# Patient Record
Sex: MALE | Race: WHITE | Employment: OTHER | URBAN - METROPOLITAN AREA
[De-identification: names, ages, dates, MRNs, and addresses within clinical notes are randomized per-mention and may not be internally consistent; named-entity substitution may affect disease eponyms.]

---

## 2023-02-16 ENCOUNTER — OFFICE VISIT (OUTPATIENT)
Dept: INTERNAL MEDICINE CLINIC | Age: 73
End: 2023-02-16
Payer: COMMERCIAL

## 2023-02-16 VITALS
OXYGEN SATURATION: 98 % | HEART RATE: 75 BPM | WEIGHT: 155.6 LBS | HEIGHT: 70 IN | DIASTOLIC BLOOD PRESSURE: 76 MMHG | SYSTOLIC BLOOD PRESSURE: 128 MMHG | BODY MASS INDEX: 22.28 KG/M2

## 2023-02-16 DIAGNOSIS — G45.9 TIA (TRANSIENT ISCHEMIC ATTACK): ICD-10-CM

## 2023-02-16 DIAGNOSIS — E53.8 VITAMIN B 12 DEFICIENCY: Primary | ICD-10-CM

## 2023-02-16 DIAGNOSIS — Z91.81 AT HIGH RISK FOR FALLS: ICD-10-CM

## 2023-02-16 DIAGNOSIS — I10 PRIMARY HYPERTENSION: ICD-10-CM

## 2023-02-16 DIAGNOSIS — L98.9 SKIN LESION: ICD-10-CM

## 2023-02-16 DIAGNOSIS — I25.83 CORONARY ARTERY DISEASE DUE TO LIPID RICH PLAQUE: ICD-10-CM

## 2023-02-16 DIAGNOSIS — F17.200 SMOKER: ICD-10-CM

## 2023-02-16 DIAGNOSIS — G21.9 SECONDARY PARKINSONISM, UNSPECIFIED SECONDARY PARKINSONISM TYPE (HCC): ICD-10-CM

## 2023-02-16 DIAGNOSIS — E78.5 HYPERLIPIDEMIA, UNSPECIFIED HYPERLIPIDEMIA TYPE: ICD-10-CM

## 2023-02-16 DIAGNOSIS — I25.10 CORONARY ARTERY DISEASE DUE TO LIPID RICH PLAQUE: ICD-10-CM

## 2023-02-16 DIAGNOSIS — I48.91 ATRIAL FIBRILLATION, UNSPECIFIED TYPE (HCC): ICD-10-CM

## 2023-02-16 PROCEDURE — 3074F SYST BP LT 130 MM HG: CPT | Performed by: INTERNAL MEDICINE

## 2023-02-16 PROCEDURE — 1123F ACP DISCUSS/DSCN MKR DOCD: CPT | Performed by: INTERNAL MEDICINE

## 2023-02-16 PROCEDURE — 3078F DIAST BP <80 MM HG: CPT | Performed by: INTERNAL MEDICINE

## 2023-02-16 PROCEDURE — 99204 OFFICE O/P NEW MOD 45 MIN: CPT | Performed by: INTERNAL MEDICINE

## 2023-02-16 RX ORDER — MIRTAZAPINE 30 MG/1
TABLET, FILM COATED ORAL
COMMUNITY
Start: 2023-01-16

## 2023-02-16 RX ORDER — LEVOTHYROXINE SODIUM 0.07 MG/1
75 TABLET ORAL DAILY
COMMUNITY

## 2023-02-16 RX ORDER — MELOXICAM 7.5 MG/1
7.5 TABLET ORAL DAILY
COMMUNITY

## 2023-02-16 RX ORDER — MULTIVIT-MIN/FERROUS FUMARATE 9 MG/15 ML
LIQUID (ML) ORAL
COMMUNITY

## 2023-02-16 RX ORDER — AMLODIPINE BESYLATE 5 MG/1
TABLET ORAL
COMMUNITY
Start: 2023-01-31

## 2023-02-16 RX ORDER — ASPIRIN 81 MG/1
81 TABLET ORAL DAILY
COMMUNITY

## 2023-02-16 RX ORDER — SUMATRIPTAN 50 MG/1
TABLET, FILM COATED ORAL
COMMUNITY
Start: 2023-01-28

## 2023-02-16 RX ORDER — EVOLOCUMAB 140 MG/ML
INJECTION, SOLUTION SUBCUTANEOUS
COMMUNITY

## 2023-02-16 RX ORDER — CLOPIDOGREL BISULFATE 75 MG/1
TABLET ORAL
COMMUNITY
Start: 2023-02-12

## 2023-02-16 RX ORDER — TRAMADOL HYDROCHLORIDE 50 MG/1
TABLET ORAL
COMMUNITY
Start: 2023-01-20

## 2023-02-16 RX ORDER — RANOLAZINE 1000 MG/1
500 TABLET, EXTENDED RELEASE ORAL 2 TIMES DAILY
COMMUNITY

## 2023-02-16 RX ORDER — UBIDECARENONE 100 MG
100 CAPSULE ORAL DAILY
COMMUNITY

## 2023-02-16 RX ORDER — PREGABALIN 75 MG/1
CAPSULE ORAL
COMMUNITY
Start: 2023-01-18

## 2023-02-16 SDOH — ECONOMIC STABILITY: HOUSING INSECURITY
IN THE LAST 12 MONTHS, WAS THERE A TIME WHEN YOU DID NOT HAVE A STEADY PLACE TO SLEEP OR SLEPT IN A SHELTER (INCLUDING NOW)?: NO

## 2023-02-16 SDOH — ECONOMIC STABILITY: INCOME INSECURITY: HOW HARD IS IT FOR YOU TO PAY FOR THE VERY BASICS LIKE FOOD, HOUSING, MEDICAL CARE, AND HEATING?: NOT HARD AT ALL

## 2023-02-16 SDOH — ECONOMIC STABILITY: FOOD INSECURITY: WITHIN THE PAST 12 MONTHS, YOU WORRIED THAT YOUR FOOD WOULD RUN OUT BEFORE YOU GOT MONEY TO BUY MORE.: NEVER TRUE

## 2023-02-16 SDOH — ECONOMIC STABILITY: FOOD INSECURITY: WITHIN THE PAST 12 MONTHS, THE FOOD YOU BOUGHT JUST DIDN'T LAST AND YOU DIDN'T HAVE MONEY TO GET MORE.: NEVER TRUE

## 2023-02-16 ASSESSMENT — ENCOUNTER SYMPTOMS
SHORTNESS OF BREATH: 0
CONSTIPATION: 0
DIARRHEA: 0
APNEA: 0
CHEST TIGHTNESS: 0
COUGH: 0
WHEEZING: 0
COLOR CHANGE: 0
ABDOMINAL PAIN: 0
BACK PAIN: 0
FACIAL SWELLING: 0
ABDOMINAL DISTENTION: 0

## 2023-02-16 ASSESSMENT — PATIENT HEALTH QUESTIONNAIRE - PHQ9
SUM OF ALL RESPONSES TO PHQ QUESTIONS 1-9: 0
SUM OF ALL RESPONSES TO PHQ9 QUESTIONS 1 & 2: 0
2. FEELING DOWN, DEPRESSED OR HOPELESS: 0
1. LITTLE INTEREST OR PLEASURE IN DOING THINGS: 0

## 2023-02-16 NOTE — PROGRESS NOTES
Subjective:      Patient ID: Estanislao Jeans is a 75 y.o. male. HPI-patient is here to establish care, he has multiple medical problems include hypertension, coronary artery disease s/p CABG in 2014, multiple stents in 2022, hyperlipidemia, history of TIA, atrial fibrillation not on anticoagulation with history of bleeding, Parkinson disease, chronic headache, likely chronic migraine, follows with [facility] , get botox  Patient, not taking his Repatha, he told me he has allergic reaction at the injection site,  Feeling very week, he was recently admitted to [facility]  Had extensive testing, seen by neurologist, diagnosed with Parkinson disease  Started on Sinemet  [facility] for his chronic headache  Patient since started on this medication he feels that his tremors are better, and his balance is improved  He has chronic rash on right side of his face, requesting Derm referral  Patient has seen structural heart physician on 10/2, for consideration of Watchman device, he will have ALKA soon, he cannot take blood thinners with history of bleeding    Review of Systems   Constitutional:  Positive for fatigue. Negative for activity change, appetite change, chills and diaphoresis. HENT:  Negative for congestion, dental problem, ear discharge, facial swelling and hearing loss. Respiratory:  Negative for apnea, cough, chest tightness, shortness of breath and wheezing. Cardiovascular:  Negative for chest pain and leg swelling. Gastrointestinal:  Negative for abdominal distention, abdominal pain, constipation and diarrhea. Genitourinary:  Negative for difficulty urinating, dysuria, enuresis, flank pain and frequency. Musculoskeletal:  Positive for gait problem. Negative for arthralgias, back pain and joint swelling. Skin:  Positive for rash (Right side of face). Negative for color change and pallor.    Neurological:  Positive for tremors and headaches. Negative for dizziness, seizures, facial asymmetry, light-headedness and numbness. Psychiatric/Behavioral:  Negative for agitation, behavioral problems, confusion, decreased concentration and dysphoric mood. Objective:   Physical Exam  Vitals and nursing note reviewed. Constitutional:       General: He is not in acute distress. Appearance: He is well-developed. He is not diaphoretic. HENT:      Head: Normocephalic and atraumatic. Mouth/Throat:      Pharynx: No oropharyngeal exudate. Eyes:      General: No scleral icterus. Right eye: No discharge. Left eye: No discharge. Conjunctiva/sclera: Conjunctivae normal.      Pupils: Pupils are equal, round, and reactive to light. Neck:      Thyroid: No thyromegaly. Vascular: No JVD. Trachea: No tracheal deviation. Cardiovascular:      Rate and Rhythm: Normal rate. Heart sounds: Normal heart sounds. No murmur heard. No gallop. Pulmonary:      Effort: Pulmonary effort is normal. No respiratory distress. Breath sounds: Normal breath sounds. No stridor. No wheezing or rales. Abdominal:      General: Bowel sounds are normal. There is no distension. Palpations: Abdomen is soft. Tenderness: There is no abdominal tenderness. There is no guarding or rebound. Musculoskeletal:         General: No tenderness. Normal range of motion. Cervical back: Normal range of motion and neck supple. Skin:     General: Skin is warm and dry. Findings: Rash (Hyperpigmented, nontender rash around  right cheek) present. No erythema. Neurological:      Mental Status: He is alert and oriented to person, place, and time.        Socioeconomic History    Marital status:      Spouse name: None    Number of children: None    Years of education: None    Highest education level: None   Tobacco Use    Smoking status: Never    Smokeless tobacco: Never   Substance and Sexual Activity    Alcohol use: Never    Drug use: Never     Social Determinants of Health     Financial Resource Strain: Low Risk     Difficulty of Paying Living Expenses: Not hard at all   Food Insecurity: No Food Insecurity    Worried About [address] in the Last Year: Never true    Ran Out of Food in the Last Year: Never true   Transportation Needs: Unknown    Lack of Transportation (Non-Medical): No   Housing Stability: Unknown    Unstable Housing in the Last Year: No        No family history on file. 1. Vitamin B 12 deficiency  - cyanocobalamin ([employer] VITAMIN B12) 1000 MCG tablet; Take 1 tablet by mouth daily  Dispense: 30 tablet; Refill: 3    2. Smoker  Advise to quit smoking     3. Coronary artery disease due to lipid rich plaque  S/p CABG and Multiple stents     4. Secondary parkinsonism, unspecified secondary Parkinsonism type ([address]  Recent eval by neurologist on Sinemet    5. Hyperlipidemia, unspecified hyperlipidemia type  Last LDL was more than 180, history of multiple coronary events, need to continue with Repatha  Only allergic reaction risk  is skin reaction    6. At high risk for falls  - Amb External Referral To Home Health    7. Primary hypertension  Controlled  8. TIA (transient ischemic attack)  On aspirin    9. Atrial fibrillation, unspecified type [facility]  Patient, had episode of bleeding with Eliquis he stopped it, was recently evaluated by cardiologist, will have ALKA and Watchman device    10. Skin lesion  - Georgia Link MD, Dermatology, [place]      Return in about 6 weeks (around 3/30/2023). Reviewed prior labs and health maintenance. Discussed use, benefit, and side effects of prescribed medications. Barriers to medication compliance addressed. All patient questions answered. Pt voiced understanding. MD TULIO [facility]  2/16/2023, 5:20 PM    Please note that this chart was generated using voice recognition Dragon dictation software.   Although every effort was made to ensure the accuracy of this automated transcription, some errors in transcription may have occurred. Assessment / Plan:    Visit Information    Have you changed or started any medications since your last visit including any over-the-counter medicines, vitamins, or herbal medicines? no   Are you having any side effects from any of your medications? -  no  Have you stopped taking any of your medications? Is so, why? -  no    Have you seen any other physician or provider since your last visit? No  Have you had any other diagnostic tests since your last visit? No  Have you been seen in the emergency room and/or had an admission to a hospital since we last saw you? No  Have you had your routine dental cleaning in the past 6 months? no    Have you activated your [address] account? If not, what are your barriers? No:      Patient Care Team:  Elwin Aase, MD as PCP - General (Internal Medicine)    Medical History Review  Past Medical, Family, and Social History reviewed and does not contribute to the patient presenting condition    Health Maintenance   Topic Date Due    Depression Screen  Never done    Hepatitis C screen  Never done    Colorectal Cancer Screen  Never done    Shingles vaccine (1 of 2) Never done    Pneumococcal 65+ years Vaccine (2 - PPSV23 if available, else PCV20) 11/27/2020    COVID-19 Vaccine (3 - Booster for Katrin series) 05/30/2022    DTaP/Tdap/Td vaccine (2 - Td or Tdap) 12/22/2026    Lipids  12/07/2027    Flu vaccine  Completed    Hepatitis A vaccine  Aged Out    Hib vaccine  Aged Out    Meningococcal (ACWY) vaccine  Aged Out              On the basis of positive falls risk screening, assessment and plan is as follows: home safety tips provided.

## 2023-03-06 RX ORDER — ASPIRIN 81 MG
TABLET, DELAYED RELEASE (ENTERIC COATED) ORAL
Qty: 90 TABLET | Refills: 2 | Status: SHIPPED | OUTPATIENT
Start: 2023-03-06 | End: 2023-03-08 | Stop reason: SDUPTHER

## 2023-03-08 DIAGNOSIS — E53.8 VITAMIN B 12 DEFICIENCY: ICD-10-CM

## 2023-03-08 RX ORDER — ASPIRIN 81 MG
TABLET, DELAYED RELEASE (ENTERIC COATED) ORAL
Qty: 90 TABLET | Refills: 2 | Status: SHIPPED | OUTPATIENT
Start: 2023-03-08

## 2023-03-08 NOTE — TELEPHONE ENCOUNTER
----- Message from Osvaldo Richardson sent at 3/8/2023 10:35 AM EST -----  Subject: Refill Request    QUESTIONS  Name of Medication? carbidopa-levodopa (SINEMET)  MG per tablet  Patient-reported dosage and instructions?  MG per tablet  How many days do you have left? 0  [id number]  Pharmacy phone number (if available)? [phone]  ---------------------------------------------------------------------------  --------------,  Name of Medication? cyanocobalamin ([employer] VITAMIN B12) 1000 MCG tablet  Patient-reported dosage and instructions? 1000 MCG tablet  How many days do you have left? 0  [id number]  Pharmacy phone number (if available)? [phone]  ---------------------------------------------------------------------------  --------------,  Name of Medication? Other - Thera - M multivitamin & iron product  Patient-reported dosage and instructions? Thera-M 9 mg iron-400 mcg tablet  How many days do you have left? 0  [id number]  Pharmacy phone number (if available)? [phone]  ---------------------------------------------------------------------------  --------------  CALL BACK INFO  What is the best way for the office to contact you? OK to leave message on   voicemail  Preferred Call Back Phone Number? [phone]  ---------------------------------------------------------------------------  --------------  SCRIPT ANSWERS  Relationship to Patient? Spouse/Partner  Representative Name? Neeraj Able  Is the representative on the Communication Release of Information (FAUSTO)   form in Epic?  Yes

## 2023-03-30 ENCOUNTER — OFFICE VISIT (OUTPATIENT)
Dept: INTERNAL MEDICINE CLINIC | Age: 73
End: 2023-03-30

## 2023-03-30 VITALS
SYSTOLIC BLOOD PRESSURE: 120 MMHG | OXYGEN SATURATION: 98 % | HEIGHT: 70 IN | HEART RATE: 86 BPM | WEIGHT: 173.4 LBS | DIASTOLIC BLOOD PRESSURE: 70 MMHG | BODY MASS INDEX: 24.82 KG/M2

## 2023-03-30 DIAGNOSIS — I25.10 CORONARY ARTERY DISEASE DUE TO LIPID RICH PLAQUE: ICD-10-CM

## 2023-03-30 DIAGNOSIS — E53.8 VITAMIN B 12 DEFICIENCY: ICD-10-CM

## 2023-03-30 DIAGNOSIS — I10 PRIMARY HYPERTENSION: ICD-10-CM

## 2023-03-30 DIAGNOSIS — I48.91 ATRIAL FIBRILLATION, UNSPECIFIED TYPE (HCC): ICD-10-CM

## 2023-03-30 DIAGNOSIS — Z12.11 COLON CANCER SCREENING: Primary | ICD-10-CM

## 2023-03-30 DIAGNOSIS — I25.83 CORONARY ARTERY DISEASE DUE TO LIPID RICH PLAQUE: ICD-10-CM

## 2023-03-30 ASSESSMENT — ENCOUNTER SYMPTOMS
ABDOMINAL DISTENTION: 0
CONSTIPATION: 0
COUGH: 0
WHEEZING: 0
SHORTNESS OF BREATH: 0
FACIAL SWELLING: 0
APNEA: 0
BACK PAIN: 1
DIARRHEA: 0
COLOR CHANGE: 0
CHEST TIGHTNESS: 0
ABDOMINAL PAIN: 0

## 2023-03-30 NOTE — PROGRESS NOTES
Subjective:      Patient ID: Jyoti León is a 75 y.o. male. Patient is here for evaluation of  multiple medical problems include hypertension, coronary artery disease s/p CABG in 2014, multiple stents in 2022, hyperlipidemia, history of TIA, atrial fibrillation not on anticoagulation with history of bleeding, Parkinson disease,  Follows with [facility] , Chronic Migraine , which is Controlled   Seen Back surgeon for Chronic back Pain,ordered MRI lower back, on Pain Meds from pain management   Had ALKA , will see Cardiologist Soon for watchman Device   Has Diagnosed with Prostate  Cancer Follows with Urologist at [facility] . Last PSA was 2.2   Has just one low readings of Low BP,he has Parkinson Dz   Has Vitamin B12 being Low   Review of Systems   Constitutional:  Positive for fatigue. Negative for activity change, appetite change, chills and diaphoresis. HENT:  Negative for congestion, dental problem, ear discharge, facial swelling and hearing loss. Respiratory:  Negative for apnea, cough, chest tightness, shortness of breath and wheezing. Cardiovascular:  Negative for chest pain and leg swelling. Gastrointestinal:  Negative for abdominal distention, abdominal pain, constipation and diarrhea. Genitourinary:  Negative for difficulty urinating, dysuria, enuresis, flank pain and frequency. Musculoskeletal:  Positive for back pain and gait problem. Negative for arthralgias and joint swelling. Skin:  Negative for color change, pallor and rash. Neurological:  Positive for tremors and headaches. Negative for dizziness, seizures, facial asymmetry, light-headedness and numbness. Psychiatric/Behavioral:  Negative for agitation, behavioral problems, confusion, decreased concentration and dysphoric mood. Objective:   Physical Exam  Vitals and nursing note reviewed. Constitutional:       General: He is not in acute distress. Appearance: He is well-developed. He is not diaphoretic.    HENT:      Head:

## 2023-07-05 ENCOUNTER — OFFICE VISIT (OUTPATIENT)
Dept: INTERNAL MEDICINE CLINIC | Age: 73
End: 2023-07-05
Payer: COMMERCIAL

## 2023-07-05 VITALS
DIASTOLIC BLOOD PRESSURE: 64 MMHG | OXYGEN SATURATION: 97 % | HEART RATE: 60 BPM | WEIGHT: 176 LBS | SYSTOLIC BLOOD PRESSURE: 120 MMHG | BODY MASS INDEX: 25.2 KG/M2 | HEIGHT: 70 IN

## 2023-07-05 DIAGNOSIS — I10 PRIMARY HYPERTENSION: ICD-10-CM

## 2023-07-05 DIAGNOSIS — E53.8 VITAMIN B12 DEFICIENCY: Primary | ICD-10-CM

## 2023-07-05 DIAGNOSIS — I25.83 CORONARY ARTERY DISEASE DUE TO LIPID RICH PLAQUE: ICD-10-CM

## 2023-07-05 DIAGNOSIS — I25.10 CORONARY ARTERY DISEASE DUE TO LIPID RICH PLAQUE: ICD-10-CM

## 2023-07-05 DIAGNOSIS — I48.91 ATRIAL FIBRILLATION, UNSPECIFIED TYPE (HCC): ICD-10-CM

## 2023-07-05 PROCEDURE — 99214 OFFICE O/P EST MOD 30 MIN: CPT | Performed by: INTERNAL MEDICINE

## 2023-07-05 PROCEDURE — 3074F SYST BP LT 130 MM HG: CPT | Performed by: INTERNAL MEDICINE

## 2023-07-05 PROCEDURE — 1123F ACP DISCUSS/DSCN MKR DOCD: CPT | Performed by: INTERNAL MEDICINE

## 2023-07-05 PROCEDURE — 3078F DIAST BP <80 MM HG: CPT | Performed by: INTERNAL MEDICINE

## 2023-07-05 ASSESSMENT — PATIENT HEALTH QUESTIONNAIRE - PHQ9
SUM OF ALL RESPONSES TO PHQ QUESTIONS 1-9: 0
SUM OF ALL RESPONSES TO PHQ QUESTIONS 1-9: 0
1. LITTLE INTEREST OR PLEASURE IN DOING THINGS: 0
SUM OF ALL RESPONSES TO PHQ9 QUESTIONS 1 & 2: 0
SUM OF ALL RESPONSES TO PHQ QUESTIONS 1-9: 0
2. FEELING DOWN, DEPRESSED OR HOPELESS: 0
SUM OF ALL RESPONSES TO PHQ QUESTIONS 1-9: 0

## 2023-07-05 ASSESSMENT — ENCOUNTER SYMPTOMS
COLOR CHANGE: 0
DIARRHEA: 0
FACIAL SWELLING: 0
BACK PAIN: 1
CONSTIPATION: 0
COUGH: 0
ABDOMINAL DISTENTION: 0
CHEST TIGHTNESS: 0
SHORTNESS OF BREATH: 0
ABDOMINAL PAIN: 0
APNEA: 0
WHEEZING: 0

## 2023-07-05 NOTE — PROGRESS NOTES
Subjective:      Patient ID: Tay Hernandez is a 75 y.o. male. HPIPatient is here for evaluation of  multiple medical problems include hypertension, CKD-3 , coronary artery disease s/p CABG in 2014, multiple stents in 2022, hyperlipidemia, history of TIA, atrial fibrillation not on anticoagulation with history of bleeding, Parkinson disease, ( follows with [facility]) , his dose of sinemet is increased   Follows with [facility] , Chronic Migraine , which is Controlled   Seen Back surgeon for Chronic back Pain,ordered MRI lower back, on Pain Meds from pain management   Has Diagnosed with Prostate  Cancer Follows with Urologist at [facility] . Last PSA was 2.2   Seen cardiologist , will have Watchman Device after Colonoscopy and Endoscopy , ( which is already scheduled on 8/2) , with H/o GI Bleed   He feels that his memory is getting worse , more concerned about short term Memmory , has H/o VItamin b12 def   Review of Systems   Constitutional:  Positive for fatigue. Negative for activity change, appetite change, chills and diaphoresis. HENT:  Negative for congestion, dental problem, ear discharge, facial swelling and hearing loss. Respiratory:  Negative for apnea, cough, chest tightness, shortness of breath and wheezing. Cardiovascular:  Negative for chest pain and leg swelling. Gastrointestinal:  Negative for abdominal distention, abdominal pain, constipation and diarrhea. Genitourinary:  Negative for difficulty urinating, dysuria, enuresis, flank pain and frequency. Musculoskeletal:  Positive for back pain and gait problem. Negative for arthralgias and joint swelling. Skin:  Negative for color change, pallor and rash. Neurological:  Positive for tremors and headaches. Negative for dizziness, seizures, facial asymmetry, light-headedness and numbness. Psychiatric/Behavioral:  Negative for agitation, behavioral problems, confusion, decreased concentration and dysphoric mood.       Objective:   Physical

## 2023-07-18 ENCOUNTER — TELEPHONE (OUTPATIENT)
Dept: INTERNAL MEDICINE CLINIC | Age: 73
End: 2023-07-18

## 2023-10-11 RX ORDER — LEVOTHYROXINE SODIUM 0.07 MG/1
75 TABLET ORAL DAILY
Qty: 30 TABLET | Refills: 1 | Status: SHIPPED | OUTPATIENT
Start: 2023-10-11

## 2023-10-11 NOTE — TELEPHONE ENCOUNTER
Patient called stating the pharmacy told him they sent a request for this medication and they did not.   Patient has been out of medication since last Thursday

## 2023-10-13 ENCOUNTER — TELEPHONE (OUTPATIENT)
Dept: DERMATOLOGY | Age: 73
End: 2023-10-13

## 2023-10-13 NOTE — TELEPHONE ENCOUNTER
I was unable to confirm Dermatology appointment scheduled for 10/16/2023. The patient was unavailable and the voicemail was full.

## 2023-11-06 ENCOUNTER — TELEPHONE (OUTPATIENT)
Dept: INTERNAL MEDICINE CLINIC | Age: 73
End: 2023-11-06

## 2023-11-06 NOTE — TELEPHONE ENCOUNTER
----- Message from Triceyusufkristofer Monday sent at 11/6/2023  1:03 PM EST -----  Subject: Appointment Request    Reason for Call: Established Patient Appointment needed: Routine Existing   Condition Follow Up    QUESTIONS    Reason for appointment request? No appointments available during search     Additional Information for Provider? patient needs follow-up appt with   Rupali Schultz to go over his blood work that needs to be repeated.   ---------------------------------------------------------------------------  --------------  Concha Thomas [facility]  [phone]; OK to leave message on voicemail  ---------------------------------------------------------------------------  --------------  SCRIPT ANSWERS

## 2023-11-07 RX ORDER — LEVOTHYROXINE SODIUM 0.07 MG/1
75 TABLET ORAL DAILY
Qty: 90 TABLET | Refills: 4 | Status: SHIPPED | OUTPATIENT
Start: 2023-11-07

## 2023-11-15 ENCOUNTER — TELEPHONE (OUTPATIENT)
Dept: INTERNAL MEDICINE CLINIC | Age: 73
End: 2023-11-15

## 2023-11-15 NOTE — TELEPHONE ENCOUNTER
----- Message from Juan Pablo Wasserman sent at 11/15/2023 12:57 PM EST -----  Subject: Message to Provider    QUESTIONS  Information for Provider? Patient had destinman put in yesterday. Says he   already feels better. ---------------------------------------------------------------------------  --------------  Maritza Hunter [facility]  [phone]; OK to leave message on voicemail  ---------------------------------------------------------------------------  --------------  SCRIPT ANSWERS  Relationship to Patient?  Self

## 2023-12-11 RX ORDER — ASPIRIN 81 MG
TABLET, DELAYED RELEASE (ENTERIC COATED) ORAL
Qty: 90 TABLET | Refills: 2 | Status: SHIPPED | OUTPATIENT
Start: 2023-12-11

## 2023-12-26 ENCOUNTER — TELEPHONE (OUTPATIENT)
Dept: INTERNAL MEDICINE CLINIC | Age: 73
End: 2023-12-26

## 2023-12-26 NOTE — TELEPHONE ENCOUNTER
Informed patient to go to the Er or urgent care as we have no available appts.  Patient states he is going to go to [facility]

## 2024-01-02 ENCOUNTER — OFFICE VISIT (OUTPATIENT)
Dept: INTERNAL MEDICINE CLINIC | Age: 74
End: 2024-01-02
Payer: COMMERCIAL

## 2024-01-02 VITALS
DIASTOLIC BLOOD PRESSURE: 62 MMHG | BODY MASS INDEX: 20.76 KG/M2 | SYSTOLIC BLOOD PRESSURE: 90 MMHG | WEIGHT: 145 LBS | HEIGHT: 70 IN | HEART RATE: 98 BPM | OXYGEN SATURATION: 99 %

## 2024-01-02 DIAGNOSIS — R42 DIZZINESS: ICD-10-CM

## 2024-01-02 DIAGNOSIS — R53.83 OTHER FATIGUE: ICD-10-CM

## 2024-01-02 DIAGNOSIS — E86.0 DEHYDRATION: ICD-10-CM

## 2024-01-02 DIAGNOSIS — R05.2 SUBACUTE COUGH: Primary | ICD-10-CM

## 2024-01-02 DIAGNOSIS — Z13.220 LIPID SCREENING: ICD-10-CM

## 2024-01-02 PROCEDURE — 99214 OFFICE O/P EST MOD 30 MIN: CPT | Performed by: INTERNAL MEDICINE

## 2024-01-02 PROCEDURE — 1123F ACP DISCUSS/DSCN MKR DOCD: CPT | Performed by: INTERNAL MEDICINE

## 2024-01-02 RX ORDER — POTASSIUM CHLORIDE 750 MG/1
10 TABLET, FILM COATED, EXTENDED RELEASE ORAL EVERY MORNING
COMMUNITY
Start: 2023-11-10

## 2024-01-02 RX ORDER — DOXYCYCLINE HYCLATE 100 MG
100 TABLET ORAL 2 TIMES DAILY
Qty: 14 TABLET | Refills: 0 | Status: SHIPPED | OUTPATIENT
Start: 2024-01-02 | End: 2024-01-09

## 2024-01-02 RX ORDER — ONDANSETRON 4 MG/1
4 TABLET, ORALLY DISINTEGRATING ORAL EVERY 8 HOURS PRN
Qty: 21 TABLET | Refills: 0 | Status: SHIPPED | OUTPATIENT
Start: 2024-01-02

## 2024-01-02 ASSESSMENT — PATIENT HEALTH QUESTIONNAIRE - PHQ9
SUM OF ALL RESPONSES TO PHQ9 QUESTIONS 1 & 2: 0
2. FEELING DOWN, DEPRESSED OR HOPELESS: 0
SUM OF ALL RESPONSES TO PHQ QUESTIONS 1-9: 0
SUM OF ALL RESPONSES TO PHQ QUESTIONS 1-9: 0
1. LITTLE INTEREST OR PLEASURE IN DOING THINGS: 0
SUM OF ALL RESPONSES TO PHQ QUESTIONS 1-9: 0
SUM OF ALL RESPONSES TO PHQ QUESTIONS 1-9: 0

## 2024-01-02 NOTE — PROGRESS NOTES
Carb-Cholecalciferol 500-10 MG-MCG TABS Take 1 tablet by mouth (Patient not taking: Reported on 4/14/2023)      apixaban (ELIQUIS) 5 MG TABS tablet Take 1 tablet by mouth (Patient not taking: Reported on 4/14/2023)      ketoconazole (NIZORAL) 2 % cream Apply to feet BID (Patient not taking: Reported on 7/5/2023) 60 g 5    terbinafine (LAMISIL) 250 MG tablet Take 1 tablet daily.  Repeat this one week per month. (Patient not taking: Reported on 7/5/2023) 7 tablet 5    mirtazapine (REMERON) 30 MG tablet  (Patient not taking: Reported on 1/2/2024)      aspirin EC 81 MG EC tablet Take 81 mg by mouth daily (Patient not taking: Reported on 4/14/2023)      Cholecalciferol (VITAMIN D3) 30 MCG/15ML LIQD Take by mouth (Patient not taking: Reported on 4/14/2023)      meloxicam (MOBIC) 7.5 MG tablet Take 1 tablet by mouth daily (Patient not taking: Reported on 1/2/2024)      Evolocumab (REPATHA) SOSY syringe Inject into the skin (Patient not taking: Reported on 1/2/2024)       No current facility-administered medications for this visit.         OBJECTIVE:  Vitals:    01/02/24 1702   BP: 90/62   Pulse: 98   SpO2: 99%     Body mass index is 20.81 kg/m².        General exam (except above):  General appearance - well appearing, alert, in no acute distress  Head - Atraumatic, normocephalic  Eyes - EOMI, no jaundice or pallor  Lungs - Lungs clear to auscultation.  No wheezing, rhonchi, rales  Heart - RRR without murmur, gallop, or rubs.  No ectopy  Abdomen - Abdomen soft, non-tender. Bowel sounds normal. No masses, organomegaly  Extremities -No significant edema, or skin discoloration. Good capillary refill.  Neuro - Pt Alert, awake and oriented x 3. No gross focal neurological deficits    ASSESSMENT AND PLAN (MEDICAL DECISION MAKING):    Rowdy was seen today for fatigue and dizziness.    Diagnoses and all orders for this visit:    Subacute cough  -     Lipid, Fasting; Future  -     TSH With Reflex Ft4; Future  -     CBC; Future  -

## 2024-01-06 ENCOUNTER — HOSPITAL ENCOUNTER (OUTPATIENT)
Age: 74
Discharge: HOME | End: 2024-01-06
Payer: COMMERCIAL

## 2024-01-06 ENCOUNTER — HOSPITAL ENCOUNTER (OUTPATIENT)
Dept: GENERAL RADIOLOGY | Age: 74
End: 2024-01-06
Payer: COMMERCIAL

## 2024-01-06 ENCOUNTER — HOSPITAL ENCOUNTER (OUTPATIENT)
Age: 74
End: 2024-01-06
Payer: COMMERCIAL

## 2024-01-06 DIAGNOSIS — Z13.220 LIPID SCREENING: ICD-10-CM

## 2024-01-06 DIAGNOSIS — R05.2 SUBACUTE COUGH: ICD-10-CM

## 2024-01-06 DIAGNOSIS — R42 DIZZINESS: ICD-10-CM

## 2024-01-06 DIAGNOSIS — R53.83 OTHER FATIGUE: ICD-10-CM

## 2024-01-06 LAB
ALBUMIN SERPL-MCNC: 4.1 G/DL (ref 3.5–5.2)
ALP SERPL-CCNC: 98 U/L (ref 40–129)
ALT SERPL-CCNC: <5 U/L (ref 5–41)
ANION GAP SERPL CALCULATED.3IONS-SCNC: 13 MMOL/L (ref 9–17)
AST SERPL-CCNC: 17 U/L
BILIRUB SERPL-MCNC: 0.3 MG/DL (ref 0.3–1.2)
BUN SERPL-MCNC: 20 MG/DL (ref 8–23)
CALCIUM SERPL-MCNC: 9 MG/DL (ref 8.6–10.4)
CHLORIDE SERPL-SCNC: 98 MMOL/L (ref 98–107)
CHOLEST SERPL-MCNC: 307 MG/DL
CHOLESTEROL/HDL RATIO: 8.8
CO2 SERPL-SCNC: 25 MMOL/L (ref 20–31)
CREAT SERPL-MCNC: 1.3 MG/DL (ref 0.7–1.2)
ERYTHROCYTE [DISTWIDTH] IN BLOOD BY AUTOMATED COUNT: 13.6 % (ref 11.5–14.9)
GFR SERPL CREATININE-BSD FRML MDRD: 58 ML/MIN/1.73M2
GLUCOSE SERPL-MCNC: 96 MG/DL (ref 70–99)
HCT VFR BLD AUTO: 37.5 % (ref 41–53)
HDLC SERPL-MCNC: 35 MG/DL
HGB BLD-MCNC: 12.5 G/DL (ref 13.5–17.5)
LDLC SERPL CALC-MCNC: 221 MG/DL (ref 0–130)
MCH RBC QN AUTO: 31.3 PG (ref 26–34)
MCHC RBC AUTO-ENTMCNC: 33.4 G/DL (ref 31–37)
MCV RBC AUTO: 93.9 FL (ref 80–100)
PLATELET # BLD AUTO: 412 K/UL (ref 150–450)
PMV BLD AUTO: 10 FL (ref 6–12)
POTASSIUM SERPL-SCNC: 4.2 MMOL/L (ref 3.7–5.3)
PROT SERPL-MCNC: 7.3 G/DL (ref 6.4–8.3)
RBC # BLD AUTO: 4 M/UL (ref 4.5–5.9)
SODIUM SERPL-SCNC: 136 MMOL/L (ref 135–144)
TRIGL SERPL-MCNC: 255 MG/DL
TSH SERPL DL<=0.05 MIU/L-ACNC: 2.01 UIU/ML (ref 0.3–5)
WBC OTHER # BLD: 11.7 K/UL (ref 3.5–11)

## 2024-01-06 PROCEDURE — 80053 COMPREHEN METABOLIC PANEL: CPT

## 2024-01-06 PROCEDURE — 85027 COMPLETE CBC AUTOMATED: CPT

## 2024-01-06 PROCEDURE — 84443 ASSAY THYROID STIM HORMONE: CPT

## 2024-01-06 PROCEDURE — 36415 COLL VENOUS BLD VENIPUNCTURE: CPT

## 2024-01-06 PROCEDURE — 80061 LIPID PANEL: CPT

## 2024-01-06 PROCEDURE — 71046 X-RAY EXAM CHEST 2 VIEWS: CPT

## 2024-01-16 ENCOUNTER — OFFICE VISIT (OUTPATIENT)
Dept: INTERNAL MEDICINE CLINIC | Age: 74
End: 2024-01-16

## 2024-01-16 VITALS
BODY MASS INDEX: 21.62 KG/M2 | SYSTOLIC BLOOD PRESSURE: 106 MMHG | OXYGEN SATURATION: 97 % | DIASTOLIC BLOOD PRESSURE: 62 MMHG | WEIGHT: 151 LBS | HEART RATE: 77 BPM | HEIGHT: 70 IN

## 2024-01-16 DIAGNOSIS — R53.83 OTHER FATIGUE: ICD-10-CM

## 2024-01-16 DIAGNOSIS — R71.0 DROP IN HEMOGLOBIN: Primary | ICD-10-CM

## 2024-01-16 NOTE — PROGRESS NOTES
Difficulty of Paying Living Expenses: Not hard at all   Food Insecurity: Not on file (2/16/2023)   Transportation Needs: Unknown (2/16/2023)    PRAPARE - Transportation     Lack of Transportation (Medical): Not on file     Lack of Transportation (Non-Medical): No   Physical Activity: Not on file   Stress: Not on file   Social Connections: Not on file   Intimate Partner Violence: Not on file   Housing Stability: Unknown (2/16/2023)    Housing Stability Vital Sign     Unable to Pay for Housing in the Last Year: Not on file     Number of Places Lived in the Last Year: Not on file     Unstable Housing in the Last Year: No           CURRENT MEDICATIONS:  Current Outpatient Medications   Medication Sig Dispense Refill    potassium chloride (KLOR-CON) 10 MEQ extended release tablet Take 1 tablet by mouth every morning      ondansetron (ZOFRAN-ODT) 4 MG disintegrating tablet Take 1 tablet by mouth every 8 hours as needed for Nausea or Vomiting 21 tablet 0    Multiple Vitamins-Minerals (MULTIVITAMIN-MINERALS) TABS tablet take 1 tablet by mouth once daily 90 tablet 2    cyanocobalamin ([employer] VITAMIN B12) 1000 MCG tablet Take 1 tablet by mouth daily 30 tablet 3    ergocalciferol (ERGOCALCIFEROL) 1.25 MG (78267 UT) capsule Take 1 capsule by mouth once a week      aspirin 81 MG EC tablet Take 1 tablet by mouth daily      metoprolol tartrate (LOPRESSOR) 25 MG tablet Take 0.5 tablets by mouth 2 times daily      clopidogrel (PLAVIX) 75 MG tablet       pregabalin (LYRICA) 75 MG capsule       SUMAtriptan (IMITREX) 50 MG tablet       traMADol (ULTRAM) 50 MG tablet       coenzyme Q10 100 MG CAPS capsule Take 1 capsule by mouth daily      esomeprazole (NEXIUM) 20 MG delayed release capsule Take 1 capsule by mouth every morning (before breakfast)      levothyroxine (SYNTHROID) 75 MCG tablet Take 1 tablet by mouth Daily      Magnesium Chloride 70 MG TBEC Take by mouth      ranolazine (RANEXA) 1000 MG extended release tablet Take 500 mg by

## 2024-01-20 ENCOUNTER — HOSPITAL ENCOUNTER (OUTPATIENT)
Age: 74
Discharge: HOME | End: 2024-01-20
Payer: COMMERCIAL

## 2024-01-20 DIAGNOSIS — R71.0 DROP IN HEMOGLOBIN: ICD-10-CM

## 2024-01-20 DIAGNOSIS — R53.83 OTHER FATIGUE: ICD-10-CM

## 2024-01-20 LAB
ERYTHROCYTE [DISTWIDTH] IN BLOOD BY AUTOMATED COUNT: 14.6 % (ref 11.5–14.9)
HCT VFR BLD AUTO: 33.2 % (ref 41–53)
HGB BLD-MCNC: 11 G/DL (ref 13.5–17.5)
MCH RBC QN AUTO: 31.3 PG (ref 26–34)
MCHC RBC AUTO-ENTMCNC: 33 G/DL (ref 31–37)
MCV RBC AUTO: 95 FL (ref 80–100)
PLATELET # BLD AUTO: 207 K/UL (ref 150–450)
PMV BLD AUTO: 9.8 FL (ref 6–12)
RBC # BLD AUTO: 3.49 M/UL (ref 4.5–5.9)
WBC OTHER # BLD: 7.4 K/UL (ref 3.5–11)

## 2024-01-20 PROCEDURE — 85027 COMPLETE CBC AUTOMATED: CPT

## 2024-01-20 PROCEDURE — 36415 COLL VENOUS BLD VENIPUNCTURE: CPT

## 2024-01-27 ENCOUNTER — HOSPITAL ENCOUNTER (OUTPATIENT)
Age: 74
Setting detail: SPECIMEN
Discharge: HOME | End: 2024-01-27
Payer: COMMERCIAL

## 2024-01-27 LAB
DATE, STOOL #1: ABNORMAL
HEMOCCULT SP1 STL QL: POSITIVE
HEMOCCULT SP2 STL QL: NEGATIVE
HEMOCCULT SP3 STL QL: NEGATIVE
TIME, STOOL #1: 1500

## 2024-01-27 PROCEDURE — 82270 OCCULT BLOOD FECES: CPT

## 2024-01-29 DIAGNOSIS — K92.2 GASTROINTESTINAL HEMORRHAGE, UNSPECIFIED GASTROINTESTINAL HEMORRHAGE TYPE: Primary | ICD-10-CM

## 2024-01-31 ENCOUNTER — APPOINTMENT (OUTPATIENT)
Dept: GENERAL RADIOLOGY | Age: 74
DRG: 377 | End: 2024-01-31
Payer: COMMERCIAL

## 2024-01-31 ENCOUNTER — OFFICE VISIT (OUTPATIENT)
Dept: INTERNAL MEDICINE CLINIC | Age: 74
End: 2024-01-31
Payer: COMMERCIAL

## 2024-01-31 ENCOUNTER — APPOINTMENT (OUTPATIENT)
Dept: CT IMAGING | Age: 74
DRG: 377 | End: 2024-01-31
Payer: COMMERCIAL

## 2024-01-31 ENCOUNTER — HOSPITAL ENCOUNTER (INPATIENT)
Age: 74
LOS: 4 days | Discharge: HOME | DRG: 377 | End: 2024-02-04
Attending: EMERGENCY MEDICINE | Admitting: INTERNAL MEDICINE
Payer: COMMERCIAL

## 2024-01-31 VITALS
BODY MASS INDEX: 21.62 KG/M2 | SYSTOLIC BLOOD PRESSURE: 86 MMHG | HEIGHT: 70 IN | WEIGHT: 151 LBS | HEART RATE: 80 BPM | OXYGEN SATURATION: 96 % | DIASTOLIC BLOOD PRESSURE: 50 MMHG

## 2024-01-31 DIAGNOSIS — I95.9 HYPOTENSION, UNSPECIFIED HYPOTENSION TYPE: ICD-10-CM

## 2024-01-31 DIAGNOSIS — D53.8 OTHER SPECIFIED NUTRITIONAL ANEMIAS: ICD-10-CM

## 2024-01-31 DIAGNOSIS — I25.810 CORONARY ARTERY DISEASE INVOLVING CORONARY BYPASS GRAFT OF NATIVE HEART WITHOUT ANGINA PECTORIS: ICD-10-CM

## 2024-01-31 DIAGNOSIS — K92.2 GASTROINTESTINAL HEMORRHAGE, UNSPECIFIED GASTROINTESTINAL HEMORRHAGE TYPE: Primary | ICD-10-CM

## 2024-01-31 DIAGNOSIS — I48.20 CHRONIC ATRIAL FIBRILLATION (HCC): ICD-10-CM

## 2024-01-31 DIAGNOSIS — I95.1 ORTHOSTATIC HYPOTENSION: ICD-10-CM

## 2024-01-31 DIAGNOSIS — Z95.1 S/P CABG (CORONARY ARTERY BYPASS GRAFT): ICD-10-CM

## 2024-01-31 PROBLEM — D64.89 OTHER SPECIFIED ANEMIAS: Status: ACTIVE | Noted: 2024-01-31

## 2024-01-31 LAB
ALBUMIN SERPL-MCNC: 4.1 G/DL (ref 3.5–5.2)
ALP SERPL-CCNC: 104 U/L (ref 40–129)
ALT SERPL-CCNC: <5 U/L (ref 5–41)
ANION GAP SERPL CALCULATED.3IONS-SCNC: 15 MMOL/L (ref 9–17)
AST SERPL-CCNC: 28 U/L
BACTERIA URNS QL MICRO: ABNORMAL
BASOPHILS # BLD: 0.09 K/UL (ref 0–0.2)
BASOPHILS NFR BLD: 1 % (ref 0–2)
BILIRUB DIRECT SERPL-MCNC: 0.2 MG/DL
BILIRUB INDIRECT SERPL-MCNC: 0.1 MG/DL (ref 0–1)
BILIRUB SERPL-MCNC: 0.3 MG/DL (ref 0.3–1.2)
BILIRUB UR QL STRIP: ABNORMAL
BNP SERPL-MCNC: 213 PG/ML
BUN SERPL-MCNC: 17 MG/DL (ref 8–23)
CALCIUM SERPL-MCNC: 9.6 MG/DL (ref 8.6–10.4)
CASTS #/AREA URNS LPF: ABNORMAL /LPF
CHLORIDE SERPL-SCNC: 98 MMOL/L (ref 98–107)
CLARITY UR: CLEAR
CO2 SERPL-SCNC: 21 MMOL/L (ref 20–31)
COLOR UR: ABNORMAL
CREAT SERPL-MCNC: 1.3 MG/DL (ref 0.7–1.2)
EOSINOPHIL # BLD: 0.7 K/UL (ref 0–0.4)
EOSINOPHILS RELATIVE PERCENT: 8 % (ref 0–4)
EPI CELLS #/AREA URNS HPF: ABNORMAL /HPF
ERYTHROCYTE [DISTWIDTH] IN BLOOD BY AUTOMATED COUNT: 15.1 % (ref 11.5–14.9)
GFR SERPL CREATININE-BSD FRML MDRD: 58 ML/MIN/1.73M2
GLUCOSE SERPL-MCNC: 91 MG/DL (ref 70–99)
GLUCOSE UR STRIP-MCNC: NEGATIVE MG/DL
HCT VFR BLD AUTO: 41.3 % (ref 41–53)
HGB BLD-MCNC: 13.6 G/DL (ref 13.5–17.5)
HGB UR QL STRIP.AUTO: NEGATIVE
KETONES UR STRIP-MCNC: ABNORMAL MG/DL
LACTATE BLDV-SCNC: 1.3 MMOL/L (ref 0.5–2.2)
LEUKOCYTE ESTERASE UR QL STRIP: ABNORMAL
LIPASE SERPL-CCNC: 26 U/L (ref 13–60)
LYMPHOCYTES NFR BLD: 1.74 K/UL (ref 1–4.8)
LYMPHOCYTES RELATIVE PERCENT: 20 % (ref 24–44)
MCH RBC QN AUTO: 31.7 PG (ref 26–34)
MCHC RBC AUTO-ENTMCNC: 32.9 G/DL (ref 31–37)
MCV RBC AUTO: 96.4 FL (ref 80–100)
MONOCYTES NFR BLD: 0.61 K/UL (ref 0.1–1.3)
MONOCYTES NFR BLD: 7 % (ref 1–7)
MORPHOLOGY: ABNORMAL
NEUTROPHILS NFR BLD: 64 % (ref 36–66)
NEUTS SEG NFR BLD: 5.56 K/UL (ref 1.3–9.1)
NITRITE UR QL STRIP: NEGATIVE
PH UR STRIP: 5 [PH] (ref 5–8)
PLATELET # BLD AUTO: 241 K/UL (ref 150–450)
PMV BLD AUTO: 9.6 FL (ref 6–12)
POTASSIUM SERPL-SCNC: 4.6 MMOL/L (ref 3.7–5.3)
PROT SERPL-MCNC: 7.4 G/DL (ref 6.4–8.3)
PROT UR STRIP-MCNC: NEGATIVE MG/DL
RBC # BLD AUTO: 4.28 M/UL (ref 4.5–5.9)
RBC #/AREA URNS HPF: ABNORMAL /HPF
SODIUM SERPL-SCNC: 134 MMOL/L (ref 135–144)
SP GR UR STRIP: 1.03 (ref 1–1.03)
TROPONIN I SERPL HS-MCNC: 15 NG/L (ref 0–22)
UROBILINOGEN UR STRIP-ACNC: NORMAL EU/DL (ref 0–1)
WBC #/AREA URNS HPF: ABNORMAL /HPF
WBC OTHER # BLD: 8.7 K/UL (ref 3.5–11)

## 2024-01-31 PROCEDURE — 83605 ASSAY OF LACTIC ACID: CPT

## 2024-01-31 PROCEDURE — 96374 THER/PROPH/DIAG INJ IV PUSH: CPT

## 2024-01-31 PROCEDURE — 74177 CT ABD & PELVIS W/CONTRAST: CPT

## 2024-01-31 PROCEDURE — 99285 EMERGENCY DEPT VISIT HI MDM: CPT

## 2024-01-31 PROCEDURE — 6370000000 HC RX 637 (ALT 250 FOR IP): Performed by: NURSE PRACTITIONER

## 2024-01-31 PROCEDURE — 2580000003 HC RX 258

## 2024-01-31 PROCEDURE — 80048 BASIC METABOLIC PNL TOTAL CA: CPT

## 2024-01-31 PROCEDURE — 99212 OFFICE O/P EST SF 10 MIN: CPT | Performed by: INTERNAL MEDICINE

## 2024-01-31 PROCEDURE — 85025 COMPLETE CBC W/AUTO DIFF WBC: CPT

## 2024-01-31 PROCEDURE — 6360000004 HC RX CONTRAST MEDICATION

## 2024-01-31 PROCEDURE — 2060000000 HC ICU INTERMEDIATE R&B

## 2024-01-31 PROCEDURE — 36415 COLL VENOUS BLD VENIPUNCTURE: CPT

## 2024-01-31 PROCEDURE — 83690 ASSAY OF LIPASE: CPT

## 2024-01-31 PROCEDURE — 6360000002 HC RX W HCPCS

## 2024-01-31 PROCEDURE — 93005 ELECTROCARDIOGRAM TRACING: CPT

## 2024-01-31 PROCEDURE — 83880 ASSAY OF NATRIURETIC PEPTIDE: CPT

## 2024-01-31 PROCEDURE — 84484 ASSAY OF TROPONIN QUANT: CPT

## 2024-01-31 PROCEDURE — 81001 URINALYSIS AUTO W/SCOPE: CPT

## 2024-01-31 PROCEDURE — 71045 X-RAY EXAM CHEST 1 VIEW: CPT

## 2024-01-31 PROCEDURE — 80076 HEPATIC FUNCTION PANEL: CPT

## 2024-01-31 PROCEDURE — 1123F ACP DISCUSS/DSCN MKR DOCD: CPT | Performed by: INTERNAL MEDICINE

## 2024-01-31 PROCEDURE — 6370000000 HC RX 637 (ALT 250 FOR IP): Performed by: EMERGENCY MEDICINE

## 2024-01-31 PROCEDURE — 2580000003 HC RX 258: Performed by: NURSE PRACTITIONER

## 2024-01-31 RX ORDER — ACETAMINOPHEN 325 MG/1
650 TABLET ORAL EVERY 6 HOURS PRN
Status: DISCONTINUED | OUTPATIENT
Start: 2024-01-31 | End: 2024-02-04 | Stop reason: HOSPADM

## 2024-01-31 RX ORDER — POTASSIUM CHLORIDE 750 MG/1
10 CAPSULE, EXTENDED RELEASE ORAL EVERY MORNING
Status: DISCONTINUED | OUTPATIENT
Start: 2024-02-01 | End: 2024-02-04 | Stop reason: HOSPADM

## 2024-01-31 RX ORDER — MECLIZINE HYDROCHLORIDE 25 MG/1
25 TABLET ORAL 3 TIMES DAILY PRN
Status: DISCONTINUED | OUTPATIENT
Start: 2024-01-31 | End: 2024-02-04 | Stop reason: HOSPADM

## 2024-01-31 RX ORDER — CLOPIDOGREL BISULFATE 75 MG/1
75 TABLET ORAL DAILY
Status: DISCONTINUED | OUTPATIENT
Start: 2024-02-01 | End: 2024-02-04 | Stop reason: HOSPADM

## 2024-01-31 RX ORDER — SODIUM CHLORIDE 0.9 % (FLUSH) 0.9 %
5-40 SYRINGE (ML) INJECTION PRN
Status: DISCONTINUED | OUTPATIENT
Start: 2024-01-31 | End: 2024-02-04 | Stop reason: HOSPADM

## 2024-01-31 RX ORDER — SODIUM CHLORIDE 0.9 % (FLUSH) 0.9 %
5-40 SYRINGE (ML) INJECTION EVERY 12 HOURS SCHEDULED
Status: DISCONTINUED | OUTPATIENT
Start: 2024-01-31 | End: 2024-02-04 | Stop reason: HOSPADM

## 2024-01-31 RX ORDER — PREGABALIN 150 MG/1
150 CAPSULE ORAL 2 TIMES DAILY
Status: DISCONTINUED | OUTPATIENT
Start: 2024-01-31 | End: 2024-02-04 | Stop reason: HOSPADM

## 2024-01-31 RX ORDER — ACETAMINOPHEN 325 MG/1
650 TABLET ORAL ONCE
Status: COMPLETED | OUTPATIENT
Start: 2024-01-31 | End: 2024-01-31

## 2024-01-31 RX ORDER — SODIUM CHLORIDE 0.9 % (FLUSH) 0.9 %
10 SYRINGE (ML) INJECTION PRN
Status: DISCONTINUED | OUTPATIENT
Start: 2024-01-31 | End: 2024-02-04 | Stop reason: HOSPADM

## 2024-01-31 RX ORDER — ONDANSETRON 2 MG/ML
4 INJECTION INTRAMUSCULAR; INTRAVENOUS EVERY 6 HOURS PRN
Status: DISCONTINUED | OUTPATIENT
Start: 2024-01-31 | End: 2024-02-04 | Stop reason: HOSPADM

## 2024-01-31 RX ORDER — LEVOTHYROXINE SODIUM 0.07 MG/1
75 TABLET ORAL DAILY
Status: DISCONTINUED | OUTPATIENT
Start: 2024-02-01 | End: 2024-02-04 | Stop reason: HOSPADM

## 2024-01-31 RX ORDER — MORPHINE SULFATE 4 MG/ML
4 INJECTION, SOLUTION INTRAMUSCULAR; INTRAVENOUS ONCE
Status: COMPLETED | OUTPATIENT
Start: 2024-01-31 | End: 2024-01-31

## 2024-01-31 RX ORDER — TRAMADOL HYDROCHLORIDE 50 MG/1
50 TABLET ORAL 3 TIMES DAILY
Status: DISCONTINUED | OUTPATIENT
Start: 2024-01-31 | End: 2024-02-04 | Stop reason: HOSPADM

## 2024-01-31 RX ORDER — 0.9 % SODIUM CHLORIDE 0.9 %
1000 INTRAVENOUS SOLUTION INTRAVENOUS ONCE
Status: COMPLETED | OUTPATIENT
Start: 2024-01-31 | End: 2024-01-31

## 2024-01-31 RX ORDER — RANOLAZINE 500 MG/1
1000 TABLET, EXTENDED RELEASE ORAL 2 TIMES DAILY
Status: DISCONTINUED | OUTPATIENT
Start: 2024-01-31 | End: 2024-02-04 | Stop reason: HOSPADM

## 2024-01-31 RX ORDER — ASPIRIN 81 MG/1
81 TABLET ORAL DAILY
Status: DISCONTINUED | OUTPATIENT
Start: 2024-02-01 | End: 2024-02-04 | Stop reason: HOSPADM

## 2024-01-31 RX ORDER — PANTOPRAZOLE SODIUM 40 MG/1
40 TABLET, DELAYED RELEASE ORAL
Status: DISCONTINUED | OUTPATIENT
Start: 2024-02-01 | End: 2024-02-01

## 2024-01-31 RX ORDER — ONDANSETRON 4 MG/1
4 TABLET, ORALLY DISINTEGRATING ORAL EVERY 8 HOURS PRN
Status: DISCONTINUED | OUTPATIENT
Start: 2024-01-31 | End: 2024-02-04 | Stop reason: HOSPADM

## 2024-01-31 RX ORDER — DIAZEPAM 2 MG/1
2 TABLET ORAL ONCE
Status: COMPLETED | OUTPATIENT
Start: 2024-02-01 | End: 2024-02-01

## 2024-01-31 RX ORDER — SODIUM CHLORIDE 9 MG/ML
INJECTION, SOLUTION INTRAVENOUS PRN
Status: DISCONTINUED | OUTPATIENT
Start: 2024-01-31 | End: 2024-02-04 | Stop reason: HOSPADM

## 2024-01-31 RX ORDER — 0.9 % SODIUM CHLORIDE 0.9 %
100 INTRAVENOUS SOLUTION INTRAVENOUS ONCE
Status: COMPLETED | OUTPATIENT
Start: 2024-01-31 | End: 2024-01-31

## 2024-01-31 RX ORDER — SODIUM CHLORIDE 9 MG/ML
INJECTION, SOLUTION INTRAVENOUS CONTINUOUS
Status: DISCONTINUED | OUTPATIENT
Start: 2024-01-31 | End: 2024-02-04 | Stop reason: HOSPADM

## 2024-01-31 RX ORDER — ACETAMINOPHEN 650 MG/1
650 SUPPOSITORY RECTAL EVERY 6 HOURS PRN
Status: DISCONTINUED | OUTPATIENT
Start: 2024-01-31 | End: 2024-02-04 | Stop reason: HOSPADM

## 2024-01-31 RX ADMIN — DOCUSATE SODIUM LIQUID 50 MG: 100 LIQUID ORAL at 23:23

## 2024-01-31 RX ADMIN — CARBIDOPA AND LEVODOPA 1 TABLET: 25; 100 TABLET ORAL at 23:23

## 2024-01-31 RX ADMIN — ACETAMINOPHEN 650 MG: 325 TABLET ORAL at 21:00

## 2024-01-31 RX ADMIN — PREGABALIN 150 MG: 150 CAPSULE ORAL at 23:23

## 2024-01-31 RX ADMIN — SODIUM CHLORIDE, PRESERVATIVE FREE 10 ML: 5 INJECTION INTRAVENOUS at 19:44

## 2024-01-31 RX ADMIN — SODIUM CHLORIDE: 9 INJECTION, SOLUTION INTRAVENOUS at 23:24

## 2024-01-31 RX ADMIN — SODIUM CHLORIDE, PRESERVATIVE FREE 10 ML: 5 INJECTION INTRAVENOUS at 23:24

## 2024-01-31 RX ADMIN — SODIUM CHLORIDE 100 ML: 9 INJECTION, SOLUTION INTRAVENOUS at 19:44

## 2024-01-31 RX ADMIN — IOPAMIDOL 75 ML: 755 INJECTION, SOLUTION INTRAVENOUS at 19:44

## 2024-01-31 RX ADMIN — MORPHINE SULFATE 4 MG: 4 INJECTION, SOLUTION INTRAMUSCULAR; INTRAVENOUS at 18:35

## 2024-01-31 RX ADMIN — SODIUM CHLORIDE 1000 ML: 9 INJECTION, SOLUTION INTRAVENOUS at 19:47

## 2024-01-31 RX ADMIN — RANOLAZINE 1000 MG: 500 TABLET, EXTENDED RELEASE ORAL at 23:23

## 2024-01-31 RX ADMIN — TRAMADOL HYDROCHLORIDE 50 MG: 50 TABLET, COATED ORAL at 23:23

## 2024-01-31 ASSESSMENT — PAIN DESCRIPTION - LOCATION
LOCATION: ABDOMEN
LOCATION: BACK;HEAD
LOCATION: BACK

## 2024-01-31 ASSESSMENT — PAIN SCALES - GENERAL
PAINLEVEL_OUTOF10: 7
PAINLEVEL_OUTOF10: 10
PAINLEVEL_OUTOF10: 8

## 2024-01-31 ASSESSMENT — PAIN DESCRIPTION - PAIN TYPE: TYPE: CHRONIC PAIN

## 2024-01-31 ASSESSMENT — PAIN - FUNCTIONAL ASSESSMENT: PAIN_FUNCTIONAL_ASSESSMENT: 0-10

## 2024-01-31 NOTE — ED TRIAGE NOTES
Mode of arrival (squad #, walk in, police, etc) : w/c        Chief complaint(s): dizziness, hypotension        Arrival Note (brief scenario, treatment PTA, etc).: pt has been dealing with low BP and dizziness for some time. Was at PCP office today and she was worried because his BP has been consistently dropping as well as his hemoglobin and he recently had a positive hemoccult sample. Pt hx of ulcers and is on blood thinners. Pt denies having blood in stool        C= \"Have you ever felt that you should Cut down on your drinking?\"  No  A= \"Have people Annoyed you by criticizing your drinking?\"  No  G= \"Have you ever felt bad or Guilty about your drinking?\"  No  E= \"Have you ever had a drink as an Eye-opener first thing in the morning to steady your nerves or to help a hangover?\"  No      Deferred []      Reason for deferring: N/A    *If yes to two or more: probable alcohol abuse.*

## 2024-01-31 NOTE — ED PROVIDER NOTES
[facility] ED  eMERGENCY dEPARTMENT eNCOUnter   Attending Attestation     Pt Name: Rowdy Marie  MRN: [id number]  Birthdate 1950  Date of evaluation: 1/31/24    History, EXAM, MDM:    Rowdy Marie is a 73 y.o. male who presents with Hypotension and Dizziness    Presenting with hypotension and dizziness.  He is sent from internal medicine clinic.  Prior systolic pressures were 80 or earlier in the day.  He recently had a fecal occult blood test that was positive.  Over the month of January his hemoglobin is been dropping and he is anemic.  The patient takes aspirin and Plavix.  He is not not on any hypertensive agents.    On physical exam here the patient's blood pressure is 107/70.  He is not tachycardic.  There is no tenderness to his abdomen.    Pt has tenderness across his lower abdomen on exam.      The patient was placed on a cardiac monitor, an IV was placed, laboratory studies were obtained and showed white blood cell count of 8.7 hemoglobin is 13 hematocrit is 41 these are increased from his laboratory studies that were checked a few days ago, I suspect that he may be hypovolemic.  CT scan of the abdomen shows no acute intra-abdominal pathology no sign of infection perforation or ischemia.    EKG: All EKG's are interpreted by the Emergency Department Physician who either signs or Co-signs this chart in the absence of a cardiologist.    EKG shows a sinus rhythm, heart rate of 68, , QRS of 88, QTc of 448 no ST segment elevations or depressions there is no T wave inversions the axis is normal.      Given the patient's anemia, hypotension, + FOBT, will place in hospital for further evaluation and treatment.         Vitals:   Vitals:    01/31/24 2014 01/31/24 2029 01/31/24 2044 01/31/24 2130   BP: (!) 158/81 (!) 165/80 (!) 179/88 (!) 174/83   Pulse:       Resp:       Temp:       TempSrc:       SpO2: 96% 96% 93%      I performed a history and physical examination of the patient and discussed

## 2024-01-31 NOTE — ED PROVIDER NOTES
[facility]  Emergency Department Encounter  Emergency Medicine Resident     Pt Name:Rowdy Marie  MRN: [id number]  Birthdate 1950  Date of evaluation: 1/31/24  PCP:  Yandy Jones MD  Note Started: 5:25 PM EST      CHIEF COMPLAINT       Chief Complaint   Patient presents with    Hypotension    Dizziness       HISTORY OF PRESENT ILLNESS  (Location/Symptom, Timing/Onset, Context/Setting, Quality, Duration, Modifying Factors, Severity.)      Rowdy Marie is a 73 y.o. male with a history of CABG, atrial fibrillation who presents with concerns for GI bleed.  Patient was sent by his primary care office after he was found to be hypotensive with blood pressure of 86/50 at his office.  There was also concern for drop in hemoglobin over a short period of time with approximately 1.5 g drop.  Patient's blood was Hemoccult positive, patient is on aspirin and Plavix.    Upon arrival, patient states he overall has been feeling unwell.  States he feels weak and dizzy.  Has not noticed any gross blood in his stool or dark stools at home.  Denies associated chest pain, shortness of breath, abdominal pain.  Patient does have a history of ulcers.    PAST MEDICAL / SURGICAL / SOCIAL / FAMILY HISTORY      has no past medical history on file.       has no past surgical history on file.      Social History     Socioeconomic History    Marital status:      Spouse name: Not on file    Number of children: Not on file    Years of education: Not on file    Highest education level: Not on file   Occupational History    Not on file   Tobacco Use    Smoking status: Never    Smokeless tobacco: Never   Substance and Sexual Activity    Alcohol use: Never    Drug use: Never    Sexual activity: Not on file   Other Topics Concern    Not on file   Social History Narrative    Not on file     Social Determinants of Health     Financial Resource Strain: Low Risk  (2/16/2023)    Overall Financial Resource Strain (CARDIA)

## 2024-01-31 NOTE — PROGRESS NOTES
fatigued, alert, in no acute distress  Head - Atraumatic, normocephalic  Eyes - EOMI, no jaundice or pallor  Extremities -No significant edema, or skin discoloration. Good capillary refill.  Neuro - Pt Alert, awake and oriented x 3. No gross focal neurological deficits    ASSESSMENT AND PLAN (MEDICAL DECISION MAKING):    Rowdy was seen today for dizziness.    Diagnoses and all orders for this visit:    Gastrointestinal hemorrhage, unspecified gastrointestinal hemorrhage type  Comments:  Hemoglobin downtrending, patient hypovolemic dizzy    Orthostatic hypotension    Other specified nutritional anemias    Chronic atrial fibrillation (HCC)    Coronary artery disease involving coronary bypass graft of native heart without angina pectoris    S/P CABG (coronary artery bypass graft)         Concern for ongoing GI bleed due to recent drop in hemoglobin, Hemoccult positive stool persistent and worsening dizziness in spite of stopping all blood pressure medication patient is high risk for sudden GI bleed with cardiovascular decompensation because of being on aspirin as well as Plavix due to history of coronary artery disease.  Patient needs repeat hemoglobin checked stat and needs volume replacement  Advised to go to ER    Follow up in: As needed      Yandy Jones MD

## 2024-02-01 ENCOUNTER — APPOINTMENT (OUTPATIENT)
Dept: CT IMAGING | Age: 74
DRG: 377 | End: 2024-02-01
Payer: COMMERCIAL

## 2024-02-01 PROBLEM — R13.14 PHARYNGOESOPHAGEAL DYSPHAGIA: Status: ACTIVE | Noted: 2024-02-01

## 2024-02-01 PROBLEM — I95.9 HYPOTENSION: Status: ACTIVE | Noted: 2024-01-31

## 2024-02-01 PROBLEM — E43 SEVERE MALNUTRITION (HCC): Status: ACTIVE | Noted: 2024-02-01

## 2024-02-01 PROBLEM — D64.9 MILD ANEMIA: Status: ACTIVE | Noted: 2024-01-31

## 2024-02-01 LAB
EKG ATRIAL RATE: 66 BPM
EKG P AXIS: 53 DEGREES
EKG P-R INTERVAL: 150 MS
EKG Q-T INTERVAL: 428 MS
EKG QRS DURATION: 88 MS
EKG QTC CALCULATION (BAZETT): 448 MS
EKG R AXIS: -6 DEGREES
EKG T AXIS: 57 DEGREES
EKG VENTRICULAR RATE: 66 BPM
FERRITIN SERPL-MCNC: 105 NG/ML (ref 30–400)
FOLATE SERPL-MCNC: >20 NG/ML (ref 4.8–24.2)
HCT VFR BLD AUTO: 35.4 % (ref 41–53)
HCT VFR BLD AUTO: 37.7 % (ref 41–53)
HCT VFR BLD AUTO: 37.8 % (ref 41–53)
HCT VFR BLD AUTO: 39.2 % (ref 41–53)
HGB BLD-MCNC: 11.6 G/DL (ref 13.5–17.5)
HGB BLD-MCNC: 12.3 G/DL (ref 13.5–17.5)
HGB BLD-MCNC: 12.4 G/DL (ref 13.5–17.5)
HGB BLD-MCNC: 12.5 G/DL (ref 13.5–17.5)
INR PPP: 1.1
IRON SATN MFR SERPL: 21 % (ref 20–55)
IRON SERPL-MCNC: 57 UG/DL (ref 59–158)
PARTIAL THROMBOPLASTIN TIME: 38.1 SEC (ref 24–36)
PROTHROMBIN TIME: 14.8 SEC (ref 11.8–14.6)
TIBC SERPL-MCNC: 266 UG/DL (ref 250–450)
TROPONIN I SERPL HS-MCNC: 13 NG/L (ref 0–22)
UNSATURATED IRON BINDING CAPACITY: 209 UG/DL (ref 112–347)
VIT B12 SERPL-MCNC: 1970 PG/ML (ref 232–1245)

## 2024-02-01 PROCEDURE — 85014 HEMATOCRIT: CPT

## 2024-02-01 PROCEDURE — 6370000000 HC RX 637 (ALT 250 FOR IP): Performed by: PSYCHIATRY & NEUROLOGY

## 2024-02-01 PROCEDURE — 85018 HEMOGLOBIN: CPT

## 2024-02-01 PROCEDURE — 85730 THROMBOPLASTIN TIME PARTIAL: CPT

## 2024-02-01 PROCEDURE — 6370000000 HC RX 637 (ALT 250 FOR IP): Performed by: NURSE PRACTITIONER

## 2024-02-01 PROCEDURE — 93010 ELECTROCARDIOGRAM REPORT: CPT | Performed by: INTERNAL MEDICINE

## 2024-02-01 PROCEDURE — 99223 1ST HOSP IP/OBS HIGH 75: CPT | Performed by: INTERNAL MEDICINE

## 2024-02-01 PROCEDURE — 82746 ASSAY OF FOLIC ACID SERUM: CPT

## 2024-02-01 PROCEDURE — 2060000000 HC ICU INTERMEDIATE R&B

## 2024-02-01 PROCEDURE — 84484 ASSAY OF TROPONIN QUANT: CPT

## 2024-02-01 PROCEDURE — 97530 THERAPEUTIC ACTIVITIES: CPT

## 2024-02-01 PROCEDURE — 99223 1ST HOSP IP/OBS HIGH 75: CPT | Performed by: PSYCHIATRY & NEUROLOGY

## 2024-02-01 PROCEDURE — 82728 ASSAY OF FERRITIN: CPT

## 2024-02-01 PROCEDURE — 97166 OT EVAL MOD COMPLEX 45 MIN: CPT

## 2024-02-01 PROCEDURE — 83540 ASSAY OF IRON: CPT

## 2024-02-01 PROCEDURE — 6370000000 HC RX 637 (ALT 250 FOR IP): Performed by: INTERNAL MEDICINE

## 2024-02-01 PROCEDURE — 36415 COLL VENOUS BLD VENIPUNCTURE: CPT

## 2024-02-01 PROCEDURE — 70450 CT HEAD/BRAIN W/O DYE: CPT

## 2024-02-01 PROCEDURE — 85610 PROTHROMBIN TIME: CPT

## 2024-02-01 PROCEDURE — 6360000002 HC RX W HCPCS: Performed by: NURSE PRACTITIONER

## 2024-02-01 PROCEDURE — 83550 IRON BINDING TEST: CPT

## 2024-02-01 PROCEDURE — 82607 VITAMIN B-12: CPT

## 2024-02-01 PROCEDURE — 2580000003 HC RX 258: Performed by: NURSE PRACTITIONER

## 2024-02-01 RX ORDER — MORPHINE SULFATE 4 MG/ML
4 INJECTION, SOLUTION INTRAMUSCULAR; INTRAVENOUS ONCE
Status: COMPLETED | OUTPATIENT
Start: 2024-02-01 | End: 2024-02-01

## 2024-02-01 RX ORDER — MIDODRINE HYDROCHLORIDE 2.5 MG/1
2.5 TABLET ORAL
Status: DISCONTINUED | OUTPATIENT
Start: 2024-02-02 | End: 2024-02-04 | Stop reason: HOSPADM

## 2024-02-01 RX ORDER — FLUDROCORTISONE ACETATE 0.1 MG/1
0.1 TABLET ORAL DAILY
Status: DISCONTINUED | OUTPATIENT
Start: 2024-02-01 | End: 2024-02-04 | Stop reason: HOSPADM

## 2024-02-01 RX ADMIN — CARBIDOPA AND LEVODOPA 1 TABLET: 25; 100 TABLET ORAL at 15:09

## 2024-02-01 RX ADMIN — RANOLAZINE 1000 MG: 500 TABLET, EXTENDED RELEASE ORAL at 21:05

## 2024-02-01 RX ADMIN — TRAMADOL HYDROCHLORIDE 50 MG: 50 TABLET, COATED ORAL at 21:05

## 2024-02-01 RX ADMIN — MORPHINE SULFATE 4 MG: 4 INJECTION, SOLUTION INTRAMUSCULAR; INTRAVENOUS at 03:55

## 2024-02-01 RX ADMIN — SODIUM CHLORIDE: 9 INJECTION, SOLUTION INTRAVENOUS at 11:51

## 2024-02-01 RX ADMIN — DIAZEPAM 2 MG: 2 TABLET ORAL at 00:22

## 2024-02-01 RX ADMIN — POTASSIUM CHLORIDE 10 MEQ: 10 CAPSULE, COATED, EXTENDED RELEASE ORAL at 11:47

## 2024-02-01 RX ADMIN — PREGABALIN 150 MG: 150 CAPSULE ORAL at 21:05

## 2024-02-01 RX ADMIN — CARBIDOPA AND LEVODOPA 1 TABLET: 25; 100 TABLET ORAL at 21:05

## 2024-02-01 RX ADMIN — ACETAMINOPHEN 650 MG: 325 TABLET, FILM COATED ORAL at 11:46

## 2024-02-01 RX ADMIN — ACETAMINOPHEN, ASPIRIN, CAFFEINE 2 TABLET: 250; 65; 250 TABLET, FILM COATED ORAL at 00:21

## 2024-02-01 RX ADMIN — TRAMADOL HYDROCHLORIDE 50 MG: 50 TABLET, COATED ORAL at 15:09

## 2024-02-01 RX ADMIN — MECLIZINE HYDROCHLORIDE 25 MG: 25 TABLET ORAL at 00:22

## 2024-02-01 RX ADMIN — FLUDROCORTISONE ACETATE 0.1 MG: 0.1 TABLET ORAL at 17:00

## 2024-02-01 RX ADMIN — MECLIZINE HYDROCHLORIDE 25 MG: 25 TABLET ORAL at 11:47

## 2024-02-01 ASSESSMENT — PAIN SCALES - GENERAL
PAINLEVEL_OUTOF10: 3
PAINLEVEL_OUTOF10: 9
PAINLEVEL_OUTOF10: 8
PAINLEVEL_OUTOF10: 9
PAINLEVEL_OUTOF10: 8

## 2024-02-01 ASSESSMENT — PAIN DESCRIPTION - DESCRIPTORS
DESCRIPTORS: ACHING;POUNDING
DESCRIPTORS: ACHING;POUNDING

## 2024-02-01 ASSESSMENT — PAIN DESCRIPTION - LOCATION
LOCATION: HEAD
LOCATION: HEAD

## 2024-02-01 NOTE — CARE COORDINATION
Case Management Assessment  Initial Evaluation    Date/Time of Evaluation: 2/1/2024 3:23 PM  Assessment Completed by: Faviola Prakash RN    If patient is discharged prior to next notation, then this note serves as note for discharge by case management.    Patient Name: Rowdy Marie                   YOB: 1950  Diagnosis: GI bleed [K92.2]  Hypotension, unspecified hypotension type [I95.9]  Gastrointestinal hemorrhage, unspecified gastrointestinal hemorrhage type [K92.2]                   Date / Time: 1/31/2024  5:21 PM    Patient Admission Status: Inpatient   Readmission Risk (Low < 19, Mod (19-27), High > 27): Readmission Risk Score: 7.6    Current PCP: Yandy Jones MD  PCP verified by CM? No    Chart Reviewed: Yes      History Provided by: Patient  Patient Orientation: Alert and Oriented    Patient Cognition: Alert    Hospitalization in the last 30 days (Readmission):  No    If yes, Readmission Assessment in CM Navigator will be completed.    Advance Directives:      Code Status: Full Code   Patient's Primary Decision Maker is: Legal Next of Kin    Primary Decision Maker: remove,per pt - Spouse - [phone]    Discharge Planning:    Patient lives with: Children Type of Home: House  Primary Care Giver: Self  Patient Support Systems include: Children   Current Financial resources: Medicare  Current community resources: None  Current services prior to admission: None            Current DME:              Type of Home Care services:  None    ADLS  Prior functional level: Independent in ADLs/IADLs  Current functional level: Independent in ADLs/IADLs    PT AM-PAC:   /24  OT AM-PAC: 15 /24    Family can provide assistance at DC: Yes  Would you like Case Management to discuss the discharge plan with any other family members/significant others, and if so, who?    Plans to Return to Present Housing: Yes  Other Identified Issues/Barriers to RETURNING to current housing: no  Potential Assistance needed

## 2024-02-01 NOTE — ED NOTES
Report given to STAS Navarrete from PCU.   Report method by phone   The following was reviewed with receiving RN:   Current vital signs:  BP (!) 156/76   Pulse 78   Temp 98 °F (36.7 °C) (Oral)   Resp 16   SpO2 94%                MEWS Score: 1     Any medication or safety alerts were reviewed. Any pending diagnostics and notifications were also reviewed, as well as any safety concerns or issues, abnormal labs, abnormal imaging, and abnormal assessment findings. Questions were answered.

## 2024-02-01 NOTE — CONSULTS
NEUROLOGY INPATIENT CONSULT NOTE    2024         Rowdy Marie is a  73 y.o. male admitted on 2024 with  GI bleed [K92.2]  Hypotension, unspecified hypotension type [I95.9]  Gastrointestinal hemorrhage, unspecified gastrointestinal hemorrhage type [K92.2]    Reason for consult: Dizziness, weakness with multiple falls, tinnitus, Parkinson's  History is obtained mostly from the patient and the medical record and from the caregivers. Chart is reviewed and patient is examined.   Briefly, this is a  73 y.o. male with recent +ve fecal occult blood test and parkinsonism was admitted as a direct admit from clinic on 2024 with hypotension and dizziness.   Neurology is consulted for evaluation of parkinsonism with recurrent falls.  He was having shaking in his both hands and also was having orthostatic dizziness.  Patient stated that he was evaluated in [facility] last year and he has been on Sinemet 10/100 switched to 25/100.  It has helped initially but not lately.  He has been having recurrent falls with ongoing orthostatic dizziness.  Denied head injury and LOC.  Denied speech and swallowing difficulties.  Caregivers stated that he has had orthostatic vitals done this afternoon revealing  Supine: 162/89.,  Pulse 58  Standin/47.,  Pulse 73         No current facility-administered medications on file prior to encounter.     Current Outpatient Medications on File Prior to Encounter   Medication Sig Dispense Refill    carbidopa-levodopa (SINEMET)  MG per tablet Take 1 tablet by mouth 3 times daily      potassium chloride (KLOR-CON) 10 MEQ extended release tablet Take 1 tablet by mouth every morning      ondansetron (ZOFRAN-ODT) 4 MG disintegrating tablet Take 1 tablet by mouth every 8 hours as needed for Nausea or Vomiting 21 tablet 0    Multiple Vitamins-Minerals (MULTIVITAMIN-MINERALS) TABS tablet take 1 tablet by mouth once daily 90 tablet 2    cyanocobalamin ([employer] VITAMIN B12) 1000 MCG

## 2024-02-01 NOTE — CONSULTS
Date:   2/1/2024  Patient name: Rowdy Marie  Date of admission:  1/31/2024  5:21 PM  MRN:   [id number]  YOB: 1950  PCP: Yandy Jones MD    Reason for Admission: GI bleed [K92.2]  Hypotension, unspecified hypotension type [I95.9]  Gastrointestinal hemorrhage, unspecified gastrointestinal hemorrhage type [K92.2]    Cardiology consult: Chest pain       Referring physician Dr. Aracely Pierre    Impression  Episode of sharp chest pain no shortness of breath no diaphoresis ECG no ischemic changes on admission, normal high-sensitivity troponin  Paroxysmal atrial fibrillation Watchman procedure 11/14/2023  Ejection fraction 60 to 65%  Coronary artery disease, CABG times 4/2014, stent placement times 5 December 2022  LIMA to LAD, SVG to OM 3, ramus, RCA patent grafts cardiac cath December 2019  History of stent placement  Hypothyroidism  Hyperlipidemia  History of prostate cancer  History of anemia  History of gastritis, esophageal stricture  Parkinson disease on Sinemet  Back pain, back surgery    Investigation workup    ECG 1/31/2024  Sinus rhythm heart rate 66, normal GA, QRS and QT interval  No obvious ischemic changes    CT abdomen and pelvis 1/31/2024  No acute intra-abdominal or intrapelvic process    MRA neck and head without contrast 2/9/2023  No evidence of large vessel vascular occlusion or intracranial aneurysm within the limits of MR angiographic technique  No hemodynamically significant stenosis or occlusion in the visualized cervical portion of the carotid or vertebral arteries    Echo 11/14/2023  Normal LV size, wall thickness, wall motion, ejection fraction 55 to 60%  No obvious significant valvular abnormality normal aortic root    History of present illness    73-year-old male with past medical history of multivessel coronary artery disease, CABG x 4, multiple stents, paroxysmal A-fib, status post Watchman procedure, Parkinson disease, hypothyroidism got hospitalized on 1/31/2024 with a

## 2024-02-01 NOTE — CONSULTS
GI Consult Note:    Name: Rowdy Marie  MRN: [id number]     Acct: [id number]  Room: 2117/2117-01    Admit Date: 1/31/2024  PCP: Yandy Jones MD    Physician Requesting Consult: Aracely Pierre MD     Reason for Consult:      Mild anemia  Dysphagia  Abdominal discomfort  History of colon polyps  History of neck surgery  Patient on blood thinners      Chief Complaint:     Chief Complaint   Patient presents with    Hypotension    Dizziness       History Obtained From:     Patient and EMR    History of Present Illness:      Rowdy Marie is a  73 y.o.  male who presents with Hypotension and Dizziness    73-year-old gentleman history significant multiple chronic medical issues been admitted with some hypotension dizziness and cardiac issues    He has been on blood thinners    Has been complaining some weakness dizziness and fatigue    Mild anemia noted slight drop in hemoglobin    Patient denies any overt bleeding melanotic stools    He had GI workup done last year in August at [facility] facility was found to have polyps    He is also complaining of some nonspecific dysphagia    It is to be noted that he has cervical surgery done in the past has some issues with the neck bending    No smoking alcohol abuse illicit drug usage    Denies any rectal bleeding denies any melanotic stools    Mild abdominal bloating gas symptoms    Has intermittent constipation    Patient is available medical records were reviewed with him      Symptoms:  Onset:  Location:  abdomen  Duration:  day(s)  Severity:  mild  Quality:  intermittent      Past Medical History:     History reviewed. No pertinent past medical history.     Past Surgical History:     History reviewed. No pertinent surgical history.     Medications Prior to Admission:       Prior to Admission medications    Medication Sig Start Date End Date Taking? Authorizing Provider   carbidopa-levodopa (SINEMET)  MG per tablet Take 1 tablet by mouth 3 times daily

## 2024-02-01 NOTE — H&P
[facility]   IN-PATIENT SERVICE   [facility]    HISTORY AND PHYSICAL EXAMINATION            Date:   2/1/2024  Patient name:  Rowdy Marie  Date of admission:  1/31/2024  5:21 PM  MRN:   [id number]  Account:  [id number]  YOB: 1950  PCP:    Yandy Jones MD  Room:   [address] [place] [address]  Code Status:    Full Code    Chief Complaint:     Chief Complaint   Patient presents with    Hypotension    Dizziness       History Obtained From:     patient    History of Present Illness:     The patient is a 73 y.o.  Non- / non  male who presents with Hypotension and Dizziness   and he is admitted to the hospital for the management of         Rowdy Marie is a 73 y.o. Non- / non  male who presents with Hypotension and Dizziness   and is admitted to the hospital for the management of GI bleed.     According to patient, he was seen by his PCP office today, who had been working him up for recent weight loss and anemia.  He had a rectal guaiac test completed on 1/27, which was positive for occult blood. Patient was instructed to come to ED for evaluation.     Patient reports that recently he has been getting extremely dizzy and feels as if he cannot do anything.  Has been falling at home, secondary to these dizzy spells.  Patient reports that he was seen in the PCP office today and his blood pressure was noted to be 86/50.  Reports past history of vertigo and states that he gets these vertigo episodes and cannot walk..  Reports that he has been having these episodes since before Eliana and states that he has had some episodes of severe vomiting related to syncope.        Patient seen and examined, patient complaining of dizziness and lightheadedness, severely orthostatic positive, also has history of Parkinson's  Denies any chest pain shortness of breath      Past Medical History:     History reviewed. No pertinent past medical history.     Past Surgical History:

## 2024-02-02 PROBLEM — G20.A1 PARKINSON'S DISEASE WITHOUT DYSKINESIA OR FLUCTUATING MANIFESTATIONS: Status: ACTIVE | Noted: 2024-02-02

## 2024-02-02 PROBLEM — R51.9 ACUTE INTRACTABLE HEADACHE: Status: ACTIVE | Noted: 2024-02-02

## 2024-02-02 LAB
HCT VFR BLD AUTO: 33.4 % (ref 41–53)
HCT VFR BLD AUTO: 33.7 % (ref 41–53)
HCT VFR BLD AUTO: 34.6 % (ref 41–53)
HCT VFR BLD AUTO: 35.6 % (ref 41–53)
HGB BLD-MCNC: 10.9 G/DL (ref 13.5–17.5)
HGB BLD-MCNC: 10.9 G/DL (ref 13.5–17.5)
HGB BLD-MCNC: 11.4 G/DL (ref 13.5–17.5)
HGB BLD-MCNC: 11.5 G/DL (ref 13.5–17.5)
TROPONIN I SERPL HS-MCNC: 15 NG/L (ref 0–22)

## 2024-02-02 PROCEDURE — 93005 ELECTROCARDIOGRAM TRACING: CPT | Performed by: INTERNAL MEDICINE

## 2024-02-02 PROCEDURE — 97530 THERAPEUTIC ACTIVITIES: CPT

## 2024-02-02 PROCEDURE — 2580000003 HC RX 258: Performed by: NURSE PRACTITIONER

## 2024-02-02 PROCEDURE — 85018 HEMOGLOBIN: CPT

## 2024-02-02 PROCEDURE — C9113 INJ PANTOPRAZOLE SODIUM, VIA: HCPCS | Performed by: INTERNAL MEDICINE

## 2024-02-02 PROCEDURE — 6370000000 HC RX 637 (ALT 250 FOR IP): Performed by: PSYCHIATRY & NEUROLOGY

## 2024-02-02 PROCEDURE — 6370000000 HC RX 637 (ALT 250 FOR IP): Performed by: NURSE PRACTITIONER

## 2024-02-02 PROCEDURE — 6360000002 HC RX W HCPCS: Performed by: INTERNAL MEDICINE

## 2024-02-02 PROCEDURE — A4216 STERILE WATER/SALINE, 10 ML: HCPCS | Performed by: INTERNAL MEDICINE

## 2024-02-02 PROCEDURE — 6370000000 HC RX 637 (ALT 250 FOR IP): Performed by: INTERNAL MEDICINE

## 2024-02-02 PROCEDURE — 84484 ASSAY OF TROPONIN QUANT: CPT

## 2024-02-02 PROCEDURE — 99233 SBSQ HOSP IP/OBS HIGH 50: CPT | Performed by: INTERNAL MEDICINE

## 2024-02-02 PROCEDURE — APPNB30 APP NON BILLABLE TIME 0-30 MINS: Performed by: NURSE PRACTITIONER

## 2024-02-02 PROCEDURE — 2580000003 HC RX 258: Performed by: INTERNAL MEDICINE

## 2024-02-02 PROCEDURE — 85014 HEMATOCRIT: CPT

## 2024-02-02 PROCEDURE — 99232 SBSQ HOSP IP/OBS MODERATE 35: CPT | Performed by: PSYCHIATRY & NEUROLOGY

## 2024-02-02 PROCEDURE — 2060000000 HC ICU INTERMEDIATE R&B

## 2024-02-02 PROCEDURE — 97162 PT EVAL MOD COMPLEX 30 MIN: CPT

## 2024-02-02 PROCEDURE — 36415 COLL VENOUS BLD VENIPUNCTURE: CPT

## 2024-02-02 PROCEDURE — 6360000002 HC RX W HCPCS: Performed by: PSYCHIATRY & NEUROLOGY

## 2024-02-02 RX ORDER — MAGNESIUM SULFATE 1 G/100ML
1000 INJECTION INTRAVENOUS ONCE
Status: COMPLETED | OUTPATIENT
Start: 2024-02-02 | End: 2024-02-02

## 2024-02-02 RX ADMIN — DOCUSATE SODIUM LIQUID 50 MG: 100 LIQUID ORAL at 21:16

## 2024-02-02 RX ADMIN — PANTOPRAZOLE SODIUM 40 MG: 40 INJECTION, POWDER, FOR SOLUTION INTRAVENOUS at 11:44

## 2024-02-02 RX ADMIN — FLUDROCORTISONE ACETATE 0.1 MG: 0.1 TABLET ORAL at 11:44

## 2024-02-02 RX ADMIN — CARBIDOPA AND LEVODOPA 1 TABLET: 25; 100 TABLET ORAL at 05:19

## 2024-02-02 RX ADMIN — CARBIDOPA AND LEVODOPA 1 TABLET: 25; 100 TABLET ORAL at 18:43

## 2024-02-02 RX ADMIN — SODIUM CHLORIDE: 9 INJECTION, SOLUTION INTRAVENOUS at 05:16

## 2024-02-02 RX ADMIN — PREGABALIN 150 MG: 150 CAPSULE ORAL at 11:46

## 2024-02-02 RX ADMIN — TRAMADOL HYDROCHLORIDE 50 MG: 50 TABLET, COATED ORAL at 21:17

## 2024-02-02 RX ADMIN — LEVOTHYROXINE SODIUM 75 MCG: 0.07 TABLET ORAL at 05:19

## 2024-02-02 RX ADMIN — CARBIDOPA AND LEVODOPA 1 TABLET: 25; 100 TABLET ORAL at 11:46

## 2024-02-02 RX ADMIN — TRAMADOL HYDROCHLORIDE 50 MG: 50 TABLET, COATED ORAL at 11:45

## 2024-02-02 RX ADMIN — ACETAMINOPHEN, ASPIRIN, CAFFEINE 2 TABLET: 250; 65; 250 TABLET, FILM COATED ORAL at 11:44

## 2024-02-02 RX ADMIN — MECLIZINE HYDROCHLORIDE 25 MG: 25 TABLET ORAL at 11:44

## 2024-02-02 RX ADMIN — PREGABALIN 150 MG: 150 CAPSULE ORAL at 21:17

## 2024-02-02 RX ADMIN — ACETAMINOPHEN, ASPIRIN, CAFFEINE 2 TABLET: 250; 65; 250 TABLET, FILM COATED ORAL at 18:32

## 2024-02-02 RX ADMIN — TRAMADOL HYDROCHLORIDE 50 MG: 50 TABLET, COATED ORAL at 15:27

## 2024-02-02 RX ADMIN — SODIUM CHLORIDE: 9 INJECTION, SOLUTION INTRAVENOUS at 18:43

## 2024-02-02 RX ADMIN — MIDODRINE HYDROCHLORIDE 2.5 MG: 2.5 TABLET ORAL at 11:45

## 2024-02-02 RX ADMIN — MAGNESIUM SULFATE HEPTAHYDRATE 1000 MG: 1 INJECTION, SOLUTION INTRAVENOUS at 15:27

## 2024-02-02 RX ADMIN — RANOLAZINE 1000 MG: 500 TABLET, EXTENDED RELEASE ORAL at 11:44

## 2024-02-02 RX ADMIN — SODIUM CHLORIDE, PRESERVATIVE FREE 10 ML: 5 INJECTION INTRAVENOUS at 21:31

## 2024-02-02 RX ADMIN — CARBIDOPA AND LEVODOPA 1 TABLET: 25; 100 TABLET ORAL at 15:27

## 2024-02-02 RX ADMIN — RANOLAZINE 1000 MG: 500 TABLET, EXTENDED RELEASE ORAL at 21:17

## 2024-02-02 RX ADMIN — POTASSIUM CHLORIDE 10 MEQ: 10 CAPSULE, COATED, EXTENDED RELEASE ORAL at 11:46

## 2024-02-02 ASSESSMENT — PAIN SCALES - GENERAL
PAINLEVEL_OUTOF10: 5
PAINLEVEL_OUTOF10: 8
PAINLEVEL_OUTOF10: 0
PAINLEVEL_OUTOF10: 0
PAINLEVEL_OUTOF10: 5

## 2024-02-02 ASSESSMENT — PAIN DESCRIPTION - ORIENTATION: ORIENTATION: LOWER

## 2024-02-02 ASSESSMENT — PAIN DESCRIPTION - LOCATION: LOCATION: BACK

## 2024-02-02 NOTE — PLAN OF CARE
Problem: Discharge Planning  Goal: Discharge to home or other facility with appropriate resources  Outcome: Progressing     Problem: Safety - Adult  Goal: Free from fall injury  Outcome: Progressing     Problem: ABCDS Injury Assessment  Goal: Absence of physical injury  Outcome: Progressing     Problem: Nutrition Deficit:  Goal: Optimize nutritional status  Outcome: Progressing     Problem: Neurosensory - Adult  Goal: Achieves stable or improved neurological status  Outcome: Progressing

## 2024-02-02 NOTE — CARE COORDINATION
ONGOING DISCHARGE PLAN:    Patient is alert and oriented x4.    Spoke with patient regarding discharge plan and patient confirms that plan is still to discharge to home with no needs    UGI Monday may possibly done outpt    [facility]    PPI       Will continue to follow for additional discharge needs.    If patient is discharged prior to next notation, then this note serves as note for discharge by case management.    Electronically signed by Faviola Prakash RN on 2/2/2024 at 2:16 PM

## 2024-02-02 NOTE — PLAN OF CARE
Problem: Discharge Planning  Goal: Discharge to home or other facility with appropriate resources  Outcome: Progressing     Problem: Safety - Adult  Goal: Free from fall injury  Outcome: Progressing     Problem: ABCDS Injury Assessment  Goal: Absence of physical injury  Outcome: Progressing     Problem: Nutrition Deficit:  Goal: Optimize nutritional status  Outcome: Progressing     Problem: Neurosensory - Adult  Goal: Achieves stable or improved neurological status  Outcome: Progressing  Flowsheets (Taken 2/1/2024 2136)  Achieves stable or improved neurological status:   Assess for and report changes in neurological status   Monitor temperature, glucose, and sodium. Initiate appropriate interventions as ordered  Goal: Achieves maximal functionality and self care  Outcome: Progressing  Flowsheets (Taken 2/1/2024 2136)  Achieves maximal functionality and self care: Monitor swallowing and airway patency with patient fatigue and changes in neurological status     Problem: Cardiovascular - Adult  Goal: Maintains optimal cardiac output and hemodynamic stability  Outcome: Progressing  Flowsheets (Taken 2/1/2024 2136)  Maintains optimal cardiac output and hemodynamic stability:   Monitor blood pressure and heart rate   Assess for signs of decreased cardiac output   Administer vasoactive medications as ordered     Problem: Gastrointestinal - Adult  Goal: Maintains or returns to baseline bowel function  Outcome: Progressing  Flowsheets (Taken 2/1/2024 2136)  Maintains or returns to baseline bowel function:   Assess bowel function   Administer IV fluids as ordered to ensure adequate hydration   Administer ordered medications as needed     Problem: Metabolic/Fluid and Electrolytes - Adult  Goal: Hemodynamic stability and optimal renal function maintained  Outcome: Progressing  Flowsheets (Taken 2/1/2024 2136)  Hemodynamic stability and optimal renal function maintained:   Monitor labs and assess for signs and symptoms of

## 2024-02-02 NOTE — PLAN OF CARE
Problem: Discharge Planning  Goal: Discharge to home or other facility with appropriate resources  2/2/2024 0217 by Fariha Ackerman RN  Outcome: Progressing  Flowsheets (Taken 2/2/2024 0217)  Discharge to home or other facility with appropriate resources: Identify barriers to discharge with patient and caregiver  2/1/2024 2136 by Joie Felix RN  Outcome: Progressing     Problem: Safety - Adult  Goal: Free from fall injury  2/2/2024 0217 by Fariha Ackerman RN  Outcome: Progressing  Note: Patient instructed on safety measures.  2/1/2024 2136 by Joie Felix RN  Outcome: Progressing     Problem: ABCDS Injury Assessment  Goal: Absence of physical injury  2/2/2024 0217 by Fariha Ackerman RN  Outcome: Progressing  Flowsheets (Taken 2/2/2024 0217)  Absence of Physical Injury: Implement safety measures based on patient assessment  2/1/2024 2136 by Joie Felix RN  Outcome: Progressing     Problem: Nutrition Deficit:  Goal: Optimize nutritional status  2/2/2024 0217 by Fariha Ackerman RN  Outcome: Progressing  Flowsheets (Taken 2/2/2024 0217)  Nutrient intake appropriate for improving, restoring, or maintaining nutritional needs: Monitor oral intake, labs, and treatment plans  2/1/2024 2136 by Joie Felix RN  Outcome: Progressing     Problem: Neurosensory - Adult  Goal: Achieves stable or improved neurological status  2/2/2024 0217 by Fariha Ackerman RN  Outcome: Progressing  Flowsheets (Taken 2/1/2024 2136 by Joie Felix RN)  Achieves stable or improved neurological status:   Assess for and report changes in neurological status   Monitor temperature, glucose, and sodium. Initiate appropriate interventions as ordered  2/1/2024 2136 by Joie Felix RN  Outcome: Progressing  Flowsheets (Taken 2/1/2024 2136)  Achieves stable or improved neurological status:   Assess for and report changes in neurological status   Monitor temperature, glucose, and sodium. Initiate appropriate interventions as ordered  Goal:

## 2024-02-03 LAB
DATE, STOOL #1: NORMAL
HCT VFR BLD AUTO: 32.2 % (ref 41–53)
HCT VFR BLD AUTO: 35.4 % (ref 41–53)
HCT VFR BLD AUTO: 35.6 % (ref 41–53)
HCT VFR BLD AUTO: 36.8 % (ref 41–53)
HEMOCCULT SP1 STL QL: NEGATIVE
HGB BLD-MCNC: 10.6 G/DL (ref 13.5–17.5)
HGB BLD-MCNC: 11.5 G/DL (ref 13.5–17.5)
HGB BLD-MCNC: 11.6 G/DL (ref 13.5–17.5)
HGB BLD-MCNC: 12.1 G/DL (ref 13.5–17.5)
TIME, STOOL #1: NORMAL

## 2024-02-03 PROCEDURE — 6360000002 HC RX W HCPCS: Performed by: INTERNAL MEDICINE

## 2024-02-03 PROCEDURE — 99232 SBSQ HOSP IP/OBS MODERATE 35: CPT | Performed by: INTERNAL MEDICINE

## 2024-02-03 PROCEDURE — 85018 HEMOGLOBIN: CPT

## 2024-02-03 PROCEDURE — 2060000000 HC ICU INTERMEDIATE R&B

## 2024-02-03 PROCEDURE — 97110 THERAPEUTIC EXERCISES: CPT

## 2024-02-03 PROCEDURE — 6370000000 HC RX 637 (ALT 250 FOR IP): Performed by: INTERNAL MEDICINE

## 2024-02-03 PROCEDURE — 6370000000 HC RX 637 (ALT 250 FOR IP): Performed by: PSYCHIATRY & NEUROLOGY

## 2024-02-03 PROCEDURE — 82270 OCCULT BLOOD FECES: CPT

## 2024-02-03 PROCEDURE — 85014 HEMATOCRIT: CPT

## 2024-02-03 PROCEDURE — 6360000002 HC RX W HCPCS: Performed by: NURSE PRACTITIONER

## 2024-02-03 PROCEDURE — 2580000003 HC RX 258: Performed by: INTERNAL MEDICINE

## 2024-02-03 PROCEDURE — C9113 INJ PANTOPRAZOLE SODIUM, VIA: HCPCS | Performed by: INTERNAL MEDICINE

## 2024-02-03 PROCEDURE — 2580000003 HC RX 258: Performed by: NURSE PRACTITIONER

## 2024-02-03 PROCEDURE — 97116 GAIT TRAINING THERAPY: CPT

## 2024-02-03 PROCEDURE — 36415 COLL VENOUS BLD VENIPUNCTURE: CPT

## 2024-02-03 PROCEDURE — 6370000000 HC RX 637 (ALT 250 FOR IP): Performed by: NURSE PRACTITIONER

## 2024-02-03 PROCEDURE — A4216 STERILE WATER/SALINE, 10 ML: HCPCS | Performed by: INTERNAL MEDICINE

## 2024-02-03 PROCEDURE — 99232 SBSQ HOSP IP/OBS MODERATE 35: CPT | Performed by: PSYCHIATRY & NEUROLOGY

## 2024-02-03 RX ORDER — FLUDROCORTISONE ACETATE 0.1 MG/1
0.1 TABLET ORAL DAILY
Qty: 30 TABLET | Refills: 0 | Status: SHIPPED | OUTPATIENT
Start: 2024-02-04 | End: 2024-03-05

## 2024-02-03 RX ORDER — MIDODRINE HYDROCHLORIDE 2.5 MG/1
2.5 TABLET ORAL 3 TIMES DAILY PRN
Qty: 90 TABLET | Refills: 3 | Status: SHIPPED | OUTPATIENT
Start: 2024-02-03

## 2024-02-03 RX ORDER — MECLIZINE HYDROCHLORIDE 25 MG/1
25 TABLET ORAL 3 TIMES DAILY PRN
Qty: 30 TABLET | Refills: 0 | Status: SHIPPED | OUTPATIENT
Start: 2024-02-03 | End: 2024-02-13

## 2024-02-03 RX ADMIN — SODIUM CHLORIDE: 9 INJECTION, SOLUTION INTRAVENOUS at 06:30

## 2024-02-03 RX ADMIN — PREGABALIN 150 MG: 150 CAPSULE ORAL at 08:18

## 2024-02-03 RX ADMIN — POTASSIUM CHLORIDE 10 MEQ: 10 CAPSULE, COATED, EXTENDED RELEASE ORAL at 08:18

## 2024-02-03 RX ADMIN — ACETAMINOPHEN, ASPIRIN, CAFFEINE 2 TABLET: 250; 65; 250 TABLET, FILM COATED ORAL at 09:19

## 2024-02-03 RX ADMIN — FLUDROCORTISONE ACETATE 0.1 MG: 0.1 TABLET ORAL at 09:31

## 2024-02-03 RX ADMIN — PREGABALIN 150 MG: 150 CAPSULE ORAL at 21:29

## 2024-02-03 RX ADMIN — MECLIZINE HYDROCHLORIDE 25 MG: 25 TABLET ORAL at 23:51

## 2024-02-03 RX ADMIN — SODIUM CHLORIDE, PRESERVATIVE FREE 10 ML: 5 INJECTION INTRAVENOUS at 08:25

## 2024-02-03 RX ADMIN — RANOLAZINE 1000 MG: 500 TABLET, EXTENDED RELEASE ORAL at 21:29

## 2024-02-03 RX ADMIN — SODIUM CHLORIDE: 9 INJECTION, SOLUTION INTRAVENOUS at 16:52

## 2024-02-03 RX ADMIN — CARBIDOPA AND LEVODOPA 1 TABLET: 25; 100 TABLET ORAL at 13:58

## 2024-02-03 RX ADMIN — TRAMADOL HYDROCHLORIDE 50 MG: 50 TABLET, COATED ORAL at 14:46

## 2024-02-03 RX ADMIN — CARBIDOPA AND LEVODOPA 1 TABLET: 25; 100 TABLET ORAL at 09:34

## 2024-02-03 RX ADMIN — MIDODRINE HYDROCHLORIDE 2.5 MG: 2.5 TABLET ORAL at 08:18

## 2024-02-03 RX ADMIN — LEVOTHYROXINE SODIUM 75 MCG: 0.07 TABLET ORAL at 05:42

## 2024-02-03 RX ADMIN — ACETAMINOPHEN 650 MG: 325 TABLET, FILM COATED ORAL at 13:58

## 2024-02-03 RX ADMIN — TRAMADOL HYDROCHLORIDE 50 MG: 50 TABLET, COATED ORAL at 08:18

## 2024-02-03 RX ADMIN — MIDODRINE HYDROCHLORIDE 2.5 MG: 2.5 TABLET ORAL at 12:30

## 2024-02-03 RX ADMIN — MECLIZINE HYDROCHLORIDE 25 MG: 25 TABLET ORAL at 00:11

## 2024-02-03 RX ADMIN — PANTOPRAZOLE SODIUM 40 MG: 40 INJECTION, POWDER, FOR SOLUTION INTRAVENOUS at 08:17

## 2024-02-03 RX ADMIN — RANOLAZINE 1000 MG: 500 TABLET, EXTENDED RELEASE ORAL at 08:18

## 2024-02-03 RX ADMIN — SODIUM CHLORIDE, PRESERVATIVE FREE 10 ML: 5 INJECTION INTRAVENOUS at 21:31

## 2024-02-03 RX ADMIN — CARBIDOPA AND LEVODOPA 1 TABLET: 25; 100 TABLET ORAL at 17:58

## 2024-02-03 RX ADMIN — CARBIDOPA AND LEVODOPA 1 TABLET: 25; 100 TABLET ORAL at 05:42

## 2024-02-03 RX ADMIN — ONDANSETRON 4 MG: 2 INJECTION INTRAMUSCULAR; INTRAVENOUS at 12:37

## 2024-02-03 RX ADMIN — MECLIZINE HYDROCHLORIDE 25 MG: 25 TABLET ORAL at 10:29

## 2024-02-03 RX ADMIN — TRAMADOL HYDROCHLORIDE 50 MG: 50 TABLET, COATED ORAL at 21:29

## 2024-02-03 RX ADMIN — DOCUSATE SODIUM LIQUID 50 MG: 100 LIQUID ORAL at 21:29

## 2024-02-03 ASSESSMENT — PAIN SCALES - GENERAL
PAINLEVEL_OUTOF10: 6
PAINLEVEL_OUTOF10: 7
PAINLEVEL_OUTOF10: 1
PAINLEVEL_OUTOF10: 5
PAINLEVEL_OUTOF10: 6
PAINLEVEL_OUTOF10: 2
PAINLEVEL_OUTOF10: 6
PAINLEVEL_OUTOF10: 5

## 2024-02-03 ASSESSMENT — PAIN DESCRIPTION - ORIENTATION
ORIENTATION: LOWER
ORIENTATION: LOWER

## 2024-02-03 ASSESSMENT — PAIN DESCRIPTION - LOCATION
LOCATION: BACK
LOCATION: HEAD;GENERALIZED
LOCATION: HEAD
LOCATION: BACK;HEAD

## 2024-02-03 NOTE — PLAN OF CARE
Problem: Discharge Planning  Goal: Discharge to home or other facility with appropriate resources  2/3/2024 0330 by Jocy Morgan, RN  Outcome: Progressing  Flowsheets (Taken 2/3/2024 0330)  Discharge to home or other facility with appropriate resources:   Identify discharge learning needs (meds, wound care, etc)   Arrange for needed discharge resources and transportation as appropriate   Arrange for interpreters to assist at discharge as needed     Problem: Safety - Adult  Goal: Free from fall injury  2/3/2024 0330 by Jocy Morgan, RN  Outcome: Progressing  Note: No falls noted this shift. Patient ambulates with x1 staff assistance without difficulty.  Bed kept in low position. Safe environment maintained. Bedside table & call light in reach. Uses call light appropriately when needing assistance.        Problem: Pain  Goal: Verbalizes/displays adequate comfort level or baseline comfort level  Outcome: Progressing  Note: Pt medicated with pain medication prn.  Assessed all pain characteristics including level, type, location, frequency, and onset.  Non-pharmacologic interventions offered to pt as well.  Pt states pain is tolerable at this time.

## 2024-02-03 NOTE — CARE COORDINATION
ONGOING DISCHARGE PLAN:    Patient is alert and oriented x4.    Spoke with patient regarding discharge plan and patient confirms that plan is still to return to home w/ Family.    Denies VNS.    HGB today 10.6.     Per GI notes, OK for UGI outpatient, since can't be done til Monday.    Will continue to follow for additional discharge needs.    If patient is discharged prior to next notation, then this note serves as note for discharge by case management.    Electronically signed by Janice Rolle RN on 2/3/2024 at 5:47 PM

## 2024-02-04 VITALS
RESPIRATION RATE: 18 BRPM | HEIGHT: 70 IN | OXYGEN SATURATION: 100 % | HEART RATE: 68 BPM | WEIGHT: 124.12 LBS | SYSTOLIC BLOOD PRESSURE: 107 MMHG | DIASTOLIC BLOOD PRESSURE: 56 MMHG | BODY MASS INDEX: 17.77 KG/M2 | TEMPERATURE: 97.8 F

## 2024-02-04 PROCEDURE — 99239 HOSP IP/OBS DSCHRG MGMT >30: CPT | Performed by: INTERNAL MEDICINE

## 2024-02-04 PROCEDURE — C9113 INJ PANTOPRAZOLE SODIUM, VIA: HCPCS | Performed by: INTERNAL MEDICINE

## 2024-02-04 PROCEDURE — 6370000000 HC RX 637 (ALT 250 FOR IP): Performed by: INTERNAL MEDICINE

## 2024-02-04 PROCEDURE — 2580000003 HC RX 258: Performed by: NURSE PRACTITIONER

## 2024-02-04 PROCEDURE — 99231 SBSQ HOSP IP/OBS SF/LOW 25: CPT | Performed by: PSYCHIATRY & NEUROLOGY

## 2024-02-04 PROCEDURE — 2580000003 HC RX 258: Performed by: INTERNAL MEDICINE

## 2024-02-04 PROCEDURE — 6370000000 HC RX 637 (ALT 250 FOR IP): Performed by: PSYCHIATRY & NEUROLOGY

## 2024-02-04 PROCEDURE — A4216 STERILE WATER/SALINE, 10 ML: HCPCS | Performed by: INTERNAL MEDICINE

## 2024-02-04 PROCEDURE — 6360000002 HC RX W HCPCS: Performed by: INTERNAL MEDICINE

## 2024-02-04 PROCEDURE — 6370000000 HC RX 637 (ALT 250 FOR IP): Performed by: NURSE PRACTITIONER

## 2024-02-04 RX ADMIN — TRAMADOL HYDROCHLORIDE 50 MG: 50 TABLET, COATED ORAL at 08:12

## 2024-02-04 RX ADMIN — PANTOPRAZOLE SODIUM 40 MG: 40 INJECTION, POWDER, FOR SOLUTION INTRAVENOUS at 08:12

## 2024-02-04 RX ADMIN — RANOLAZINE 1000 MG: 500 TABLET, EXTENDED RELEASE ORAL at 08:12

## 2024-02-04 RX ADMIN — CARBIDOPA AND LEVODOPA 1 TABLET: 25; 100 TABLET ORAL at 06:06

## 2024-02-04 RX ADMIN — SODIUM CHLORIDE, PRESERVATIVE FREE 10 ML: 5 INJECTION INTRAVENOUS at 08:13

## 2024-02-04 RX ADMIN — MIDODRINE HYDROCHLORIDE 2.5 MG: 2.5 TABLET ORAL at 08:14

## 2024-02-04 RX ADMIN — PREGABALIN 150 MG: 150 CAPSULE ORAL at 08:12

## 2024-02-04 RX ADMIN — CARBIDOPA AND LEVODOPA 1 TABLET: 25; 100 TABLET ORAL at 10:07

## 2024-02-04 RX ADMIN — SODIUM CHLORIDE: 9 INJECTION, SOLUTION INTRAVENOUS at 03:39

## 2024-02-04 RX ADMIN — POTASSIUM CHLORIDE 10 MEQ: 10 CAPSULE, COATED, EXTENDED RELEASE ORAL at 08:12

## 2024-02-04 RX ADMIN — FLUDROCORTISONE ACETATE 0.1 MG: 0.1 TABLET ORAL at 08:14

## 2024-02-04 RX ADMIN — ACETAMINOPHEN, ASPIRIN, CAFFEINE 2 TABLET: 250; 65; 250 TABLET, FILM COATED ORAL at 00:09

## 2024-02-04 RX ADMIN — LEVOTHYROXINE SODIUM 75 MCG: 0.07 TABLET ORAL at 06:06

## 2024-02-04 ASSESSMENT — PAIN SCALES - GENERAL
PAINLEVEL_OUTOF10: 5
PAINLEVEL_OUTOF10: 7

## 2024-02-04 ASSESSMENT — PAIN DESCRIPTION - LOCATION: LOCATION: HEAD

## 2024-02-04 ASSESSMENT — PAIN DESCRIPTION - DESCRIPTORS: DESCRIPTORS: ACHING

## 2024-02-04 NOTE — DISCHARGE SUMMARY
Gallbladder shows no significant abnormality. GI/Bowel: There is limited evaluation due to absence of oral contrast. The stomach shows no focal lesions.  Small bowel loops normal in caliber showing no focal abnormalities. No evidence for appendicitis.  Evaluation of the colon shows no acute process. Pelvis: Prostate seed implants noted.  The urinary bladder is unremarkable. It is moderately distended.  No suspicious pelvic mass. Peritoneum/Retroperitoneum: No free fluid.  No significant lymphadenopathy. Atherosclerotic disease in the aorta. Bones/Soft Tissues: L4 and L5 laminectomy noted.  Pedicle screw fixation of L4 and L5.  No acute bony abnormality.  Superficial soft tissues unremarkable.     No acute intra-abdominal or intrapelvic process.     XR CHEST PORTABLE    Result Date: 1/31/2024  EXAMINATION: ONE XRAY VIEW OF THE CHEST 1/31/2024 6:02 pm COMPARISON: January 6, 2024 HISTORY: ORDERING SYSTEM PROVIDED HISTORY: dizziness TECHNOLOGIST PROVIDED HISTORY: dizziness Reason for Exam: dizziness Additional signs and symptoms: dizziness Relevant Medical/Surgical History: dizziness FINDINGS: The lungs are without acute focal process.  There is no effusion or pneumothorax. The cardiomediastinal silhouette is without acute process. The osseous structures are without acute process.  Stable left humeral ORIF. Stable postthoracotomy changes.     No acute cardiopulmonary process.     XR CHEST (2 VW)    Result Date: 1/6/2024  EXAMINATION: TWO XRAY VIEWS OF THE CHEST 1/6/2024 10:55 am COMPARISON: None. HISTORY: ORDERING SYSTEM PROVIDED HISTORY: Subacute cough TECHNOLOGIST PROVIDED HISTORY: Reason for Exam: Pt states acute cough, fatigue FINDINGS: Medical devices: Left humeral plate and screw fixation hardware with cortical irregularity chronic appearing cortical irregularity of the medial proximal shaft.  Sternotomy wires and surgical clips over the heart.. Mediastinum/Heart: The mediastinal contours are normal. The heart

## 2024-02-04 NOTE — PLAN OF CARE
Problem: Discharge Planning  Goal: Discharge to home or other facility with appropriate resources  2/4/2024 0312 by Jocy Morgan RN  Outcome: Progressing  Flowsheets (Taken 2/4/2024 0312)  Discharge to home or other facility with appropriate resources:   Identify barriers to discharge with patient and caregiver   Refer to discharge planning if patient needs post-hospital services based on physician order or complex needs related to functional status, cognitive ability or social support system   Arrange for needed discharge resources and transportation as appropriate     Problem: Safety - Adult  Goal: Free from fall injury  2/4/2024 0312 by Jocy Morgan RN  Outcome: Progressing  Note: No falls noted this shift. Patient ambulates with x1 staff assistance without difficulty.  Bed kept in low position. Safe environment maintained. Bedside table & call light in reach. Uses call light appropriately when needing assistance.        Problem: Nutrition Deficit:  Goal: Optimize nutritional status  2/4/2024 0312 by Jocy Morgan RN  Outcome: Progressing     Problem: Neurosensory - Adult  Goal: Achieves stable or improved neurological status  2/4/2024 0312 by Jocy Morgan RN  Outcome: Progressing     Problem: Gastrointestinal - Adult  Goal: Maintains or returns to baseline bowel function  2/4/2024 0312 by Jocy Morgan RN  Outcome: Progressing     Problem: Pain  Goal: Verbalizes/displays adequate comfort level or baseline comfort level  2/4/2024 0312 by Jocy Morgan RN  Outcome: Progressing  Note: Pt medicated with pain medication prn.  Assessed all pain characteristics including level, type, location, frequency, and onset.  Non-pharmacologic interventions offered to pt as well.  Pt states pain is tolerable at this time.        Problem: Skin/Tissue Integrity  Goal: Absence of new skin breakdown  Description: 1.  Monitor for areas of redness and/or skin breakdown  2.  Assess

## 2024-02-04 NOTE — PLAN OF CARE
Problem: Discharge Planning  Goal: Discharge to home or other facility with appropriate resources  Outcome: Progressing     Problem: Safety - Adult  Goal: Free from fall injury  Outcome: Progressing     Problem: ABCDS Injury Assessment  Goal: Absence of physical injury  Outcome: Progressing     Problem: Nutrition Deficit:  Goal: Optimize nutritional status  Outcome: Progressing     Problem: Neurosensory - Adult  Goal: Achieves stable or improved neurological status  Outcome: Progressing     Problem: Cardiovascular - Adult  Goal: Maintains optimal cardiac output and hemodynamic stability  Outcome: Progressing     Problem: Gastrointestinal - Adult  Goal: Maintains or returns to baseline bowel function  Outcome: Progressing     Problem: Metabolic/Fluid and Electrolytes - Adult  Goal: Hemodynamic stability and optimal renal function maintained  Outcome: Progressing     Problem: Pain  Goal: Verbalizes/displays adequate comfort level or baseline comfort level  Outcome: Progressing

## 2024-02-04 NOTE — PROGRESS NOTES
[facility]    Gastroenterology Daily Progress Note      Patient:   Rowdy Marie   :    1950   Facility:   [facility]  Date:     2024  Consultant:   ALMAS Coreas - CNP, CNP      SUBJECTIVE  73 y.o. male admitted 2024 with GI bleed [K92.2]  Hypotension, unspecified hypotension type [I95.9]  Gastrointestinal hemorrhage, unspecified gastrointestinal hemorrhage type [K92.2] and seen for anemia with positive fobt and dysphagia. The pt was seen and examined. Hgb 11.4, no BM overnight, pt was given food so UGI cannot be completed until Monday. Pt denies abdominal pain and nausea. Reports decreased dizziness..        OBJECTIVE  Scheduled Meds:   carbidopa-levodopa  1 tablet Oral 4x Daily    fludrocortisone  0.1 mg Oral Daily    pantoprazole (PROTONIX) 40 mg in sodium chloride (PF) 0.9 % 10 mL injection  40 mg IntraVENous Daily    midodrine  2.5 mg Oral TID WC    sodium chloride flush  5-40 mL IntraVENous 2 times per day    [Held by provider] aspirin EC  81 mg Oral Daily    ranolazine  1,000 mg Oral BID    pregabalin  150 mg Oral BID    potassium chloride  10 mEq Oral QAM    traMADol  50 mg Oral TID    levothyroxine  75 mcg Oral Daily    docusate  50 mg Oral Nightly    [Held by provider] clopidogrel  75 mg Oral Daily       Vital Signs:  /69   Pulse 74   Temp 98.4 °F (36.9 °C) (Oral)   Resp 16   Ht 1.778 m (5' 10\")   SpO2 96%   BMI 21.67 kg/m²    Review of Systems - History obtained from chart review and the patient  General ROS: positive for  - dizziness  Respiratory ROS: no cough, shortness of breath, or wheezing  Cardiovascular ROS: no chest pain or dyspnea on exertion  Gastrointestinal ROS: no abdominal pain, change in bowel habits, or black or bloody stools   Physical Exam:     General Appearance: alert and oriented to person, place and time, well-developed and well-nourished, in no acute distress  Skin: warm and dry, no rash or erythema  Head: 
   02/02/24 1150   Encounter Summary   Encounter Overview/Reason  Attempted Encounter   Service Provided For: Patient not available  (patient with PT)       
   GI Progress notes    2/3/2024   11:36 AM    Name:  Rowdy Marie  MRN:    [id number]     Acct:     [id number]   Room:  [address] [place] [address] Day: 3     Admit Date: 1/31/2024  5:21 PM  PCP: Yandy Jones MD    Subjective:     C/C:   Chief Complaint   Patient presents with    Hypotension    Dizziness       Interval History: Status: improved.     Patient seen and examined.  No acute events overnight.  Tolerating diet  No signs of overt GI bleeding  Hgb stable    ROS:  Constitutional: negative for chills, fevers and sweats  Gastrointestinal: negative for abdominal pain, constipation, diarrhea, nausea and vomiting  Neurological: negative for dizziness and headaches    Medications:     Allergies:   Allergies   Allergen Reactions    Atorvastatin     Fluvastatin     Imdur [Isosorbide Nitrate]     Isosorbide Headaches     Patient denies having any allergy to this medication    Methadone     Metoprolol     Nalbuphine     Oxycodone     Penicillins     Rosuvastatin     Statins     Zetia [Ezetimibe]        Current Meds: carbidopa-levodopa (SINEMET)  MG per tablet 1 tablet, 4x Daily  fludrocortisone (FLORINEF) tablet 0.1 mg, Daily  pantoprazole (PROTONIX) 40 mg in sodium chloride (PF) 0.9 % 10 mL injection, Daily  midodrine (PROAMATINE) tablet 2.5 mg, TID WC  sodium chloride flush 0.9 % injection 10 mL, PRN  0.9 % sodium chloride infusion, Continuous  sodium chloride flush 0.9 % injection 5-40 mL, 2 times per day  sodium chloride flush 0.9 % injection 5-40 mL, PRN  0.9 % sodium chloride infusion, PRN  ondansetron (ZOFRAN-ODT) disintegrating tablet 4 mg, Q8H PRN   Or  ondansetron (ZOFRAN) injection 4 mg, Q6H PRN  acetaminophen (TYLENOL) tablet 650 mg, Q6H PRN   Or  acetaminophen (TYLENOL) suppository 650 mg, Q6H PRN  [Held by provider] aspirin EC tablet 81 mg, Daily  ranolazine (RANEXA) extended release tablet 1,000 mg, BID  pregabalin (LYRICA) capsule 150 mg, BID  potassium chloride (MICRO-K) extended release capsule 10 
  NEUROLOGY INPATIENT PROGRESS NOTE    2024         Rowdy Marie is a  73 y.o. male admitted on 2024 with  GI bleed [K92.2]  Hypotension, unspecified hypotension type [I95.9]  Gastrointestinal hemorrhage, unspecified gastrointestinal hemorrhage type [K92.2]    Reason for consult: Dizziness, weakness with multiple falls, tinnitus, Parkinson's  History is obtained mostly from the patient and the medical record and from the caregivers. Chart is reviewed and patient is examined.   Briefly, this is a  73 y.o. male with recent +ve fecal occult blood test and parkinsonism was admitted as a direct admit from clinic on 2024 with hypotension and dizziness.   Neurology is consulted for evaluation of parkinsonism with recurrent falls.  He was having shaking in his both hands and also was having orthostatic dizziness.  Patient stated that he was evaluated in [facility] last year and he has been on Sinemet 10/100 switched to 25/100.  It has helped initially but not lately.  He has been having recurrent falls with ongoing orthostatic dizziness.  Denied head injury and LOC.  Denied speech and swallowing difficulties.  Caregivers stated that he has had orthostatic vitals done this afternoon revealing  Supine: 162/89.,  Pulse 58  Standin/47.,  Pulse 73    2024: Says orthostatic dizziness is getting better on present meds. But he c/o severe headache located in left frontotemporal region with throbbing and pounding nature with associated photophobia and phonophobia.  He has received tramadol with no help.  He is requesting medication to help for relief of headache.         No current facility-administered medications on file prior to encounter.     Current Outpatient Medications on File Prior to Encounter   Medication Sig Dispense Refill    carbidopa-levodopa (SINEMET)  MG per tablet Take 1 tablet by mouth 3 times daily      potassium chloride (KLOR-CON) 10 MEQ extended release tablet Take 1 tablet 
  NEUROLOGY INPATIENT PROGRESS NOTE    2024         Rowdy Marie is a  73 y.o. male admitted on 2024 with  GI bleed [K92.2]  Hypotension, unspecified hypotension type [I95.9]  Gastrointestinal hemorrhage, unspecified gastrointestinal hemorrhage type [K92.2]    Reason for consult: Dizziness, weakness with multiple falls, tinnitus, Parkinson's  History is obtained mostly from the patient and the medical record and from the caregivers. Chart is reviewed and patient is examined.   Briefly, this is a  73 y.o. male with recent +ve fecal occult blood test and parkinsonism was admitted as a direct admit from clinic on 2024 with hypotension and dizziness.   Neurology is consulted for evaluation of parkinsonism with recurrent falls.  He was having shaking in his both hands and also was having orthostatic dizziness.  Patient stated that he was evaluated in [facility] last year and he has been on Sinemet 10/100 switched to 25/100.  It has helped initially but not lately.  He has been having recurrent falls with ongoing orthostatic dizziness.  Denied head injury and LOC.  Denied speech and swallowing difficulties.  Caregivers stated that he has had orthostatic vitals done this afternoon revealing  Supine: 162/89.,  Pulse 58  Standin/47.,  Pulse 73    2024: Says orthostatic dizziness is getting better on present meds. But he c/o severe headache located in left frontotemporal region with throbbing and pounding nature with associated photophobia and phonophobia.  He has received tramadol with no help.  He is requesting medication to help for relief of headache.    2/3/2024: Chart reviewed and discussed with caregivers.  Patient has had severe headache yesterday receiving IV mag and also had tramadol.    2024: tremors and dizziness are getting better. caregivers stated that his blood pressure parameters on orthostatic hypotension check improved; supine 140/80--> standing 120/80         No current 
  Rowdy Marie is a 73 y.o. Non- / non  male who presents with Hypotension and Dizziness   and is admitted to the hospital for the management of GI bleed.    According to patient, he was seen by his PCP office today, who had been working him up for recent weight loss and anemia.  He had a rectal guaiac test completed on 1/27, which was positive for occult blood. Patient was instructed to come to ED for evaluation.    Patient reports that recently he has been getting extremely dizzy and feels as if he cannot do anything.  Has been falling at home, secondary to these dizzy spells.  Patient reports that he was seen in the PCP office today and his blood pressure was noted to be 86/50.  Reports past history of vertigo and states that he gets these vertigo episodes and cannot walk..  Reports that he has been having these episodes since before Christmas and states that he has had some episodes of severe vomiting related to syncope.      Patient status inpatient in the Progressive Unit/Step down    Hospital Problems             Last Modified POA    * (Principal) GI bleed 1/31/2024 Yes     GI bleeding  -rectal guaiac Positive on 1/27   -hemoglobin 13.6  -Hold ASA and VTE d/t bleeding  -Consult GI  -Clear liquid diet, then NPO after midnight  -monitor for signs of bleeding  -Reports past history of hemorrhoids and Peptic ulcers    Near syncope  -Check orthostatic VS   -EKG - sinus rhythm  -check 2D Echocardiogram today  -IVF- NS @ 75 ml/hr  -PT & OT eval and treat  -Meclizine as needed for dizziness  -Consult neurology        Disposition 3 days      Consultations:   IP CONSULT TO GI    Patient is admitted as inpatient status because of co-morbidities listed above, severity of signs and symptoms as outlined, requirement for current medical therapies and most importantly because of direct risk to patient if care not provided in a hospital setting.  Expected length of stay > 48 hours.    ALMAS Lopez - 
Arabella Nguyen, JEROME   Patient:  RADHA IVEY   YOB: 1950  MRN:  [id number]  [facility]: [facility]    2117-01 2/1/24 8:43 AM   [phone] Hospital or Facility: [facility] From: Shahnaz [place] RE: RADHA IVEY 1950 RM: 2117-01 GI bleed Need Callback: NO CALLBACK REQ PROG CARE ROUTINE  Unrea   
Comprehensive Nutrition Assessment    Type and Reason for Visit:  Positive Nutrition Screen (weight loss)    Nutrition Recommendations/Plan:   Continue diet as ordered.  Follow for diet advancement.     Malnutrition Assessment:  Malnutrition Status:  Severe malnutrition (24 1440)    Context:  Chronic Illness     Findings of the 6 clinical characteristics of malnutrition:  Body Fat Loss:  Severe body fat loss Orbital, Buccal region   Muscle Mass Loss:  Severe muscle mass loss Temples (temporalis), Clavicles (pectoralis & deltoids)  Fluid Accumulation:  No significant fluid accumulation     Strength:  Not Performed    Nutrition Assessment:    Pt had gone to his PCP with complaints of vertigo & weakness.  Pt found to be hypotensive with stool hemoccult positive and told to go ER. GI consulted with Clear liquid diet ordered. Pt states he's probably lost 40# in less than a year and thinks his weight is lower than the recorded weight of 151#. Pt states \"I've been under a lot of stress, my wife  and we were  50 plus years and his best friend.\"  Pt further discussed that anything lemon, canteloupe , and cream cheese will give him a migraine.    Nutrition Related Findings:    No edema. Hx: CABG, A. fib. Labs & meds reviewed. Wound Type: None       Current Nutrition Intake & Therapies:    Average Meal Intake: %     ADULT DIET; Clear Liquid    Anthropometric Measures:  Height: 177.8 cm (5' 10\")  Ideal Body Weight (IBW): 166 lbs (75 kg)    Admission Body Weight: 68.5 kg (151 lb)  Current Body Weight: 68.5 kg (151 lb), 91 % IBW. Weight Source: Not Specified  Current BMI (kg/m2): 21.7                          BMI Categories: Underweight (BMI less than 22) age over 65    Estimated Daily Nutrient Needs:  Energy Requirements Based On: Kcal/kg  Weight Used for Energy Requirements: Admission  Energy (kcal/day): 4593-0930 kcals based on 26-28 kcals/kg  Weight Used for Protein Requirements: Admission  Protein 
Date:   2/3/2024  Patient name: Rowdy Marie  Date of admission:  1/31/2024  5:21 PM  MRN:   [id number]  YOB: 1950  PCP: Yandy Jones MD    Reason for Admission: GI bleed [K92.2]  Hypotension, unspecified hypotension type [I95.9]  Gastrointestinal hemorrhage, unspecified gastrointestinal hemorrhage type [K92.2]    Cardiology follow-up: Chest pain        Referring physician Dr. Aracely Pierre     Impression     Admission 1/31/2024 severe hypotension blood pressure 80 mmHg, dizziness  Episode of sharp chest pain no shortness of breath no diaphoresis ECG no ischemic changes on admission, normal high-sensitivity troponin 15, 13, 15  Paroxysmal atrial fibrillation Watchman procedure 11/14/2023, current rhythm sinus rhythm  Ejection fraction 60 to 65%  Coronary artery disease, CABG times 4/2014, stent placement times 5 December 2022  LIMA to LAD, SVG to OM 3, ramus, RCA patent grafts cardiac cath December 2019  History of stent placement  Hypothyroidism  Hyperlipidemia  History of prostate cancer  History of anemia  History of gastritis, esophageal stricture  Parkinson disease on Sinemet  Back pain, back surgery  Episodes of dizziness, combination of previous C-spine surgery, postural hypotension/autonomic dysfunction patient has Parkinson disease on medication, dizziness aggravates by head and neck movements  Normal B12 1970, serum iron 57, iron saturation 21, folate more than 20    Investigation workup     ECG 1/31/2024  Sinus rhythm heart rate 66, normal OR, QRS and QT interval  No obvious ischemic changes     CT abdomen and pelvis 1/31/2024  No acute intra-abdominal or intrapelvic process     MRA neck and head without contrast 2/9/2023  No evidence of large vessel vascular occlusion or intracranial aneurysm within the limits of MR angiographic technique  No hemodynamically significant stenosis or occlusion in the visualized cervical portion of the carotid or vertebral arteries     Echo 
Date:   2/4/2024  Patient name: Rowdy Marie  Date of admission:  1/31/2024  5:21 PM  MRN:   [id number]  YOB: 1950  PCP: Yandy Jones MD    Reason for Admission: GI bleed [K92.2]  Hypotension, unspecified hypotension type [I95.9]  Gastrointestinal hemorrhage, unspecified gastrointestinal hemorrhage type [K92.2]    Cardiology follow-up: Chest pain         Referring physician Dr. Aracely Pierre     Impression     Admission 1/31/2024 severe hypotension blood pressure 80 mmHg, dizziness  Episode of sharp chest pain no shortness of breath no diaphoresis ECG no ischemic changes on admission, normal high-sensitivity troponin 15, 13, 15  Paroxysmal atrial fibrillation Watchman procedure 11/14/2023, current rhythm sinus rhythm  Ejection fraction 60 to 65%  Coronary artery disease, CABG times 4/2014, stent placement times 5 December 2022  LIMA to LAD, SVG to OM 3, ramus, RCA patent grafts cardiac cath December 2019  History of stent placement  Hypothyroidism  Hyperlipidemia  History of prostate cancer  History of anemia  History of gastritis, esophageal stricture  Parkinson disease on Sinemet  Back pain, back surgery  Episodes of dizziness, combination of previous C-spine surgery, postural hypotension/autonomic dysfunction patient has Parkinson disease on medication, dizziness aggravates by head and neck movements  Normal B12 1970, serum iron 57, iron saturation 21, folate more than 20    Investigation workup     ECG 1/31/2024  Sinus rhythm heart rate 66, normal MN, QRS and QT interval  No obvious ischemic changes     CT abdomen and pelvis 1/31/2024  No acute intra-abdominal or intrapelvic process     MRA neck and head without contrast 2/9/2023  No evidence of large vessel vascular occlusion or intracranial aneurysm within the limits of MR angiographic technique  No hemodynamically significant stenosis or occlusion in the visualized cervical portion of the carotid or vertebral arteries     Echo 
Dr Ladd was consulted and saw patient and will continue to follow.  
Dr. Rafa Macias   Patient:  RADHA IVEY   YOB: 1950  MRN:  [id number]  Location: [facility]    2117-01 2/1/24 8:45 AM   [phone] From: Shahnaz Zuniga [facility] RE: RADHA IVEY dizziness, weakness with multiple falls, tinnitus, parkinsons. - sees neurology at [facility]  Unread   
[facility]   Occupational Therapy Evaluation  Date: 24  Patient Name: Rowdy Marie       Room: 7/2117-01  MRN: [id number]  Account: [id number]   : 1950  (73 y.o.) Gender: male     Discharge Recommendations:  Further Occupational Therapy is recommended upon facility discharge.    OT Equipment Recommendations  Other: TBD    Referring Practitioner: Mavis Mathur, APRN - CNP  Diagnosis: GI bleed; Hypotension, unspecified hypotension type; Gastrointestinal hemorrhage, unspecified type Additional Pertinent Hx: Rowdy Marie is a 73 y.o. Non- / non  male who presents with Hypotension and Dizziness   and is admitted to the hospital for the management of GI bleed.     According to patient, he was seen by his PCP office today, who had been working him up for recent weight loss and anemia.  He had a rectal guaiac test completed on , which was positive for occult blood. Patient was instructed to come to ED for evaluation.     Patient reports that recently he has been getting extremely dizzy and feels as if he cannot do anything.  Has been falling at home, secondary to these dizzy spells.  Patient reports that he was seen in the PCP office today and his blood pressure was noted to be 86/50.  Reports past history of vertigo and states that he gets these vertigo episodes and cannot walk..  Reports that he has been having these episodes since before  and states that he has had some episodes of severe vomiting related to syncope.    Treatment Diagnosis: Impaired self-care status    Past Medical History:  has no past medical history on file.    Past Surgical History:   has no past surgical history on file.    Restrictions  Restrictions/Precautions  Restrictions/Precautions: Fall Risk, General Precautions, Bedrest with Bathroom Privileges  Required Braces or Orthoses?: No  Implants present? : Metal implants (Pt reports hardware in back, plates in R ankle, L upper arm 
Message sent via [employer] serve to Dr Kirby for consult of chest pain. EKG interpretation sent. Vitals stable, troponin 13. Patient has extensive cardiac history and sees [facility].  
Patient complain chest pain 7/10 pressure stabbing pain left chest. History CABG 2014, NSTEMI and several stents. Dr Pierre updated. Vitals obtained, 12 lead EKG done troponins ordered. Orders to consult cardiology.  
Pharmacy Medication History Note      List of current medications patient is taking is complete.     Source of information: patient/caregiver, dispense report, OARRS    Changes made to medication list:  Medications flagged for removal (include reason, ex. noncompliance):  Calcium carbonate - patient reports not taking  Vitamin D3 - patient reports not taking  Ergocalciferol - patient reports not taking  Evolocumab - patient reports not taking  Ferrous sulfate - patient reports not taking  Hydrocortisone - patient reports not taking  Ketoconazole - patient reports not taking  Magnesium chloride - patient reports not taking  Mirtazapine - patient reports not taking  Sumatriptan - patient reports not taking  Terbinafine - patient reports not taking  Ubrogepant - patient reports not taking    Medications removed (include reason, ex. therapy complete or physician discontinued):  Levothyroxine - duplicate medication  Vytorin - listed as patient allergy  Ezetemibe - listed as patient alllergy  CoQ10 - dupliocate medication    Medications added/doses adjusted:  Pregabalin - takes two capsules twice daily    Other notes (ex. Recent course of antibiotics, Coumadin dosing):  OARRS Report - tramadol 50mg 90 for 30 on 12/30/23 and pregabalin 75mg 120 for 30 on 12/24/23    Denies use of other OTC or herbal medications.      Allergies clarified    Medication list provided to the patient:no  Medication education provided to the patient:none      Electronically signed by Wally Gonzalez on 1/31/2024 at 8:24 PM                                                     
Physical Therapy  [facility]    Date: 2/3/24  Patient Name: Rowdy Marie       Room: 7/2117-01  MRN: [id number]   Account: [id number]   : 1950  (73 y.o.) Gender: male     Referring Practitioner: Mavis Mathur APRN - CNP  Diagnosis: GI bleed; Hypotension, unspecified hypotension type; Gastrointestinal hemorrhage, unspecified type  Past Medical History:  has a past medical history of A-fib ([facility]), PREET (acute kidney injury) ([facility]), Anemia, CAD (coronary artery disease), Esophageal stricture, Hyperlipidemia, Hypokalemia, Hypomagnesemia, NSTEMI (non-ST elevated myocardial infarction) ([facility]), Orthostatic hypotension, Parkinsons, Presence of Watchman left atrial appendage closure device, and Prostate CA ([facility]).   Past Surgical History:   has a past surgical history that includes Coronary artery bypass graft; Cardiac catheterization; and Coronary angioplasty with stent.       Overall Orientation Status: Within Functional Limits  Restrictions/Precautions  Restrictions/Precautions: Fall Risk;General Precautions;Bedrest with Bathroom Privileges  Required Braces or Orthoses?: No  Implants present? : Metal implants (Pt reports hardware in back, plates in R ankle, L upper arm screws, pins in bilat elbows)  Position Activity Restriction  Other position/activity restrictions: Mavis Mathur APRN - CNP    Subjective: Pt lying in bed upon arrival, agreeable to therapy  Comments: STAS Castillo approved therapy       Pain Assessment: 0-10  Pain Level: 7  Pain Location: Back  Pain Orientation: Lower     Oxygen Therapy  O2 Device: None (Room air)    Bed Mobility  Supine to Sit: Stand by assistance  Sit to Supine: Unable to assess  Scooting: Stand by assistance  Bed mobility  Scooting: Stand by assistance    Transfers:  Sit to Stand: Stand by assistance  Stand to Sit: Stand by assistance                 Ambulation  Surface: Level tile  Device: Rolling Walker  Assistance: Contact guard assistance  Distance: 
activity tolerance, impaired balance, general weakness and increased pain which impacts the pt's ability to safely and independently complete self-care and mobility    Balance  Balance  Sitting Balance: Stand by assistance (pt tolerates sitting EOB x ~10 minutes, pt denied dizziness with sitting EOB)  Standing Balance: Contact guard assistance  Standing Balance  Time: 2 minutes x 3  Activity: static stand, functional mobility, dynamic reaching activity  Comment: pt states that his legs feel weak but no LOB noted    Transfers/Mobility  Bed mobility  Supine to Sit: Stand by assistance  Sit to Supine: Unable to assess (pt up in recliner at end of session)  Scooting: Stand by assistance (to EOB)  Bed Mobility Comments: HOB elevated, use of hand rails, pt denies dizziness c changes in positioning  Transfers  Sit to stand: Contact guard assistance  Stand to sit: Contact guard assistance    Functional Mobility  Functional - Mobility Device: Rolling Walker  Activity: Other (room mobility)  Assist Level: Contact guard assistance  Functional Mobility Comments: pt states that his legs feel weak but no LOB noted      OT Exercises  Dynamic Standing Balance Exercises: pt engages in dynamic reaching activity x 2 minutes. eg reaching high/low(to just below waist), and crossing midline outside KATIE c 1 UE support on RW to increase safety during functional tasks at home. no c/o dizziness or LOB noted    Patient Education  Patient Education  Education Given To: Patient  Education Provided: Role of Therapy, Plan of Care  Education Method: Verbal  Barriers to Learning: None  Education Outcome: Verbalized understanding, Continued education needed    Goals  Short Term Goals  Time Frame for Short Term Goals: By discharge  Short Term Goal 1: Pt will complete UB ADLs with Mod I and LB ADLs with Min A, good safety, and use of AE/DME/Modified techniques as needed  Short Term Goal 2: Pt will actively participate in 15+ minutes of therapeutic 
stents, paroxysmal A-fib, status post Watchman procedure, Parkinson disease, hypothyroidism got hospitalized on 1/31/2024 with a chief complaint of dizziness also had hypotension.  He was seen in office, his systolic blood pressure was 80 mmHg.  He did not have any chest pain.  Normally when he walks around he gets shortness of breath but no chest pain. He recently had fecal occult blood test and it was positive patient has been on aspirin and Plavix  In ER his blood pressure was 107/70 he was not tachycardic, ECG showed sinus rhythm heart rate 66  CT abdomen and pelvis did not show any acute process     On admission hemoglobin was 13.6, sodium 134, potassium 4.6, creatinine 1.3  High-sensitivity troponin 15    Current evaluation  Patient seen and examined he was resting with couple of pillows  No further episode of chest pain  Repeat high-sensitivity troponin 13 and 15  He was out of bed for couple of hours he was on chair  He had a headache when he stood up headache has improved by lying down on the bed  No nausea no vomiting  No diaphoresis no paroxysmal nocturnal dyspnea  Hemodynamically stable  ECG monitor sinus rhythm    Blood pressure 140/80, heart rate 74, temperature 97.6, oxygen saturation 97%  Hemoglobin 10.9 on admission hemoglobin was 13.6    Medications:   Scheduled Meds:   carbidopa-levodopa  1 tablet Oral 4x Daily    fludrocortisone  0.1 mg Oral Daily    pantoprazole (PROTONIX) 40 mg in sodium chloride (PF) 0.9 % 10 mL injection  40 mg IntraVENous Daily    midodrine  2.5 mg Oral TID WC    sodium chloride flush  5-40 mL IntraVENous 2 times per day    [Held by provider] aspirin EC  81 mg Oral Daily    ranolazine  1,000 mg Oral BID    pregabalin  150 mg Oral BID    potassium chloride  10 mEq Oral QAM    traMADol  50 mg Oral TID    levothyroxine  75 mcg Oral Daily    docusate  50 mg Oral Nightly    [Held by provider] clopidogrel  75 mg Oral Daily     Continuous Infusions:   sodium chloride 75 mL/hr at 
 0.1 mg Oral Daily    pantoprazole (PROTONIX) 40 mg in sodium chloride (PF) 0.9 % 10 mL injection  40 mg IntraVENous Daily    midodrine  2.5 mg Oral TID WC    sodium chloride flush  5-40 mL IntraVENous 2 times per day    [Held by provider] aspirin EC  81 mg Oral Daily    ranolazine  1,000 mg Oral BID    pregabalin  150 mg Oral BID    potassium chloride  10 mEq Oral QAM    traMADol  50 mg Oral TID    levothyroxine  75 mcg Oral Daily    docusate  50 mg Oral Nightly    [Held by provider] clopidogrel  75 mg Oral Daily     PRN Meds include: sodium chloride flush, sodium chloride flush, sodium chloride, ondansetron **OR** ondansetron, acetaminophen **OR** acetaminophen, meclizine, aspirin-acetaminophen-caffeine    ROS:   Constitutional Negative for fever and chills   HEENT Negative for ear discharge, ear pain, nosebleed   Eyes Negative for photophobia, pain and discharge   Respiratory Negative for hemoptysis and sputum   Cardiovascular Negative for orthopnea, claudication and PND   Gastrointestinal Negative for abdominal pain, diarrhea, blood in stool   Musculoskeletal Negative for joint pain, negative for myalgia   Skin Negative for rash or itching   Endo/heme/allergies Negative for polydipsia, environmental allergy   Psychiatric Negative for suicidal ideation.  Patient is not anxious           Objective:   /61   Pulse 67   Temp 97.5 °F (36.4 °C)   Resp 16   Ht 1.778 m (5' 10\")   Wt 58 kg (127 lb 13.9 oz)   SpO2 95%   BMI 18.35 kg/m²     Blood pressure range: Systolic (24hrs), Av , Min:128 , Max:167   ; Diastolic (24hrs), Av, Min:61, Max:78      Continuous infusions:    sodium chloride 75 mL/hr at 24 0630    sodium chloride         ON EXAMINATION:  GENERAL  Appears comfortable and in no distress   HEENT  NC/ AT   NECK  Supple and no bruits heard   Cardiovascular  S1, S2 heard; radial pulse intact   MENTAL STATUS:  Alert, oriented, intact memory, no confusion, normal speech, normal language, no 
help is needed turning from your back to your side while in a flat bed without using bedrails?: A Little  How much help is needed moving from lying on your back to sitting on the side of a flat bed without using bedrails?: A Little  How much help is needed moving to and from a bed to a chair?: A Little  How much help is needed standing up from a chair using your arms?: A Little  How much help is needed walking in hospital room?: A Little  How much help is needed climbing 3-5 steps with a railing?: A Lot  AM-PAC Inpatient Mobility Raw Score : 17  AM-PAC Inpatient T-Scale Score : 42.13  Mobility Inpatient CMS 0-100% Score: 50.57  Mobility Inpatient CMS G-Code Modifier : CK       Goals  Short Term Goals  Time Frame for Short Term Goals: 2-3 days  Short Term Goal 1: mod-I bed mobility  Short Term Goal 2: mod-I transfers from various surfaces  Short Term Goal 3: ambulate 50-100ft with device and supervision for household distances  Short Term Goal 4: pt able to negotiate 6 steps with rail (with/without device) and supervision for household mobility and home entry              Therapy Time   Individual Concurrent Group Co-treatment   Time In 1140         Time Out 1205         Minutes 25         Timed Code Treatment Minutes: 10 Minutes       Gracie Hutchinson, PT         
Range    Hemoglobin 10.9 (L) 13.5 - 17.5 g/dL    Hematocrit 33.7 (L) 41 - 53 %   Hemoglobin and Hematocrit    Collection Time: 02/02/24  8:49 PM   Result Value Ref Range    Hemoglobin 11.5 (L) 13.5 - 17.5 g/dL    Hematocrit 34.6 (L) 41 - 53 %   Hemoglobin and Hematocrit    Collection Time: 02/03/24  4:28 AM   Result Value Ref Range    Hemoglobin 10.6 (L) 13.5 - 17.5 g/dL    Hematocrit 32.2 (L) 41 - 53 %       Imaging/Diagnostics:        Assessment :      Primary Problem  GI bleed    Active Hospital Problems    Diagnosis Date Noted    Parkinson's disease without dyskinesia or fluctuating manifestations [G20.A1] 02/02/2024    Acute intractable headache [R51.9] 02/02/2024    Severe malnutrition (HCC) [E43] 02/01/2024    Pharyngoesophageal dysphagia [R13.14] 02/01/2024    GI bleed [K92.2] 01/31/2024    Hypotension [I95.9] 01/31/2024    Mild anemia [D64.9] 01/31/2024       Plan:     Patient status Admit as inpatient in the  Progressive Unit/Step down    Patient is a 73-year-old male with multiple comorbidities including Parkinson's CAD s/p stent in December 2022, history of A-fib s/p Watchman device presented to the ER with presyncopal episode    Presyncope, patient has history of Parkinson's on carbidopa levodopa, his orthostats severely positive, blood pressure dropped to 70s on standing, patient has autonomic instability likely secondary to his Parkinson's, encourage p.o. hydration, will start Florinef,  Check head CT  Neurology is already been consulted    Anemia, acute anemia of blood loss hemoglobin dropped to 12.4, stool occult is positive, patient on aspirin and Plavix last stent was over a year ago, will hold GI consulted, switch to PPI IV    Parkinson's on carbidopa levodopa which has been resumed    CAD s/p stent    A-fib s/p Watchman device in November 2023 not on any anticoagulation due to history of GI bleed    Hypothyroidism, patient on levothyroxine recently TSH was checked which was 
IV    Parkinson's on carbidopa levodopa which has been resumed    CAD s/p stent    A-fib s/p Watchman device in November 2023 not on any anticoagulation due to history of GI bleed    Hypothyroidism, patient on levothyroxine recently TSH was checked which was normal    Hyperlipidemia, not on statin and Zetia due to side efefct   DVT prophylaxis SCDs    2/2  Continue to downtrend, 10.9 today.  Repeat orthostatic +ve- 120 to 95 systolic .  Fludrocortisone was started yesterday.  Ordering compression stockings, abdominal binder.  PT/OT  Discussed with GI-in light of continued hemoglobin drop, will need EGD to be done on Monday        Consultations:   IP CONSULT TO NEUROLOGY  IP CONSULT TO GI  IP CONSULT TO CARDIOLOGY     Patient is admitted as inpatient status because of co-morbidities listed above, severity of signs and symptoms as outlined, requirement for current medical therapies and most importantly because of direct risk to patient if care not provided in a hospital setting.    Yandy Jones MD  2/2/2024  1:51 PM    Copy sent to Yandy Haddad MD    Please note that this chart was generated using voice recognition Dragon dictation software.  Although every effort was made to ensure the accuracy of this automated transcription, some errors in transcription may have occurred.

## 2024-02-05 LAB
EKG ATRIAL RATE: 72 BPM
EKG P AXIS: 119 DEGREES
EKG P-R INTERVAL: 142 MS
EKG Q-T INTERVAL: 410 MS
EKG QRS DURATION: 86 MS
EKG QTC CALCULATION (BAZETT): 448 MS
EKG R AXIS: -68 DEGREES
EKG T AXIS: -165 DEGREES
EKG VENTRICULAR RATE: 72 BPM

## 2024-02-05 PROCEDURE — 93010 ELECTROCARDIOGRAM REPORT: CPT | Performed by: INTERNAL MEDICINE

## 2024-02-09 LAB
EKG ATRIAL RATE: 63 BPM
EKG P AXIS: 129 DEGREES
EKG P-R INTERVAL: 160 MS
EKG Q-T INTERVAL: 412 MS
EKG QRS DURATION: 88 MS
EKG QTC CALCULATION (BAZETT): 421 MS
EKG R AXIS: -33 DEGREES
EKG T AXIS: -175 DEGREES
EKG VENTRICULAR RATE: 63 BPM

## 2024-02-14 ENCOUNTER — TELEPHONE (OUTPATIENT)
Dept: INTERNAL MEDICINE CLINIC | Age: 74
End: 2024-02-14

## 2024-02-16 ENCOUNTER — TELEPHONE (OUTPATIENT)
Dept: GASTROENTEROLOGY | Age: 74
End: 2024-02-16

## 2024-02-16 NOTE — TELEPHONE ENCOUNTER
Writer received vm from Sánchez with [facility], #[phone], pt was seen in hospital recently and had stopped antidual platelet therapy, wondering if pt can continue this. Will speak with Dr Barclay and return call.

## 2024-02-18 DIAGNOSIS — E53.8 VITAMIN B12 DEFICIENCY: ICD-10-CM

## 2024-02-19 RX ORDER — LANOLIN ALCOHOL/MO/W.PET/CERES
1000 CREAM (GRAM) TOPICAL DAILY
Qty: 30 TABLET | Refills: 3 | Status: SHIPPED | OUTPATIENT
Start: 2024-02-19

## 2024-02-20 NOTE — TELEPHONE ENCOUNTER
Called lmom for patient to return call    Number given not correct please excuse, did speak with Dr Barclay ok to continue therapy, spoke with Lucero and gave verbal ok to continue antidual platelet therapy, thanked writer call ended.   28-Jan-2024 04:05

## 2024-02-22 ENCOUNTER — OFFICE VISIT (OUTPATIENT)
Dept: GASTROENTEROLOGY | Age: 74
End: 2024-02-22
Payer: MEDICARE

## 2024-02-22 VITALS
HEART RATE: 78 BPM | DIASTOLIC BLOOD PRESSURE: 76 MMHG | BODY MASS INDEX: 21.75 KG/M2 | TEMPERATURE: 97.2 F | WEIGHT: 151.6 LBS | SYSTOLIC BLOOD PRESSURE: 127 MMHG

## 2024-02-22 DIAGNOSIS — D64.9 MILD ANEMIA: ICD-10-CM

## 2024-02-22 DIAGNOSIS — G20.A1 PARKINSON'S DISEASE WITHOUT DYSKINESIA OR FLUCTUATING MANIFESTATIONS: ICD-10-CM

## 2024-02-22 DIAGNOSIS — Z98.890 H/O NECK SURGERY: ICD-10-CM

## 2024-02-22 DIAGNOSIS — R13.14 PHARYNGOESOPHAGEAL DYSPHAGIA: Primary | ICD-10-CM

## 2024-02-22 DIAGNOSIS — Z95.1 S/P CABG (CORONARY ARTERY BYPASS GRAFT): ICD-10-CM

## 2024-02-22 PROCEDURE — 99214 OFFICE O/P EST MOD 30 MIN: CPT | Performed by: INTERNAL MEDICINE

## 2024-02-22 PROCEDURE — 1123F ACP DISCUSS/DSCN MKR DOCD: CPT | Performed by: INTERNAL MEDICINE

## 2024-02-22 RX ORDER — CLOPIDOGREL BISULFATE 75 MG/1
75 TABLET ORAL EVERY MORNING
COMMUNITY
Start: 2024-02-04

## 2024-02-22 ASSESSMENT — ENCOUNTER SYMPTOMS
TROUBLE SWALLOWING: 1
WHEEZING: 0
SORE THROAT: 0
ANAL BLEEDING: 0
DIARRHEA: 0
NAUSEA: 1
VOMITING: 1
ABDOMINAL PAIN: 1
CONSTIPATION: 0
ABDOMINAL DISTENTION: 0
COUGH: 0
CHOKING: 0
RECTAL PAIN: 0
BLOOD IN STOOL: 0

## 2024-02-22 NOTE — PROGRESS NOTES
Isosorbide Headaches     Patient denies having any allergy to this medication    Methadone     Metoprolol     Nalbuphine     Oxycodone     Penicillins     Rosuvastatin     Statins     Zetia [Ezetimibe]        FAMILY HISTORY: No family history on file.      SOCIAL HISTORY:   Social History     Socioeconomic History    Marital status:      Spouse name: Not on file    Number of children: Not on file    Years of education: Not on file    Highest education level: Not on file   Occupational History    Not on file   Tobacco Use    Smoking status: Never    Smokeless tobacco: Never   Substance and Sexual Activity    Alcohol use: Never    Drug use: Never    Sexual activity: Not on file   Other Topics Concern    Not on file   Social History Narrative    Not on file     Social Determinants of Health     Financial Resource Strain: Low Risk  (2/16/2023)    Overall Financial Resource Strain (CARDIA)     Difficulty of Paying Living Expenses: Not hard at all   Food Insecurity: No Food Insecurity (1/31/2024)    Hunger Vital Sign     Worried About Running Out of Food in the Last Year: Never true     Ran Out of Food in the Last Year: Never true   Transportation Needs: No Transportation Needs (1/31/2024)    PRAPARE - Transportation     Lack of Transportation (Medical): No     Lack of Transportation (Non-Medical): No   Physical Activity: Not on file   Stress: Not on file   Social Connections: Not on file   Intimate Partner Violence: Not on file   Housing Stability: Low Risk  (1/31/2024)    Housing Stability Vital Sign     Unable to Pay for Housing in the Last Year: No     Number of Places Lived in the Last Year: 1     Unstable Housing in the Last Year: No         REVIEW OF SYSTEMS:         Review of Systems   Constitutional:  Positive for appetite change (decrease, states nothing sounds good) and fatigue. Negative for unexpected weight change.   HENT:  Positive for trouble swallowing (states when  eats food comes back up).

## 2024-02-26 ENCOUNTER — TELEPHONE (OUTPATIENT)
Dept: INTERNAL MEDICINE CLINIC | Age: 74
End: 2024-02-26

## 2024-02-26 DIAGNOSIS — I95.1 ORTHOSTATIC HYPOTENSION: Primary | ICD-10-CM

## 2024-02-26 RX ORDER — FLUDROCORTISONE ACETATE 0.1 MG/1
0.1 TABLET ORAL DAILY
Qty: 30 TABLET | Refills: 0 | Status: SHIPPED | OUTPATIENT
Start: 2024-02-26 | End: 2024-03-27

## 2024-02-26 NOTE — TELEPHONE ENCOUNTER
Patient called wanting to know if he is going to still be taking the Fludrocortisone when his script runs out in a week? He has no refills and if you want him to continue taking this he needs it sent back in to the pharmacy.    Please advise

## 2024-02-26 NOTE — TELEPHONE ENCOUNTER
Continue taking medication. Do not stop abruptly. I have sent script in.    We will discuss taper at follow up visit.

## 2024-02-29 ENCOUNTER — HOSPITAL ENCOUNTER (EMERGENCY)
Age: 74
Discharge: HOME | End: 2024-02-29
Attending: EMERGENCY MEDICINE
Payer: MEDICARE

## 2024-02-29 ENCOUNTER — APPOINTMENT (OUTPATIENT)
Dept: GENERAL RADIOLOGY | Age: 74
End: 2024-02-29
Payer: MEDICARE

## 2024-02-29 ENCOUNTER — TELEPHONE (OUTPATIENT)
Dept: INTERNAL MEDICINE CLINIC | Age: 74
End: 2024-02-29

## 2024-02-29 VITALS
OXYGEN SATURATION: 97 % | WEIGHT: 151 LBS | HEIGHT: 70 IN | HEART RATE: 69 BPM | SYSTOLIC BLOOD PRESSURE: 127 MMHG | BODY MASS INDEX: 21.62 KG/M2 | RESPIRATION RATE: 18 BRPM | TEMPERATURE: 97.7 F | DIASTOLIC BLOOD PRESSURE: 63 MMHG

## 2024-02-29 DIAGNOSIS — I95.1 ORTHOSTATIC HYPOTENSION: Primary | ICD-10-CM

## 2024-02-29 DIAGNOSIS — R42 VERTIGO: ICD-10-CM

## 2024-02-29 LAB
ANION GAP SERPL CALCULATED.3IONS-SCNC: 16 MMOL/L (ref 9–17)
BASOPHILS # BLD: 0.08 K/UL (ref 0–0.2)
BASOPHILS NFR BLD: 1 % (ref 0–2)
BUN SERPL-MCNC: 16 MG/DL (ref 8–23)
CALCIUM SERPL-MCNC: 8.7 MG/DL (ref 8.6–10.4)
CHLORIDE SERPL-SCNC: 100 MMOL/L (ref 98–107)
CO2 SERPL-SCNC: 21 MMOL/L (ref 20–31)
CREAT SERPL-MCNC: 1.3 MG/DL (ref 0.7–1.2)
EOSINOPHIL # BLD: 0.46 K/UL (ref 0–0.4)
EOSINOPHILS RELATIVE PERCENT: 6 % (ref 0–4)
ERYTHROCYTE [DISTWIDTH] IN BLOOD BY AUTOMATED COUNT: 14.5 % (ref 11.5–14.9)
GFR SERPL CREATININE-BSD FRML MDRD: 57 ML/MIN/1.73M2
GLUCOSE SERPL-MCNC: 136 MG/DL (ref 70–99)
HCT VFR BLD AUTO: 37.2 % (ref 41–53)
HGB BLD-MCNC: 12 G/DL (ref 13.5–17.5)
LYMPHOCYTES NFR BLD: 1.29 K/UL (ref 1–4.8)
LYMPHOCYTES RELATIVE PERCENT: 17 % (ref 24–44)
MCH RBC QN AUTO: 30.8 PG (ref 26–34)
MCHC RBC AUTO-ENTMCNC: 32.1 G/DL (ref 31–37)
MCV RBC AUTO: 96 FL (ref 80–100)
MONOCYTES NFR BLD: 0.84 K/UL (ref 0.1–1.3)
MONOCYTES NFR BLD: 11 % (ref 1–7)
MORPHOLOGY: ABNORMAL
NEUTROPHILS NFR BLD: 65 % (ref 36–66)
NEUTS SEG NFR BLD: 4.93 K/UL (ref 1.3–9.1)
PLATELET # BLD AUTO: 269 K/UL (ref 150–450)
PMV BLD AUTO: 10.3 FL (ref 6–12)
POTASSIUM SERPL-SCNC: 3.6 MMOL/L (ref 3.7–5.3)
RBC # BLD AUTO: 3.88 M/UL (ref 4.5–5.9)
SODIUM SERPL-SCNC: 137 MMOL/L (ref 135–144)
TROPONIN I SERPL HS-MCNC: 18 NG/L (ref 0–22)
TROPONIN I SERPL HS-MCNC: 22 NG/L (ref 0–22)
TSH SERPL DL<=0.05 MIU/L-ACNC: 0.9 UIU/ML (ref 0.3–5)
WBC OTHER # BLD: 7.6 K/UL (ref 3.5–11)

## 2024-02-29 PROCEDURE — 84484 ASSAY OF TROPONIN QUANT: CPT

## 2024-02-29 PROCEDURE — 6370000000 HC RX 637 (ALT 250 FOR IP)

## 2024-02-29 PROCEDURE — 36415 COLL VENOUS BLD VENIPUNCTURE: CPT

## 2024-02-29 PROCEDURE — 96360 HYDRATION IV INFUSION INIT: CPT

## 2024-02-29 PROCEDURE — 99285 EMERGENCY DEPT VISIT HI MDM: CPT

## 2024-02-29 PROCEDURE — 2580000003 HC RX 258

## 2024-02-29 PROCEDURE — 84443 ASSAY THYROID STIM HORMONE: CPT

## 2024-02-29 PROCEDURE — 80048 BASIC METABOLIC PNL TOTAL CA: CPT

## 2024-02-29 PROCEDURE — 85025 COMPLETE CBC W/AUTO DIFF WBC: CPT

## 2024-02-29 PROCEDURE — 71045 X-RAY EXAM CHEST 1 VIEW: CPT

## 2024-02-29 RX ORDER — MECLIZINE HCL 12.5 MG/1
12.5 TABLET ORAL 3 TIMES DAILY PRN
Qty: 7 TABLET | Refills: 0 | Status: SHIPPED | OUTPATIENT
Start: 2024-02-29

## 2024-02-29 RX ORDER — MECLIZINE HYDROCHLORIDE 25 MG/1
12.5 TABLET ORAL ONCE
Status: COMPLETED | OUTPATIENT
Start: 2024-02-29 | End: 2024-02-29

## 2024-02-29 RX ORDER — 0.9 % SODIUM CHLORIDE 0.9 %
1000 INTRAVENOUS SOLUTION INTRAVENOUS ONCE
Status: COMPLETED | OUTPATIENT
Start: 2024-02-29 | End: 2024-02-29

## 2024-02-29 RX ADMIN — MECLIZINE HYDROCHLORIDE 12.5 MG: 25 TABLET ORAL at 16:03

## 2024-02-29 RX ADMIN — SODIUM CHLORIDE 1000 ML: 9 INJECTION, SOLUTION INTRAVENOUS at 16:15

## 2024-02-29 ASSESSMENT — ENCOUNTER SYMPTOMS
NAUSEA: 0
SHORTNESS OF BREATH: 0
ABDOMINAL PAIN: 0
DIARRHEA: 0
COUGH: 0
SORE THROAT: 0
VOMITING: 0

## 2024-02-29 ASSESSMENT — PAIN SCALES - GENERAL: PAINLEVEL_OUTOF10: 5

## 2024-02-29 ASSESSMENT — LIFESTYLE VARIABLES
HOW MANY STANDARD DRINKS CONTAINING ALCOHOL DO YOU HAVE ON A TYPICAL DAY: PATIENT DOES NOT DRINK
HOW OFTEN DO YOU HAVE A DRINK CONTAINING ALCOHOL: NEVER

## 2024-02-29 ASSESSMENT — PAIN - FUNCTIONAL ASSESSMENT: PAIN_FUNCTIONAL_ASSESSMENT: 0-10

## 2024-02-29 NOTE — ED PROVIDER NOTES
EMERGENCY DEPARTMENT ENCOUNTER   ATTENDING ATTESTATION     Pt Name: Rowdy Marie  MRN: [id number]  Birthdate 1950  Date of evaluation: 2/29/24       Rowdy Marie is a 74 y.o. male who presents with Hypotension (Noted 81/52, dizziness upon standing which is not new/States he has a medication for when his BP is low (Midodrine) and took at 1300 /105/56 in triage, A&Ox4, //Just started taking plavix again on Monday and this is when sx began)      MDM:   Felt some lightheadedness dizziness with standing.  His blood pressure was very low in the 80s at home.  He is got a history of orthostatic hypotension and takes midodrine and Florinef.  He took a midodrine this morning and his blood pressure was not coming up so he decided to come in and get checked out.  Resumed his Plavix on Monday which is when he started to have the symptoms.  Currently feeling better.  He is given some IV fluids.  Do not suspect stroke.  Do not suspect ACS.  Hemoglobin stable do not suspect GI bleed.  Reviewed his EKG no arrhythmias noted.  He is on the cardiac monitor.  Blood pressure responds very well to fluids.  I think he is having orthostatic hypotension symptoms.  He is already on midodrine and Florinef for this.  Plan is to follow-up with his PCP 24 hours.    Vitals:   Vitals:    02/29/24 1534   BP: (!) 105/56   Pulse: 79   Resp: 18   Temp: 97.7 °F (36.5 °C)   TempSrc: Oral   SpO2: 97%   Weight: 68.5 kg (151 lb)   Height: 1.778 m (5' 10\")         I personally saw and examined the patient. I have reviewed and agree with the resident's findings, including all diagnostic interpretations and treatment plan as written. I was present for the key portions of any procedures performed and the inclusive time noted for any critical care statement.    Guille Garcia MD  Attending Emergency Physician            Guille Garcia MD  02/29/24 1640

## 2024-02-29 NOTE — TELEPHONE ENCOUNTER
Patient calls in today stating his blood pressure readings are low.  His reading was 81/51.    Directed him to the ER to be evaluated.

## 2024-02-29 NOTE — ED PROVIDER NOTES
[facility] ED  Emergency Department Encounter  Emergency Medicine Resident     Pt Name:Rowdy Marie  MRN: [id number]  Birthdate 1950  Date of evaluation: 2/29/24  PCP:  Yandy Jones MD  Note Started: 3:41 PM EST      CHIEF COMPLAINT       Chief Complaint   Patient presents with    Hypotension     Noted 81/52, dizziness upon standing which is not new  States he has a medication for when his BP is low (Midodrine) and took at 1300   105/56 in triage, A&Ox4,     Just started taking plavix again on Monday and this is when sx began       HISTORY OF PRESENT ILLNESS  (Location/Symptom, Timing/Onset, Context/Setting, Quality, Duration, Modifying Factors, Severity.)      Rowdy Marie is a 74 y.o. male with history of A-fib status post Watchman device placement, CAD status post CABG, orthostatic hypotension who was brought in by EMS due to concerns for low blood pressure at home.  Patient states that he felt dizzy today which is not new for him.  Dizziness is worse when he is up and moving.  He states that he checked his blood pressure at home and his systolic blood pressure was in the 80s.  He took a dose of midodrine but it did not improve that much which she states is not normal for him which is why he came to the emergency department for evaluation.  Patient also has meclizine at home that he takes intermittently for dizziness but did not take anything today.  Patient has a history of migraine headaches and decreased vision in the left eye but denies any changes from his baseline.  He denies any fevers or chills.  He denies any chest pain or shortness of breath.  Denies any nausea, vomiting, or diarrhea.  Denies any abdominal pain.  Denies any numbness or weakness in his arms or legs.  Patient states he did recently restart his Plavix on Monday and states that he has not felt the best since starting that medication.  He denies any urinary symptoms.  Denies any black or bloody stools.    PAST MEDICAL /

## 2024-02-29 NOTE — DISCHARGE INSTRUCTIONS
You were seen in the emergency department for low blood pressure and dizziness.  Your blood pressure was low on arrival but improved with IV fluids.  Lab work was all at your baseline.  No signs of infection.  Your dizziness was improved with meclizine.    I believe your symptoms are related to orthostatic hypotension or possibly resuming your Plavix on Monday.  Please call your cardiologist tomorrow to inform them of your symptoms that you have been having since starting the Plavix.    We sure to stay well-hydrated.  Please continue to wear your abdominal binder and compression stockings.  You may take 5 mg of midodrine as needed for low blood pressure instead of the 2.5 mg that you were originally taking.    You may continue to take meclizine as needed for dizziness.    Please follow-up with your primary care provider in the next week given recent ER visit.    Please return the emergency department if your symptoms worsen or you develop any headaches, vision changes, numbness or weakness in your arms or legs, chest pain, shortness of breath, nausea, vomiting, or develop any black or bloody stools.

## 2024-03-04 ENCOUNTER — TELEPHONE (OUTPATIENT)
Dept: INTERNAL MEDICINE CLINIC | Age: 74
End: 2024-03-04

## 2024-03-04 DIAGNOSIS — I95.1 ORTHOSTATIC HYPOTENSION: Primary | ICD-10-CM

## 2024-03-04 NOTE — TELEPHONE ENCOUNTER
Nurse triage call was dropped, called patient back     Yesterday AM BP approx. 100/60  Last night /110   Intense palpitations this morning  330pm today BP approx. 140/80    While on the phone /85 HR 75 asymptomatic    Recently went to ED for hypotension on 2/29/24  Appointment with PCP on 3/13  Advised patient to monitor BP and pulse.   Go to ER if feeling palpitations, chest pain, SOB, dizziness or if BP/HR increases.     Asked to log BP and bring to appointment     Please advise if any further instruction is needed.

## 2024-03-05 NOTE — TELEPHONE ENCOUNTER
Blood pressure readings as reported are satisfactory  Cardiology referral filed per patient request    Yandy Jones MD

## 2024-03-05 NOTE — TELEPHONE ENCOUNTER
Called patient to check on him  BP are as follows:  2ru=772/66  7yu=962/70  0kd=183/90    Patient is asymptomatic   Advised that patient monitor symptoms and keep log of BP to bring to appointment  Go to ER if symptoms worsen.    Requesting referral to cardiology, referral pending   Dr. Katty Silva

## 2024-03-06 LAB
EKG ATRIAL RATE: 75 BPM
EKG P AXIS: 54 DEGREES
EKG P-R INTERVAL: 136 MS
EKG Q-T INTERVAL: 406 MS
EKG QRS DURATION: 84 MS
EKG QTC CALCULATION (BAZETT): 453 MS
EKG R AXIS: 2 DEGREES
EKG T AXIS: 57 DEGREES
EKG VENTRICULAR RATE: 75 BPM

## 2024-03-13 ENCOUNTER — OFFICE VISIT (OUTPATIENT)
Dept: INTERNAL MEDICINE CLINIC | Age: 74
End: 2024-03-13
Payer: MEDICARE

## 2024-03-13 VITALS
OXYGEN SATURATION: 98 % | WEIGHT: 153 LBS | BODY MASS INDEX: 21.9 KG/M2 | HEIGHT: 70 IN | SYSTOLIC BLOOD PRESSURE: 150 MMHG | DIASTOLIC BLOOD PRESSURE: 80 MMHG | HEART RATE: 69 BPM

## 2024-03-13 DIAGNOSIS — Z00.00 WELCOME TO MEDICARE PREVENTIVE VISIT: ICD-10-CM

## 2024-03-13 DIAGNOSIS — I95.1 ORTHOSTATIC HYPOTENSION: ICD-10-CM

## 2024-03-13 DIAGNOSIS — I10 PRIMARY HYPERTENSION: ICD-10-CM

## 2024-03-13 DIAGNOSIS — I48.20 CHRONIC ATRIAL FIBRILLATION (HCC): Primary | ICD-10-CM

## 2024-03-13 DIAGNOSIS — I25.810 CORONARY ARTERY DISEASE INVOLVING CORONARY BYPASS GRAFT OF NATIVE HEART WITHOUT ANGINA PECTORIS: ICD-10-CM

## 2024-03-13 PROCEDURE — 3077F SYST BP >= 140 MM HG: CPT | Performed by: INTERNAL MEDICINE

## 2024-03-13 PROCEDURE — 99214 OFFICE O/P EST MOD 30 MIN: CPT | Performed by: INTERNAL MEDICINE

## 2024-03-13 PROCEDURE — G0402 INITIAL PREVENTIVE EXAM: HCPCS | Performed by: INTERNAL MEDICINE

## 2024-03-13 PROCEDURE — 3079F DIAST BP 80-89 MM HG: CPT | Performed by: INTERNAL MEDICINE

## 2024-03-13 PROCEDURE — 1123F ACP DISCUSS/DSCN MKR DOCD: CPT | Performed by: INTERNAL MEDICINE

## 2024-03-13 RX ORDER — ASPIRIN 81 MG/1
81 TABLET ORAL DAILY
COMMUNITY

## 2024-03-13 SDOH — ECONOMIC STABILITY: FOOD INSECURITY: WITHIN THE PAST 12 MONTHS, YOU WORRIED THAT YOUR FOOD WOULD RUN OUT BEFORE YOU GOT MONEY TO BUY MORE.: NEVER TRUE

## 2024-03-13 SDOH — ECONOMIC STABILITY: INCOME INSECURITY: HOW HARD IS IT FOR YOU TO PAY FOR THE VERY BASICS LIKE FOOD, HOUSING, MEDICAL CARE, AND HEATING?: NOT HARD AT ALL

## 2024-03-13 SDOH — ECONOMIC STABILITY: FOOD INSECURITY: WITHIN THE PAST 12 MONTHS, THE FOOD YOU BOUGHT JUST DIDN'T LAST AND YOU DIDN'T HAVE MONEY TO GET MORE.: NEVER TRUE

## 2024-03-13 NOTE — PATIENT INSTRUCTIONS
[employer] practices performed before you die?  When should you call for help?  Be sure to contact your doctor if you have any questions.  Where can you learn more?  Go to https://[email or web address].net/patientEd and enter R264 to learn more about \"Advance Directives: Care Instructions.\"  Current as of: November 16, 2023               Content Version: 14.0  © 6441-7387 [email or web address].   Care instructions adapted under license by [employer]. If you have questions about a medical condition or this instruction, always ask your healthcare professional. [employer] disclaims any warranty or liability for your use of this information.           A Healthy Heart: Care Instructions  Overview     Coronary artery disease, also called heart disease, occurs when a substance called plaque builds up in the vessels that supply oxygen-rich blood to your heart muscle. This can narrow the blood vessels and reduce blood flow. A heart attack happens when blood flow is completely blocked. A high-fat diet, smoking, and other factors increase the risk of heart disease.  Your doctor has found that you have a chance of having heart disease. A heart-healthy lifestyle can help keep your heart healthy and prevent heart disease. This lifestyle includes eating healthy, being active, staying at a weight that's healthy for you, and not smoking or using tobacco. It also includes taking medicines as directed, managing other health conditions, and trying to get a healthy amount of sleep.  Follow-up care is a key part of your treatment and safety. Be sure to make and go to all appointments, and call your doctor if you are having problems. It's also a good idea to know your test results and keep a list of the medicines you take.  How can you care for yourself at home?  Diet    Use less salt when you cook and eat. This helps lower your blood pressure. Taste food before salting. Add only a little salt when you think you need it. With

## 2024-03-13 NOTE — PROGRESS NOTES
daily as needed for Dizziness Yes Jason Church MD   fludrocortisone (FLORINEF) 0.1 MG tablet Take 1 tablet by mouth daily Yes Brea Hung MD   vitamin B-12 (CYANOCOBALAMIN) 1000 MCG tablet take 1 tablet by mouth once daily Yes Brea Hung MD   midodrine (PROAMATINE) 2.5 MG tablet Take 1 tablet by mouth 3 times daily as needed (SBP <100) Yes Yandy Jones MD   carbidopa-levodopa (SINEMET)  MG per tablet Take 1 tablet by mouth 4 times daily Yes Yandy Jones MD   potassium chloride (KLOR-CON) 10 MEQ extended release tablet Take 1 tablet by mouth every morning Yes Bailey Umanzor MD   docusate sodium (COLACE) 50 MG capsule Take 1 capsule by mouth at bedtime Yes Bailey Umanzor MD   pregabalin (LYRICA) 75 MG capsule Take 2 capsules by mouth 2 times daily. Yes Bailey Umanzor MD   traMADol (ULTRAM) 50 MG tablet Take 1 tablet by mouth in the morning, at noon, and at bedtime. Yes Bailey Umanzor MD   coenzyme Q10 100 MG CAPS capsule Take 1 capsule by mouth daily Yes Bailey Umanzor MD   esomeprazole (NEXIUM) 20 MG delayed release capsule Take 1 capsule by mouth every morning (before breakfast) Yes Bailey Umanzor MD   levothyroxine (SYNTHROID) 75 MCG tablet Take 1 tablet by mouth Daily Yes Bailey Umanzor MD   ranolazine (RANEXA) 1000 MG extended release tablet Take 1 tablet by mouth 2 times daily Yes Bailye Umanzor MD   clopidogrel (PLAVIX) 75 MG tablet Take 1 tablet by mouth every morning  Patient not taking: Reported on 3/13/2024  Bailey Umanzor MD CareTeam (Including outside providers/suppliers regularly involved in providing care):   Patient Care Team:  Yandy Jones MD as PCP - General (Internal Medicine)  Yandy Jones MD as PCP - Empaneled Provider     Reviewed and updated this visit:  Allergies  Meds

## 2024-03-25 RX ORDER — FLUDROCORTISONE ACETATE 0.1 MG/1
0.1 TABLET ORAL DAILY
Qty: 30 TABLET | Refills: 0 | Status: SHIPPED | OUTPATIENT
Start: 2024-03-25 | End: 2024-04-24

## 2024-04-02 RX ORDER — MECLIZINE HYDROCHLORIDE 25 MG/1
TABLET ORAL
Qty: 30 TABLET | Refills: 0 | Status: SHIPPED | OUTPATIENT
Start: 2024-04-02

## 2024-04-09 ENCOUNTER — APPOINTMENT (OUTPATIENT)
Dept: GENERAL RADIOLOGY | Age: 74
DRG: 282 | End: 2024-04-09
Payer: MEDICARE

## 2024-04-09 ENCOUNTER — OFFICE VISIT (OUTPATIENT)
Dept: INTERNAL MEDICINE CLINIC | Age: 74
End: 2024-04-09
Payer: MEDICARE

## 2024-04-09 ENCOUNTER — HOSPITAL ENCOUNTER (INPATIENT)
Age: 74
LOS: 1 days | Discharge: ANOTHER HOSPITAL | DRG: 282 | End: 2024-04-11
Attending: EMERGENCY MEDICINE | Admitting: INTERNAL MEDICINE
Payer: MEDICARE

## 2024-04-09 VITALS
OXYGEN SATURATION: 97 % | SYSTOLIC BLOOD PRESSURE: 124 MMHG | HEART RATE: 66 BPM | DIASTOLIC BLOOD PRESSURE: 66 MMHG | WEIGHT: 159 LBS | BODY MASS INDEX: 22.76 KG/M2 | HEIGHT: 70 IN

## 2024-04-09 DIAGNOSIS — R07.9 CHEST PAIN, UNSPECIFIED TYPE: Primary | ICD-10-CM

## 2024-04-09 DIAGNOSIS — I48.20 CHRONIC ATRIAL FIBRILLATION (HCC): Primary | ICD-10-CM

## 2024-04-09 DIAGNOSIS — I21.4 NSTEMI (NON-ST ELEVATED MYOCARDIAL INFARCTION) (HCC): ICD-10-CM

## 2024-04-09 DIAGNOSIS — H93.13 TINNITUS OF BOTH EARS: ICD-10-CM

## 2024-04-09 DIAGNOSIS — Z91.81 AT MODERATE RISK FOR FALL: ICD-10-CM

## 2024-04-09 DIAGNOSIS — G20.A1 PARKINSON'S DISEASE WITHOUT DYSKINESIA OR FLUCTUATING MANIFESTATIONS (HCC): ICD-10-CM

## 2024-04-09 LAB
ANION GAP SERPL CALCULATED.3IONS-SCNC: 14 MMOL/L (ref 9–17)
BASOPHILS # BLD: 0 K/UL (ref 0–0.2)
BASOPHILS NFR BLD: 0 % (ref 0–2)
BUN SERPL-MCNC: 23 MG/DL (ref 8–23)
CALCIUM SERPL-MCNC: 8.6 MG/DL (ref 8.6–10.4)
CHLORIDE SERPL-SCNC: 99 MMOL/L (ref 98–107)
CO2 SERPL-SCNC: 25 MMOL/L (ref 20–31)
CREAT SERPL-MCNC: 1.3 MG/DL (ref 0.7–1.2)
EOSINOPHIL # BLD: 0.7 K/UL (ref 0–0.4)
EOSINOPHILS RELATIVE PERCENT: 9 % (ref 0–4)
ERYTHROCYTE [DISTWIDTH] IN BLOOD BY AUTOMATED COUNT: 13.9 % (ref 11.5–14.9)
GFR SERPL CREATININE-BSD FRML MDRD: 57 ML/MIN/1.73M2
GLUCOSE SERPL-MCNC: 127 MG/DL (ref 70–99)
HCT VFR BLD AUTO: 35.5 % (ref 41–53)
HGB BLD-MCNC: 11.5 G/DL (ref 13.5–17.5)
INR PPP: 1
LYMPHOCYTES NFR BLD: 1.6 K/UL (ref 1–4.8)
LYMPHOCYTES RELATIVE PERCENT: 22 % (ref 24–44)
MAGNESIUM SERPL-MCNC: 1.4 MG/DL (ref 1.6–2.6)
MCH RBC QN AUTO: 30 PG (ref 26–34)
MCHC RBC AUTO-ENTMCNC: 32.3 G/DL (ref 31–37)
MCV RBC AUTO: 92.8 FL (ref 80–100)
MONOCYTES NFR BLD: 0.6 K/UL (ref 0.1–1.3)
MONOCYTES NFR BLD: 9 % (ref 1–7)
NEUTROPHILS NFR BLD: 60 % (ref 36–66)
NEUTS SEG NFR BLD: 4.4 K/UL (ref 1.3–9.1)
PARTIAL THROMBOPLASTIN TIME: 35.7 SEC (ref 24–36)
PHOSPHATE SERPL-MCNC: 3.1 MG/DL (ref 2.5–4.5)
PLATELET # BLD AUTO: 229 K/UL (ref 150–450)
PMV BLD AUTO: 10.1 FL (ref 6–12)
POTASSIUM SERPL-SCNC: 3.8 MMOL/L (ref 3.7–5.3)
PROTHROMBIN TIME: 13.9 SEC (ref 11.8–14.6)
RBC # BLD AUTO: 3.83 M/UL (ref 4.5–5.9)
SODIUM SERPL-SCNC: 138 MMOL/L (ref 135–144)
TROPONIN I SERPL HS-MCNC: 18 NG/L (ref 0–22)
WBC OTHER # BLD: 7.3 K/UL (ref 3.5–11)

## 2024-04-09 PROCEDURE — 36415 COLL VENOUS BLD VENIPUNCTURE: CPT

## 2024-04-09 PROCEDURE — 6360000002 HC RX W HCPCS: Performed by: EMERGENCY MEDICINE

## 2024-04-09 PROCEDURE — 96375 TX/PRO/DX INJ NEW DRUG ADDON: CPT

## 2024-04-09 PROCEDURE — 96376 TX/PRO/DX INJ SAME DRUG ADON: CPT

## 2024-04-09 PROCEDURE — 85730 THROMBOPLASTIN TIME PARTIAL: CPT

## 2024-04-09 PROCEDURE — 85520 HEPARIN ASSAY: CPT

## 2024-04-09 PROCEDURE — 80048 BASIC METABOLIC PNL TOTAL CA: CPT

## 2024-04-09 PROCEDURE — 1123F ACP DISCUSS/DSCN MKR DOCD: CPT | Performed by: INTERNAL MEDICINE

## 2024-04-09 PROCEDURE — 84100 ASSAY OF PHOSPHORUS: CPT

## 2024-04-09 PROCEDURE — 84484 ASSAY OF TROPONIN QUANT: CPT

## 2024-04-09 PROCEDURE — 6370000000 HC RX 637 (ALT 250 FOR IP)

## 2024-04-09 PROCEDURE — 99213 OFFICE O/P EST LOW 20 MIN: CPT | Performed by: INTERNAL MEDICINE

## 2024-04-09 PROCEDURE — 83735 ASSAY OF MAGNESIUM: CPT

## 2024-04-09 PROCEDURE — 85025 COMPLETE CBC W/AUTO DIFF WBC: CPT

## 2024-04-09 PROCEDURE — 96374 THER/PROPH/DIAG INJ IV PUSH: CPT

## 2024-04-09 PROCEDURE — 99285 EMERGENCY DEPT VISIT HI MDM: CPT

## 2024-04-09 PROCEDURE — 93005 ELECTROCARDIOGRAM TRACING: CPT | Performed by: INTERNAL MEDICINE

## 2024-04-09 PROCEDURE — 71045 X-RAY EXAM CHEST 1 VIEW: CPT

## 2024-04-09 PROCEDURE — 85610 PROTHROMBIN TIME: CPT

## 2024-04-09 RX ORDER — MORPHINE SULFATE 2 MG/ML
2 INJECTION, SOLUTION INTRAMUSCULAR; INTRAVENOUS ONCE
Status: COMPLETED | OUTPATIENT
Start: 2024-04-09 | End: 2024-04-09

## 2024-04-09 RX ORDER — FENTANYL CITRATE 0.05 MG/ML
25 INJECTION, SOLUTION INTRAMUSCULAR; INTRAVENOUS ONCE
Status: COMPLETED | OUTPATIENT
Start: 2024-04-09 | End: 2024-04-09

## 2024-04-09 RX ORDER — MECLIZINE HYDROCHLORIDE 25 MG/1
TABLET ORAL
Qty: 30 TABLET | Refills: 0 | Status: CANCELLED | OUTPATIENT
Start: 2024-04-09

## 2024-04-09 RX ORDER — ASPIRIN 325 MG
TABLET ORAL
Status: COMPLETED
Start: 2024-04-09 | End: 2024-04-09

## 2024-04-09 RX ORDER — ASPIRIN 325 MG
325 TABLET ORAL ONCE
Status: DISCONTINUED | OUTPATIENT
Start: 2024-04-09 | End: 2024-04-09

## 2024-04-09 RX ADMIN — Medication 325 MG: at 21:54

## 2024-04-09 RX ADMIN — MORPHINE SULFATE 2 MG: 2 INJECTION, SOLUTION INTRAMUSCULAR; INTRAVENOUS at 21:54

## 2024-04-09 RX ADMIN — ASPIRIN 325 MG: 325 TABLET ORAL at 21:54

## 2024-04-09 RX ADMIN — FENTANYL CITRATE 25 MCG: 0.05 INJECTION, SOLUTION INTRAMUSCULAR; INTRAVENOUS at 23:49

## 2024-04-09 RX ADMIN — MORPHINE SULFATE 2 MG: 2 INJECTION, SOLUTION INTRAMUSCULAR; INTRAVENOUS at 22:59

## 2024-04-09 ASSESSMENT — PAIN SCALES - GENERAL
PAINLEVEL_OUTOF10: 10

## 2024-04-09 ASSESSMENT — PAIN DESCRIPTION - LOCATION
LOCATION: CHEST
LOCATION: CHEST

## 2024-04-09 ASSESSMENT — PAIN - FUNCTIONAL ASSESSMENT: PAIN_FUNCTIONAL_ASSESSMENT: 0-10

## 2024-04-09 NOTE — TELEPHONE ENCOUNTER
Patient was seen today and forgot to ask for a refill today.     Patient states he takes 25 mg of Meclizine    Please advise

## 2024-04-09 NOTE — PROGRESS NOTES
Visit Information    Have you changed or started any medications since your last visit including any over-the-counter medicines, vitamins, or herbal medicines? no   Are you having any side effects from any of your medications? -  no  Have you stopped taking any of your medications? Is so, why? -  no    Have you seen any other physician or provider since your last visit? No  Have you had any other diagnostic tests since your last visit? No  Have you been seen in the emergency room and/or had an admission to a hospital since we last saw you? No  Have you had your routine dental cleaning in the past 6 months? no    Have you activated your [employer] account? If not, what are your barriers? No:      Patient Care Team:  Yandy Jones MD as PCP - General (Internal Medicine)  Yandy Jones MD as PCP - Empaneled Provider    Medical History Review  Past Medical, Family, and Social History reviewed and does contribute to the patient presenting condition    Health Maintenance   Topic Date Due    Hepatitis C screen  Never done    Prostate Specific Antigen (PSA) Screening or Monitoring  Never done    Colorectal Cancer Screen  Never done    Shingles vaccine (1 of 2) Never done    Respiratory Syncytial Virus (RSV) Pregnant or age 60 yrs+ (1 - 1-dose 60+ series) Never done    Pneumococcal 65+ years Vaccine (2 of 2 - PPSV23 or PCV20) 11/27/2020    COVID-19 Vaccine (3 - 2023-24 season) 09/01/2023    Flu vaccine (Season Ended) 08/01/2024    Depression Screen  03/13/2025    Annual Wellness Visit (Medicare)  03/14/2025    DTaP/Tdap/Td vaccine (2 - Td or Tdap) 12/22/2026    Lipids  01/06/2029    Hepatitis A vaccine  Aged Out    Hepatitis B vaccine  Aged Out    Hib vaccine  Aged Out    Polio vaccine  Aged Out    Meningococcal (ACWY) vaccine  Aged Out    GFR test (Diabetes, CKD 3-4, OR last GFR 15-59)  Discontinued     SUBJECTIVE:  Rowdy Marie is a 74 y.o. male patient who  comes for complaints of   Chief Complaint   Patient

## 2024-04-10 ENCOUNTER — APPOINTMENT (OUTPATIENT)
Dept: CT IMAGING | Age: 74
DRG: 282 | End: 2024-04-10
Payer: MEDICARE

## 2024-04-10 PROBLEM — R07.9 CHEST PAIN: Status: ACTIVE | Noted: 2024-04-10

## 2024-04-10 PROBLEM — I21.4 NSTEMI (NON-ST ELEVATED MYOCARDIAL INFARCTION) (HCC): Status: ACTIVE | Noted: 2024-04-10

## 2024-04-10 LAB
ANION GAP SERPL CALCULATED.3IONS-SCNC: 12 MMOL/L (ref 9–17)
ANTI-XA UNFRAC HEPARIN: 0.25 IU/L (ref 0.3–0.7)
ANTI-XA UNFRAC HEPARIN: 0.32 IU/L (ref 0.3–0.7)
ANTI-XA UNFRAC HEPARIN: 0.45 IU/L (ref 0.3–0.7)
ANTI-XA UNFRAC HEPARIN: <0.1 IU/L (ref 0.3–0.7)
BASOPHILS # BLD: 0.2 K/UL (ref 0–0.2)
BASOPHILS NFR BLD: 3 % (ref 0–2)
BUN SERPL-MCNC: 23 MG/DL (ref 8–23)
CALCIUM SERPL-MCNC: 8.3 MG/DL (ref 8.6–10.4)
CHLORIDE SERPL-SCNC: 102 MMOL/L (ref 98–107)
CO2 SERPL-SCNC: 25 MMOL/L (ref 20–31)
CREAT SERPL-MCNC: 1.1 MG/DL (ref 0.7–1.2)
D DIMER PPP FEU-MCNC: 0.75 UG/ML FEU (ref 0–0.59)
EKG ATRIAL RATE: 61 BPM
EKG P AXIS: 54 DEGREES
EKG P-R INTERVAL: 142 MS
EKG Q-T INTERVAL: 436 MS
EKG QRS DURATION: 84 MS
EKG QTC CALCULATION (BAZETT): 438 MS
EKG R AXIS: 3 DEGREES
EKG T AXIS: 86 DEGREES
EKG VENTRICULAR RATE: 61 BPM
EOSINOPHIL # BLD: 0.7 K/UL (ref 0–0.4)
EOSINOPHILS RELATIVE PERCENT: 9 % (ref 0–4)
ERYTHROCYTE [DISTWIDTH] IN BLOOD BY AUTOMATED COUNT: 13.9 % (ref 11.5–14.9)
GFR SERPL CREATININE-BSD FRML MDRD: 70 ML/MIN/1.73M2
GLUCOSE SERPL-MCNC: 114 MG/DL (ref 70–99)
HCT VFR BLD AUTO: 33.3 % (ref 41–53)
HGB BLD-MCNC: 10.8 G/DL (ref 13.5–17.5)
LYMPHOCYTES NFR BLD: 2 K/UL (ref 1–4.8)
LYMPHOCYTES RELATIVE PERCENT: 26 % (ref 24–44)
MAGNESIUM SERPL-MCNC: 2.3 MG/DL (ref 1.6–2.6)
MCH RBC QN AUTO: 30.2 PG (ref 26–34)
MCHC RBC AUTO-ENTMCNC: 32.4 G/DL (ref 31–37)
MCV RBC AUTO: 93.2 FL (ref 80–100)
MONOCYTES NFR BLD: 0.8 K/UL (ref 0.1–1.3)
MONOCYTES NFR BLD: 10 % (ref 1–7)
NEUTROPHILS NFR BLD: 52 % (ref 36–66)
NEUTS SEG NFR BLD: 4.1 K/UL (ref 1.3–9.1)
PLATELET # BLD AUTO: 204 K/UL (ref 150–450)
PMV BLD AUTO: 10.3 FL (ref 6–12)
POTASSIUM SERPL-SCNC: 3.6 MMOL/L (ref 3.7–5.3)
RBC # BLD AUTO: 3.58 M/UL (ref 4.5–5.9)
SODIUM SERPL-SCNC: 139 MMOL/L (ref 135–144)
TROPONIN I SERPL HS-MCNC: 38 NG/L (ref 0–22)
TROPONIN I SERPL HS-MCNC: 66 NG/L (ref 0–22)
TROPONIN I SERPL HS-MCNC: 70 NG/L (ref 0–22)
WBC OTHER # BLD: 7.8 K/UL (ref 3.5–11)

## 2024-04-10 PROCEDURE — 6370000000 HC RX 637 (ALT 250 FOR IP): Performed by: INTERNAL MEDICINE

## 2024-04-10 PROCEDURE — 6360000002 HC RX W HCPCS: Performed by: INTERNAL MEDICINE

## 2024-04-10 PROCEDURE — 93005 ELECTROCARDIOGRAM TRACING: CPT | Performed by: EMERGENCY MEDICINE

## 2024-04-10 PROCEDURE — 6370000000 HC RX 637 (ALT 250 FOR IP)

## 2024-04-10 PROCEDURE — 99223 1ST HOSP IP/OBS HIGH 75: CPT | Performed by: INTERNAL MEDICINE

## 2024-04-10 PROCEDURE — 36415 COLL VENOUS BLD VENIPUNCTURE: CPT

## 2024-04-10 PROCEDURE — 6360000004 HC RX CONTRAST MEDICATION: Performed by: INTERNAL MEDICINE

## 2024-04-10 PROCEDURE — 2580000003 HC RX 258

## 2024-04-10 PROCEDURE — 6360000002 HC RX W HCPCS

## 2024-04-10 PROCEDURE — 2580000003 HC RX 258: Performed by: INTERNAL MEDICINE

## 2024-04-10 PROCEDURE — 85025 COMPLETE CBC W/AUTO DIFF WBC: CPT

## 2024-04-10 PROCEDURE — 71260 CT THORAX DX C+: CPT

## 2024-04-10 PROCEDURE — 6360000002 HC RX W HCPCS: Performed by: EMERGENCY MEDICINE

## 2024-04-10 PROCEDURE — 2060000000 HC ICU INTERMEDIATE R&B

## 2024-04-10 PROCEDURE — 2500000003 HC RX 250 WO HCPCS

## 2024-04-10 PROCEDURE — 84484 ASSAY OF TROPONIN QUANT: CPT

## 2024-04-10 PROCEDURE — 80048 BASIC METABOLIC PNL TOTAL CA: CPT

## 2024-04-10 PROCEDURE — 85520 HEPARIN ASSAY: CPT

## 2024-04-10 PROCEDURE — 85379 FIBRIN DEGRADATION QUANT: CPT

## 2024-04-10 PROCEDURE — 83735 ASSAY OF MAGNESIUM: CPT

## 2024-04-10 RX ORDER — MECLIZINE HCL 12.5 MG/1
25 TABLET ORAL 3 TIMES DAILY PRN
Qty: 7 TABLET | Refills: 0 | Status: SHIPPED | OUTPATIENT
Start: 2024-04-10

## 2024-04-10 RX ORDER — LEVOTHYROXINE SODIUM 0.07 MG/1
75 TABLET ORAL DAILY
Status: DISCONTINUED | OUTPATIENT
Start: 2024-04-10 | End: 2024-04-11 | Stop reason: HOSPADM

## 2024-04-10 RX ORDER — SODIUM CHLORIDE 0.9 % (FLUSH) 0.9 %
10 SYRINGE (ML) INJECTION PRN
Status: DISCONTINUED | OUTPATIENT
Start: 2024-04-10 | End: 2024-04-11 | Stop reason: HOSPADM

## 2024-04-10 RX ORDER — ASPIRIN 81 MG/1
81 TABLET ORAL DAILY
Status: DISCONTINUED | OUTPATIENT
Start: 2024-04-10 | End: 2024-04-11 | Stop reason: HOSPADM

## 2024-04-10 RX ORDER — SODIUM CHLORIDE AND POTASSIUM CHLORIDE 150; 900 MG/100ML; MG/100ML
INJECTION, SOLUTION INTRAVENOUS CONTINUOUS
Status: DISCONTINUED | OUTPATIENT
Start: 2024-04-10 | End: 2024-04-11 | Stop reason: HOSPADM

## 2024-04-10 RX ORDER — ACETAMINOPHEN 650 MG/1
650 SUPPOSITORY RECTAL EVERY 6 HOURS PRN
Status: DISCONTINUED | OUTPATIENT
Start: 2024-04-10 | End: 2024-04-11 | Stop reason: HOSPADM

## 2024-04-10 RX ORDER — PANTOPRAZOLE SODIUM 40 MG/1
40 TABLET, DELAYED RELEASE ORAL
Status: DISCONTINUED | OUTPATIENT
Start: 2024-04-10 | End: 2024-04-11 | Stop reason: HOSPADM

## 2024-04-10 RX ORDER — SODIUM CHLORIDE 0.9 % (FLUSH) 0.9 %
10 SYRINGE (ML) INJECTION PRN
Status: COMPLETED | OUTPATIENT
Start: 2024-04-10 | End: 2024-04-10

## 2024-04-10 RX ORDER — PREGABALIN 150 MG/1
150 CAPSULE ORAL 2 TIMES DAILY
Status: DISCONTINUED | OUTPATIENT
Start: 2024-04-10 | End: 2024-04-11 | Stop reason: HOSPADM

## 2024-04-10 RX ORDER — RANOLAZINE 500 MG/1
1000 TABLET, EXTENDED RELEASE ORAL 2 TIMES DAILY
Status: DISCONTINUED | OUTPATIENT
Start: 2024-04-10 | End: 2024-04-11 | Stop reason: HOSPADM

## 2024-04-10 RX ORDER — HEPARIN SODIUM 1000 [USP'U]/ML
2000 INJECTION, SOLUTION INTRAVENOUS; SUBCUTANEOUS PRN
Status: DISCONTINUED | OUTPATIENT
Start: 2024-04-10 | End: 2024-04-11 | Stop reason: HOSPADM

## 2024-04-10 RX ORDER — SODIUM CHLORIDE AND POTASSIUM CHLORIDE 150; 900 MG/100ML; MG/100ML
INJECTION, SOLUTION INTRAVENOUS CONTINUOUS
Status: DISCONTINUED | OUTPATIENT
Start: 2024-04-10 | End: 2024-04-10

## 2024-04-10 RX ORDER — MIDODRINE HYDROCHLORIDE 2.5 MG/1
2.5 TABLET ORAL 3 TIMES DAILY PRN
Status: DISCONTINUED | OUTPATIENT
Start: 2024-04-10 | End: 2024-04-11 | Stop reason: HOSPADM

## 2024-04-10 RX ORDER — BISACODYL 10 MG
10 SUPPOSITORY, RECTAL RECTAL DAILY PRN
Status: DISCONTINUED | OUTPATIENT
Start: 2024-04-10 | End: 2024-04-11 | Stop reason: HOSPADM

## 2024-04-10 RX ORDER — FLUDROCORTISONE ACETATE 0.1 MG/1
0.1 TABLET ORAL DAILY
Status: DISCONTINUED | OUTPATIENT
Start: 2024-04-10 | End: 2024-04-11 | Stop reason: HOSPADM

## 2024-04-10 RX ORDER — HEPARIN SODIUM 1000 [USP'U]/ML
4000 INJECTION, SOLUTION INTRAVENOUS; SUBCUTANEOUS PRN
Status: DISCONTINUED | OUTPATIENT
Start: 2024-04-10 | End: 2024-04-11 | Stop reason: HOSPADM

## 2024-04-10 RX ORDER — ACETAMINOPHEN 325 MG/1
650 TABLET ORAL EVERY 6 HOURS PRN
Status: DISCONTINUED | OUTPATIENT
Start: 2024-04-10 | End: 2024-04-11 | Stop reason: HOSPADM

## 2024-04-10 RX ORDER — HEPARIN SODIUM 1000 [USP'U]/ML
4000 INJECTION, SOLUTION INTRAVENOUS; SUBCUTANEOUS ONCE
Status: COMPLETED | OUTPATIENT
Start: 2024-04-10 | End: 2024-04-10

## 2024-04-10 RX ORDER — LANOLIN ALCOHOL/MO/W.PET/CERES
3 CREAM (GRAM) TOPICAL NIGHTLY PRN
Status: DISCONTINUED | OUTPATIENT
Start: 2024-04-10 | End: 2024-04-11 | Stop reason: HOSPADM

## 2024-04-10 RX ORDER — ONDANSETRON 4 MG/1
4 TABLET, ORALLY DISINTEGRATING ORAL EVERY 8 HOURS PRN
Status: DISCONTINUED | OUTPATIENT
Start: 2024-04-10 | End: 2024-04-11 | Stop reason: HOSPADM

## 2024-04-10 RX ORDER — HEPARIN SODIUM 10000 [USP'U]/100ML
5-30 INJECTION, SOLUTION INTRAVENOUS CONTINUOUS
Status: DISPENSED | OUTPATIENT
Start: 2024-04-10 | End: 2024-04-11

## 2024-04-10 RX ORDER — POTASSIUM CHLORIDE 7.45 MG/ML
10 INJECTION INTRAVENOUS PRN
Status: DISCONTINUED | OUTPATIENT
Start: 2024-04-10 | End: 2024-04-11 | Stop reason: HOSPADM

## 2024-04-10 RX ORDER — MORPHINE SULFATE 4 MG/ML
4 INJECTION, SOLUTION INTRAMUSCULAR; INTRAVENOUS EVERY 4 HOURS PRN
Status: DISCONTINUED | OUTPATIENT
Start: 2024-04-10 | End: 2024-04-11 | Stop reason: HOSPADM

## 2024-04-10 RX ORDER — POTASSIUM CHLORIDE 20 MEQ/1
40 TABLET, EXTENDED RELEASE ORAL PRN
Status: DISCONTINUED | OUTPATIENT
Start: 2024-04-10 | End: 2024-04-11 | Stop reason: HOSPADM

## 2024-04-10 RX ORDER — SODIUM CHLORIDE 9 MG/ML
INJECTION, SOLUTION INTRAVENOUS ONCE
Status: DISCONTINUED | OUTPATIENT
Start: 2024-04-10 | End: 2024-04-11 | Stop reason: HOSPADM

## 2024-04-10 RX ORDER — MECLIZINE HYDROCHLORIDE 25 MG/1
12.5 TABLET ORAL 3 TIMES DAILY PRN
Status: DISCONTINUED | OUTPATIENT
Start: 2024-04-10 | End: 2024-04-11 | Stop reason: HOSPADM

## 2024-04-10 RX ORDER — POTASSIUM CHLORIDE 750 MG/1
10 CAPSULE, EXTENDED RELEASE ORAL EVERY MORNING
Status: DISCONTINUED | OUTPATIENT
Start: 2024-04-10 | End: 2024-04-11 | Stop reason: HOSPADM

## 2024-04-10 RX ORDER — MAGNESIUM SULFATE HEPTAHYDRATE 40 MG/ML
2000 INJECTION, SOLUTION INTRAVENOUS ONCE
Status: COMPLETED | OUTPATIENT
Start: 2024-04-10 | End: 2024-04-10

## 2024-04-10 RX ORDER — UBIDECARENONE 100 MG
100 CAPSULE ORAL DAILY
Status: DISCONTINUED | OUTPATIENT
Start: 2024-04-10 | End: 2024-04-11 | Stop reason: HOSPADM

## 2024-04-10 RX ORDER — SODIUM CHLORIDE 9 MG/ML
INJECTION, SOLUTION INTRAVENOUS PRN
Status: DISCONTINUED | OUTPATIENT
Start: 2024-04-10 | End: 2024-04-11 | Stop reason: HOSPADM

## 2024-04-10 RX ORDER — MAGNESIUM SULFATE HEPTAHYDRATE 40 MG/ML
2000 INJECTION, SOLUTION INTRAVENOUS PRN
Status: DISCONTINUED | OUTPATIENT
Start: 2024-04-10 | End: 2024-04-11 | Stop reason: HOSPADM

## 2024-04-10 RX ORDER — ONDANSETRON 2 MG/ML
4 INJECTION INTRAMUSCULAR; INTRAVENOUS EVERY 6 HOURS PRN
Status: DISCONTINUED | OUTPATIENT
Start: 2024-04-10 | End: 2024-04-11 | Stop reason: HOSPADM

## 2024-04-10 RX ORDER — SODIUM CHLORIDE 0.9 % (FLUSH) 0.9 %
5-40 SYRINGE (ML) INJECTION EVERY 12 HOURS SCHEDULED
Status: DISCONTINUED | OUTPATIENT
Start: 2024-04-10 | End: 2024-04-11 | Stop reason: HOSPADM

## 2024-04-10 RX ORDER — POLYETHYLENE GLYCOL 3350 17 G/17G
17 POWDER, FOR SOLUTION ORAL DAILY PRN
Status: DISCONTINUED | OUTPATIENT
Start: 2024-04-10 | End: 2024-04-11 | Stop reason: HOSPADM

## 2024-04-10 RX ORDER — NITROGLYCERIN 20 MG/100ML
5-200 INJECTION INTRAVENOUS CONTINUOUS
Status: DISCONTINUED | OUTPATIENT
Start: 2024-04-10 | End: 2024-04-11 | Stop reason: HOSPADM

## 2024-04-10 RX ORDER — LANOLIN ALCOHOL/MO/W.PET/CERES
1000 CREAM (GRAM) TOPICAL DAILY
Status: DISCONTINUED | OUTPATIENT
Start: 2024-04-10 | End: 2024-04-11 | Stop reason: HOSPADM

## 2024-04-10 RX ORDER — TRAMADOL HYDROCHLORIDE 50 MG/1
50 TABLET ORAL 3 TIMES DAILY
Status: DISCONTINUED | OUTPATIENT
Start: 2024-04-10 | End: 2024-04-11 | Stop reason: HOSPADM

## 2024-04-10 RX ADMIN — MORPHINE SULFATE 4 MG: 4 INJECTION, SOLUTION INTRAMUSCULAR; INTRAVENOUS at 11:02

## 2024-04-10 RX ADMIN — PREGABALIN 150 MG: 150 CAPSULE ORAL at 09:23

## 2024-04-10 RX ADMIN — Medication 100 MG: at 09:45

## 2024-04-10 RX ADMIN — PREGABALIN 150 MG: 150 CAPSULE ORAL at 20:56

## 2024-04-10 RX ADMIN — FLUDROCORTISONE ACETATE 0.1 MG: 0.1 TABLET ORAL at 09:45

## 2024-04-10 RX ADMIN — METOPROLOL TARTRATE 25 MG: 25 TABLET, FILM COATED ORAL at 20:56

## 2024-04-10 RX ADMIN — TRAMADOL HYDROCHLORIDE 50 MG: 50 TABLET, COATED ORAL at 20:56

## 2024-04-10 RX ADMIN — ASPIRIN 81 MG: 81 TABLET, COATED ORAL at 09:24

## 2024-04-10 RX ADMIN — CARBIDOPA AND LEVODOPA 1 TABLET: 25; 100 TABLET ORAL at 18:10

## 2024-04-10 RX ADMIN — RANOLAZINE 1000 MG: 500 TABLET, EXTENDED RELEASE ORAL at 09:23

## 2024-04-10 RX ADMIN — SODIUM CHLORIDE, PRESERVATIVE FREE 10 ML: 5 INJECTION INTRAVENOUS at 20:39

## 2024-04-10 RX ADMIN — DOCUSATE SODIUM LIQUID 50 MG: 100 LIQUID ORAL at 20:55

## 2024-04-10 RX ADMIN — CARBIDOPA AND LEVODOPA 1 TABLET: 25; 100 TABLET ORAL at 12:09

## 2024-04-10 RX ADMIN — TRAMADOL HYDROCHLORIDE 50 MG: 50 TABLET, COATED ORAL at 14:56

## 2024-04-10 RX ADMIN — MORPHINE SULFATE 4 MG: 4 INJECTION, SOLUTION INTRAMUSCULAR; INTRAVENOUS at 02:16

## 2024-04-10 RX ADMIN — HEPARIN SODIUM 12 UNITS/KG/HR: 10000 INJECTION, SOLUTION INTRAVENOUS at 01:00

## 2024-04-10 RX ADMIN — RANOLAZINE 1000 MG: 500 TABLET, EXTENDED RELEASE ORAL at 20:56

## 2024-04-10 RX ADMIN — HEPARIN SODIUM 2000 UNITS: 1000 INJECTION INTRAVENOUS; SUBCUTANEOUS at 16:01

## 2024-04-10 RX ADMIN — MAGNESIUM SULFATE HEPTAHYDRATE 2000 MG: 40 INJECTION, SOLUTION INTRAVENOUS at 02:17

## 2024-04-10 RX ADMIN — HEPARIN SODIUM 4000 UNITS: 1000 INJECTION INTRAVENOUS; SUBCUTANEOUS at 00:59

## 2024-04-10 RX ADMIN — TRAMADOL HYDROCHLORIDE 50 MG: 50 TABLET, COATED ORAL at 09:24

## 2024-04-10 RX ADMIN — CYANOCOBALAMIN TAB 1000 MCG 1000 MCG: 1000 TAB at 09:24

## 2024-04-10 RX ADMIN — METOPROLOL TARTRATE 25 MG: 25 TABLET, FILM COATED ORAL at 09:24

## 2024-04-10 RX ADMIN — NITROGLYCERIN 5 MCG/MIN: 20 INJECTION INTRAVENOUS at 03:25

## 2024-04-10 RX ADMIN — LEVOTHYROXINE SODIUM 75 MCG: 0.07 TABLET ORAL at 06:17

## 2024-04-10 RX ADMIN — MORPHINE SULFATE 4 MG: 4 INJECTION, SOLUTION INTRAMUSCULAR; INTRAVENOUS at 17:25

## 2024-04-10 RX ADMIN — NITROGLYCERIN 40 MCG/MIN: 20 INJECTION INTRAVENOUS at 20:54

## 2024-04-10 RX ADMIN — Medication 10 ML: at 09:27

## 2024-04-10 RX ADMIN — CARBIDOPA AND LEVODOPA 1 TABLET: 25; 100 TABLET ORAL at 20:56

## 2024-04-10 RX ADMIN — POTASSIUM CHLORIDE 10 MEQ: 10 CAPSULE, COATED, EXTENDED RELEASE ORAL at 09:23

## 2024-04-10 RX ADMIN — POTASSIUM CHLORIDE AND SODIUM CHLORIDE: 900; 150 INJECTION, SOLUTION INTRAVENOUS at 20:14

## 2024-04-10 RX ADMIN — PANTOPRAZOLE SODIUM 40 MG: 40 TABLET, DELAYED RELEASE ORAL at 06:17

## 2024-04-10 RX ADMIN — POTASSIUM CHLORIDE AND SODIUM CHLORIDE: 900; 150 INJECTION, SOLUTION INTRAVENOUS at 15:01

## 2024-04-10 RX ADMIN — IOPAMIDOL 100 ML: 755 INJECTION, SOLUTION INTRAVENOUS at 20:39

## 2024-04-10 RX ADMIN — CARBIDOPA AND LEVODOPA 1 TABLET: 25; 100 TABLET ORAL at 09:25

## 2024-04-10 RX ADMIN — MORPHINE SULFATE 4 MG: 4 INJECTION, SOLUTION INTRAMUSCULAR; INTRAVENOUS at 06:17

## 2024-04-10 ASSESSMENT — PAIN DESCRIPTION - LOCATION
LOCATION: CHEST;BACK
LOCATION: CHEST
LOCATION: CHEST;BACK
LOCATION: CHEST

## 2024-04-10 ASSESSMENT — PAIN DESCRIPTION - DESCRIPTORS
DESCRIPTORS: PRESSURE
DESCRIPTORS: ACHING;TIGHTNESS
DESCRIPTORS: ACHING;TIGHTNESS

## 2024-04-10 ASSESSMENT — PAIN SCALES - GENERAL
PAINLEVEL_OUTOF10: 4
PAINLEVEL_OUTOF10: 8
PAINLEVEL_OUTOF10: 9
PAINLEVEL_OUTOF10: 10
PAINLEVEL_OUTOF10: 10
PAINLEVEL_OUTOF10: 5
PAINLEVEL_OUTOF10: 6
PAINLEVEL_OUTOF10: 9
PAINLEVEL_OUTOF10: 4
PAINLEVEL_OUTOF10: 8
PAINLEVEL_OUTOF10: 9
PAINLEVEL_OUTOF10: 7

## 2024-04-10 ASSESSMENT — PAIN DESCRIPTION - ONSET
ONSET: ON-GOING
ONSET: ON-GOING

## 2024-04-10 ASSESSMENT — PAIN DESCRIPTION - ORIENTATION
ORIENTATION: LEFT;ANTERIOR
ORIENTATION: LEFT;ANTERIOR

## 2024-04-10 ASSESSMENT — PAIN DESCRIPTION - FREQUENCY
FREQUENCY: CONTINUOUS
FREQUENCY: CONTINUOUS

## 2024-04-10 ASSESSMENT — PAIN DESCRIPTION - DIRECTION
RADIATING_TOWARDS: LEFT ARM
RADIATING_TOWARDS: LEFT ARM

## 2024-04-10 ASSESSMENT — PAIN DESCRIPTION - PAIN TYPE
TYPE: ACUTE PAIN
TYPE: ACUTE PAIN
TYPE: ACUTE PAIN;CHRONIC PAIN
TYPE: ACUTE PAIN;CHRONIC PAIN

## 2024-04-10 ASSESSMENT — ENCOUNTER SYMPTOMS
TROUBLE SWALLOWING: 0
NAUSEA: 0
COUGH: 0
VOMITING: 0
COLOR CHANGE: 0
ABDOMINAL PAIN: 0
SHORTNESS OF BREATH: 0
PHOTOPHOBIA: 0
DIARRHEA: 0

## 2024-04-10 ASSESSMENT — PAIN - FUNCTIONAL ASSESSMENT
PAIN_FUNCTIONAL_ASSESSMENT: ACTIVITIES ARE NOT PREVENTED
PAIN_FUNCTIONAL_ASSESSMENT: ACTIVITIES ARE NOT PREVENTED

## 2024-04-10 ASSESSMENT — HEART SCORE: ECG: NORMAL

## 2024-04-10 NOTE — PROGRESS NOTES
04/10/24 1345   Encounter Summary   Encounter Overview/Reason  Attempted Encounter   Service Provided For: Patient not available  (with staff)       
HEPARIN DRIP  Date 04/10 @ 1450    0.25     Plt 204 K  Anti-Xa 0.1-0.29 units/mL Heparin 30 units/kg bolus (Max=2,000 units), then increase infusion by 2 units/kg/hr.   Administration Action Time Recorded Time Documented By Site Comment Reason   Handoff : 12 Units/kg/hr : 8.7 mL/hr : IntraVENous 04/10/24 0716 04/10/24 0716       Increase to 14 units/kg/hr with 2000 bolus.    Wilfredo Aranda,  R.Ph.  4/10/2024  3:55 PM    
Patient continues to have 9/10 chest pain despite nitro gtt  
Pharmacy monitoring of Heparin infusion  Heparin Infusion New Order    Patient on heparin for:   Elevated troponin.    Was patient on previous oral anticoagulant/dose?:  No , Confirmed with RN/Pt/Pts family/Surescripts?: Yes.    Factor Xa inhibitor/LMWH use within the past 72 hours? NO  If yes, date of last administration: N/A.    Recent Labs     04/09/24  2145   HGB 11.5*   INR 1.0          Heparin to be monitored using anti-Xa for duration of therapy.(aPTT should be used for patients who have received a DOAC in the past 72 hours)?      Have admin instructions been updated to reflect appropriate protocol? YES    Have appropriate labs been ordered for corresponding protocol? YES   *Discontinue anti-Xa lab monitoring if using aPTT protocol    Is the starting rate/last rate adjustment appropriate? Yes.    Concurrent anticoagulants: No.  (Note it is not appropriate to be on most anticoagulants while on a heparin drip)    Review platelets from the past few days.  Any concerns?: No 229K  Ellis Rivas RPH [facility]  4/10/2024   12:30 AM  
Transport was set up with KELSY Davidson @7am on 4/11/2024 to go to [facility] cath lab.   
Writer spoke with Dr Oneill regarding patient's latest troponin level (66) and persistent 9/10 chest pain. Orders received to start nitro gtt and titrate to chest pain and recheck troponin in 8 hours.  
chest.  He describes it as sharp and constant.  It was unrelieved by nitro.  His heart sounds were S1-S2 without murmurs noted.  His pulmonary effort was normal with no adventitious breath sounds noted.  His radial pulses were 2+.  His pedal pulses were 2+.  No edema noted in bilateral lower extremities.    Past Medical History:   Diagnosis Date    A-fib ([facility])     PREET (acute kidney injury) ([facility])     Anemia     CAD (coronary artery disease)     Esophageal stricture     Hyperlipidemia     Hypokalemia     Hypomagnesemia     NSTEMI (non-ST elevated myocardial infarction) ([facility])     Orthostatic hypotension     Parkinsons ([facility])     Presence of Watchman left atrial appendage closure device     Prostate CA ([facility])         Past Surgical History:     Past Surgical History:   Procedure Laterality Date    CARDIAC CATHETERIZATION      CORONARY ANGIOPLASTY WITH STENT PLACEMENT      8 stents prior to CABG per patient, most recent 12/2023    CORONARY ARTERY BYPASS GRAFT      2014        Medications Prior to Admission:     Prior to Admission medications    Medication Sig Start Date End Date Taking? Authorizing Provider   fludrocortisone (FLORINEF) 0.1 MG tablet Take 1 tablet by mouth daily 3/25/24 4/24/24  Ian Bautista MD   aspirin 81 MG EC tablet Take 1 tablet by mouth daily    ProviderBailey MD   metoprolol tartrate (LOPRESSOR) 25 MG tablet Take 0.5 tablets by mouth at bedtime 3/13/24   Yandy Jones MD   Compression Stockings MISC by Does not apply route 3/13/24   Yandy Jones MD   meclizine (ANTIVERT) 12.5 MG tablet Take 1 tablet by mouth 3 times daily as needed for Dizziness 2/29/24   Jason Church MD   clopidogrel (PLAVIX) 75 MG tablet Take 1 tablet by mouth every morning  Patient not taking: Reported on 3/13/2024 2/4/24   ProviderBailey MD   vitamin B-12 (CYANOCOBALAMIN) 1000 MCG tablet take 1 tablet by mouth once daily 2/19/24   Brea Hung MD   midodrine (PROAMATINE) 2.5 MG tablet Take 1 tablet

## 2024-04-10 NOTE — H&P
[facility] Internal Medicine  Ko Elias MD; Ian Bautista MD, Fransisco Cee MD, Aracely Pierre MD, Brea Jones MD; Larissa Oro MD    [facility] Internal Medicine   IN-PATIENT SERVICE   [facility]    HISTORY AND PHYSICAL EXAMINATION            Date:   4/10/2024  Patient name:  Rowdy Marie  Date of admission:  4/9/2024  9:38 PM  MRN:   [id number]  Account:  [id number]  YOB: 1950  PCP:    Yandy Jones MD  Room:   [id number]  Code Status:    Full Code    Chief Complaint:     Chief Complaint   Patient presents with    Chest Pain       History Obtained From:     Patient/EMR/Bedside RN    History of Present Illness:     Rowdy Marie is a 74 y.o. Non- / non  male who presents with Chest Pain   and is admitted to the hospital for the management of NSTEMI (non-ST elevated myocardial infarction) (HCC).  According to patient, he has been suffering with severe chest pain with sudden onset 30 minutes prior to arrival to the ER.  The patient endorses a history of a 5 vessel CABG and an even more recent stent replacement.  He states that this pain is similar to the pain he had for previous MIs.  He also endorses not taking Plavix due to history of GI bleeds and watchman placement. Chart review reveals that the patient follows with Dr. Ladd, however ER consulted [facility] due to lack of callback.  [facility] ordered the patient to be started on a heparin drip in ER due to rising troponin.  The patient's last echo was performed February 2024, which showed an EF of 50 to 55%.    The patient underwent a Watchman procedure due to paroxysmal A-fib November 2023, CABG x 4 in 2014, and 5 stent placements in December 2022.  He had a recent hospital admission in February due to postural hypotension.  He was started on fludrocortisone and midodrine at that time.  - The patient was alert and oriented upon

## 2024-04-10 NOTE — ACP (ADVANCE CARE PLANNING)
Advance Care Planning     Advance Care Planning Activator (Inpatient)  Conversation Note      Date of ACP Conversation: 4/10/2024     Conversation Conducted with: Patient with Decision Making Capacity    ACP Activator: Jamaica Chairez RN      Health Care Decision Maker:     Current Designated Health Care Decision Maker:     Primary Decision Maker: remove,per pt - Spouse - [phone]  Click here to complete Healthcare Decision Makers including section of the Healthcare Decision Maker Relationship (ie \"Primary\")  Today we documented Decision Maker(s) consistent with Legal Next of Kin hierarchy.    Care Preferences    Ventilation:  \"If you were in your present state of health and suddenly became very ill and were unable to breathe on your own, what would your preference be about the use of a ventilator (breathing machine) if it were available to you?\"      Would the patient desire the use of ventilator (breathing machine)?: yes    \"If your health worsens and it becomes clear that your chance of recovery is unlikely, what would your preference be about the use of a ventilator (breathing machine) if it were available to you?\"     Would the patient desire the use of ventilator (breathing machine)?: No      Resuscitation  \"CPR works best to restart the heart when there is a sudden event, like a heart attack, in someone who is otherwise healthy. Unfortunately, CPR does not typically restart the heart for people who have serious health conditions or who are very sick.\"    \"In the event your heart stopped as a result of an underlying serious health condition, would you want attempts to be made to restart your heart (answer \"yes\" for attempt to resuscitate) or would you prefer a natural death (answer \"no\" for do not attempt to resuscitate)?\" yes       [] Yes   [x] No   Educated Patient / Decision Maker regarding differences between Advance Directives and portable DNR orders.    Length of ACP Conversation in minutes:

## 2024-04-10 NOTE — ED TRIAGE NOTES
Mode of arrival (squad #, walk in, police, etc) : walk in        Chief complaint(s): chest pain        Arrival Note (brief scenario, treatment PTA, etc).: pt had sudden onset chest pain and left arm pain this evening around 2130. Pt has cardiac hx        C= \"Have you ever felt that you should Cut down on your drinking?\"  No  A= \"Have people Annoyed you by criticizing your drinking?\"  No  G= \"Have you ever felt bad or Guilty about your drinking?\"  No  E= \"Have you ever had a drink as an Eye-opener first thing in the morning to steady your nerves or to help a hangover?\"  No      Deferred []      Reason for deferring: N/A    *If yes to two or more: probable alcohol abuse.*

## 2024-04-10 NOTE — ED NOTES
Report given to STAS Chairez from [facility].   Report method by phone   The following was reviewed with receiving RN:   Current vital signs:  /77   Pulse 63   Temp 97.4 °F (36.3 °C)   Resp 18   SpO2 98%                MEWS Score: 1     Any medication or safety alerts were reviewed. Any pending diagnostics and notifications were also reviewed, as well as any safety concerns or issues, abnormal labs, abnormal imaging, and abnormal assessment findings. Questions were answered.

## 2024-04-10 NOTE — ED NOTES
Pt called out for pain again. Pt reports nothing he has received for pain has helped. Lazara CANO notified.

## 2024-04-10 NOTE — PLAN OF CARE
Problem: Pain  Goal: Verbalizes/displays adequate comfort level or baseline comfort level  Outcome: Progressing  Flowsheets (Taken 4/10/2024 0621)  Verbalizes/displays adequate comfort level or baseline comfort level:   Encourage patient to monitor pain and request assistance   Assess pain using appropriate pain scale   Administer analgesics based on type and severity of pain and evaluate response

## 2024-04-10 NOTE — ED PROVIDER NOTES
EMERGENCY DEPARTMENT ENCOUNTER    Pt Name: Rowdy Marie  MRN: [id number]  Birthdate 1950  Date of evaluation: 4/9/24  CHIEF COMPLAINT       Chief Complaint   Patient presents with    Chest Pain     HISTORY OF PRESENT ILLNESS   74-year-old male with a history of CAD and open heart surgery coming to the ER complaining of left-sided chest pain that started at 9 PM tonight.    The history is provided by the patient.   Chest Pain  Pain location:  L chest  Pain quality: aching    Associated symptoms: no abdominal pain, no cough, no dizziness, no dysphagia, no fatigue, no fever, no nausea, no palpitations, no shortness of breath and no vomiting            REVIEW OF SYSTEMS     Review of Systems   Constitutional:  Negative for activity change, fatigue and fever.   HENT:  Negative for congestion, ear pain and trouble swallowing.    Eyes:  Negative for photophobia and visual disturbance.   Respiratory:  Negative for cough and shortness of breath.    Cardiovascular:  Positive for chest pain. Negative for palpitations.   Gastrointestinal:  Negative for abdominal pain, diarrhea, nausea and vomiting.   Genitourinary:  Negative for dysuria, flank pain and urgency.   Musculoskeletal:  Negative for arthralgias and myalgias.   Skin:  Negative for color change and rash.   Neurological:  Negative for dizziness and facial asymmetry.   Psychiatric/Behavioral:  Negative for agitation and behavioral problems.      PASTMEDICAL HISTORY     Past Medical History:   Diagnosis Date    A-fib ([facility])     PREET (acute kidney injury) ([facility])     Anemia     CAD (coronary artery disease)     Esophageal stricture     Hyperlipidemia     Hypokalemia     Hypomagnesemia     NSTEMI (non-ST elevated myocardial infarction) ([facility])     Orthostatic hypotension     Parkinsons ([facility])     Presence of Watchman left atrial appendage closure device     Prostate CA ([facility])      Past Problem List  Patient Active Problem List   Diagnosis Code    Hypotension I95.9    Mild

## 2024-04-11 ENCOUNTER — HOSPITAL ENCOUNTER (INPATIENT)
Age: 74
LOS: 3 days | Discharge: HOME | DRG: 321 | End: 2024-04-14
Attending: INTERNAL MEDICINE | Admitting: INTERNAL MEDICINE
Payer: MEDICARE

## 2024-04-11 ENCOUNTER — APPOINTMENT (OUTPATIENT)
Dept: VASCULAR LAB | Age: 74
DRG: 321 | End: 2024-04-11
Attending: INTERNAL MEDICINE
Payer: MEDICARE

## 2024-04-11 VITALS
RESPIRATION RATE: 16 BRPM | SYSTOLIC BLOOD PRESSURE: 100 MMHG | TEMPERATURE: 97.7 F | OXYGEN SATURATION: 91 % | HEART RATE: 57 BPM | DIASTOLIC BLOOD PRESSURE: 64 MMHG

## 2024-04-11 DIAGNOSIS — I25.10 CAD, MULTIPLE VESSEL: ICD-10-CM

## 2024-04-11 DIAGNOSIS — Z95.820 S/P ANGIOPLASTY WITH STENT: Primary | ICD-10-CM

## 2024-04-11 DIAGNOSIS — R07.89 OTHER CHEST PAIN: ICD-10-CM

## 2024-04-11 DIAGNOSIS — I20.9 ANGINA PECTORIS (HCC): ICD-10-CM

## 2024-04-11 PROBLEM — J18.9 CAP (COMMUNITY ACQUIRED PNEUMONIA): Status: ACTIVE | Noted: 2024-04-11

## 2024-04-11 LAB
ANION GAP SERPL CALCULATED.3IONS-SCNC: 9 MMOL/L (ref 9–17)
ANTI-XA UNFRAC HEPARIN: 0.36 IU/L (ref 0.3–0.7)
BASOPHILS # BLD: 0.1 K/UL (ref 0–0.2)
BASOPHILS NFR BLD: 1 % (ref 0–2)
BUN SERPL-MCNC: 17 MG/DL (ref 8–23)
CALCIUM SERPL-MCNC: 8.3 MG/DL (ref 8.6–10.4)
CHLORIDE SERPL-SCNC: 102 MMOL/L (ref 98–107)
CO2 SERPL-SCNC: 24 MMOL/L (ref 20–31)
CREAT SERPL-MCNC: 1.1 MG/DL (ref 0.7–1.2)
EKG ATRIAL RATE: 62 BPM
EKG P AXIS: 52 DEGREES
EKG P-R INTERVAL: 142 MS
EKG Q-T INTERVAL: 436 MS
EKG QRS DURATION: 82 MS
EKG QTC CALCULATION (BAZETT): 442 MS
EKG R AXIS: 4 DEGREES
EKG T AXIS: 85 DEGREES
EKG VENTRICULAR RATE: 62 BPM
EOSINOPHIL # BLD: 0.8 K/UL (ref 0–0.4)
EOSINOPHILS RELATIVE PERCENT: 9 % (ref 0–4)
ERYTHROCYTE [DISTWIDTH] IN BLOOD BY AUTOMATED COUNT: 14.1 % (ref 11.5–14.9)
GFR SERPL CREATININE-BSD FRML MDRD: 70 ML/MIN/1.73M2
GLUCOSE SERPL-MCNC: 97 MG/DL (ref 70–99)
HCT VFR BLD AUTO: 30.2 % (ref 41–53)
HCT VFR BLD AUTO: 33 % (ref 41–53)
HGB BLD-MCNC: 9.8 G/DL (ref 13.5–17.5)
LYMPHOCYTES NFR BLD: 2 K/UL (ref 1–4.8)
LYMPHOCYTES RELATIVE PERCENT: 22 % (ref 24–44)
MCH RBC QN AUTO: 30.3 PG (ref 26–34)
MCHC RBC AUTO-ENTMCNC: 32.4 G/DL (ref 31–37)
MCV RBC AUTO: 93.3 FL (ref 80–100)
MONOCYTES NFR BLD: 0.7 K/UL (ref 0.1–1.3)
MONOCYTES NFR BLD: 8 % (ref 1–7)
NEUTROPHILS NFR BLD: 60 % (ref 36–66)
NEUTS SEG NFR BLD: 5.3 K/UL (ref 1.3–9.1)
PLATELET # BLD AUTO: 212 K/UL (ref 150–450)
PMV BLD AUTO: 10.4 FL (ref 6–12)
POC HEMOGLOBIN (CALC): 11.1 G/DL (ref 13.5–17.5)
POTASSIUM SERPL-SCNC: 4.3 MMOL/L (ref 3.7–5.3)
RBC # BLD AUTO: 3.24 M/UL (ref 4.5–5.9)
SODIUM SERPL-SCNC: 135 MMOL/L (ref 135–144)
WBC OTHER # BLD: 8.9 K/UL (ref 3.5–11)

## 2024-04-11 PROCEDURE — C1894 INTRO/SHEATH, NON-LASER: HCPCS | Performed by: INTERNAL MEDICINE

## 2024-04-11 PROCEDURE — 93455 CORONARY ART/GRFT ANGIO S&I: CPT | Performed by: INTERNAL MEDICINE

## 2024-04-11 PROCEDURE — 2580000003 HC RX 258: Performed by: INTERNAL MEDICINE

## 2024-04-11 PROCEDURE — 6370000000 HC RX 637 (ALT 250 FOR IP): Performed by: INTERNAL MEDICINE

## 2024-04-11 PROCEDURE — 0202U NFCT DS 22 TRGT SARS-COV-2: CPT

## 2024-04-11 PROCEDURE — 87899 AGENT NOS ASSAY W/OPTIC: CPT

## 2024-04-11 PROCEDURE — 92920 PRQ TRLUML C ANGIOP 1ART&/BR: CPT | Performed by: INTERNAL MEDICINE

## 2024-04-11 PROCEDURE — 2500000003 HC RX 250 WO HCPCS: Performed by: INTERNAL MEDICINE

## 2024-04-11 PROCEDURE — 7100000010 HC PHASE II RECOVERY - FIRST 15 MIN

## 2024-04-11 PROCEDURE — C1769 GUIDE WIRE: HCPCS | Performed by: INTERNAL MEDICINE

## 2024-04-11 PROCEDURE — 6360000002 HC RX W HCPCS: Performed by: INTERNAL MEDICINE

## 2024-04-11 PROCEDURE — 92928 PRQ TCAT PLMT NTRAC ST 1 LES: CPT | Performed by: INTERNAL MEDICINE

## 2024-04-11 PROCEDURE — C9601 PERC DRUG-EL COR STENT BRAN: HCPCS | Performed by: INTERNAL MEDICINE

## 2024-04-11 PROCEDURE — 2580000003 HC RX 258

## 2024-04-11 PROCEDURE — 2709999900 HC NON-CHARGEABLE SUPPLY: Performed by: INTERNAL MEDICINE

## 2024-04-11 PROCEDURE — 6360000002 HC RX W HCPCS

## 2024-04-11 PROCEDURE — 4A023N7 MEASUREMENT OF CARDIAC SAMPLING AND PRESSURE, LEFT HEART, PERCUTANEOUS APPROACH: ICD-10-PCS | Performed by: INTERNAL MEDICINE

## 2024-04-11 PROCEDURE — 92937 PRQ TRLUML REVSC CAB GRF 1: CPT | Performed by: INTERNAL MEDICINE

## 2024-04-11 PROCEDURE — C1874 STENT, COATED/COV W/DEL SYS: HCPCS | Performed by: INTERNAL MEDICINE

## 2024-04-11 PROCEDURE — 93926 LOWER EXTREMITY STUDY: CPT

## 2024-04-11 PROCEDURE — C9600 PERC DRUG-EL COR STENT SING: HCPCS | Performed by: INTERNAL MEDICINE

## 2024-04-11 PROCEDURE — 93452 LEFT HRT CATH W/VENTRCLGRPHY: CPT | Performed by: INTERNAL MEDICINE

## 2024-04-11 PROCEDURE — 2060000000 HC ICU INTERMEDIATE R&B

## 2024-04-11 PROCEDURE — 85014 HEMATOCRIT: CPT

## 2024-04-11 PROCEDURE — 027135Z DILATION OF CORONARY ARTERY, TWO ARTERIES WITH TWO DRUG-ELUTING INTRALUMINAL DEVICES, PERCUTANEOUS APPROACH: ICD-10-PCS | Performed by: INTERNAL MEDICINE

## 2024-04-11 PROCEDURE — 80048 BASIC METABOLIC PNL TOTAL CA: CPT

## 2024-04-11 PROCEDURE — 6370000000 HC RX 637 (ALT 250 FOR IP)

## 2024-04-11 PROCEDURE — 99153 MOD SED SAME PHYS/QHP EA: CPT | Performed by: INTERNAL MEDICINE

## 2024-04-11 PROCEDURE — 85520 HEPARIN ASSAY: CPT

## 2024-04-11 PROCEDURE — B2151ZZ FLUOROSCOPY OF LEFT HEART USING LOW OSMOLAR CONTRAST: ICD-10-PCS | Performed by: INTERNAL MEDICINE

## 2024-04-11 PROCEDURE — 7100000010 HC PHASE II RECOVERY - FIRST 15 MIN: Performed by: INTERNAL MEDICINE

## 2024-04-11 PROCEDURE — 7100000011 HC PHASE II RECOVERY - ADDTL 15 MIN

## 2024-04-11 PROCEDURE — 93005 ELECTROCARDIOGRAM TRACING: CPT | Performed by: NURSE PRACTITIONER

## 2024-04-11 PROCEDURE — C1887 CATHETER, GUIDING: HCPCS | Performed by: INTERNAL MEDICINE

## 2024-04-11 PROCEDURE — 99152 MOD SED SAME PHYS/QHP 5/>YRS: CPT | Performed by: INTERNAL MEDICINE

## 2024-04-11 PROCEDURE — 85025 COMPLETE CBC W/AUTO DIFF WBC: CPT

## 2024-04-11 PROCEDURE — 6370000000 HC RX 637 (ALT 250 FOR IP): Performed by: STUDENT IN AN ORGANIZED HEALTH CARE EDUCATION/TRAINING PROGRAM

## 2024-04-11 PROCEDURE — 93005 ELECTROCARDIOGRAM TRACING: CPT | Performed by: STUDENT IN AN ORGANIZED HEALTH CARE EDUCATION/TRAINING PROGRAM

## 2024-04-11 PROCEDURE — C1760 CLOSURE DEV, VASC: HCPCS | Performed by: INTERNAL MEDICINE

## 2024-04-11 PROCEDURE — 99223 1ST HOSP IP/OBS HIGH 75: CPT | Performed by: INTERNAL MEDICINE

## 2024-04-11 PROCEDURE — B2111ZZ FLUOROSCOPY OF MULTIPLE CORONARY ARTERIES USING LOW OSMOLAR CONTRAST: ICD-10-PCS | Performed by: INTERNAL MEDICINE

## 2024-04-11 PROCEDURE — C1725 CATH, TRANSLUMIN NON-LASER: HCPCS | Performed by: INTERNAL MEDICINE

## 2024-04-11 PROCEDURE — 87449 NOS EACH ORGANISM AG IA: CPT

## 2024-04-11 PROCEDURE — 36415 COLL VENOUS BLD VENIPUNCTURE: CPT

## 2024-04-11 PROCEDURE — 6360000002 HC RX W HCPCS: Performed by: STUDENT IN AN ORGANIZED HEALTH CARE EDUCATION/TRAINING PROGRAM

## 2024-04-11 PROCEDURE — 93459 L HRT ART/GRFT ANGIO: CPT | Performed by: INTERNAL MEDICINE

## 2024-04-11 DEVICE — STENT ONYXNG27526UX ONYX 2.75X26RX
Type: IMPLANTABLE DEVICE | Status: FUNCTIONAL
Brand: ONYX FRONTIER™

## 2024-04-11 RX ORDER — SODIUM CHLORIDE 0.9 % (FLUSH) 0.9 %
5-40 SYRINGE (ML) INJECTION EVERY 12 HOURS SCHEDULED
Status: DISCONTINUED | OUTPATIENT
Start: 2024-04-11 | End: 2024-04-14 | Stop reason: HOSPADM

## 2024-04-11 RX ORDER — SODIUM CHLORIDE 0.9 % (FLUSH) 0.9 %
5-40 SYRINGE (ML) INJECTION PRN
Status: DISCONTINUED | OUTPATIENT
Start: 2024-04-11 | End: 2024-04-14 | Stop reason: HOSPADM

## 2024-04-11 RX ORDER — LEVOTHYROXINE SODIUM 0.07 MG/1
75 TABLET ORAL DAILY
Status: CANCELLED | OUTPATIENT
Start: 2024-04-12

## 2024-04-11 RX ORDER — UBIDECARENONE 100 MG
100 CAPSULE ORAL DAILY
Status: DISCONTINUED | OUTPATIENT
Start: 2024-04-11 | End: 2024-04-11 | Stop reason: SDUPTHER

## 2024-04-11 RX ORDER — FLUDROCORTISONE ACETATE 0.1 MG/1
0.1 TABLET ORAL DAILY
Status: CANCELLED | OUTPATIENT
Start: 2024-04-11

## 2024-04-11 RX ORDER — PANTOPRAZOLE SODIUM 40 MG/1
40 TABLET, DELAYED RELEASE ORAL
Status: DISCONTINUED | OUTPATIENT
Start: 2024-04-12 | End: 2024-04-14 | Stop reason: HOSPADM

## 2024-04-11 RX ORDER — CHOLECALCIFEROL (VITAMIN D3) 125 MCG
1000 CAPSULE ORAL DAILY
Status: DISCONTINUED | OUTPATIENT
Start: 2024-04-11 | End: 2024-04-14 | Stop reason: HOSPADM

## 2024-04-11 RX ORDER — UBIDECARENONE 100 MG
100 CAPSULE ORAL DAILY
Status: CANCELLED | OUTPATIENT
Start: 2024-04-11

## 2024-04-11 RX ORDER — FLUDROCORTISONE ACETATE 0.1 MG/1
0.1 TABLET ORAL DAILY
Status: DISCONTINUED | OUTPATIENT
Start: 2024-04-11 | End: 2024-04-14 | Stop reason: HOSPADM

## 2024-04-11 RX ORDER — UBIDECARENONE 100 MG
100 CAPSULE ORAL DAILY
Status: DISCONTINUED | OUTPATIENT
Start: 2024-04-11 | End: 2024-04-11

## 2024-04-11 RX ORDER — POLYETHYLENE GLYCOL 3350 17 G/17G
17 POWDER, FOR SOLUTION ORAL DAILY PRN
Status: CANCELLED | OUTPATIENT
Start: 2024-04-11

## 2024-04-11 RX ORDER — SODIUM CHLORIDE AND POTASSIUM CHLORIDE 150; 900 MG/100ML; MG/100ML
INJECTION, SOLUTION INTRAVENOUS CONTINUOUS
Status: CANCELLED | OUTPATIENT
Start: 2024-04-11 | End: 2024-04-11

## 2024-04-11 RX ORDER — POTASSIUM CHLORIDE 750 MG/1
10 TABLET, FILM COATED, EXTENDED RELEASE ORAL EVERY MORNING
Status: DISCONTINUED | OUTPATIENT
Start: 2024-04-11 | End: 2024-04-11 | Stop reason: SDUPTHER

## 2024-04-11 RX ORDER — MORPHINE SULFATE 2 MG/ML
1 INJECTION, SOLUTION INTRAMUSCULAR; INTRAVENOUS EVERY 4 HOURS PRN
Status: DISCONTINUED | OUTPATIENT
Start: 2024-04-11 | End: 2024-04-14 | Stop reason: HOSPADM

## 2024-04-11 RX ORDER — ACETAMINOPHEN 650 MG/1
650 SUPPOSITORY RECTAL EVERY 6 HOURS PRN
Status: DISCONTINUED | OUTPATIENT
Start: 2024-04-11 | End: 2024-04-14 | Stop reason: HOSPADM

## 2024-04-11 RX ORDER — SODIUM CHLORIDE 9 MG/ML
INJECTION, SOLUTION INTRAVENOUS PRN
Status: DISCONTINUED | OUTPATIENT
Start: 2024-04-11 | End: 2024-04-14 | Stop reason: HOSPADM

## 2024-04-11 RX ORDER — TRAMADOL HYDROCHLORIDE 50 MG/1
50 TABLET ORAL 3 TIMES DAILY
Status: DISCONTINUED | OUTPATIENT
Start: 2024-04-11 | End: 2024-04-14 | Stop reason: HOSPADM

## 2024-04-11 RX ORDER — MAGNESIUM SULFATE HEPTAHYDRATE 40 MG/ML
2000 INJECTION, SOLUTION INTRAVENOUS PRN
Status: CANCELLED | OUTPATIENT
Start: 2024-04-11

## 2024-04-11 RX ORDER — BISACODYL 10 MG
10 SUPPOSITORY, RECTAL RECTAL DAILY PRN
Status: CANCELLED | OUTPATIENT
Start: 2024-04-11

## 2024-04-11 RX ORDER — POLYETHYLENE GLYCOL 3350 17 G/17G
17 POWDER, FOR SOLUTION ORAL DAILY PRN
Status: DISCONTINUED | OUTPATIENT
Start: 2024-04-11 | End: 2024-04-14 | Stop reason: HOSPADM

## 2024-04-11 RX ORDER — ACETAMINOPHEN 650 MG/1
650 SUPPOSITORY RECTAL EVERY 6 HOURS PRN
Status: DISCONTINUED | OUTPATIENT
Start: 2024-04-11 | End: 2024-04-11

## 2024-04-11 RX ORDER — MAGNESIUM SULFATE IN WATER 40 MG/ML
2000 INJECTION, SOLUTION INTRAVENOUS PRN
Status: DISCONTINUED | OUTPATIENT
Start: 2024-04-11 | End: 2024-04-11

## 2024-04-11 RX ORDER — BISACODYL 10 MG
10 SUPPOSITORY, RECTAL RECTAL DAILY PRN
Status: DISCONTINUED | OUTPATIENT
Start: 2024-04-11 | End: 2024-04-14 | Stop reason: HOSPADM

## 2024-04-11 RX ORDER — MORPHINE SULFATE 2 MG/ML
4 INJECTION, SOLUTION INTRAMUSCULAR; INTRAVENOUS EVERY 4 HOURS PRN
Status: DISCONTINUED | OUTPATIENT
Start: 2024-04-11 | End: 2024-04-11

## 2024-04-11 RX ORDER — MIDODRINE HYDROCHLORIDE 2.5 MG/1
2.5 TABLET ORAL 3 TIMES DAILY PRN
Status: CANCELLED | OUTPATIENT
Start: 2024-04-11

## 2024-04-11 RX ORDER — NITROGLYCERIN 20 MG/100ML
INJECTION INTRAVENOUS CONTINUOUS PRN
Status: COMPLETED | OUTPATIENT
Start: 2024-04-11 | End: 2024-04-11

## 2024-04-11 RX ORDER — POTASSIUM CHLORIDE 20 MEQ/1
40 TABLET, EXTENDED RELEASE ORAL PRN
Status: DISCONTINUED | OUTPATIENT
Start: 2024-04-11 | End: 2024-04-14 | Stop reason: HOSPADM

## 2024-04-11 RX ORDER — LANOLIN ALCOHOL/MO/W.PET/CERES
3 CREAM (GRAM) TOPICAL NIGHTLY PRN
Status: DISCONTINUED | OUTPATIENT
Start: 2024-04-11 | End: 2024-04-14 | Stop reason: HOSPADM

## 2024-04-11 RX ORDER — PREGABALIN 75 MG/1
150 CAPSULE ORAL 2 TIMES DAILY
Status: DISCONTINUED | OUTPATIENT
Start: 2024-04-11 | End: 2024-04-14 | Stop reason: HOSPADM

## 2024-04-11 RX ORDER — ASPIRIN 81 MG/1
81 TABLET ORAL DAILY
Status: CANCELLED | OUTPATIENT
Start: 2024-04-11

## 2024-04-11 RX ORDER — ACETAMINOPHEN 325 MG/1
650 TABLET ORAL EVERY 6 HOURS PRN
Status: DISCONTINUED | OUTPATIENT
Start: 2024-04-11 | End: 2024-04-11 | Stop reason: SDUPTHER

## 2024-04-11 RX ORDER — ACETAMINOPHEN 650 MG/1
650 SUPPOSITORY RECTAL EVERY 6 HOURS PRN
Status: DISCONTINUED | OUTPATIENT
Start: 2024-04-11 | End: 2024-04-11 | Stop reason: SDUPTHER

## 2024-04-11 RX ORDER — POTASSIUM CHLORIDE 7.45 MG/ML
10 INJECTION INTRAVENOUS PRN
Status: DISCONTINUED | OUTPATIENT
Start: 2024-04-11 | End: 2024-04-11

## 2024-04-11 RX ORDER — POTASSIUM CHLORIDE 7.45 MG/ML
10 INJECTION INTRAVENOUS PRN
Status: DISCONTINUED | OUTPATIENT
Start: 2024-04-11 | End: 2024-04-14 | Stop reason: HOSPADM

## 2024-04-11 RX ORDER — TRAMADOL HYDROCHLORIDE 50 MG/1
50 TABLET ORAL 3 TIMES DAILY
Status: DISCONTINUED | OUTPATIENT
Start: 2024-04-11 | End: 2024-04-11 | Stop reason: SDUPTHER

## 2024-04-11 RX ORDER — ACETAMINOPHEN 650 MG/1
650 SUPPOSITORY RECTAL EVERY 6 HOURS PRN
Status: CANCELLED | OUTPATIENT
Start: 2024-04-11

## 2024-04-11 RX ORDER — MAGNESIUM SULFATE IN WATER 40 MG/ML
2000 INJECTION, SOLUTION INTRAVENOUS PRN
Status: DISCONTINUED | OUTPATIENT
Start: 2024-04-11 | End: 2024-04-11 | Stop reason: SDUPTHER

## 2024-04-11 RX ORDER — SODIUM CHLORIDE 9 MG/ML
INJECTION, SOLUTION INTRAVENOUS PRN
Status: DISCONTINUED | OUTPATIENT
Start: 2024-04-11 | End: 2024-04-11 | Stop reason: SDUPTHER

## 2024-04-11 RX ORDER — POLYETHYLENE GLYCOL 3350 17 G/17G
17 POWDER, FOR SOLUTION ORAL DAILY PRN
Status: DISCONTINUED | OUTPATIENT
Start: 2024-04-11 | End: 2024-04-11 | Stop reason: SDUPTHER

## 2024-04-11 RX ORDER — MAGNESIUM SULFATE IN WATER 40 MG/ML
2000 INJECTION, SOLUTION INTRAVENOUS PRN
Status: DISCONTINUED | OUTPATIENT
Start: 2024-04-11 | End: 2024-04-14 | Stop reason: HOSPADM

## 2024-04-11 RX ORDER — PANTOPRAZOLE SODIUM 40 MG/1
40 TABLET, DELAYED RELEASE ORAL
Status: CANCELLED | OUTPATIENT
Start: 2024-04-12

## 2024-04-11 RX ORDER — SODIUM CHLORIDE AND POTASSIUM CHLORIDE 150; 900 MG/100ML; MG/100ML
INJECTION, SOLUTION INTRAVENOUS CONTINUOUS
Status: DISPENSED | OUTPATIENT
Start: 2024-04-11 | End: 2024-04-11

## 2024-04-11 RX ORDER — ONDANSETRON 2 MG/ML
4 INJECTION INTRAMUSCULAR; INTRAVENOUS EVERY 6 HOURS PRN
Status: DISCONTINUED | OUTPATIENT
Start: 2024-04-11 | End: 2024-04-11 | Stop reason: SDUPTHER

## 2024-04-11 RX ORDER — ACETAMINOPHEN 325 MG/1
650 TABLET ORAL EVERY 4 HOURS PRN
Status: DISCONTINUED | OUTPATIENT
Start: 2024-04-11 | End: 2024-04-11 | Stop reason: SDUPTHER

## 2024-04-11 RX ORDER — SODIUM CHLORIDE 0.9 % (FLUSH) 0.9 %
5-40 SYRINGE (ML) INJECTION EVERY 12 HOURS SCHEDULED
Status: DISCONTINUED | OUTPATIENT
Start: 2024-04-11 | End: 2024-04-11 | Stop reason: SDUPTHER

## 2024-04-11 RX ORDER — SODIUM CHLORIDE 9 MG/ML
INJECTION, SOLUTION INTRAVENOUS PRN
Status: CANCELLED | OUTPATIENT
Start: 2024-04-11

## 2024-04-11 RX ORDER — SODIUM CHLORIDE 0.9 % (FLUSH) 0.9 %
10 SYRINGE (ML) INJECTION PRN
Status: CANCELLED | OUTPATIENT
Start: 2024-04-11

## 2024-04-11 RX ORDER — FLUDROCORTISONE ACETATE 0.1 MG/1
0.1 TABLET ORAL DAILY
Status: DISCONTINUED | OUTPATIENT
Start: 2024-04-11 | End: 2024-04-11 | Stop reason: SDUPTHER

## 2024-04-11 RX ORDER — NITROGLYCERIN 20 MG/100ML
5-200 INJECTION INTRAVENOUS CONTINUOUS
Status: DISCONTINUED | OUTPATIENT
Start: 2024-04-11 | End: 2024-04-14 | Stop reason: HOSPADM

## 2024-04-11 RX ORDER — SODIUM CHLORIDE 0.9 % (FLUSH) 0.9 %
5-40 SYRINGE (ML) INJECTION PRN
Status: DISCONTINUED | OUTPATIENT
Start: 2024-04-11 | End: 2024-04-11 | Stop reason: SDUPTHER

## 2024-04-11 RX ORDER — ONDANSETRON 4 MG/1
4 TABLET, ORALLY DISINTEGRATING ORAL EVERY 8 HOURS PRN
Status: DISCONTINUED | OUTPATIENT
Start: 2024-04-11 | End: 2024-04-11

## 2024-04-11 RX ORDER — RANOLAZINE 500 MG/1
1000 TABLET, EXTENDED RELEASE ORAL 2 TIMES DAILY
Status: DISCONTINUED | OUTPATIENT
Start: 2024-04-11 | End: 2024-04-14 | Stop reason: HOSPADM

## 2024-04-11 RX ORDER — PANTOPRAZOLE SODIUM 40 MG/1
40 TABLET, DELAYED RELEASE ORAL
Status: DISCONTINUED | OUTPATIENT
Start: 2024-04-12 | End: 2024-04-11 | Stop reason: SDUPTHER

## 2024-04-11 RX ORDER — RANOLAZINE 500 MG/1
1000 TABLET, EXTENDED RELEASE ORAL 2 TIMES DAILY
Status: DISCONTINUED | OUTPATIENT
Start: 2024-04-11 | End: 2024-04-11 | Stop reason: SDUPTHER

## 2024-04-11 RX ORDER — MIDODRINE HYDROCHLORIDE 2.5 MG/1
2.5 TABLET ORAL 3 TIMES DAILY PRN
Status: DISCONTINUED | OUTPATIENT
Start: 2024-04-11 | End: 2024-04-14 | Stop reason: HOSPADM

## 2024-04-11 RX ORDER — TRAMADOL HYDROCHLORIDE 50 MG/1
50 TABLET ORAL 3 TIMES DAILY
Status: CANCELLED | OUTPATIENT
Start: 2024-04-11

## 2024-04-11 RX ORDER — SODIUM CHLORIDE 0.9 % (FLUSH) 0.9 %
10 SYRINGE (ML) INJECTION PRN
Status: DISCONTINUED | OUTPATIENT
Start: 2024-04-11 | End: 2024-04-14 | Stop reason: HOSPADM

## 2024-04-11 RX ORDER — POTASSIUM CHLORIDE 20 MEQ/1
40 TABLET, EXTENDED RELEASE ORAL PRN
Status: DISCONTINUED | OUTPATIENT
Start: 2024-04-11 | End: 2024-04-11 | Stop reason: SDUPTHER

## 2024-04-11 RX ORDER — ONDANSETRON 4 MG/1
4 TABLET, ORALLY DISINTEGRATING ORAL EVERY 8 HOURS PRN
Status: DISCONTINUED | OUTPATIENT
Start: 2024-04-11 | End: 2024-04-14 | Stop reason: HOSPADM

## 2024-04-11 RX ORDER — ONDANSETRON 2 MG/ML
4 INJECTION INTRAMUSCULAR; INTRAVENOUS EVERY 6 HOURS PRN
Status: DISCONTINUED | OUTPATIENT
Start: 2024-04-11 | End: 2024-04-11

## 2024-04-11 RX ORDER — MORPHINE SULFATE 2 MG/ML
1 INJECTION, SOLUTION INTRAMUSCULAR; INTRAVENOUS ONCE
Status: COMPLETED | OUTPATIENT
Start: 2024-04-11 | End: 2024-04-11

## 2024-04-11 RX ORDER — MECLIZINE HYDROCHLORIDE 25 MG/1
12.5 TABLET ORAL 3 TIMES DAILY PRN
Status: CANCELLED | OUTPATIENT
Start: 2024-04-11

## 2024-04-11 RX ORDER — BIVALIRUDIN 250 MG/5ML
INJECTION, POWDER, LYOPHILIZED, FOR SOLUTION INTRAVENOUS PRN
Status: DISCONTINUED | OUTPATIENT
Start: 2024-04-11 | End: 2024-04-11 | Stop reason: HOSPADM

## 2024-04-11 RX ORDER — ONDANSETRON 2 MG/ML
4 INJECTION INTRAMUSCULAR; INTRAVENOUS EVERY 6 HOURS PRN
Status: DISCONTINUED | OUTPATIENT
Start: 2024-04-11 | End: 2024-04-14 | Stop reason: HOSPADM

## 2024-04-11 RX ORDER — POTASSIUM CHLORIDE 7.45 MG/ML
10 INJECTION INTRAVENOUS PRN
Status: DISCONTINUED | OUTPATIENT
Start: 2024-04-11 | End: 2024-04-11 | Stop reason: SDUPTHER

## 2024-04-11 RX ORDER — MIDODRINE HYDROCHLORIDE 2.5 MG/1
2.5 TABLET ORAL 3 TIMES DAILY PRN
Status: DISCONTINUED | OUTPATIENT
Start: 2024-04-11 | End: 2024-04-11 | Stop reason: SDUPTHER

## 2024-04-11 RX ORDER — POLYETHYLENE GLYCOL 3350 17 G/17G
17 POWDER, FOR SOLUTION ORAL DAILY PRN
Status: DISCONTINUED | OUTPATIENT
Start: 2024-04-11 | End: 2024-04-11

## 2024-04-11 RX ORDER — ACETAMINOPHEN 325 MG/1
650 TABLET ORAL EVERY 6 HOURS PRN
Status: DISCONTINUED | OUTPATIENT
Start: 2024-04-11 | End: 2024-04-11

## 2024-04-11 RX ORDER — HEPARIN SODIUM 1000 [USP'U]/ML
2000 INJECTION, SOLUTION INTRAVENOUS; SUBCUTANEOUS PRN
Status: DISCONTINUED | OUTPATIENT
Start: 2024-04-11 | End: 2024-04-11

## 2024-04-11 RX ORDER — LEVOTHYROXINE SODIUM 0.07 MG/1
75 TABLET ORAL DAILY
Status: DISCONTINUED | OUTPATIENT
Start: 2024-04-11 | End: 2024-04-11 | Stop reason: SDUPTHER

## 2024-04-11 RX ORDER — HEPARIN SODIUM 1000 [USP'U]/ML
4000 INJECTION, SOLUTION INTRAVENOUS; SUBCUTANEOUS PRN
Status: DISCONTINUED | OUTPATIENT
Start: 2024-04-11 | End: 2024-04-11

## 2024-04-11 RX ORDER — ONDANSETRON 2 MG/ML
4 INJECTION INTRAMUSCULAR; INTRAVENOUS EVERY 6 HOURS PRN
Status: CANCELLED | OUTPATIENT
Start: 2024-04-11

## 2024-04-11 RX ORDER — LANOLIN ALCOHOL/MO/W.PET/CERES
3 CREAM (GRAM) TOPICAL NIGHTLY PRN
Status: CANCELLED | OUTPATIENT
Start: 2024-04-11

## 2024-04-11 RX ORDER — MECLIZINE HCL 12.5 MG/1
25 TABLET ORAL 3 TIMES DAILY PRN
Status: DISCONTINUED | OUTPATIENT
Start: 2024-04-11 | End: 2024-04-14 | Stop reason: HOSPADM

## 2024-04-11 RX ORDER — PREGABALIN 150 MG/1
150 CAPSULE ORAL 2 TIMES DAILY
Status: CANCELLED | OUTPATIENT
Start: 2024-04-11

## 2024-04-11 RX ORDER — ENOXAPARIN SODIUM 100 MG/ML
40 INJECTION SUBCUTANEOUS DAILY
Status: DISCONTINUED | OUTPATIENT
Start: 2024-04-12 | End: 2024-04-14 | Stop reason: HOSPADM

## 2024-04-11 RX ORDER — LANOLIN ALCOHOL/MO/W.PET/CERES
1000 CREAM (GRAM) TOPICAL DAILY
Status: CANCELLED | OUTPATIENT
Start: 2024-04-11

## 2024-04-11 RX ORDER — POTASSIUM CHLORIDE 20 MEQ/1
40 TABLET, EXTENDED RELEASE ORAL PRN
Status: DISCONTINUED | OUTPATIENT
Start: 2024-04-11 | End: 2024-04-11

## 2024-04-11 RX ORDER — LEVOFLOXACIN 5 MG/ML
750 INJECTION, SOLUTION INTRAVENOUS EVERY 24 HOURS
Status: DISCONTINUED | OUTPATIENT
Start: 2024-04-11 | End: 2024-04-12

## 2024-04-11 RX ORDER — HEPARIN SODIUM 1000 [USP'U]/ML
2000 INJECTION, SOLUTION INTRAVENOUS; SUBCUTANEOUS PRN
Status: CANCELLED | OUTPATIENT
Start: 2024-04-11

## 2024-04-11 RX ORDER — PREGABALIN 75 MG/1
150 CAPSULE ORAL 2 TIMES DAILY
Status: DISCONTINUED | OUTPATIENT
Start: 2024-04-11 | End: 2024-04-11 | Stop reason: SDUPTHER

## 2024-04-11 RX ORDER — ACETAMINOPHEN 325 MG/1
650 TABLET ORAL EVERY 6 HOURS PRN
Status: DISCONTINUED | OUTPATIENT
Start: 2024-04-11 | End: 2024-04-14 | Stop reason: HOSPADM

## 2024-04-11 RX ORDER — HEPARIN SODIUM 1000 [USP'U]/ML
4000 INJECTION, SOLUTION INTRAVENOUS; SUBCUTANEOUS PRN
Status: CANCELLED | OUTPATIENT
Start: 2024-04-11

## 2024-04-11 RX ORDER — NITROGLYCERIN 20 MG/100ML
5-200 INJECTION INTRAVENOUS CONTINUOUS
Status: CANCELLED | OUTPATIENT
Start: 2024-04-11

## 2024-04-11 RX ORDER — POTASSIUM CHLORIDE 7.45 MG/ML
10 INJECTION INTRAVENOUS PRN
Status: CANCELLED | OUTPATIENT
Start: 2024-04-11

## 2024-04-11 RX ORDER — FENTANYL CITRATE 50 UG/ML
INJECTION, SOLUTION INTRAMUSCULAR; INTRAVENOUS PRN
Status: DISCONTINUED | OUTPATIENT
Start: 2024-04-11 | End: 2024-04-11 | Stop reason: HOSPADM

## 2024-04-11 RX ORDER — POTASSIUM CHLORIDE 20 MEQ/1
40 TABLET, EXTENDED RELEASE ORAL PRN
Status: CANCELLED | OUTPATIENT
Start: 2024-04-11

## 2024-04-11 RX ORDER — LEVOTHYROXINE SODIUM 0.07 MG/1
75 TABLET ORAL DAILY
Status: DISCONTINUED | OUTPATIENT
Start: 2024-04-12 | End: 2024-04-14 | Stop reason: HOSPADM

## 2024-04-11 RX ORDER — ONDANSETRON 4 MG/1
4 TABLET, ORALLY DISINTEGRATING ORAL EVERY 8 HOURS PRN
Status: DISCONTINUED | OUTPATIENT
Start: 2024-04-11 | End: 2024-04-11 | Stop reason: SDUPTHER

## 2024-04-11 RX ORDER — RANOLAZINE 500 MG/1
1000 TABLET, EXTENDED RELEASE ORAL 2 TIMES DAILY
Status: CANCELLED | OUTPATIENT
Start: 2024-04-11

## 2024-04-11 RX ORDER — POTASSIUM CHLORIDE 750 MG/1
10 CAPSULE, EXTENDED RELEASE ORAL EVERY MORNING
Status: DISCONTINUED | OUTPATIENT
Start: 2024-04-11 | End: 2024-04-14 | Stop reason: HOSPADM

## 2024-04-11 RX ORDER — SODIUM CHLORIDE 9 MG/ML
INJECTION, SOLUTION INTRAVENOUS CONTINUOUS
Status: DISCONTINUED | OUTPATIENT
Start: 2024-04-11 | End: 2024-04-11 | Stop reason: SDUPTHER

## 2024-04-11 RX ORDER — ASPIRIN 81 MG/1
81 TABLET, CHEWABLE ORAL DAILY
Status: DISCONTINUED | OUTPATIENT
Start: 2024-04-11 | End: 2024-04-14 | Stop reason: HOSPADM

## 2024-04-11 RX ORDER — CHOLECALCIFEROL (VITAMIN D3) 125 MCG
1000 CAPSULE ORAL DAILY
Status: DISCONTINUED | OUTPATIENT
Start: 2024-04-11 | End: 2024-04-11 | Stop reason: SDUPTHER

## 2024-04-11 RX ORDER — MIDAZOLAM HYDROCHLORIDE 1 MG/ML
INJECTION INTRAMUSCULAR; INTRAVENOUS PRN
Status: DISCONTINUED | OUTPATIENT
Start: 2024-04-11 | End: 2024-04-11 | Stop reason: HOSPADM

## 2024-04-11 RX ORDER — ASPIRIN 81 MG/1
81 TABLET ORAL DAILY
Status: DISCONTINUED | OUTPATIENT
Start: 2024-04-11 | End: 2024-04-11 | Stop reason: SDUPTHER

## 2024-04-11 RX ORDER — MECLIZINE HCL 12.5 MG/1
12.5 TABLET ORAL 3 TIMES DAILY PRN
Status: DISCONTINUED | OUTPATIENT
Start: 2024-04-11 | End: 2024-04-11

## 2024-04-11 RX ORDER — MORPHINE SULFATE 4 MG/ML
4 INJECTION, SOLUTION INTRAMUSCULAR; INTRAVENOUS EVERY 4 HOURS PRN
Status: CANCELLED | OUTPATIENT
Start: 2024-04-11

## 2024-04-11 RX ORDER — SODIUM CHLORIDE 0.9 % (FLUSH) 0.9 %
5-40 SYRINGE (ML) INJECTION EVERY 12 HOURS SCHEDULED
Status: CANCELLED | OUTPATIENT
Start: 2024-04-11

## 2024-04-11 RX ORDER — POTASSIUM CHLORIDE 750 MG/1
10 CAPSULE, EXTENDED RELEASE ORAL EVERY MORNING
Status: CANCELLED | OUTPATIENT
Start: 2024-04-11

## 2024-04-11 RX ORDER — ACETAMINOPHEN 325 MG/1
650 TABLET ORAL EVERY 6 HOURS PRN
Status: CANCELLED | OUTPATIENT
Start: 2024-04-11

## 2024-04-11 RX ORDER — ONDANSETRON 4 MG/1
4 TABLET, ORALLY DISINTEGRATING ORAL EVERY 8 HOURS PRN
Status: CANCELLED | OUTPATIENT
Start: 2024-04-11

## 2024-04-11 RX ADMIN — TRAMADOL HYDROCHLORIDE 50 MG: 50 TABLET, COATED ORAL at 22:23

## 2024-04-11 RX ADMIN — CARBIDOPA AND LEVODOPA 1 TABLET: 25; 100 TABLET ORAL at 19:52

## 2024-04-11 RX ADMIN — RANOLAZINE 1000 MG: 500 TABLET, FILM COATED, EXTENDED RELEASE ORAL at 19:52

## 2024-04-11 RX ADMIN — METOPROLOL TARTRATE 25 MG: 25 TABLET, FILM COATED ORAL at 18:31

## 2024-04-11 RX ADMIN — PREGABALIN 150 MG: 75 CAPSULE ORAL at 19:52

## 2024-04-11 RX ADMIN — TRAMADOL HYDROCHLORIDE 50 MG: 50 TABLET, COATED ORAL at 18:33

## 2024-04-11 RX ADMIN — Medication 1000 MCG: at 18:31

## 2024-04-11 RX ADMIN — RANOLAZINE 1000 MG: 500 TABLET, FILM COATED, EXTENDED RELEASE ORAL at 18:31

## 2024-04-11 RX ADMIN — MORPHINE SULFATE 1 MG: 2 INJECTION, SOLUTION INTRAMUSCULAR; INTRAVENOUS at 12:41

## 2024-04-11 RX ADMIN — SODIUM CHLORIDE: 9 INJECTION, SOLUTION INTRAVENOUS at 08:20

## 2024-04-11 RX ADMIN — HEPARIN SODIUM 14.01 UNITS/KG/HR: 10000 INJECTION, SOLUTION INTRAVENOUS at 00:14

## 2024-04-11 RX ADMIN — MORPHINE SULFATE 1 MG: 2 INJECTION, SOLUTION INTRAMUSCULAR; INTRAVENOUS at 19:53

## 2024-04-11 RX ADMIN — SODIUM CHLORIDE, PRESERVATIVE FREE 10 ML: 5 INJECTION INTRAVENOUS at 19:53

## 2024-04-11 RX ADMIN — TICAGRELOR 90 MG: 90 TABLET ORAL at 19:52

## 2024-04-11 RX ADMIN — CARBIDOPA AND LEVODOPA 1 TABLET: 25; 100 TABLET ORAL at 22:23

## 2024-04-11 RX ADMIN — LEVOFLOXACIN 750 MG: 5 INJECTION, SOLUTION INTRAVENOUS at 20:01

## 2024-04-11 RX ADMIN — DOCUSATE SODIUM LIQUID 50 MG: 100 LIQUID ORAL at 19:52

## 2024-04-11 RX ADMIN — MORPHINE SULFATE 4 MG: 4 INJECTION, SOLUTION INTRAMUSCULAR; INTRAVENOUS at 00:14

## 2024-04-11 RX ADMIN — CARBIDOPA AND LEVODOPA 1 TABLET: 25; 100 TABLET ORAL at 18:32

## 2024-04-11 RX ADMIN — POTASSIUM CHLORIDE 10 MEQ: 10 CAPSULE, COATED, EXTENDED RELEASE ORAL at 18:31

## 2024-04-11 RX ADMIN — NITROGLYCERIN 30 MCG/MIN: 20 INJECTION INTRAVENOUS at 22:27

## 2024-04-11 ASSESSMENT — PAIN SCALES - GENERAL
PAINLEVEL_OUTOF10: 7
PAINLEVEL_OUTOF10: 10
PAINLEVEL_OUTOF10: 10
PAINLEVEL_OUTOF10: 4
PAINLEVEL_OUTOF10: 10
PAINLEVEL_OUTOF10: 7
PAINLEVEL_OUTOF10: 10
PAINLEVEL_OUTOF10: 7
PAINLEVEL_OUTOF10: 3
PAINLEVEL_OUTOF10: 10

## 2024-04-11 ASSESSMENT — PAIN DESCRIPTION - DESCRIPTORS
DESCRIPTORS: ACHING;TIGHTNESS
DESCRIPTORS: ACHING
DESCRIPTORS: ACHING;TIGHTNESS
DESCRIPTORS: ACHING
DESCRIPTORS: TIGHTNESS;ACHING
DESCRIPTORS: ACHING

## 2024-04-11 ASSESSMENT — PAIN DESCRIPTION - ORIENTATION
ORIENTATION: LEFT

## 2024-04-11 ASSESSMENT — PAIN DESCRIPTION - LOCATION
LOCATION: CHEST;BACK
LOCATION: CHEST

## 2024-04-11 ASSESSMENT — PAIN - FUNCTIONAL ASSESSMENT
PAIN_FUNCTIONAL_ASSESSMENT: PREVENTS OR INTERFERES SOME ACTIVE ACTIVITIES AND ADLS
PAIN_FUNCTIONAL_ASSESSMENT: PREVENTS OR INTERFERES SOME ACTIVE ACTIVITIES AND ADLS

## 2024-04-11 ASSESSMENT — PAIN DESCRIPTION - PAIN TYPE
TYPE: ACUTE PAIN;CHRONIC PAIN

## 2024-04-11 NOTE — PROCEDURES
orders  Medical treatments.  Risk factors modifications.        Electronically signed by Andree Paulino MD on 4/11/2024 at 10:17 AM      [facility]  [phone]

## 2024-04-11 NOTE — PLAN OF CARE
Problem: Discharge Planning  Goal: Discharge to home or other facility with appropriate resources  Outcome: Progressing  Flowsheets (Taken 4/11/2024 0529)  Discharge to home or other facility with appropriate resources:   Identify barriers to discharge with patient and caregiver   Identify discharge learning needs (meds, wound care, etc)   Refer to discharge planning if patient needs post-hospital services based on physician order or complex needs related to functional status, cognitive ability or social support system     Problem: Pain  Goal: Verbalizes/displays adequate comfort level or baseline comfort level  Outcome: Progressing  Flowsheets (Taken 4/10/2024 0621 by Haroldo Dickey, RN)  Verbalizes/displays adequate comfort level or baseline comfort level:   Encourage patient to monitor pain and request assistance   Assess pain using appropriate pain scale   Administer analgesics based on type and severity of pain and evaluate response     Problem: Safety - Adult  Goal: Free from fall injury  Outcome: Progressing  Flowsheets (Taken 4/11/2024 0529)  Free From Fall Injury: Instruct family/caregiver on patient safety     Problem: Cardiovascular - Adult  Goal: Maintains optimal cardiac output and hemodynamic stability  Outcome: Progressing  Flowsheets (Taken 4/11/2024 0529)  Maintains optimal cardiac output and hemodynamic stability:   Monitor blood pressure and heart rate   Assess for signs of decreased cardiac output   Administer vasoactive medications as ordered   Monitor urine output and notify Licensed Independent Practitioner for values outside of normal range   Administer fluid and/or volume expanders as ordered  Goal: Absence of cardiac dysrhythmias or at baseline  Outcome: Progressing  Flowsheets (Taken 4/11/2024 0529)  Absence of cardiac dysrhythmias or at baseline:   Monitor cardiac rate and rhythm   Administer antiarrhythmia medication and electrolyte replacement as ordered   Assess for signs of

## 2024-04-11 NOTE — H&P
[facility]  Procedure History and Physical Update          Patient Name: Rowdy Marie  MRN:    [id number]  YOB: 1950  Date of evaluation:  4/11/2024    Procedure:    LHC +/- PCI    Indication for procedure:  NSTEMI    Please refer to the note completed by Dr. Gu on 4/10/2023 in the medical record and note that:    [x] I have examined the patient and reviewed the H&P/Consult and there are no changes to be made to the assessment or plan.    [] I have examined the patient and reviewed the H&P/Consult and have noted the following changes:    Past Medical History:   Diagnosis Date    A-fib ([facility])     PREET (acute kidney injury) ([facility])     Anemia     CAD (coronary artery disease)     Esophageal stricture     Hyperlipidemia     Hypokalemia     Hypomagnesemia     NSTEMI (non-ST elevated myocardial infarction) ([facility])     Orthostatic hypotension     Parkinsons ([facility])     Presence of Watchman left atrial appendage closure device     Prostate CA ([facility])        Past Surgical History:   Procedure Laterality Date    CARDIAC CATHETERIZATION      CORONARY ANGIOPLASTY WITH STENT PLACEMENT      8 stents prior to CABG per patient, most recent 12/2023    CORONARY ARTERY BYPASS GRAFT      2014    DIAGNOSTIC CARDIAC CATH LAB PROCEDURE  04/11/2024       History reviewed. No pertinent family history.    Allergies   Allergen Reactions    Latex     Atorvastatin     Fluvastatin     Imdur [Isosorbide Nitrate]     Isosorbide Headaches     Patient denies having any allergy to this medication    Methadone     Metoprolol     Nalbuphine     Oxycodone     Penicillins     Rosuvastatin     Statins     Zetia [Ezetimibe]        Prior to Admission medications    Medication Sig Start Date End Date Taking? Authorizing Provider   meclizine (ANTIVERT) 12.5 MG tablet Take 2 tablets by mouth 3 times daily as needed for Dizziness 4/10/24   Yandy Jones MD   fludrocortisone (FLORINEF) 0.1 MG tablet Take 1 tablet by mouth daily 
lymphadenopathy.  Musculoskeletal: Normal curvature of the spine.  No gross muscle weakness.    Extremities:  No lower extremity edema, ulcerations, tenderness, varicosities or erythema.  Muscle size, tone and strength are normal.  No involuntary movements are noted.    Skin:  Warm and dry.  Good color, turgor and pigmentation. No lesions or scars.  No cyanosis or clubbing  Neurological/Psychiatric: The patient's general behavior, level of consciousness, thought content and emotional status is normal.          INVESTIGATIONS     Diagnostic Labs:  CBC:   Recent Labs     04/09/24  2145 04/10/24  0502 04/11/24  0418   WBC 7.3 7.8 8.9   RBC 3.83* 3.58* 3.24*   HGB 11.5* 10.8* 9.8*   HCT 35.5* 33.3* 30.2*   MCV 92.8 93.2 93.3   RDW 13.9 13.9 14.1    204 212     BMP:   Recent Labs     04/09/24  2145 04/10/24  0502 04/11/24  0418    139 135   K 3.8 3.6* 4.3   CL 99 102 102   CO2 25 25 24   PHOS 3.1  --   --    BUN 23 23 17   CREATININE 1.3* 1.1 1.1     BNP: No results for input(s): \"BNP\" in the last 72 hours.  PT/INR:   Recent Labs     04/09/24 2145   PROTIME 13.9   INR 1.0     APTT:   Recent Labs     04/09/24 2145   APTT 35.7     CARDIAC ENZYMES: No results for input(s): \"CKMB\", \"CKMBINDEX\", \"TROPONINI\" in the last 72 hours.    Invalid input(s): \"CKTOTAL;3\"  FASTING LIPID PANEL:  Lab Results   Component Value Date    HDL 35 (L) 01/06/2024     LIVER PROFILE: No results for input(s): \"AST\", \"ALT\", \"ALB\", \"BILIDIR\", \"BILITOT\", \"ALKPHOS\" in the last 72 hours.   MICROBIOLOGY: No results found for: \"CULTURE\"    Imaging:   CT CHEST PULMONARY EMBOLISM W CONTRAST    Result Date: 4/10/2024  1. No evidence of pulmonary embolism. 2. Patchy bilateral peripheral predominant ground-glass opacities, which are nonspecific, but can be seen in the setting of atypical/viral pneumonia. 3. Mild cardiomegaly. 4. Enlarged bilateral hilar lymph nodes, which are nonspecific and may be reactive.     XR CHEST PORTABLE    Result Date:

## 2024-04-11 NOTE — DISCHARGE INSTRUCTIONS
medical condition or this instruction, always ask your healthcare professional. [employer] disclaims any warranty or liability for your use of this information.  Content Version: 10.6.559381; Current as of: February 20, 2015      Discharge Instructions      SEDATION / ANALGESIA INFORMATION / HOME GOING ADVICE  You have received the sedation/analgesia medication during your visit    Sedation/analgesia is used during short medical procedures under controlled supervision. The medication will produce a strong relaxation. You will be able to hear, speak and follow instructions, but your memory and alertness will be decreased.    You will be able to swallow and breathe on your own. During sedation/analgesia your blood pressure, heart and breathing will be watched closely. After the procedure, you may not remember what was said or done.    You may have the following effects from the medication.  \" Drowsiness, dizziness, sleepiness or confusion.  \" Difficulty remembering or delayed reaction times.  \" Loss of fine muscle control or difficulty with your balance especially while walking.  \" Difficulty focusing or blurred vision.  You may not be aware of slight changes in your behavior and/or your reaction time because of the medication used during the procedure. Therefore you should follow these instructions.  \" Have someone responsible help you with your care.  \" Do not drive for 24 hours.  \" Do not operate equipment for 24 hours (lawnmowers, power tools, kitchen accessories, stove).  \" Do not drink any alcoholic beverages for a minimum of 24 hours.  \" Do not make important personal, legal or business decisions for 24 hours.  \" You may experience dizziness or lightheadedness. Move slowly and carefully, do not make sudden position changes.  \" Drink extra amounts of fluids today.  \" Increase your diet as tolerated (unless you have received specific instructions from your doctor).  \" If you feel nauseated, continue

## 2024-04-11 NOTE — CONSULTS
Date:   4/10/2024  Patient name: Rowdy Marie  Date of admission:  4/9/2024  9:38 PM  MRN:   [id number]  YOB: 1950  PCP: Yandy Jones MD    Reason for Admission: Chest pain [R07.9]  NSTEMI (non-ST elevated myocardial infarction) (HCC) [I21.4]  Chest pain, unspecified type [R07.9]    Cardiology consult: Chest pain, elevated high-sensitivity troponin, history of CAD, CABG       Referring physician: Dr Ko Elias    Impression    Admission 4/10/2024 for left chest pain radiating to left shoulder aggravating with the inspiration and shoulder movements almost constant in nature for more than 2 days  ECG showed no ischemic changes high-sensitivity troponin trending upward     Admission 1/31/2024 severe hypotension blood pressure 80 mmHg, dizziness  Episode of sharp chest pain no shortness of breath no diaphoresis ECG no ischemic changes on admission, normal high-sensitivity troponin 15, 13, 15  Paroxysmal atrial fibrillation Watchman procedure 11/14/2023, current rhythm sinus rhythm  Ejection fraction 60 to 65%  Coronary artery disease, CABG times 4/2014, stent placement times 5 December 2022  LIMA to LAD, SVG to OM 3, ramus, RCA patent grafts cardiac cath December 2019  History of stent placement  Hypothyroidism  Hyperlipidemia  History of prostate cancer  History of anemia  History of gastritis, esophageal stricture  Parkinson disease on Sinemet  Back pain, back surgery  Episodes of dizziness, combination of previous C-spine surgery, postural hypotension/autonomic dysfunction patient has Parkinson disease on medication, dizziness aggravates by head and neck movements  Normal B12 1970, serum iron 57, iron saturation 21, folate more than 20    History of present illness     73-year-old male with past medical history of multivessel coronary artery disease, CABG x 4, multiple stents, paroxysmal A-fib, status post Watchman procedure, Parkinson disease, hypothyroidism got hospitalized on 4/9/2024 with 
  Abdomen:  No masses or tenderness  Bowel sounds present  Extremities:   No Cyanosis or Clubbing   Lower extremity edema: None   Skin: Warm and dry  Neurological:  Alert and oriented.  Moves all extremities well  No abnormalities of mood, affect, memory, mentation, or behavior are noted    DATA:    Diagnostics:      EKG: Sinus rhythm, 61 bpm; non-specific T wave abnormality      CARDIAC CATHETERIZATION ( 12/19/22)  Impression:   1. Non ST-elevation MI   2. Severe native coronary disease   3. Patent LIMA graft to the LAD, patent SVG to the distal RCA.    4. Severe ostial disease of SVG to the OM.  Successful drug-eluting stent   x2 of the graft   5. Severe disease of the SVG to the diagonal.  Successful drug-eluting   stent x3 of the graft         ALKA ( 2/ 16/ 24)      Left Ventricle: Systolic function is normal with an ejection fraction   of 55-60%.     Left Atrium: Left atrium is mildly dilated. Color Doppler reveals   evidence of an iatrogenic ASD. There is a 27mm Watchman device in the left   atrial appendage. The device appears to be of appropriate position and   adequate compression. There is a trivial leak noted (2.6 mm). No thrombus   visualized.    Mitral Valve: There is mild to moderate regurgitation.     Left Ventricle   Left ventricle appears normal in size. Wall thickness is normal. Systolic function is normal with an ejection fraction of 55-60%.     Right Ventricle   Right ventricular size appears normal. Systolic function is normal.     Left Atrium   Left atrium is mildly dilated. Color Doppler reveals evidence of an iatrogenic ASD. There is a 27mm Watchman device in the left atrial appendage. The device appears to be of appropriate position and adequate compression. There is a trivial leak noted. No thrombus visualized.     Right Atrium   Right atrium is normal in size.     Mitral Valve   The leaflets are mildly thickened. There is mild to moderate regurgitation. There is no evidence of mitral valve

## 2024-04-12 LAB
ANION GAP SERPL CALCULATED.3IONS-SCNC: 12 MMOL/L (ref 9–16)
B PARAP IS1001 DNA NPH QL NAA+NON-PROBE: NOT DETECTED
B PERT DNA SPEC QL NAA+PROBE: NOT DETECTED
BASOPHILS # BLD: 0.08 K/UL (ref 0–0.2)
BASOPHILS NFR BLD: 1 % (ref 0–2)
BODY TEMPERATURE: 37
BUN SERPL-MCNC: 16 MG/DL (ref 8–23)
C PNEUM DNA NPH QL NAA+NON-PROBE: NOT DETECTED
CALCIUM SERPL-MCNC: 9 MG/DL (ref 8.6–10.4)
CHLORIDE SERPL-SCNC: 100 MMOL/L (ref 98–107)
CHOLEST SERPL-MCNC: 255 MG/DL (ref 0–199)
CHOLESTEROL/HDL RATIO: 6
CO2 SERPL-SCNC: 22 MMOL/L (ref 20–31)
COHGB MFR BLD: 1.3 % (ref 0–5)
CREAT SERPL-MCNC: 1.1 MG/DL (ref 0.7–1.2)
EKG ATRIAL RATE: 61 BPM
EKG ATRIAL RATE: 65 BPM
EKG ATRIAL RATE: 70 BPM
EKG ATRIAL RATE: 71 BPM
EKG P AXIS: 52 DEGREES
EKG P AXIS: 53 DEGREES
EKG P AXIS: 59 DEGREES
EKG P AXIS: 59 DEGREES
EKG P-R INTERVAL: 138 MS
EKG P-R INTERVAL: 148 MS
EKG P-R INTERVAL: 148 MS
EKG P-R INTERVAL: 156 MS
EKG Q-T INTERVAL: 400 MS
EKG Q-T INTERVAL: 420 MS
EKG Q-T INTERVAL: 428 MS
EKG Q-T INTERVAL: 452 MS
EKG QRS DURATION: 80 MS
EKG QRS DURATION: 88 MS
EKG QTC CALCULATION (BAZETT): 430 MS
EKG QTC CALCULATION (BAZETT): 432 MS
EKG QTC CALCULATION (BAZETT): 456 MS
EKG QTC CALCULATION (BAZETT): 470 MS
EKG R AXIS: -16 DEGREES
EKG R AXIS: -2 DEGREES
EKG R AXIS: -9 DEGREES
EKG R AXIS: 7 DEGREES
EKG T AXIS: 64 DEGREES
EKG T AXIS: 74 DEGREES
EKG T AXIS: 82 DEGREES
EKG T AXIS: 86 DEGREES
EKG VENTRICULAR RATE: 61 BPM
EKG VENTRICULAR RATE: 65 BPM
EKG VENTRICULAR RATE: 70 BPM
EKG VENTRICULAR RATE: 71 BPM
EOSINOPHIL # BLD: 0.25 K/UL (ref 0–0.44)
EOSINOPHILS RELATIVE PERCENT: 2 % (ref 1–4)
ERYTHROCYTE [DISTWIDTH] IN BLOOD BY AUTOMATED COUNT: 13.3 % (ref 11.8–14.4)
FIO2 ON VENT: ABNORMAL %
FLUAV RNA NPH QL NAA+NON-PROBE: NOT DETECTED
FLUBV RNA NPH QL NAA+NON-PROBE: NOT DETECTED
GFR SERPL CREATININE-BSD FRML MDRD: 69 ML/MIN/1.73M2
GLUCOSE SERPL-MCNC: 93 MG/DL (ref 74–99)
HADV DNA NPH QL NAA+NON-PROBE: NOT DETECTED
HCO3 VENOUS: 26.8 MMOL/L (ref 24–30)
HCOV 229E RNA NPH QL NAA+NON-PROBE: NOT DETECTED
HCOV HKU1 RNA NPH QL NAA+NON-PROBE: NOT DETECTED
HCOV NL63 RNA NPH QL NAA+NON-PROBE: NOT DETECTED
HCOV OC43 RNA NPH QL NAA+NON-PROBE: NOT DETECTED
HCT VFR BLD AUTO: 36.4 % (ref 40.7–50.3)
HDLC SERPL-MCNC: 42 MG/DL
HGB BLD-MCNC: 11.1 G/DL (ref 13–17)
HMPV RNA NPH QL NAA+NON-PROBE: NOT DETECTED
HPIV1 RNA NPH QL NAA+NON-PROBE: NOT DETECTED
HPIV2 RNA NPH QL NAA+NON-PROBE: NOT DETECTED
HPIV3 RNA NPH QL NAA+NON-PROBE: NOT DETECTED
HPIV4 RNA NPH QL NAA+NON-PROBE: NOT DETECTED
IMM GRANULOCYTES # BLD AUTO: 0.06 K/UL (ref 0–0.3)
IMM GRANULOCYTES NFR BLD: 1 %
L PNEUMO1 AG UR QL IA.RAPID: NEGATIVE
LDLC SERPL CALC-MCNC: 185 MG/DL (ref 0–100)
LYMPHOCYTES NFR BLD: 1.18 K/UL (ref 1.1–3.7)
LYMPHOCYTES RELATIVE PERCENT: 10 % (ref 24–43)
M PNEUMO DNA NPH QL NAA+NON-PROBE: NOT DETECTED
M PNEUMO IGM SER QL IA: 0.14
MCH RBC QN AUTO: 30.5 PG (ref 25.2–33.5)
MCHC RBC AUTO-ENTMCNC: 30.5 G/DL (ref 28.4–34.8)
MCV RBC AUTO: 100 FL (ref 82.6–102.9)
MONOCYTES NFR BLD: 0.9 K/UL (ref 0.1–1.2)
MONOCYTES NFR BLD: 8 % (ref 3–12)
NEUTROPHILS NFR BLD: 78 % (ref 36–65)
NEUTS SEG NFR BLD: 9.09 K/UL (ref 1.5–8.1)
NRBC BLD-RTO: 0 PER 100 WBC
O2 SAT, VEN: 74.4 % (ref 60–85)
PCO2, VEN: 39.1 MM HG (ref 39–55)
PH VENOUS: 7.45 (ref 7.32–7.42)
PLATELET # BLD AUTO: 219 K/UL (ref 138–453)
PMV BLD AUTO: 12.1 FL (ref 8.1–13.5)
PO2, VEN: 40.6 MM HG (ref 30–50)
POSITIVE BASE EXCESS, VEN: 3.2 MMOL/L (ref 0–2)
POTASSIUM SERPL-SCNC: 4.1 MMOL/L (ref 3.7–5.3)
RBC # BLD AUTO: 3.64 M/UL (ref 4.21–5.77)
RSV RNA NPH QL NAA+NON-PROBE: NOT DETECTED
RV+EV RNA NPH QL NAA+NON-PROBE: NOT DETECTED
S PNEUM AG SPEC QL: NEGATIVE
SARS-COV-2 RNA NPH QL NAA+NON-PROBE: NOT DETECTED
SODIUM SERPL-SCNC: 134 MMOL/L (ref 136–145)
SPECIMEN DESCRIPTION: NORMAL
SPECIMEN SOURCE: NORMAL
TRIGL SERPL-MCNC: 137 MG/DL
TROPONIN I SERPL HS-MCNC: 260 NG/L (ref 0–22)
VAS RIGHT CFA PROX PSV: 168 CM/S
VAS RIGHT CFA VEL RATIO: 1
VAS RIGHT EXT ILIAC DIST PSV: 163 CM/S
VAS RIGHT SFA PROX PSV: 89.1 CM/S
VAS RIGHT SFA PROX VEL RATIO: 0.5
VLDLC SERPL CALC-MCNC: 27 MG/DL
WBC OTHER # BLD: 11.6 K/UL (ref 3.5–11.3)

## 2024-04-12 PROCEDURE — 6370000000 HC RX 637 (ALT 250 FOR IP)

## 2024-04-12 PROCEDURE — 93010 ELECTROCARDIOGRAM REPORT: CPT | Performed by: INTERNAL MEDICINE

## 2024-04-12 PROCEDURE — 93005 ELECTROCARDIOGRAM TRACING: CPT | Performed by: INTERNAL MEDICINE

## 2024-04-12 PROCEDURE — 2580000003 HC RX 258

## 2024-04-12 PROCEDURE — 80061 LIPID PANEL: CPT

## 2024-04-12 PROCEDURE — 6370000000 HC RX 637 (ALT 250 FOR IP): Performed by: NURSE PRACTITIONER

## 2024-04-12 PROCEDURE — 84484 ASSAY OF TROPONIN QUANT: CPT

## 2024-04-12 PROCEDURE — 6360000002 HC RX W HCPCS

## 2024-04-12 PROCEDURE — 93926 LOWER EXTREMITY STUDY: CPT | Performed by: STUDENT IN AN ORGANIZED HEALTH CARE EDUCATION/TRAINING PROGRAM

## 2024-04-12 PROCEDURE — 2060000000 HC ICU INTERMEDIATE R&B

## 2024-04-12 PROCEDURE — 6360000002 HC RX W HCPCS: Performed by: STUDENT IN AN ORGANIZED HEALTH CARE EDUCATION/TRAINING PROGRAM

## 2024-04-12 PROCEDURE — 82805 BLOOD GASES W/O2 SATURATION: CPT

## 2024-04-12 PROCEDURE — 99233 SBSQ HOSP IP/OBS HIGH 50: CPT | Performed by: INTERNAL MEDICINE

## 2024-04-12 PROCEDURE — 36415 COLL VENOUS BLD VENIPUNCTURE: CPT

## 2024-04-12 PROCEDURE — 6370000000 HC RX 637 (ALT 250 FOR IP): Performed by: STUDENT IN AN ORGANIZED HEALTH CARE EDUCATION/TRAINING PROGRAM

## 2024-04-12 PROCEDURE — 86738 MYCOPLASMA ANTIBODY: CPT

## 2024-04-12 PROCEDURE — 99233 SBSQ HOSP IP/OBS HIGH 50: CPT | Performed by: NURSE PRACTITIONER

## 2024-04-12 PROCEDURE — 85025 COMPLETE CBC W/AUTO DIFF WBC: CPT

## 2024-04-12 PROCEDURE — 80048 BASIC METABOLIC PNL TOTAL CA: CPT

## 2024-04-12 RX ORDER — AMLODIPINE BESYLATE 5 MG/1
5 TABLET ORAL DAILY
Status: DISCONTINUED | OUTPATIENT
Start: 2024-04-12 | End: 2024-04-14 | Stop reason: HOSPADM

## 2024-04-12 RX ORDER — LEVOFLOXACIN 750 MG/1
750 TABLET, FILM COATED ORAL DAILY
Status: DISCONTINUED | OUTPATIENT
Start: 2024-04-13 | End: 2024-04-14 | Stop reason: HOSPADM

## 2024-04-12 RX ORDER — AMLODIPINE BESYLATE 5 MG/1
5 TABLET ORAL DAILY
COMMUNITY

## 2024-04-12 RX ORDER — FUROSEMIDE 20 MG/1
20 TABLET ORAL DAILY
Status: DISCONTINUED | OUTPATIENT
Start: 2024-04-12 | End: 2024-04-14

## 2024-04-12 RX ADMIN — CARBIDOPA AND LEVODOPA 1 TABLET: 25; 100 TABLET ORAL at 17:40

## 2024-04-12 RX ADMIN — TRAMADOL HYDROCHLORIDE 50 MG: 50 TABLET, COATED ORAL at 05:48

## 2024-04-12 RX ADMIN — PANTOPRAZOLE SODIUM 40 MG: 40 TABLET, DELAYED RELEASE ORAL at 08:26

## 2024-04-12 RX ADMIN — ACETAMINOPHEN 650 MG: 325 TABLET ORAL at 20:05

## 2024-04-12 RX ADMIN — METOPROLOL TARTRATE 25 MG: 25 TABLET, FILM COATED ORAL at 20:05

## 2024-04-12 RX ADMIN — PREGABALIN 150 MG: 75 CAPSULE ORAL at 22:45

## 2024-04-12 RX ADMIN — TICAGRELOR 90 MG: 90 TABLET ORAL at 20:13

## 2024-04-12 RX ADMIN — CARBIDOPA AND LEVODOPA 1 TABLET: 25; 100 TABLET ORAL at 20:08

## 2024-04-12 RX ADMIN — SODIUM CHLORIDE, PRESERVATIVE FREE 10 ML: 5 INJECTION INTRAVENOUS at 20:06

## 2024-04-12 RX ADMIN — POTASSIUM CHLORIDE 10 MEQ: 10 CAPSULE, COATED, EXTENDED RELEASE ORAL at 09:21

## 2024-04-12 RX ADMIN — SODIUM CHLORIDE, PRESERVATIVE FREE 10 ML: 5 INJECTION INTRAVENOUS at 09:22

## 2024-04-12 RX ADMIN — AMLODIPINE BESYLATE 5 MG: 5 TABLET ORAL at 10:22

## 2024-04-12 RX ADMIN — PREGABALIN 150 MG: 75 CAPSULE ORAL at 09:59

## 2024-04-12 RX ADMIN — FLUDROCORTISONE ACETATE 0.1 MG: 0.1 TABLET ORAL at 09:21

## 2024-04-12 RX ADMIN — Medication 1000 MCG: at 09:21

## 2024-04-12 RX ADMIN — TRAMADOL HYDROCHLORIDE 50 MG: 50 TABLET, COATED ORAL at 20:05

## 2024-04-12 RX ADMIN — CARBIDOPA AND LEVODOPA 1 TABLET: 25; 100 TABLET ORAL at 09:21

## 2024-04-12 RX ADMIN — LEVOTHYROXINE SODIUM 75 MCG: 75 TABLET ORAL at 08:26

## 2024-04-12 RX ADMIN — ENOXAPARIN SODIUM 40 MG: 100 INJECTION SUBCUTANEOUS at 09:21

## 2024-04-12 RX ADMIN — METOPROLOL TARTRATE 25 MG: 25 TABLET, FILM COATED ORAL at 09:21

## 2024-04-12 RX ADMIN — ASPIRIN 81 MG 81 MG: 81 TABLET ORAL at 09:21

## 2024-04-12 RX ADMIN — FUROSEMIDE 20 MG: 20 TABLET ORAL at 12:40

## 2024-04-12 RX ADMIN — CARBIDOPA AND LEVODOPA 1 TABLET: 25; 100 TABLET ORAL at 12:39

## 2024-04-12 RX ADMIN — RANOLAZINE 1000 MG: 500 TABLET, FILM COATED, EXTENDED RELEASE ORAL at 09:21

## 2024-04-12 RX ADMIN — TRAMADOL HYDROCHLORIDE 50 MG: 50 TABLET, COATED ORAL at 14:29

## 2024-04-12 RX ADMIN — RANOLAZINE 1000 MG: 500 TABLET, FILM COATED, EXTENDED RELEASE ORAL at 20:05

## 2024-04-12 RX ADMIN — DOCUSATE SODIUM LIQUID 50 MG: 100 LIQUID ORAL at 20:05

## 2024-04-12 RX ADMIN — TICAGRELOR 90 MG: 90 TABLET ORAL at 09:21

## 2024-04-12 ASSESSMENT — PAIN DESCRIPTION - DESCRIPTORS
DESCRIPTORS: ACHING
DESCRIPTORS: PRESSURE

## 2024-04-12 ASSESSMENT — PAIN SCALES - GENERAL
PAINLEVEL_OUTOF10: 4
PAINLEVEL_OUTOF10: 4
PAINLEVEL_OUTOF10: 10
PAINLEVEL_OUTOF10: 10
PAINLEVEL_OUTOF10: 4

## 2024-04-12 ASSESSMENT — PAIN DESCRIPTION - ORIENTATION
ORIENTATION: LEFT
ORIENTATION: MID;LOWER

## 2024-04-12 ASSESSMENT — PAIN - FUNCTIONAL ASSESSMENT: PAIN_FUNCTIONAL_ASSESSMENT: PREVENTS OR INTERFERES SOME ACTIVE ACTIVITIES AND ADLS

## 2024-04-12 ASSESSMENT — PAIN DESCRIPTION - LOCATION
LOCATION: CHEST
LOCATION: CHEST

## 2024-04-12 NOTE — PLAN OF CARE
Problem: Safety - Adult  Goal: Free from fall injury  4/12/2024 1743 by Vika Glaser RN  Outcome: Progressing  4/12/2024 0530 by Barbara Alicea RN  Outcome: Progressing     Problem: Pain  Goal: Verbalizes/displays adequate comfort level or baseline comfort level  4/12/2024 1743 by Vika Glaser RN  Outcome: Progressing  4/12/2024 0530 by Barbara Alicea RN  Outcome: Progressing     Problem: Discharge Planning  Goal: Discharge to home or other facility with appropriate resources  4/12/2024 1743 by Vika Glaser RN  Outcome: Progressing  4/12/2024 0530 by Barbara Alicea RN  Outcome: Progressing

## 2024-04-12 NOTE — CARE COORDINATION
Attempt to see patient for initial assessment. Patient sleeping soundly and does not arouse to calling out name. No family available in room. Will try again later as time allows.

## 2024-04-12 NOTE — PLAN OF CARE
EKG shows no acute changes.    Nitro gtt off.   Pt states that he is feeling much better.   Ok to d/c from cardiology standpoint and follow up as outpt

## 2024-04-13 ENCOUNTER — APPOINTMENT (OUTPATIENT)
Dept: GENERAL RADIOLOGY | Age: 74
DRG: 321 | End: 2024-04-13
Attending: INTERNAL MEDICINE
Payer: MEDICARE

## 2024-04-13 LAB
ANION GAP SERPL CALCULATED.3IONS-SCNC: 14 MMOL/L (ref 9–16)
BASOPHILS # BLD: 0.08 K/UL (ref 0–0.2)
BASOPHILS NFR BLD: 1 % (ref 0–2)
BUN SERPL-MCNC: 19 MG/DL (ref 8–23)
CALCIUM SERPL-MCNC: 9 MG/DL (ref 8.6–10.4)
CHLORIDE SERPL-SCNC: 98 MMOL/L (ref 98–107)
CO2 SERPL-SCNC: 23 MMOL/L (ref 20–31)
CREAT SERPL-MCNC: 1.3 MG/DL (ref 0.7–1.2)
EKG ATRIAL RATE: 65 BPM
EKG ATRIAL RATE: 71 BPM
EKG P AXIS: 57 DEGREES
EKG P AXIS: 59 DEGREES
EKG P-R INTERVAL: 144 MS
EKG P-R INTERVAL: 148 MS
EKG Q-T INTERVAL: 418 MS
EKG Q-T INTERVAL: 452 MS
EKG QRS DURATION: 82 MS
EKG QRS DURATION: 88 MS
EKG QTC CALCULATION (BAZETT): 454 MS
EKG QTC CALCULATION (BAZETT): 470 MS
EKG R AXIS: -10 DEGREES
EKG R AXIS: -9 DEGREES
EKG T AXIS: 82 DEGREES
EKG T AXIS: 82 DEGREES
EKG VENTRICULAR RATE: 65 BPM
EKG VENTRICULAR RATE: 71 BPM
EOSINOPHIL # BLD: 0.45 K/UL (ref 0–0.44)
EOSINOPHILS RELATIVE PERCENT: 4 % (ref 1–4)
ERYTHROCYTE [DISTWIDTH] IN BLOOD BY AUTOMATED COUNT: 13.4 % (ref 11.8–14.4)
GFR SERPL CREATININE-BSD FRML MDRD: 60 ML/MIN/1.73M2
GLUCOSE SERPL-MCNC: 80 MG/DL (ref 74–99)
HCT VFR BLD AUTO: 35.3 % (ref 40.7–50.3)
HGB BLD-MCNC: 10.8 G/DL (ref 13–17)
IMM GRANULOCYTES # BLD AUTO: 0.07 K/UL (ref 0–0.3)
IMM GRANULOCYTES NFR BLD: 1 %
LYMPHOCYTES NFR BLD: 1.47 K/UL (ref 1.1–3.7)
LYMPHOCYTES RELATIVE PERCENT: 14 % (ref 24–43)
MAGNESIUM SERPL-MCNC: 1.8 MG/DL (ref 1.6–2.4)
MCH RBC QN AUTO: 30.3 PG (ref 25.2–33.5)
MCHC RBC AUTO-ENTMCNC: 30.6 G/DL (ref 28.4–34.8)
MCV RBC AUTO: 98.9 FL (ref 82.6–102.9)
MONOCYTES NFR BLD: 1.15 K/UL (ref 0.1–1.2)
MONOCYTES NFR BLD: 11 % (ref 3–12)
NEUTROPHILS NFR BLD: 69 % (ref 36–65)
NEUTS SEG NFR BLD: 7.64 K/UL (ref 1.5–8.1)
NRBC BLD-RTO: 0 PER 100 WBC
PLATELET # BLD AUTO: 214 K/UL (ref 138–453)
PMV BLD AUTO: 11.9 FL (ref 8.1–13.5)
POTASSIUM SERPL-SCNC: 3.5 MMOL/L (ref 3.7–5.3)
RBC # BLD AUTO: 3.57 M/UL (ref 4.21–5.77)
SODIUM SERPL-SCNC: 135 MMOL/L (ref 136–145)
WBC OTHER # BLD: 10.9 K/UL (ref 3.5–11.3)

## 2024-04-13 PROCEDURE — 6370000000 HC RX 637 (ALT 250 FOR IP)

## 2024-04-13 PROCEDURE — 2060000000 HC ICU INTERMEDIATE R&B

## 2024-04-13 PROCEDURE — 80048 BASIC METABOLIC PNL TOTAL CA: CPT

## 2024-04-13 PROCEDURE — 6370000000 HC RX 637 (ALT 250 FOR IP): Performed by: STUDENT IN AN ORGANIZED HEALTH CARE EDUCATION/TRAINING PROGRAM

## 2024-04-13 PROCEDURE — 99233 SBSQ HOSP IP/OBS HIGH 50: CPT | Performed by: INTERNAL MEDICINE

## 2024-04-13 PROCEDURE — 93010 ELECTROCARDIOGRAM REPORT: CPT | Performed by: INTERNAL MEDICINE

## 2024-04-13 PROCEDURE — 36415 COLL VENOUS BLD VENIPUNCTURE: CPT

## 2024-04-13 PROCEDURE — 2580000003 HC RX 258

## 2024-04-13 PROCEDURE — 99233 SBSQ HOSP IP/OBS HIGH 50: CPT | Performed by: NURSE PRACTITIONER

## 2024-04-13 PROCEDURE — 85025 COMPLETE CBC W/AUTO DIFF WBC: CPT

## 2024-04-13 PROCEDURE — 83735 ASSAY OF MAGNESIUM: CPT

## 2024-04-13 PROCEDURE — 71045 X-RAY EXAM CHEST 1 VIEW: CPT

## 2024-04-13 PROCEDURE — 6370000000 HC RX 637 (ALT 250 FOR IP): Performed by: INTERNAL MEDICINE

## 2024-04-13 PROCEDURE — 6360000002 HC RX W HCPCS: Performed by: STUDENT IN AN ORGANIZED HEALTH CARE EDUCATION/TRAINING PROGRAM

## 2024-04-13 PROCEDURE — 6370000000 HC RX 637 (ALT 250 FOR IP): Performed by: NURSE PRACTITIONER

## 2024-04-13 RX ORDER — POTASSIUM CHLORIDE 20 MEQ/1
40 TABLET, EXTENDED RELEASE ORAL ONCE
Status: COMPLETED | OUTPATIENT
Start: 2024-04-13 | End: 2024-04-13

## 2024-04-13 RX ADMIN — SODIUM CHLORIDE, PRESERVATIVE FREE 10 ML: 5 INJECTION INTRAVENOUS at 10:30

## 2024-04-13 RX ADMIN — CARBIDOPA AND LEVODOPA 1 TABLET: 25; 100 TABLET ORAL at 21:46

## 2024-04-13 RX ADMIN — POTASSIUM CHLORIDE 40 MEQ: 1500 TABLET, EXTENDED RELEASE ORAL at 10:25

## 2024-04-13 RX ADMIN — METOPROLOL TARTRATE 25 MG: 25 TABLET, FILM COATED ORAL at 10:20

## 2024-04-13 RX ADMIN — AMLODIPINE BESYLATE 5 MG: 5 TABLET ORAL at 10:19

## 2024-04-13 RX ADMIN — Medication 1000 MCG: at 10:19

## 2024-04-13 RX ADMIN — RANOLAZINE 1000 MG: 500 TABLET, FILM COATED, EXTENDED RELEASE ORAL at 21:29

## 2024-04-13 RX ADMIN — RANOLAZINE 1000 MG: 500 TABLET, FILM COATED, EXTENDED RELEASE ORAL at 11:06

## 2024-04-13 RX ADMIN — FUROSEMIDE 20 MG: 20 TABLET ORAL at 10:20

## 2024-04-13 RX ADMIN — TRAMADOL HYDROCHLORIDE 50 MG: 50 TABLET, COATED ORAL at 10:20

## 2024-04-13 RX ADMIN — CARBIDOPA AND LEVODOPA 1 TABLET: 25; 100 TABLET ORAL at 17:17

## 2024-04-13 RX ADMIN — MIDODRINE HYDROCHLORIDE 2.5 MG: 2.5 TABLET ORAL at 17:17

## 2024-04-13 RX ADMIN — ASPIRIN 81 MG 81 MG: 81 TABLET ORAL at 10:20

## 2024-04-13 RX ADMIN — POTASSIUM CHLORIDE 10 MEQ: 10 CAPSULE, COATED, EXTENDED RELEASE ORAL at 10:19

## 2024-04-13 RX ADMIN — CARBIDOPA AND LEVODOPA 1 TABLET: 25; 100 TABLET ORAL at 10:20

## 2024-04-13 RX ADMIN — ONDANSETRON 4 MG: 4 TABLET, ORALLY DISINTEGRATING ORAL at 13:19

## 2024-04-13 RX ADMIN — TRAMADOL HYDROCHLORIDE 50 MG: 50 TABLET, COATED ORAL at 15:30

## 2024-04-13 RX ADMIN — SODIUM CHLORIDE, PRESERVATIVE FREE 10 ML: 5 INJECTION INTRAVENOUS at 21:50

## 2024-04-13 RX ADMIN — METOPROLOL TARTRATE 25 MG: 25 TABLET, FILM COATED ORAL at 21:48

## 2024-04-13 RX ADMIN — PANTOPRAZOLE SODIUM 40 MG: 40 TABLET, DELAYED RELEASE ORAL at 10:25

## 2024-04-13 RX ADMIN — SODIUM CHLORIDE, PRESERVATIVE FREE 10 ML: 5 INJECTION INTRAVENOUS at 21:30

## 2024-04-13 RX ADMIN — LEVOTHYROXINE SODIUM 75 MCG: 75 TABLET ORAL at 10:25

## 2024-04-13 RX ADMIN — PREGABALIN 150 MG: 75 CAPSULE ORAL at 11:06

## 2024-04-13 RX ADMIN — ENOXAPARIN SODIUM 40 MG: 100 INJECTION SUBCUTANEOUS at 10:19

## 2024-04-13 RX ADMIN — CARBIDOPA AND LEVODOPA 1 TABLET: 25; 100 TABLET ORAL at 13:06

## 2024-04-13 RX ADMIN — FLUDROCORTISONE ACETATE 0.1 MG: 0.1 TABLET ORAL at 10:19

## 2024-04-13 RX ADMIN — DOCUSATE SODIUM LIQUID 50 MG: 100 LIQUID ORAL at 21:29

## 2024-04-13 RX ADMIN — TICAGRELOR 90 MG: 90 TABLET ORAL at 21:29

## 2024-04-13 RX ADMIN — TRAMADOL HYDROCHLORIDE 50 MG: 50 TABLET, COATED ORAL at 21:53

## 2024-04-13 RX ADMIN — SODIUM CHLORIDE, PRESERVATIVE FREE 10 ML: 5 INJECTION INTRAVENOUS at 10:22

## 2024-04-13 RX ADMIN — PREGABALIN 150 MG: 75 CAPSULE ORAL at 21:47

## 2024-04-13 RX ADMIN — TICAGRELOR 90 MG: 90 TABLET ORAL at 10:20

## 2024-04-13 RX ADMIN — LEVOFLOXACIN 750 MG: 750 TABLET, FILM COATED ORAL at 10:19

## 2024-04-13 ASSESSMENT — PAIN DESCRIPTION - LOCATION
LOCATION: CHEST
LOCATION: CHEST

## 2024-04-13 ASSESSMENT — PAIN SCALES - GENERAL
PAINLEVEL_OUTOF10: 8
PAINLEVEL_OUTOF10: 5
PAINLEVEL_OUTOF10: 4
PAINLEVEL_OUTOF10: 8
PAINLEVEL_OUTOF10: 8

## 2024-04-13 ASSESSMENT — PAIN DESCRIPTION - DESCRIPTORS
DESCRIPTORS: DISCOMFORT
DESCRIPTORS: DISCOMFORT

## 2024-04-13 ASSESSMENT — PAIN DESCRIPTION - FREQUENCY
FREQUENCY: CONTINUOUS
FREQUENCY: CONTINUOUS

## 2024-04-13 ASSESSMENT — PAIN DESCRIPTION - PAIN TYPE
TYPE: CHRONIC PAIN
TYPE: CHRONIC PAIN

## 2024-04-13 ASSESSMENT — PAIN DESCRIPTION - ORIENTATION
ORIENTATION: MID
ORIENTATION: MID

## 2024-04-13 NOTE — CARE COORDINATION
Case Management Assessment  Initial Evaluation    Date/Time of Evaluation: 4/13/2024 12:41 PM  Assessment Completed by: PETTY BOWLING    If patient is discharged prior to next notation, then this note serves as note for discharge by case management.    Patient Name: Rowdy Marie                   YOB: 1950  Diagnosis: Angina pectoris (HCC) [I20.9]  S/P angioplasty with stent [Z95.820]  NSTEMI (non-ST elevated myocardial infarction) (HCC) [I21.4]  CAD, multiple vessel [I25.10]                   Date / Time: 4/11/2024  8:09 AM    Patient Admission Status: Inpatient   Readmission Risk (Low < 19, Mod (19-27), High > 27): Readmission Risk Score: 20.6    Current PCP: Yandy Jones MD  PCP verified by CM? (P) Yes (Dr Sherwood)    Chart Reviewed: Yes      History Provided by: (P) Patient  Patient Orientation: (P) Alert and Oriented, Person, Place, Situation, Self    Patient Cognition:      Hospitalization in the last 30 days (Readmission):  Yes    If yes, Readmission Assessment in CM Navigator will be completed.    Advance Directives:      Code Status: Full Code   Patient's Primary Decision Maker is: (P) Legal Next of Kin    Primary Decision Maker: remove,per pt - Spouse - [phone]    Discharge Planning:    Patient lives with: (P) Children, Family Members Type of Home: (P) House  Primary Care Giver: (P) Self  Patient Support Systems include: (P) Children, Family Members   Current Financial resources: (P) Medicare  Current community resources: (P) None  Current services prior to admission: (P) Durable Medical Equipment            Current DME: (P) [facility], [facility]            Type of Home Care services:  (P) None    ADLS  Prior functional level: (P) Independent in ADLs/IADLs  Current functional level: (P) Independent in ADLs/IADLs    PT AM-PAC:   /24  OT AM-PAC:   /24    Family can provide assistance at DC: (P) Yes  Would you like Case Management to discuss the discharge plan with any other family

## 2024-04-13 NOTE — PLAN OF CARE
Problem: Safety - Adult  Goal: Free from fall injury  Outcome: Progressing     Problem: Pain  Goal: Verbalizes/displays adequate comfort level or baseline comfort level  Outcome: Progressing     Problem: Discharge Planning  Goal: Discharge to home or other facility with appropriate resources  Outcome: Progressing     Problem: Skin/Tissue Integrity  Goal: Absence of new skin breakdown  Description: 1.  Monitor for areas of redness and/or skin breakdown  2.  Assess vascular access sites hourly  3.  Every 4-6 hours minimum:  Change oxygen saturation probe site  4.  Every 4-6 hours:  If on nasal continuous positive airway pressure, respiratory therapy assess nares and determine need for appliance change or resting period.  Outcome: Progressing

## 2024-04-14 VITALS
HEART RATE: 57 BPM | BODY MASS INDEX: 22.78 KG/M2 | DIASTOLIC BLOOD PRESSURE: 66 MMHG | TEMPERATURE: 96.6 F | SYSTOLIC BLOOD PRESSURE: 108 MMHG | RESPIRATION RATE: 16 BRPM | WEIGHT: 158.73 LBS | OXYGEN SATURATION: 93 %

## 2024-04-14 LAB
ANION GAP SERPL CALCULATED.3IONS-SCNC: 12 MMOL/L (ref 9–16)
BASOPHILS # BLD: 0.07 K/UL (ref 0–0.2)
BASOPHILS NFR BLD: 1 % (ref 0–2)
BUN SERPL-MCNC: 23 MG/DL (ref 8–23)
CALCIUM SERPL-MCNC: 8.3 MG/DL (ref 8.6–10.4)
CHLORIDE SERPL-SCNC: 100 MMOL/L (ref 98–107)
CO2 SERPL-SCNC: 24 MMOL/L (ref 20–31)
CREAT SERPL-MCNC: 1.5 MG/DL (ref 0.7–1.2)
EOSINOPHIL # BLD: 0.54 K/UL (ref 0–0.44)
EOSINOPHILS RELATIVE PERCENT: 6 % (ref 1–4)
ERYTHROCYTE [DISTWIDTH] IN BLOOD BY AUTOMATED COUNT: 13.7 % (ref 11.8–14.4)
GFR SERPL CREATININE-BSD FRML MDRD: 49 ML/MIN/1.73M2
GLUCOSE SERPL-MCNC: 96 MG/DL (ref 74–99)
HCT VFR BLD AUTO: 32.6 % (ref 40.7–50.3)
HGB BLD-MCNC: 10.1 G/DL (ref 13–17)
IMM GRANULOCYTES # BLD AUTO: 0.11 K/UL (ref 0–0.3)
IMM GRANULOCYTES NFR BLD: 1 %
LYMPHOCYTES NFR BLD: 1.57 K/UL (ref 1.1–3.7)
LYMPHOCYTES RELATIVE PERCENT: 17 % (ref 24–43)
MCH RBC QN AUTO: 30 PG (ref 25.2–33.5)
MCHC RBC AUTO-ENTMCNC: 31 G/DL (ref 28.4–34.8)
MCV RBC AUTO: 96.7 FL (ref 82.6–102.9)
MONOCYTES NFR BLD: 1.08 K/UL (ref 0.1–1.2)
MONOCYTES NFR BLD: 12 % (ref 3–12)
NEUTROPHILS NFR BLD: 63 % (ref 36–65)
NEUTS SEG NFR BLD: 5.74 K/UL (ref 1.5–8.1)
NRBC BLD-RTO: 0 PER 100 WBC
PLATELET # BLD AUTO: 208 K/UL (ref 138–453)
PMV BLD AUTO: 12.3 FL (ref 8.1–13.5)
POTASSIUM SERPL-SCNC: 3.8 MMOL/L (ref 3.7–5.3)
RBC # BLD AUTO: 3.37 M/UL (ref 4.21–5.77)
SODIUM SERPL-SCNC: 136 MMOL/L (ref 136–145)
WBC OTHER # BLD: 9.1 K/UL (ref 3.5–11.3)

## 2024-04-14 PROCEDURE — 6360000002 HC RX W HCPCS: Performed by: STUDENT IN AN ORGANIZED HEALTH CARE EDUCATION/TRAINING PROGRAM

## 2024-04-14 PROCEDURE — 2580000003 HC RX 258

## 2024-04-14 PROCEDURE — 99233 SBSQ HOSP IP/OBS HIGH 50: CPT | Performed by: INTERNAL MEDICINE

## 2024-04-14 PROCEDURE — 85025 COMPLETE CBC W/AUTO DIFF WBC: CPT

## 2024-04-14 PROCEDURE — 99233 SBSQ HOSP IP/OBS HIGH 50: CPT | Performed by: NURSE PRACTITIONER

## 2024-04-14 PROCEDURE — 6370000000 HC RX 637 (ALT 250 FOR IP): Performed by: INTERNAL MEDICINE

## 2024-04-14 PROCEDURE — 6370000000 HC RX 637 (ALT 250 FOR IP): Performed by: NURSE PRACTITIONER

## 2024-04-14 PROCEDURE — 6370000000 HC RX 637 (ALT 250 FOR IP): Performed by: STUDENT IN AN ORGANIZED HEALTH CARE EDUCATION/TRAINING PROGRAM

## 2024-04-14 PROCEDURE — 36415 COLL VENOUS BLD VENIPUNCTURE: CPT

## 2024-04-14 PROCEDURE — 80048 BASIC METABOLIC PNL TOTAL CA: CPT

## 2024-04-14 PROCEDURE — 6370000000 HC RX 637 (ALT 250 FOR IP)

## 2024-04-14 RX ORDER — LEVOFLOXACIN 750 MG/1
375 TABLET, FILM COATED ORAL DAILY
Qty: 5 TABLET | Refills: 0 | Status: SHIPPED | OUTPATIENT
Start: 2024-04-14 | End: 2024-04-24

## 2024-04-14 RX ADMIN — POTASSIUM CHLORIDE 10 MEQ: 10 CAPSULE, COATED, EXTENDED RELEASE ORAL at 10:13

## 2024-04-14 RX ADMIN — Medication 1000 MCG: at 10:13

## 2024-04-14 RX ADMIN — CARBIDOPA AND LEVODOPA 1 TABLET: 25; 100 TABLET ORAL at 16:32

## 2024-04-14 RX ADMIN — TRAMADOL HYDROCHLORIDE 50 MG: 50 TABLET, COATED ORAL at 15:03

## 2024-04-14 RX ADMIN — TRAMADOL HYDROCHLORIDE 50 MG: 50 TABLET, COATED ORAL at 10:15

## 2024-04-14 RX ADMIN — CARBIDOPA AND LEVODOPA 1 TABLET: 25; 100 TABLET ORAL at 10:13

## 2024-04-14 RX ADMIN — SODIUM CHLORIDE, PRESERVATIVE FREE 10 ML: 5 INJECTION INTRAVENOUS at 10:19

## 2024-04-14 RX ADMIN — CARBIDOPA AND LEVODOPA 1 TABLET: 25; 100 TABLET ORAL at 12:58

## 2024-04-14 RX ADMIN — PREGABALIN 150 MG: 75 CAPSULE ORAL at 11:26

## 2024-04-14 RX ADMIN — METOPROLOL TARTRATE 25 MG: 25 TABLET, FILM COATED ORAL at 10:14

## 2024-04-14 RX ADMIN — SODIUM CHLORIDE, PRESERVATIVE FREE 10 ML: 5 INJECTION INTRAVENOUS at 10:20

## 2024-04-14 RX ADMIN — PANTOPRAZOLE SODIUM 40 MG: 40 TABLET, DELAYED RELEASE ORAL at 10:15

## 2024-04-14 RX ADMIN — TICAGRELOR 90 MG: 90 TABLET ORAL at 10:14

## 2024-04-14 RX ADMIN — MIDODRINE HYDROCHLORIDE 2.5 MG: 2.5 TABLET ORAL at 16:32

## 2024-04-14 RX ADMIN — ENOXAPARIN SODIUM 40 MG: 100 INJECTION SUBCUTANEOUS at 10:14

## 2024-04-14 RX ADMIN — FLUDROCORTISONE ACETATE 0.1 MG: 0.1 TABLET ORAL at 10:14

## 2024-04-14 RX ADMIN — RANOLAZINE 1000 MG: 500 TABLET, FILM COATED, EXTENDED RELEASE ORAL at 10:14

## 2024-04-14 RX ADMIN — AMLODIPINE BESYLATE 5 MG: 5 TABLET ORAL at 10:14

## 2024-04-14 RX ADMIN — ONDANSETRON 4 MG: 4 TABLET, ORALLY DISINTEGRATING ORAL at 12:58

## 2024-04-14 RX ADMIN — LEVOFLOXACIN 750 MG: 750 TABLET, FILM COATED ORAL at 10:14

## 2024-04-14 RX ADMIN — LEVOTHYROXINE SODIUM 75 MCG: 75 TABLET ORAL at 10:14

## 2024-04-14 RX ADMIN — ASPIRIN 81 MG 81 MG: 81 TABLET ORAL at 10:14

## 2024-04-14 ASSESSMENT — PAIN SCALES - GENERAL
PAINLEVEL_OUTOF10: 6
PAINLEVEL_OUTOF10: 4
PAINLEVEL_OUTOF10: 6
PAINLEVEL_OUTOF10: 4

## 2024-04-14 NOTE — CARE COORDINATION
Transitional Planning:   Spoke with pt and DIL at bedside and notified of discharge order. Plan to discharge home with son and DIL , has DME/ transport. Pt/ DIL agreeable to plan and deny any needs at this time.   1530- Pt qualifies for home O2, spoke with pt and DIL who state DME preference is [facility].   Call to Marielle from [facility], all info faxed to [phone] per request. Confirms they accept pt insurance and can deliver to [place], [place] .    1606- Spoke with Marielle from [facility] and confirmed fax rec'd . Notified pt discharging home, okay to discharge and give portable tank.   1610- [facility] portable O2 tank delivered to pt room and spoke with pt/ DILMartha Fishman and gave instructions for O2 and to call [facility] upon arrival home. Pt/ DIL verbalized understanding and denied questions at this time.       Discharge Report    [facility]  Clinical Case Management Department  Written by: PETTY BOWLING    Patient Name: Rowdy Marie  Attending Provider: Maria T Mccartney MD  Admit Date: 2024  8:09 AM  MRN: [id number]  Account: [id number]                     : 1950  Discharge Date:       Disposition: home with son/ DIL and oxygen from [facility] BOWLING

## 2024-04-14 NOTE — PLAN OF CARE
Problem: Safety - Adult  Goal: Free from fall injury  Outcome: Adequate for Discharge     Problem: Pain  Goal: Verbalizes/displays adequate comfort level or baseline comfort level  Outcome: Adequate for Discharge     Problem: Discharge Planning  Goal: Discharge to home or other facility with appropriate resources  Outcome: Adequate for Discharge     Problem: Skin/Tissue Integrity  Goal: Absence of new skin breakdown  Description: 1.  Monitor for areas of redness and/or skin breakdown  2.  Assess vascular access sites hourly  3.  Every 4-6 hours minimum:  Change oxygen saturation probe site  4.  Every 4-6 hours:  If on nasal continuous positive airway pressure, respiratory therapy assess nares and determine need for appliance change or resting period.  Outcome: Adequate for Discharge

## 2024-04-14 NOTE — PROGRESS NOTES
04/12/24 1319   RT Protocol   History Pulmonary Disease 0   Respiratory pattern 0   Breath sounds 0   Cough 0   Indications for Bronchodilator Therapy None   Bronchodilator Assessment Score 0       
   04/14/24 1159   Resting (Room Air)   SpO2 93   HR 57   During Walk (Room Air)   SpO2 92   HR 64   Walk/Assistance Device Walker   Rate of Dyspnea 1   Symptoms Dizziness   After Walk   SpO2 88   HR 58   O2 Device Nasal cannula   O2 Flow Rate (l/min) 2 l/min   Rate of Dyspnea 1   Symptoms Dizziness   Does the Patient Qualify for Home O2 Yes   Liter Flow at Rest 2   Liter Flow on Exertion 2   Does the Patient Need Portable Oxygen Tanks Yes       
  Herbal and Nutritional Product Restrictions      The following herbal, alternative, and/or nutritional/dietary supplement product(s) has been discontinued per [employer] approved policy:    Coenzyme q-10    Please reorder upon discharge if appropriate.    Thank you,  Neftaly West [facility]  4/11/2024 6:08 PM    
Angiomax completed.Stent card, Stent book and Angeoseal pamphlet given to patients daughter in law.  
CLINICAL PHARMACY NOTE: MEDS TO BEDS    Total # of Prescriptions Filled: 3   The following medications were delivered to the patient:  Brilinta 90mg tabs  Levofloxacin 750mg tabs    Additional Documentation:  Delivered to pt + 1 in room 505 on 4/14 at 1:48P. No copay.  
Dr guevara informed of vascular scan being completed. Ok to restart NTG for chest pain..  
Patient admitted, consent signed and questions answered. Patient ready for procedure. Call light to reach with side rails up 2 of 2. Bilat groin hair clipped with writer and Janice present.  Patient [facility].  
Patient c/o right lower quadrant abdominal pain ,chest and back pain. Rt groin area soft . Mid abdomen very tender to touch. Dr Fisher informed and visited. EKG done. HG per POC 11.1. Morphine 1 MG given IV as ordered. Vascular at bedside for scan. Patient fell asleep after morphine was given.  
Patient transferred to room 505 per payam.  
Received post procedure to [facility] to room 15. Assessment obtained. Restrictions reviewed with patient. Post procedure pathway initiated.  Rt femoral site soft, dressing  dry and intact.  No hematoma noted.  Family at side.  Patient without complaints. Head of bed flat with right leg straight. Nitro gtt at 3mls discontinued. Denies chest pain.  
Report received from Claudia MCCLELLAND.  
Report to Alek on 5a  
[facility]    Admission Medication Reconciliation       The patient's list of current home medications has been reviewed.     Source(s) of information: dispense report    Based on information provided by the above source(s), I have updated the patient's home med list as described below.     Please review the ACTION REQUESTED section of this note below for any discrepancies on current hospital orders.      I changed or updated the following medications on the patient's home medication list:  Removed none   Added Amlodipine 5 mg daily      Adjusted   none   Other Notes none         Please feel free to call me with any questions about this encounter. Thank you.        Electronically signed by Nora Tang RPH on 4/12/2024 at 11:25 AM            
[facility]   Progress Note                   Date:   4/12/2024  Patient name: Rowdy Marie  Date of admission:  4/11/2024  8:09 AM  MRN:   [id number]  YOB: 1950  PCP: Yandy Jones MD    Reason for Admission: Angina pectoris (HCC) [I20.9]  S/P angioplasty with stent [Z95.820]  NSTEMI (non-ST elevated myocardial infarction) (HCC) [I21.4]  CAD, multiple vessel [I25.10]    Subjective:       Clinical Changes / Abnormalities:Pt seen and examined in the room.  Pt denies any sob, but pt states that he is having some chest pressure Pt is on a nitro gtt.   Labs, vitals and tele reviewed-  s/p PCI      Medications:   Scheduled Meds:   enoxaparin  40 mg SubCUTAneous Daily    ticagrelor  90 mg Oral BID    aspirin  81 mg Oral Daily    carbidopa-levodopa  1 tablet Oral 4x Daily    docusate  50 mg Oral Nightly    fludrocortisone  0.1 mg Oral Daily    levothyroxine  75 mcg Oral Daily    metoprolol tartrate  25 mg Oral BID    pantoprazole  40 mg Oral QAM AC    potassium chloride  10 mEq Oral QAM    pregabalin  150 mg Oral BID    ranolazine  1,000 mg Oral BID    sodium chloride flush  5-40 mL IntraVENous 2 times per day    traMADol  50 mg Oral TID    vitamin B-12  1,000 mcg Oral Daily    sodium chloride flush  5-40 mL IntraVENous 2 times per day    levofloxacin  750 mg IntraVENous Q24H     Continuous Infusions:   sodium chloride      nitroGLYCERIN 10 mcg/min (04/12/24 0554)    sodium chloride       CBC:   Recent Labs     04/10/24  0502 04/11/24  0418 04/12/24  0701   WBC 7.8 8.9 11.6*   HGB 10.8* 9.8* 11.1*    212 219     BMP:    Recent Labs     04/10/24  0502 04/11/24  0418 04/12/24  0701    135 134*   K 3.6* 4.3 4.1    102 100   CO2 25 24 22   BUN 23 17 16   CREATININE 1.1 1.1 1.1   GLUCOSE 114* 97 93     Hepatic: No results for input(s): \"AST\", \"ALT\", \"ALB\", \"BILITOT\", \"ALKPHOS\" in the last 72 hours.  Troponin:   Recent Labs     04/09/24  2344 04/10/24  0230 04/10/24  0646 
[facility]   Progress Note                   Date:   4/14/2024  Patient name: Rowdy Marie  Date of admission:  4/11/2024  8:09 AM  MRN:   [id number]  YOB: 1950  PCP: Yandy Jones MD    Reason for Admission: Angina pectoris (HCC) [I20.9]  S/P angioplasty with stent [Z95.820]  NSTEMI (non-ST elevated myocardial infarction) (HCC) [I21.4]  CAD, multiple vessel [I25.10]    Subjective:       Clinical Changes / Abnormalities:Pt seen and examined in the room.  Pt states that he is feeling better.  Labs, vitals and tele reviewed-  s/p PCI      Medications:   Scheduled Meds:   amLODIPine  5 mg Oral Daily    levoFLOXacin  750 mg Oral Daily    enoxaparin  40 mg SubCUTAneous Daily    ticagrelor  90 mg Oral BID    aspirin  81 mg Oral Daily    carbidopa-levodopa  1 tablet Oral 4x Daily    docusate  50 mg Oral Nightly    fludrocortisone  0.1 mg Oral Daily    levothyroxine  75 mcg Oral Daily    metoprolol tartrate  25 mg Oral BID    pantoprazole  40 mg Oral QAM AC    potassium chloride  10 mEq Oral QAM    pregabalin  150 mg Oral BID    ranolazine  1,000 mg Oral BID    sodium chloride flush  5-40 mL IntraVENous 2 times per day    traMADol  50 mg Oral TID    vitamin B-12  1,000 mcg Oral Daily    sodium chloride flush  5-40 mL IntraVENous 2 times per day     Continuous Infusions:   sodium chloride      [Held by provider] nitroGLYCERIN Stopped (04/12/24 1138)    sodium chloride       CBC:   Recent Labs     04/12/24  0701 04/13/24  0614 04/14/24  0604   WBC 11.6* 10.9 9.1   HGB 11.1* 10.8* 10.1*    214 208       BMP:    Recent Labs     04/12/24  0701 04/13/24  0614 04/14/24  0604   * 135* 136   K 4.1 3.5* 3.8    98 100   CO2 22 23 24   BUN 16 19 23   CREATININE 1.1 1.3* 1.5*   GLUCOSE 93 80 96       Hepatic: No results for input(s): \"AST\", \"ALT\", \"ALB\", \"BILITOT\", \"ALKPHOS\" in the last 72 hours.  Troponin:   Recent Labs     04/12/24  1917   TROPHS 260*       BNP: No results for 
Voided 700mls urine in urinal.  
Pulses: Normal pulses.      Heart sounds: No murmur heard.     No friction rub.   Pulmonary:      Effort: Pulmonary effort is normal. No respiratory distress.      Breath sounds: No stridor. No wheezing or rhonchi.   Abdominal:      General: Abdomen is flat. There is no distension.      Palpations: There is no mass.      Tenderness: There is no abdominal tenderness.      Hernia: No hernia is present.   Musculoskeletal:         General: No swelling or tenderness. Normal range of motion.      Cervical back: Normal range of motion. No rigidity or tenderness.   Skin:     General: Skin is warm.      Coloration: Skin is not jaundiced or pale.   Neurological:      General: No focal deficit present.      Mental Status: He is alert and oriented to person, place, and time.   Psychiatric:         Mood and Affect: Mood normal.         Behavior: Behavior normal.           Medications:  Scheduled Medications:    amLODIPine  5 mg Oral Daily    furosemide  20 mg Oral Daily    levoFLOXacin  750 mg Oral Daily    enoxaparin  40 mg SubCUTAneous Daily    ticagrelor  90 mg Oral BID    aspirin  81 mg Oral Daily    carbidopa-levodopa  1 tablet Oral 4x Daily    docusate  50 mg Oral Nightly    fludrocortisone  0.1 mg Oral Daily    levothyroxine  75 mcg Oral Daily    metoprolol tartrate  25 mg Oral BID    pantoprazole  40 mg Oral QAM AC    potassium chloride  10 mEq Oral QAM    pregabalin  150 mg Oral BID    ranolazine  1,000 mg Oral BID    sodium chloride flush  5-40 mL IntraVENous 2 times per day    traMADol  50 mg Oral TID    vitamin B-12  1,000 mcg Oral Daily    sodium chloride flush  5-40 mL IntraVENous 2 times per day     Continuous Infusions:    sodium chloride      [Held by provider] nitroGLYCERIN Stopped (04/12/24 6228)    sodium chloride       PRN Medicationssodium chloride flush, 5-40 mL, PRN  meclizine, 25 mg, TID PRN  sodium chloride, , PRN  acetaminophen, 650 mg, Q6H PRN   Or  acetaminophen, 650 mg, Q6H PRN  bisacodyl, 10 mg, 
   [Held by provider] nitroGLYCERIN Stopped (04/12/24 1138)    sodium chloride       PRN Medicationssodium chloride flush, 5-40 mL, PRN  meclizine, 25 mg, TID PRN  sodium chloride, , PRN  acetaminophen, 650 mg, Q6H PRN   Or  acetaminophen, 650 mg, Q6H PRN  bisacodyl, 10 mg, Daily PRN  magnesium sulfate, 2,000 mg, PRN  melatonin, 3 mg, Nightly PRN  midodrine, 2.5 mg, TID PRN  ondansetron, 4 mg, Q8H PRN   Or  ondansetron, 4 mg, Q6H PRN  polyethylene glycol, 17 g, Daily PRN  sodium chloride flush, 10 mL, PRN  sodium chloride flush, 5-40 mL, PRN  sodium chloride, , PRN  potassium chloride, 40 mEq, PRN   Or  potassium alternative oral replacement, 40 mEq, PRN   Or  potassium chloride, 10 mEq, PRN  morphine, 1 mg, Q4H PRN        Diagnostic Labs:  CBC:   Recent Labs     04/11/24  0418 04/12/24  0701 04/13/24  0614   WBC 8.9 11.6* 10.9   RBC 3.24* 3.64* 3.57*   HGB 9.8* 11.1* 10.8*   HCT 30.2* 36.4* 35.3*   MCV 93.3 100.0 98.9   RDW 14.1 13.3 13.4    219 214       BMP:   Recent Labs     04/11/24  0418 04/12/24  0701 04/13/24  0614    134* 135*   K 4.3 4.1 3.5*    100 98   CO2 24 22 23   BUN 17 16 19   CREATININE 1.1 1.1 1.3*       BNP: No results for input(s): \"BNP\" in the last 72 hours.  PT/INR:   No results for input(s): \"PROTIME\", \"INR\" in the last 72 hours.    APTT:   No results for input(s): \"APTT\" in the last 72 hours.    CARDIAC ENZYMES: No results for input(s): \"CKMB\", \"CKMBINDEX\", \"TROPONINI\" in the last 72 hours.    Invalid input(s): \"CKTOTAL;3\"  FASTING LIPID PANEL:  Lab Results   Component Value Date    CHOL 255 (H) 04/12/2024    HDL 42 04/12/2024    TRIG 137 04/12/2024     LIVER PROFILE: No results for input(s): \"AST\", \"ALT\", \"ALB\", \"BILIDIR\", \"BILITOT\", \"ALKPHOS\" in the last 72 hours.   MICROBIOLOGY: No results found for: \"CULTURE\"    Imaging:    CT CHEST PULMONARY EMBOLISM W CONTRAST    Result Date: 4/10/2024  1. No evidence of pulmonary embolism. 2. Patchy bilateral peripheral predominant 
rate and rhythm, s1/s2 auscultated, no murmurs  Abdomen: soft, non-tender, bowel sounds active  Extremities: no edema  Neurologic: not done        Assessment / Acute Cardiac Problems:   Chest pain with NSTEMI  CAD with h/o CABG and multiple PCI; preserved LVEF with no CHF on exam  PAF; currently maintaining sinus rhythm  S/p PORSCHE occlusion ( Watchman); f/u ALKA 2/16/24 showed PORSCHE occlusion with trivial leak  Parkinson's disease    Patient Active Problem List:     Hypotension     Mild anemia     Chronic atrial fibrillation ([facility])     Coronary artery disease involving coronary bypass graft of native heart without angina pectoris     S/P CABG (coronary artery bypass graft)     GI bleed     Severe malnutrition ([facility])     Pharyngoesophageal dysphagia     Parkinson's disease without dyskinesia or fluctuating manifestations ([facility])     Acute intractable headache     H/O neck surgery     Chest pain     NSTEMI (non-ST elevated myocardial infarction) ([facility])     S/P angioplasty with stent     CAD, multiple vessel     CAP (community acquired pneumonia)      Plan of Treatment:   MVD.  S.p PCI.  Continue ASA, brilinta, statin, BB, CCB and Continue Ranexa. No acute EKG changes.  . SL nitro PRN    Pt states that he cannot tolerate imdur due to severe headaches. Off nitro gtt.    Continue PO  lasix.    On florinef   Follows with [facility] on discharge.  No further workup at this time     Electronically signed by ALMAS MALIK CNP on 4/13/2024 at 10:31 AM  [facility]  [phone]         
spirometry, respirate therapy  Continue to monitor  Keep SpO2 above 92%           History of paroxysmal A-fib s/p Watchman procedure  Currently heart rate 68 sinus rhythm  Continue to monitor        History of parkinsonism:  At home on carbidopa levodopa 4 times daily  Resume home dose carbidopa levodopa  Resume Lyrica 150 twice daily        Diet: Cardiac diet  DVT ppx : Lovenox  GI ppx:     PT/OT: PT OT  Discharge Planning / SW:  for discharge planning  Oly Sneed MD  Internal Medicine Resident, PGY-1  [facility], [place], [place]  4/12/2024, 1:53 AM

## 2024-04-14 NOTE — PLAN OF CARE
Patient was evaluated today for the diagnosis of  acute respiratory failure .  I entered a DME order for home oxygen at 2 lpm because the diagnosis and testing require the patient to have supplemental oxygen.  Condition will improve or be benefited by oxygen use.  The patient is not able to perform good mobility in a home setting and therefore does require the use of a portable oxygen system.  The need for this equipment was discussed with the patient and he understands and is in agreement.     Gurvinder Lund MD  Internal Medicine Resident, PGY-1  [place], [place]  4/14/2024,3:16 PM     Maria T Mccartney MD

## 2024-04-14 NOTE — CARE COORDINATION
04/14/24 0906   Readmission Assessment   Number of Days since last admission? 8-30 days   Previous Disposition Home with Family   Who is being Interviewed Patient   What was the patient's/caregiver's perception as to why they think they needed to return back to the hospital? Other (Comment)  (worsening chest pain)   Did you visit your Primary Care Physician after you left the hospital, before you returned this time? No   Why weren't you able to visit your PCP? Did not have an appointment   Did you see a specialist, such as Cardiac, Pulmonary, Orthopedic Physician, etc. after you left the hospital? No   Who advised the patient to return to the hospital? Self-referral   Does the patient report anything that got in the way of taking their medications? No   In our efforts to provide the best possible care to you and others like you, can you think of anything that we could have done to help you after you left the hospital the first time, so that you might not have needed to return so soon? Other (Comment)  (nothing he can think of)

## 2024-04-16 NOTE — DISCHARGE SUMMARY
[facility]     Department of Internal Medicine - Staff Internal Medicine Teaching Service    INPATIENT DISCHARGE SUMMARY      Patient Identification:  Rowdy Marie is a 74 y.o. male.  :  1950  MRN: [id number]     Acct: [id number]   PCP: Yandy Jones MD  Admit Date:  2024  Discharge date and time: 2024  6:59 PM   Attending Provider: No att. providers found                                     ACTIVE DISCHARGE DIAGNOSES     Hospital Problem Lists:  Principal Problem:    S/P angioplasty with stent  Active Problems:    NSTEMI (non-ST elevated myocardial infarction) (HCC)    Chronic atrial fibrillation (HCC)    CAD, multiple vessel    CAP (community acquired pneumonia)    Hypotension  Resolved Problems:    * No resolved hospital problems. *      HOSPITAL STAY     Brief Inpatient course:   Rowdy Marie is a 74 y.o. male who was admitted for the management of S/P angioplasty with stent, presented to the emergency department with chest pain.  The chest pain has been ongoing for couple of weeks but became worse that day.  For this ongoing severe pain patient decided to get evaluated and went to [place].    In n the ED patient was evaluated and his tropes were uptrending from 18> 38> 66> 70.  His EKG was unremarkable with no signs of ST or T wave changes.  Overnight the patient in the ED was started on IV heparin and nitroglycerin drip with no significant change in pain.  Cardiology reevaluated the patient decided to proceed with left heart cath at [facility]      Cardiac cath was done  which was suggestive of multivessel CAD, patent LIMA to LAD with moderate nonobstructive disease in the LAD, successful PCI with BRENDA to ostial and mid SVG to diagonal and Overall preserved LV function.    Post cath the patient had some improvement in chest pains of his nitro drip was weaned off.  For concerns of MVD s/p PCI patient was continued on aspirin, Brilinta, statin,

## 2024-04-22 ENCOUNTER — APPOINTMENT (OUTPATIENT)
Dept: CT IMAGING | Age: 74
DRG: 280 | End: 2024-04-22
Payer: MEDICARE

## 2024-04-22 ENCOUNTER — HOSPITAL ENCOUNTER (INPATIENT)
Age: 74
LOS: 2 days | Discharge: HOME | DRG: 280 | End: 2024-04-24
Attending: EMERGENCY MEDICINE | Admitting: INTERNAL MEDICINE
Payer: MEDICARE

## 2024-04-22 ENCOUNTER — APPOINTMENT (OUTPATIENT)
Dept: GENERAL RADIOLOGY | Age: 74
DRG: 280 | End: 2024-04-22
Payer: MEDICARE

## 2024-04-22 ENCOUNTER — TELEPHONE (OUTPATIENT)
Dept: INTERNAL MEDICINE CLINIC | Age: 74
End: 2024-04-22

## 2024-04-22 DIAGNOSIS — R07.9 CHEST PAIN, UNSPECIFIED TYPE: Primary | ICD-10-CM

## 2024-04-22 DIAGNOSIS — I25.10 CAD, MULTIPLE VESSEL: ICD-10-CM

## 2024-04-22 DIAGNOSIS — I20.9 ANGINA PECTORIS (HCC): ICD-10-CM

## 2024-04-22 LAB
ABSOLUTE BANDS: 0.12 K/UL (ref 0–1)
ALBUMIN SERPL-MCNC: 4.3 G/DL (ref 3.5–5.2)
ALP SERPL-CCNC: 96 U/L (ref 40–129)
ALT SERPL-CCNC: <5 U/L (ref 5–41)
ANION GAP SERPL CALCULATED.3IONS-SCNC: 17 MMOL/L (ref 9–17)
AST SERPL-CCNC: 10 U/L
BANDS: 1 % (ref 0–10)
BASOPHILS # BLD: 0 K/UL (ref 0–0.2)
BASOPHILS NFR BLD: 0 % (ref 0–2)
BILIRUB DIRECT SERPL-MCNC: 0.2 MG/DL
BILIRUB INDIRECT SERPL-MCNC: 0.4 MG/DL (ref 0–1)
BILIRUB SERPL-MCNC: 0.6 MG/DL (ref 0.3–1.2)
BNP SERPL-MCNC: 1865 PG/ML
BUN SERPL-MCNC: 27 MG/DL (ref 8–23)
CALCIUM SERPL-MCNC: 10 MG/DL (ref 8.6–10.4)
CHLORIDE SERPL-SCNC: 94 MMOL/L (ref 98–107)
CO2 SERPL-SCNC: 24 MMOL/L (ref 20–31)
CREAT SERPL-MCNC: 1.2 MG/DL (ref 0.7–1.2)
EOSINOPHIL # BLD: 0.35 K/UL (ref 0–0.4)
EOSINOPHILS RELATIVE PERCENT: 3 % (ref 0–4)
ERYTHROCYTE [DISTWIDTH] IN BLOOD BY AUTOMATED COUNT: 13.9 % (ref 11.5–14.9)
GFR SERPL CREATININE-BSD FRML MDRD: 63 ML/MIN/1.73M2
GLUCOSE SERPL-MCNC: 94 MG/DL (ref 70–99)
HCT VFR BLD AUTO: 39.7 % (ref 41–53)
HGB BLD-MCNC: 12.9 G/DL (ref 13.5–17.5)
LYMPHOCYTES NFR BLD: 1.04 K/UL (ref 1–4.8)
LYMPHOCYTES RELATIVE PERCENT: 9 % (ref 24–44)
MCH RBC QN AUTO: 29.5 PG (ref 26–34)
MCHC RBC AUTO-ENTMCNC: 32.4 G/DL (ref 31–37)
MCV RBC AUTO: 91.1 FL (ref 80–100)
MONOCYTES NFR BLD: 0.35 K/UL (ref 0.1–1.3)
MONOCYTES NFR BLD: 3 % (ref 1–7)
MORPHOLOGY: NORMAL
NEUTROPHILS NFR BLD: 84 % (ref 36–66)
NEUTS SEG NFR BLD: 9.64 K/UL (ref 1.3–9.1)
PLATELET # BLD AUTO: 395 K/UL (ref 150–450)
PMV BLD AUTO: 9.5 FL (ref 6–12)
POTASSIUM SERPL-SCNC: 3.9 MMOL/L (ref 3.7–5.3)
PROT SERPL-MCNC: 8.9 G/DL (ref 6.4–8.3)
RBC # BLD AUTO: 4.36 M/UL (ref 4.5–5.9)
SODIUM SERPL-SCNC: 135 MMOL/L (ref 135–144)
TROPONIN I SERPL HS-MCNC: 43 NG/L (ref 0–22)
TROPONIN I SERPL HS-MCNC: 59 NG/L (ref 0–22)
WBC OTHER # BLD: 11.5 K/UL (ref 3.5–11)

## 2024-04-22 PROCEDURE — 80048 BASIC METABOLIC PNL TOTAL CA: CPT

## 2024-04-22 PROCEDURE — 74174 CTA ABD&PLVS W/CONTRAST: CPT

## 2024-04-22 PROCEDURE — 71046 X-RAY EXAM CHEST 2 VIEWS: CPT

## 2024-04-22 PROCEDURE — 99285 EMERGENCY DEPT VISIT HI MDM: CPT

## 2024-04-22 PROCEDURE — 6360000004 HC RX CONTRAST MEDICATION: Performed by: EMERGENCY MEDICINE

## 2024-04-22 PROCEDURE — 83880 ASSAY OF NATRIURETIC PEPTIDE: CPT

## 2024-04-22 PROCEDURE — 36415 COLL VENOUS BLD VENIPUNCTURE: CPT

## 2024-04-22 PROCEDURE — 6360000002 HC RX W HCPCS: Performed by: EMERGENCY MEDICINE

## 2024-04-22 PROCEDURE — 2060000000 HC ICU INTERMEDIATE R&B

## 2024-04-22 PROCEDURE — 85025 COMPLETE CBC W/AUTO DIFF WBC: CPT

## 2024-04-22 PROCEDURE — 93005 ELECTROCARDIOGRAM TRACING: CPT | Performed by: EMERGENCY MEDICINE

## 2024-04-22 PROCEDURE — 96374 THER/PROPH/DIAG INJ IV PUSH: CPT

## 2024-04-22 PROCEDURE — 84484 ASSAY OF TROPONIN QUANT: CPT

## 2024-04-22 PROCEDURE — 96375 TX/PRO/DX INJ NEW DRUG ADDON: CPT

## 2024-04-22 PROCEDURE — 80076 HEPATIC FUNCTION PANEL: CPT

## 2024-04-22 PROCEDURE — 2580000003 HC RX 258: Performed by: EMERGENCY MEDICINE

## 2024-04-22 RX ORDER — ACETAMINOPHEN 325 MG/1
650 TABLET ORAL EVERY 6 HOURS PRN
Status: DISCONTINUED | OUTPATIENT
Start: 2024-04-22 | End: 2024-04-24 | Stop reason: HOSPADM

## 2024-04-22 RX ORDER — LEVOFLOXACIN 750 MG/1
375 TABLET, FILM COATED ORAL DAILY
Status: DISCONTINUED | OUTPATIENT
Start: 2024-04-23 | End: 2024-04-23

## 2024-04-22 RX ORDER — POTASSIUM CHLORIDE 7.45 MG/ML
10 INJECTION INTRAVENOUS PRN
Status: DISCONTINUED | OUTPATIENT
Start: 2024-04-22 | End: 2024-04-24 | Stop reason: HOSPADM

## 2024-04-22 RX ORDER — POTASSIUM CHLORIDE 750 MG/1
10 CAPSULE, EXTENDED RELEASE ORAL EVERY MORNING
Status: DISCONTINUED | OUTPATIENT
Start: 2024-04-23 | End: 2024-04-24 | Stop reason: HOSPADM

## 2024-04-22 RX ORDER — PREGABALIN 150 MG/1
150 CAPSULE ORAL 2 TIMES DAILY
Status: DISCONTINUED | OUTPATIENT
Start: 2024-04-23 | End: 2024-04-24 | Stop reason: HOSPADM

## 2024-04-22 RX ORDER — LEVOTHYROXINE SODIUM 0.07 MG/1
75 TABLET ORAL DAILY
Status: DISCONTINUED | OUTPATIENT
Start: 2024-04-23 | End: 2024-04-24 | Stop reason: HOSPADM

## 2024-04-22 RX ORDER — PANTOPRAZOLE SODIUM 40 MG/1
40 TABLET, DELAYED RELEASE ORAL
Status: DISCONTINUED | OUTPATIENT
Start: 2024-04-23 | End: 2024-04-24 | Stop reason: HOSPADM

## 2024-04-22 RX ORDER — ACETAMINOPHEN 650 MG/1
650 SUPPOSITORY RECTAL EVERY 6 HOURS PRN
Status: DISCONTINUED | OUTPATIENT
Start: 2024-04-22 | End: 2024-04-24 | Stop reason: HOSPADM

## 2024-04-22 RX ORDER — TRAMADOL HYDROCHLORIDE 50 MG/1
50 TABLET ORAL 3 TIMES DAILY
Status: DISCONTINUED | OUTPATIENT
Start: 2024-04-23 | End: 2024-04-24 | Stop reason: HOSPADM

## 2024-04-22 RX ORDER — UBIDECARENONE 100 MG
100 CAPSULE ORAL DAILY
Status: DISCONTINUED | OUTPATIENT
Start: 2024-04-23 | End: 2024-04-24 | Stop reason: HOSPADM

## 2024-04-22 RX ORDER — LANOLIN ALCOHOL/MO/W.PET/CERES
1000 CREAM (GRAM) TOPICAL DAILY
Status: DISCONTINUED | OUTPATIENT
Start: 2024-04-23 | End: 2024-04-24 | Stop reason: HOSPADM

## 2024-04-22 RX ORDER — MAGNESIUM SULFATE HEPTAHYDRATE 40 MG/ML
2000 INJECTION, SOLUTION INTRAVENOUS PRN
Status: DISCONTINUED | OUTPATIENT
Start: 2024-04-22 | End: 2024-04-24 | Stop reason: HOSPADM

## 2024-04-22 RX ORDER — FLUDROCORTISONE ACETATE 0.1 MG/1
0.1 TABLET ORAL DAILY
Status: DISCONTINUED | OUTPATIENT
Start: 2024-04-23 | End: 2024-04-24

## 2024-04-22 RX ORDER — ENOXAPARIN SODIUM 100 MG/ML
40 INJECTION SUBCUTANEOUS DAILY
Status: DISCONTINUED | OUTPATIENT
Start: 2024-04-23 | End: 2024-04-24 | Stop reason: HOSPADM

## 2024-04-22 RX ORDER — ONDANSETRON 2 MG/ML
4 INJECTION INTRAMUSCULAR; INTRAVENOUS EVERY 6 HOURS PRN
Status: DISCONTINUED | OUTPATIENT
Start: 2024-04-22 | End: 2024-04-24 | Stop reason: HOSPADM

## 2024-04-22 RX ORDER — SODIUM CHLORIDE 0.9 % (FLUSH) 0.9 %
10 SYRINGE (ML) INJECTION PRN
Status: COMPLETED | OUTPATIENT
Start: 2024-04-22 | End: 2024-04-22

## 2024-04-22 RX ORDER — RANOLAZINE 500 MG/1
1000 TABLET, EXTENDED RELEASE ORAL 2 TIMES DAILY
Status: DISCONTINUED | OUTPATIENT
Start: 2024-04-23 | End: 2024-04-24 | Stop reason: HOSPADM

## 2024-04-22 RX ORDER — POLYETHYLENE GLYCOL 3350 17 G/17G
17 POWDER, FOR SOLUTION ORAL DAILY PRN
Status: DISCONTINUED | OUTPATIENT
Start: 2024-04-22 | End: 2024-04-24 | Stop reason: HOSPADM

## 2024-04-22 RX ORDER — AMLODIPINE BESYLATE 5 MG/1
5 TABLET ORAL DAILY
Status: DISCONTINUED | OUTPATIENT
Start: 2024-04-23 | End: 2024-04-24

## 2024-04-22 RX ORDER — ASPIRIN 81 MG/1
81 TABLET ORAL DAILY
Status: DISCONTINUED | OUTPATIENT
Start: 2024-04-23 | End: 2024-04-24 | Stop reason: HOSPADM

## 2024-04-22 RX ORDER — SODIUM CHLORIDE 0.9 % (FLUSH) 0.9 %
5-40 SYRINGE (ML) INJECTION EVERY 12 HOURS SCHEDULED
Status: DISCONTINUED | OUTPATIENT
Start: 2024-04-23 | End: 2024-04-24 | Stop reason: HOSPADM

## 2024-04-22 RX ORDER — SODIUM CHLORIDE 0.9 % (FLUSH) 0.9 %
5-40 SYRINGE (ML) INJECTION PRN
Status: DISCONTINUED | OUTPATIENT
Start: 2024-04-22 | End: 2024-04-24 | Stop reason: HOSPADM

## 2024-04-22 RX ORDER — MORPHINE SULFATE 2 MG/ML
2 INJECTION, SOLUTION INTRAMUSCULAR; INTRAVENOUS
Status: DISCONTINUED | OUTPATIENT
Start: 2024-04-22 | End: 2024-04-24 | Stop reason: HOSPADM

## 2024-04-22 RX ORDER — FUROSEMIDE 10 MG/ML
20 INJECTION INTRAMUSCULAR; INTRAVENOUS ONCE
Status: COMPLETED | OUTPATIENT
Start: 2024-04-22 | End: 2024-04-22

## 2024-04-22 RX ORDER — SODIUM CHLORIDE 9 MG/ML
INJECTION, SOLUTION INTRAVENOUS ONCE
Status: COMPLETED | OUTPATIENT
Start: 2024-04-22 | End: 2024-04-22

## 2024-04-22 RX ORDER — SODIUM CHLORIDE 9 MG/ML
INJECTION, SOLUTION INTRAVENOUS PRN
Status: DISCONTINUED | OUTPATIENT
Start: 2024-04-22 | End: 2024-04-24 | Stop reason: HOSPADM

## 2024-04-22 RX ORDER — MECLIZINE HYDROCHLORIDE 25 MG/1
25 TABLET ORAL 3 TIMES DAILY PRN
Status: DISCONTINUED | OUTPATIENT
Start: 2024-04-22 | End: 2024-04-24 | Stop reason: HOSPADM

## 2024-04-22 RX ORDER — MIDODRINE HYDROCHLORIDE 2.5 MG/1
2.5 TABLET ORAL 3 TIMES DAILY PRN
Status: DISCONTINUED | OUTPATIENT
Start: 2024-04-22 | End: 2024-04-23

## 2024-04-22 RX ORDER — ONDANSETRON 4 MG/1
4 TABLET, ORALLY DISINTEGRATING ORAL EVERY 8 HOURS PRN
Status: DISCONTINUED | OUTPATIENT
Start: 2024-04-22 | End: 2024-04-24 | Stop reason: HOSPADM

## 2024-04-22 RX ORDER — POTASSIUM CHLORIDE 20 MEQ/1
40 TABLET, EXTENDED RELEASE ORAL PRN
Status: DISCONTINUED | OUTPATIENT
Start: 2024-04-22 | End: 2024-04-24 | Stop reason: HOSPADM

## 2024-04-22 RX ORDER — FENTANYL CITRATE 0.05 MG/ML
25 INJECTION, SOLUTION INTRAMUSCULAR; INTRAVENOUS ONCE
Status: COMPLETED | OUTPATIENT
Start: 2024-04-22 | End: 2024-04-22

## 2024-04-22 RX ADMIN — SODIUM CHLORIDE, PRESERVATIVE FREE 10 ML: 5 INJECTION INTRAVENOUS at 21:03

## 2024-04-22 RX ADMIN — FENTANYL CITRATE 25 MCG: 0.05 INJECTION, SOLUTION INTRAMUSCULAR; INTRAVENOUS at 18:40

## 2024-04-22 RX ADMIN — FUROSEMIDE 20 MG: 10 INJECTION, SOLUTION INTRAMUSCULAR; INTRAVENOUS at 23:00

## 2024-04-22 RX ADMIN — HYDROMORPHONE HYDROCHLORIDE 1 MG: 1 INJECTION, SOLUTION INTRAMUSCULAR; INTRAVENOUS; SUBCUTANEOUS at 21:38

## 2024-04-22 RX ADMIN — IOPAMIDOL 100 ML: 755 INJECTION, SOLUTION INTRAVENOUS at 21:03

## 2024-04-22 RX ADMIN — SODIUM CHLORIDE: 9 INJECTION, SOLUTION INTRAVENOUS at 21:04

## 2024-04-22 ASSESSMENT — PAIN - FUNCTIONAL ASSESSMENT
PAIN_FUNCTIONAL_ASSESSMENT: 0-10
PAIN_FUNCTIONAL_ASSESSMENT: 0-10

## 2024-04-22 ASSESSMENT — PAIN DESCRIPTION - LOCATION
LOCATION: CHEST
LOCATION: CHEST
LOCATION: BACK;CHEST
LOCATION: CHEST

## 2024-04-22 ASSESSMENT — LIFESTYLE VARIABLES
HOW OFTEN DO YOU HAVE A DRINK CONTAINING ALCOHOL: NEVER
HOW MANY STANDARD DRINKS CONTAINING ALCOHOL DO YOU HAVE ON A TYPICAL DAY: PATIENT DOES NOT DRINK

## 2024-04-22 ASSESSMENT — PAIN SCALES - GENERAL
PAINLEVEL_OUTOF10: 9
PAINLEVEL_OUTOF10: 8
PAINLEVEL_OUTOF10: 9
PAINLEVEL_OUTOF10: 10

## 2024-04-22 ASSESSMENT — PAIN DESCRIPTION - ORIENTATION: ORIENTATION: UPPER

## 2024-04-22 ASSESSMENT — PAIN DESCRIPTION - FREQUENCY: FREQUENCY: CONTINUOUS

## 2024-04-22 ASSESSMENT — PAIN DESCRIPTION - DESCRIPTORS
DESCRIPTORS: ACHING
DESCRIPTORS: ACHING

## 2024-04-22 NOTE — ED PROVIDER NOTES
[facility] CARE  Emergency Department Encounter  Emergency Medicine Resident     Pt Name:Rowdy Marie  MRN: [id number]  Birthdate 1950  Date of evaluation: 4/22/24  PCP:  Yandy Jones MD  Note Started: 5:50 PM EDT      CHIEF COMPLAINT       Chief Complaint   Patient presents with    Chest Pain     Recent NSTEMI with cardiac cath and stent. Pt also had pneumonia while in hospital.      Fatigue       HISTORY OF PRESENT ILLNESS  (Location/Symptom, Timing/Onset, Context/Setting, Quality, Duration, Modifying Factors, Severity.)      Rowdy Marie is a 74 y.o. male who presents with chest pain and generalized feeling unwell.  Patient had recent cardiac cath approximately a week and a half ago.  Reportedly had 2 stents placed.  Was discharged and has had persistent chest pain since discharge.  Patient reports mid chest pain that goes into his abdomen.  Has not had any recent cough, fevers, chills.  Denies any significant shortness of breath.  Reports compliance with medications.    PAST MEDICAL / SURGICAL / SOCIAL / FAMILY HISTORY      has a past medical history of A-fib ([facility]), PREET (acute kidney injury) ([facility]), Anemia, CAD (coronary artery disease), Esophageal stricture, Hyperlipidemia, Hypokalemia, Hypomagnesemia, NSTEMI (non-ST elevated myocardial infarction) ([facility]), Orthostatic hypotension, Parkinsons ([facility]), Presence of Watchman left atrial appendage closure device, and Prostate CA ([facility]).       has a past surgical history that includes Coronary artery bypass graft; Cardiac catheterization; Coronary angioplasty with stent; Diagnostic Cardiac Cath Lab Procedure (04/11/2024); Cardiac procedure (N/A, 4/11/2024); and Cardiac procedure (N/A, 4/11/2024).      Social History     Socioeconomic History    Marital status:      Spouse name: Not on file    Number of children: Not on file    Years of education: Not on file    Highest education level: Not on file   Occupational History    Not on file

## 2024-04-22 NOTE — ED PROVIDER NOTES
[facility] ED  eMERGENCY dEPARTMENT eNCOUnter   Attending Attestation     Pt Name: Rowdy Marie  MRN: [id number]  Birthdate 1950  Date of evaluation: 4/22/24    History, EXAM, MDM:    Rowdy Marie is a 74 y.o. male who presents with Chest Pain (Recent NSTEMI with cardiac cath and stent. Pt also had pneumonia while in hospital.  ) and Fatigue      Patient presenting with a week of chest pain since the heart catheterization.  Pain is in the middle of his chest rating down into his abdomen.  Is been persistent throughout the week.  No coughing or fevers.  Patient reports a lot of fatigue.    EKG: All EKG's are interpreted by the Emergency Department Physician who either signs or Co-signs this chart in the absence of a cardiologist.  EKG shows a sinus rhythm heart rate 75  QRS of 92 QTc of 462 no ST segment elevations, no T wave inversions the axis is leftwards.    IV placed the patient was placed on cardiac monitor laboratory and imaging studies obtained and reviewed.  BNP is elevated, liver function test within normal limits except for elevated protein.  Troponin is 59 and this is decreasing appropriately since is NSTEMI.  Renal function electrolytes within normal limits except for an elevation in his BUN.  Hemoglobin is 12.9 dated compared to his most recent reading on the 14th this is which was 10.1    Cardiac cath performed 11 April Multivessel CAD  Patent LIMA to the LAD with moderate nonobstructive disease in LAD post distal anastomosis  Patent SVG to OM 2 with patent stents  Patent SVG to RPDA  Successful PCI with BRENDA to ostial and mid SVG to diagonal.  Overall preserved LV function      Will obtain a CTA to rule out any aortic dissection.  If this is negative will place in the hospital for further cardiac evaluation given his recent heart catheterization and stent placement.    Vitals:   Vitals:    04/22/24 1845 04/22/24 1900 04/22/24 1938 04/22/24 2132   BP: 115/71 125/73 125/73 122/66

## 2024-04-22 NOTE — TELEPHONE ENCOUNTER
[facility] calling looking for documentation for the O2 that was ordered for the patient.  They needed to know what the patients sats are while sleeping.    Patient was recently at [facility] and the order came from there.  Reaching out to Care manager from [facility].    Fax [phone]

## 2024-04-22 NOTE — TELEPHONE ENCOUNTER
Patient called and stated he is not feeling well since his heart attack. He doesn't know if its his heart again or the pneumonia. He stated he was going to go back to the ER.

## 2024-04-22 NOTE — ED NOTES
Report given to STAS Trevino from ER  oncoming shift   Report method in person   The following was reviewed with receiving RN:   Current vital signs:  /71   Pulse 76   Temp 98.1 °F (36.7 °C) (Oral)   Resp 18   Ht 1.778 m (5' 10\")   Wt 70.3 kg (155 lb)   SpO2 96%   BMI 22.24 kg/m²                MEWS Score: 1     Any medication or safety alerts were reviewed. Any pending diagnostics and notifications were also reviewed, as well as any safety concerns or issues, abnormal labs, abnormal imaging, and abnormal assessment findings. Questions were answered.

## 2024-04-23 ENCOUNTER — TELEPHONE (OUTPATIENT)
Dept: INTERNAL MEDICINE CLINIC | Age: 74
End: 2024-04-23

## 2024-04-23 ENCOUNTER — APPOINTMENT (OUTPATIENT)
Age: 74
DRG: 280 | End: 2024-04-23
Payer: MEDICARE

## 2024-04-23 PROBLEM — E43 SEVERE MALNUTRITION (HCC): Status: ACTIVE | Noted: 2024-04-23

## 2024-04-23 LAB
ANION GAP SERPL CALCULATED.3IONS-SCNC: 17 MMOL/L (ref 9–17)
BUN SERPL-MCNC: 26 MG/DL (ref 8–23)
CALCIUM SERPL-MCNC: 9.3 MG/DL (ref 8.6–10.4)
CHLORIDE SERPL-SCNC: 95 MMOL/L (ref 98–107)
CO2 SERPL-SCNC: 22 MMOL/L (ref 20–31)
CREAT SERPL-MCNC: 1.3 MG/DL (ref 0.7–1.2)
ECHO AO ROOT DIAM: 3.1 CM
ECHO AO ROOT INDEX: 1.81 CM/M2
ECHO AV AREA PEAK VELOCITY: 2.1 CM2
ECHO AV AREA VTI: 2.2 CM2
ECHO AV AREA/BSA PEAK VELOCITY: 1.2 CM2/M2
ECHO AV AREA/BSA VTI: 1.3 CM2/M2
ECHO AV MEAN GRADIENT: 3 MMHG
ECHO AV MEAN VELOCITY: 0.8 M/S
ECHO AV PEAK GRADIENT: 5 MMHG
ECHO AV PEAK VELOCITY: 1.2 M/S
ECHO AV VELOCITY RATIO: 0.75
ECHO AV VTI: 22.8 CM
ECHO BSA: 1.67 M2
ECHO EST RA PRESSURE: 3 MMHG
ECHO LA DIAMETER INDEX: 2.05 CM/M2
ECHO LA DIAMETER: 3.5 CM
ECHO LA TO AORTIC ROOT RATIO: 1.13
ECHO LV E' LATERAL VELOCITY: 14 CM/S
ECHO LV E' SEPTAL VELOCITY: 8 CM/S
ECHO LV FRACTIONAL SHORTENING: 27 % (ref 28–44)
ECHO LV INTERNAL DIMENSION DIASTOLE INDEX: 2.98 CM/M2
ECHO LV INTERNAL DIMENSION DIASTOLIC: 5.1 CM (ref 4.2–5.9)
ECHO LV INTERNAL DIMENSION SYSTOLIC INDEX: 2.16 CM/M2
ECHO LV INTERNAL DIMENSION SYSTOLIC: 3.7 CM
ECHO LV IVSD: 0.9 CM (ref 0.6–1)
ECHO LV MASS 2D: 151.8 G (ref 88–224)
ECHO LV MASS INDEX 2D: 88.8 G/M2 (ref 49–115)
ECHO LV POSTERIOR WALL DIASTOLIC: 0.8 CM (ref 0.6–1)
ECHO LV RELATIVE WALL THICKNESS RATIO: 0.31
ECHO LVOT AREA: 2.8 CM2
ECHO LVOT AV VTI INDEX: 0.76
ECHO LVOT DIAM: 1.9 CM
ECHO LVOT MEAN GRADIENT: 2 MMHG
ECHO LVOT PEAK GRADIENT: 3 MMHG
ECHO LVOT PEAK VELOCITY: 0.9 M/S
ECHO LVOT STROKE VOLUME INDEX: 28.8 ML/M2
ECHO LVOT SV: 49.3 ML
ECHO LVOT VTI: 17.4 CM
ECHO MV A VELOCITY: 0.6 M/S
ECHO MV AREA PHT: 2 CM2
ECHO MV AREA VTI: 1.6 CM2
ECHO MV E DECELERATION TIME (DT): 239 MS
ECHO MV LVOT VTI INDEX: 1.76
ECHO MV MAX VELOCITY: 0.9 M/S
ECHO MV MEAN GRADIENT: 1 MMHG
ECHO MV MEAN VELOCITY: 0.5 M/S
ECHO MV PEAK GRADIENT: 3 MMHG
ECHO MV PRESSURE HALF TIME (PHT): 109 MS
ECHO MV REGURGITANT ALIASING (NYQUIST) VELOCITY: 39 CM/S
ECHO MV REGURGITANT PEAK GRADIENT: 100 MMHG
ECHO MV REGURGITANT PEAK VELOCITY: 5 M/S
ECHO MV REGURGITANT RADIUS PISA: 0.5 CM
ECHO MV REGURGITANT VTIA: 165 CM
ECHO MV VTI: 30.6 CM
ECHO RA AREA 4C: 11.6 CM2
ECHO RIGHT VENTRICULAR SYSTOLIC PRESSURE (RVSP): 22 MMHG
ECHO RV TAPSE: 1.1 CM (ref 1.7–?)
ECHO TV REGURGITANT MAX VELOCITY: 2.18 M/S
ECHO TV REGURGITANT PEAK GRADIENT: 17 MMHG
EKG ATRIAL RATE: 75 BPM
EKG P AXIS: 64 DEGREES
EKG P-R INTERVAL: 146 MS
EKG Q-T INTERVAL: 414 MS
EKG QRS DURATION: 92 MS
EKG QTC CALCULATION (BAZETT): 462 MS
EKG R AXIS: -15 DEGREES
EKG T AXIS: 73 DEGREES
EKG VENTRICULAR RATE: 75 BPM
ERYTHROCYTE [DISTWIDTH] IN BLOOD BY AUTOMATED COUNT: 13.8 % (ref 11.5–14.9)
EST. AVERAGE GLUCOSE BLD GHB EST-MCNC: 100 MG/DL
GFR SERPL CREATININE-BSD FRML MDRD: 57 ML/MIN/1.73M2
GLUCOSE SERPL-MCNC: 90 MG/DL (ref 70–99)
HBA1C MFR BLD: 5.1 % (ref 4–6)
HCT VFR BLD AUTO: 35.3 % (ref 41–53)
HGB BLD-MCNC: 11.5 G/DL (ref 13.5–17.5)
MAGNESIUM SERPL-MCNC: 2 MG/DL (ref 1.6–2.6)
MCH RBC QN AUTO: 30 PG (ref 26–34)
MCHC RBC AUTO-ENTMCNC: 32.6 G/DL (ref 31–37)
MCV RBC AUTO: 92 FL (ref 80–100)
PLATELET # BLD AUTO: 373 K/UL (ref 150–450)
PMV BLD AUTO: 9.8 FL (ref 6–12)
POTASSIUM SERPL-SCNC: 3.3 MMOL/L (ref 3.7–5.3)
RBC # BLD AUTO: 3.84 M/UL (ref 4.5–5.9)
SODIUM SERPL-SCNC: 134 MMOL/L (ref 135–144)
TROPONIN I SERPL HS-MCNC: 46 NG/L (ref 0–22)
TSH SERPL DL<=0.05 MIU/L-ACNC: 2.08 UIU/ML (ref 0.3–5)
WBC OTHER # BLD: 10.1 K/UL (ref 3.5–11)

## 2024-04-23 PROCEDURE — 93010 ELECTROCARDIOGRAM REPORT: CPT | Performed by: INTERNAL MEDICINE

## 2024-04-23 PROCEDURE — 97530 THERAPEUTIC ACTIVITIES: CPT

## 2024-04-23 PROCEDURE — 99222 1ST HOSP IP/OBS MODERATE 55: CPT | Performed by: NURSE PRACTITIONER

## 2024-04-23 PROCEDURE — 80048 BASIC METABOLIC PNL TOTAL CA: CPT

## 2024-04-23 PROCEDURE — 6370000000 HC RX 637 (ALT 250 FOR IP): Performed by: NURSE PRACTITIONER

## 2024-04-23 PROCEDURE — 84484 ASSAY OF TROPONIN QUANT: CPT

## 2024-04-23 PROCEDURE — 36415 COLL VENOUS BLD VENIPUNCTURE: CPT

## 2024-04-23 PROCEDURE — 83036 HEMOGLOBIN GLYCOSYLATED A1C: CPT

## 2024-04-23 PROCEDURE — 2060000000 HC ICU INTERMEDIATE R&B

## 2024-04-23 PROCEDURE — 97162 PT EVAL MOD COMPLEX 30 MIN: CPT

## 2024-04-23 PROCEDURE — 6360000002 HC RX W HCPCS: Performed by: NURSE PRACTITIONER

## 2024-04-23 PROCEDURE — 2580000003 HC RX 258: Performed by: INTERNAL MEDICINE

## 2024-04-23 PROCEDURE — 85027 COMPLETE CBC AUTOMATED: CPT

## 2024-04-23 PROCEDURE — 84443 ASSAY THYROID STIM HORMONE: CPT

## 2024-04-23 PROCEDURE — 93306 TTE W/DOPPLER COMPLETE: CPT

## 2024-04-23 PROCEDURE — 6370000000 HC RX 637 (ALT 250 FOR IP): Performed by: INTERNAL MEDICINE

## 2024-04-23 PROCEDURE — 99223 1ST HOSP IP/OBS HIGH 75: CPT | Performed by: INTERNAL MEDICINE

## 2024-04-23 PROCEDURE — 83735 ASSAY OF MAGNESIUM: CPT

## 2024-04-23 PROCEDURE — 97166 OT EVAL MOD COMPLEX 45 MIN: CPT

## 2024-04-23 PROCEDURE — 93306 TTE W/DOPPLER COMPLETE: CPT | Performed by: INTERNAL MEDICINE

## 2024-04-23 PROCEDURE — 2580000003 HC RX 258: Performed by: NURSE PRACTITIONER

## 2024-04-23 RX ORDER — NITROGLYCERIN 40 MG/1
1 PATCH TRANSDERMAL DAILY
Status: DISCONTINUED | OUTPATIENT
Start: 2024-04-23 | End: 2024-04-24 | Stop reason: HOSPADM

## 2024-04-23 RX ORDER — SODIUM CHLORIDE 9 MG/ML
INJECTION, SOLUTION INTRAVENOUS CONTINUOUS
Status: DISCONTINUED | OUTPATIENT
Start: 2024-04-23 | End: 2024-04-24

## 2024-04-23 RX ORDER — MIDODRINE HYDROCHLORIDE 5 MG/1
5 TABLET ORAL
Status: DISCONTINUED | OUTPATIENT
Start: 2024-04-23 | End: 2024-04-24 | Stop reason: HOSPADM

## 2024-04-23 RX ADMIN — MORPHINE SULFATE 2 MG: 2 INJECTION, SOLUTION INTRAMUSCULAR; INTRAVENOUS at 13:11

## 2024-04-23 RX ADMIN — MORPHINE SULFATE 2 MG: 2 INJECTION, SOLUTION INTRAMUSCULAR; INTRAVENOUS at 20:42

## 2024-04-23 RX ADMIN — CYANOCOBALAMIN TAB 1000 MCG 1000 MCG: 1000 TAB at 10:38

## 2024-04-23 RX ADMIN — DOCUSATE SODIUM LIQUID 50 MG: 100 LIQUID ORAL at 01:22

## 2024-04-23 RX ADMIN — CARBIDOPA AND LEVODOPA 1 TABLET: 25; 100 TABLET ORAL at 18:06

## 2024-04-23 RX ADMIN — PREGABALIN 150 MG: 150 CAPSULE ORAL at 20:42

## 2024-04-23 RX ADMIN — TICAGRELOR 90 MG: 90 TABLET ORAL at 20:41

## 2024-04-23 RX ADMIN — TICAGRELOR 90 MG: 90 TABLET ORAL at 10:37

## 2024-04-23 RX ADMIN — PREGABALIN 150 MG: 150 CAPSULE ORAL at 01:20

## 2024-04-23 RX ADMIN — TRAMADOL HYDROCHLORIDE 50 MG: 50 TABLET, COATED ORAL at 01:21

## 2024-04-23 RX ADMIN — METOPROLOL TARTRATE 12.5 MG: 25 TABLET, FILM COATED ORAL at 20:42

## 2024-04-23 RX ADMIN — LEVOFLOXACIN 375 MG: 750 TABLET, FILM COATED ORAL at 10:38

## 2024-04-23 RX ADMIN — PANTOPRAZOLE SODIUM 40 MG: 40 TABLET, DELAYED RELEASE ORAL at 06:03

## 2024-04-23 RX ADMIN — TRAMADOL HYDROCHLORIDE 50 MG: 50 TABLET, COATED ORAL at 15:29

## 2024-04-23 RX ADMIN — ASPIRIN 81 MG: 81 TABLET, COATED ORAL at 10:52

## 2024-04-23 RX ADMIN — RANOLAZINE 1000 MG: 500 TABLET, EXTENDED RELEASE ORAL at 01:20

## 2024-04-23 RX ADMIN — MORPHINE SULFATE 2 MG: 2 INJECTION, SOLUTION INTRAMUSCULAR; INTRAVENOUS at 04:39

## 2024-04-23 RX ADMIN — SODIUM CHLORIDE, PRESERVATIVE FREE 10 ML: 5 INJECTION INTRAVENOUS at 20:42

## 2024-04-23 RX ADMIN — LEVOTHYROXINE SODIUM 75 MCG: 0.07 TABLET ORAL at 06:03

## 2024-04-23 RX ADMIN — SODIUM CHLORIDE: 9 INJECTION, SOLUTION INTRAVENOUS at 11:01

## 2024-04-23 RX ADMIN — TICAGRELOR 90 MG: 90 TABLET ORAL at 01:20

## 2024-04-23 RX ADMIN — RANOLAZINE 1000 MG: 500 TABLET, EXTENDED RELEASE ORAL at 10:38

## 2024-04-23 RX ADMIN — MORPHINE SULFATE 2 MG: 2 INJECTION, SOLUTION INTRAMUSCULAR; INTRAVENOUS at 00:03

## 2024-04-23 RX ADMIN — METOPROLOL TARTRATE 12.5 MG: 25 TABLET, FILM COATED ORAL at 01:21

## 2024-04-23 RX ADMIN — MIDODRINE HYDROCHLORIDE 5 MG: 5 TABLET ORAL at 21:25

## 2024-04-23 RX ADMIN — ENOXAPARIN SODIUM 40 MG: 100 INJECTION SUBCUTANEOUS at 10:54

## 2024-04-23 RX ADMIN — DOCUSATE SODIUM LIQUID 50 MG: 100 LIQUID ORAL at 20:41

## 2024-04-23 RX ADMIN — POTASSIUM CHLORIDE 10 MEQ: 10 CAPSULE, COATED, EXTENDED RELEASE ORAL at 10:37

## 2024-04-23 RX ADMIN — Medication 100 MG: at 10:54

## 2024-04-23 RX ADMIN — SODIUM CHLORIDE, PRESERVATIVE FREE 10 ML: 5 INJECTION INTRAVENOUS at 11:00

## 2024-04-23 RX ADMIN — MORPHINE SULFATE 2 MG: 2 INJECTION, SOLUTION INTRAMUSCULAR; INTRAVENOUS at 16:46

## 2024-04-23 RX ADMIN — TRAMADOL HYDROCHLORIDE 50 MG: 50 TABLET, COATED ORAL at 10:38

## 2024-04-23 RX ADMIN — FLUDROCORTISONE ACETATE 0.1 MG: 0.1 TABLET ORAL at 10:53

## 2024-04-23 RX ADMIN — CARBIDOPA AND LEVODOPA 1 TABLET: 25; 100 TABLET ORAL at 14:17

## 2024-04-23 RX ADMIN — CARBIDOPA AND LEVODOPA 1 TABLET: 25; 100 TABLET ORAL at 20:42

## 2024-04-23 RX ADMIN — CARBIDOPA AND LEVODOPA 1 TABLET: 25; 100 TABLET ORAL at 10:37

## 2024-04-23 RX ADMIN — TRAMADOL HYDROCHLORIDE 50 MG: 50 TABLET, COATED ORAL at 20:42

## 2024-04-23 RX ADMIN — PREGABALIN 150 MG: 150 CAPSULE ORAL at 10:52

## 2024-04-23 RX ADMIN — RANOLAZINE 1000 MG: 500 TABLET, EXTENDED RELEASE ORAL at 20:42

## 2024-04-23 ASSESSMENT — PAIN SCALES - GENERAL
PAINLEVEL_OUTOF10: 8
PAINLEVEL_OUTOF10: 9
PAINLEVEL_OUTOF10: 9
PAINLEVEL_OUTOF10: 10
PAINLEVEL_OUTOF10: 6
PAINLEVEL_OUTOF10: 6
PAINLEVEL_OUTOF10: 9

## 2024-04-23 ASSESSMENT — PAIN DESCRIPTION - LOCATION
LOCATION: CHEST
LOCATION: CHEST;BACK
LOCATION: BACK;CHEST
LOCATION: CHEST
LOCATION: BACK;CHEST

## 2024-04-23 ASSESSMENT — ENCOUNTER SYMPTOMS
SHORTNESS OF BREATH: 0
VOMITING: 0
NAUSEA: 0
ABDOMINAL PAIN: 0
COUGH: 0

## 2024-04-23 ASSESSMENT — PAIN DESCRIPTION - DESCRIPTORS
DESCRIPTORS: SHARP

## 2024-04-23 ASSESSMENT — PAIN DESCRIPTION - ORIENTATION
ORIENTATION: LOWER
ORIENTATION: LEFT
ORIENTATION: LOWER
ORIENTATION: LEFT
ORIENTATION: LEFT;UPPER
ORIENTATION: LEFT

## 2024-04-23 NOTE — ACP (ADVANCE CARE PLANNING)
Advance Care Planning     Advance Care Planning Activator (Inpatient)  Conversation Note      Date of ACP Conversation: 4/23/2024     Conversation Conducted with: Patient with Decision Making Capacity    ACP Activator: Sabina Lucas RN        Health Care Decision Maker:     Current Designated Health Care Decision Maker:     Primary Decision Maker: Elliott Marie - Child - [phone]    Primary Decision Maker: FrankAlek - Child    Today we documented Decision Maker(s) consistent with Legal Next of Kin hierarchy.    Care Preferences    Ventilation:  \"If you were in your present state of health and suddenly became very ill and were unable to breathe on your own, what would your preference be about the use of a ventilator (breathing machine) if it were available to you?\"      Would the patient desire the use of ventilator (breathing machine)?: yes    \"If your health worsens and it becomes clear that your chance of recovery is unlikely, what would your preference be about the use of a ventilator (breathing machine) if it were available to you?\"     Would the patient desire the use of ventilator (breathing machine)?: Yes      Resuscitation  \"CPR works best to restart the heart when there is a sudden event, like a heart attack, in someone who is otherwise healthy. Unfortunately, CPR does not typically restart the heart for people who have serious health conditions or who are very sick.\"    \"In the event your heart stopped as a result of an underlying serious health condition, would you want attempts to be made to restart your heart (answer \"yes\" for attempt to resuscitate) or would you prefer a natural death (answer \"no\" for do not attempt to resuscitate)?\" yes       [] Yes   [] No   Educated Patient / Decision Maker regarding differences between Advance Directives and portable DNR orders.    Length of ACP Conversation in minutes:      Conversation Outcomes:  ACP discussion completed    Follow-up plan:    [] Schedule

## 2024-04-23 NOTE — CONSULTS
MD Yandy   Compression Stockings MISC by Does not apply route 3/13/24   Yandy Jones MD   midodrine (PROAMATINE) 2.5 MG tablet Take 1 tablet by mouth 3 times daily as needed (SBP <100)  Patient taking differently: Take 1 tablet by mouth 3 times daily as needed (SBP <100) Every morning 2/3/24   Yandy Jones MD       Allergies:  Latex, Atorvastatin, Fluvastatin, Imdur [isosorbide nitrate], Isosorbide, Methadone, Nalbuphine, Oxycodone, Penicillins, Rosuvastatin, Statins, and Zetia [ezetimibe]    Social History:   reports that he has never smoked. He has never used smokeless tobacco. He reports that he does not drink alcohol and does not use drugs.     Family History: family history is not on file. No h/o sudden cardiac death.No for premature CAD    REVIEW OF SYSTEMS:    Constitutional: there has been no unanticipated weight loss. There's been No change in energy level, No change in activity level.     Eyes: No visual changes or diplopia. No scleral icterus.  ENT: No Headaches, hearing loss or vertigo. No mouth sores or sore throat.  Cardiovascular: see above  Respiratory: see above  Gastrointestinal: No abdominal pain, appetite loss, blood in stools.   Genitourinary: No dysuria, trouble voiding, or hematuria.  Musculoskeletal:  No gait disturbance, No weakness or joint complaints.  Integumentary: No rash or pruritis.  Neurological: No headache or diplopia. No tingling  Psychiatric: No anxiety, or depression.  Endocrine: No temperature intolerance.   Hematologic/Lymphatic: No abnormal bruising or bleeding, blood clots or swollen lymph nodes.  Allergic/Immunologic: No nasal congestion or hives.      PHYSICAL EXAM:      /67   Pulse 68   Temp 97.8 °F (36.6 °C) (Oral)   Resp 18   Ht 1.778 m (5' 10\")   Wt 56.9 kg (125 lb 7.1 oz)   SpO2 98%   BMI 18.00 kg/m²    Constitutional and General Appearance: alert, cooperative, no distress and appears stated age  HEENT: PERRL, no cervical lymphadenopathy. No

## 2024-04-23 NOTE — CARE COORDINATION
Case Management Assessment  Initial Evaluation    Date/Time of Evaluation: 4/23/2024 11:32 AM  Assessment Completed by: Sabina Lucas RN    If patient is discharged prior to next notation, then this note serves as note for discharge by case management.    Patient Name: Rowdy Marie                   YOB: 1950  Diagnosis: Angina pectoris (HCC) [I20.9]  Chest pain, unspecified type [R07.9]                   Date / Time: 4/22/2024  5:47 PM    Patient Admission Status: Inpatient   Readmission Risk (Low < 19, Mod (19-27), High > 27): Readmission Risk Score: 23.3    Current PCP: Yandy Jones MD  PCP verified by CM? Yes    Chart Reviewed: Yes      History Provided by: Patient, Medical Record  Patient Orientation: Alert and Oriented    Patient Cognition: Alert    Hospitalization in the last 30 days (Readmission):  Yes    If yes, Readmission Assessment in CM Navigator will be completed.    Readmission Assessment  Number of Days since last admission?: 8-30 days  Previous Disposition: Home with Family  Who is being Interviewed: Patient  What was the patient's/caregiver's perception as to why they think they needed to return back to the hospital?: (S) Did not agree to original recommended D/C plan (I think they should have kept me longer. I told them I was not ready to go home yet. I have had stents placed before and I did not feel good.)  Did you visit your Primary Care Physician after you left the hospital, before you returned this time?: No  Why weren't you able to visit your PCP?: (S) Did not have an appointment (I called my doctors office but could not get in. They recommended I come to the ER because of my pain.)  Did you see a specialist, such as Cardiac, Pulmonary, Orthopedic Physician, etc. after you left the hospital?: No  Who advised the patient to return to the hospital?: Self-referral  Does the patient report anything that got in the way of taking their medications?: No  In our efforts to

## 2024-04-23 NOTE — ED NOTES
Admission Dx: chest pain    Pts Chief Complaints on Arrival: chest pain    ADL's - Partial assistance    Pending Diagnostics:  n/a    Residence PTA: single story home    Special Considerations/Circumstances:  n/a    Vitals: Current vital signs:  /72   Pulse 75   Temp 97.5 °F (36.4 °C) (Oral)   Resp 16   Ht 1.778 m (5' 10\")   Wt 70.3 kg (155 lb)   SpO2 98%   BMI 22.24 kg/m²                MEWS Score: 1

## 2024-04-23 NOTE — DISCHARGE INSTR - COC
Therapy and Occupational Therapy  Weight Bearing Status/Restrictions: No weight bearing restrictions  Other Medical Equipment (for information only, NOT a DME order):  cane and walker  Other Treatments: Skilled Nursing assessment and monitoring. Medication education and monitoring per protocol.      Patient's personal belongings (please select all that are sent with patient):  None    RN SIGNATURE:  Electronically signed by STAS DOVE on 4/24/24 at 1:00 PM EDT    CASE MANAGEMENT/SOCIAL WORK SECTION    Inpatient Status Date: 4/22/2024    Readmission Risk Assessment Score:  Readmission Risk              Risk of Unplanned Readmission:  28           Discharging to Facility/ Agency   [facility]  [address]  [place] [address]  Phone [phone]  Fax  [phone]     Dialysis Facility (if applicable)   Name:  Address:  Dialysis Schedule:  Phone:  Fax:    / signature: Electronically signed by Sabina Lucas RN on 4/23/24 at 11:25 AM EDT    PHYSICIAN SECTION    Prognosis: Fair    Condition at Discharge: Stable    Rehab Potential (if transferring to Rehab): Good    Recommended Labs or Other Treatments After Discharge: SEE ABOVE    Physician Certification: I certify the above information and transfer of Rowdy Marie  is necessary for the continuing treatment of the diagnosis listed and that he requires Home Care for less 30 days.     Update Admission H&P: No change in H&P    PHYSICIAN SIGNATURE:  Electronically signed by Aracely Pierre MD on 4/24/24 at 9:30 AM EDT

## 2024-04-23 NOTE — H&P
Anion Gap 17 9 - 17 mmol/L    Glucose 90 70 - 99 mg/dL    BUN 26 (H) 8 - 23 mg/dL    Creatinine 1.3 (H) 0.7 - 1.2 mg/dL    Est, Glom Filt Rate 57 (L) >60 mL/min/1.73m2    Calcium 9.3 8.6 - 10.4 mg/dL   Troponin    Collection Time: 04/23/24  5:47 AM   Result Value Ref Range    Troponin, High Sensitivity 46 (H) 0 - 22 ng/L   TSH with Reflex    Collection Time: 04/23/24  5:47 AM   Result Value Ref Range    TSH 2.08 0.30 - 5.00 uIU/mL   Magnesium    Collection Time: 04/23/24  5:47 AM   Result Value Ref Range    Magnesium 2.0 1.6 - 2.6 mg/dL       Imaging/Diagnostics:    CTA CHEST ABDOMEN PELVIS W CONTRAST  Addendum: ADDENDUM:   Correction: There is no endovascular aortic stent valve device.  There is  a   left atrial appendage occlusion device in place.  It appears to contain   thrombus which is unchanged.  Narrative: EXAMINATION:  CTA OF THE CHEST, ABDOMEN AND PELVIS WITH CONTRAST    4/22/2024 9:00 pm:    TECHNIQUE:  CTA of the chest, abdomen and pelvis was performed after the administration  of intravenous contrast.  Multiplanar reformatted images are provided for  review.  MIP images are provided for review. Automated exposure control,  iterative reconstruction, and/or weight based adjustment of the mA/kV was  utilized to reduce the radiation dose to as low as reasonably achievable.    COMPARISON:  Chest x-ray April 13, 2024.  CT chest April 10, 2024    HISTORY:  ORDERING SYSTEM PROVIDED HISTORY: rule out dissection  TECHNOLOGIST PROVIDED HISTORY:  rule out dissection  Additional Contrast?->1  Reason for Exam: rule out dissection, chest pain, fatigue    FINDINGS:    CTA CHEST:    Thoracic aorta: No evidence of thoracic aortic aneurysm or dissection.  No  acute abnormality of the aorta.  Calcific coronary artery disease.  Endovascular stent valve within the aorta.  This appears to contain thrombus.  Mediastinal vascular clips.  Mixed plaque in irregularity along the thoracic  aorta.  No significant ulcerations

## 2024-04-24 VITALS
HEART RATE: 65 BPM | BODY MASS INDEX: 19.03 KG/M2 | RESPIRATION RATE: 16 BRPM | DIASTOLIC BLOOD PRESSURE: 57 MMHG | SYSTOLIC BLOOD PRESSURE: 120 MMHG | TEMPERATURE: 97 F | HEIGHT: 70 IN | WEIGHT: 132.94 LBS | OXYGEN SATURATION: 99 %

## 2024-04-24 LAB
ANION GAP SERPL CALCULATED.3IONS-SCNC: 10 MMOL/L (ref 9–17)
BUN SERPL-MCNC: 22 MG/DL (ref 8–23)
CALCIUM SERPL-MCNC: 8.5 MG/DL (ref 8.6–10.4)
CHLORIDE SERPL-SCNC: 96 MMOL/L (ref 98–107)
CO2 SERPL-SCNC: 27 MMOL/L (ref 20–31)
CREAT SERPL-MCNC: 1 MG/DL (ref 0.7–1.2)
ERYTHROCYTE [DISTWIDTH] IN BLOOD BY AUTOMATED COUNT: 13.9 % (ref 11.5–14.9)
GFR SERPL CREATININE-BSD FRML MDRD: 78 ML/MIN/1.73M2
GLUCOSE SERPL-MCNC: 91 MG/DL (ref 70–99)
HCT VFR BLD AUTO: 30.5 % (ref 41–53)
HGB BLD-MCNC: 9.9 G/DL (ref 13.5–17.5)
MCH RBC QN AUTO: 30.1 PG (ref 26–34)
MCHC RBC AUTO-ENTMCNC: 32.7 G/DL (ref 31–37)
MCV RBC AUTO: 92.2 FL (ref 80–100)
PLATELET # BLD AUTO: 305 K/UL (ref 150–450)
PMV BLD AUTO: 9.2 FL (ref 6–12)
POTASSIUM SERPL-SCNC: 3.9 MMOL/L (ref 3.7–5.3)
RBC # BLD AUTO: 3.3 M/UL (ref 4.5–5.9)
SODIUM SERPL-SCNC: 133 MMOL/L (ref 135–144)
WBC OTHER # BLD: 9.6 K/UL (ref 3.5–11)

## 2024-04-24 PROCEDURE — 97530 THERAPEUTIC ACTIVITIES: CPT

## 2024-04-24 PROCEDURE — 6370000000 HC RX 637 (ALT 250 FOR IP): Performed by: NURSE PRACTITIONER

## 2024-04-24 PROCEDURE — 6370000000 HC RX 637 (ALT 250 FOR IP): Performed by: INTERNAL MEDICINE

## 2024-04-24 PROCEDURE — 99233 SBSQ HOSP IP/OBS HIGH 50: CPT | Performed by: NURSE PRACTITIONER

## 2024-04-24 PROCEDURE — 80048 BASIC METABOLIC PNL TOTAL CA: CPT

## 2024-04-24 PROCEDURE — 85027 COMPLETE CBC AUTOMATED: CPT

## 2024-04-24 PROCEDURE — 2580000003 HC RX 258: Performed by: INTERNAL MEDICINE

## 2024-04-24 PROCEDURE — 36415 COLL VENOUS BLD VENIPUNCTURE: CPT

## 2024-04-24 PROCEDURE — 97110 THERAPEUTIC EXERCISES: CPT

## 2024-04-24 PROCEDURE — 99239 HOSP IP/OBS DSCHRG MGMT >30: CPT | Performed by: INTERNAL MEDICINE

## 2024-04-24 PROCEDURE — 6360000002 HC RX W HCPCS: Performed by: NURSE PRACTITIONER

## 2024-04-24 RX ORDER — NITROGLYCERIN 40 MG/1
1 PATCH TRANSDERMAL DAILY
Qty: 30 PATCH | Refills: 3 | Status: SHIPPED | OUTPATIENT
Start: 2024-04-25

## 2024-04-24 RX ORDER — LIDOCAINE 4 G/G
1 PATCH TOPICAL DAILY
Qty: 30 EACH | Refills: 0 | Status: SHIPPED | OUTPATIENT
Start: 2024-04-24

## 2024-04-24 RX ORDER — FLUDROCORTISONE ACETATE 0.1 MG/1
0.2 TABLET ORAL DAILY
Qty: 30 TABLET | Refills: 0 | Status: SHIPPED | OUTPATIENT
Start: 2024-04-24 | End: 2024-05-24

## 2024-04-24 RX ORDER — FLUDROCORTISONE ACETATE 0.1 MG/1
0.2 TABLET ORAL DAILY
Status: DISCONTINUED | OUTPATIENT
Start: 2024-04-25 | End: 2024-04-24 | Stop reason: HOSPADM

## 2024-04-24 RX ORDER — DOCUSATE SODIUM 100 MG/1
100 CAPSULE, LIQUID FILLED ORAL DAILY
Status: DISCONTINUED | OUTPATIENT
Start: 2024-04-24 | End: 2024-04-24 | Stop reason: HOSPADM

## 2024-04-24 RX ORDER — LIDOCAINE 4 G/G
1 PATCH TOPICAL DAILY
Status: DISCONTINUED | OUTPATIENT
Start: 2024-04-24 | End: 2024-04-24 | Stop reason: HOSPADM

## 2024-04-24 RX ADMIN — MIDODRINE HYDROCHLORIDE 5 MG: 5 TABLET ORAL at 16:12

## 2024-04-24 RX ADMIN — Medication 100 MG: at 09:20

## 2024-04-24 RX ADMIN — MIDODRINE HYDROCHLORIDE 5 MG: 5 TABLET ORAL at 09:19

## 2024-04-24 RX ADMIN — CARBIDOPA AND LEVODOPA 1 TABLET: 25; 100 TABLET ORAL at 09:19

## 2024-04-24 RX ADMIN — PREGABALIN 150 MG: 150 CAPSULE ORAL at 09:19

## 2024-04-24 RX ADMIN — LEVOTHYROXINE SODIUM 75 MCG: 0.07 TABLET ORAL at 06:19

## 2024-04-24 RX ADMIN — SODIUM CHLORIDE: 9 INJECTION, SOLUTION INTRAVENOUS at 00:53

## 2024-04-24 RX ADMIN — TRAMADOL HYDROCHLORIDE 50 MG: 50 TABLET, COATED ORAL at 16:12

## 2024-04-24 RX ADMIN — MIDODRINE HYDROCHLORIDE 5 MG: 5 TABLET ORAL at 12:46

## 2024-04-24 RX ADMIN — CYANOCOBALAMIN TAB 1000 MCG 1000 MCG: 1000 TAB at 09:19

## 2024-04-24 RX ADMIN — POTASSIUM CHLORIDE 10 MEQ: 10 CAPSULE, COATED, EXTENDED RELEASE ORAL at 09:19

## 2024-04-24 RX ADMIN — CARBIDOPA AND LEVODOPA 1 TABLET: 25; 100 TABLET ORAL at 16:12

## 2024-04-24 RX ADMIN — TICAGRELOR 90 MG: 90 TABLET ORAL at 09:18

## 2024-04-24 RX ADMIN — PANTOPRAZOLE SODIUM 40 MG: 40 TABLET, DELAYED RELEASE ORAL at 06:19

## 2024-04-24 RX ADMIN — CARBIDOPA AND LEVODOPA 1 TABLET: 25; 100 TABLET ORAL at 12:46

## 2024-04-24 RX ADMIN — MORPHINE SULFATE 2 MG: 2 INJECTION, SOLUTION INTRAMUSCULAR; INTRAVENOUS at 09:30

## 2024-04-24 RX ADMIN — TRAMADOL HYDROCHLORIDE 50 MG: 50 TABLET, COATED ORAL at 09:19

## 2024-04-24 RX ADMIN — ENOXAPARIN SODIUM 40 MG: 100 INJECTION SUBCUTANEOUS at 09:20

## 2024-04-24 RX ADMIN — DOCUSATE SODIUM 100 MG: 100 CAPSULE, LIQUID FILLED ORAL at 11:08

## 2024-04-24 RX ADMIN — MORPHINE SULFATE 2 MG: 2 INJECTION, SOLUTION INTRAMUSCULAR; INTRAVENOUS at 00:47

## 2024-04-24 RX ADMIN — RANOLAZINE 1000 MG: 500 TABLET, EXTENDED RELEASE ORAL at 09:19

## 2024-04-24 RX ADMIN — ASPIRIN 81 MG: 81 TABLET, COATED ORAL at 09:19

## 2024-04-24 ASSESSMENT — PAIN DESCRIPTION - PAIN TYPE: TYPE: CHRONIC PAIN

## 2024-04-24 ASSESSMENT — PAIN SCALES - GENERAL
PAINLEVEL_OUTOF10: 8
PAINLEVEL_OUTOF10: 8
PAINLEVEL_OUTOF10: 7
PAINLEVEL_OUTOF10: 9
PAINLEVEL_OUTOF10: 8

## 2024-04-24 ASSESSMENT — PAIN - FUNCTIONAL ASSESSMENT: PAIN_FUNCTIONAL_ASSESSMENT: ACTIVITIES ARE NOT PREVENTED

## 2024-04-24 ASSESSMENT — PAIN DESCRIPTION - LOCATION
LOCATION: CHEST
LOCATION: CHEST

## 2024-04-24 ASSESSMENT — PAIN DESCRIPTION - DESCRIPTORS
DESCRIPTORS: TIGHTNESS
DESCRIPTORS: SHARP

## 2024-04-24 ASSESSMENT — PAIN DESCRIPTION - ORIENTATION
ORIENTATION: LEFT
ORIENTATION: LEFT

## 2024-04-24 ASSESSMENT — PAIN DESCRIPTION - ONSET: ONSET: ON-GOING

## 2024-04-24 ASSESSMENT — PAIN DESCRIPTION - FREQUENCY: FREQUENCY: CONTINUOUS

## 2024-04-24 NOTE — CARE COORDINATION
ONGOING DISCHARGE PLAN:    Patient is alert and oriented x4.    Spoke with patient regarding discharge plan and patient confirms that plan is still home with [facility] and follows at [facility]. Notified Ramila from [facility] that pt will be discharged home today. Clotild[facility] can pull DAVID and d/c med list from [facility].    Will continue to follow for additional discharge needs.    If patient is discharged prior to next notation, then this note serves as note for discharge by case management.    Electronically signed by Esther Blake RN on 4/24/2024 at 1:39 PM

## 2024-04-24 NOTE — PROGRESS NOTES
Physical Therapy Cancel Note      DATE: 2024    NAME: Rowdy Marie  MRN: [id number]   : 1950      Patient not seen this date for Physical Therapy due to:    Pt declined PT at this time.  Pt states he is going home shortly.      Electronically signed by Sabina Roland PTA on 2024 at 3:52 PM    
    [facility] Internal Medicine  Aamir Caballero MD; Fransisco Cee MD; Ko Elias MD; MD Yandy Walter MD; Larissa Oro MD  [facility] Internal Medicine   IN-PATIENT SERVICE  [facility]                 Date:   4/23/2024  Patientname:  Rowdy Marie  Date of admission:  4/22/2024  5:47 PM  MRN:   [id number]  Account:  [id number]  YOB: 1950  PCP:    Yandy Jones MD  Room:   2090/2090-01  Code Status:    Full Code      Chief Complaint:     Chief Complaint   Patient presents with    Chest Pain     Recent NSTEMI with cardiac cath and stent. Pt also had pneumonia while in hospital.      Fatigue       History of Present Illness:     Rowdy Marie is a 74 y.o. Non- / non  male who presents with Chest Pain (Recent NSTEMI with cardiac cath and stent. Pt also had pneumonia while in hospital) and Fatigue and is admitted to the hospital for the management of Angina pectoris (HCC). Medical history significant for A-fib, Watchman procedure, multivessel CAD, CABG x 4, multiple cardiac stents, HLD, Parkinson's disease, hypothyroidism.  Recent admit 4/11 to 4/14 for NSTEMI, cardiac cath and stent placement x2.  Concerns for mitral valve disease s/p PCI, continued on aspirin, Brilinta.  Treated for community-acquired pneumonia with Levaquin.  Presented to ED today complaining of chest pain and feeling unwell.  Reports having chest pain since discharge from hospital on 4/14.  Describes pain as mid chest radiating into abdomen, dull and constant.  Troponin 59 with repeat 43.  With recent NSTEMI troponins were elevated to 260.  BNP 1800, no prior elevation.  EKG sinus arrhythmia, no acute ST or T wave changes.  CTA chest thrombus and left atrial appendage device, unchanged.  Mild bibasilar interstitial infiltrates. Cardiology consulted in ED.  20 mg IV Lasix ordered.  WBC 11.5.  CXR no acute findings.  Continued home dose of Levaquin for CAP. Denies 
    [facility]   IN-PATIENT SERVICE   [facility]    HISTORY AND PHYSICAL EXAMINATION            Date:   4/24/2024  Patient name:  Rowdy Marie  Date of admission:  4/22/2024  5:47 PM  MRN:   [id number]  Account:  [id number]  YOB: 1950  PCP:    Yandy Jones MD  [facility]:   2090[facility]  Code Status:    Full Code    Chief Complaint:     Chief Complaint   Patient presents with    Chest Pain     Recent NSTEMI with cardiac cath and stent. Pt also had pneumonia while in hospital.      Fatigue       History Obtained From:     patient    History of Present Illness:     The patient is a 74 y.o.  Non- / non  male who presents with Chest Pain (Recent NSTEMI with cardiac cath and stent. Pt also had pneumonia while in hospital.  ) and Fatigue   and he is admitted to the hospital for the management of      Rowdy Marie is a 74 y.o. Non- / non  male who presents with Chest Pain (Recent NSTEMI with cardiac cath and stent. Pt also had pneumonia while in hospital) and Fatigue and is admitted to the hospital for the management of Angina pectoris (HCC). Medical history significant for A-fib, Watchman procedure, multivessel CAD, CABG x 4, multiple cardiac stents, HLD, Parkinson's disease, hypothyroidism.  Recent admit 4/11 to 4/14 for NSTEMI, cardiac cath and stent placement x2.  Concerns for mitral valve disease s/p PCI, continued on aspirin, Brilinta.  Treated for community-acquired pneumonia with Levaquin.  Presented to ED today complaining of chest pain and feeling unwell.  Reports having chest pain since discharge from hospital on 4/14.  Describes pain as mid chest radiating into abdomen, dull and constant.  Troponin 59 with repeat 43.  With recent NSTEMI troponins were elevated to 260.  BNP 1800, no prior elevation.  EKG sinus arrhythmia, no acute ST or T wave changes.  CTA chest thrombus and left atrial appendage device, unchanged.  Mild bibasilar interstitial 
CLINICAL PHARMACY NOTE: MEDS TO BEDS    Total # of Prescriptions Filled: 2   The following medications were delivered to the patient:  Lidocaine 4%  Fludricortisone 0.1mg    Additional Documentation: delivered to pt room in [facility] closet he and Nurse Arina padgett 4/24/24 kbg    -Nitroglycerin patch out of stock per pt transfer to [id number] - [address] -  [phone]  
Comprehensive Nutrition Assessment    Type and Reason for Visit:  Initial, Positive Nutrition Screen (Decreased appetite and intake, wt loss)    Nutrition Recommendations/Plan:   Provide 3-4 gm Na, Low Caffeine Diet.  Provide High Calorie, High Protein Oral Nutrition Supplement with meals.      Malnutrition Assessment:  Malnutrition Status:  Severe malnutrition (04/23/24 1851)    Context:  Acute Illness (Acute on chronic)     Findings of the 6 clinical characteristics of malnutrition:  Energy Intake:  50% or less of estimated energy requirements for 5 or more days  Weight Loss:  Greater than 5% over 1 month     Body Fat Loss:  Moderate body fat loss (Moderate to Severe) Orbital, Buccal region   Muscle Mass Loss:  Moderate muscle mass loss (Moderate to Severe) Temples (temporalis), Clavicles (pectoralis & deltoids)  Fluid Accumulation:  No significant fluid accumulation     Strength:  Not Performed    Nutrition Assessment:    Pt admitted with angina pectoris. States appetite and intake have been minimal for 2 weeks. States he ate the most today as he has over these past weeks; intake of lunch appeared to be less than 25%. States he does think his appetite is improving. Willing to receive Ensure with meals. Avoids uday due to contribution to headaches. No chewing or swallowing issues. Diet to be liberalized somewhat to promote increased intake.    Nutrition Related Findings:    No edema. Hx: CABG, CAD, Parkinson's, Esophageal Stricture. Labs and meds reviewed.         Current Nutrition Intake & Therapies:    Average Meal Intake: 1-25%     ADULT DIET; Regular; Low Fat/Low Chol/High Fiber/FREDDIE; No Caffeine  ADULT ORAL NUTRITION SUPPLEMENT; AM Snack, Dinner; Standard High Calorie/High Protein Oral Supplement    Anthropometric Measures:  Height: 177.8 cm (5' 10\")  Ideal Body Weight (IBW): 166 lbs (75 kg)    Admission Body Weight: 56.9 kg (125 lb 7.1 oz) ([facility] 4/23. Wt discrepancies noted.)  Current Body Weight: 
Occupational Therapy  Facility/Department: [facility] PROGRESSIVE CARE  Rehabilitation Occupational Therapy Daily Treatment Note    Date: 24  Patient Name: Rowdy Marie       Room:   MRN: [id number]  Account: [id number]   : 1950  (74 y.o.) Gender: male      RN reports patient is medically stable for therapy treatment this date. Chart reviewed prior to treatment and patient is agreeable for therapy.  All lines intact and patient positioned comfortably at end of treatment.  All patient needs addressed prior to ending therapy session.      Due to recent hospitalization and medical condition, pt would benefit from additional intermittent skilled therapy at time of discharge.  Please refer to the AM-PAC score for current functional status.                Past Medical History:  has a past medical history of A-fib ([facility]), PREET (acute kidney injury) ([facility]), Anemia, CAD (coronary artery disease), Esophageal stricture, Hyperlipidemia, Hypokalemia, Hypomagnesemia, NSTEMI (non-ST elevated myocardial infarction) ([facility]), Orthostatic hypotension, Parkinsons ([facility]), Presence of Watchman left atrial appendage closure device, and Prostate CA ([facility]).  Past Surgical History:   has a past surgical history that includes Coronary artery bypass graft; Cardiac catheterization; Coronary angioplasty with stent; Diagnostic Cardiac Cath Lab Procedure (2024); Cardiac procedure (N/A, 2024); and Cardiac procedure (N/A, 2024).    Restrictions  Restrictions/Precautions: Fall Risk, General Precautions  Implants present? : Metal implants (hardware in back, plates in R ankle, LUE screw, pins in Sixto elbow, stent in heart)  Required Braces or Orthoses?: No    Subjective  Subjective: Pt resting in bed upon arrival agreeable to OT. 7/10 in chest toward heart with RN present in room and aware.  Restrictions/Precautions: Fall Risk;General Precautions             Objective    Orthostatics:   Supine: 114/51 (69) HR 76  Sitting 
Occupational Therapy  [facility]   Occupational Therapy Evaluation  Date: 24  Patient Name: Rowdy Marie       Room:   MRN: [id number]  Account: [id number]   : 1950  (74 y.o.) Gender: male     Discharge Recommendations:  Further Occupational Therapy is recommended upon facility discharge.    OT Equipment Recommendations  Other: TBD    Referring Practitioner: Aracely Peirre MD  Diagnosis: Angina pectoris   Additional Pertinent Hx: Per chart: Rowdy Marie is a 74 y.o. Non- / non  male who presents with Chest Pain (Recent NSTEMI with cardiac cath and stent. Pt also had pneumonia while in hospital) and Fatigue and is admitted to the hospital for the management of Angina pectoris (HCC). Medical history significant for A-fib, Watchman procedure, multivessel CAD, CABG x 4, multiple cardiac stents, HLD, Parkinson's disease, hypothyroidism.  Recent admit  to  for NSTEMI, cardiac cath and stent placement x2.  Concerns for mitral valve disease s/p PCI, continued on aspirin, Brilinta.  Treated for community-acquired pneumonia with Levaquin.  Presented to ED today complaining of chest pain and feeling unwell.  Reports having chest pain since discharge from hospital on .  Describes pain as mid chest radiating into abdomen, dull and constant.  Troponin 59 with repeat 43.  With recent NSTEMI troponins were elevated to 260.  BNP 1800, no prior elevation.  EKG sinus arrhythmia, no acute ST or T wave changes.  CTA chest thrombus and left atrial appendage device, unchanged.  Mild bibasilar interstitial infiltrates. Cardiology consulted in ED.  20 mg IV Lasix ordered.  WBC 11.5.  CXR no acute findings.  Continued home dose of Levaquin for CAP. Denies fever, chills, abdominal pain, nausea, vomiting, diarrhea, and urinary symptoms. Symptoms are reported as acute, constant and moderate in severity.    Treatment Diagnosis: impaired self care status    Past 
Physical Therapy  Facility/Department: [facility] PROGRESSIVE CARE  Physical Therapy Initial Assessment    Name: Rowdy Marie  : 1950  MRN: [id number]  Date of Service: 2024    Discharge Recommendations:  Patient would benefit from continued therapy after discharge, Therapy recommended at discharge          Patient Diagnosis(es): The primary encounter diagnosis was Chest pain, unspecified type. Diagnoses of Angina pectoris (HCC) and CAD, multiple vessel were also pertinent to this visit.  Past Medical History:  has a past medical history of A-fib (HCC), PREET (acute kidney injury) (HCC), Anemia, CAD (coronary artery disease), Esophageal stricture, Hyperlipidemia, Hypokalemia, Hypomagnesemia, NSTEMI (non-ST elevated myocardial infarction) ([facility]), Orthostatic hypotension, Parkinsons (HCC), Presence of Watchman left atrial appendage closure device, and Prostate CA ([facility]).  Past Surgical History:  has a past surgical history that includes Coronary artery bypass graft; Cardiac catheterization; Coronary angioplasty with stent; Diagnostic Cardiac Cath Lab Procedure (2024); Cardiac procedure (N/A, 2024); and Cardiac procedure (N/A, 2024).    Assessment   Assessment: Pt with multiple admission recent, recent cardiac cath. Pt reports multiple falls at home and reports symptoms of dizzyness. Pt able to dangle at EOB , BP drops with dangling, reports moderate dizzyness and unable to stand for standing BP. Will conitnue POC to maximize rehab potential and assist with increased activity as able. Pt high fall risk and below his baselin level of fucntion. Will benefit from continued therapy while in acute care as well as post discharge.  Treatment Diagnosis: Impaired function  Specific Instructions for Next Treatment: Monitor BP , pt reports dizzyness, mulitple falls at  home.  Therapy Prognosis: Fair  Decision Making: Medium Complexity  History: A-fib, parkinson's, recent cardiac cath.  Requires PT Follow-Up: 
headache     H/O neck surgery     Chest pain     NSTEMI (non-ST elevated myocardial infarction) (HCC)     S/P angioplasty with stent     CAD, multiple vessel     CAP (community acquired pneumonia)     Angina pectoris (HCC)      Plan of Treatment:   Stable. Case was discussed with Dr. Mane yesterday and recommend continued medication management. Continue NTG patch started yesterday. Tolerating. Could not tolerate Imdur d/t headache.   Continue PO ASA & Brilinta. Allergy to statin  Continue Ranexa  Continue Midodrine for BP support.  C/O significant lightheadedness with standing. Will check orthostatics. Continue midodrine and will increase Florinef  Avoid dehydration. Discussed importance of monitoring PO fluid intake  MR note  Keep K >4 and Mg >2    Electronically signed by ALMAS Saldivar CNP on 4/24/2024 at 9:10 AM  [facility]  [phone]

## 2024-04-24 NOTE — CARE COORDINATION
IMM letter provided to patient.  Patient offered four hours to make informed decision regarding appeal process; patient agreeable to discharge.     Electronically signed by Esther Blake RN on 4/24/2024 at 1:39 PM

## 2024-04-24 NOTE — CARE COORDINATION
DISCHARGE PLANNING NOTE:    Follow up appt scheduled with Dr. Jones 5/21 @ 0945am. This is the soonest available appt.    Electronically signed by Esther Blake RN on 4/24/2024 at 10:58 AM

## 2024-04-24 NOTE — PLAN OF CARE
Problem: Discharge Planning  Goal: Discharge to home or other facility with appropriate resources  Outcome: Adequate for Discharge     Problem: Pain  Goal: Verbalizes/displays adequate comfort level or baseline comfort level  Outcome: Adequate for Discharge     Problem: Skin/Tissue Integrity  Goal: Absence of new skin breakdown  Description: 1.  Monitor for areas of redness and/or skin breakdown  2.  Assess vascular access sites hourly  3.  Every 4-6 hours minimum:  Change oxygen saturation probe site  4.  Every 4-6 hours:  If on nasal continuous positive airway pressure, respiratory therapy assess nares and determine need for appliance change or resting period.  Outcome: Adequate for Discharge     Problem: Safety - Adult  Goal: Free from fall injury  Outcome: Adequate for Discharge     Problem: Nutrition Deficit:  Goal: Optimize nutritional status  Outcome: Adequate for Discharge

## 2024-04-26 ENCOUNTER — CARE COORDINATION (OUTPATIENT)
Dept: CARE COORDINATION | Age: 74
End: 2024-04-26

## 2024-04-26 NOTE — CARE COORDINATION
CARD REHAB RM [address] [facility]   5/21/2024  9:45 AM Yandy Jones MD [facility]   7/1/2024 11:00 AM Yandy Jones MD [facility]   8/26/2024  2:00 PM Mahsa Barclay MD [facility]      and   General Assessment    Do you have any symptoms that are causing concern?: Yes  Progression since Onset: Unchanged, Intermittent - Waxing/Waning  Reported Symptoms:  (Comment: dizziness)

## 2024-04-30 ENCOUNTER — PATIENT MESSAGE (OUTPATIENT)
Dept: INTERNAL MEDICINE CLINIC | Age: 74
End: 2024-04-30

## 2024-04-30 ENCOUNTER — CARE COORDINATION (OUTPATIENT)
Dept: CARE COORDINATION | Age: 74
End: 2024-04-30

## 2024-04-30 NOTE — CARE COORDINATION
Ambulatory Care Coordination Note  2024    Patient Current Location:  Home: [id number] [place] [address] [place] [address]     ACM contacted the patient, family, and caregiver by telephone. Verified name and  with patient, family, and caregiver as identifiers. Provided introduction to self, and explanation of the ACM role.     Challenges to be reviewed by the provider   Additional needs identified to be addressed with provider: Yes  Patient is in need of a sooner follow up appointment, will route to PCP office.                Method of communication with provider: chart routing.    Summary  Date Care Coordination Episode Started:  24      Reason patient is in Care Coordination:     IP  referral    Topics Discussed Today:     Dizziness, patient and son report that patient has had severe dizzy spells since discharge. He reports his pulse ox has been 98-99% and his BP around 120/60. He feels that he is staying hydrated. He does not have a follow up with cardiology scheduled, I called Dr. Ladd's office and the soonest they can see him is 5..24 at 1:30. I took that appointment and called PCP office who will see him this week. 5.3.24 at 11 am. Patient notified of both appointments and notified that he may have a slight wait on Friday as provider will be overbooking for him. He was grateful and understood.   Patient instructed that if his symptoms worsen or if he has chest pains, increased SOB or any other concerning symptoms to report to the ED. He understood.   He did recheck his BP and it is 135/77.      Assessments completed today:     Fall risk  Initial assessment  Medication reconciliation  SDOH      Care Coordinator plan of care:     Follow up call in 5-7 days, check on needs, continue education and support.     Offered patient enrollment in the Remote Patient Monitoring (RPM) program for in-home monitoring: Yes, but did not enroll at this time: not eligible .         Goals Addressed

## 2024-05-01 RX ORDER — FLUDROCORTISONE ACETATE 0.1 MG/1
TABLET ORAL DAILY
Qty: 30 TABLET | Refills: 0 | OUTPATIENT
Start: 2024-05-01

## 2024-05-02 LAB
EKG ATRIAL RATE: 59 BPM
EKG P AXIS: 56 DEGREES
EKG P-R INTERVAL: 138 MS
EKG Q-T INTERVAL: 460 MS
EKG QRS DURATION: 78 MS
EKG QTC CALCULATION (BAZETT): 455 MS
EKG R AXIS: 7 DEGREES
EKG T AXIS: 104 DEGREES
EKG VENTRICULAR RATE: 59 BPM

## 2024-05-03 ENCOUNTER — OFFICE VISIT (OUTPATIENT)
Dept: INTERNAL MEDICINE CLINIC | Age: 74
End: 2024-05-03

## 2024-05-03 VITALS
HEART RATE: 67 BPM | BODY MASS INDEX: 18.94 KG/M2 | OXYGEN SATURATION: 97 % | DIASTOLIC BLOOD PRESSURE: 60 MMHG | SYSTOLIC BLOOD PRESSURE: 104 MMHG | WEIGHT: 132 LBS

## 2024-05-03 DIAGNOSIS — I95.1 ORTHOSTATIC HYPOTENSION: Primary | ICD-10-CM

## 2024-05-03 DIAGNOSIS — Z09 HOSPITAL DISCHARGE FOLLOW-UP: ICD-10-CM

## 2024-05-03 RX ORDER — MIDODRINE HYDROCHLORIDE 2.5 MG/1
2.5 TABLET ORAL 3 TIMES DAILY PRN
Qty: 90 TABLET | Refills: 3 | Status: SHIPPED | OUTPATIENT
Start: 2024-05-03

## 2024-05-03 RX ORDER — FLUDROCORTISONE ACETATE 0.1 MG/1
0.2 TABLET ORAL DAILY
Qty: 30 TABLET | Refills: 0 | Status: SHIPPED | OUTPATIENT
Start: 2024-05-03 | End: 2024-06-02

## 2024-05-03 RX ORDER — RANOLAZINE 500 MG/1
500 TABLET, EXTENDED RELEASE ORAL 2 TIMES DAILY
Qty: 60 TABLET | Refills: 3 | Status: SHIPPED | OUTPATIENT
Start: 2024-05-03

## 2024-05-03 RX ORDER — METOPROLOL SUCCINATE 25 MG/1
12.5 TABLET, EXTENDED RELEASE ORAL DAILY
Qty: 90 TABLET | Refills: 1 | Status: SHIPPED | OUTPATIENT
Start: 2024-05-03

## 2024-05-03 NOTE — PROGRESS NOTES
[facility] [address] [address]  Dept: [phone]  Dept Fax: [phone]    Office Progress/Follow Up Note  Date of patient's visit: 5/3/2024  Patient's Name:  Rowdy Marie YOB: 1950            Patient Care Team:  Yandy Jones MD as PCP - General (Internal Medicine)  Yandy Jones MD as PCP - Empaneled Provider  Antionette Buckner RN as Ambulatory Care Manager    REASON FOR VISIT: Hospital follow-up visit    HISTORY OF PRESENT ILLNESS:      Chief Complaint   Patient presents with    Follow-Up from Hospital     Hospital follow up from [facility]   Discharged 04/24/24  Patient is still having the dizzy spells     Tinnitus     Patient is having ringing in both of his ears         History was obtained from the patient. Rowdy Marie is a 74 y.o. is here for a hospital follow-up visit.    The patient was admitted to the hospital 4/22/2024, discharged on 5/2/2024.  She had presented there for chest pain, fatigue.    Rowdy Marie is a 74 y.o. male who was admitted for the management of Angina pectoris (HCC) , presented to ER with Chest Pain (Recent NSTEMI with cardiac cath and stent. Pt also had pneumonia while in hospital.  ) and Fatigue     Patient is a 74-year-old male with multiple comorbidities including history of CABG multiple PCI last 1 week ago, history of GI bleed, A-fib status post Watchman device not on anticoagulation due to history of GI bleed, Parkinson's, recently had stent placed a week ago  Angina, troponin plateaued, continues to have chest pain, cardiology consulted, already on Bumex beta-blocker, cannot tolerate nitro due to severe headache, as per cardiology to check echocardiogram today which has been ordered, cardiology ordered Nitropatch, monitor for severe headache next night, on aspirin and Brilinta patient allergic to statins  History of A-fib, status post Watchman device November 2023, patient had CTA showed left

## 2024-05-03 NOTE — PROGRESS NOTES
Visit Information    Have you changed or started any medications since your last visit including any over-the-counter medicines, vitamins, or herbal medicines? no   Are you having any side effects from any of your medications? -  no  Have you stopped taking any of your medications? Is so, why? -  no    Have you seen any other physician or provider since your last visit? No  Have you had any other diagnostic tests since your last visit? No  Have you been seen in the emergency room and/or had an admission to a hospital since we last saw you? Yes - Records Obtained  Have you had your routine dental cleaning in the past 6 months? no    Have you activated your [employer] account? If not, what are your barriers? Yes     Patient Care Team:  Yandy Jones MD as PCP - General (Internal Medicine)  Yandy Jones MD as PCP - Empaneled Provider  Antionette Buckner RN as Ambulatory Care Manager    Medical History Review  Past Medical, Family, and Social History reviewed and does not contribute to the patient presenting condition    Health Maintenance   Topic Date Due    Hepatitis C screen  Never done    Prostate Specific Antigen (PSA) Screening or Monitoring  Never done    Colorectal Cancer Screen  Never done    Shingles vaccine (1 of 2) Never done    Respiratory Syncytial Virus (RSV) Pregnant or age 60 yrs+ (1 - 1-dose 60+ series) Never done    Pneumococcal 65+ years Vaccine (2 of 2 - PPSV23 or PCV20) 11/27/2020    COVID-19 Vaccine (3 - 2023-24 season) 09/01/2023    Flu vaccine (Season Ended) 08/01/2024    Depression Screen  03/13/2025    Annual Wellness Visit (Medicare)  03/14/2025    DTaP/Tdap/Td vaccine (2 - Td or Tdap) 12/22/2026    Lipids  04/12/2029    Hepatitis A vaccine  Aged Out    Hepatitis B vaccine  Aged Out    Hib vaccine  Aged Out    Polio vaccine  Aged Out    Meningococcal (ACWY) vaccine  Aged Out    GFR test (Diabetes, CKD 3-4, OR last GFR 15-59)  Discontinued

## 2024-05-05 ENCOUNTER — PATIENT MESSAGE (OUTPATIENT)
Dept: INTERNAL MEDICINE CLINIC | Age: 74
End: 2024-05-05

## 2024-05-06 ENCOUNTER — CARE COORDINATION (OUTPATIENT)
Dept: CARE COORDINATION | Age: 74
End: 2024-05-06

## 2024-05-06 ENCOUNTER — TELEPHONE (OUTPATIENT)
Dept: INTERNAL MEDICINE CLINIC | Age: 74
End: 2024-05-06

## 2024-05-06 RX ORDER — FLUDROCORTISONE ACETATE 0.1 MG/1
0.2 TABLET ORAL DAILY
Qty: 30 TABLET | Refills: 0 | OUTPATIENT
Start: 2024-05-06 | End: 2024-06-05

## 2024-05-06 NOTE — TELEPHONE ENCOUNTER
[facility] no longer carry's the compression stockings with zippers.    States that maybe a [facility] would be someone that has what the patient needs.    Please Advise

## 2024-05-06 NOTE — TELEPHONE ENCOUNTER
From: Rowdy Marie  To: Dr. Brea Hung  Sent: 5/5/2024 10:30 PM EDT  Subject: Brilinta 90 mg    Could a script be sent to [facility] for refill.       Thanks

## 2024-05-06 NOTE — TELEPHONE ENCOUNTER
Spoke with patient to confirm he is able to use regular ones. Agreed that he can use them. Awaiting signature prior to sending to [facility]

## 2024-05-07 RX ORDER — FLUDROCORTISONE ACETATE 0.1 MG/1
0.2 TABLET ORAL DAILY
Qty: 30 TABLET | Refills: 0 | Status: SHIPPED | OUTPATIENT
Start: 2024-05-07 | End: 2024-06-06

## 2024-05-07 NOTE — CARE COORDINATION
Attempting to reach patient for a follow up care coordination call. Left detailed message for the patient to return my call with any questions or concerns.  TENA MonrealN, RN ambulatory care manager [phone].

## 2024-05-15 ENCOUNTER — TELEPHONE (OUTPATIENT)
Dept: INTERNAL MEDICINE CLINIC | Age: 74
End: 2024-05-15

## 2024-05-15 ENCOUNTER — CARE COORDINATION (OUTPATIENT)
Dept: CARE COORDINATION | Age: 74
End: 2024-05-15

## 2024-05-15 DIAGNOSIS — R51.9 ACUTE INTRACTABLE HEADACHE, UNSPECIFIED HEADACHE TYPE: Primary | ICD-10-CM

## 2024-05-15 DIAGNOSIS — G44.201 ACUTE INTRACTABLE TENSION-TYPE HEADACHE: Primary | ICD-10-CM

## 2024-05-15 NOTE — TELEPHONE ENCOUNTER
[facility] called to report a fall he had today , injured right forearm and right knee with  skin tears, no other know injuries.    Will have nursing in the home a next visit to assess

## 2024-05-15 NOTE — CARE COORDINATION
Ambulatory Care Coordination Note  5/15/2024    Patient Current Location:  Home: [address] [place] [address] [place] [address]     ACM contacted the patient by telephone. Verified name and  with patient as identifiers. Provided introduction to self, and explanation of the ACM role.     Challenges to be reviewed by the provider   Additional needs identified to be addressed with provider: Yes  Patient would like a referral for migraines, per patient he has suffered with migraines for 40 years.                Method of communication with provider: chart routing.    Summary  Date Care Coordination Episode Started:  24      Reason patient is in Care Coordination:     In patient  referral    Topics Discussed Today:     Home care, per patient he is to be evaluated today and possibly DC from PT and home care.   Cardiac rehab, scheduled for next month.  Fall, he did have 1 fall recently. Patient reports continued weakness and some dizzy spells since his surgery. Stated that it is improving but slowly. He does have a walker and a cane that he uses for support. He is more fatigued than usual, not able to do everything he used to.   Pain management is scheduled for tomorrow.   Migraines, patient reports that he has had migraines for over 40 years, when asked if he has seen a specialist for this he stated no. He is interested in a referral to specialist. Will discuss with PCP.       Assessments completed today:     Fall risk  Initial assessment  Medication reconciliation  SDOH      Care Coordinator plan of care:     Follow up with patient with PCP recommendations, continue education and support.     Offered patient enrollment in the Remote Patient Monitoring (RPM) program for in-home monitoring: Patient is not eligible for RPM program because: insurance coverage.       Goals Addressed                   This Visit's Progress     Conditions and Symptoms   On track     I will schedule office visits, as directed by my

## 2024-05-15 NOTE — TELEPHONE ENCOUNTER
Called and spoke to Briseyda and she states they will hopefully have a nurse come out and Thursday or Friday as they already seen the patient this week prior to this fall. They said they will have them call us if they feel they need imagining but she said the patient was fine and walked into the house for her to clean him up.

## 2024-05-16 ENCOUNTER — TELEPHONE (OUTPATIENT)
Dept: INTERNAL MEDICINE CLINIC | Age: 74
End: 2024-05-16

## 2024-05-16 DIAGNOSIS — L08.9 SKIN INFECTION: Primary | ICD-10-CM

## 2024-05-16 NOTE — TELEPHONE ENCOUNTER
Okay with me  Will order Bactroban ointment, if symptoms worsen, need to make appointment with the office

## 2024-05-16 NOTE — TELEPHONE ENCOUNTER
Rafaela from [facility] called inform us that the patient stubbed his toe and then fell on the deck down 4-5 stairs to the ground. Shoulders are sore. He is going to pain management today. Patient has a skin tear on right forearm. The family is caring for that for now. But Rafaela would like to know if a order for a soap and water cleanse with antibiotic ointment and a dry dressing daily is ok. Please advise.

## 2024-05-17 ENCOUNTER — HOSPITAL ENCOUNTER (OUTPATIENT)
Age: 74
Discharge: HOME | End: 2024-05-17
Payer: MEDICARE

## 2024-05-17 DIAGNOSIS — I95.1 ORTHOSTATIC HYPOTENSION: ICD-10-CM

## 2024-05-17 LAB
ALBUMIN SERPL-MCNC: 3.9 G/DL (ref 3.5–5.2)
ALP SERPL-CCNC: 102 U/L (ref 40–129)
ALT SERPL-CCNC: <5 U/L (ref 5–41)
ANION GAP SERPL CALCULATED.3IONS-SCNC: 13 MMOL/L (ref 9–17)
AST SERPL-CCNC: 12 U/L
BILIRUB SERPL-MCNC: 0.3 MG/DL (ref 0.3–1.2)
BUN SERPL-MCNC: 16 MG/DL (ref 8–23)
CALCIUM SERPL-MCNC: 8.6 MG/DL (ref 8.6–10.4)
CHLORIDE SERPL-SCNC: 102 MMOL/L (ref 98–107)
CO2 SERPL-SCNC: 24 MMOL/L (ref 20–31)
CREAT SERPL-MCNC: 1 MG/DL (ref 0.7–1.2)
ERYTHROCYTE [DISTWIDTH] IN BLOOD BY AUTOMATED COUNT: 16 % (ref 11.5–14.9)
GFR, ESTIMATED: 78 ML/MIN/1.73M2
GLUCOSE SERPL-MCNC: 90 MG/DL (ref 70–99)
HCT VFR BLD AUTO: 33.6 % (ref 41–53)
HGB BLD-MCNC: 10.6 G/DL (ref 13.5–17.5)
MCH RBC QN AUTO: 28.7 PG (ref 26–34)
MCHC RBC AUTO-ENTMCNC: 31.5 G/DL (ref 31–37)
MCV RBC AUTO: 91 FL (ref 80–100)
PLATELET # BLD AUTO: 239 K/UL (ref 150–450)
PMV BLD AUTO: 10 FL (ref 6–12)
POTASSIUM SERPL-SCNC: 4.2 MMOL/L (ref 3.7–5.3)
PROT SERPL-MCNC: 7.2 G/DL (ref 6.4–8.3)
RBC # BLD AUTO: 3.69 M/UL (ref 4.5–5.9)
SODIUM SERPL-SCNC: 139 MMOL/L (ref 135–144)
WBC OTHER # BLD: 8.6 K/UL (ref 3.5–11)

## 2024-05-17 PROCEDURE — 85027 COMPLETE CBC AUTOMATED: CPT

## 2024-05-17 PROCEDURE — 80053 COMPREHEN METABOLIC PANEL: CPT

## 2024-05-17 PROCEDURE — 36415 COLL VENOUS BLD VENIPUNCTURE: CPT

## 2024-05-20 ENCOUNTER — APPOINTMENT (OUTPATIENT)
Dept: GENERAL RADIOLOGY | Age: 74
DRG: 281 | End: 2024-05-20
Payer: MEDICARE

## 2024-05-20 ENCOUNTER — TELEPHONE (OUTPATIENT)
Dept: INTERNAL MEDICINE CLINIC | Age: 74
End: 2024-05-20

## 2024-05-20 ENCOUNTER — HOSPITAL ENCOUNTER (OUTPATIENT)
Age: 74
Setting detail: OBSERVATION
Discharge: ANOTHER HOSPITAL | DRG: 281 | End: 2024-05-22
Attending: STUDENT IN AN ORGANIZED HEALTH CARE EDUCATION/TRAINING PROGRAM | Admitting: INTERNAL MEDICINE
Payer: MEDICARE

## 2024-05-20 DIAGNOSIS — D64.89 ANEMIA DUE TO OTHER CAUSE, NOT CLASSIFIED: Primary | ICD-10-CM

## 2024-05-20 DIAGNOSIS — R07.9 CHEST PAIN, UNSPECIFIED TYPE: Primary | ICD-10-CM

## 2024-05-20 LAB
ANION GAP SERPL CALCULATED.3IONS-SCNC: 13 MMOL/L (ref 9–17)
BASOPHILS # BLD: 0.1 K/UL (ref 0–0.2)
BASOPHILS NFR BLD: 1 % (ref 0–2)
BNP SERPL-MCNC: 1554 PG/ML
BUN SERPL-MCNC: 21 MG/DL (ref 8–23)
CALCIUM SERPL-MCNC: 8.5 MG/DL (ref 8.6–10.4)
CHLORIDE SERPL-SCNC: 101 MMOL/L (ref 98–107)
CO2 SERPL-SCNC: 25 MMOL/L (ref 20–31)
CREAT SERPL-MCNC: 1.1 MG/DL (ref 0.7–1.2)
EOSINOPHIL # BLD: 0.5 K/UL (ref 0–0.4)
EOSINOPHILS RELATIVE PERCENT: 7 % (ref 0–4)
ERYTHROCYTE [DISTWIDTH] IN BLOOD BY AUTOMATED COUNT: 15.9 % (ref 11.5–14.9)
GFR, ESTIMATED: 70 ML/MIN/1.73M2
GLUCOSE SERPL-MCNC: 130 MG/DL (ref 70–99)
HCT VFR BLD AUTO: 29.6 % (ref 41–53)
HGB BLD-MCNC: 9.7 G/DL (ref 13.5–17.5)
LYMPHOCYTES NFR BLD: 1.5 K/UL (ref 1–4.8)
LYMPHOCYTES RELATIVE PERCENT: 20 % (ref 24–44)
MCH RBC QN AUTO: 29.5 PG (ref 26–34)
MCHC RBC AUTO-ENTMCNC: 32.6 G/DL (ref 31–37)
MCV RBC AUTO: 90.5 FL (ref 80–100)
MONOCYTES NFR BLD: 0.6 K/UL (ref 0.1–1.3)
MONOCYTES NFR BLD: 9 % (ref 1–7)
NEUTROPHILS NFR BLD: 63 % (ref 36–66)
NEUTS SEG NFR BLD: 4.9 K/UL (ref 1.3–9.1)
PLATELET # BLD AUTO: 235 K/UL (ref 150–450)
PMV BLD AUTO: 9.2 FL (ref 6–12)
POTASSIUM SERPL-SCNC: 3.7 MMOL/L (ref 3.7–5.3)
RBC # BLD AUTO: 3.28 M/UL (ref 4.5–5.9)
SODIUM SERPL-SCNC: 139 MMOL/L (ref 135–144)
TROPONIN I SERPL HS-MCNC: 23 NG/L (ref 0–22)
TROPONIN I SERPL HS-MCNC: 25 NG/L (ref 0–22)
WBC OTHER # BLD: 7.7 K/UL (ref 3.5–11)

## 2024-05-20 PROCEDURE — 85025 COMPLETE CBC W/AUTO DIFF WBC: CPT

## 2024-05-20 PROCEDURE — 99285 EMERGENCY DEPT VISIT HI MDM: CPT

## 2024-05-20 PROCEDURE — 83880 ASSAY OF NATRIURETIC PEPTIDE: CPT

## 2024-05-20 PROCEDURE — 80048 BASIC METABOLIC PNL TOTAL CA: CPT

## 2024-05-20 PROCEDURE — 36415 COLL VENOUS BLD VENIPUNCTURE: CPT

## 2024-05-20 PROCEDURE — 84484 ASSAY OF TROPONIN QUANT: CPT

## 2024-05-20 PROCEDURE — 93005 ELECTROCARDIOGRAM TRACING: CPT

## 2024-05-20 PROCEDURE — 71045 X-RAY EXAM CHEST 1 VIEW: CPT

## 2024-05-20 RX ORDER — FLUDROCORTISONE ACETATE 0.1 MG/1
0.2 TABLET ORAL 2 TIMES DAILY
Qty: 30 TABLET | Refills: 1 | Status: SHIPPED | OUTPATIENT
Start: 2024-05-20 | End: 2024-05-20 | Stop reason: ALTCHOICE

## 2024-05-20 RX ORDER — FLUDROCORTISONE ACETATE 0.1 MG/1
0.1 TABLET ORAL 2 TIMES DAILY
Qty: 180 TABLET | Refills: 1 | Status: SHIPPED | OUTPATIENT
Start: 2024-05-20 | End: 2024-11-16

## 2024-05-20 ASSESSMENT — PAIN - FUNCTIONAL ASSESSMENT: PAIN_FUNCTIONAL_ASSESSMENT: 0-10

## 2024-05-20 ASSESSMENT — PAIN SCALES - GENERAL: PAINLEVEL_OUTOF10: 9

## 2024-05-20 NOTE — TELEPHONE ENCOUNTER
Please inform patient that liver functions kidney functions within normal limits, hemoglobin is 10.6. I recommend seeing a hematologist-- referral placed

## 2024-05-21 PROBLEM — E78.5 HYPERLIPIDEMIA: Status: ACTIVE | Noted: 2024-05-21

## 2024-05-21 LAB
ANION GAP SERPL CALCULATED.3IONS-SCNC: 13 MMOL/L (ref 9–17)
ANTI-XA UNFRAC HEPARIN: <0.1 IU/L (ref 0.3–0.7)
ANTI-XA UNFRAC HEPARIN: <0.1 IU/L (ref 0.3–0.7)
BASOPHILS # BLD: 0.1 K/UL (ref 0–0.2)
BASOPHILS NFR BLD: 2 % (ref 0–2)
BUN SERPL-MCNC: 22 MG/DL (ref 8–23)
CALCIUM SERPL-MCNC: 8.6 MG/DL (ref 8.6–10.4)
CHLORIDE SERPL-SCNC: 103 MMOL/L (ref 98–107)
CO2 SERPL-SCNC: 23 MMOL/L (ref 20–31)
CREAT SERPL-MCNC: 1.1 MG/DL (ref 0.7–1.2)
EOSINOPHIL # BLD: 0.6 K/UL (ref 0–0.4)
EOSINOPHILS RELATIVE PERCENT: 9 % (ref 0–4)
ERYTHROCYTE [DISTWIDTH] IN BLOOD BY AUTOMATED COUNT: 16.1 % (ref 11.5–14.9)
ERYTHROCYTE [DISTWIDTH] IN BLOOD BY AUTOMATED COUNT: 16.1 % (ref 11.5–14.9)
GFR, ESTIMATED: 70 ML/MIN/1.73M2
GLUCOSE SERPL-MCNC: 96 MG/DL (ref 70–99)
HCT VFR BLD AUTO: 28.4 % (ref 41–53)
HCT VFR BLD AUTO: 33 % (ref 41–53)
HGB BLD-MCNC: 10.6 G/DL (ref 13.5–17.5)
HGB BLD-MCNC: 9.2 G/DL (ref 13.5–17.5)
INR PPP: 1.1
LYMPHOCYTES NFR BLD: 2 K/UL (ref 1–4.8)
LYMPHOCYTES RELATIVE PERCENT: 27 % (ref 24–44)
MAGNESIUM SERPL-MCNC: 1.5 MG/DL (ref 1.6–2.6)
MCH RBC QN AUTO: 29.3 PG (ref 26–34)
MCH RBC QN AUTO: 29.4 PG (ref 26–34)
MCHC RBC AUTO-ENTMCNC: 32 G/DL (ref 31–37)
MCHC RBC AUTO-ENTMCNC: 32.5 G/DL (ref 31–37)
MCV RBC AUTO: 90.4 FL (ref 80–100)
MCV RBC AUTO: 91.6 FL (ref 80–100)
MONOCYTES NFR BLD: 0.7 K/UL (ref 0.1–1.3)
MONOCYTES NFR BLD: 10 % (ref 1–7)
NEUTROPHILS NFR BLD: 52 % (ref 36–66)
NEUTS SEG NFR BLD: 3.9 K/UL (ref 1.3–9.1)
PARTIAL THROMBOPLASTIN TIME: 36.2 SEC (ref 24–36)
PLATELET # BLD AUTO: 219 K/UL (ref 150–450)
PLATELET # BLD AUTO: 231 K/UL (ref 150–450)
PMV BLD AUTO: 10 FL (ref 6–12)
PMV BLD AUTO: 10.1 FL (ref 6–12)
POTASSIUM SERPL-SCNC: 3.5 MMOL/L (ref 3.7–5.3)
PROTHROMBIN TIME: 14 SEC (ref 11.8–14.6)
RBC # BLD AUTO: 3.14 M/UL (ref 4.5–5.9)
RBC # BLD AUTO: 3.6 M/UL (ref 4.5–5.9)
SODIUM SERPL-SCNC: 139 MMOL/L (ref 135–144)
TROPONIN I SERPL HS-MCNC: 29 NG/L (ref 0–22)
WBC OTHER # BLD: 7.4 K/UL (ref 3.5–11)
WBC OTHER # BLD: 7.5 K/UL (ref 3.5–11)

## 2024-05-21 PROCEDURE — 84484 ASSAY OF TROPONIN QUANT: CPT

## 2024-05-21 PROCEDURE — 96374 THER/PROPH/DIAG INJ IV PUSH: CPT

## 2024-05-21 PROCEDURE — 85025 COMPLETE CBC W/AUTO DIFF WBC: CPT

## 2024-05-21 PROCEDURE — 80048 BASIC METABOLIC PNL TOTAL CA: CPT

## 2024-05-21 PROCEDURE — 85027 COMPLETE CBC AUTOMATED: CPT

## 2024-05-21 PROCEDURE — 96376 TX/PRO/DX INJ SAME DRUG ADON: CPT

## 2024-05-21 PROCEDURE — 6370000000 HC RX 637 (ALT 250 FOR IP): Performed by: NURSE PRACTITIONER

## 2024-05-21 PROCEDURE — 6360000002 HC RX W HCPCS: Performed by: INTERNAL MEDICINE

## 2024-05-21 PROCEDURE — 36415 COLL VENOUS BLD VENIPUNCTURE: CPT

## 2024-05-21 PROCEDURE — 85610 PROTHROMBIN TIME: CPT

## 2024-05-21 PROCEDURE — 2580000003 HC RX 258: Performed by: NURSE PRACTITIONER

## 2024-05-21 PROCEDURE — 83735 ASSAY OF MAGNESIUM: CPT

## 2024-05-21 PROCEDURE — 99223 1ST HOSP IP/OBS HIGH 75: CPT | Performed by: INTERNAL MEDICINE

## 2024-05-21 PROCEDURE — 85520 HEPARIN ASSAY: CPT

## 2024-05-21 PROCEDURE — G0378 HOSPITAL OBSERVATION PER HR: HCPCS

## 2024-05-21 PROCEDURE — 85730 THROMBOPLASTIN TIME PARTIAL: CPT

## 2024-05-21 RX ORDER — POTASSIUM CHLORIDE 7.45 MG/ML
10 INJECTION INTRAVENOUS PRN
Status: DISCONTINUED | OUTPATIENT
Start: 2024-05-21 | End: 2024-05-22 | Stop reason: HOSPADM

## 2024-05-21 RX ORDER — LIDOCAINE 4 G/G
1 PATCH TOPICAL DAILY
Status: CANCELLED | OUTPATIENT
Start: 2024-05-21

## 2024-05-21 RX ORDER — NITROGLYCERIN 40 MG/1
1 PATCH TRANSDERMAL DAILY
Status: CANCELLED | OUTPATIENT
Start: 2024-05-21

## 2024-05-21 RX ORDER — UBIDECARENONE 100 MG
100 CAPSULE ORAL DAILY
Status: CANCELLED | OUTPATIENT
Start: 2024-05-21

## 2024-05-21 RX ORDER — ACETAMINOPHEN 650 MG/1
650 SUPPOSITORY RECTAL EVERY 6 HOURS PRN
Status: DISCONTINUED | OUTPATIENT
Start: 2024-05-21 | End: 2024-05-22 | Stop reason: HOSPADM

## 2024-05-21 RX ORDER — DOCUSATE SODIUM 100 MG/1
100 CAPSULE, LIQUID FILLED ORAL NIGHTLY
Status: DISCONTINUED | OUTPATIENT
Start: 2024-05-21 | End: 2024-05-22 | Stop reason: HOSPADM

## 2024-05-21 RX ORDER — FLUDROCORTISONE ACETATE 0.1 MG/1
0.2 TABLET ORAL 2 TIMES DAILY
Status: DISCONTINUED | OUTPATIENT
Start: 2024-05-21 | End: 2024-05-22 | Stop reason: HOSPADM

## 2024-05-21 RX ORDER — FLUDROCORTISONE ACETATE 0.1 MG/1
0.2 TABLET ORAL 2 TIMES DAILY
Status: CANCELLED | OUTPATIENT
Start: 2024-05-21

## 2024-05-21 RX ORDER — LIDOCAINE 4 G/G
1 PATCH TOPICAL DAILY
Status: DISCONTINUED | OUTPATIENT
Start: 2024-05-21 | End: 2024-05-22 | Stop reason: HOSPADM

## 2024-05-21 RX ORDER — HEPARIN SODIUM 1000 [USP'U]/ML
60 INJECTION, SOLUTION INTRAVENOUS; SUBCUTANEOUS PRN
Status: DISCONTINUED | OUTPATIENT
Start: 2024-05-21 | End: 2024-05-22 | Stop reason: HOSPADM

## 2024-05-21 RX ORDER — METOPROLOL SUCCINATE 25 MG/1
12.5 TABLET, EXTENDED RELEASE ORAL DAILY
Status: CANCELLED | OUTPATIENT
Start: 2024-05-21

## 2024-05-21 RX ORDER — POTASSIUM CHLORIDE 20 MEQ/1
40 TABLET, EXTENDED RELEASE ORAL PRN
Status: DISCONTINUED | OUTPATIENT
Start: 2024-05-21 | End: 2024-05-22 | Stop reason: HOSPADM

## 2024-05-21 RX ORDER — HEPARIN SODIUM 10000 [USP'U]/100ML
5-30 INJECTION, SOLUTION INTRAVENOUS CONTINUOUS
Status: DISCONTINUED | OUTPATIENT
Start: 2024-05-21 | End: 2024-05-22 | Stop reason: HOSPADM

## 2024-05-21 RX ORDER — SODIUM CHLORIDE 0.9 % (FLUSH) 0.9 %
10 SYRINGE (ML) INJECTION PRN
Status: DISCONTINUED | OUTPATIENT
Start: 2024-05-21 | End: 2024-05-22 | Stop reason: HOSPADM

## 2024-05-21 RX ORDER — POLYETHYLENE GLYCOL 3350 17 G/17G
17 POWDER, FOR SOLUTION ORAL DAILY PRN
Status: DISCONTINUED | OUTPATIENT
Start: 2024-05-21 | End: 2024-05-22 | Stop reason: HOSPADM

## 2024-05-21 RX ORDER — METOPROLOL SUCCINATE 25 MG/1
12.5 TABLET, EXTENDED RELEASE ORAL DAILY
Status: DISCONTINUED | OUTPATIENT
Start: 2024-05-21 | End: 2024-05-22 | Stop reason: HOSPADM

## 2024-05-21 RX ORDER — MIDODRINE HYDROCHLORIDE 2.5 MG/1
2.5 TABLET ORAL 3 TIMES DAILY PRN
Status: CANCELLED | OUTPATIENT
Start: 2024-05-21

## 2024-05-21 RX ORDER — LANOLIN ALCOHOL/MO/W.PET/CERES
3 CREAM (GRAM) TOPICAL NIGHTLY PRN
Status: DISCONTINUED | OUTPATIENT
Start: 2024-05-21 | End: 2024-05-22 | Stop reason: HOSPADM

## 2024-05-21 RX ORDER — LEVOTHYROXINE SODIUM 0.07 MG/1
75 TABLET ORAL DAILY
Status: CANCELLED | OUTPATIENT
Start: 2024-05-21

## 2024-05-21 RX ORDER — MAGNESIUM SULFATE HEPTAHYDRATE 40 MG/ML
2000 INJECTION, SOLUTION INTRAVENOUS PRN
Status: DISCONTINUED | OUTPATIENT
Start: 2024-05-21 | End: 2024-05-22 | Stop reason: HOSPADM

## 2024-05-21 RX ORDER — HEPARIN SODIUM 1000 [USP'U]/ML
30 INJECTION, SOLUTION INTRAVENOUS; SUBCUTANEOUS PRN
Status: DISCONTINUED | OUTPATIENT
Start: 2024-05-21 | End: 2024-05-22 | Stop reason: HOSPADM

## 2024-05-21 RX ORDER — ONDANSETRON 4 MG/1
4 TABLET, ORALLY DISINTEGRATING ORAL EVERY 8 HOURS PRN
Status: DISCONTINUED | OUTPATIENT
Start: 2024-05-21 | End: 2024-05-22 | Stop reason: HOSPADM

## 2024-05-21 RX ORDER — LANOLIN ALCOHOL/MO/W.PET/CERES
1000 CREAM (GRAM) TOPICAL DAILY
Status: CANCELLED | OUTPATIENT
Start: 2024-05-21

## 2024-05-21 RX ORDER — POTASSIUM CHLORIDE 750 MG/1
10 TABLET, FILM COATED, EXTENDED RELEASE ORAL EVERY MORNING
Status: CANCELLED | OUTPATIENT
Start: 2024-05-21

## 2024-05-21 RX ORDER — SODIUM CHLORIDE 0.9 % (FLUSH) 0.9 %
5-40 SYRINGE (ML) INJECTION EVERY 12 HOURS SCHEDULED
Status: DISCONTINUED | OUTPATIENT
Start: 2024-05-21 | End: 2024-05-22 | Stop reason: HOSPADM

## 2024-05-21 RX ORDER — ACETAMINOPHEN 325 MG/1
650 TABLET ORAL EVERY 6 HOURS PRN
Status: DISCONTINUED | OUTPATIENT
Start: 2024-05-21 | End: 2024-05-22 | Stop reason: HOSPADM

## 2024-05-21 RX ORDER — PREGABALIN 150 MG/1
150 CAPSULE ORAL 2 TIMES DAILY
Status: DISCONTINUED | OUTPATIENT
Start: 2024-05-21 | End: 2024-05-22 | Stop reason: HOSPADM

## 2024-05-21 RX ORDER — ASPIRIN 81 MG/1
81 TABLET ORAL DAILY
Status: CANCELLED | OUTPATIENT
Start: 2024-05-21

## 2024-05-21 RX ORDER — RANOLAZINE 500 MG/1
1000 TABLET, EXTENDED RELEASE ORAL 2 TIMES DAILY
Status: DISCONTINUED | OUTPATIENT
Start: 2024-05-21 | End: 2024-05-22 | Stop reason: HOSPADM

## 2024-05-21 RX ORDER — PREGABALIN 150 MG/1
150 CAPSULE ORAL 2 TIMES DAILY
Status: CANCELLED | OUTPATIENT
Start: 2024-05-21

## 2024-05-21 RX ORDER — MIDODRINE HYDROCHLORIDE 2.5 MG/1
2.5 TABLET ORAL 3 TIMES DAILY PRN
Status: DISCONTINUED | OUTPATIENT
Start: 2024-05-21 | End: 2024-05-22 | Stop reason: HOSPADM

## 2024-05-21 RX ORDER — ENOXAPARIN SODIUM 100 MG/ML
40 INJECTION SUBCUTANEOUS DAILY
Status: DISCONTINUED | OUTPATIENT
Start: 2024-05-21 | End: 2024-05-21

## 2024-05-21 RX ORDER — ASPIRIN 81 MG/1
81 TABLET ORAL DAILY
Status: DISCONTINUED | OUTPATIENT
Start: 2024-05-21 | End: 2024-05-22 | Stop reason: HOSPADM

## 2024-05-21 RX ORDER — ONDANSETRON 2 MG/ML
4 INJECTION INTRAMUSCULAR; INTRAVENOUS EVERY 6 HOURS PRN
Status: DISCONTINUED | OUTPATIENT
Start: 2024-05-21 | End: 2024-05-22 | Stop reason: HOSPADM

## 2024-05-21 RX ORDER — PANTOPRAZOLE SODIUM 40 MG/1
40 TABLET, DELAYED RELEASE ORAL
Status: DISCONTINUED | OUTPATIENT
Start: 2024-05-21 | End: 2024-05-22 | Stop reason: HOSPADM

## 2024-05-21 RX ORDER — RANOLAZINE 500 MG/1
1000 TABLET, EXTENDED RELEASE ORAL 2 TIMES DAILY
Status: CANCELLED | OUTPATIENT
Start: 2024-05-21

## 2024-05-21 RX ORDER — TRAMADOL HYDROCHLORIDE 50 MG/1
50 TABLET ORAL 3 TIMES DAILY
Status: DISCONTINUED | OUTPATIENT
Start: 2024-05-21 | End: 2024-05-22 | Stop reason: HOSPADM

## 2024-05-21 RX ORDER — BISACODYL 10 MG
10 SUPPOSITORY, RECTAL RECTAL DAILY PRN
Status: DISCONTINUED | OUTPATIENT
Start: 2024-05-21 | End: 2024-05-22 | Stop reason: HOSPADM

## 2024-05-21 RX ORDER — POTASSIUM CHLORIDE 750 MG/1
10 CAPSULE, EXTENDED RELEASE ORAL EVERY MORNING
Status: DISCONTINUED | OUTPATIENT
Start: 2024-05-21 | End: 2024-05-22 | Stop reason: HOSPADM

## 2024-05-21 RX ORDER — SODIUM CHLORIDE 9 MG/ML
INJECTION, SOLUTION INTRAVENOUS PRN
Status: DISCONTINUED | OUTPATIENT
Start: 2024-05-21 | End: 2024-05-22 | Stop reason: HOSPADM

## 2024-05-21 RX ORDER — LEVOTHYROXINE SODIUM 0.07 MG/1
75 TABLET ORAL DAILY
Status: DISCONTINUED | OUTPATIENT
Start: 2024-05-21 | End: 2024-05-22 | Stop reason: HOSPADM

## 2024-05-21 RX ORDER — PANTOPRAZOLE SODIUM 40 MG/1
40 TABLET, DELAYED RELEASE ORAL
Status: CANCELLED | OUTPATIENT
Start: 2024-05-21

## 2024-05-21 RX ORDER — TRAMADOL HYDROCHLORIDE 50 MG/1
50 TABLET ORAL 3 TIMES DAILY
Status: CANCELLED | OUTPATIENT
Start: 2024-05-21

## 2024-05-21 RX ADMIN — POTASSIUM CHLORIDE 40 MEQ: 1500 TABLET, EXTENDED RELEASE ORAL at 15:07

## 2024-05-21 RX ADMIN — SODIUM CHLORIDE, PRESERVATIVE FREE 10 ML: 5 INJECTION INTRAVENOUS at 13:08

## 2024-05-21 RX ADMIN — HEPARIN SODIUM 12 UNITS/KG/HR: 10000 INJECTION, SOLUTION INTRAVENOUS at 14:53

## 2024-05-21 RX ADMIN — TRAMADOL HYDROCHLORIDE 50 MG: 50 TABLET, COATED ORAL at 20:20

## 2024-05-21 RX ADMIN — TRAMADOL HYDROCHLORIDE 50 MG: 50 TABLET, COATED ORAL at 15:05

## 2024-05-21 RX ADMIN — RANOLAZINE 1000 MG: 500 TABLET, EXTENDED RELEASE ORAL at 20:20

## 2024-05-21 RX ADMIN — CARBIDOPA AND LEVODOPA 1 TABLET: 25; 100 TABLET ORAL at 20:21

## 2024-05-21 RX ADMIN — CARBIDOPA AND LEVODOPA 1 TABLET: 25; 100 TABLET ORAL at 15:09

## 2024-05-21 RX ADMIN — ACETAMINOPHEN 650 MG: 325 TABLET ORAL at 17:38

## 2024-05-21 RX ADMIN — PREGABALIN 150 MG: 150 CAPSULE ORAL at 20:20

## 2024-05-21 RX ADMIN — DOCUSATE SODIUM 100 MG: 100 CAPSULE, LIQUID FILLED ORAL at 20:20

## 2024-05-21 RX ADMIN — FLUDROCORTISONE ACETATE 0.2 MG: 0.1 TABLET ORAL at 20:21

## 2024-05-21 RX ADMIN — TICAGRELOR 90 MG: 90 TABLET ORAL at 20:20

## 2024-05-21 RX ADMIN — HEPARIN SODIUM 4000 UNITS: 1000 INJECTION INTRAVENOUS; SUBCUTANEOUS at 21:13

## 2024-05-21 ASSESSMENT — PAIN DESCRIPTION - ORIENTATION
ORIENTATION: LOWER
ORIENTATION: LOWER

## 2024-05-21 ASSESSMENT — PAIN SCALES - GENERAL
PAINLEVEL_OUTOF10: 8
PAINLEVEL_OUTOF10: 5

## 2024-05-21 ASSESSMENT — PAIN DESCRIPTION - DESCRIPTORS
DESCRIPTORS: ACHING
DESCRIPTORS: ACHING
DESCRIPTORS: ACHING;SORE

## 2024-05-21 ASSESSMENT — PAIN DESCRIPTION - LOCATION
LOCATION: CHEST;BACK
LOCATION: BACK
LOCATION: HEAD
LOCATION: BACK

## 2024-05-21 NOTE — PROGRESS NOTES
[facility] Internal Medicine  Aamir Caballero MD; Fransisco Cee MD; Ko Elias MD; MD Yandy Walter MD; Larissa Oro MD  [facility] Internal Medicine   IN-PATIENT SERVICE  [facility]                 Date:   5/21/2024  Patientname:  Rowdy Marie  Date of admission:  5/20/2024  9:56 PM  MRN:   [id number]  Account:  [id number]  YOB: 1950  PCP:    Yandy Jones MD  Room:   09/09  Code Status:    Prior      Chief Complaint:     Chief Complaint   Patient presents with    Chest Pain       History of Present Illness:     Rowdy Marie is a 74 y.o. Non- / non  male who presents with Chest Pain   and is admitted to the hospital for the management of Angina pectoris (HCC).    Patient's medical history significant for multivessel CAD -s/p CABG and angioplasty with stent x 4, Parkinson's disease, chronic back pain, hyperlipidemia, and hypertension.      According to patient, he had an MI in April.  This evening, he felt fatigued and developed pain across the left side of his chest that radiated down his left arm and up into his left neck.  Sympotms are reminiscent of the pain during his MI.  Patient also experienced shortness of breath, and lightheadedness.  States that in April, he had 2 old heart stents replaced and 2 new stents placed by Dr. Paulino; he follows with Dr. Ldad.  Reports longstanding history of hyperlipidemia.    ED eval reveals troponin HS  23, then 25.  Pro-BNP - 1,554CXR mild diffuse interstitial prominence - may represent mild pulmonary edema or atypical infection.  Denies cough, feer, chills.  No signs of ischemia on EKG.    Past Medical History:     Past Medical History:   Diagnosis Date    A-fib (HCC)     PREET (acute kidney injury) ([facility])     Anemia     CAD (coronary artery disease)     Esophageal stricture     Hyperlipidemia     Hypokalemia     Hypomagnesemia     NSTEMI (non-ST elevated myocardial infarction) ([facility])      Orthostatic hypotension     Parkinsons (HCC)     Presence of Watchman left atrial appendage closure device     Prostate CA (HCC)         Past Surgical History:     Past Surgical History:   Procedure Laterality Date    CARDIAC CATHETERIZATION      CARDIAC PROCEDURE N/A 4/11/2024    sunny / Left heart cath / coronary angiography / op [facility] performed by Andree Paulino MD at [facility] CARDIAC CATH LAB    CARDIAC PROCEDURE N/A 4/11/2024    Percutaneous coronary intervention performed by Andree Paulino MD at [facility] CARDIAC CATH LAB    CORONARY ANGIOPLASTY WITH STENT PLACEMENT      8 stents prior to CABG per patient, most recent 12/2023    CORONARY ARTERY BYPASS GRAFT      2014    DIAGNOSTIC CARDIAC CATH LAB PROCEDURE  04/11/2024        Medications Prior to Admission:     Prior to Admission medications    Medication Sig Start Date End Date Taking? Authorizing Provider   fludrocortisone (FLORINEF) 0.1 MG tablet Take 1 tablet by mouth 2 times daily 5/20/24 11/16/24  Yandy Jones MD   mupirocin (BACTROBAN) 2 % ointment Apply topically 3 times daily. 5/16/24 5/23/24  Ian Bautista MD   ticagrelor (BRILINTA) 90 MG TABS tablet Take 1 tablet by mouth 2 times daily 5/6/24 8/4/24  Yandy Jones MD   Compression Stockings MISC by Does not apply route 5/6/24   Yandy Jones MD   midodrine (PROAMATINE) 2.5 MG tablet Take 1 tablet by mouth 3 times daily as needed (SBP <100) 5/3/24   Brea Hung MD   Compression Stockings MISC by Does not apply route 5/3/24   Brea Hung MD   metoprolol succinate (TOPROL XL) 25 MG extended release tablet Take 0.5 tablets by mouth daily 5/3/24   Brea Hung MD   ranolazine (RANEXA) 500 MG extended release tablet Take 1 tablet by mouth 2 times daily 5/3/24   Brea Hung MD   lidocaine 4 % external patch Place 1 patch onto the skin daily 4/24/24   Aracely Pierre MD   nitroGLYCERIN (NITRODUR) 0.2 MG/HR Place 1 patch onto the skin daily 4/25/24   Aracely Pierre MD   meclizine  13.5 - 17.5 g/dL    Hematocrit 29.6 (L) 41 - 53 %    MCV 90.5 80 - 100 fL    MCH 29.5 26 - 34 pg    MCHC 32.6 31 - 37 g/dL    RDW 15.9 (H) 11.5 - 14.9 %    Platelets 235 150 - 450 k/uL    MPV 9.2 6.0 - 12.0 fL    Neutrophils % 63 36 - 66 %    Lymphocytes % 20 (L) 24 - 44 %    Monocytes % 9 (H) 1 - 7 %    Eosinophils % 7 (H) 0 - 4 %    Basophils % 1 0 - 2 %    Neutrophils Absolute 4.90 1.3 - 9.1 k/uL    Lymphocytes Absolute 1.50 1.0 - 4.8 k/uL    Monocytes Absolute 0.60 0.1 - 1.3 k/uL    Eosinophils Absolute 0.50 (H) 0.0 - 0.4 k/uL    Basophils Absolute 0.10 0.0 - 0.2 k/uL   Basic Metabolic Panel    Collection Time: 05/20/24 10:17 PM   Result Value Ref Range    Sodium 139 135 - 144 mmol/L    Potassium 3.7 3.7 - 5.3 mmol/L    Chloride 101 98 - 107 mmol/L    CO2 25 20 - 31 mmol/L    Anion Gap 13 9 - 17 mmol/L    Glucose 130 (H) 70 - 99 mg/dL    BUN 21 8 - 23 mg/dL    Creatinine 1.1 0.7 - 1.2 mg/dL    Est, Glom Filt Rate 70 >60 mL/min/1.73m2    Calcium 8.5 (L) 8.6 - 10.4 mg/dL   Brain Natriuretic Peptide    Collection Time: 05/20/24 10:17 PM   Result Value Ref Range    Pro-BNP 1,554 (H) <300 pg/mL   Troponin    Collection Time: 05/20/24 10:17 PM   Result Value Ref Range    Troponin, High Sensitivity 23 (H) 0 - 22 ng/L   Troponin    Collection Time: 05/20/24 11:13 PM   Result Value Ref Range    Troponin, High Sensitivity 25 (H) 0 - 22 ng/L       Imaging/Diagnostics:  XR CHEST PORTABLE    Result Date: 5/20/2024  Mild diffuse interstitial prominence may represent mild pulmonary edema or atypical infection.         Patient status observation in the [facility] Problems             Last Modified POA    * (Principal) Angina pectoris (HCC) 5/21/2024 Yes     Angina Pectoris - recent MI with stent placement with recurring chest pain.  Consult cardiology for evaluation in am.  Keep NPO until discussed with cardiology. Recheck troponin in am.     Disposition 2 days      Consultations:   IP CONSULT TO

## 2024-05-21 NOTE — CARE COORDINATION
Case Management Assessment  Initial Evaluation    Date/Time of Evaluation: 5/21/2024 3:53 PM  Assessment Completed by: Esther Blake RN    If patient is discharged prior to next notation, then this note serves as note for discharge by case management.    Patient Name: Rowdy Marie                   YOB: 1950  Diagnosis: Angina pectoris (HCC) [I20.9]  Chest pain, unspecified type [R07.9]                   Date / Time: 5/20/2024  9:56 PM    Patient Admission Status: Observation   Readmission Risk (Low < 19, Mod (19-27), High > 27): Readmission Risk Score: 23.3    Current PCP: Yandy Jones MD  PCP verified by CM? Yes    Chart Reviewed: Yes      History Provided by: Patient  Patient Orientation: Alert and Oriented    Patient Cognition: Alert    Hospitalization in the last 30 days (Readmission):  Yes    If yes, Readmission Assessment in CM Navigator will be completed.    Readmission Assessment  Number of Days since last admission?: 8-30 days  Previous Disposition: Home with Home Health  Who is being Interviewed: Patient  What was the patient's/caregiver's perception as to why they think they needed to return back to the hospital?: Other (Comment) (chest pain)  Did you visit your Primary Care Physician after you left the hospital, before you returned this time?: Yes  Why weren't you able to visit your PCP?: Other (Comment) (n/a)  Did you see a specialist, such as Cardiac, Pulmonary, Orthopedic Physician, etc. after you left the hospital?: No  Who advised the patient to return to the hospital?: Self-referral  Does the patient report anything that got in the way of taking their medications?: No  In our efforts to provide the best possible care to you and others like you, can you think of anything that we could have done to help you after you left the hospital the first time, so that you might not have needed to return so soon?: Other (Comment) (n/a)    Advance Directives:      Code Status: Full  Code   Patient's Primary Decision Maker is: Legal Next of Kin    Primary Decision Maker: Elliott Marie - Child - [phone]    Primary Decision Maker: Alek Marie - Child    Discharge Planning:    Patient lives with: Family Members Type of Home: House  Primary Care Giver: Self  Patient Support Systems include: Family Members, Friends/Neighbors, Children   Current Financial resources: Medicare  Current community resources: Other (Comment) ([facility])  Current services prior to admission: Oxygen Therapy, Durable Medical Equipment, Home Care (current with [facility])            Current DME: Walker, Cane, Shower Chair, Other (Comment) (raised toilet seat. O2 2L NC from [employer])            Type of Home Care services:  PT, OT, Nursing Services    ADLS  Prior functional level: Assistance with the following:, Housework, Cooking, Shopping  Current functional level: Assistance with the following:, Cooking, Housework, Shopping    PT AM-PAC:   /24  OT AM-PAC:   /24    Family can provide assistance at DC: Yes  Would you like Case Management to discuss the discharge plan with any other family members/significant others, and if so, who?    Plans to Return to Present Housing: Yes  Other Identified Issues/Barriers to RETURNING to current housing: no  Potential Assistance needed at discharge: Home Care            Potential DME:  n/a  Patient expects to discharge to: House  Plan for transportation at discharge: Family    Financial    Payor: [employer] MEDICARE / Plan: [employer] MEDIBLUE ESSENTIAL/PLUS / Product Type: *No Product type* /     Does insurance require precert for SNF: Yes    Potential assistance Purchasing Medications:    Meds-to-Beds request:        [id number] - [place], [place] - [address] -  [phone] - F [phone]  [address] [facility] [address]  Phone: [phone] Fax: [phone]      Notes:    Factors facilitating achievement of predicted outcomes: Family support, Motivated,

## 2024-05-21 NOTE — CONSULTS
Date:   5/21/2024  Patient name: Rowdy Marie  Date of admission:  5/20/2024  9:56 PM  MRN:   [id number]  YOB: 1950  PCP: Yandy Jones MD    Reason for Admission: Angina pectoris (HCC) [I20.9]  Chest pain, unspecified type [R07.9]    Cardiology consult: Chest pain, history of CAD, CABG       Referring physician: Dr. Aracely Pierre      Impression    Admission 5/23/2024 left-sided chest pain radiating to the left arm and to the neck, no acute ECG changes, high-sensitivity troponin 29, 25, 23, proBNP 1550     Admission 4/10/2024 for left chest pain radiating to left shoulder aggravating with the inspiration and shoulder movements almost constant in nature for more than 2 days  ECG showed no ischemic changes high-sensitivity troponin trending upward  Cardiac cath 4/11/2024  Multivessel CAD  Patent LIMA to the LAD with moderate nonobstructive disease in LAD post distal anastomosis  Patent SVG to OM 2 with patent stents  Patent SVG to RPDA  Successful PCI with BRENDA to ostial and mid SVG to diagonal.  Overall preserved LV function    Admission 1/31/2024 severe hypotension blood pressure 80 mmHg, dizziness  Episode of sharp chest pain no shortness of breath no diaphoresis ECG no ischemic changes on admission, normal high-sensitivity troponin 15, 13, 15  Paroxysmal atrial fibrillation Watchman procedure 11/14/2023, current rhythm sinus rhythm  Ejection fraction 60 to 65%  Coronary artery disease, CABG times 4/2014, stent placement times 5 December 2022  LIMA to LAD, SVG to OM 3, ramus, RCA patent grafts cardiac cath December 2019  History of stent placement  Hypothyroidism  Hyperlipidemia  History of prostate cancer  History of anemia  History of gastritis, esophageal stricture  Parkinson disease on Sinemet  Back pain, back surgery  Episodes of dizziness, combination of previous C-spine surgery, postural hypotension/autonomic dysfunction patient has Parkinson disease on medication, dizziness aggravates by  vertebral arteries     Echo 11/14/2023  Normal LV size, wall thickness, wall motion, ejection fraction 55 to 60%  No obvious significant valvular abnormality normal aortic root      History of present illness     73-year-old male with past medical history of multivessel coronary artery disease, CABG x 4, multiple stents, paroxysmal A-fib, status post Watchman procedure, Parkinson disease, hypothyroidism got hospitalized on 5/20/2024 with a severe left upper chest pain radiating to the neck and left shoulder.  He had stent placement in the SVG to diagonal 1 on 4/11/2024.  Over the last 1 week he has been experiencing left sided chest pain radiating to the neck and shoulder particularly in the evening, lasting only for few minute.  Yesterday the pain was very severe 9/10 and associated with the shortness of breath and it did not go away.  Pain lasted for about 3 to 4 hours.  Nitroglycerin gave him headache but he was put on nitroglycerin for.  ECG showed sinus rhythm no obvious ST elevation or depression.  Oxygen saturations were 98% on room air blood pressure 139/63 and heart rate 84.  Chest x-ray showed mild diffuse interstitial prominence may represent mild pulmonary edema or atypical    Labs on admission  proBNP 1554, high-sensitivity troponin 23 and 29  Sodium 139, potassium 3.7, creatinine 1.1, CO2 25, magnesium 1.5  Hemoglobin 9.7, WBC 7.7, platelets 230    Current evaluation  Patient seen and examined  Elderly very slim male resting on bed  At present he has no chest pain  Afebrile hemodynamically stable  ECG monitor sinus rhythm  No obvious sign of cardiac decompensation        Medications:   Scheduled Meds:   sodium chloride flush  5-40 mL IntraVENous 2 times per day    enoxaparin  40 mg SubCUTAneous Daily    aspirin  81 mg Oral Daily    carbidopa-levodopa  1 tablet Oral 4x Daily    docusate sodium  100 mg Oral Nightly    pantoprazole  40 mg Oral QAM AC    fludrocortisone  0.2 mg Oral BID    levothyroxine   angina  Multivessel CAD, cardiac cath 4/11/2024 patent LIMA to LAD distal LAD disease, patent SVG to OM 2 and patent stents, patent SVG to RPDA, stent placement in the SVG to diagonal 1  History of CABG times 4/2014, multiple stent placement, normal EF  History of exertional shortness of breath   ECG on admission no ischemic changes high-sensitivity troponin 18, 70  Normal LV systolic function ejection fraction more than 55%  History of paroxysmal A-fib, status post Watchman procedure  History of GI bleeding  Parkinson disease  Episodes of dizziness combination of previous C-spine surgery, Parkinson disease medications, postural hypotension/autonomic dysfunction    Patient Active Problem List:     Hypotension     Mild anemia     Chronic atrial fibrillation ([facility])     Coronary artery disease involving coronary bypass graft of native heart without angina pectoris     S/P CABG (coronary artery bypass graft)     GI bleed     Severe malnutrition ([facility])     Pharyngoesophageal dysphagia     Parkinson's disease without dyskinesia or fluctuating manifestations ([facility])     Acute intractable headache     H/O neck surgery     Chest pain     NSTEMI (non-ST elevated myocardial infarction) ([facility])     S/P angioplasty with stent     CAD, multiple vessel     CAP (community acquired pneumonia)     Angina pectoris ([facility])     Hyperlipidemia      Plan of Treatment:   Medications reviewed  1: Unstable angina, start IV heparin infusion without bolus continue with aspirin and Brilinta  2: Continue with other home medication including metoprolol succinate 12.5 mg t daily, ranolazine 1000 mg twice a day,  3: Parkinson disease continue with carbidopa levodopa 25/101 tablet 4 times a day, patient is also on Florinef 0.2 mg twice a day    N.p.o. after midnight  Cardiac cath tomorrow at [facility]  Arrange transport to cardiac catheter tomorrow morning  Case discussed with interventional cardiologist Dr. Mane.      Electronically signed by

## 2024-05-21 NOTE — ED PROVIDER NOTES
[facility] ED  Emergency Department  Faculty Attestation       I performed a history and physical examination of the patient and discussed management with the resident. I reviewed the resident’s note and agree with the documented findings including all diagnostic interpretations and plan of care. Any areas of disagreement are noted on the chart. I was personally present for the key portions of any procedures. I have documented in the chart those procedures where I was not present during the key portions. I have reviewed the emergency nurses triage note. I agree with the chief complaint, past medical history, past surgical history, allergies, medications, social and family history as documented unless otherwise noted below. Documentation of the HPI, Physical Exam and Medical Decision Making performed by scribjoseph is based on my personal performance of the HPI, PE and MDM. For Physician Assistant/ Nurse Practitioner cases/documentation I have personally evaluated this patient and have completed at least one if not all key elements of the E/M (history, physical exam, and MDM). Additional findings are as noted.    Pertinent Comments     Primary Care Physician: Yandy Jones MD    History: This is a 74 y.o. male who presents to the Emergency Department with complaint of    Chief Complaint   Patient presents with    Chest Pain         Physical:    ED Triage Vitals [05/20/24 2153]   BP Temp Temp Source Pulse Respirations SpO2 Height Weight - Scale   139/63 97.9 °F (36.6 °C) Oral 84 18 98 % 1.803 m (5' 11\") 68 kg (150 lb)        RADIOLOGY:  XR CHEST PORTABLE    Result Date: 5/20/2024  EXAMINATION: ONE XRAY VIEW OF THE CHEST 5/20/2024 10:05 pm COMPARISON: Chest radiograph 04/22/2024 HISTORY: ORDERING SYSTEM PROVIDED HISTORY: cp TECHNOLOGIST PROVIDED HISTORY: cp Reason for Exam: PT CO CP in sternal region X 1 day FINDINGS: Stable cardiac silhouette status post median sternotomy.  There is mild diffuse interstitial  prominence.  No lobar consolidation.  No pleural effusion or pneumothorax.  Proximal left humeral surgical fixation hardware again noted.     Mild diffuse interstitial prominence may represent mild pulmonary edema or atypical infection.      MDM/Plan:   Centralized chest pain.  Recent history of a stent.  Pain does not appear to be severe.  Minimal shortness of breath.  No fever, no chills.  No cough.  Lungs clear to auscultation bilaterally, heart rate is regular.  Minimal leg swelling.  Plan is for lab work, chest x-ray and likely admission given the fact that he is having chest pain and just had a recent stent placed.  Has been compliant with his Brilinta and aspirin.      EKG Interpretation    Interpreted by me  [id number]  Sinus rhythm rate of 79.  No ST segment changes, no arrhythmia, no ectopy.  Left axis deviation with good R wave progression and persistent T wave inversion in aVL.    Procedures:  none    ED Course as of 05/21/24 0205 Tue May 21, 2024   0059 Troponin near baseline.  BNP of 1500.  Given MI with stent placement just 1 month ago will admit for trending troponins, cardiology evaluation in the morning [AK]      ED Course User Index  [AK] Bertrand Thomson MD       CRITICAL CARE: There was a high probability of clinically significant/life threatening deterioration in this patient's condition which required my urgent intervention.  Total critical care time was 12 minutes.  This excludes any time for separately reportable procedures.     Yang Bridges DO  Attending Emergency Physician         Yang Bridges DO  05/21/24 0206

## 2024-05-21 NOTE — PROGRESS NOTES
Pharmacy monitoring of Heparin infusion  Heparin Infusion New Order    Patient on heparin for:   angina    Was patient on previous oral anticoagulant/dose?:  none , Confirmed with RN/Pt/Pts family/Surescripts?: yes    Factor Xa inhibitor/LMWH use within the past 72 hours? NO  If yes, date of last administration: none     Recent Labs     05/20/24  2217 05/21/24  0546   HGB 9.7* 9.2*    219       Heparin to be monitored using anti-Xa for duration of therapy.(aPTT should be used for patients who have received a DOAC in the past 72 hours)?      Have admin instructions been updated to reflect appropriate protocol? YES    Have appropriate labs been ordered for corresponding protocol? YES   *Discontinue anti-Xa lab monitoring if using aPTT protocol    Is the starting rate/last rate adjustment appropriate? yes    Concurrent anticoagulants: none   (Note it is not appropriate to be on most anticoagulants while on a heparin drip)    Review platelets from the past few days.  Any concerns?: No    Please record any appropriate additional notes here:     Kristi ChristieD, BCPS  5/21/2024 2:42 PM

## 2024-05-21 NOTE — PLAN OF CARE
Problem: Discharge Planning  Goal: Discharge to home or other facility with appropriate resources  5/21/2024 1707 by Erik Avilez RN  Outcome: Progressing  5/21/2024 0411 by Tj Schultz RN  Outcome: Progressing  Flowsheets (Taken 5/21/2024 0223)  Discharge to home or other facility with appropriate resources: Identify barriers to discharge with patient and caregiver     Problem: Pain  Goal: Verbalizes/displays adequate comfort level or baseline comfort level  5/21/2024 1707 by Erik Avilez RN  Outcome: Progressing  5/21/2024 0411 by Tj Schultz RN  Outcome: Progressing     Problem: Safety - Adult  Goal: Free from fall injury  5/21/2024 1707 by Erik Avilez RN  Outcome: Progressing  5/21/2024 0411 by Tj Schultz RN  Outcome: Progressing     Problem: ABCDS Injury Assessment  Goal: Absence of physical injury  5/21/2024 1707 by Erik Avilez RN  Outcome: Progressing  5/21/2024 0411 by Tj Schultz RN  Outcome: Progressing     Problem: Cardiovascular - Adult  Goal: Maintains optimal cardiac output and hemodynamic stability  5/21/2024 1707 by Erik Avilez RN  Outcome: Progressing  5/21/2024 0411 by Tj Schultz RN  Outcome: Progressing  Flowsheets (Taken 5/21/2024 0223)  Maintains optimal cardiac output and hemodynamic stability: Monitor blood pressure and heart rate  Goal: Absence of cardiac dysrhythmias or at baseline  5/21/2024 0411 by Tj Schultz RN  Outcome: Progressing  Flowsheets (Taken 5/21/2024 0223)  Absence of cardiac dysrhythmias or at baseline: Monitor cardiac rate and rhythm     Problem: Skin/Tissue Integrity - Adult  Goal: Skin integrity remains intact  5/21/2024 1707 by Erik Avilez RN  Outcome: Progressing  5/21/2024 0411 by Tj Schultz RN  Outcome: Progressing  Flowsheets  Taken 5/21/2024 0234  Skin Integrity Remains Intact: Monitor for areas of redness and/or skin breakdown  Taken 5/21/2024 0223  Skin Integrity Remains Intact: Monitor for areas  of redness and/or skin breakdown

## 2024-05-21 NOTE — H&P
rate, regular rhythm, no murmur, gallop, rub.  Abdomen: Soft, nontender, nondistended, normal bowel sounds, no hepatomegaly or splenomegaly  Neurologic: There are no new focal motor or sensory deficits, normal muscle tone and bulk, no abnormal sensation, normal speech, cranial nerves II through XII grossly intact  Skin: No gross lesions, rashes, bruising or bleeding on exposed skin area  Extremities:  peripheral pulses palpable, no pedal edema or calf pain with palpation  Psych: normal affect     Investigations:      Laboratory Testing:  Recent Results (from the past 24 hour(s))   CBC with Auto Differential    Collection Time: 05/20/24 10:17 PM   Result Value Ref Range    WBC 7.7 3.5 - 11.0 k/uL    RBC 3.28 (L) 4.5 - 5.9 m/uL    Hemoglobin 9.7 (L) 13.5 - 17.5 g/dL    Hematocrit 29.6 (L) 41 - 53 %    MCV 90.5 80 - 100 fL    MCH 29.5 26 - 34 pg    MCHC 32.6 31 - 37 g/dL    RDW 15.9 (H) 11.5 - 14.9 %    Platelets 235 150 - 450 k/uL    MPV 9.2 6.0 - 12.0 fL    Neutrophils % 63 36 - 66 %    Lymphocytes % 20 (L) 24 - 44 %    Monocytes % 9 (H) 1 - 7 %    Eosinophils % 7 (H) 0 - 4 %    Basophils % 1 0 - 2 %    Neutrophils Absolute 4.90 1.3 - 9.1 k/uL    Lymphocytes Absolute 1.50 1.0 - 4.8 k/uL    Monocytes Absolute 0.60 0.1 - 1.3 k/uL    Eosinophils Absolute 0.50 (H) 0.0 - 0.4 k/uL    Basophils Absolute 0.10 0.0 - 0.2 k/uL   Basic Metabolic Panel    Collection Time: 05/20/24 10:17 PM   Result Value Ref Range    Sodium 139 135 - 144 mmol/L    Potassium 3.7 3.7 - 5.3 mmol/L    Chloride 101 98 - 107 mmol/L    CO2 25 20 - 31 mmol/L    Anion Gap 13 9 - 17 mmol/L    Glucose 130 (H) 70 - 99 mg/dL    BUN 21 8 - 23 mg/dL    Creatinine 1.1 0.7 - 1.2 mg/dL    Est, Glom Filt Rate 70 >60 mL/min/1.73m2    Calcium 8.5 (L) 8.6 - 10.4 mg/dL   Brain Natriuretic Peptide    Collection Time: 05/20/24 10:17 PM   Result Value Ref Range    Pro-BNP 1,554 (H) <300 pg/mL   Troponin    Collection Time: 05/20/24 10:17 PM   Result Value Ref Range     Troponin, High Sensitivity 23 (H) 0 - 22 ng/L   Troponin    Collection Time: 05/20/24 11:13 PM   Result Value Ref Range    Troponin, High Sensitivity 25 (H) 0 - 22 ng/L   Basic Metabolic Panel w/ Reflex to MG    Collection Time: 05/21/24  5:46 AM   Result Value Ref Range    Sodium 139 135 - 144 mmol/L    Potassium 3.5 (L) 3.7 - 5.3 mmol/L    Chloride 103 98 - 107 mmol/L    CO2 23 20 - 31 mmol/L    Anion Gap 13 9 - 17 mmol/L    Glucose 96 70 - 99 mg/dL    BUN 22 8 - 23 mg/dL    Creatinine 1.1 0.7 - 1.2 mg/dL    Est, Glom Filt Rate 70 >60 mL/min/1.73m2    Calcium 8.6 8.6 - 10.4 mg/dL   CBC with auto differential    Collection Time: 05/21/24  5:46 AM   Result Value Ref Range    WBC 7.4 3.5 - 11.0 k/uL    RBC 3.14 (L) 4.5 - 5.9 m/uL    Hemoglobin 9.2 (L) 13.5 - 17.5 g/dL    Hematocrit 28.4 (L) 41 - 53 %    MCV 90.4 80 - 100 fL    MCH 29.3 26 - 34 pg    MCHC 32.5 31 - 37 g/dL    RDW 16.1 (H) 11.5 - 14.9 %    Platelets 219 150 - 450 k/uL    MPV 10.1 6.0 - 12.0 fL    Neutrophils % 52 36 - 66 %    Lymphocytes % 27 24 - 44 %    Monocytes % 10 (H) 1 - 7 %    Eosinophils % 9 (H) 0 - 4 %    Basophils % 2 0 - 2 %    Neutrophils Absolute 3.90 1.3 - 9.1 k/uL    Lymphocytes Absolute 2.00 1.0 - 4.8 k/uL    Monocytes Absolute 0.70 0.1 - 1.3 k/uL    Eosinophils Absolute 0.60 (H) 0.0 - 0.4 k/uL    Basophils Absolute 0.10 0.0 - 0.2 k/uL   Troponin    Collection Time: 05/21/24  5:46 AM   Result Value Ref Range    Troponin, High Sensitivity 29 (H) 0 - 22 ng/L   Magnesium    Collection Time: 05/21/24  5:46 AM   Result Value Ref Range    Magnesium 1.5 (L) 1.6 - 2.6 mg/dL       Imaging/Diagnostics:        Assessment :      Primary Problem  Angina pectoris (HCC)    Active Hospital Problems    Diagnosis Date Noted    Angina pectoris (HCC) [I20.9] 04/22/2024    CAD, multiple vessel [I25.10] 04/11/2024       Plan:     Patient status Admit as inpatient in the  Med/Surge      Patient is a 74-year-old male with multiple comorbidities including

## 2024-05-21 NOTE — DISCHARGE INSTR - COC
Continuity of Care Form    Patient Name: Rowdy Marie   :  1950  MRN:  [id number]    Admit date:  2024  Discharge date:  ***    Code Status Order: Full Code   Advance Directives:     Admitting Physician:  Larissa Oro MD  PCP: Yandy Jones MD    Discharging Nurse: ***  Discharging Hospital Unit/Room#: 2064/2064-01  Discharging Unit Phone Number: ***    Emergency Contact:   Extended Emergency Contact Information  Primary Emergency Contact: Elliott Marie  Home Phone: [phone]  Relation: Child  Secondary Emergency Contact: Kye Marie  Home Phone: [phone]  Mobile Phone: [phone]  Relation: Daughter-in-Law  Preferred language: English   needed? No    Past Surgical History:  Past Surgical History:   Procedure Laterality Date    CARDIAC CATHETERIZATION      CARDIAC PROCEDURE N/A 2024    sunny / [facility] CARDIAC CATH LAB    CARDIAC PROCEDURE N/A 2024    Percutaneous coronary intervention performed by Andree Paulino MD at [facility] CARDIAC CATH LAB    CORONARY ANGIOPLASTY WITH STENT PLACEMENT      8 stents prior to CABG per patient, most recent 2023    CORONARY ARTERY BYPASS GRAFT          DIAGNOSTIC CARDIAC CATH LAB PROCEDURE  2024       Immunization History:   Immunization History   Administered Date(s) Administered    COVID-19, J&J, (age 18y+), IM, 0.5 mL 2021, 2022       Active Problems:  Patient Active Problem List   Diagnosis Code    Hypotension I95.9    Mild anemia D64.9    Chronic atrial fibrillation (HCC) I48.20    Coronary artery disease involving coronary bypass graft of native heart without angina pectoris I25.810    S/P CABG (coronary artery bypass graft) Z95.1    GI bleed K92.2    Severe malnutrition (HCC) E43    Pharyngoesophageal dysphagia R13.14    Parkinson's disease without dyskinesia or fluctuating manifestations (HCC) G20.A1    Acute intractable

## 2024-05-21 NOTE — ED TRIAGE NOTES
Mode of arrival (squad #, walk in, police, etc) : Walk in        Chief complaint(s): Chest pain        Arrival Note (brief scenario, treatment PTA, etc).: Pt reports he was having Lt side chest pain that radiates to left arm. Pt was laying in bed when this started and has gotten worse with time. Pt reports he has hx of MI. PT is AO x4, vitals assessed, EKG done at bedside, pt placed on cardiac monitor. Care ongoing.

## 2024-05-21 NOTE — ED PROVIDER NOTES
[facility] ED  Emergency Department Encounter  Emergency Medicine Resident     Pt Name:Rowdy Marie  MRN: [id number]  Birthdate 1950  Date of evaluation: 5/21/24  PCP:  Yandy Jones MD  Note Started: 12:57 AM EDT      CHIEF COMPLAINT       Chief Complaint   Patient presents with    Chest Pain       HISTORY OF PRESENT ILLNESS  (Location/Symptom, Timing/Onset, Context/Setting, Quality, Duration, Modifying Factors, Severity.)      Rowdy Marie is a 74 y.o. male who presents with chest pain.  Patient states he had a heart attack proximately 1 month ago, had stents placed.  Discharged on aspirin and Brilinta.  He states that when he was walking around his house today he had some left-sided chest pain rating to his left arm.  Did not radiate to the back of the abdomen.  He states this started felt like his previous heart attack so he wanted to come be evaluated.  He denies any nausea, vomiting, shortness of breath, abdominal pain, diaphoresis or any other complaints associated    PAST MEDICAL / SURGICAL / SOCIAL / FAMILY HISTORY      has a past medical history of A-fib ([facility]), PREET (acute kidney injury) ([facility]), Anemia, CAD (coronary artery disease), Esophageal stricture, Hyperlipidemia, Hypokalemia, Hypomagnesemia, NSTEMI (non-ST elevated myocardial infarction) ([facility]), Orthostatic hypotension, Parkinsons ([facility]), Presence of Watchman left atrial appendage closure device, and Prostate CA ([facility]).       has a past surgical history that includes Coronary artery bypass graft; Cardiac catheterization; Coronary angioplasty with stent; Diagnostic Cardiac Cath Lab Procedure (04/11/2024); Cardiac procedure (N/A, 4/11/2024); and Cardiac procedure (N/A, 4/11/2024).      Social History     Socioeconomic History    Marital status:      Spouse name: Not on file    Number of children: Not on file    Years of education: Not on file    Highest education level: Not on file   Occupational History    Not on file

## 2024-05-21 NOTE — PLAN OF CARE
Problem: Discharge Planning  Goal: Discharge to home or other facility with appropriate resources  Outcome: Progressing  Flowsheets (Taken 5/21/2024 0223)  Discharge to home or other facility with appropriate resources: Identify barriers to discharge with patient and caregiver     Problem: Pain  Goal: Verbalizes/displays adequate comfort level or baseline comfort level  Outcome: Progressing     Problem: Safety - Adult  Goal: Free from fall injury  Outcome: Progressing     Problem: ABCDS Injury Assessment  Goal: Absence of physical injury  Outcome: Progressing     Problem: Cardiovascular - Adult  Goal: Maintains optimal cardiac output and hemodynamic stability  Outcome: Progressing  Flowsheets (Taken 5/21/2024 0223)  Maintains optimal cardiac output and hemodynamic stability: Monitor blood pressure and heart rate     Problem: Cardiovascular - Adult  Goal: Absence of cardiac dysrhythmias or at baseline  Outcome: Progressing  Flowsheets (Taken 5/21/2024 0223)  Absence of cardiac dysrhythmias or at baseline: Monitor cardiac rate and rhythm     Problem: Skin/Tissue Integrity - Adult  Goal: Skin integrity remains intact  Outcome: Progressing  Flowsheets  Taken 5/21/2024 0234  Skin Integrity Remains Intact: Monitor for areas of redness and/or skin breakdown  Taken 5/21/2024 0223  Skin Integrity Remains Intact: Monitor for areas of redness and/or skin breakdown

## 2024-05-21 NOTE — PROGRESS NOTES
Pt arrived to room 2064. VSS. Standard safety measures in place. Telemetry applied. Admission questions and assessment complete. Writer received message from Mavis Mathur NP that she will be placing home medication orders. Writer notified Mavis Mathur NP about pts back pain which he takes tramadol for at home, okay to give with a sip of water. Order to be placed by NP.

## 2024-05-22 ENCOUNTER — HOSPITAL ENCOUNTER (OUTPATIENT)
Age: 74
Setting detail: OUTPATIENT SURGERY
Discharge: ANOTHER HOSPITAL | End: 2024-05-22
Attending: INTERNAL MEDICINE | Admitting: INTERNAL MEDICINE
Payer: MEDICARE

## 2024-05-22 ENCOUNTER — HOSPITAL ENCOUNTER (INPATIENT)
Age: 74
LOS: 1 days | Discharge: HOME HEALTH | DRG: 281 | End: 2024-05-23
Attending: INTERNAL MEDICINE | Admitting: INTERNAL MEDICINE
Payer: MEDICARE

## 2024-05-22 VITALS
OXYGEN SATURATION: 98 % | HEART RATE: 62 BPM | SYSTOLIC BLOOD PRESSURE: 147 MMHG | DIASTOLIC BLOOD PRESSURE: 80 MMHG | RESPIRATION RATE: 17 BRPM

## 2024-05-22 VITALS
RESPIRATION RATE: 16 BRPM | DIASTOLIC BLOOD PRESSURE: 83 MMHG | WEIGHT: 147.05 LBS | HEART RATE: 130 BPM | OXYGEN SATURATION: 96 % | BODY MASS INDEX: 20.59 KG/M2 | TEMPERATURE: 97.9 F | SYSTOLIC BLOOD PRESSURE: 133 MMHG | HEIGHT: 71 IN

## 2024-05-22 DIAGNOSIS — I20.9 ANGINA PECTORIS (HCC): ICD-10-CM

## 2024-05-22 LAB
ANION GAP SERPL CALCULATED.3IONS-SCNC: 14 MMOL/L (ref 9–17)
ANTI-XA UNFRAC HEPARIN: 0.44 IU/L (ref 0.3–0.7)
ANTI-XA UNFRAC HEPARIN: 0.7 IU/L (ref 0.3–0.7)
BASOPHILS # BLD: 0.2 K/UL (ref 0–0.2)
BASOPHILS NFR BLD: 3 % (ref 0–2)
BUN SERPL-MCNC: 14 MG/DL (ref 8–23)
CALCIUM SERPL-MCNC: 9 MG/DL (ref 8.6–10.4)
CHLORIDE SERPL-SCNC: 103 MMOL/L (ref 98–107)
CO2 SERPL-SCNC: 24 MMOL/L (ref 20–31)
CREAT SERPL-MCNC: 1 MG/DL (ref 0.7–1.2)
EOSINOPHIL # BLD: 0.7 K/UL (ref 0–0.4)
EOSINOPHILS RELATIVE PERCENT: 9 % (ref 0–4)
ERYTHROCYTE [DISTWIDTH] IN BLOOD BY AUTOMATED COUNT: 16.1 % (ref 11.5–14.9)
GFR, ESTIMATED: 78 ML/MIN/1.73M2
GLUCOSE SERPL-MCNC: 96 MG/DL (ref 70–99)
HCT VFR BLD AUTO: 34.5 % (ref 41–53)
HGB BLD-MCNC: 11.1 G/DL (ref 13.5–17.5)
LYMPHOCYTES NFR BLD: 1.7 K/UL (ref 1–4.8)
LYMPHOCYTES RELATIVE PERCENT: 22 % (ref 24–44)
MCH RBC QN AUTO: 29.1 PG (ref 26–34)
MCHC RBC AUTO-ENTMCNC: 32.3 G/DL (ref 31–37)
MCV RBC AUTO: 90 FL (ref 80–100)
MONOCYTES NFR BLD: 0.6 K/UL (ref 0.1–1.3)
MONOCYTES NFR BLD: 8 % (ref 1–7)
NEUTROPHILS NFR BLD: 58 % (ref 36–66)
NEUTS SEG NFR BLD: 4.6 K/UL (ref 1.3–9.1)
PLATELET # BLD AUTO: 240 K/UL (ref 150–450)
PMV BLD AUTO: 9.9 FL (ref 6–12)
POTASSIUM SERPL-SCNC: 3.8 MMOL/L (ref 3.7–5.3)
RBC # BLD AUTO: 3.83 M/UL (ref 4.5–5.9)
SODIUM SERPL-SCNC: 141 MMOL/L (ref 135–144)
WBC OTHER # BLD: 7.8 K/UL (ref 3.5–11)

## 2024-05-22 PROCEDURE — 2580000003 HC RX 258: Performed by: STUDENT IN AN ORGANIZED HEALTH CARE EDUCATION/TRAINING PROGRAM

## 2024-05-22 PROCEDURE — 2580000003 HC RX 258: Performed by: NURSE PRACTITIONER

## 2024-05-22 PROCEDURE — G0378 HOSPITAL OBSERVATION PER HR: HCPCS

## 2024-05-22 PROCEDURE — 85025 COMPLETE CBC W/AUTO DIFF WBC: CPT

## 2024-05-22 PROCEDURE — 2709999900 HC NON-CHARGEABLE SUPPLY: Performed by: INTERNAL MEDICINE

## 2024-05-22 PROCEDURE — 2500000003 HC RX 250 WO HCPCS: Performed by: INTERNAL MEDICINE

## 2024-05-22 PROCEDURE — 96375 TX/PRO/DX INJ NEW DRUG ADDON: CPT

## 2024-05-22 PROCEDURE — 99152 MOD SED SAME PHYS/QHP 5/>YRS: CPT | Performed by: INTERNAL MEDICINE

## 2024-05-22 PROCEDURE — 6370000000 HC RX 637 (ALT 250 FOR IP): Performed by: STUDENT IN AN ORGANIZED HEALTH CARE EDUCATION/TRAINING PROGRAM

## 2024-05-22 PROCEDURE — C1725 CATH, TRANSLUMIN NON-LASER: HCPCS | Performed by: INTERNAL MEDICINE

## 2024-05-22 PROCEDURE — C1874 STENT, COATED/COV W/DEL SYS: HCPCS | Performed by: INTERNAL MEDICINE

## 2024-05-22 PROCEDURE — 92928 PRQ TCAT PLMT NTRAC ST 1 LES: CPT | Performed by: INTERNAL MEDICINE

## 2024-05-22 PROCEDURE — C1887 CATHETER, GUIDING: HCPCS | Performed by: INTERNAL MEDICINE

## 2024-05-22 PROCEDURE — 36415 COLL VENOUS BLD VENIPUNCTURE: CPT

## 2024-05-22 PROCEDURE — 6370000000 HC RX 637 (ALT 250 FOR IP): Performed by: INTERNAL MEDICINE

## 2024-05-22 PROCEDURE — 2060000000 HC ICU INTERMEDIATE R&B

## 2024-05-22 PROCEDURE — 85520 HEPARIN ASSAY: CPT

## 2024-05-22 PROCEDURE — 7100000010 HC PHASE II RECOVERY - FIRST 15 MIN: Performed by: INTERNAL MEDICINE

## 2024-05-22 PROCEDURE — 2580000003 HC RX 258: Performed by: INTERNAL MEDICINE

## 2024-05-22 PROCEDURE — 7100000011 HC PHASE II RECOVERY - ADDTL 15 MIN: Performed by: INTERNAL MEDICINE

## 2024-05-22 PROCEDURE — 6360000004 HC RX CONTRAST MEDICATION: Performed by: INTERNAL MEDICINE

## 2024-05-22 PROCEDURE — C1769 GUIDE WIRE: HCPCS | Performed by: INTERNAL MEDICINE

## 2024-05-22 PROCEDURE — 99153 MOD SED SAME PHYS/QHP EA: CPT | Performed by: INTERNAL MEDICINE

## 2024-05-22 PROCEDURE — 96376 TX/PRO/DX INJ SAME DRUG ADON: CPT

## 2024-05-22 PROCEDURE — 6360000002 HC RX W HCPCS: Performed by: INTERNAL MEDICINE

## 2024-05-22 PROCEDURE — 93455 CORONARY ART/GRFT ANGIO S&I: CPT | Performed by: INTERNAL MEDICINE

## 2024-05-22 PROCEDURE — C1892 INTRO/SHEATH,FIXED,PEEL-AWAY: HCPCS | Performed by: INTERNAL MEDICINE

## 2024-05-22 PROCEDURE — 6370000000 HC RX 637 (ALT 250 FOR IP): Performed by: NURSE PRACTITIONER

## 2024-05-22 PROCEDURE — 80048 BASIC METABOLIC PNL TOTAL CA: CPT

## 2024-05-22 PROCEDURE — C1894 INTRO/SHEATH, NON-LASER: HCPCS | Performed by: INTERNAL MEDICINE

## 2024-05-22 PROCEDURE — 99239 HOSP IP/OBS DSCHRG MGMT >30: CPT | Performed by: INTERNAL MEDICINE

## 2024-05-22 RX ORDER — POTASSIUM CHLORIDE 750 MG/1
10 CAPSULE, EXTENDED RELEASE ORAL EVERY MORNING
Status: DISCONTINUED | OUTPATIENT
Start: 2024-05-23 | End: 2024-05-23 | Stop reason: HOSPADM

## 2024-05-22 RX ORDER — MIDODRINE HYDROCHLORIDE 2.5 MG/1
2.5 TABLET ORAL 3 TIMES DAILY PRN
Status: DISCONTINUED | OUTPATIENT
Start: 2024-05-22 | End: 2024-05-23 | Stop reason: HOSPADM

## 2024-05-22 RX ORDER — SODIUM CHLORIDE 0.9 % (FLUSH) 0.9 %
5-40 SYRINGE (ML) INJECTION EVERY 12 HOURS SCHEDULED
Status: CANCELLED | OUTPATIENT
Start: 2024-05-22

## 2024-05-22 RX ORDER — ONDANSETRON 4 MG/1
4 TABLET, ORALLY DISINTEGRATING ORAL EVERY 8 HOURS PRN
Status: CANCELLED | OUTPATIENT
Start: 2024-05-22

## 2024-05-22 RX ORDER — LANOLIN ALCOHOL/MO/W.PET/CERES
3 CREAM (GRAM) TOPICAL NIGHTLY PRN
Status: CANCELLED | OUTPATIENT
Start: 2024-05-22

## 2024-05-22 RX ORDER — ACETAMINOPHEN 325 MG/1
650 TABLET ORAL EVERY 4 HOURS PRN
Status: DISCONTINUED | OUTPATIENT
Start: 2024-05-22 | End: 2024-05-22 | Stop reason: HOSPADM

## 2024-05-22 RX ORDER — METOPROLOL TARTRATE 1 MG/ML
5 INJECTION, SOLUTION INTRAVENOUS ONCE
Status: COMPLETED | OUTPATIENT
Start: 2024-05-22 | End: 2024-05-22

## 2024-05-22 RX ORDER — FLUDROCORTISONE ACETATE 0.1 MG/1
0.2 TABLET ORAL 2 TIMES DAILY
Status: DISCONTINUED | OUTPATIENT
Start: 2024-05-22 | End: 2024-05-23 | Stop reason: HOSPADM

## 2024-05-22 RX ORDER — SODIUM CHLORIDE 9 MG/ML
INJECTION, SOLUTION INTRAVENOUS PRN
Status: CANCELLED | OUTPATIENT
Start: 2024-05-22

## 2024-05-22 RX ORDER — METOPROLOL SUCCINATE 25 MG/1
12.5 TABLET, EXTENDED RELEASE ORAL DAILY
Status: DISCONTINUED | OUTPATIENT
Start: 2024-05-23 | End: 2024-05-23 | Stop reason: HOSPADM

## 2024-05-22 RX ORDER — TRAMADOL HYDROCHLORIDE 50 MG/1
50 TABLET ORAL 3 TIMES DAILY
Status: DISCONTINUED | OUTPATIENT
Start: 2024-05-22 | End: 2024-05-23 | Stop reason: HOSPADM

## 2024-05-22 RX ORDER — FENTANYL CITRATE 50 UG/ML
INJECTION, SOLUTION INTRAMUSCULAR; INTRAVENOUS PRN
Status: DISCONTINUED | OUTPATIENT
Start: 2024-05-22 | End: 2024-05-22 | Stop reason: HOSPADM

## 2024-05-22 RX ORDER — POLYETHYLENE GLYCOL 3350 17 G/17G
17 POWDER, FOR SOLUTION ORAL DAILY PRN
Status: DISCONTINUED | OUTPATIENT
Start: 2024-05-22 | End: 2024-05-23 | Stop reason: HOSPADM

## 2024-05-22 RX ORDER — ONDANSETRON 2 MG/ML
4 INJECTION INTRAMUSCULAR; INTRAVENOUS EVERY 6 HOURS PRN
Status: DISCONTINUED | OUTPATIENT
Start: 2024-05-22 | End: 2024-05-23 | Stop reason: HOSPADM

## 2024-05-22 RX ORDER — SODIUM CHLORIDE 9 MG/ML
INJECTION, SOLUTION INTRAVENOUS PRN
Status: DISCONTINUED | OUTPATIENT
Start: 2024-05-22 | End: 2024-05-23 | Stop reason: SDUPTHER

## 2024-05-22 RX ORDER — MIDAZOLAM HYDROCHLORIDE 1 MG/ML
INJECTION INTRAMUSCULAR; INTRAVENOUS PRN
Status: DISCONTINUED | OUTPATIENT
Start: 2024-05-22 | End: 2024-05-22 | Stop reason: HOSPADM

## 2024-05-22 RX ORDER — SODIUM CHLORIDE 0.9 % (FLUSH) 0.9 %
5-40 SYRINGE (ML) INJECTION PRN
Status: CANCELLED | OUTPATIENT
Start: 2024-05-22

## 2024-05-22 RX ORDER — ACETAMINOPHEN 650 MG/1
650 SUPPOSITORY RECTAL EVERY 6 HOURS PRN
Status: CANCELLED | OUTPATIENT
Start: 2024-05-22

## 2024-05-22 RX ORDER — SODIUM CHLORIDE 0.9 % (FLUSH) 0.9 %
10 SYRINGE (ML) INJECTION PRN
Status: CANCELLED | OUTPATIENT
Start: 2024-05-22

## 2024-05-22 RX ORDER — LANOLIN ALCOHOL/MO/W.PET/CERES
3 CREAM (GRAM) TOPICAL NIGHTLY PRN
Status: DISCONTINUED | OUTPATIENT
Start: 2024-05-22 | End: 2024-05-23 | Stop reason: HOSPADM

## 2024-05-22 RX ORDER — DOCUSATE SODIUM 100 MG/1
100 CAPSULE, LIQUID FILLED ORAL NIGHTLY
Status: CANCELLED | OUTPATIENT
Start: 2024-05-22

## 2024-05-22 RX ORDER — HEPARIN SODIUM 1000 [USP'U]/ML
30 INJECTION, SOLUTION INTRAVENOUS; SUBCUTANEOUS PRN
Status: DISCONTINUED | OUTPATIENT
Start: 2024-05-22 | End: 2024-05-22

## 2024-05-22 RX ORDER — ASPIRIN 81 MG/1
81 TABLET ORAL DAILY
Status: CANCELLED | OUTPATIENT
Start: 2024-05-23

## 2024-05-22 RX ORDER — POTASSIUM CHLORIDE 7.45 MG/ML
10 INJECTION INTRAVENOUS PRN
Status: DISCONTINUED | OUTPATIENT
Start: 2024-05-22 | End: 2024-05-23 | Stop reason: HOSPADM

## 2024-05-22 RX ORDER — SODIUM CHLORIDE 0.9 % (FLUSH) 0.9 %
5-40 SYRINGE (ML) INJECTION PRN
Status: DISCONTINUED | OUTPATIENT
Start: 2024-05-22 | End: 2024-05-23 | Stop reason: HOSPADM

## 2024-05-22 RX ORDER — NITROGLYCERIN 20 MG/100ML
INJECTION INTRAVENOUS PRN
Status: DISCONTINUED | OUTPATIENT
Start: 2024-05-22 | End: 2024-05-22 | Stop reason: HOSPADM

## 2024-05-22 RX ORDER — ACETAMINOPHEN 650 MG/1
650 SUPPOSITORY RECTAL EVERY 6 HOURS PRN
Status: DISCONTINUED | OUTPATIENT
Start: 2024-05-22 | End: 2024-05-23 | Stop reason: HOSPADM

## 2024-05-22 RX ORDER — HEPARIN SODIUM 10000 [USP'U]/100ML
5-30 INJECTION, SOLUTION INTRAVENOUS CONTINUOUS
Status: CANCELLED | OUTPATIENT
Start: 2024-05-22

## 2024-05-22 RX ORDER — HEPARIN SODIUM 1000 [USP'U]/ML
60 INJECTION, SOLUTION INTRAVENOUS; SUBCUTANEOUS PRN
Status: DISCONTINUED | OUTPATIENT
Start: 2024-05-22 | End: 2024-05-22

## 2024-05-22 RX ORDER — ACETAMINOPHEN 325 MG/1
650 TABLET ORAL EVERY 6 HOURS PRN
Status: DISCONTINUED | OUTPATIENT
Start: 2024-05-22 | End: 2024-05-23 | Stop reason: HOSPADM

## 2024-05-22 RX ORDER — SODIUM CHLORIDE 0.9 % (FLUSH) 0.9 %
10 SYRINGE (ML) INJECTION PRN
Status: DISCONTINUED | OUTPATIENT
Start: 2024-05-22 | End: 2024-05-23 | Stop reason: HOSPADM

## 2024-05-22 RX ORDER — POTASSIUM CHLORIDE 20 MEQ/1
40 TABLET, EXTENDED RELEASE ORAL PRN
Status: DISCONTINUED | OUTPATIENT
Start: 2024-05-22 | End: 2024-05-23 | Stop reason: HOSPADM

## 2024-05-22 RX ORDER — RANOLAZINE 500 MG/1
1000 TABLET, EXTENDED RELEASE ORAL 2 TIMES DAILY
Status: DISCONTINUED | OUTPATIENT
Start: 2024-05-22 | End: 2024-05-23 | Stop reason: HOSPADM

## 2024-05-22 RX ORDER — ONDANSETRON 2 MG/ML
4 INJECTION INTRAMUSCULAR; INTRAVENOUS EVERY 6 HOURS PRN
Status: CANCELLED | OUTPATIENT
Start: 2024-05-22

## 2024-05-22 RX ORDER — LIDOCAINE 4 G/G
1 PATCH TOPICAL DAILY
Status: CANCELLED | OUTPATIENT
Start: 2024-05-23

## 2024-05-22 RX ORDER — MAGNESIUM SULFATE HEPTAHYDRATE 40 MG/ML
2000 INJECTION, SOLUTION INTRAVENOUS PRN
Status: DISCONTINUED | OUTPATIENT
Start: 2024-05-22 | End: 2024-05-23 | Stop reason: HOSPADM

## 2024-05-22 RX ORDER — POTASSIUM CHLORIDE 7.45 MG/ML
10 INJECTION INTRAVENOUS PRN
Status: CANCELLED | OUTPATIENT
Start: 2024-05-22

## 2024-05-22 RX ORDER — SODIUM CHLORIDE 0.9 % (FLUSH) 0.9 %
5-40 SYRINGE (ML) INJECTION EVERY 12 HOURS SCHEDULED
Status: DISCONTINUED | OUTPATIENT
Start: 2024-05-22 | End: 2024-05-23 | Stop reason: HOSPADM

## 2024-05-22 RX ORDER — ASPIRIN 81 MG/1
81 TABLET ORAL DAILY
Status: DISCONTINUED | OUTPATIENT
Start: 2024-05-23 | End: 2024-05-23 | Stop reason: HOSPADM

## 2024-05-22 RX ORDER — LEVOTHYROXINE SODIUM 0.07 MG/1
75 TABLET ORAL DAILY
Status: CANCELLED | OUTPATIENT
Start: 2024-05-23

## 2024-05-22 RX ORDER — MAGNESIUM SULFATE HEPTAHYDRATE 40 MG/ML
2000 INJECTION, SOLUTION INTRAVENOUS PRN
Status: CANCELLED | OUTPATIENT
Start: 2024-05-22

## 2024-05-22 RX ORDER — TRAMADOL HYDROCHLORIDE 50 MG/1
50 TABLET ORAL 3 TIMES DAILY
Status: CANCELLED | OUTPATIENT
Start: 2024-05-22

## 2024-05-22 RX ORDER — METOPROLOL SUCCINATE 25 MG/1
12.5 TABLET, EXTENDED RELEASE ORAL DAILY
Status: CANCELLED | OUTPATIENT
Start: 2024-05-23

## 2024-05-22 RX ORDER — RANOLAZINE 500 MG/1
1000 TABLET, EXTENDED RELEASE ORAL 2 TIMES DAILY
Status: CANCELLED | OUTPATIENT
Start: 2024-05-22

## 2024-05-22 RX ORDER — HEPARIN SODIUM 1000 [USP'U]/ML
30 INJECTION, SOLUTION INTRAVENOUS; SUBCUTANEOUS PRN
Status: CANCELLED | OUTPATIENT
Start: 2024-05-22

## 2024-05-22 RX ORDER — PANTOPRAZOLE SODIUM 40 MG/1
40 TABLET, DELAYED RELEASE ORAL
Status: CANCELLED | OUTPATIENT
Start: 2024-05-23

## 2024-05-22 RX ORDER — BIVALIRUDIN 250 MG/5ML
INJECTION, POWDER, LYOPHILIZED, FOR SOLUTION INTRAVENOUS PRN
Status: DISCONTINUED | OUTPATIENT
Start: 2024-05-22 | End: 2024-05-22 | Stop reason: HOSPADM

## 2024-05-22 RX ORDER — FLUDROCORTISONE ACETATE 0.1 MG/1
0.2 TABLET ORAL 2 TIMES DAILY
Status: CANCELLED | OUTPATIENT
Start: 2024-05-22

## 2024-05-22 RX ORDER — HEPARIN SODIUM 10000 [USP'U]/100ML
5-30 INJECTION, SOLUTION INTRAVENOUS CONTINUOUS
Status: DISCONTINUED | OUTPATIENT
Start: 2024-05-22 | End: 2024-05-22

## 2024-05-22 RX ORDER — MIDODRINE HYDROCHLORIDE 2.5 MG/1
2.5 TABLET ORAL 3 TIMES DAILY PRN
Status: CANCELLED | OUTPATIENT
Start: 2024-05-22

## 2024-05-22 RX ORDER — LIDOCAINE 4 G/G
1 PATCH TOPICAL DAILY
Status: DISCONTINUED | OUTPATIENT
Start: 2024-05-23 | End: 2024-05-23 | Stop reason: HOSPADM

## 2024-05-22 RX ORDER — PANTOPRAZOLE SODIUM 40 MG/1
40 TABLET, DELAYED RELEASE ORAL
Status: DISCONTINUED | OUTPATIENT
Start: 2024-05-23 | End: 2024-05-23 | Stop reason: HOSPADM

## 2024-05-22 RX ORDER — BISACODYL 10 MG
10 SUPPOSITORY, RECTAL RECTAL DAILY PRN
Status: CANCELLED | OUTPATIENT
Start: 2024-05-22

## 2024-05-22 RX ORDER — PREGABALIN 150 MG/1
150 CAPSULE ORAL 2 TIMES DAILY
Status: CANCELLED | OUTPATIENT
Start: 2024-05-22

## 2024-05-22 RX ORDER — POTASSIUM CHLORIDE 750 MG/1
10 CAPSULE, EXTENDED RELEASE ORAL EVERY MORNING
Status: CANCELLED | OUTPATIENT
Start: 2024-05-23

## 2024-05-22 RX ORDER — POTASSIUM CHLORIDE 20 MEQ/1
40 TABLET, EXTENDED RELEASE ORAL PRN
Status: CANCELLED | OUTPATIENT
Start: 2024-05-22

## 2024-05-22 RX ORDER — LEVOTHYROXINE SODIUM 0.07 MG/1
75 TABLET ORAL DAILY
Status: DISCONTINUED | OUTPATIENT
Start: 2024-05-23 | End: 2024-05-23 | Stop reason: HOSPADM

## 2024-05-22 RX ORDER — ACETAMINOPHEN 325 MG/1
650 TABLET ORAL EVERY 6 HOURS PRN
Status: CANCELLED | OUTPATIENT
Start: 2024-05-22

## 2024-05-22 RX ORDER — BISACODYL 10 MG
10 SUPPOSITORY, RECTAL RECTAL DAILY PRN
Status: DISCONTINUED | OUTPATIENT
Start: 2024-05-22 | End: 2024-05-23 | Stop reason: HOSPADM

## 2024-05-22 RX ORDER — SODIUM CHLORIDE 9 MG/ML
INJECTION, SOLUTION INTRAVENOUS CONTINUOUS
Status: DISCONTINUED | OUTPATIENT
Start: 2024-05-22 | End: 2024-05-22 | Stop reason: HOSPADM

## 2024-05-22 RX ORDER — DOCUSATE SODIUM 100 MG/1
100 CAPSULE, LIQUID FILLED ORAL NIGHTLY
Status: DISCONTINUED | OUTPATIENT
Start: 2024-05-22 | End: 2024-05-23 | Stop reason: HOSPADM

## 2024-05-22 RX ORDER — ONDANSETRON 4 MG/1
4 TABLET, ORALLY DISINTEGRATING ORAL EVERY 8 HOURS PRN
Status: DISCONTINUED | OUTPATIENT
Start: 2024-05-22 | End: 2024-05-23 | Stop reason: HOSPADM

## 2024-05-22 RX ORDER — POLYETHYLENE GLYCOL 3350 17 G/17G
17 POWDER, FOR SOLUTION ORAL DAILY PRN
Status: CANCELLED | OUTPATIENT
Start: 2024-05-22

## 2024-05-22 RX ORDER — SODIUM CHLORIDE 9 MG/ML
INJECTION, SOLUTION INTRAVENOUS PRN
Status: DISCONTINUED | OUTPATIENT
Start: 2024-05-22 | End: 2024-05-23 | Stop reason: HOSPADM

## 2024-05-22 RX ORDER — SODIUM CHLORIDE 0.9 % (FLUSH) 0.9 %
5-40 SYRINGE (ML) INJECTION EVERY 12 HOURS SCHEDULED
Status: DISCONTINUED | OUTPATIENT
Start: 2024-05-22 | End: 2024-05-23 | Stop reason: SDUPTHER

## 2024-05-22 RX ORDER — PREGABALIN 150 MG/1
150 CAPSULE ORAL 2 TIMES DAILY
Status: DISCONTINUED | OUTPATIENT
Start: 2024-05-22 | End: 2024-05-23 | Stop reason: HOSPADM

## 2024-05-22 RX ORDER — HEPARIN SODIUM 1000 [USP'U]/ML
60 INJECTION, SOLUTION INTRAVENOUS; SUBCUTANEOUS PRN
Status: CANCELLED | OUTPATIENT
Start: 2024-05-22

## 2024-05-22 RX ADMIN — CARBIDOPA AND LEVODOPA 1 TABLET: 25; 100 TABLET ORAL at 08:35

## 2024-05-22 RX ADMIN — ASPIRIN 81 MG: 81 TABLET, COATED ORAL at 08:26

## 2024-05-22 RX ADMIN — METOPROLOL TARTRATE 5 MG: 1 INJECTION, SOLUTION INTRAVENOUS at 08:30

## 2024-05-22 RX ADMIN — FLUDROCORTISONE ACETATE 0.2 MG: 0.1 TABLET ORAL at 20:55

## 2024-05-22 RX ADMIN — SODIUM CHLORIDE, PRESERVATIVE FREE 10 ML: 5 INJECTION INTRAVENOUS at 20:58

## 2024-05-22 RX ADMIN — TICAGRELOR 90 MG: 90 TABLET ORAL at 08:26

## 2024-05-22 RX ADMIN — ACETAMINOPHEN 650 MG: 325 TABLET ORAL at 17:51

## 2024-05-22 RX ADMIN — METOPROLOL TARTRATE 5 MG: 1 INJECTION, SOLUTION INTRAVENOUS at 08:02

## 2024-05-22 RX ADMIN — SODIUM CHLORIDE, PRESERVATIVE FREE 10 ML: 5 INJECTION INTRAVENOUS at 08:29

## 2024-05-22 RX ADMIN — PREGABALIN 150 MG: 150 CAPSULE ORAL at 08:26

## 2024-05-22 RX ADMIN — METOPROLOL SUCCINATE 12.5 MG: 25 TABLET, EXTENDED RELEASE ORAL at 06:35

## 2024-05-22 RX ADMIN — TICAGRELOR 90 MG: 90 TABLET ORAL at 20:56

## 2024-05-22 RX ADMIN — DOCUSATE SODIUM 100 MG: 100 CAPSULE, LIQUID FILLED ORAL at 20:56

## 2024-05-22 RX ADMIN — TRAMADOL HYDROCHLORIDE 50 MG: 50 TABLET, COATED ORAL at 08:26

## 2024-05-22 RX ADMIN — RANOLAZINE 1000 MG: 500 TABLET, EXTENDED RELEASE ORAL at 20:55

## 2024-05-22 RX ADMIN — LEVOTHYROXINE SODIUM 75 MCG: 0.07 TABLET ORAL at 08:28

## 2024-05-22 RX ADMIN — TRAMADOL HYDROCHLORIDE 50 MG: 50 TABLET, COATED ORAL at 20:56

## 2024-05-22 RX ADMIN — RANOLAZINE 1000 MG: 500 TABLET, EXTENDED RELEASE ORAL at 08:26

## 2024-05-22 RX ADMIN — FLUDROCORTISONE ACETATE 0.2 MG: 0.1 TABLET ORAL at 08:35

## 2024-05-22 RX ADMIN — PREGABALIN 150 MG: 150 CAPSULE ORAL at 20:56

## 2024-05-22 RX ADMIN — CARBIDOPA AND LEVODOPA 1 TABLET: 25; 100 TABLET ORAL at 20:56

## 2024-05-22 RX ADMIN — PANTOPRAZOLE SODIUM 40 MG: 40 TABLET, DELAYED RELEASE ORAL at 08:27

## 2024-05-22 RX ADMIN — POTASSIUM CHLORIDE 10 MEQ: 10 CAPSULE, COATED, EXTENDED RELEASE ORAL at 08:26

## 2024-05-22 RX ADMIN — Medication 3 MG: at 20:56

## 2024-05-22 ASSESSMENT — PAIN DESCRIPTION - ORIENTATION
ORIENTATION: LOWER
ORIENTATION: LOWER

## 2024-05-22 ASSESSMENT — PAIN DESCRIPTION - ONSET: ONSET: ON-GOING

## 2024-05-22 ASSESSMENT — PAIN SCALES - GENERAL
PAINLEVEL_OUTOF10: 0
PAINLEVEL_OUTOF10: 8
PAINLEVEL_OUTOF10: 5
PAINLEVEL_OUTOF10: 6

## 2024-05-22 ASSESSMENT — PAIN DESCRIPTION - LOCATION
LOCATION: BACK

## 2024-05-22 ASSESSMENT — PAIN DESCRIPTION - PAIN TYPE: TYPE: CHRONIC PAIN

## 2024-05-22 ASSESSMENT — PAIN DESCRIPTION - DESCRIPTORS
DESCRIPTORS: ACHING

## 2024-05-22 ASSESSMENT — PAIN - FUNCTIONAL ASSESSMENT
PAIN_FUNCTIONAL_ASSESSMENT: ACTIVITIES ARE NOT PREVENTED
PAIN_FUNCTIONAL_ASSESSMENT: PREVENTS OR INTERFERES SOME ACTIVE ACTIVITIES AND ADLS

## 2024-05-22 ASSESSMENT — PAIN DESCRIPTION - FREQUENCY: FREQUENCY: CONTINUOUS

## 2024-05-22 NOTE — PROGRESS NOTES
Patient returned to room. Post cath recovery initiated. Patient alert and oriented . Arterial line intact to right groin. No  hematoma or drainage noted.    Family at bedside,  call light with in reach. Side railsup times 2. Patient closely monitored.    Arterial line with good wave from noted.patient closely monitored.

## 2024-05-22 NOTE — PROGRESS NOTES
Pharmacy monitoring of Heparin infusion  Heparin Infusion New Order    Patient on heparin for:  atrial fibrillation    Was patient on previous oral anticoagulant/dose?:  none , Confirmed with RN/Pt/Pts family/Surescripts?: yes    Factor Xa inhibitor/LMWH use within the past 72 hours? NO  If yes, date of last administration:     Recent Labs     05/21/24  0546 05/21/24  1457 05/22/24  0749   HGB 9.2* 10.6* 11.1*   INR  --  1.1  --     231 240       Heparin to be monitored using anti-Xa for duration of therapy.(aPTT should be used for patients who have received a DOAC in the past 72 hours)?      Have admin instructions been updated to reflect appropriate protocol? YES    Have appropriate labs been ordered for corresponding protocol? YES   *Discontinue anti-Xa lab monitoring if using aPTT protocol    Is the starting rate/last rate adjustment appropriate? yes    Concurrent anticoagulants: none  (Note it is not appropriate to be on most anticoagulants while on a heparin drip)    Review platelets from the past few days.  Any concerns?: No    Please record any appropriate additional notes here:

## 2024-05-22 NOTE — PLAN OF CARE
Problem: Discharge Planning  Goal: Discharge to home or other facility with appropriate resources  5/22/2024 0430 by Tj Schultz RN  Outcome: Progressing  Flowsheets (Taken 5/21/2024 2017)  Discharge to home or other facility with appropriate resources: Identify barriers to discharge with patient and caregiver     Problem: Pain  Goal: Verbalizes/displays adequate comfort level or baseline comfort level  5/22/2024 0430 by Tj Schultz RN  Outcome: Progressing     Problem: Safety - Adult  Goal: Free from fall injury  5/22/2024 0430 by Tj Schultz RN  Outcome: Progressing  Flowsheets (Taken 5/21/2024 2147)  Free From Fall Injury: Instruct family/caregiver on patient safety     Problem: ABCDS Injury Assessment  Goal: Absence of physical injury  5/22/2024 0430 by Tj Schultz RN  Outcome: Progressing  Flowsheets (Taken 5/21/2024 2147)  Absence of Physical Injury: Implement safety measures based on patient assessment     Problem: Cardiovascular - Adult  Goal: Maintains optimal cardiac output and hemodynamic stability  5/22/2024 0430 by Tj Schultz RN  Outcome: Progressing  Flowsheets (Taken 5/21/2024 2017)  Maintains optimal cardiac output and hemodynamic stability: Monitor blood pressure and heart rate     Problem: Cardiovascular - Adult  Goal: Absence of cardiac dysrhythmias or at baseline  Outcome: Progressing  Flowsheets (Taken 5/21/2024 2017)  Absence of cardiac dysrhythmias or at baseline: Monitor cardiac rate and rhythm     Problem: Skin/Tissue Integrity - Adult  Goal: Skin integrity remains intact  5/22/2024 0430 by Tj Schultz RN  Outcome: Progressing  Flowsheets  Taken 5/21/2024 2147  Skin Integrity Remains Intact: Monitor for areas of redness and/or skin breakdown  Taken 5/21/2024 2017  Skin Integrity Remains Intact: Monitor for areas of redness and/or skin breakdown

## 2024-05-22 NOTE — CARE COORDINATION
ONGOING DISCHARGE PLAN:    Patient is alert and oriented x4.    Spoke with patient regarding discharge plan and patient confirms that plan is still to return to home w/ Son & Dil w/ [facility]NS, [employer].     Plan is for Cardiac Cath today.    Will follow for any rec/needs, IF/When pt. Returns.     Will continue to follow for additional discharge needs.    If patient is discharged prior to next notation, then this note serves as note for discharge by case management.    Electronically signed by Janice oRlle RN on 5/22/2024 at 10:47 AM

## 2024-05-22 NOTE — DISCHARGE SUMMARY
IN-PATIENT SERVICE   [facility] Internal Medicine    Discharge Summary     Patient ID: Rowdy Marie  :  1950   MRN: [id number]     ACCOUNT:  [id number]   Patient's PCP: Yandy Jones MD  Admit Date: 2024   Discharge Date: 2024    Length of Stay: 0  Code Status:  Full Code  Admitting Physician: Larissa Oro MD  Discharge Physician: Aracely Pierre MD     Active Discharge Diagnoses:     Primary Problem  Angina pectoris (HCC)      Hospital Problems  Active Hospital Problems    Diagnosis Date Noted    Angina pectoris (HCC) [I20.9] 2024    CAD, multiple vessel [I25.10] 2024       Admission Condition:  fair     Discharged Condition: fair    Hospital Stay:     Hospital Course:  Rowdy Marie is a 74 y.o. male who was admitted for the management of Angina pectoris (HCC) , presented to ER with Chest Pain    The patient is a 74 y.o.  Non- / non  male who presents with Chest Pain   and he is admitted to the hospital for the management of    Rowdy Marie is a 74 y.o. Non- / non  male who presents with Chest Pain   and is admitted to the hospital for the management of Angina pectoris (HCC).     Patient's medical history significant for multivessel CAD -s/p CABG and angioplasty with stent x 4, Parkinson's disease, chronic back pain, hyperlipidemia, and hypertension.       According to patient, he had an MI in April.  This evening, he felt fatigued and developed pain across the left side of his chest that radiated down his left arm and up into his left neck.  Sympotms are reminiscent of the pain during his MI.  Patient also experienced shortness of breath, and lightheadedness.  States that in April, he had 2 old heart stents replaced and 2 new stents placed by Dr. Paulino; he follows with Dr. Ladd.  Reports longstanding history of hyperlipidemia.     ED eval reveals troponin HS  23, then 25.  Pro-BNP - 1,554CXR mild diffuse interstitial prominence      Discharge Medications:      Medication List        CONTINUE taking these medications      Compression Stockings Misc  by Does not apply route     Compression Stockings Misc  by Does not apply route            ASK your doctor about these medications      aspirin 81 MG EC tablet     carbidopa-levodopa  MG per tablet  Commonly known as: SINEMET  Take 1 tablet by mouth 4 times daily     coenzyme Q10 100 MG Caps capsule     docusate sodium 50 MG capsule  Commonly known as: COLACE     esomeprazole 20 MG delayed release capsule  Commonly known as: NexIUM     fludrocortisone 0.1 MG tablet  Commonly known as: FLORINEF  Take 1 tablet by mouth 2 times daily     levothyroxine 75 MCG tablet  Commonly known as: SYNTHROID     lidocaine 4 % external patch  Place 1 patch onto the skin daily     meclizine 12.5 MG tablet  Commonly known as: ANTIVERT  Take 2 tablets by mouth 3 times daily as needed for Dizziness     metoprolol succinate 25 MG extended release tablet  Commonly known as: TOPROL XL  Take 0.5 tablets by mouth daily     midodrine 2.5 MG tablet  Commonly known as: PROAMATINE  Take 1 tablet by mouth 3 times daily as needed (SBP <100)     mupirocin 2 % ointment  Commonly known as: BACTROBAN  Apply topically 3 times daily.     nitroGLYCERIN 0.2 MG/HR  Commonly known as: NITRODUR  Place 1 patch onto the skin daily     potassium chloride 10 MEQ extended release tablet  Commonly known as: KLOR-CON     pregabalin 75 MG capsule  Commonly known as: LYRICA     ranolazine 500 MG extended release tablet  Commonly known as: RANEXA  Take 1 tablet by mouth 2 times daily     ticagrelor 90 MG Tabs tablet  Commonly known as: BRILINTA  Take 1 tablet by mouth 2 times daily     traMADol 50 MG tablet  Commonly known as: ULTRAM     vitamin B-12 1000 MCG tablet  Commonly known as: CYANOCOBALAMIN  take 1 tablet by mouth once daily              Time Spent on discharge is  35 mins in patient examination, evaluation, counseling as well as

## 2024-05-22 NOTE — PROGRESS NOTES
Patient admitted, consent signed and questions answered. Patient ready for procedure. Call light to reach with side rails up 2 of 2. Bilateral groins clipped with writer and Yareli present.  family at bedside with patient.  History and physical updated.

## 2024-05-22 NOTE — PROGRESS NOTES
Angiomax completed at this time.    Patient resting with eyes closed, apears to be sleeping.   Spontaneous, unlabored and symmetrical

## 2024-05-22 NOTE — H&P
[facility]  Procedure History and Physical Update          Patient Name: Rowdy Marie  MRN:    [id number]  YOB: 1950  Date of evaluation:  5/22/2024    Procedure:    Cardiac cath +/- PCI    Indication for procedure:  angina      Please refer to the office note completed by Dr. Peralta on 5/21 in the medical record and note that:    [x] I have examined the patient and reviewed the H&P/Consult and there are no changes to be made to the assessment or plan.    [] I have examined the patient and reviewed the H&P/Consult and have noted the following changes:    Past Medical History:   Diagnosis Date    A-fib ([facility])     PREET (acute kidney injury) ([facility])     Anemia     CAD (coronary artery disease)     Esophageal stricture     Hyperlipidemia     Hypokalemia     Hypomagnesemia     NSTEMI (non-ST elevated myocardial infarction) ([facility])     Orthostatic hypotension     Parkinsons ([facility])     Presence of Watchman left atrial appendage closure device     Prostate CA ([facility])        Past Surgical History:   Procedure Laterality Date    CARDIAC CATHETERIZATION      CARDIAC PROCEDURE N/A 4/11/2024    sunny / [facility] CARDIAC CATH LAB    CARDIAC PROCEDURE N/A 4/11/2024    Percutaneous coronary intervention performed by Andree Paulino MD at [facility] CARDIAC CATH LAB    CORONARY ANGIOPLASTY WITH STENT PLACEMENT      8 stents prior to CABG per patient, most recent 12/2023    CORONARY ARTERY BYPASS GRAFT      2014    DIAGNOSTIC CARDIAC CATH LAB PROCEDURE  04/11/2024       No family history on file.    Allergies   Allergen Reactions    Latex     Atorvastatin     Fluvastatin     Imdur [Isosorbide Nitrate]     Isosorbide Headaches     Patient denies having any allergy to this medication    Methadone     Nalbuphine     Oxycodone     Penicillins     Rosuvastatin     Statins     Zetia [Ezetimibe]        Prior to Admission medications

## 2024-05-23 VITALS
OXYGEN SATURATION: 98 % | HEIGHT: 71 IN | WEIGHT: 148.15 LBS | RESPIRATION RATE: 16 BRPM | DIASTOLIC BLOOD PRESSURE: 60 MMHG | HEART RATE: 66 BPM | SYSTOLIC BLOOD PRESSURE: 121 MMHG | BODY MASS INDEX: 20.74 KG/M2 | TEMPERATURE: 97.7 F

## 2024-05-23 LAB
ANION GAP SERPL CALCULATED.3IONS-SCNC: 15 MMOL/L (ref 9–17)
BASOPHILS # BLD: 0.1 K/UL (ref 0–0.2)
BASOPHILS NFR BLD: 1 % (ref 0–2)
BUN SERPL-MCNC: 19 MG/DL (ref 8–23)
CALCIUM SERPL-MCNC: 8.9 MG/DL (ref 8.6–10.4)
CHLORIDE SERPL-SCNC: 103 MMOL/L (ref 98–107)
CO2 SERPL-SCNC: 23 MMOL/L (ref 20–31)
CREAT SERPL-MCNC: 1.1 MG/DL (ref 0.7–1.2)
EOSINOPHIL # BLD: 0.4 K/UL (ref 0–0.4)
EOSINOPHILS RELATIVE PERCENT: 6 % (ref 0–4)
ERYTHROCYTE [DISTWIDTH] IN BLOOD BY AUTOMATED COUNT: 16.2 % (ref 11.5–14.9)
GFR, ESTIMATED: 70 ML/MIN/1.73M2
GLUCOSE SERPL-MCNC: 117 MG/DL (ref 70–99)
HCT VFR BLD AUTO: 31.4 % (ref 41–53)
HGB BLD-MCNC: 10.3 G/DL (ref 13.5–17.5)
LYMPHOCYTES NFR BLD: 0.9 K/UL (ref 1–4.8)
LYMPHOCYTES RELATIVE PERCENT: 12 % (ref 24–44)
MCH RBC QN AUTO: 29.5 PG (ref 26–34)
MCHC RBC AUTO-ENTMCNC: 32.7 G/DL (ref 31–37)
MCV RBC AUTO: 90.3 FL (ref 80–100)
MONOCYTES NFR BLD: 0.7 K/UL (ref 0.1–1.3)
MONOCYTES NFR BLD: 10 % (ref 1–7)
NEUTROPHILS NFR BLD: 71 % (ref 36–66)
NEUTS SEG NFR BLD: 5.2 K/UL (ref 1.3–9.1)
PLATELET # BLD AUTO: 227 K/UL (ref 150–450)
PMV BLD AUTO: 10.1 FL (ref 6–12)
POTASSIUM SERPL-SCNC: 3.7 MMOL/L (ref 3.7–5.3)
RBC # BLD AUTO: 3.48 M/UL (ref 4.5–5.9)
SODIUM SERPL-SCNC: 141 MMOL/L (ref 135–144)
WBC OTHER # BLD: 7.3 K/UL (ref 3.5–11)

## 2024-05-23 PROCEDURE — 36415 COLL VENOUS BLD VENIPUNCTURE: CPT

## 2024-05-23 PROCEDURE — 85025 COMPLETE CBC W/AUTO DIFF WBC: CPT

## 2024-05-23 PROCEDURE — 80048 BASIC METABOLIC PNL TOTAL CA: CPT

## 2024-05-23 PROCEDURE — 6370000000 HC RX 637 (ALT 250 FOR IP): Performed by: INTERNAL MEDICINE

## 2024-05-23 PROCEDURE — 99232 SBSQ HOSP IP/OBS MODERATE 35: CPT | Performed by: INTERNAL MEDICINE

## 2024-05-23 PROCEDURE — 2580000003 HC RX 258: Performed by: STUDENT IN AN ORGANIZED HEALTH CARE EDUCATION/TRAINING PROGRAM

## 2024-05-23 RX ORDER — RANOLAZINE 1000 MG/1
1000 TABLET, EXTENDED RELEASE ORAL 2 TIMES DAILY
Qty: 60 TABLET | Refills: 3 | Status: SHIPPED | OUTPATIENT
Start: 2024-05-23

## 2024-05-23 RX ADMIN — CARBIDOPA AND LEVODOPA 1 TABLET: 25; 100 TABLET ORAL at 14:53

## 2024-05-23 RX ADMIN — RANOLAZINE 1000 MG: 500 TABLET, EXTENDED RELEASE ORAL at 10:41

## 2024-05-23 RX ADMIN — POTASSIUM CHLORIDE 10 MEQ: 10 CAPSULE, COATED, EXTENDED RELEASE ORAL at 10:42

## 2024-05-23 RX ADMIN — CARBIDOPA AND LEVODOPA 1 TABLET: 25; 100 TABLET ORAL at 10:42

## 2024-05-23 RX ADMIN — PANTOPRAZOLE SODIUM 40 MG: 40 TABLET, DELAYED RELEASE ORAL at 05:44

## 2024-05-23 RX ADMIN — TICAGRELOR 90 MG: 90 TABLET ORAL at 10:41

## 2024-05-23 RX ADMIN — TRAMADOL HYDROCHLORIDE 50 MG: 50 TABLET, COATED ORAL at 14:53

## 2024-05-23 RX ADMIN — LEVOTHYROXINE SODIUM 75 MCG: 0.07 TABLET ORAL at 05:44

## 2024-05-23 RX ADMIN — FLUDROCORTISONE ACETATE 0.2 MG: 0.1 TABLET ORAL at 10:44

## 2024-05-23 RX ADMIN — METOPROLOL SUCCINATE 12.5 MG: 25 TABLET, EXTENDED RELEASE ORAL at 10:42

## 2024-05-23 RX ADMIN — SODIUM CHLORIDE, PRESERVATIVE FREE 10 ML: 5 INJECTION INTRAVENOUS at 10:43

## 2024-05-23 RX ADMIN — ASPIRIN 81 MG: 81 TABLET, COATED ORAL at 10:42

## 2024-05-23 RX ADMIN — TRAMADOL HYDROCHLORIDE 50 MG: 50 TABLET, COATED ORAL at 10:42

## 2024-05-23 RX ADMIN — PREGABALIN 150 MG: 150 CAPSULE ORAL at 10:42

## 2024-05-23 ASSESSMENT — PAIN DESCRIPTION - ORIENTATION: ORIENTATION: LOWER

## 2024-05-23 ASSESSMENT — PAIN SCALES - GENERAL: PAINLEVEL_OUTOF10: 5

## 2024-05-23 ASSESSMENT — PAIN DESCRIPTION - LOCATION: LOCATION: BACK

## 2024-05-23 NOTE — CARE COORDINATION
Case Management Assessment  Initial Evaluation    Date/Time of Evaluation: 5/23/2024 12:29 PM  Assessment Completed by: Janice Rolle RN    If patient is discharged prior to next notation, then this note serves as note for discharge by case management.    Patient Name: Rowdy Marie                   YOB: 1950  Diagnosis: NSTEMI (non-ST elevated myocardial infarction) (HCC) [I21.4]                   Date / Time: 5/22/2024  7:20 PM    Patient Admission Status: Inpatient   Readmission Risk (Low < 19, Mod (19-27), High > 27): Readmission Risk Score: 25.7    Current PCP: Yandy Jones MD  PCP verified by CM? Yes    Chart Reviewed: Yes      History Provided by: Patient  Patient Orientation: Alert and Oriented    Patient Cognition: Alert    Hospitalization in the last 30 days (Readmission):  No    If yes, Readmission Assessment in  Navigator will be completed.    Advance Directives:      Code Status: Full Code   Patient's Primary Decision Maker is:      Primary Decision Maker: Elliott Marie - [phone]    Primary Decision Maker: Alek Marie    Discharge Planning:    Patient lives with: Family Members Type of Home: House  Primary Care Giver: Self  Patient Support Systems include: Family Members   Current Financial resources: Medicare  Current community resources: Other (Comment) ([facility])  Current services prior to admission: Durable Medical Equipment            Current DME: Cane, Walker, Shower Chair, Oxygen Therapy (Comment), Other (Comment) (Raised Toilet Seat. Wears 2LNC, as needed, [employer], Rollator)            Type of Home Care services:  PT, OT, Nursing Services    ADLS  Prior functional level: Assistance with the following:, Cooking, Housework, Shopping  Current functional level: Assistance with the following:, Cooking, Housework, Shopping    PT AM-PAC:   /24  OT AM-PAC:   /24    Family can provide assistance at DC: Yes  Would you like Case Management to discuss the  Fax:

## 2024-05-23 NOTE — DISCHARGE INSTR - COC
Continuity of Care Form    Patient Name: Rowdy Marie   :  1950  MRN:  [id number]    Admit date:  2024  Discharge date:  2024    Code Status Order: Full Code   Advance Directives:     Admitting Physician:  Larissa Oro MD  PCP: Yandy Jones MD    Discharging Nurse: Denita MCCLELLAND  Discharging Hospital Unit/Room#: 2119/2119-01  Discharging Unit Phone Number: [phone]    Emergency Contact:   Extended Emergency Contact Information  Primary Emergency Contact: Elliott Marie  Home Phone: [phone]  Relation: Child  Secondary Emergency Contact: Kye Marie  Home Phone: [phone]  Mobile Phone: [phone]  Relation: Daughter-in-Law  Preferred language: English   needed? No    Past Surgical History:  Past Surgical History:   Procedure Laterality Date    CARDIAC CATHETERIZATION      CARDIAC PROCEDURE N/A 2024    sunny / Left heart cath / [facility] CARDIAC CATH LAB    CARDIAC PROCEDURE N/A 2024    Percutaneous coronary intervention performed by Andree Paulino MD at [facility] CARDIAC CATH LAB    CORONARY ANGIOPLASTY WITH STENT PLACEMENT      8 stents prior to CABG per patient, most recent 2023    CORONARY ARTERY BYPASS GRAFT          DIAGNOSTIC CARDIAC CATH LAB PROCEDURE  2024       Immunization History:   Immunization History   Administered Date(s) Administered    COVID-19, J&J, (age 18y+), IM, 0.5 mL 2021, 2022       Active Problems:  Patient Active Problem List   Diagnosis Code    Hypotension I95.9    Mild anemia D64.9    Chronic atrial fibrillation (HCC) I48.20    Coronary artery disease involving coronary bypass graft of native heart without angina pectoris I25.810    S/P CABG (coronary artery bypass graft) Z95.1    GI bleed K92.2    Severe malnutrition (HCC) E43    Pharyngoesophageal dysphagia R13.14    Parkinson's disease without dyskinesia or fluctuating manifestations (HCC) G20.A1     Acute intractable headache R51.9    H/O neck surgery Z98.890    Chest pain R07.9    NSTEMI (non-ST elevated myocardial infarction) ([facility]) I21.4    S/P angioplasty with stent Z95.820    CAD, multiple vessel I25.10    CAP (community acquired pneumonia) J18.9    Angina pectoris ([facility]) I20.9    Hyperlipidemia E78.5       Isolation/Infection:   Isolation            No Isolation          Patient Infection Status       None to display                     Nurse Assessment:  Last Vital Signs: BP (!) 130/53   Pulse 72   Temp 98.3 °F (36.8 °C) (Oral)   Resp 16   Wt 67.2 kg (148 lb 2.4 oz)   SpO2 93%   BMI 20.66 kg/m²     Last documented pain score (0-10 scale): Pain Level: 0  Last Weight:   Wt Readings from Last 1 Encounters:   05/23/24 67.2 kg (148 lb 2.4 oz)     Mental Status:  oriented and alert    IV Access:  - None    Nursing Mobility/ADLs:  Walking   Assisted  Transfer  Assisted  Bathing  Assisted  Dressing  Assisted  Toileting  Assisted  Feeding  Independent  Med Admin  Independent  Med Delivery   whole    Wound Care Documentation and Therapy:        Elimination:  Continence:   Bowel: Yes  Bladder: Yes  Urinary Catheter: None   Colostomy/Ileostomy/Ileal Conduit: No       Date of Last BM: ***    Intake/Output Summary (Last 24 hours) at 5/23/2024 1031  Last data filed at 5/23/2024 0947  Gross per 24 hour   Intake 640 ml   Output 1000 ml   Net -360 ml     I/O last 3 completed shifts:  In: 400 [P.O.:400]  Out: 1000 [Urine:1000]    Safety Concerns:     At Risk for Falls    Impairments/Disabilities:      Vision    Nutrition Therapy:  Current Nutrition Therapy:   - Oral Diet:  General    Routes of Feeding: Oral  Liquids: No Restrictions  Daily Fluid Restriction: no  Last Modified Barium Swallow with Video (Video Swallowing Test): not done    Treatments at the Time of Hospital Discharge:   Respiratory Treatments: ***  Oxygen Therapy:  {Therapy; copd oxygen:61840}  Ventilator:    { CC Vent List:[id number]}    Rehab

## 2024-05-23 NOTE — DISCHARGE SUMMARY
[facility] Internal Medicine    Ko Elias MD; Ian Bautista MD, Fransisco Cee MD, Aracely Pierre MD, Brea Jones MD; Larissa Oro MD      [facility] Internal Medicine  IN-PATIENT SERVICE   [facility]    Discharge Summary     Patient ID: Rowdy Marie  :  1950   MRN: [id number]     ACCOUNT:  [id number]   Patient's PCP: Yandy Jones MD  Admit Date: 2024   Discharge Date: 2024     Length of Stay: 1  Code Status:  Full Code  Admitting Physician: Larissa Oro MD  Discharge Physician: Larissa Oro MD     Active Discharge Diagnoses:     Hospital Problem Lists:  Principal Problem:    NSTEMI (non-ST elevated myocardial infarction) (HCC)  Resolved Problems:    * No resolved hospital problems. *      Admission Condition:  Serious      Discharged Condition: Stable     Hospital Stay:     Hospital Course:  Rowdy Marie is a 74 y.o. male who was admitted for the management of   NSTEMI (non-ST elevated myocardial infarction) (HCC) , presented to ER with No chief complaint on file.        Review of system:  Denies any nausea vomiting fever chills,  Denies any headaches or blurred vision,  Denies any chest pain   Denies any cough phlegm hemoptysis,  Denies any abdominal pain diarrhea constipation,  Denies any tingling tingling numbness weakness of arms or legs,   Skin no rash,    On examination,  Alert awake oriented x3,  S1-S2 present,  CTA bilateral,  Abdomen soft nontender nondistended bowel sounds present   Extremity no edema no calf tenderness,,  Skin no rash  CNS no focal neurological deficits   Significant therapeutic interventions:     Significant Diagnostic Studies:   Labs / Micro:  BP (!) 130/53   Pulse 72   Temp 98.3 °F (36.8 °C) (Oral)   Resp 16   Wt 67.2 kg (148 lb 2.4 oz)   SpO2 93%   BMI 20.66 kg/m²   Temp (24hrs), Av °F (36.7 °C), Min:97.7 °F (36.5 °C), Max:98.3 °F (36.8 °C)    No results for input(s):  \"POCGLU\" in the last 72 hours.    Intake/Output Summary (Last 24 hours) at 5/23/2024 1212  Last data filed at 5/23/2024 0947  Gross per 24 hour   Intake 640 ml   Output 1000 ml   Net -360 ml        Labs:  Hematology:  Recent Labs     05/21/24  1457 05/22/24  0749 05/23/24  0517   WBC 7.5 7.8 7.3   RBC 3.60* 3.83* 3.48*   HGB 10.6* 11.1* 10.3*   HCT 33.0* 34.5* 31.4*   MCV 91.6 90.0 90.3   MCH 29.4 29.1 29.5   MCHC 32.0 32.3 32.7   RDW 16.1* 16.1* 16.2*    240 227   MPV 10.0 9.9 10.1   INR 1.1  --   --      Chemistry:  Recent Labs     05/20/24  2217 05/20/24  2313 05/21/24  0546 05/22/24  0749 05/23/24  0517     --  139 141 141   K 3.7  --  3.5* 3.8 3.7     --  103 103 103   CO2 25  --  23 24 23   GLUCOSE 130*  --  96 96 117*   BUN 21  --  22 14 19   CREATININE 1.1  --  1.1 1.0 1.1   MG  --   --  1.5*  --   --    ANIONGAP 13  --  13 14 15   LABGLOM 70  --  70 78 70   CALCIUM 8.5*  --  8.6 9.0 8.9   PROBNP 1,554*  --   --   --   --    TROPHS 23* 25* 29*  --   --    No results for input(s): \"PROT\", \"LABALBU\", \"LABA1C\", \"T2LRJZT\", \"W3DNNAK\", \"FT4\", \"TSH\", \"AST\", \"ALT\", \"LDH\", \"GGT\", \"ALKPHOS\", \"BILITOT\", \"BILIDIR\", \"AMMONIA\", \"AMYLASE\", \"LIPASE\", \"LACTATE\", \"CHOL\", \"HDL\", \"CHOLHDLRATIO\", \"TRIG\", \"VLDL\", \"DOK68RN\", \"PHENYTOIN\", \"PHENYF\", \"URICACID\", \"POCGLU\" in the last 72 hours.    Invalid input(s): \"LABGGT\", \"LDLCHOLESTEROL\"  ABG:  Lab Results   Component Value Date/Time    FIO2 INFORMATION NOT PROVIDED 04/12/2024 07:17 PM     No results found for: \"SPECIAL\"  No results found for: \"CULTURE\"    Radiology:  XR CHEST PORTABLE    Result Date: 5/20/2024  Mild diffuse interstitial prominence may represent mild pulmonary edema or atypical infection.       Consultations:    Consults:     Final Specialist Recommendations/Findings:   IP CONSULT TO CARDIOLOGY      The patient was seen and examined on day of discharge and this discharge summary is in conjunction with any daily progress note from day of

## 2024-05-23 NOTE — H&P
[facility]   IN-PATIENT SERVICE   [facility]    HISTORY AND PHYSICAL EXAMINATION            Date:   5/23/2024  Patient name:  Rowdy Marie  Date of admission:  5/22/2024  7:20 PM  MRN:   [id number]  Account:  [id number]  YOB: 1950  PCP:    Yandy Jones MD  Room:   [address] [place] [address]  Code Status:    Full Code    Chief Complaint:     No chief complaint on file.      History Obtained From:     patient    History of Present Illness:     The patient is a 74 y.o.  Non- / non  male who presents with No chief complaint on file.   and he is admitted to the hospital for the management of    Rowdy Marie is a 74 y.o. Non- / non  male who presents with Chest Pain   and is admitted to the hospital for the management of Angina pectoris (HCC).     Patient's medical history significant for multivessel CAD -s/p CABG and angioplasty with stent x 4, Parkinson's disease, chronic back pain, hyperlipidemia, and hypertension.       According to patient, he had an MI in April.  This evening, he felt fatigued and developed pain across the left side of his chest that radiated down his left arm and up into his left neck.  Sympotms are reminiscent of the pain during his MI.  Patient also experienced shortness of breath, and lightheadedness.  States that in April, he had 2 old heart stents replaced and 2 new stents placed by Dr. Paulino; he follows with Dr. Ladd.  Reports longstanding history of hyperlipidemia.     ED eval reveals troponin HS  23, then 25.  Pro-BNP - 1,554CXR mild diffuse interstitial prominence - may represent mild pulmonary edema or atypical infection.  Denies cough, feer, chills.  No signs of ischemia on EKG.      Past Medical History:     Past Medical History:   Diagnosis Date    A-fib (HCC)     PREET (acute kidney injury) (HCC)     Anemia     CAD (coronary artery disease)     Esophageal stricture     Hyperlipidemia     Hypokalemia     Hypomagnesemia      myocardial infarction) ([facility])    Active Hospital Problems    Diagnosis Date Noted    NSTEMI (non-ST elevated myocardial infarction) ([facility]) [I21.4] 04/10/2024       Plan:     Patient status Admit as inpatient in the  Med/Surge      Patient is a 74-year-old male with multiple comorbidities including history of CABG multiple PCI last month, history of GI bleed, A-fib status post Watchman device not on anticoagulation due to history of GI bleed, Parkinson's, recently had stent a month ago  Angina, patient continues to have typical chest pain troponin plateaued, continues to have chest pain, cardiology consulted, beta-blocker, cannot tolerate nitro due to severe headache, had similar admission last month, had echocardiogram, which showed preserved ejection fraction , on aspirin and Brilinta patient allergic to statins, on Ranexa as well thousand twice daily  History of A-fib, status post Watchman device   Hypertension stable   chronic orthostatic hypotension  Parkinson's disease, on carbidopa  CKD stage III, creatinine is 1.3 continue to monitor patient received 1 dose of Lasix yesterday, no sign of volume overload present, no pedal edema, continue to monitor next  Replace potassium  DVT prophylaxis.  Anemia of chronic disease n  Had chest x-ray showed pulmonary edema versus possible atypical infection.  Denies fever or chills, no cough  ,  no shortness of breath no sign of volume overload, continue to monitor next  S/p cath and PCI from [place],  Discussed with cardiology, possible discharge now    Consultations:   IP CONSULT TO CARDIOLOGY     Patient is admitted as inpatient status because of co-morbidities listed above, severity of signs and symptoms as outlined, requirement for current medical therapies and most importantly because of direct risk to patient if care not provided in a hospital setting.    Larissa Oro MD  5/23/2024  12:09 PM    Copy sent to Yandy Haddad MD    Please note that this chart  was generated using voice recognition Dragon dictation software.  Although every effort was made to ensure the accuracy of this automated transcription, some errors in transcription may have occurred.

## 2024-05-23 NOTE — DISCHARGE INSTR - DIET

## 2024-05-23 NOTE — CARE COORDINATION
Continuity of Care Form    Patient Name: Rowdy Marie   :  1950  MRN:  [id number]    Admit date:  2024  Discharge date:  2024    Code Status Order: Full Code   Advance Directives:     Admitting Physician:  Larissa Oro MD  PCP: Yandy Jones MD    Discharging Nurse: Denita MCCLELLAND  Discharging Hospital Unit/Room#: 2119/2119-01  Discharging Unit Phone Number: [phone]    Emergency Contact:   Extended Emergency Contact Information  Primary Emergency Contact: Elliott Marie  Home Phone: [phone]  Relation: Child  Secondary Emergency Contact: Kye Marie  Home Phone: [phone]  Mobile Phone: [phone]  Relation: Daughter-in-Law  Preferred language: English   needed? No    Past Surgical History:  Past Surgical History:   Procedure Laterality Date    CARDIAC CATHETERIZATION      CARDIAC PROCEDURE N/A 2024    sunny / [facility] heart [facility] CARDIAC CATH LAB    CARDIAC PROCEDURE N/A 2024    Percutaneous coronary intervention performed by Andree Paulino MD at [facility] CARDIAC CATH LAB    CORONARY ANGIOPLASTY WITH STENT PLACEMENT      8 stents prior to CABG per patient, most recent 2023    CORONARY ARTERY BYPASS GRAFT          DIAGNOSTIC CARDIAC CATH LAB PROCEDURE  2024       Immunization History:   Immunization History   Administered Date(s) Administered    COVID-19, J&J, (age 18y+), IM, 0.5 mL 2021, 2022       Active Problems:  Patient Active Problem List   Diagnosis Code    Hypotension I95.9    Mild anemia D64.9    Chronic atrial fibrillation (HCC) I48.20    Coronary artery disease involving coronary bypass graft of native heart without angina pectoris I25.810    S/P CABG (coronary artery bypass graft) Z95.1    GI bleed K92.2    Severe malnutrition (HCC) E43    Pharyngoesophageal dysphagia R13.14    Parkinson's disease without dyskinesia or fluctuating manifestations (HCC) G20.A1

## 2024-05-23 NOTE — CARE COORDINATION
[facility] Encounter Date/Time: 2024    Hospital Account: [id number]    MRN: [id number]    Patient: Rowdy Ivey    Contact Serial #: [id number]      ENCOUNTER          Patient Class: I Private Enc?  No Unit RM BD: STCZ PROG    Hospital Service: MED   Encounter DX: NSTEMI (non-ST elevated *   ADM Provider: Larissa Oro MD   Procedure:     ATT Provider: Larissa Oro MD   REF Provider:        Admission DX: NSTEMI (non-ST elevated myocardial infarction) (HCC) and DX codes: I21.4      PATIENT                 Name: Rowdy Ivey : 1950 (74 yrs)   Address: [address] [place] [address]  Sex: Male   City: [place] [address]         Marital Status:    Employer: RETIRED         Ep[employer]opalian: Jainism   Primary Care Provider: Yandy Jones MD         Primary Phone: [phone]   EMERGENCY CONTACT   Contact Name Legal Guardian? Relationship to Patient Home Phone Work Phone   1. Elliott Ivey  2. Kye Ivey    No Child  Daughter-in-Law [phone]              GUARANTOR            Guarantor: Rowdy Ivey     : 1950   Address: [address] [place] [address]  Sex: [place] [address]     Relation to Patient: Self       Home Phone: [phone]   Guarantor ID: [id number]       Work Phone: [phone]   Guarantor Employer: RETIRED         Status: RETIRED      COVERAGE        PRIMARY INSURANCE   Payor: [facility] MEDICARE Plan: ANTHEM MEDIBLUE ESSENTIA*   Payor Address: [address] [address]       Group Number: [id number] Insurance Type: INDEMNITY   Subscriber Name: ROWDY IVEY [place] Subscriber : 1950   Subscriber ID: [id number] Pat. Rel. to Sub: Self   SECONDARY INSURANCE   Payor:   Plan:     Payor Address:  ,           Group Number:   Insurance Type:     Subscriber Name:   Subscriber :     Subscriber ID:   Pat. Rel. to Sub:          CSN: [id number]

## 2024-05-23 NOTE — CONSULTS
Date:   5/23/2024  Patient name: Rowdy Marie  Date of admission:  5/22/2024  7:20 PM  MRN:   [id number]  YOB: 1950  PCP: Yandy Jones MD    Reason for Admission: NSTEMI (non-ST elevated myocardial infarction) (HCC) [I21.4]    Cardiology consult:       Referring physician: Dr Larissa Oro    Impression     Admission 5/20/2024 left-sided chest pain radiating to the left arm and to the neck, no acute ECG changes, high-sensitivity troponin 29, 25, 23, proBNP 1550  Cardiac cath 5/22/2024 right femoral artery access  Patent LIMA to LAD distal LAD disease not amenable for PCI, patent SVG to diagonal with a patent stent, patent SVG to RPDA, 80% stenosis of SVG to OM and reduced to 0% BRENDA by Dr. Love     Admission 4/10/2024 for left chest pain radiating to left shoulder aggravating with the inspiration and shoulder movements almost constant in nature for more than 2 days  ECG showed no ischemic changes high-sensitivity troponin trending upward  Cardiac cath 4/11/2024  Multivessel CAD  Patent LIMA to the LAD with moderate nonobstructive disease in LAD post distal anastomosis  Patent SVG to OM 2 with patent stents  Patent SVG to RPDA  Successful PCI with BRENDA to ostial and mid SVG to diagonal.  Overall preserved LV function     Admission 1/31/2024 severe hypotension blood pressure 80 mmHg, dizziness  Episode of sharp chest pain no shortness of breath no diaphoresis ECG no ischemic changes on admission, normal high-sensitivity troponin 15, 13, 15  Paroxysmal atrial fibrillation Watchman procedure 11/14/2023, current rhythm sinus rhythm  Ejection fraction 60 to 65%  Coronary artery disease, CABG times 4/2014, stent placement times 5 December 2022  LIMA to LAD, SVG to OM 3, ramus, RCA patent grafts cardiac cath December 2019  History of stent placement  Hypothyroidism  Hyperlipidemia  History of prostate cancer  History of anemia  History of gastritis, esophageal stricture  Parkinson disease on Sinemet  Back  seen and examined  Elderly very slim male resting on bed  Cardiac catheter right femoral artery access 5/22/2024  He had BRENDA SVG to OM for 80% stenosis 5/22/2024  Previous stent SVG to diagonal was patent  Is feeling much better no further chest pain  Right groin examination showed no swelling or hematoma normal peripheral  Neck veins were normal lungs were clear on auscultation    Blood pressure 120/60, heart rate 66, oxygen saturation 98%  WBC 7.3, hemoglobin 10.3, platelets 227  Sodium 141, potassium 3.7, BUN 19, creatinine 1.1, glucose 170    Medications:   Scheduled Meds:   aspirin  81 mg Oral Daily    carbidopa-levodopa  1 tablet Oral 4x Daily    docusate sodium  100 mg Oral Nightly    pantoprazole  40 mg Oral QAM AC    fludrocortisone  0.2 mg Oral BID    levothyroxine  75 mcg Oral Daily    lidocaine  1 patch TransDERmal Daily    metoprolol succinate  12.5 mg Oral Daily    potassium chloride  10 mEq Oral QAM    pregabalin  150 mg Oral BID    ranolazine  1,000 mg Oral BID    ticagrelor  90 mg Oral BID    traMADol  50 mg Oral TID    sodium chloride flush  5-40 mL IntraVENous 2 times per day     Continuous Infusions:   sodium chloride       CBC:   Recent Labs     05/21/24  1457 05/22/24  0749 05/23/24  0517   WBC 7.5 7.8 7.3   HGB 10.6* 11.1* 10.3*    240 227     BMP:    Recent Labs     05/21/24  0546 05/22/24  0749 05/23/24  0517    141 141   K 3.5* 3.8 3.7    103 103   CO2 23 24 23   BUN 22 14 19   CREATININE 1.1 1.0 1.1   GLUCOSE 96 96 117*     Hepatic: No results for input(s): \"AST\", \"ALT\", \"BILITOT\", \"ALKPHOS\" in the last 72 hours.    Invalid input(s): \"ALB\"  Troponin: No results for input(s): \"TROPONINI\" in the last 72 hours.  BNP: No results for input(s): \"BNP\" in the last 72 hours.  Lipids: No results for input(s): \"CHOL\", \"HDL\" in the last 72 hours.    Invalid input(s): \"LDLCALCU\"  INR:   Recent Labs     05/21/24  1457   INR 1.1       Objective:   Vitals: BP (!) 130/53   Pulse 72

## 2024-05-23 NOTE — CARE COORDINATION
Writer faxed Jonny/DC med list to [facility] & informed them of DC today & to call for start of care.

## 2024-05-23 NOTE — PLAN OF CARE
Problem: Discharge Planning  Goal: Discharge to home or other facility with appropriate resources  5/23/2024 1345 by Denita Wing, RN  Outcome: Adequate for Discharge  Flowsheets (Taken 5/23/2024 0915)  Discharge to home or other facility with appropriate resources:   Identify barriers to discharge with patient and caregiver   Arrange for needed discharge resources and transportation as appropriate   Identify discharge learning needs (meds, wound care, etc)   Refer to discharge planning if patient needs post-hospital services based on physician order or complex needs related to functional status, cognitive ability or social support system     Problem: Safety - Adult  Goal: Free from fall injury  5/23/2024 1345 by Denita Wing, RN  Outcome: Adequate for Discharge     Problem: Pain  Goal: Verbalizes/displays adequate comfort level or baseline comfort level  5/23/2024 1345 by Denita Wing, RN  Outcome: Adequate for Discharge

## 2024-05-23 NOTE — PROGRESS NOTES
CLINICAL PHARMACY NOTE: MEDS TO BEDS    Total # of Prescriptions Filled: 1   The following medications were delivered to the patient:  Ranolazine ER 1000mg    Additional Documentation: delivered to pt and family in room Nurse Denita aware [id number] complete 5/23/24 3:35pm kbg    5/23/24 12:18pm [employer] Nurse Denita to call Outpt Pharm when family arrives for [id number] delivery  12:17pm spoke w/pt when family arrives will be able to pay $47 copay

## 2024-05-24 NOTE — CARE COORDINATION
Writer faxed Jonny/DC med list to [facility], & Informed of DC yesterday. Information was mistakenly sent to [facility] yesterday.

## 2024-05-28 ENCOUNTER — CARE COORDINATION (OUTPATIENT)
Dept: CARE COORDINATION | Age: 74
End: 2024-05-28

## 2024-05-28 NOTE — CARE COORDINATION
Ambulatory Care Coordination Note  2024    Patient Current Location:  Home: [id number] [place] [address] [place] [address]     ACM contacted the patient by telephone. Verified name and  with patient as identifiers. Provided introduction to self, and explanation of the ACM role.     Challenges to be reviewed by the provider   Additional needs identified to be addressed with provider: No  none               Method of communication with provider: none.    Summary  Date Care Coordination Episode Started:  24      Reason patient is in Care Coordination:     In patient  referral     Topics Discussed Today:     Medications, no needs per patient.   Equipment, patient reports he has all of the equipment he needs.   Home care, per patient the nurse was out today.  Patient denied any needs today.     Assessments completed today:     Fall risk  Initial assessment  Medication reconciliation  [facility]        Care Coordinator plan of care:     Follow up in 1 week, continue education and support.       Offered patient enrollment in the Remote Patient Monitoring (RPM) program for in-home monitoring: Yes, but did not enroll at this time: declined to enroll in the program becauseinsurance coverage .       Goals Addressed                   This Visit's Progress     Conditions and Symptoms   On track     I will schedule office visits, as directed by my provider.  I will keep my appointment or reschedule if I have to cancel.  I will notify my provider of any barriers to my plan of care.  I will follow my Zone Management tool to seek urgent or emergent care.  I will notify my provider of any symptoms that indicate a worsening of my condition.    Barriers: overwhelmed by complexity of regimen  Plan for overcoming my barriers: work with ACM and home health  Confidence: 10/10  Anticipated Goal Completion Date: 24                Future Appointments   Date Time Provider Department Center   2024  8:15 AM [facility] CARD REHAB

## 2024-05-31 NOTE — PROGRESS NOTES
Physician Progress Note      PATIENT:               RADHA IVEY  CSN #:                  [id number]  :                       1950  ADMIT DATE:       2024 7:20 PM  DISCH DATE:        2024 4:19 PM  RESPONDING  PROVIDER #:        Larissa Oro MD          QUERY TEXT:    Pt admitted with chest pain.  Noted documentation of NSTEMI by cardiology in   c/s notes dated  and .  Troponins 23, 25, 29, pt with recent MI and   transferred for cardiac cath. If possible, please document in progress notes   and discharge summary:    The medical record reflects the following:  Risk Factors: recent MI, CAD, HLD  Clinical Indicators: trops 23, 25, 29, cards  note \"NSTEMI (non-ST   elevated myocardial infarction)\", \"Multivessel coronary artery disease, stent   placement 2024 and 2024 continue current dose of aspirin 81 mg,   Brilinta/ticagrelor 90 mg twice a day, Ranexa 1000 mg twice a day, metoprolol   12.5 mg\", H&P \"No signs of ischemia on EKG\", DCS \"Cardiology was consulted   recommended cardiac cath, patient transferred to [facility] for cardiac   cath\"  Treatment: labs, ekg, cards c/s, transfer for cath, medication management    Thank you  [email or web address]  Options provided:  -- NSTEMI suspected present on admission  -- NSTEMI not suspected, CP d/t, pls specify.  -- Other - I will add my own diagnosis  -- Disagree - Not applicable / Not valid  -- Disagree - Clinically unable to determine / Unknown  -- Refer to Clinical Documentation Reviewer    PROVIDER RESPONSE TEXT:    The diagnosis of NSTEMI was suspected as present on admission.    Query created by: Tamika Solitario on 2024 7:50 AM      Electronically signed by:  Larissa Oro MD 2024 9:05 AM

## 2024-06-04 ENCOUNTER — CARE COORDINATION (OUTPATIENT)
Dept: CARE COORDINATION | Age: 74
End: 2024-06-04

## 2024-06-04 LAB
EKG ATRIAL RATE: 79 BPM
EKG P AXIS: 60 DEGREES
EKG P-R INTERVAL: 138 MS
EKG Q-T INTERVAL: 376 MS
EKG QRS DURATION: 76 MS
EKG QTC CALCULATION (BAZETT): 431 MS
EKG R AXIS: 4 DEGREES
EKG T AXIS: 60 DEGREES
EKG VENTRICULAR RATE: 79 BPM

## 2024-06-04 NOTE — CARE COORDINATION
office visits, as directed by my provider.  I will keep my appointment or reschedule if I have to cancel.  I will notify my provider of any barriers to my plan of care.  I will follow my Zone Management tool to seek urgent or emergent care.  I will notify my provider of any symptoms that indicate a worsening of my condition.    Barriers: overwhelmed by complexity of regimen  Plan for overcoming my barriers: work with ACM and home health  Confidence: 10/10  Anticipated Goal Completion Date: 7.26.24                Future Appointments   Date Time Provider Department Center   6/12/2024  8:15 AM [facility] CARD REHAB [address] [facility] [address] [facility] [place]   7/1/2024 11:00 AM Yandy Jones MD [facility]   7/11/2024  3:30 PM Mohit Adan MD [facility]   8/9/2024 10:30 AM Yandy Jones MD [facility]   8/26/2024  2:00 PM Mahsa Barclay MD [facility]    and   General Assessment    Do you have any symptoms that are causing concern?: Yes  Progression since Onset: Intermittent - Waxing/Waning  Reported Symptoms: Other (Comment: swelling in feet)

## 2024-06-06 ENCOUNTER — HOSPITAL ENCOUNTER (INPATIENT)
Age: 74
LOS: 1 days | Discharge: HOME | End: 2024-06-07
Attending: EMERGENCY MEDICINE | Admitting: INTERNAL MEDICINE
Payer: MEDICARE

## 2024-06-06 ENCOUNTER — APPOINTMENT (OUTPATIENT)
Dept: GENERAL RADIOLOGY | Age: 74
End: 2024-06-06
Payer: MEDICARE

## 2024-06-06 DIAGNOSIS — R07.9 CHEST PAIN, UNSPECIFIED TYPE: Primary | ICD-10-CM

## 2024-06-06 DIAGNOSIS — I20.9 ANGINA PECTORIS (HCC): ICD-10-CM

## 2024-06-06 DIAGNOSIS — E87.6 HYPOKALEMIA: ICD-10-CM

## 2024-06-06 LAB
ANION GAP SERPL CALCULATED.3IONS-SCNC: 16 MMOL/L (ref 9–17)
BASOPHILS # BLD: 0 K/UL (ref 0–0.2)
BASOPHILS NFR BLD: 1 % (ref 0–2)
BNP SERPL-MCNC: 4005 PG/ML
BUN SERPL-MCNC: 13 MG/DL (ref 8–23)
CALCIUM SERPL-MCNC: 8.3 MG/DL (ref 8.6–10.4)
CHLORIDE SERPL-SCNC: 97 MMOL/L (ref 98–107)
CO2 SERPL-SCNC: 26 MMOL/L (ref 20–31)
CREAT SERPL-MCNC: 1.2 MG/DL (ref 0.7–1.2)
EOSINOPHIL # BLD: 0.5 K/UL (ref 0–0.4)
EOSINOPHILS RELATIVE PERCENT: 5 % (ref 0–4)
ERYTHROCYTE [DISTWIDTH] IN BLOOD BY AUTOMATED COUNT: 16.1 % (ref 11.5–14.9)
GFR, ESTIMATED: 63 ML/MIN/1.73M2
GLUCOSE SERPL-MCNC: 104 MG/DL (ref 70–99)
HCT VFR BLD AUTO: 33.1 % (ref 41–53)
HGB BLD-MCNC: 10.9 G/DL (ref 13.5–17.5)
INR PPP: 1.1
LYMPHOCYTES NFR BLD: 1.3 K/UL (ref 1–4.8)
LYMPHOCYTES RELATIVE PERCENT: 15 % (ref 24–44)
MCH RBC QN AUTO: 29 PG (ref 26–34)
MCHC RBC AUTO-ENTMCNC: 32.9 G/DL (ref 31–37)
MCV RBC AUTO: 88.3 FL (ref 80–100)
MONOCYTES NFR BLD: 0.4 K/UL (ref 0.1–1.3)
MONOCYTES NFR BLD: 5 % (ref 1–7)
MYOGLOBIN SERPL-MCNC: 36 NG/ML (ref 28–72)
MYOGLOBIN SERPL-MCNC: 43 NG/ML (ref 28–72)
NEUTROPHILS NFR BLD: 74 % (ref 36–66)
NEUTS SEG NFR BLD: 6.5 K/UL (ref 1.3–9.1)
PARTIAL THROMBOPLASTIN TIME: 39.6 SEC (ref 24–36)
PLATELET # BLD AUTO: 245 K/UL (ref 150–450)
PMV BLD AUTO: 10 FL (ref 6–12)
POTASSIUM SERPL-SCNC: 3.2 MMOL/L (ref 3.7–5.3)
PROTHROMBIN TIME: 14.7 SEC (ref 11.8–14.6)
RBC # BLD AUTO: 3.74 M/UL (ref 4.5–5.9)
SODIUM SERPL-SCNC: 139 MMOL/L (ref 135–144)
TROPONIN I SERPL HS-MCNC: 20 NG/L (ref 0–22)
TROPONIN I SERPL HS-MCNC: 21 NG/L (ref 0–22)
TROPONIN I SERPL HS-MCNC: 22 NG/L (ref 0–22)
TROPONIN I SERPL HS-MCNC: 23 NG/L (ref 0–22)
WBC OTHER # BLD: 8.8 K/UL (ref 3.5–11)

## 2024-06-06 PROCEDURE — 80048 BASIC METABOLIC PNL TOTAL CA: CPT

## 2024-06-06 PROCEDURE — 96366 THER/PROPH/DIAG IV INF ADDON: CPT

## 2024-06-06 PROCEDURE — 6360000002 HC RX W HCPCS

## 2024-06-06 PROCEDURE — 93005 ELECTROCARDIOGRAM TRACING: CPT | Performed by: EMERGENCY MEDICINE

## 2024-06-06 PROCEDURE — 84484 ASSAY OF TROPONIN QUANT: CPT

## 2024-06-06 PROCEDURE — 96365 THER/PROPH/DIAG IV INF INIT: CPT

## 2024-06-06 PROCEDURE — 2060000000 HC ICU INTERMEDIATE R&B

## 2024-06-06 PROCEDURE — 36415 COLL VENOUS BLD VENIPUNCTURE: CPT

## 2024-06-06 PROCEDURE — G0378 HOSPITAL OBSERVATION PER HR: HCPCS

## 2024-06-06 PROCEDURE — 71045 X-RAY EXAM CHEST 1 VIEW: CPT

## 2024-06-06 PROCEDURE — 85610 PROTHROMBIN TIME: CPT

## 2024-06-06 PROCEDURE — 99285 EMERGENCY DEPT VISIT HI MDM: CPT

## 2024-06-06 PROCEDURE — 85730 THROMBOPLASTIN TIME PARTIAL: CPT

## 2024-06-06 PROCEDURE — 6370000000 HC RX 637 (ALT 250 FOR IP): Performed by: EMERGENCY MEDICINE

## 2024-06-06 PROCEDURE — 83880 ASSAY OF NATRIURETIC PEPTIDE: CPT

## 2024-06-06 PROCEDURE — 6370000000 HC RX 637 (ALT 250 FOR IP)

## 2024-06-06 PROCEDURE — 6360000002 HC RX W HCPCS: Performed by: EMERGENCY MEDICINE

## 2024-06-06 PROCEDURE — 96374 THER/PROPH/DIAG INJ IV PUSH: CPT

## 2024-06-06 PROCEDURE — 83874 ASSAY OF MYOGLOBIN: CPT

## 2024-06-06 PROCEDURE — 85025 COMPLETE CBC W/AUTO DIFF WBC: CPT

## 2024-06-06 PROCEDURE — 96372 THER/PROPH/DIAG INJ SC/IM: CPT

## 2024-06-06 RX ORDER — PREGABALIN 150 MG/1
150 CAPSULE ORAL 2 TIMES DAILY
Status: DISCONTINUED | OUTPATIENT
Start: 2024-06-06 | End: 2024-06-07 | Stop reason: HOSPADM

## 2024-06-06 RX ORDER — RANOLAZINE 500 MG/1
1000 TABLET, EXTENDED RELEASE ORAL 2 TIMES DAILY
Status: DISCONTINUED | OUTPATIENT
Start: 2024-06-06 | End: 2024-06-07 | Stop reason: HOSPADM

## 2024-06-06 RX ORDER — TRAMADOL HYDROCHLORIDE 50 MG/1
50 TABLET ORAL 3 TIMES DAILY
Status: DISCONTINUED | OUTPATIENT
Start: 2024-06-06 | End: 2024-06-07 | Stop reason: HOSPADM

## 2024-06-06 RX ORDER — ONDANSETRON 4 MG/1
4 TABLET, ORALLY DISINTEGRATING ORAL EVERY 8 HOURS PRN
Status: DISCONTINUED | OUTPATIENT
Start: 2024-06-06 | End: 2024-06-07 | Stop reason: HOSPADM

## 2024-06-06 RX ORDER — FLUDROCORTISONE ACETATE 0.1 MG/1
0.1 TABLET ORAL 2 TIMES DAILY
Status: DISCONTINUED | OUTPATIENT
Start: 2024-06-06 | End: 2024-06-07 | Stop reason: HOSPADM

## 2024-06-06 RX ORDER — ASPIRIN 81 MG/1
324 TABLET, CHEWABLE ORAL ONCE
Status: COMPLETED | OUTPATIENT
Start: 2024-06-06 | End: 2024-06-06

## 2024-06-06 RX ORDER — ASPIRIN 81 MG/1
81 TABLET ORAL DAILY
Status: DISCONTINUED | OUTPATIENT
Start: 2024-06-06 | End: 2024-06-07 | Stop reason: HOSPADM

## 2024-06-06 RX ORDER — LEVOTHYROXINE SODIUM 0.07 MG/1
75 TABLET ORAL DAILY
Status: DISCONTINUED | OUTPATIENT
Start: 2024-06-06 | End: 2024-06-07 | Stop reason: HOSPADM

## 2024-06-06 RX ORDER — PANTOPRAZOLE SODIUM 40 MG/1
40 TABLET, DELAYED RELEASE ORAL
Status: DISCONTINUED | OUTPATIENT
Start: 2024-06-07 | End: 2024-06-07 | Stop reason: HOSPADM

## 2024-06-06 RX ORDER — ENOXAPARIN SODIUM 100 MG/ML
40 INJECTION SUBCUTANEOUS DAILY
Status: DISCONTINUED | OUTPATIENT
Start: 2024-06-06 | End: 2024-06-07 | Stop reason: HOSPADM

## 2024-06-06 RX ORDER — POTASSIUM CHLORIDE 20 MEQ/1
40 TABLET, EXTENDED RELEASE ORAL PRN
Status: DISCONTINUED | OUTPATIENT
Start: 2024-06-06 | End: 2024-06-07 | Stop reason: HOSPADM

## 2024-06-06 RX ORDER — LANOLIN ALCOHOL/MO/W.PET/CERES
1000 CREAM (GRAM) TOPICAL DAILY
Status: DISCONTINUED | OUTPATIENT
Start: 2024-06-06 | End: 2024-06-07 | Stop reason: HOSPADM

## 2024-06-06 RX ORDER — SODIUM CHLORIDE 0.9 % (FLUSH) 0.9 %
5-40 SYRINGE (ML) INJECTION EVERY 12 HOURS SCHEDULED
Status: DISCONTINUED | OUTPATIENT
Start: 2024-06-06 | End: 2024-06-07 | Stop reason: HOSPADM

## 2024-06-06 RX ORDER — ACETAMINOPHEN 325 MG/1
650 TABLET ORAL EVERY 6 HOURS PRN
Status: DISCONTINUED | OUTPATIENT
Start: 2024-06-06 | End: 2024-06-07 | Stop reason: HOSPADM

## 2024-06-06 RX ORDER — NITROGLYCERIN 40 MG/1
1 PATCH TRANSDERMAL DAILY
Status: DISCONTINUED | OUTPATIENT
Start: 2024-06-06 | End: 2024-06-07

## 2024-06-06 RX ORDER — SODIUM CHLORIDE 9 MG/ML
INJECTION, SOLUTION INTRAVENOUS PRN
Status: DISCONTINUED | OUTPATIENT
Start: 2024-06-06 | End: 2024-06-07 | Stop reason: HOSPADM

## 2024-06-06 RX ORDER — MIDODRINE HYDROCHLORIDE 2.5 MG/1
2.5 TABLET ORAL 3 TIMES DAILY PRN
Status: DISCONTINUED | OUTPATIENT
Start: 2024-06-06 | End: 2024-06-07 | Stop reason: HOSPADM

## 2024-06-06 RX ORDER — POTASSIUM CHLORIDE 20 MEQ/1
40 TABLET, EXTENDED RELEASE ORAL ONCE
Status: COMPLETED | OUTPATIENT
Start: 2024-06-06 | End: 2024-06-06

## 2024-06-06 RX ORDER — NITROGLYCERIN 20 MG/100ML
5-200 INJECTION INTRAVENOUS CONTINUOUS
Status: DISCONTINUED | OUTPATIENT
Start: 2024-06-06 | End: 2024-06-07

## 2024-06-06 RX ORDER — UBIDECARENONE 100 MG
100 CAPSULE ORAL DAILY
Status: DISCONTINUED | OUTPATIENT
Start: 2024-06-06 | End: 2024-06-07 | Stop reason: HOSPADM

## 2024-06-06 RX ORDER — POTASSIUM CHLORIDE 7.45 MG/ML
10 INJECTION INTRAVENOUS PRN
Status: DISCONTINUED | OUTPATIENT
Start: 2024-06-06 | End: 2024-06-07 | Stop reason: HOSPADM

## 2024-06-06 RX ORDER — SODIUM CHLORIDE 0.9 % (FLUSH) 0.9 %
5-40 SYRINGE (ML) INJECTION PRN
Status: DISCONTINUED | OUTPATIENT
Start: 2024-06-06 | End: 2024-06-07 | Stop reason: HOSPADM

## 2024-06-06 RX ORDER — POLYETHYLENE GLYCOL 3350 17 G/17G
17 POWDER, FOR SOLUTION ORAL DAILY PRN
Status: DISCONTINUED | OUTPATIENT
Start: 2024-06-06 | End: 2024-06-07 | Stop reason: HOSPADM

## 2024-06-06 RX ORDER — ACETAMINOPHEN 650 MG/1
650 SUPPOSITORY RECTAL EVERY 6 HOURS PRN
Status: DISCONTINUED | OUTPATIENT
Start: 2024-06-06 | End: 2024-06-07 | Stop reason: HOSPADM

## 2024-06-06 RX ORDER — MAGNESIUM SULFATE HEPTAHYDRATE 40 MG/ML
2000 INJECTION, SOLUTION INTRAVENOUS PRN
Status: DISCONTINUED | OUTPATIENT
Start: 2024-06-06 | End: 2024-06-07 | Stop reason: HOSPADM

## 2024-06-06 RX ORDER — METOPROLOL SUCCINATE 25 MG/1
12.5 TABLET, EXTENDED RELEASE ORAL DAILY
Status: DISCONTINUED | OUTPATIENT
Start: 2024-06-06 | End: 2024-06-07 | Stop reason: HOSPADM

## 2024-06-06 RX ORDER — ONDANSETRON 2 MG/ML
4 INJECTION INTRAMUSCULAR; INTRAVENOUS EVERY 6 HOURS PRN
Status: DISCONTINUED | OUTPATIENT
Start: 2024-06-06 | End: 2024-06-07 | Stop reason: HOSPADM

## 2024-06-06 RX ADMIN — TRAMADOL HYDROCHLORIDE 50 MG: 50 TABLET, COATED ORAL at 17:53

## 2024-06-06 RX ADMIN — FLUDROCORTISONE ACETATE 0.1 MG: 0.1 TABLET ORAL at 21:59

## 2024-06-06 RX ADMIN — MIDODRINE HYDROCHLORIDE 2.5 MG: 2.5 TABLET ORAL at 20:06

## 2024-06-06 RX ADMIN — ENOXAPARIN SODIUM 40 MG: 100 INJECTION SUBCUTANEOUS at 17:53

## 2024-06-06 RX ADMIN — PREGABALIN 150 MG: 150 CAPSULE ORAL at 21:27

## 2024-06-06 RX ADMIN — RANOLAZINE 1000 MG: 500 TABLET, EXTENDED RELEASE ORAL at 21:27

## 2024-06-06 RX ADMIN — DOCUSATE SODIUM LIQUID 50 MG: 100 LIQUID ORAL at 21:27

## 2024-06-06 RX ADMIN — CARBIDOPA AND LEVODOPA 1 TABLET: 25; 100 TABLET ORAL at 17:53

## 2024-06-06 RX ADMIN — TICAGRELOR 90 MG: 90 TABLET ORAL at 21:27

## 2024-06-06 RX ADMIN — TRAMADOL HYDROCHLORIDE 50 MG: 50 TABLET, COATED ORAL at 21:27

## 2024-06-06 RX ADMIN — POTASSIUM CHLORIDE 40 MEQ: 1500 TABLET, EXTENDED RELEASE ORAL at 16:08

## 2024-06-06 RX ADMIN — NITROGLYCERIN 5 MCG/MIN: 20 INJECTION INTRAVENOUS at 15:26

## 2024-06-06 RX ADMIN — ASPIRIN 81 MG 324 MG: 81 TABLET ORAL at 15:19

## 2024-06-06 ASSESSMENT — ENCOUNTER SYMPTOMS
CONSTIPATION: 0
EYE REDNESS: 0
SORE THROAT: 0
WHEEZING: 0
EYE PAIN: 0
VOMITING: 0
RHINORRHEA: 0
CHEST TIGHTNESS: 1
FACIAL SWELLING: 0
SHORTNESS OF BREATH: 1
TROUBLE SWALLOWING: 0
BACK PAIN: 0
BLOOD IN STOOL: 0
EYE DISCHARGE: 0
CHEST TIGHTNESS: 0
SINUS PRESSURE: 0
COLOR CHANGE: 0
ABDOMINAL PAIN: 0
DIARRHEA: 0
COUGH: 0
NAUSEA: 0

## 2024-06-06 ASSESSMENT — PAIN DESCRIPTION - LOCATION
LOCATION: CHEST;BACK
LOCATION: CHEST

## 2024-06-06 ASSESSMENT — PAIN - FUNCTIONAL ASSESSMENT: PAIN_FUNCTIONAL_ASSESSMENT: 0-10

## 2024-06-06 ASSESSMENT — PAIN SCALES - GENERAL
PAINLEVEL_OUTOF10: 4
PAINLEVEL_OUTOF10: 10
PAINLEVEL_OUTOF10: 8

## 2024-06-06 ASSESSMENT — PAIN DESCRIPTION - DESCRIPTORS: DESCRIPTORS: PRESSURE;ACHING

## 2024-06-06 ASSESSMENT — PAIN DESCRIPTION - ORIENTATION
ORIENTATION: LOWER
ORIENTATION: LEFT

## 2024-06-06 NOTE — H&P
tablets by mouth 3 times daily as needed for Dizziness 4/10/24   Yandy Jones MD   aspirin 81 MG EC tablet Take 1 tablet by mouth daily    Bailey Umanzor MD   vitamin B-12 (CYANOCOBALAMIN) 1000 MCG tablet take 1 tablet by mouth once daily 2/19/24   Brea Hung MD   carbidopa-levodopa (SINEMET)  MG per tablet Take 1 tablet by mouth 4 times daily 2/3/24   Yandy Jones MD   potassium chloride (KLOR-CON) 10 MEQ extended release tablet Take 1 tablet by mouth every morning 11/10/23   Bailey Umanzor MD   docusate sodium (COLACE) 50 MG capsule Take 1 capsule by mouth at bedtime 4/26/16   Bailey Umanzor MD   pregabalin (LYRICA) 75 MG capsule Take 2 capsules by mouth 2 times daily. 1/18/23   Bailey Umanzor MD   traMADol (ULTRAM) 50 MG tablet Take 1 tablet by mouth in the morning, at noon, and at bedtime. 1/20/23   Bailye Umanzor MD   coenzyme Q10 100 MG CAPS capsule Take 1 capsule by mouth daily    Bailey Umanzor MD   esomeprazole (NEXIUM) 20 MG delayed release capsule Take 1 capsule by mouth every morning (before breakfast)    Bailey Umanzor MD   levothyroxine (SYNTHROID) 75 MCG tablet Take 1 tablet by mouth Daily    Bailey Umanzor MD        Allergies:     Latex, Atorvastatin, Fluvastatin, Imdur [isosorbide nitrate], Isosorbide, Methadone, Nalbuphine, Oxycodone, Penicillins, Rosuvastatin, Statins, and Zetia [ezetimibe]    Social History:     Tobacco:    reports that he has never smoked. He has never used smokeless tobacco.  Alcohol:      reports no history of alcohol use.  Drug Use:  reports no history of drug use.    Family History:     History reviewed. No pertinent family history.    Review of Systems:     Positive and Negative as described in HPI.    Review of Systems   Constitutional:  Negative for fatigue and fever.   Respiratory:  Positive for chest tightness. Negative for cough.    Cardiovascular:  Positive for chest pain. Negative for  anticoagulant  -Continue metoprolol 12.5 Mg twice daily    #Hypothyroidism  -Continue levothyroxine 75 mcg daily    #History of orthostatic hypotension  -Continue home med fludrocortisone 0.1 Mg twice daily, continue midodrine 2.5 Mg 3 times as needed  -continue compression stockings    #Parkinsonism  -Continue Sinemet 1 tab 4 times daily      DVT prophylaxis: Lovenox Sq  GI prophylaxis: Oral Protonix  Diet: Regular , low Sodium diet, NPO after midnight today  Disposition: Progressive unit    OT/PT/SW all consulted    Code Status: Full code    Consultations:   IP CONSULT TO PRIMARY CARE PROVIDER  IP CONSULT TO CARDIOLOGY  IP CONSULT TO SOCIAL WORK     Patient is admitted as inpatient status because of co-morbiditieslisted above, severity of signs and symptoms as outlined, requirement for current medical therapies and most importantly because of direct risk to patient if care not provided in a hospital setting.    Tho Wood MD  PGY1- Transitional Year Resident  6/6/2024  4:20 PM    This note is created with the assistance of the speech recognition program. While intending to generate a document that actually reflects the content of the visit, it can still have some errors including those of syntax and sound-a-like substitutions which may escape proof reading. Actual meaning can be extrapolated by contextual inference.    Copy sent to Yandy Haddad MD       Attending Physician Statement  I have discussed the care of Rowdy Marie with the resident team. I have examined the patient myself and taken ros and hpi , including pertinent history and exam findings,  with the resident. I have reviewed the key elements of all parts of the encounter with the resident.  I agree with the assessment, plan and orders as documented by the resident.    Principal Problem:    Chest pain  Resolved Problems:    * No resolved hospital problems. *      Please see my note from 6/6.      Electronically signed by Yandy

## 2024-06-06 NOTE — CONSULTS
Date:   6/6/2024  Patient name: Rowdy Marie  Date of admission:  6/6/2024  2:36 PM  MRN:   [id number]  YOB: 1950  PCP: Yandy Jones MD    Reason for Admission: Hypokalemia [E87.6]  Chest pain [R07.9]  Chest pain, unspecified type [R07.9]    Cardiology consult       Referring physician Dr. Aracely Pierre      Impression     Admission 5/20/2024 left-sided chest pain radiating to the left arm and to the neck, no acute ECG changes, high-sensitivity troponin 29, 25, 23, proBNP 1550  Cardiac cath 5/22/2024 right femoral artery access  Patent LIMA to LAD distal LAD disease not amenable for PCI, patent SVG to diagonal with a patent stent, patent SVG to RPDA, 80% stenosis of SVG to OM and reduced to 0% BRENDA by Dr. Love     Admission 4/10/2024 for left chest pain radiating to left shoulder aggravating with the inspiration and shoulder movements almost constant in nature for more than 2 days  ECG showed no ischemic changes high-sensitivity troponin trending upward  Cardiac cath 4/11/2024  Multivessel CAD  Patent LIMA to the LAD with moderate nonobstructive disease in LAD post distal anastomosis  Patent SVG to OM 2 with patent stents  Patent SVG to RPDA  Successful PCI with BRENDA to ostial and mid SVG to diagonal.  Overall preserved LV function     Admission 1/31/2024 severe hypotension blood pressure 80 mmHg, dizziness  Episode of sharp chest pain no shortness of breath no diaphoresis ECG no ischemic changes on admission, normal high-sensitivity troponin 15, 13, 15  Paroxysmal atrial fibrillation Watchman procedure 11/14/2023, current rhythm sinus rhythm  Ejection fraction 60 to 65%  Coronary artery disease, CABG times 4/2014, stent placement times 5 December 2022  LIMA to LAD, SVG to OM 3, ramus, RCA patent grafts cardiac cath December 2019  History of stent placement  Hypothyroidism  Hyperlipidemia  History of prostate cancer  History of anemia  History of gastritis, esophageal stricture  Parkinson disease on  Sinemet  Back pain, back surgery  Episodes of dizziness, combination of previous C-spine surgery, postural hypotension/autonomic dysfunction patient has Parkinson disease on medication, dizziness aggravates by head and neck movements  Normal B12 1970, serum iron 57, iron saturation 21, folate more than 20  MRA neck and head without contrast 2/9/2023  No evidence of large vessel vascular occlusion or intracranial aneurysm within the limits of MR angiographic technique  No hemodynamically significant stenosis or occlusion in the visualized cervical portion of the carotid or vertebral arteries      Investigation workup     ECG 6/6/2024  Sinus rhythm heart rate 64, nonspecific T wave changes lead V1 and V2    ECG 5/28/2024   sinus rhythm, no obvious ST elevation or depression     Cardiac cath 5/22/2024 right femoral artery access  Severe native vessel CAD  Patent LIMA to LAD LAD distal to touchdown has diffuse in-stent restenosis small in caliber and not amendable for PCI similar to previous cath 4/11/2024  Patent SVG to diagonal with a patent stent  Patent SVG to RPDA  80% stenosis of SVG to OM reduced to 0% BRENDA     Chest x-ray 5/20/2024  Mild diffuse interstitial prominence may represent mild pulmonary edema or atypical infection     2D echo 4/23/2024  Ejection fraction 55% normal LV wall thickness and wall motion  Moderate to severe mitral regurgitation  Normal RV size and function  IVC not well-visualized      History of present illness     73-year-old male with past medical history of multivessel coronary artery disease, CABG x 4, multiple stents, paroxysmal A-fib, status post Watchman procedure, Parkinson disease, hypothyroidism got hospitalized on 5/20/2024 with severe left upper chest pain radiating to the neck and left shoulder.  He underwent stent placement SVG to OM for 80% stenosis on 5/22/2024.  He had a previous stent placement SVG to diagonal 1 on 4/11/2024.  He got readmitted on 6/6/2024 left upper chest

## 2024-06-06 NOTE — PROGRESS NOTES
Pharmacy Medication History Note      List of current medications patient is taking is complete.    Source of information: [employer] [place] ([place], [facility], [phone]), [facility], [employer]    Changes made to medication list:  Medications removed (include reason, ex. therapy complete or physician discontinued, noncompliance):  None     Medications flagged for provider review:  None     Medications added/doses adjusted:  Carbidopa-levodopa  mg tabs decreased to 1 tablet 3 times daily     Other notes (ex. Recent course of antibiotics, Coumadin dosing):  Per OARRS, last fill of tramadol was on 5/21/24 with quantity 90 for 30 days.   Per OARRS, last fill of pregabalin was on 4/29/24 with quantity 120 for 30 days.       Current Home Medication List at Time of Admission:  Prior to Admission medications    Medication Sig   carbidopa-levodopa (SINEMET)  MG per tablet Take 1 tablet by mouth 3 times daily   ranolazine (RANEXA) 1000 MG extended release tablet Take 1 tablet by mouth 2 times daily   fludrocortisone (FLORINEF) 0.1 MG tablet Take 1 tablet by mouth 2 times daily   ticagrelor (BRILINTA) 90 MG TABS tablet Take 1 tablet by mouth 2 times daily   Compression Stockings MISC by Does not apply route   midodrine (PROAMATINE) 2.5 MG tablet Take 1 tablet by mouth 3 times daily as needed (SBP <100)   Compression Stockings MISC by Does not apply route   metoprolol succinate (TOPROL XL) 25 MG extended release tablet Take 0.5 tablets by mouth daily   lidocaine 4 % external patch Place 1 patch onto the skin daily   nitroGLYCERIN (NITRODUR) 0.2 MG/HR Place 1 patch onto the skin daily   meclizine (ANTIVERT) 12.5 MG tablet Take 2 tablets by mouth 3 times daily as needed for Dizziness   aspirin 81 MG EC tablet Take 1 tablet by mouth daily   vitamin B-12 (CYANOCOBALAMIN) 1000 MCG tablet take 1 tablet by mouth once daily   potassium chloride (KLOR-CON) 10 MEQ extended release tablet Take 1 tablet by mouth every morning   docusate

## 2024-06-06 NOTE — ED PROVIDER NOTES
eMERGENCY dEPARTMENT eNCOUnter      Pt Name: Rowdy Marie  MRN: [id number]  Birthdate 1950  Date of evaluation: 6/6/24      CHIEF COMPLAINT       Chief Complaint   Patient presents with    Chest Pain         HISTORY OF PRESENT ILLNESS    Rowdy Marie is a 74 y.o. male who presents complaining of chest pain.  Patient states for the last 3 days has been having chest pain middle of his chest towards the left side with elevated shortness of breath.  Patient has a history of cardiac problems was just admitted 2 weeks ago and had a cardiac cath.  Patient states he did get a stent at that time and that there was something else in the back of the heart that was supposed to get fix but he is not sure if that happened.  Patient has not had a cough or any fevers.  Patient states 3 days ago he did have some swelling in his left leg but is gone down since then.      REVIEW OF SYSTEMS       Review of Systems   Constitutional:  Negative for activity change, appetite change, chills, diaphoresis and fever.   HENT:  Negative for congestion, ear pain, facial swelling, nosebleeds, rhinorrhea, sinus pressure, sore throat and trouble swallowing.    Eyes:  Negative for pain, discharge and redness.   Respiratory:  Positive for shortness of breath. Negative for cough, chest tightness and wheezing.    Cardiovascular:  Positive for chest pain. Negative for palpitations and leg swelling.   Gastrointestinal:  Negative for abdominal pain, blood in stool, constipation, diarrhea, nausea and vomiting.   Genitourinary:  Negative for difficulty urinating, dysuria, flank pain, frequency, genital sores and hematuria.   Musculoskeletal:  Negative for arthralgias, back pain, gait problem, joint swelling, myalgias and neck pain.   Skin:  Negative for color change, pallor, rash and wound.   Neurological:  Negative for dizziness, tremors, seizures, syncope, speech difficulty, weakness, numbness and headaches.   Psychiatric/Behavioral:  Negative for  icterus.        Right eye: No discharge.         Left eye: No discharge.      Conjunctiva/sclera: Conjunctivae normal.      Pupils: Pupils are equal, round, and reactive to light.   Cardiovascular:      Rate and Rhythm: Normal rate and regular rhythm.      Heart sounds: Normal heart sounds. No murmur heard.     No friction rub. No gallop.   Pulmonary:      Effort: Pulmonary effort is normal. No respiratory distress.      Breath sounds: Normal breath sounds. No wheezing or rales.   Chest:      Chest wall: No tenderness.   Abdominal:      General: Bowel sounds are normal. There is no distension.      Palpations: Abdomen is soft. There is no mass.      Tenderness: There is no abdominal tenderness. There is no guarding or rebound.   Musculoskeletal:         General: No tenderness. Normal range of motion.   Skin:     General: Skin is warm and dry.      Coloration: Skin is not pale.      Findings: No erythema or rash.   Neurological:      Mental Status: He is alert and oriented to person, place, and time.      Cranial Nerves: No cranial nerve deficit.      Sensory: No sensory deficit.      Motor: No abnormal muscle tone.      Coordination: Coordination normal.      Deep Tendon Reflexes: Reflexes normal.   Psychiatric:         Behavior: Behavior normal.         Thought Content: Thought content normal.         Judgment: Judgment normal.           MEDICAL DECISION MAKIN)  Number and Complexity of Problems  Problem List This Visit:  Chest pain, shortness of breath    Differential Diagnosis:  ACS, chest wall pain, PE    Diagnoses Considered but Do Not Suspect:  None    Pertinent Comorbid Conditions:  CAD, atrial fibrillation    2)  Data Reviewed  Decision Rules/Scores/MIPS utilized:  None    EKG Interpretation:  EKG Interpretation    Interpreted by emergency department physician    Rhythm: normal sinus   Rate: normal  Axis: normal  Ectopy: none  Conduction: normal  ST Segments: nonspecific changes  T Waves: non

## 2024-06-07 ENCOUNTER — APPOINTMENT (OUTPATIENT)
Dept: CT IMAGING | Age: 74
End: 2024-06-07
Payer: MEDICARE

## 2024-06-07 ENCOUNTER — APPOINTMENT (OUTPATIENT)
Dept: VASCULAR LAB | Age: 74
End: 2024-06-07
Payer: MEDICARE

## 2024-06-07 ENCOUNTER — PATIENT MESSAGE (OUTPATIENT)
Dept: INTERNAL MEDICINE CLINIC | Age: 74
End: 2024-06-07

## 2024-06-07 VITALS
DIASTOLIC BLOOD PRESSURE: 67 MMHG | SYSTOLIC BLOOD PRESSURE: 125 MMHG | RESPIRATION RATE: 18 BRPM | WEIGHT: 150.57 LBS | TEMPERATURE: 97.8 F | HEIGHT: 71 IN | OXYGEN SATURATION: 96 % | HEART RATE: 64 BPM | BODY MASS INDEX: 21.08 KG/M2

## 2024-06-07 LAB
ANION GAP SERPL CALCULATED.3IONS-SCNC: 10 MMOL/L (ref 9–17)
BASOPHILS # BLD: 0.2 K/UL (ref 0–0.2)
BASOPHILS NFR BLD: 3 % (ref 0–2)
BUN SERPL-MCNC: 20 MG/DL (ref 8–23)
CALCIUM SERPL-MCNC: 7.9 MG/DL (ref 8.6–10.4)
CHLORIDE SERPL-SCNC: 100 MMOL/L (ref 98–107)
CO2 SERPL-SCNC: 27 MMOL/L (ref 20–31)
CREAT SERPL-MCNC: 1.2 MG/DL (ref 0.7–1.2)
D DIMER PPP FEU-MCNC: 1.02 UG/ML FEU (ref 0–0.59)
ECHO BSA: 1.85 M2
EKG ATRIAL RATE: 64 BPM
EKG ATRIAL RATE: 66 BPM
EKG P AXIS: 51 DEGREES
EKG P AXIS: 67 DEGREES
EKG P-R INTERVAL: 140 MS
EKG P-R INTERVAL: 144 MS
EKG Q-T INTERVAL: 444 MS
EKG Q-T INTERVAL: 464 MS
EKG QRS DURATION: 78 MS
EKG QRS DURATION: 88 MS
EKG QTC CALCULATION (BAZETT): 465 MS
EKG QTC CALCULATION (BAZETT): 478 MS
EKG R AXIS: -8 DEGREES
EKG R AXIS: -8 DEGREES
EKG T AXIS: 52 DEGREES
EKG T AXIS: 67 DEGREES
EKG VENTRICULAR RATE: 64 BPM
EKG VENTRICULAR RATE: 66 BPM
EOSINOPHIL # BLD: 0.7 K/UL (ref 0–0.4)
EOSINOPHILS RELATIVE PERCENT: 8 % (ref 0–4)
ERYTHROCYTE [DISTWIDTH] IN BLOOD BY AUTOMATED COUNT: 16.1 % (ref 11.5–14.9)
GFR, ESTIMATED: 63 ML/MIN/1.73M2
GLUCOSE SERPL-MCNC: 89 MG/DL (ref 70–99)
HCT VFR BLD AUTO: 26.5 % (ref 41–53)
HCT VFR BLD AUTO: 28.3 % (ref 41–53)
HGB BLD-MCNC: 8.6 G/DL (ref 13.5–17.5)
HGB BLD-MCNC: 9.2 G/DL (ref 13.5–17.5)
LYMPHOCYTES NFR BLD: 1.8 K/UL (ref 1–4.8)
LYMPHOCYTES RELATIVE PERCENT: 22 % (ref 24–44)
MCH RBC QN AUTO: 29.3 PG (ref 26–34)
MCHC RBC AUTO-ENTMCNC: 32.5 G/DL (ref 31–37)
MCV RBC AUTO: 90.4 FL (ref 80–100)
MONOCYTES NFR BLD: 0.8 K/UL (ref 0.1–1.3)
MONOCYTES NFR BLD: 10 % (ref 1–7)
NEUTROPHILS NFR BLD: 57 % (ref 36–66)
NEUTS SEG NFR BLD: 4.6 K/UL (ref 1.3–9.1)
PLATELET # BLD AUTO: 205 K/UL (ref 150–450)
PMV BLD AUTO: 10.1 FL (ref 6–12)
POTASSIUM SERPL-SCNC: 3.6 MMOL/L (ref 3.7–5.3)
RBC # BLD AUTO: 2.93 M/UL (ref 4.5–5.9)
SODIUM SERPL-SCNC: 137 MMOL/L (ref 135–144)
TROPONIN I SERPL HS-MCNC: 23 NG/L (ref 0–22)
TROPONIN I SERPL HS-MCNC: 24 NG/L (ref 0–22)
WBC OTHER # BLD: 8.1 K/UL (ref 3.5–11)

## 2024-06-07 PROCEDURE — 6360000002 HC RX W HCPCS

## 2024-06-07 PROCEDURE — 96366 THER/PROPH/DIAG IV INF ADDON: CPT

## 2024-06-07 PROCEDURE — 85025 COMPLETE CBC W/AUTO DIFF WBC: CPT

## 2024-06-07 PROCEDURE — 96372 THER/PROPH/DIAG INJ SC/IM: CPT

## 2024-06-07 PROCEDURE — 80048 BASIC METABOLIC PNL TOTAL CA: CPT

## 2024-06-07 PROCEDURE — 71260 CT THORAX DX C+: CPT

## 2024-06-07 PROCEDURE — 6370000000 HC RX 637 (ALT 250 FOR IP)

## 2024-06-07 PROCEDURE — G0378 HOSPITAL OBSERVATION PER HR: HCPCS

## 2024-06-07 PROCEDURE — 97165 OT EVAL LOW COMPLEX 30 MIN: CPT

## 2024-06-07 PROCEDURE — 36415 COLL VENOUS BLD VENIPUNCTURE: CPT

## 2024-06-07 PROCEDURE — 99223 1ST HOSP IP/OBS HIGH 75: CPT | Performed by: INTERNAL MEDICINE

## 2024-06-07 PROCEDURE — 85018 HEMOGLOBIN: CPT

## 2024-06-07 PROCEDURE — 97530 THERAPEUTIC ACTIVITIES: CPT

## 2024-06-07 PROCEDURE — 93970 EXTREMITY STUDY: CPT | Performed by: SURGERY

## 2024-06-07 PROCEDURE — 2580000003 HC RX 258: Performed by: INTERNAL MEDICINE

## 2024-06-07 PROCEDURE — 85379 FIBRIN DEGRADATION QUANT: CPT

## 2024-06-07 PROCEDURE — 93970 EXTREMITY STUDY: CPT

## 2024-06-07 PROCEDURE — 6360000004 HC RX CONTRAST MEDICATION: Performed by: INTERNAL MEDICINE

## 2024-06-07 PROCEDURE — 84484 ASSAY OF TROPONIN QUANT: CPT

## 2024-06-07 PROCEDURE — 85014 HEMATOCRIT: CPT

## 2024-06-07 RX ORDER — AZITHROMYCIN 1 G/1
1 POWDER, FOR SUSPENSION ORAL ONCE
Qty: 1 EACH | Refills: 0 | Status: SHIPPED | OUTPATIENT
Start: 2024-06-08 | End: 2024-06-07 | Stop reason: HOSPADM

## 2024-06-07 RX ORDER — AZITHROMYCIN 250 MG/1
TABLET, FILM COATED ORAL
Qty: 6 TABLET | Refills: 0 | Status: SHIPPED | OUTPATIENT
Start: 2024-06-07 | End: 2024-06-17

## 2024-06-07 RX ORDER — SODIUM CHLORIDE 0.9 % (FLUSH) 0.9 %
10 SYRINGE (ML) INJECTION PRN
Status: DISCONTINUED | OUTPATIENT
Start: 2024-06-07 | End: 2024-06-07 | Stop reason: HOSPADM

## 2024-06-07 RX ORDER — ISOSORBIDE MONONITRATE 30 MG/1
30 TABLET, EXTENDED RELEASE ORAL DAILY
Status: DISCONTINUED | OUTPATIENT
Start: 2024-06-07 | End: 2024-06-07 | Stop reason: HOSPADM

## 2024-06-07 RX ORDER — 0.9 % SODIUM CHLORIDE 0.9 %
100 INTRAVENOUS SOLUTION INTRAVENOUS ONCE
Status: COMPLETED | OUTPATIENT
Start: 2024-06-07 | End: 2024-06-07

## 2024-06-07 RX ADMIN — TRAMADOL HYDROCHLORIDE 50 MG: 50 TABLET, COATED ORAL at 14:36

## 2024-06-07 RX ADMIN — METOPROLOL SUCCINATE 12.5 MG: 25 TABLET, EXTENDED RELEASE ORAL at 11:09

## 2024-06-07 RX ADMIN — TRAMADOL HYDROCHLORIDE 50 MG: 50 TABLET, COATED ORAL at 11:10

## 2024-06-07 RX ADMIN — LEVOTHYROXINE SODIUM 75 MCG: 0.07 TABLET ORAL at 05:52

## 2024-06-07 RX ADMIN — CARBIDOPA AND LEVODOPA 1 TABLET: 25; 100 TABLET ORAL at 14:36

## 2024-06-07 RX ADMIN — PANTOPRAZOLE SODIUM 40 MG: 40 TABLET, DELAYED RELEASE ORAL at 05:52

## 2024-06-07 RX ADMIN — ASPIRIN 81 MG: 81 TABLET, COATED ORAL at 11:09

## 2024-06-07 RX ADMIN — IOPAMIDOL 75 ML: 755 INJECTION, SOLUTION INTRAVENOUS at 17:49

## 2024-06-07 RX ADMIN — FLUDROCORTISONE ACETATE 0.1 MG: 0.1 TABLET ORAL at 11:09

## 2024-06-07 RX ADMIN — Medication 100 MG: at 11:08

## 2024-06-07 RX ADMIN — TICAGRELOR 90 MG: 90 TABLET ORAL at 11:09

## 2024-06-07 RX ADMIN — RANOLAZINE 1000 MG: 500 TABLET, EXTENDED RELEASE ORAL at 11:10

## 2024-06-07 RX ADMIN — SODIUM CHLORIDE, PRESERVATIVE FREE 10 ML: 5 INJECTION INTRAVENOUS at 17:49

## 2024-06-07 RX ADMIN — PREGABALIN 150 MG: 150 CAPSULE ORAL at 11:10

## 2024-06-07 RX ADMIN — ENOXAPARIN SODIUM 40 MG: 100 INJECTION SUBCUTANEOUS at 11:10

## 2024-06-07 RX ADMIN — CARBIDOPA AND LEVODOPA 1 TABLET: 25; 100 TABLET ORAL at 11:09

## 2024-06-07 RX ADMIN — CYANOCOBALAMIN TAB 1000 MCG 1000 MCG: 1000 TAB at 11:09

## 2024-06-07 RX ADMIN — CARBIDOPA AND LEVODOPA 1 TABLET: 25; 100 TABLET ORAL at 05:52

## 2024-06-07 RX ADMIN — SODIUM CHLORIDE 100 ML: 9 INJECTION, SOLUTION INTRAVENOUS at 17:49

## 2024-06-07 RX ADMIN — CARBIDOPA AND LEVODOPA 1 TABLET: 25; 100 TABLET ORAL at 18:03

## 2024-06-07 ASSESSMENT — PAIN SCALES - GENERAL
PAINLEVEL_OUTOF10: 7
PAINLEVEL_OUTOF10: 7
PAINLEVEL_OUTOF10: 2
PAINLEVEL_OUTOF10: 2

## 2024-06-07 ASSESSMENT — PAIN DESCRIPTION - DESCRIPTORS: DESCRIPTORS: PRESSURE

## 2024-06-07 ASSESSMENT — PAIN DESCRIPTION - LOCATION: LOCATION: CHEST

## 2024-06-07 NOTE — CARE COORDINATION
Case Management Assessment  Initial Evaluation    Date/Time of Evaluation: 6/7/2024 9:43 AM  Assessment Completed by: Janice Rolle RN    If patient is discharged prior to next notation, then this note serves as note for discharge by case management.    Patient Name: Rowdy Marie                   YOB: 1950  Diagnosis: Hypokalemia [E87.6]  Chest pain [R07.9]  Chest pain, unspecified type [R07.9]                   Date / Time: 6/6/2024  2:36 PM    Patient Admission Status: Inpatient   Readmission Risk (Low < 19, Mod (19-27), High > 27): Readmission Risk Score: 29.5    Current PCP: Yandy Jones MD  PCP verified by CM? Yes    Chart Reviewed: Yes      History Provided by: Patient  Patient Orientation: Alert and Oriented    Patient Cognition: Alert    Hospitalization in the last 30 days (Readmission):  Yes    If yes, Readmission Assessment in  Navigator will be completed.    Advance Directives:      Code Status: Full Code   Patient's Primary Decision Maker is: Legal Next of Kin    Primary Decision Maker: Elliott Marie - [phone]    Primary Decision Maker: Alek Marie    Discharge Planning:    Patient lives with: Family Members Type of Home: House  Primary Care Giver: Self  Patient Support Systems include: Family Members   Current Financial resources: Medicare  Current community resources: Other (Comment) ([facility])  Current services prior to admission: Durable Medical Equipment, Oxygen Therapy            Current DME: Oxygen Therapy (Comment), Cane, Walker, Shower Chair (Raised Toilet Seat, GB, Wears 2LNC- PRN- [employer])            Type of Home Care services:  PT, OT, Nursing Services (Current w/ [facility])    ADLS  Prior functional level: Independent in ADLs/IADLs, Cooking, Housework, Shopping, Assistance with the following:  Current functional level: Independent in ADLs/IADLs, Assistance with the following:, Cooking, Housework, Shopping    PT AM-PAC:    provided to: Patient   Patient Representative Name:       The Patient and/or Patient Representative Agree with the Discharge Plan? Yes    Janice Rolle RN  Case Management Department  Ph:  Fax:

## 2024-06-07 NOTE — PLAN OF CARE
Problem: Discharge Planning  Goal: Discharge to home or other facility with appropriate resources  6/7/2024 0345 by Tori Yuen RN  Outcome: Progressing     Problem: Pain  Goal: Verbalizes/displays adequate comfort level or baseline comfort level  6/7/2024 0345 by Tori Yuen RN  Outcome: Progressing     Problem: Safety - Adult  Goal: Free from fall injury  6/7/2024 0345 by Tori Yuen RN  Outcome: Progressing  Note: The patient remained free from falls this shift, call light within reach, bed in locked and lowest position.  Side rails up x2.       Problem: Cardiovascular - Adult  Goal: Absence of cardiac dysrhythmias or at baseline  Outcome: Progressing  Flowsheets (Taken 6/7/2024 0345)  Absence of cardiac dysrhythmias or at baseline:   Monitor cardiac rate and rhythm   Administer antiarrhythmia medication and electrolyte replacement as ordered  Note: Pt remains in nitro drip this shift.

## 2024-06-07 NOTE — ACP (ADVANCE CARE PLANNING)
Advance Care Planning     Advance Care Planning Activator (Inpatient)  Conversation Note      Date of ACP Conversation: 6/7/2024     Conversation Conducted with: Patient with Decision Making Capacity    ACP Activator: Janice Rolle RN        Health Care Decision Maker:     Current Designated Health Care Decision Maker:     Primary Decision Maker: Marie,Elliott - Child - [phone]    Primary Decision Maker: Alek Marie - Child      Care Preferences    Ventilation:  \"If you were in your present state of health and suddenly became very ill and were unable to breathe on your own, what would your preference be about the use of a ventilator (breathing machine) if it were available to you?\"      Would the patient desire the use of ventilator (breathing machine)?: yes    \"If your health worsens and it becomes clear that your chance of recovery is unlikely, what would your preference be about the use of a ventilator (breathing machine) if it were available to you?\"     Would the patient desire the use of ventilator (breathing machine)?: Yes      Resuscitation  \"CPR works best to restart the heart when there is a sudden event, like a heart attack, in someone who is otherwise healthy. Unfortunately, CPR does not typically restart the heart for people who have serious health conditions or who are very sick.\"    \"In the event your heart stopped as a result of an underlying serious health condition, would you want attempts to be made to restart your heart (answer \"yes\" for attempt to resuscitate) or would you prefer a natural death (answer \"no\" for do not attempt to resuscitate)?\" yes       [] Yes   [] No   Educated Patient / Decision Maker regarding differences between Advance Directives and portable DNR orders.    Length of ACP Conversation in minutes:      Conversation Outcomes:  ACP discussion completed    Follow-up plan:    [] Schedule follow-up conversation to continue planning  [] Referred individual to Provider for

## 2024-06-07 NOTE — PROGRESS NOTES
December 2022  LIMA to LAD, SVG to OM 3, ramus, RCA patent grafts cardiac cath December 2019  History of stent placement  Hypothyroidism  Hyperlipidemia  History of prostate cancer  History of anemia  History of gastritis, esophageal stricture  Parkinson disease on Sinemet  Back pain, back surgery  Episodes of dizziness, combination of previous C-spine surgery, postural hypotension/autonomic dysfunction patient has Parkinson disease on medication, dizziness aggravates by head and neck movements  Normal B12 1970, serum iron 57, iron saturation 21, folate more than 20  MRA neck and head without contrast 2/9/2023  No evidence of large vessel vascular occlusion or intracranial aneurysm within the limits of MR angiographic technique  No hemodynamically significant stenosis or occlusion in the visualized cervical portion of the carotid or vertebral arteries    Investigation workup   ECG 6/7/2024  Normal sinus rhythm heart rate 64 normal ECG  ECG 6/6/2024  Sinus rhythm heart rate 64, nonspecific T wave changes lead V1 and V2     ECG 5/28/2024   sinus rhythm, no obvious ST elevation or depression     Cardiac cath 5/22/2024 right femoral artery access  Severe native vessel CAD  Patent LIMA to LAD LAD distal to touchdown has diffuse in-stent restenosis small in caliber and not amendable for PCI similar to previous cath 4/11/2024  Patent SVG to diagonal with a patent stent  Patent SVG to RPDA  80% stenosis of SVG to OM reduced to 0% BRENDA     Chest x-ray 5/20/2024  Mild diffuse interstitial prominence may represent mild pulmonary edema or atypical infection     2D echo 4/23/2024  Ejection fraction 55% normal LV wall thickness and wall motion  Moderate to severe mitral regurgitation  Normal RV size and function  IVC not well-visualized    History of present illness     73-year-old male with past medical history of multivessel coronary artery disease, CABG x 4, multiple stents, paroxysmal A-fib, status post Watchman procedure,  Parkinson disease, hypothyroidism got hospitalized on 5/20/2024 with severe left upper chest pain radiating to the neck and left shoulder.  He underwent stent placement SVG to OM for 80% stenosis on 5/22/2024.  He had a previous stent placement SVG to diagonal 1 on 4/11/2024.  He got readmitted on 6/6/2024 left upper chest pain different from his previous admission.  He woke up early morning with the chest pain like a tightness but no radiation no diaphoresis no nausea no vomiting and he also has send chest wall tender.  He has been feeling tired over the last 3 or 4 days just like no energy.  He gets shortness of breath on mild exertion.  No syncopal episode no peripheral edema.  Since he had a stent placement on 5/22/2024 he has not had any episode of chest.  In the emergency room he was started on nitroglycerin infusion and his pain is a lot better        Blood pressure on admission 125/68, heart rate 68, oxygen saturation 94%  ECG did not show obvious ischemic change  Chest x-ray showed no acute cardiopulmonary process     Chest myoglobin 36, proBNP 4006, high-sensitivity troponin 20  Sodium 139, potassium 3.2, BUN 13, creatinine 1.2, calcium 8.3, glucose 100  WBC 8.8, hemoglobin 10.9, platelets 240     Current evaluation  Patient seen and examined  Elderly male he was resting on bed with 1 pillow he was on oxygen by nasal cannula  No chest pain at present his main complaint was headache  He has been on nitro infusion  Neck veins were normal lungs were clear on auscultation no pericardial  He has apical systolic murmur  He has not eaten anything since midnight    Blood pressure 138/70, heart rate 68, oxygen saturation 91% 1 L, temperature 98.2  High-sensitivity troponin 2223, myoglobin 36, proBNP 4005  Sodium 137, potassium 3.6, BUN 20, creatinine 1.2, glucose 89  WBC 8.1, hemoglobin 8.6, platelets 205  D-dimer 1.02    Medications:   Scheduled Meds:   [Held by provider] isosorbide mononitrate  30 mg Oral Daily

## 2024-06-07 NOTE — PROGRESS NOTES
[facility]   Occupational Therapy Evaluation  Date: 24  Patient Name: Rowdy Marie       Room: -01  MRN: [id number]  Account: [id number]   : 1950  (74 y.o.) Gender: male     Discharge Recommendations:  Further Occupational Therapy is recommended upon facility discharge.    OT Equipment Recommendations  Equipment Needed: No    Referring Practitioner: Tho Wood MD  Diagnosis: Chest pain   Additional Pertinent Hx: Per H&P Note: 73-year-old male with past medical history of multivessel coronary artery disease, CABG x 4, multiple stents, paroxysmal A-fib, status post Watchman procedure, Parkinson disease, hypothyroidism presented to ED with a severe left upper chest pain radiating to the neck and left shoulder.  As per the patient, the chest pain started 3 days back and is getting worse.  The patient told me that he was compliant with the medication and did not miss any of the medications.  He also admits to being short of breath.  He denies nausea vomiting, leg swelling.The patient underwent cardiac cath recently 2024 and a stent was placed in the graft of second marginal artery.    Treatment Diagnosis: Impaired self-care status    Past Medical History:  has a past medical history of A-fib ([facility]), PREET (acute kidney injury) ([facility]), Anemia, CAD (coronary artery disease), Esophageal stricture, Hyperlipidemia, Hypokalemia, Hypomagnesemia, NSTEMI (non-ST elevated myocardial infarction) ([facility]), Orthostatic hypotension, Parkinsons ([facility]), Presence of Watchman left atrial appendage closure device, and Prostate CA ([facility]).    Past Surgical History:   has a past surgical history that includes Coronary artery bypass graft; Cardiac catheterization; Coronary angioplasty with stent; Diagnostic Cardiac Cath Lab Procedure (2024); Cardiac procedure (N/A, 2024); Cardiac procedure (N/A, 2024); Cardiac procedure (N/A, 2024); and Cardiac procedure (N/A,

## 2024-06-07 NOTE — PROGRESS NOTES
Writer responded to consult to see patient for ACP; patient explained that his wife  in ; patient has two adult sons; writer explained that under [place] law; patient's son's are his medical decision makers; listening presence and support as discussion was held regarding executing HCPOA; patient given ACP documents, and stated he did not want to execute documents at this time; Chaplains remain available in this regard;      24 1715   Encounter Summary   Encounter Overview/Reason Advance Care Planning   Service Provided For Patient   Referral/Consult From Nurse;Patient   Support System Children   Last Encounter  24   Complexity of Encounter Moderate   Grief, Loss, and Adjustments   Type Grief and loss   Palliative Care   Type Palliative Care, Follow-up   Assessment/Intervention/Outcome   Assessment Coping;Hopeful;Powerlessness   Intervention Active listening;Grief Care;Sustaining Presence/Ministry of presence   Outcome Coping;Engaged in conversation;Expressed feelings, needs, and concerns;Expressed Gratitude;Receptive

## 2024-06-07 NOTE — PROGRESS NOTES
[facility]  IN-PATIENT SERVICE  [facility]    PROGRESS NOTE             6/7/2024    7:42 AM    Name:   Rowdy Marie  MRN:     [id number]     Acct:      [id number]   Room:   2104/2104-01   Day:  1  Admit Date:  6/6/2024  2:36 PM    PCP:  Yandy Jones MD  Code Status:  Full Code    Subjective:     C/C:   Chief Complaint   Patient presents with    Chest Pain     Interval History Status: improved.    Patient seen and examined at bedside  Vitals stable over night  Chest pain significantly improved  Currently on nitro drip    Brief History:     73-year-old male with past medical history of multivessel coronary artery disease, CABG x 4, multiple stents, paroxysmal A-fib, status post Watchman procedure, Parkinson disease, hypothyroidism presented to ED with a severe left upper chest pain radiating to the neck and left shoulder.  As per the patient, the chest pain started 3 days back and is getting worse.  The patient told me that he was compliant with the medication and did not miss any of the medications.  He also admits to being short of breath.  He denies nausea vomiting, leg swelling.The patient underwent cardiac cath recently 5/22/2024 and a stent was placed in the graft of second marginal artery.     At the ED, his troponin was 23.  EKG was normal sinus.  Patient was started on nitro drip for unstable angina and admitted for cardiology opinion.      Review of Systems:     Review of Systems  Constitutional:  Negative for fatigue and fever.   Respiratory:  Positive for chest tightness. Negative for cough.    Cardiovascular:  Positive for chest pain. Negative for palpitations and leg swelling.   Gastrointestinal:  Negative for abdominal pain, nausea and vomiting.   Genitourinary:  Negative for difficulty urinating and flank pain.   Musculoskeletal:  Negative for arthralgias and back pain.   Skin:  Negative for color change and pallor.   Allergic/Immunologic:  PROVIDED HISTORY: cp Reason for Exam: PT CO CP in sternal region X 1 day FINDINGS: Stable cardiac silhouette status post median sternotomy.  There is mild diffuse interstitial prominence.  No lobar consolidation.  No pleural effusion or pneumothorax.  Proximal left humeral surgical fixation hardware again noted.     Mild diffuse interstitial prominence may represent mild pulmonary edema or atypical infection.         Physical Examination:      BP (!) 107/56   Pulse 65   Temp 98 °F (36.7 °C) (Oral)   Resp 18   Ht 1.803 m (5' 11\")   Wt 68.3 kg (150 lb 9.2 oz)   SpO2 97%   BMI 21.00 kg/m²     Physical Exam    Physical Exam  Constitutional:       Appearance: Normal appearance. He is normal weight.   Cardiovascular:      Rate and Rhythm: Normal rate and regular rhythm.      Pulses: Normal pulses.      Heart sounds: Normal heart sounds.   Pulmonary:      Effort: Pulmonary effort is normal.      Breath sounds: Normal breath sounds.   Abdominal:      General: Abdomen is flat. Bowel sounds are normal.      Palpations: Abdomen is soft.   Musculoskeletal:         General: Normal range of motion.      Cervical back: Normal range of motion and neck supple.   Skin:     General: Skin is warm.      Capillary Refill: Capillary refill takes less than 2 seconds.   Neurological:      General: No focal deficit present.      Mental Status: He is alert and oriented to person, place, and time. Mental status is at baseline.   Psychiatric:         Mood and Affect: Mood normal.         Behavior: Behavior normal.        Primary Problem  Chest pain    Active Hospital Problems    Diagnosis Date Noted    Chest pain [R07.9] 04/10/2024       Plan:        Patient status Admit as inpatient in the  Progressive Unit/Step down     #Unstable angina status post CABG, multiple stents  -EKG at ED normal, CXR normal  -Troponin 22> 23, baseline 18  -proBNP 4005  -Continue nitro drip  -Continue metoprolol 12.5 twice daily  -Continue home meds ranolazine

## 2024-06-07 NOTE — ACP (ADVANCE CARE PLANNING)
Advance Care Planning   Healthcare Decision Maker:    Primary Decision Maker: FrankElliott - Megan - [phone]    Primary Decision Maker: Alek Marie - Megan      Writer responded to consult to see patient for ACP; patient explained that his wife  in ; patient has two adult sons; writer explained that under [place] law; patient's sons are equally his medical decision makers; listening presence and support as discussion was held regarding executing HCPOA; patient given ACP documents, and stated he did not want to execute documents at this time; Chaplains remain available in this regard;

## 2024-06-07 NOTE — DISCHARGE INSTR - COC
Continuity of Care Form    Patient Name: Rowdy Marie   :  1950  MRN:  [id number]    Admit date:  2024  Discharge date:  24    Code Status Order: Full Code   Advance Directives:     Admitting Physician:  Ko Elias MD  PCP: Yandy Jones MD    Discharging Nurse: Markus MCCLELLAND  Discharging Hospital Unit/Room#: 2104/2104-01  Discharging Unit Phone Number: [phone]    Emergency Contact:   Extended Emergency Contact Information  Primary Emergency Contact: Elliott Marie  Home Phone: [phone]  Relation: Child  Secondary Emergency Contact: Kye Marie  Home Phone: [phone]  Mobile Phone: [phone]  Relation: Daughter-in-Law  Preferred language: English   needed? No    Past Surgical History:  Past Surgical History:   Procedure Laterality Date    CARDIAC CATHETERIZATION      CARDIAC PROCEDURE N/A 2024    sunny / Left heart cath / coronary angiography / op scmh performed by Andree Paulino MD at [facility] CARDIAC CATH LAB    CARDIAC PROCEDURE N/A 2024    Percutaneous coronary intervention performed by Andree Paulino MD at [facility] CARDIAC CATH LAB    CARDIAC PROCEDURE N/A 2024    taleb / Left heart cath / coronary angiography / op scmh performed by Antonio Love MD at [facility] CARDIAC CATH LAB    CARDIAC PROCEDURE N/A 2024    Percutaneous coronary intervention performed by Antonio Love MD at [facility] CARDIAC CATH LAB    CORONARY ANGIOPLASTY WITH STENT PLACEMENT      8 stents prior to CABG per patient, most recent 2023    CORONARY ARTERY BYPASS GRAFT          DIAGNOSTIC CARDIAC CATH LAB PROCEDURE  2024       Immunization History:   Immunization History   Administered Date(s) Administered    COVID-19, J&J, (age 18y+), IM, 0.5 mL 2021, 2022       Active Problems:  Patient Active Problem List   Diagnosis Code    Hypotension I95.9    Mild anemia D64.9    Chronic atrial fibrillation (HCC) I48.20    Coronary artery disease involving coronary bypass  Liquids  Daily Fluid Restriction: no  Last Modified Barium Swallow with Video (Video Swallowing Test): not done    Treatments at the Time of Hospital Discharge:   Respiratory Treatments: NA  Oxygen Therapy:  Is on 1L nasal cannula as needed  Ventilator:    - No ventilator support    Rehab Therapies: Physical Therapy and Occupational Therapy  Weight Bearing Status/Restrictions: No weight bearing restrictions  Other Medical Equipment (for information only, NOT a DME order):    Other Treatments: Skilled Nursing assessment and monitoring. Medication education and monitoring per protocol.      Patient's personal belongings (please select all that are sent with patient):  None    RN SIGNATURE:  Electronically signed by Markus KOHLER RN on 6/7/24 at 3:20 PM EDT    CASE MANAGEMENT/SOCIAL WORK SECTION    Inpatient Status Date:     Readmission Risk Assessment Score:  Readmission Risk              Risk of Unplanned Readmission:  37           Discharging to Facility/ Agency   [facility]  [address]  [place] [address]  Phone [phone]  Fax  [phone]     Dialysis Facility (if applicable)   Name:  Address:  Dialysis Schedule:  Phone:  Fax:    / signature: Electronically signed by Janice Rolle RN on 6/7/24 at 9:47 AM EDT    PHYSICIAN SECTION    Prognosis: Good    Condition at Discharge: Stable    Rehab Potential (if transferring to Rehab): Good    Recommended Labs or Other Treatments After Discharge: ***    Physician Certification: I certify the above information and transfer of Rowdy Marie  is necessary for the continuing treatment of the diagnosis listed and that he requires Home Care for less 30 days.     Update Admission H&P: No change in H&P    PHYSICIAN SIGNATURE:  Electronically signed by Yandy Jones MD on 6/7/24 at 4:18 PM EDT

## 2024-06-07 NOTE — DISCHARGE INSTR - DIET

## 2024-06-07 NOTE — CARE COORDINATION
Continuity of Care Form    Patient Name: Rowdy Marie   :  1950  MRN:  [id number]    Admit date:  2024  Discharge date:  24    Code Status Order: Full Code   Advance Directives:     Admitting Physician:  Ko Elias MD  PCP: Yandy Jones MD    Discharging Nurse: Markus MCCLELLAND  Discharging Hospital Unit/Room#: 2104/2104-01  Discharging Unit Phone Number: [phone]    Emergency Contact:   Extended Emergency Contact Information  Primary Emergency Contact: Elliott Marie  Home Phone: [phone]  Relation: Child  Secondary Emergency Contact: Kye Marie  Home Phone: [phone]  Mobile Phone: [phone]  Relation: Daughter-in-Law  Preferred language: English   needed? No    Past Surgical History:  Past Surgical History:   Procedure Laterality Date    CARDIAC CATHETERIZATION      CARDIAC PROCEDURE N/A 2024    sunny / Left heart cath / coronary angiography / op scmh performed by Andree Paulino MD at [facility] CARDIAC CATH LAB    CARDIAC PROCEDURE N/A 2024    Percutaneous coronary intervention performed by Andree Paulino MD at [facility] CARDIAC CATH LAB    CARDIAC PROCEDURE N/A 2024    taleb / Left heart cath / coronary angiography / op scmh performed by Antonio Love MD at [facility] CARDIAC CATH LAB    CARDIAC PROCEDURE N/A 2024    Percutaneous coronary intervention performed by Antonio Love MD at [facility] CARDIAC CATH LAB    CORONARY ANGIOPLASTY WITH STENT PLACEMENT      8 stents prior to CABG per patient, most recent 2023    CORONARY ARTERY BYPASS GRAFT          DIAGNOSTIC CARDIAC CATH LAB PROCEDURE  2024       Immunization History:   Immunization History   Administered Date(s) Administered    COVID-19, J&J, (age 18y+), IM, 0.5 mL 2021, 2022       Active Problems:  Patient Active Problem List   Diagnosis Code    Hypotension I95.9    Mild anemia D64.9    Chronic atrial fibrillation (HCC) I48.20    Coronary artery disease involving coronary bypass  tablet by mouth once daily

## 2024-06-07 NOTE — PLAN OF CARE
Problem: Discharge Planning  Goal: Discharge to home or other facility with appropriate resources  6/7/2024 1516 by Markus Holder RN  Outcome: Progressing  Flowsheets (Taken 6/7/2024 1000 by Denita Wing, RN)  Discharge to home or other facility with appropriate resources:   Identify barriers to discharge with patient and caregiver   Arrange for needed discharge resources and transportation as appropriate   Identify discharge learning needs (meds, wound care, etc)   Refer to discharge planning if patient needs post-hospital services based on physician order or complex needs related to functional status, cognitive ability or social support system     Problem: Pain  Goal: Verbalizes/displays adequate comfort level or baseline comfort level  6/7/2024 1516 by Markus Holder RN  Outcome: Progressing     Problem: Safety - Adult  Goal: Free from fall injury  6/7/2024 1516 by Markus Holder RN  Outcome: Progressing     Problem: Cardiovascular - Adult  Goal: Absence of cardiac dysrhythmias or at baseline  6/7/2024 1516 by Markus Holder RN  Outcome: Progressing  Flowsheets (Taken 6/7/2024 1000 by Dneita Wing, RN)  Absence of cardiac dysrhythmias or at baseline:   Monitor cardiac rate and rhythm   Assess for signs of decreased cardiac output

## 2024-06-08 NOTE — PROGRESS NOTES
Writer Spoke with Dr. Jones regarding pt's CT chest results and discharge for this evening. Dr. Jones okay for pt to discharge home with an order for a Z pack. Orders placed, family at bedside updated.

## 2024-06-08 NOTE — PROGRESS NOTES
Patient educated on discharge instructions which include: medication schedule, reasons for new medications and some side effects and need to follow-up. Patient given new prescriptions during teaching.  Patient verbalizes understanding of discharge and states readiness for discharge.  All belongings were gathered by patient and in patient's possession.  No distress noted at this time. IV removed and telemetry removed prior to discharge.    Current vital signs:  /67   Pulse 64   Temp 97.8 °F (36.6 °C) (Oral)   Resp 18   Ht 1.803 m (5' 11\")   Wt 68.3 kg (150 lb 9.2 oz)   SpO2 96%   BMI 21.00 kg/m²                MEWS Score: 1

## 2024-06-10 ENCOUNTER — CARE COORDINATION (OUTPATIENT)
Dept: CASE MANAGEMENT | Age: 74
End: 2024-06-10

## 2024-06-10 DIAGNOSIS — R07.9 CHEST PAIN, UNSPECIFIED TYPE: Primary | ICD-10-CM

## 2024-06-10 PROCEDURE — 1111F DSCHRG MED/CURRENT MED MERGE: CPT | Performed by: INTERNAL MEDICINE

## 2024-06-10 RX ORDER — RANOLAZINE 1000 MG/1
1000 TABLET, EXTENDED RELEASE ORAL 2 TIMES DAILY
Qty: 60 TABLET | Refills: 3 | Status: SHIPPED | OUTPATIENT
Start: 2024-06-10

## 2024-06-10 NOTE — TELEPHONE ENCOUNTER
Patient has appointment with PCP on 7/1  No sooner available appointments  Spoke with patient, nothing needed at this time. Will update appointment notes.

## 2024-06-10 NOTE — CARE COORDINATION
Care Transitions Note  Initial Call - Call within 2 business days of discharge: Yes    Patient Current Location:  Home: [address] [place] [address] [place] [address]    Care Transition Nurse contacted the patient by telephone to perform post hospital discharge assessment, verified name and  as identifiers. Provided introduction to self, and explanation of the Care Transition Nurse role.     Patient: Rowdy Marie    Patient : 1950   MRN: [id number]    Reason for Admission: Chest pain  Discharge Date: 24  RURS: Readmission Risk Score: 29.2      Last Discharge Facility       Date Complaint Diagnosis Description Type Department Provider    24 Chest Pain Chest pain, unspecified type ... ED to Hosp-Admission (Discharged) (ADMITTED) Ko Camargo MD; Brian Quiroz..            Was this an external facility discharge? No    Additional needs identified to be addressed with provider   High priority: 7 day hospital follow up             Method of communication with provider: chart routing.    Patients top risk factors for readmission: functional physical ability, lack of knowledge about disease, and medical condition-chest pain, Afib    Interventions to address risk factors:   Community Resources: contacted [facility] and [facility] 24    Care Summary Note: Was able to contact Rowdy for initial transitional outreach.  He said that he was doing \"ok\", just tired.  He denied any further chest pain, has chronic shortness of breath with activity and no increased swelling.  He stated that he had all his medications and does have an appointment with PCP, 24.  He said that it is hard to get an earlier appointment.  Will message office for HFU.  He will be calling his cardiologist, Dr Ladd for an appointment.  He will be going to cardiac rehab this week.  He is being followed by [facility], Arina Buckner RN.  Will had off to [facility] for further outreaches.     Care Transition Nurse reviewed discharge instructions with

## 2024-06-11 NOTE — CARDIO/PULMONARY
CALLED PT TO REMIND HIM OF INITIAL CARDIAC REHAB APPOINTMENT ON 6/12/24 AT 8:15 AM. PT VERBALIZED UNDERSTANDING.

## 2024-06-12 ENCOUNTER — HOSPITAL ENCOUNTER (OUTPATIENT)
Dept: CARDIAC REHAB | Age: 74
Setting detail: THERAPIES SERIES
Discharge: HOME | End: 2024-06-12
Attending: INTERNAL MEDICINE

## 2024-06-12 NOTE — CARDIO/PULMONARY
PT CALLED AND CANCELED APPOINTMENT. PT STATES HE IS VOMITING, AND WILL CALL AND RESCHEDULE WHEN HE IS FEELING BETTER.

## 2024-06-13 ENCOUNTER — CARE COORDINATION (OUTPATIENT)
Dept: CARE COORDINATION | Age: 74
End: 2024-06-13

## 2024-06-14 DIAGNOSIS — E53.8 VITAMIN B12 DEFICIENCY: ICD-10-CM

## 2024-06-14 RX ORDER — LANOLIN ALCOHOL/MO/W.PET/CERES
1000 CREAM (GRAM) TOPICAL DAILY
Qty: 30 TABLET | Refills: 3 | Status: SHIPPED | OUTPATIENT
Start: 2024-06-14

## 2024-06-14 NOTE — CARE COORDINATION
Ambulatory Care Coordination Note     2024 11:19 AM     Patient Current Location:  Home: [address] [place] [address] [place] [address]     ACM contacted the patient by telephone. Verified name and  with patient as identifiers.         ACM: ANTIONETTE BUCKNER RN     Challenges to be reviewed by the provider   Additional needs identified to be addressed with provider No  none               Method of communication with provider: none.    Care Summary Note: Patient reports other than some fatigue he is doing ok. He denied any chest pain at this time and understands what to report back to the ED for. Encouraged patient  to avoid the extreme heat that is predicted for this coming week, he stated he stays inside the AC.   Per patient he is eating and drinking well.   Patient denied any swelling and stated he has been SOB for years but it is not worse than his baseline.     Offered patient enrollment in the Remote Patient Monitoring (RPM) program for in-home monitoring: Patient is not eligible for RPM program because: insurance coverage.     Assessments Completed:   General Assessment    Do you have any symptoms that are causing concern?: Yes  Progression since Onset: Intermittent - Waxing/Waning, Unchanged  Reported Symptoms: Fatigue          Medications Reviewed:   Patient denies any changes with medications and reports taking all medications as prescribed.    Advance Care Planning:   Not on file; education provided     Care Planning:   Education Documentation  General medication information, taught by Antionette Buckner RN at 2024 11:18 AM.  Learner: Patient  Readiness: Acceptance  Method: Explanation  Response: Verbalizes Understanding    Educate reporting changes in condition, taught by Antionette Buckner, RN at 2024 11:18 AM.  Learner: Patient  Readiness: Acceptance  Method: Explanation  Response: Verbalizes Understanding    Educate Patient on When to Call for Symptoms, taught by Antionette Buckner RN at 2024 11:18

## 2024-06-28 ENCOUNTER — CARE COORDINATION (OUTPATIENT)
Dept: CARE COORDINATION | Age: 74
End: 2024-06-28

## 2024-06-28 ENCOUNTER — TELEPHONE (OUTPATIENT)
Dept: INTERNAL MEDICINE CLINIC | Age: 74
End: 2024-06-28

## 2024-06-28 DIAGNOSIS — R52 PAIN: Primary | ICD-10-CM

## 2024-06-28 RX ORDER — PREGABALIN 75 MG/1
150 CAPSULE ORAL 2 TIMES DAILY
Qty: 120 CAPSULE | Refills: 0 | Status: SHIPPED | OUTPATIENT
Start: 2024-06-28 | End: 2024-07-28

## 2024-06-28 NOTE — TELEPHONE ENCOUNTER
----- Message from Garima Jon sent at 6/28/2024  3:37 PM EDT -----  Regarding: FW: Elzbieta  Contact: [phone]        ----- Message -----  From: Rowdy Marie \"Ed\"  Sent: 6/28/2024   2:23 PM EDT  To: [facility] Clinical Support Staff  Subject: Elzbieta Ng's  generic version of Lyrica script ran out over a week ago.  Dr gill has been sent messages to do refill.   He appears to be out of town or something.   The nurse that was here said it possible his  current feelings can be related to  the lack of it cold turkey stopping.   Can this script be filled through you  or  another of Ed's  doctors in the group at [facility].      Thanks   Kye

## 2024-06-28 NOTE — TELEPHONE ENCOUNTER
[facility] contacted  office to inform physician that since Saturday patient has been experiencing nausea dizzy spells, fatigue ,diarrhea and  decreased appetite with last  Blood pressure reading of145/84 pulse 96.    Informed [facility] that patient will need to be assessed ,she then informed that he has a scheduled appt already next Tues 7/1

## 2024-07-01 ENCOUNTER — OFFICE VISIT (OUTPATIENT)
Dept: INTERNAL MEDICINE CLINIC | Age: 74
End: 2024-07-01
Payer: MEDICARE

## 2024-07-01 VITALS
OXYGEN SATURATION: 99 % | WEIGHT: 140 LBS | HEART RATE: 70 BPM | SYSTOLIC BLOOD PRESSURE: 116 MMHG | BODY MASS INDEX: 19.6 KG/M2 | HEIGHT: 71 IN | DIASTOLIC BLOOD PRESSURE: 64 MMHG

## 2024-07-01 DIAGNOSIS — R11.0 NAUSEA: ICD-10-CM

## 2024-07-01 DIAGNOSIS — K29.70 GASTRITIS WITHOUT BLEEDING, UNSPECIFIED CHRONICITY, UNSPECIFIED GASTRITIS TYPE: ICD-10-CM

## 2024-07-01 DIAGNOSIS — G20.A1 PARKINSON'S DISEASE WITHOUT DYSKINESIA OR FLUCTUATING MANIFESTATIONS (HCC): ICD-10-CM

## 2024-07-01 DIAGNOSIS — I20.9 ANGINA PECTORIS (HCC): ICD-10-CM

## 2024-07-01 DIAGNOSIS — R63.4 RECENT UNEXPLAINED WEIGHT LOSS: Primary | ICD-10-CM

## 2024-07-01 PROCEDURE — 99214 OFFICE O/P EST MOD 30 MIN: CPT | Performed by: INTERNAL MEDICINE

## 2024-07-01 PROCEDURE — 1123F ACP DISCUSS/DSCN MKR DOCD: CPT | Performed by: INTERNAL MEDICINE

## 2024-07-01 RX ORDER — TRAMADOL HYDROCHLORIDE 50 MG/1
50 TABLET ORAL EVERY 8 HOURS PRN
Status: CANCELLED | OUTPATIENT
Start: 2024-07-01

## 2024-07-01 RX ORDER — MECLIZINE HCL 12.5 MG/1
25 TABLET ORAL 3 TIMES DAILY PRN
Qty: 7 TABLET | Refills: 0 | Status: SHIPPED | OUTPATIENT
Start: 2024-07-01

## 2024-07-01 RX ORDER — PANTOPRAZOLE SODIUM 40 MG/1
40 TABLET, DELAYED RELEASE ORAL
Qty: 90 TABLET | Refills: 1 | Status: SHIPPED | OUTPATIENT
Start: 2024-07-01

## 2024-07-01 RX ORDER — SUCRALFATE 1 G/1
1 TABLET ORAL 4 TIMES DAILY
Qty: 120 TABLET | Refills: 3 | Status: SHIPPED | OUTPATIENT
Start: 2024-07-01

## 2024-07-01 NOTE — PROGRESS NOTES
Visit Information    Have you changed or started any medications since your last visit including any over-the-counter medicines, vitamins, or herbal medicines? no   Are you having any side effects from any of your medications? -  no  Have you stopped taking any of your medications? Is so, why? -  no    Have you seen any other physician or provider since your last visit? No  Have you had any other diagnostic tests since your last visit? Yes - Records Obtained  Have you been seen in the emergency room and/or had an admission to a hospital since we last saw you? Yes - Records Obtained  Have you had your routine dental cleaning in the past 6 months? no    Have you activated your [employer] account? If not, what are your barriers? Yes     Patient Care Team:  Yandy Jones MD as PCP - General (Internal Medicine)  Yandy Jones MD as PCP - Empaneled Provider  Antionette Buckner RN as Ambulatory Care Manager    Medical History Review  Past Medical, Family, and Social History reviewed and does contribute to the patient presenting condition    Health Maintenance   Topic Date Due    Hepatitis C screen  Never done    Prostate Specific Antigen (PSA) Screening or Monitoring  Never done    Colorectal Cancer Screen  Never done    Shingles vaccine (1 of 2) Never done    Respiratory Syncytial Virus (RSV) Pregnant or age 60 yrs+ (1 - 1-dose 60+ series) Never done    Pneumococcal 65+ years Vaccine (2 of 2 - PPSV23 or PCV20) 11/27/2020    COVID-19 Vaccine (3 - 2023-24 season) 09/01/2023    Flu vaccine (1) 08/01/2024    Depression Screen  03/13/2025    Annual Wellness Visit (Medicare)  03/14/2025    DTaP/Tdap/Td vaccine (2 - Td or Tdap) 12/22/2026    Lipids  04/12/2029    Hepatitis A vaccine  Aged Out    Hepatitis B vaccine  Aged Out    Hib vaccine  Aged Out    Polio vaccine  Aged Out    Meningococcal (ACWY) vaccine  Aged Out    GFR test (Diabetes, CKD 3-4, OR last GFR 15-59)  Discontinued     SUBJECTIVE:  Rowdy Marie is a 74 y.o.

## 2024-07-02 ENCOUNTER — HOSPITAL ENCOUNTER (OUTPATIENT)
Age: 74
Setting detail: SPECIMEN
Discharge: HOME | End: 2024-07-02
Payer: MEDICARE

## 2024-07-02 DIAGNOSIS — R63.4 RECENT UNEXPLAINED WEIGHT LOSS: ICD-10-CM

## 2024-07-02 LAB
ALBUMIN SERPL-MCNC: 4.3 G/DL (ref 3.5–5.2)
ALBUMIN/GLOB SERPL: 1 {RATIO} (ref 1–2.5)
ALP SERPL-CCNC: 89 U/L (ref 40–129)
ALT SERPL-CCNC: <5 U/L (ref 10–50)
ANION GAP SERPL CALCULATED.3IONS-SCNC: 17 MMOL/L (ref 9–16)
AST SERPL-CCNC: 21 U/L (ref 10–50)
BILIRUB SERPL-MCNC: 0.3 MG/DL (ref 0–1.2)
BUN SERPL-MCNC: 20 MG/DL (ref 8–23)
CALCIUM SERPL-MCNC: 9.2 MG/DL (ref 8.6–10.4)
CHLORIDE SERPL-SCNC: 101 MMOL/L (ref 98–107)
CO2 SERPL-SCNC: 19 MMOL/L (ref 20–31)
CREAT SERPL-MCNC: 1.2 MG/DL (ref 0.7–1.2)
ERYTHROCYTE [DISTWIDTH] IN BLOOD BY AUTOMATED COUNT: 15.8 % (ref 11.8–14.4)
GFR, ESTIMATED: 65 ML/MIN/1.73M2
GLUCOSE SERPL-MCNC: 85 MG/DL (ref 74–99)
HCT VFR BLD AUTO: 40.8 % (ref 40.7–50.3)
HGB BLD-MCNC: 11.6 G/DL (ref 13–17)
MCH RBC QN AUTO: 27.2 PG (ref 25.2–33.5)
MCHC RBC AUTO-ENTMCNC: 28.4 G/DL (ref 28.4–34.8)
MCV RBC AUTO: 95.6 FL (ref 82.6–102.9)
NRBC BLD-RTO: 0 PER 100 WBC
PLATELET # BLD AUTO: 240 K/UL (ref 138–453)
PMV BLD AUTO: 13 FL (ref 8.1–13.5)
POTASSIUM SERPL-SCNC: 4.1 MMOL/L (ref 3.7–5.3)
PROT SERPL-MCNC: 7.6 G/DL (ref 6.6–8.7)
RBC # BLD AUTO: 4.27 M/UL (ref 4.21–5.77)
SODIUM SERPL-SCNC: 137 MMOL/L (ref 136–145)
TSH SERPL DL<=0.05 MIU/L-ACNC: 0.64 UIU/ML (ref 0.27–4.2)
WBC OTHER # BLD: 8.1 K/UL (ref 3.5–11.3)

## 2024-07-02 PROCEDURE — 80053 COMPREHEN METABOLIC PANEL: CPT

## 2024-07-02 PROCEDURE — 36415 COLL VENOUS BLD VENIPUNCTURE: CPT

## 2024-07-02 PROCEDURE — 85027 COMPLETE CBC AUTOMATED: CPT

## 2024-07-02 PROCEDURE — 84443 ASSAY THYROID STIM HORMONE: CPT

## 2024-07-11 ENCOUNTER — TELEPHONE (OUTPATIENT)
Dept: ONCOLOGY | Age: 74
End: 2024-07-11

## 2024-07-11 ENCOUNTER — INITIAL CONSULT (OUTPATIENT)
Dept: ONCOLOGY | Age: 74
End: 2024-07-11
Payer: MEDICARE

## 2024-07-11 VITALS
TEMPERATURE: 97.6 F | DIASTOLIC BLOOD PRESSURE: 77 MMHG | WEIGHT: 152 LBS | BODY MASS INDEX: 21.2 KG/M2 | HEART RATE: 75 BPM | SYSTOLIC BLOOD PRESSURE: 143 MMHG

## 2024-07-11 DIAGNOSIS — C61 PROSTATE CANCER (HCC): Primary | ICD-10-CM

## 2024-07-11 DIAGNOSIS — D64.9 ANEMIA, UNSPECIFIED TYPE: ICD-10-CM

## 2024-07-11 PROCEDURE — 99204 OFFICE O/P NEW MOD 45 MIN: CPT | Performed by: INTERNAL MEDICINE

## 2024-07-11 NOTE — PROGRESS NOTES
which are most  prominent the right upper lobe and could represent pneumonitis or atypical  infection including COVID pneumonia.  3. Small bilateral pleural effusions.  4. Central airway thickening which could be related to bronchitis or reactive  airways disease.  Vascular duplex lower extremity venous bilateral    No evidence of deep vein or superficial vein thrombosis in the right   lower extremity. Vessels demonstrate normal compressibility, color   filling, and phasic and spontaneous flow.    No evidence of deep vein or superficial vein thrombosis in the left   lower extremity. Vessels demonstrate normal compressibility, color   filling, and phasic and spontaneous flow.     ASSESSMENT:    Rowdy Marie is a 74 y.o. male with history of CAD, s/p cardiac cath and stent placement, hyperlipidemia was seen during initial consult visit for anemia    I reviewed the laboratory, imaging results, outside records, prior records, discussed diagnosis and treatment recommendations    During his recent hospitalization his hemoglobin was significant low at 8.5 now has improved to 11.5.    Will order anemia workup including iron B12 studies, erythropoietin level, peripheral blood smear.  Right now the trend is improving therefore I will hold off on bone marrow biopsy.  However if his hemoglobin does not get better we will consider bone marrow biopsy.  Return to clinic in 5 weeks with labs prior   PLAN:   I will order anemia workup  Recent lab work showed improving hemoglobin therefore we will hold off on bone marrow biopsy  Return to clinic in 5 weeks to discuss further recommendations      Mohit Adan MD  Hematologist/Medical Oncologist    On this date 7/11/24  I have spent 60 minutes reviewing previous notes, test results and face to face with the patient discussing the diagnosis and importance of compliance with the treatment plan. Greater than 50% of that time was spent face-to-face with the patient in counseling and

## 2024-07-12 ENCOUNTER — CARE COORDINATION (OUTPATIENT)
Dept: CARE COORDINATION | Age: 74
End: 2024-07-12

## 2024-07-12 NOTE — CARE COORDINATION
Ambulatory Care Coordination Note     7/12/2024 10:21 AM     Patient outreach attempt by this ACM today to perform care management follow up . ACM was unable to reach the patient by telephone today; voicemail full and unable to leave a message.      ACM: PETTY KIRK RN       PCP/Specialist follow up:   Future Appointments         Provider Specialty Dept Phone    7/31/2024 10:00 AM Yandy Jones MD Internal Medicine [phone]    8/9/2024 10:30 AM Yandy Jones MD Internal Medicine [phone]    8/15/2024 3:30 PM Mohit Adan MD Oncology [phone]    8/26/2024 2:00 PM Mahsa Barclay MD Gastroenterology [phone]            Follow Up:   Plan for next ACM outreach in approximately 1 week to complete:  - disease specific assessments  - medication review  - advance care planning  - goal progression  - education .

## 2024-07-19 ENCOUNTER — CARE COORDINATION (OUTPATIENT)
Dept: CARE COORDINATION | Age: 74
End: 2024-07-19

## 2024-07-19 NOTE — CARE COORDINATION
Ambulatory Care Coordination Note     7/19/2024 10:06 AM     Patient outreach attempt by this ACM today to perform care management follow up . ACM was unable to reach the patient by telephone today; voicemail full and unable to leave a message.      ACM: PETTY KIRK RN       PCP/Specialist follow up:   Future Appointments         Provider Specialty Dept Phone    7/31/2024 10:00 AM Yandy Jones MD Internal Medicine [phone]    8/9/2024 10:30 AM Yandy Jones MD Internal Medicine [phone]    8/15/2024 3:30 PM Mohit Adan MD Oncology [phone]    8/26/2024 2:00 PM Mahsa Barclay MD Gastroenterology [phone]            Follow Up:   Plan for next ACM outreach in approximately 1 week to complete:  - disease specific assessments  - SDOH assessments  - medication review  - advance care planning  - goal progression  - education .

## 2024-07-22 DIAGNOSIS — R52 PAIN: ICD-10-CM

## 2024-07-22 RX ORDER — PREGABALIN 75 MG/1
150 CAPSULE ORAL 2 TIMES DAILY
Qty: 120 CAPSULE | Refills: 0 | Status: SHIPPED | OUTPATIENT
Start: 2024-07-22 | End: 2024-08-21

## 2024-07-22 NOTE — TELEPHONE ENCOUNTER
Medication Requested: Lea    Last visit: 7/1/2024  Next visit: 7/31/2024  Last refill: 6/28/2024    Med contract on file:  [] yes   [x] no    Last urine drug screen: none  Consistent with medication(s):    [] yes   [] no    Last OARRS ran: unknown    Quantity of medication remaining: unknown    Who will be picking rx up: Ed    Pharmacy if escribed: [facility]

## 2024-07-26 ENCOUNTER — CARE COORDINATION (OUTPATIENT)
Dept: CARE COORDINATION | Age: 74
End: 2024-07-26

## 2024-07-26 NOTE — CARE COORDINATION
Multiple attempts to reach patient with no return call. Will remove self at this time but will be happy to assist with any needs if he should reach out.

## 2024-07-31 ENCOUNTER — OFFICE VISIT (OUTPATIENT)
Dept: INTERNAL MEDICINE CLINIC | Age: 74
End: 2024-07-31
Payer: MEDICARE

## 2024-07-31 ENCOUNTER — TELEPHONE (OUTPATIENT)
Dept: INTERNAL MEDICINE CLINIC | Age: 74
End: 2024-07-31

## 2024-07-31 VITALS
DIASTOLIC BLOOD PRESSURE: 60 MMHG | BODY MASS INDEX: 20.5 KG/M2 | SYSTOLIC BLOOD PRESSURE: 118 MMHG | OXYGEN SATURATION: 99 % | HEART RATE: 77 BPM | HEIGHT: 71 IN | WEIGHT: 146.4 LBS

## 2024-07-31 DIAGNOSIS — R63.4 UNEXPLAINED WEIGHT LOSS: Primary | ICD-10-CM

## 2024-07-31 DIAGNOSIS — Z95.820 S/P ANGIOPLASTY WITH STENT: ICD-10-CM

## 2024-07-31 DIAGNOSIS — I48.20 CHRONIC ATRIAL FIBRILLATION (HCC): ICD-10-CM

## 2024-07-31 DIAGNOSIS — I25.10 CAD, MULTIPLE VESSEL: ICD-10-CM

## 2024-07-31 DIAGNOSIS — K92.2 GASTROINTESTINAL HEMORRHAGE, UNSPECIFIED GASTROINTESTINAL HEMORRHAGE TYPE: ICD-10-CM

## 2024-07-31 PROCEDURE — 1123F ACP DISCUSS/DSCN MKR DOCD: CPT | Performed by: INTERNAL MEDICINE

## 2024-07-31 PROCEDURE — 99214 OFFICE O/P EST MOD 30 MIN: CPT | Performed by: INTERNAL MEDICINE

## 2024-07-31 RX ORDER — MIRTAZAPINE 7.5 MG/1
7.5 TABLET, FILM COATED ORAL NIGHTLY
Qty: 30 TABLET | Refills: 5 | Status: SHIPPED | OUTPATIENT
Start: 2024-07-31

## 2024-07-31 NOTE — PROGRESS NOTES
Visit Information    Have you changed or started any medications since your last visit including any over-the-counter medicines, vitamins, or herbal medicines? no   Are you having any side effects from any of your medications? -  no  Have you stopped taking any of your medications? Is so, why? -  no    Have you seen any other physician or provider since your last visit? Yes - Records Obtained  Have you had any other diagnostic tests since your last visit? No  Have you been seen in the emergency room and/or had an admission to a hospital since we last saw you? No  Have you had your routine dental cleaning in the past 6 months? no    Have you activated your [employer] account? If not, what are your barriers? Yes     Patient Care Team:  Yandy Jones MD as PCP - General (Internal Medicine)  Yandy Jones MD as PCP - Empaneled Provider    Medical History Review  Past Medical, Family, and Social History reviewed and does contribute to the patient presenting condition    Health Maintenance   Topic Date Due    Hepatitis C screen  Never done    Prostate Specific Antigen (PSA) Screening or Monitoring  Never done    Colorectal Cancer Screen  Never done    Shingles vaccine (1 of 2) Never done    Respiratory Syncytial Virus (RSV) Pregnant or age 60 yrs+ (1 - 1-dose 60+ series) Never done    Pneumococcal 65+ years Vaccine (2 of 2 - PPSV23 or PCV20) 11/27/2020    COVID-19 Vaccine (3 - 2023-24 season) 09/01/2023    Flu vaccine (1) 08/01/2024    Depression Screen  03/13/2025    Annual Wellness Visit (Medicare)  03/14/2025    DTaP/Tdap/Td vaccine (2 - Td or Tdap) 12/22/2026    Lipids  04/12/2029    Hepatitis A vaccine  Aged Out    Hepatitis B vaccine  Aged Out    Hib vaccine  Aged Out    Polio vaccine  Aged Out    Meningococcal (ACWY) vaccine  Aged Out    GFR test (Diabetes, CKD 3-4, OR last GFR 15-59)  Discontinued          SUBJECTIVE:  Rowdy Marie is a 74 y.o. male patient who  comes for complaints of   Chief Complaint

## 2024-07-31 NOTE — TELEPHONE ENCOUNTER
No alternative code-skip test if insurance not covering.    2: 02 PM-correction I did change the code.    Yandy Jones MD

## 2024-08-01 ENCOUNTER — HOSPITAL ENCOUNTER (OUTPATIENT)
Age: 74
Setting detail: SPECIMEN
Discharge: HOME | End: 2024-08-01
Payer: MEDICARE

## 2024-08-01 DIAGNOSIS — C61 PROSTATE CANCER (HCC): ICD-10-CM

## 2024-08-01 DIAGNOSIS — R63.4 UNEXPLAINED WEIGHT LOSS: ICD-10-CM

## 2024-08-01 DIAGNOSIS — D64.9 ANEMIA, UNSPECIFIED TYPE: ICD-10-CM

## 2024-08-01 LAB
BASOPHILS # BLD: 0.08 K/UL (ref 0–0.2)
BASOPHILS NFR BLD: 1 % (ref 0–2)
CRP SERPL HS-MCNC: 20.3 MG/L (ref 0–5)
EOSINOPHIL # BLD: 0.35 K/UL (ref 0–0.44)
EOSINOPHILS RELATIVE PERCENT: 5 % (ref 1–4)
ERYTHROCYTE [DISTWIDTH] IN BLOOD BY AUTOMATED COUNT: 16.9 % (ref 11.8–14.4)
ERYTHROCYTE [SEDIMENTATION RATE] IN BLOOD BY PHOTOMETRIC METHOD: 54 MM/HR (ref 0–20)
FERRITIN SERPL-MCNC: 46 NG/ML (ref 30–400)
FOLATE SERPL-MCNC: 14.2 NG/ML (ref 4.8–24.2)
HCT VFR BLD AUTO: 35.1 % (ref 40.7–50.3)
HGB BLD-MCNC: 10.4 G/DL (ref 13–17)
HIV 1+2 AB+HIV1 P24 AG SERPL QL IA: NONREACTIVE
IMM GRANULOCYTES # BLD AUTO: 0.05 K/UL (ref 0–0.3)
IMM GRANULOCYTES NFR BLD: 1 %
IMM RETICS NFR: 23.4 % (ref 2.7–18.3)
IRON SATN MFR SERPL: 8 % (ref 20–55)
IRON SERPL-MCNC: 32 UG/DL (ref 61–157)
LYMPHOCYTES NFR BLD: 1.25 K/UL (ref 1.1–3.7)
LYMPHOCYTES RELATIVE PERCENT: 16 % (ref 24–43)
MCH RBC QN AUTO: 26.9 PG (ref 25.2–33.5)
MCHC RBC AUTO-ENTMCNC: 29.6 G/DL (ref 28.4–34.8)
MCV RBC AUTO: 90.9 FL (ref 82.6–102.9)
MONOCYTES NFR BLD: 0.86 K/UL (ref 0.1–1.2)
MONOCYTES NFR BLD: 11 % (ref 3–12)
NEUTROPHILS NFR BLD: 66 % (ref 36–65)
NEUTS SEG NFR BLD: 5.21 K/UL (ref 1.5–8.1)
NRBC BLD-RTO: 0 PER 100 WBC
PLATELET # BLD AUTO: 289 K/UL (ref 138–453)
PMV BLD AUTO: 12.8 FL (ref 8.1–13.5)
PSA SERPL-MCNC: 0.5 NG/ML (ref 0–4)
RBC # BLD AUTO: 3.86 M/UL (ref 4.21–5.77)
RBC # BLD: ABNORMAL 10*6/UL
RETIC HEMOGLOBIN: 29.6 PG (ref 28.2–35.7)
RETICS # AUTO: 0.08 M/UL (ref 0.03–0.08)
RETICS/RBC NFR AUTO: 2 % (ref 0.5–1.9)
TIBC SERPL-MCNC: 403 UG/DL (ref 250–450)
UNSATURATED IRON BINDING CAPACITY: 371 UG/DL (ref 112–347)
VIT B12 SERPL-MCNC: 1465 PG/ML (ref 232–1245)
WBC OTHER # BLD: 7.8 K/UL (ref 3.5–11.3)

## 2024-08-01 PROCEDURE — 87389 HIV-1 AG W/HIV-1&-2 AB AG IA: CPT

## 2024-08-01 PROCEDURE — 82607 VITAMIN B-12: CPT

## 2024-08-01 PROCEDURE — 83540 ASSAY OF IRON: CPT

## 2024-08-01 PROCEDURE — 86140 C-REACTIVE PROTEIN: CPT

## 2024-08-01 PROCEDURE — 85652 RBC SED RATE AUTOMATED: CPT

## 2024-08-01 PROCEDURE — 85025 COMPLETE CBC W/AUTO DIFF WBC: CPT

## 2024-08-01 PROCEDURE — 82728 ASSAY OF FERRITIN: CPT

## 2024-08-01 PROCEDURE — 85045 AUTOMATED RETICULOCYTE COUNT: CPT

## 2024-08-01 PROCEDURE — 83550 IRON BINDING TEST: CPT

## 2024-08-01 PROCEDURE — 82746 ASSAY OF FOLIC ACID SERUM: CPT

## 2024-08-01 PROCEDURE — 36415 COLL VENOUS BLD VENIPUNCTURE: CPT

## 2024-08-01 PROCEDURE — 84153 ASSAY OF PSA TOTAL: CPT

## 2024-08-02 LAB
PATH REV BLD -IMP: NORMAL
SURGICAL PATHOLOGY REPORT: NORMAL

## 2024-08-05 ENCOUNTER — HOSPITAL ENCOUNTER (OUTPATIENT)
Age: 74
Setting detail: SPECIMEN
Discharge: HOME | End: 2024-08-05
Payer: MEDICARE

## 2024-08-05 DIAGNOSIS — R63.4 UNEXPLAINED WEIGHT LOSS: ICD-10-CM

## 2024-08-05 LAB
DATE, STOOL #1: NORMAL
DATE, STOOL #2: NORMAL
HEMOCCULT SP1 STL QL: NEGATIVE
TIME, STOOL #1: NORMAL

## 2024-08-05 PROCEDURE — 82272 OCCULT BLD FECES 1-3 TESTS: CPT

## 2024-08-07 ENCOUNTER — PATIENT MESSAGE (OUTPATIENT)
Dept: INTERNAL MEDICINE CLINIC | Age: 74
End: 2024-08-07

## 2024-08-07 DIAGNOSIS — R63.4 UNEXPLAINED WEIGHT LOSS: Primary | ICD-10-CM

## 2024-08-07 NOTE — TELEPHONE ENCOUNTER
From: Rowdy Marie  To: Dr. Yandy Jones  Sent: 8/7/2024 10:50 AM EDT  Subject: Crp test results    Has Ed been tested for Lupus? His older sister has had Lupus for years. I have seen men with Lupus have the dizziness, falling , and episodes of exhaustion.     Thanks  Kye Marie

## 2024-08-08 ENCOUNTER — HOSPITAL ENCOUNTER (OUTPATIENT)
Dept: CT IMAGING | Age: 74
Discharge: HOME | End: 2024-08-10
Attending: INTERNAL MEDICINE
Payer: MEDICARE

## 2024-08-08 DIAGNOSIS — R63.4 UNEXPLAINED WEIGHT LOSS: ICD-10-CM

## 2024-08-08 LAB
EGFR, POC: 63 ML/MIN/1.73M2
POC CREATININE: 1.2 MG/DL (ref 0.51–1.19)

## 2024-08-08 PROCEDURE — 82565 ASSAY OF CREATININE: CPT

## 2024-08-08 PROCEDURE — 71260 CT THORAX DX C+: CPT

## 2024-08-08 PROCEDURE — 6360000004 HC RX CONTRAST MEDICATION: Performed by: INTERNAL MEDICINE

## 2024-08-08 PROCEDURE — 2580000003 HC RX 258: Performed by: INTERNAL MEDICINE

## 2024-08-08 RX ORDER — 0.9 % SODIUM CHLORIDE 0.9 %
100 INTRAVENOUS SOLUTION INTRAVENOUS ONCE
Status: COMPLETED | OUTPATIENT
Start: 2024-08-08 | End: 2024-08-08

## 2024-08-08 RX ORDER — SODIUM CHLORIDE 0.9 % (FLUSH) 0.9 %
10 SYRINGE (ML) INJECTION PRN
Status: DISCONTINUED | OUTPATIENT
Start: 2024-08-08 | End: 2024-08-11 | Stop reason: HOSPADM

## 2024-08-08 RX ADMIN — SODIUM CHLORIDE, PRESERVATIVE FREE 10 ML: 5 INJECTION INTRAVENOUS at 12:14

## 2024-08-08 RX ADMIN — SODIUM CHLORIDE 100 ML: 9 INJECTION, SOLUTION INTRAVENOUS at 12:14

## 2024-08-08 RX ADMIN — IOPAMIDOL 100 ML: 755 INJECTION, SOLUTION INTRAVENOUS at 12:14

## 2024-08-08 NOTE — TELEPHONE ENCOUNTER
I had considered that too  I did want to look at the results of his CT scans before ordering further testing.   I can order a screening test for autoimmune conditions.  Orders placed.   Yandy Jones MD

## 2024-08-09 ENCOUNTER — OFFICE VISIT (OUTPATIENT)
Dept: INTERNAL MEDICINE CLINIC | Age: 74
End: 2024-08-09
Payer: MEDICARE

## 2024-08-09 ENCOUNTER — PATIENT MESSAGE (OUTPATIENT)
Dept: INTERNAL MEDICINE CLINIC | Age: 74
End: 2024-08-09

## 2024-08-09 VITALS
HEIGHT: 71 IN | HEART RATE: 76 BPM | WEIGHT: 153 LBS | BODY MASS INDEX: 21.42 KG/M2 | DIASTOLIC BLOOD PRESSURE: 78 MMHG | OXYGEN SATURATION: 98 % | SYSTOLIC BLOOD PRESSURE: 138 MMHG

## 2024-08-09 DIAGNOSIS — E61.1 IRON DEFICIENCY: Primary | ICD-10-CM

## 2024-08-09 DIAGNOSIS — I48.20 CHRONIC ATRIAL FIBRILLATION (HCC): ICD-10-CM

## 2024-08-09 DIAGNOSIS — R79.82 ELEVATED C-REACTIVE PROTEIN (CRP): ICD-10-CM

## 2024-08-09 PROCEDURE — 1123F ACP DISCUSS/DSCN MKR DOCD: CPT | Performed by: INTERNAL MEDICINE

## 2024-08-09 PROCEDURE — 99213 OFFICE O/P EST LOW 20 MIN: CPT | Performed by: INTERNAL MEDICINE

## 2024-08-09 RX ORDER — FERROUS SULFATE 325(65) MG
325 TABLET ORAL
Qty: 180 TABLET | Refills: 1 | Status: SHIPPED | OUTPATIENT
Start: 2024-08-09

## 2024-08-09 RX ORDER — NYSTATIN 100000 [USP'U]/G
POWDER TOPICAL
Qty: 60 G | Refills: 0 | Status: SHIPPED | OUTPATIENT
Start: 2024-08-09

## 2024-08-09 NOTE — PROGRESS NOTES
Visit Information    Have you changed or started any medications since your last visit including any over-the-counter medicines, vitamins, or herbal medicines? no   Are you having any side effects from any of your medications? -  no  Have you stopped taking any of your medications? Is so, why? -  no    Have you seen any other physician or provider since your last visit? No  Have you had any other diagnostic tests since your last visit? Yes - Records Obtained  Have you been seen in the emergency room and/or had an admission to a hospital since we last saw you? No  Have you had your routine dental cleaning in the past 6 months? no    Have you activated your [employer] account? If not, what are your barriers? Yes     Patient Care Team:  Yandy Jones MD as PCP - General (Internal Medicine)  Yandy Jones MD as PCP - Empaneled Provider    Medical History Review  Past Medical, Family, and Social History reviewed and does contribute to the patient presenting condition    Health Maintenance   Topic Date Due    Hepatitis C screen  Never done    Shingles vaccine (1 of 2) Never done    Respiratory Syncytial Virus (RSV) Pregnant or age 60 yrs+ (1 - 1-dose 60+ series) Never done    Pneumococcal 65+ years Vaccine (2 of 2 - PPSV23 or PCV20) 11/27/2020    COVID-19 Vaccine (3 - 2023-24 season) 09/01/2023    Flu vaccine (1) 08/01/2024    Depression Screen  03/13/2025    Annual Wellness Visit (Medicare)  03/14/2025    Prostate Specific Antigen (PSA) Screening or Monitoring  08/01/2025    Colorectal Cancer Screen  08/05/2025    DTaP/Tdap/Td vaccine (2 - Td or Tdap) 12/22/2026    Lipids  04/12/2029    Hepatitis A vaccine  Aged Out    Hepatitis B vaccine  Aged Out    Hib vaccine  Aged Out    Polio vaccine  Aged Out    Meningococcal (ACWY) vaccine  Aged Out    GFR test (Diabetes, CKD 3-4, OR last GFR 15-59)  Discontinued     SUBJECTIVE:  Rowdy Marie is a 74 y.o. male patient who  comes for complaints of   Chief Complaint   Patient

## 2024-08-14 ENCOUNTER — PATIENT MESSAGE (OUTPATIENT)
Dept: INTERNAL MEDICINE CLINIC | Age: 74
End: 2024-08-14

## 2024-08-14 NOTE — TELEPHONE ENCOUNTER
The spots could be because of that but due to his history of prostate cancer we still need to investigate.  Recommend continue with imaging as ordered

## 2024-08-15 ENCOUNTER — TELEPHONE (OUTPATIENT)
Dept: ONCOLOGY | Age: 74
End: 2024-08-15

## 2024-08-15 ENCOUNTER — OFFICE VISIT (OUTPATIENT)
Dept: ONCOLOGY | Age: 74
End: 2024-08-15
Payer: MEDICARE

## 2024-08-15 VITALS
HEART RATE: 80 BPM | DIASTOLIC BLOOD PRESSURE: 67 MMHG | BODY MASS INDEX: 20.92 KG/M2 | TEMPERATURE: 96.8 F | WEIGHT: 150 LBS | SYSTOLIC BLOOD PRESSURE: 98 MMHG

## 2024-08-15 DIAGNOSIS — D64.9 ANEMIA, UNSPECIFIED TYPE: Primary | ICD-10-CM

## 2024-08-15 DIAGNOSIS — M89.9 BONE LESION: ICD-10-CM

## 2024-08-15 PROCEDURE — 99214 OFFICE O/P EST MOD 30 MIN: CPT | Performed by: INTERNAL MEDICINE

## 2024-08-15 PROCEDURE — 1123F ACP DISCUSS/DSCN MKR DOCD: CPT | Performed by: INTERNAL MEDICINE

## 2024-08-15 NOTE — TELEPHONE ENCOUNTER
AVS from 8/15/24    Lasb in 4 weeks   RTC 5 weeks    Labs ordered today  CBC with Auto Differential  Complete this on or around 8/15/2024.  Comprehensive Metabolic Panel  Complete this on or around 8/15/2024.  Electrophoresis Protein, Serum  Complete this on or around 8/15/2024.  Ferritin  Complete this on or around 8/15/2024.  Immunoglobulin Panel (IgG, IgA, IgM)  Complete this on or around 8/15/2024.  Immunotyping, Serum  Complete this on or around 8/15/2024.  Iron and TIBC  Complete this on or around 8/15/2024.  [place]/Lambda Quantitative Free Light Chains, Serum  Complete this on or around 8/15/2024.    Rv on 9/19/24    Labs drawn week prior     PT was given AVS and appt schedule    Electronically signed by Dorys Bryant on 8/15/2024 at 3:53 PM     Message  Received: Today  MD Kera Baxter  He shouldn't need them but thanks for checking           Previous Messages       ----- Message -----   From: Kera Cabrera   Sent: 4/11/2023   4:18 PM CDT   To: Pedro Gautam MD     Scheduled on 4/14.  Jase doesn't have crutches.  Do you need me to order a pair?

## 2024-08-15 NOTE — PROGRESS NOTES
Patient ID: Rowdy Marie, 1950, [id number], 74 y.o.  Referred by : Yandy Jones MD   Reason for consultation:   Anemia   Iron deficiency  Prostate adenocarcinoma, initial PSA 5.6, biopsy [place] score 3 + 4 = 7 (grade group 2), clinical stage T1c, N0, M0, stage IIB [T1-T2, N0, M0, PSA <20, GG 2] (AJCC 8th ed.), s/p TRUS Random biopsy.   Prostate cancer (C61), 2019 NCCN Risk Group: Favorable Intermediate Risk Group  RADIATION SUMMARY:  Prostate brachytherapy 6/14/2022, I-125, 145 Gy   HISTORY OF PRESENT ILLNESS:    Hematologic History:  Rowdy Marie is a 74 y.o. male with a past medical history of atrial fibrillation, chronic kidney disease, CAD, hyperlipidemia, history of prostate cancer was seen during initial consultation visit for anemia.  Patient was recently admitted to hospital on 6/6/2024 with chest pain.  He had a history of cardiac catheter recently on 5/22/2024 with stent placement in the graft.    Patient recently had lab work with his primary care physician on June 7 which showed hemoglobin of 8.6 and repeat was 9.2 and therefore he was referred to hematology for evaluation of his anemia    His repeat lab work on 7/2/2024 showed slight improvement in hemoglobin to 11.6.  Patient had a Watchman procedure earlier this year.  He is planned to have cardiology follow-up next week.    He denies any dark black stool or blood in stool.  He has a history of GI bleed around January of this year.    Interval history:  Patient is returning for follow visit and to discuss lab results, imaging studies and further commissions.  His PSA appears low at 0.5.  He does have some lower back pain.  His lab work suggest iron deficiency.  He is feeling tired and fatigued.      His CT abdomen pelvis recently showed lytic lesion in his pelvic bones.  Additionally a focus of enhancement noted in the right kidney.    MRI of the abdomen and bone scan has been ordered by his  primary care physician.    He is also

## 2024-08-23 ENCOUNTER — HOSPITAL ENCOUNTER (INPATIENT)
Age: 74
LOS: 4 days | Discharge: HOME HEALTH | DRG: 308 | End: 2024-08-27
Attending: EMERGENCY MEDICINE | Admitting: INTERNAL MEDICINE
Payer: MEDICARE

## 2024-08-23 ENCOUNTER — APPOINTMENT (OUTPATIENT)
Dept: GENERAL RADIOLOGY | Age: 74
DRG: 308 | End: 2024-08-23
Payer: MEDICARE

## 2024-08-23 DIAGNOSIS — I48.91 ATRIAL FIBRILLATION WITH RAPID VENTRICULAR RESPONSE (HCC): Primary | ICD-10-CM

## 2024-08-23 DIAGNOSIS — Z95.820 S/P ANGIOPLASTY WITH STENT: ICD-10-CM

## 2024-08-23 DIAGNOSIS — R07.9 CHEST PAIN, UNSPECIFIED TYPE: ICD-10-CM

## 2024-08-23 LAB
ANION GAP SERPL CALCULATED.3IONS-SCNC: 14 MMOL/L (ref 9–17)
BASOPHILS # BLD: 0.2 K/UL (ref 0–0.2)
BASOPHILS NFR BLD: 1 % (ref 0–2)
BILIRUB UR QL STRIP: NEGATIVE
BNP SERPL-MCNC: 3481 PG/ML
BUN SERPL-MCNC: 25 MG/DL (ref 8–23)
CALCIUM SERPL-MCNC: 8.7 MG/DL (ref 8.6–10.4)
CHLORIDE SERPL-SCNC: 101 MMOL/L (ref 98–107)
CLARITY UR: CLEAR
CO2 SERPL-SCNC: 24 MMOL/L (ref 20–31)
COLOR UR: YELLOW
COMMENT: NORMAL
CREAT SERPL-MCNC: 1.1 MG/DL (ref 0.7–1.2)
EKG ATRIAL RATE: 250 BPM
EKG Q-T INTERVAL: 280 MS
EKG Q-T INTERVAL: 352 MS
EKG QRS DURATION: 104 MS
EKG QRS DURATION: 88 MS
EKG QTC CALCULATION (BAZETT): 413 MS
EKG QTC CALCULATION (BAZETT): 497 MS
EKG R AXIS: -20 DEGREES
EKG R AXIS: 7 DEGREES
EKG T AXIS: 80 DEGREES
EKG T AXIS: 89 DEGREES
EKG VENTRICULAR RATE: 120 BPM
EKG VENTRICULAR RATE: 131 BPM
EOSINOPHIL # BLD: 0.7 K/UL (ref 0–0.4)
EOSINOPHILS RELATIVE PERCENT: 5 % (ref 0–4)
ERYTHROCYTE [DISTWIDTH] IN BLOOD BY AUTOMATED COUNT: 18.6 % (ref 11.5–14.9)
GFR, ESTIMATED: 70 ML/MIN/1.73M2
GLUCOSE SERPL-MCNC: 115 MG/DL (ref 70–99)
GLUCOSE UR STRIP-MCNC: NEGATIVE MG/DL
HCT VFR BLD AUTO: 30.3 % (ref 41–53)
HGB BLD-MCNC: 9.6 G/DL (ref 13.5–17.5)
HGB UR QL STRIP.AUTO: NEGATIVE
INR PPP: 1.1
KETONES UR STRIP-MCNC: NEGATIVE MG/DL
LEUKOCYTE ESTERASE UR QL STRIP: NEGATIVE
LYMPHOCYTES NFR BLD: 1.3 K/UL (ref 1–4.8)
LYMPHOCYTES RELATIVE PERCENT: 9 % (ref 24–44)
MAGNESIUM SERPL-MCNC: 1.7 MG/DL (ref 1.6–2.6)
MCH RBC QN AUTO: 26.8 PG (ref 26–34)
MCHC RBC AUTO-ENTMCNC: 31.6 G/DL (ref 31–37)
MCV RBC AUTO: 84.8 FL (ref 80–100)
MONOCYTES NFR BLD: 0.8 K/UL (ref 0.1–1.3)
MONOCYTES NFR BLD: 6 % (ref 1–7)
MYOGLOBIN SERPL-MCNC: 24 NG/ML (ref 28–72)
MYOGLOBIN SERPL-MCNC: <21 NG/ML (ref 28–72)
NEUTROPHILS NFR BLD: 79 % (ref 36–66)
NEUTS SEG NFR BLD: 11.4 K/UL (ref 1.3–9.1)
NITRITE UR QL STRIP: NEGATIVE
PARTIAL THROMBOPLASTIN TIME: 38.7 SEC (ref 24–36)
PH UR STRIP: 6.5 [PH] (ref 5–8)
PLATELET # BLD AUTO: 269 K/UL (ref 150–450)
PMV BLD AUTO: 9.9 FL (ref 6–12)
POTASSIUM SERPL-SCNC: 3.5 MMOL/L (ref 3.7–5.3)
PROT UR STRIP-MCNC: NEGATIVE MG/DL
PROTHROMBIN TIME: 14.5 SEC (ref 11.8–14.6)
RBC # BLD AUTO: 3.58 M/UL (ref 4.5–5.9)
SODIUM SERPL-SCNC: 139 MMOL/L (ref 135–144)
SP GR UR STRIP: 1.02 (ref 1–1.03)
T4 FREE SERPL-MCNC: 1.1 NG/DL (ref 0.9–1.7)
TROPONIN I SERPL HS-MCNC: 29 NG/L (ref 0–22)
TROPONIN I SERPL HS-MCNC: 29 NG/L (ref 0–22)
TSH SERPL DL<=0.05 MIU/L-ACNC: 2.72 UIU/ML (ref 0.3–5)
UROBILINOGEN UR STRIP-ACNC: NORMAL EU/DL (ref 0–1)
WBC OTHER # BLD: 14.4 K/UL (ref 3.5–11)

## 2024-08-23 PROCEDURE — 2580000003 HC RX 258: Performed by: NURSE PRACTITIONER

## 2024-08-23 PROCEDURE — 83874 ASSAY OF MYOGLOBIN: CPT

## 2024-08-23 PROCEDURE — 6360000002 HC RX W HCPCS: Performed by: INTERNAL MEDICINE

## 2024-08-23 PROCEDURE — 84484 ASSAY OF TROPONIN QUANT: CPT

## 2024-08-23 PROCEDURE — 83735 ASSAY OF MAGNESIUM: CPT

## 2024-08-23 PROCEDURE — 2500000003 HC RX 250 WO HCPCS: Performed by: EMERGENCY MEDICINE

## 2024-08-23 PROCEDURE — 85610 PROTHROMBIN TIME: CPT

## 2024-08-23 PROCEDURE — 36415 COLL VENOUS BLD VENIPUNCTURE: CPT

## 2024-08-23 PROCEDURE — 93005 ELECTROCARDIOGRAM TRACING: CPT | Performed by: EMERGENCY MEDICINE

## 2024-08-23 PROCEDURE — 96365 THER/PROPH/DIAG IV INF INIT: CPT

## 2024-08-23 PROCEDURE — 2000000000 HC ICU R&B

## 2024-08-23 PROCEDURE — 85025 COMPLETE CBC W/AUTO DIFF WBC: CPT

## 2024-08-23 PROCEDURE — 81003 URINALYSIS AUTO W/O SCOPE: CPT

## 2024-08-23 PROCEDURE — 83880 ASSAY OF NATRIURETIC PEPTIDE: CPT

## 2024-08-23 PROCEDURE — 99223 1ST HOSP IP/OBS HIGH 75: CPT | Performed by: INTERNAL MEDICINE

## 2024-08-23 PROCEDURE — 6360000002 HC RX W HCPCS: Performed by: NURSE PRACTITIONER

## 2024-08-23 PROCEDURE — 84443 ASSAY THYROID STIM HORMONE: CPT

## 2024-08-23 PROCEDURE — 80048 BASIC METABOLIC PNL TOTAL CA: CPT

## 2024-08-23 PROCEDURE — 71045 X-RAY EXAM CHEST 1 VIEW: CPT

## 2024-08-23 PROCEDURE — 6370000000 HC RX 637 (ALT 250 FOR IP): Performed by: NURSE PRACTITIONER

## 2024-08-23 PROCEDURE — 84439 ASSAY OF FREE THYROXINE: CPT

## 2024-08-23 PROCEDURE — 6370000000 HC RX 637 (ALT 250 FOR IP): Performed by: INTERNAL MEDICINE

## 2024-08-23 PROCEDURE — 96374 THER/PROPH/DIAG INJ IV PUSH: CPT

## 2024-08-23 PROCEDURE — 85730 THROMBOPLASTIN TIME PARTIAL: CPT

## 2024-08-23 PROCEDURE — 99285 EMERGENCY DEPT VISIT HI MDM: CPT

## 2024-08-23 RX ORDER — POLYETHYLENE GLYCOL 3350 17 G/17G
17 POWDER, FOR SOLUTION ORAL DAILY PRN
Status: DISCONTINUED | OUTPATIENT
Start: 2024-08-23 | End: 2024-08-27 | Stop reason: HOSPADM

## 2024-08-23 RX ORDER — POTASSIUM CHLORIDE 1500 MG/1
40 TABLET, EXTENDED RELEASE ORAL PRN
Status: DISCONTINUED | OUTPATIENT
Start: 2024-08-23 | End: 2024-08-27 | Stop reason: HOSPADM

## 2024-08-23 RX ORDER — MAGNESIUM SULFATE 1 G/100ML
1000 INJECTION INTRAVENOUS ONCE
Status: DISCONTINUED | OUTPATIENT
Start: 2024-08-23 | End: 2024-08-23

## 2024-08-23 RX ORDER — METOPROLOL TARTRATE 1 MG/ML
5 INJECTION, SOLUTION INTRAVENOUS ONCE
Status: COMPLETED | OUTPATIENT
Start: 2024-08-23 | End: 2024-08-23

## 2024-08-23 RX ORDER — MIDODRINE HYDROCHLORIDE 2.5 MG/1
2.5 TABLET ORAL 3 TIMES DAILY PRN
Status: DISCONTINUED | OUTPATIENT
Start: 2024-08-23 | End: 2024-08-24

## 2024-08-23 RX ORDER — MIRTAZAPINE 15 MG/1
7.5 TABLET, FILM COATED ORAL NIGHTLY
Status: DISCONTINUED | OUTPATIENT
Start: 2024-08-23 | End: 2024-08-27 | Stop reason: HOSPADM

## 2024-08-23 RX ORDER — FERROUS SULFATE 325(65) MG
325 TABLET ORAL
Status: DISCONTINUED | OUTPATIENT
Start: 2024-08-23 | End: 2024-08-27 | Stop reason: HOSPADM

## 2024-08-23 RX ORDER — ASPIRIN 81 MG/1
81 TABLET ORAL DAILY
Status: DISCONTINUED | OUTPATIENT
Start: 2024-08-23 | End: 2024-08-27 | Stop reason: HOSPADM

## 2024-08-23 RX ORDER — FLUDROCORTISONE ACETATE 0.1 MG/1
0.1 TABLET ORAL 2 TIMES DAILY
Status: DISCONTINUED | OUTPATIENT
Start: 2024-08-23 | End: 2024-08-26

## 2024-08-23 RX ORDER — METOPROLOL SUCCINATE 25 MG/1
12.5 TABLET, EXTENDED RELEASE ORAL DAILY
Status: DISCONTINUED | OUTPATIENT
Start: 2024-08-23 | End: 2024-08-23

## 2024-08-23 RX ORDER — NITROGLYCERIN 40 MG/1
1 PATCH TRANSDERMAL DAILY
Status: DISCONTINUED | OUTPATIENT
Start: 2024-08-23 | End: 2024-08-27 | Stop reason: HOSPADM

## 2024-08-23 RX ORDER — SODIUM CHLORIDE 0.9 % (FLUSH) 0.9 %
10 SYRINGE (ML) INJECTION PRN
Status: DISCONTINUED | OUTPATIENT
Start: 2024-08-23 | End: 2024-08-27 | Stop reason: HOSPADM

## 2024-08-23 RX ORDER — ONDANSETRON 2 MG/ML
4 INJECTION INTRAMUSCULAR; INTRAVENOUS EVERY 6 HOURS PRN
Status: DISCONTINUED | OUTPATIENT
Start: 2024-08-23 | End: 2024-08-27 | Stop reason: HOSPADM

## 2024-08-23 RX ORDER — PREGABALIN 150 MG/1
150 CAPSULE ORAL 2 TIMES DAILY
Status: DISCONTINUED | OUTPATIENT
Start: 2024-08-23 | End: 2024-08-27 | Stop reason: HOSPADM

## 2024-08-23 RX ORDER — ENOXAPARIN SODIUM 100 MG/ML
40 INJECTION SUBCUTANEOUS DAILY
Status: DISCONTINUED | OUTPATIENT
Start: 2024-08-23 | End: 2024-08-27 | Stop reason: HOSPADM

## 2024-08-23 RX ORDER — ACETAMINOPHEN 650 MG/1
650 SUPPOSITORY RECTAL EVERY 6 HOURS PRN
Status: DISCONTINUED | OUTPATIENT
Start: 2024-08-23 | End: 2024-08-27 | Stop reason: HOSPADM

## 2024-08-23 RX ORDER — UBIDECARENONE 100 MG
100 CAPSULE ORAL DAILY
Status: DISCONTINUED | OUTPATIENT
Start: 2024-08-23 | End: 2024-08-27 | Stop reason: HOSPADM

## 2024-08-23 RX ORDER — LANOLIN ALCOHOL/MO/W.PET/CERES
3 CREAM (GRAM) TOPICAL NIGHTLY PRN
Status: DISCONTINUED | OUTPATIENT
Start: 2024-08-23 | End: 2024-08-27 | Stop reason: HOSPADM

## 2024-08-23 RX ORDER — POTASSIUM CHLORIDE 7.45 MG/ML
10 INJECTION INTRAVENOUS PRN
Status: DISCONTINUED | OUTPATIENT
Start: 2024-08-23 | End: 2024-08-27 | Stop reason: HOSPADM

## 2024-08-23 RX ORDER — SODIUM CHLORIDE 0.9 % (FLUSH) 0.9 %
5-40 SYRINGE (ML) INJECTION EVERY 12 HOURS SCHEDULED
Status: DISCONTINUED | OUTPATIENT
Start: 2024-08-23 | End: 2024-08-27 | Stop reason: HOSPADM

## 2024-08-23 RX ORDER — SODIUM CHLORIDE 9 MG/ML
INJECTION, SOLUTION INTRAVENOUS PRN
Status: DISCONTINUED | OUTPATIENT
Start: 2024-08-23 | End: 2024-08-27 | Stop reason: HOSPADM

## 2024-08-23 RX ORDER — MAGNESIUM SULFATE 1 G/100ML
1000 INJECTION INTRAVENOUS ONCE
Status: COMPLETED | OUTPATIENT
Start: 2024-08-23 | End: 2024-08-24

## 2024-08-23 RX ORDER — LEVOTHYROXINE SODIUM 75 UG/1
75 TABLET ORAL DAILY
Status: DISCONTINUED | OUTPATIENT
Start: 2024-08-23 | End: 2024-08-27 | Stop reason: HOSPADM

## 2024-08-23 RX ORDER — ONDANSETRON 4 MG/1
4 TABLET, ORALLY DISINTEGRATING ORAL EVERY 8 HOURS PRN
Status: DISCONTINUED | OUTPATIENT
Start: 2024-08-23 | End: 2024-08-27 | Stop reason: HOSPADM

## 2024-08-23 RX ORDER — CARVEDILOL 6.25 MG/1
6.25 TABLET ORAL 2 TIMES DAILY WITH MEALS
Status: DISCONTINUED | OUTPATIENT
Start: 2024-08-23 | End: 2024-08-23

## 2024-08-23 RX ORDER — CARBIDOPA AND LEVODOPA 25; 100 MG/1; MG/1
1 TABLET ORAL 3 TIMES DAILY
Status: DISCONTINUED | OUTPATIENT
Start: 2024-08-23 | End: 2024-08-27 | Stop reason: HOSPADM

## 2024-08-23 RX ORDER — ACETAMINOPHEN 325 MG/1
650 TABLET ORAL EVERY 6 HOURS PRN
Status: DISCONTINUED | OUTPATIENT
Start: 2024-08-23 | End: 2024-08-27 | Stop reason: HOSPADM

## 2024-08-23 RX ORDER — POTASSIUM CHLORIDE 1500 MG/1
40 TABLET, EXTENDED RELEASE ORAL
Status: COMPLETED | OUTPATIENT
Start: 2024-08-23 | End: 2024-08-23

## 2024-08-23 RX ORDER — FUROSEMIDE 10 MG/ML
40 INJECTION INTRAMUSCULAR; INTRAVENOUS ONCE
Status: COMPLETED | OUTPATIENT
Start: 2024-08-23 | End: 2024-08-23

## 2024-08-23 RX ORDER — AMIODARONE HYDROCHLORIDE 200 MG/1
200 TABLET ORAL 2 TIMES DAILY
Status: DISCONTINUED | OUTPATIENT
Start: 2024-08-24 | End: 2024-08-26

## 2024-08-23 RX ORDER — RANOLAZINE 500 MG/1
1000 TABLET, EXTENDED RELEASE ORAL 2 TIMES DAILY
Status: DISCONTINUED | OUTPATIENT
Start: 2024-08-23 | End: 2024-08-27 | Stop reason: HOSPADM

## 2024-08-23 RX ORDER — PANTOPRAZOLE SODIUM 40 MG/1
40 TABLET, DELAYED RELEASE ORAL
Status: DISCONTINUED | OUTPATIENT
Start: 2024-08-23 | End: 2024-08-27 | Stop reason: HOSPADM

## 2024-08-23 RX ORDER — TRAMADOL HYDROCHLORIDE 50 MG/1
50 TABLET ORAL EVERY 8 HOURS PRN
Status: DISCONTINUED | OUTPATIENT
Start: 2024-08-23 | End: 2024-08-27 | Stop reason: HOSPADM

## 2024-08-23 RX ORDER — MAGNESIUM SULFATE HEPTAHYDRATE 40 MG/ML
2000 INJECTION, SOLUTION INTRAVENOUS PRN
Status: DISCONTINUED | OUTPATIENT
Start: 2024-08-23 | End: 2024-08-27 | Stop reason: HOSPADM

## 2024-08-23 RX ORDER — CARVEDILOL 6.25 MG/1
6.25 TABLET ORAL 2 TIMES DAILY WITH MEALS
Status: DISCONTINUED | OUTPATIENT
Start: 2024-08-23 | End: 2024-08-26

## 2024-08-23 RX ORDER — BISACODYL 10 MG
10 SUPPOSITORY, RECTAL RECTAL DAILY PRN
Status: DISCONTINUED | OUTPATIENT
Start: 2024-08-23 | End: 2024-08-27 | Stop reason: HOSPADM

## 2024-08-23 RX ORDER — SUCRALFATE 1 G/1
1 TABLET ORAL 4 TIMES DAILY
Status: DISCONTINUED | OUTPATIENT
Start: 2024-08-23 | End: 2024-08-27 | Stop reason: HOSPADM

## 2024-08-23 RX ORDER — SODIUM CHLORIDE 9 MG/ML
INJECTION, SOLUTION INTRAVENOUS CONTINUOUS
Status: DISCONTINUED | OUTPATIENT
Start: 2024-08-23 | End: 2024-08-23

## 2024-08-23 RX ORDER — MECLIZINE HYDROCHLORIDE 25 MG/1
25 TABLET ORAL 3 TIMES DAILY PRN
Status: DISCONTINUED | OUTPATIENT
Start: 2024-08-23 | End: 2024-08-27 | Stop reason: HOSPADM

## 2024-08-23 RX ADMIN — RANOLAZINE 1000 MG: 500 TABLET, EXTENDED RELEASE ORAL at 09:15

## 2024-08-23 RX ADMIN — SUCRALFATE 1 G: 1 TABLET ORAL at 20:42

## 2024-08-23 RX ADMIN — AMIODARONE HYDROCHLORIDE 0.5 MG/MIN: 1.8 INJECTION, SOLUTION INTRAVENOUS at 17:49

## 2024-08-23 RX ADMIN — FERROUS SULFATE TAB 325 MG (65 MG ELEMENTAL FE) 325 MG: 325 (65 FE) TAB at 09:15

## 2024-08-23 RX ADMIN — CARVEDILOL 6.25 MG: 6.25 TABLET, FILM COATED ORAL at 13:49

## 2024-08-23 RX ADMIN — MIRTAZAPINE 7.5 MG: 15 TABLET, FILM COATED ORAL at 20:42

## 2024-08-23 RX ADMIN — DOCUSATE SODIUM LIQUID 50 MG: 100 LIQUID ORAL at 20:40

## 2024-08-23 RX ADMIN — SUCRALFATE 1 G: 1 TABLET ORAL at 09:16

## 2024-08-23 RX ADMIN — FUROSEMIDE 40 MG: 10 INJECTION, SOLUTION INTRAMUSCULAR; INTRAVENOUS at 20:35

## 2024-08-23 RX ADMIN — SUCRALFATE 1 G: 1 TABLET ORAL at 18:03

## 2024-08-23 RX ADMIN — MAGNESIUM SULFATE HEPTAHYDRATE 1000 MG: 1 INJECTION, SOLUTION INTRAVENOUS at 23:02

## 2024-08-23 RX ADMIN — ASPIRIN 81 MG: 81 TABLET, COATED ORAL at 09:14

## 2024-08-23 RX ADMIN — CARBIDOPA AND LEVODOPA 1 TABLET: 25; 100 TABLET ORAL at 09:15

## 2024-08-23 RX ADMIN — SODIUM CHLORIDE, PRESERVATIVE FREE 10 ML: 5 INJECTION INTRAVENOUS at 20:36

## 2024-08-23 RX ADMIN — SUCRALFATE 1 G: 1 TABLET ORAL at 13:49

## 2024-08-23 RX ADMIN — SODIUM CHLORIDE, PRESERVATIVE FREE 10 ML: 5 INJECTION INTRAVENOUS at 09:15

## 2024-08-23 RX ADMIN — ACETAMINOPHEN 650 MG: 325 TABLET ORAL at 22:49

## 2024-08-23 RX ADMIN — AMIODARONE HYDROCHLORIDE 150 MG: 1.5 INJECTION, SOLUTION INTRAVENOUS at 03:53

## 2024-08-23 RX ADMIN — FLUDROCORTISONE ACETATE 0.1 MG: 0.1 TABLET ORAL at 20:43

## 2024-08-23 RX ADMIN — CARVEDILOL 6.25 MG: 6.25 TABLET, FILM COATED ORAL at 20:48

## 2024-08-23 RX ADMIN — AMIODARONE HYDROCHLORIDE 0.5 MG/MIN: 1.8 INJECTION, SOLUTION INTRAVENOUS at 10:00

## 2024-08-23 RX ADMIN — CARBIDOPA AND LEVODOPA 1 TABLET: 25; 100 TABLET ORAL at 13:49

## 2024-08-23 RX ADMIN — SODIUM CHLORIDE: 9 INJECTION, SOLUTION INTRAVENOUS at 23:00

## 2024-08-23 RX ADMIN — Medication 100 MG: at 20:43

## 2024-08-23 RX ADMIN — RANOLAZINE 1000 MG: 500 TABLET, EXTENDED RELEASE ORAL at 20:44

## 2024-08-23 RX ADMIN — PREGABALIN 150 MG: 150 CAPSULE ORAL at 20:42

## 2024-08-23 RX ADMIN — PREGABALIN 150 MG: 150 CAPSULE ORAL at 09:14

## 2024-08-23 RX ADMIN — ENOXAPARIN SODIUM 40 MG: 100 INJECTION SUBCUTANEOUS at 17:14

## 2024-08-23 RX ADMIN — METOPROLOL TARTRATE 5 MG: 1 INJECTION, SOLUTION INTRAVENOUS at 05:23

## 2024-08-23 RX ADMIN — POTASSIUM CHLORIDE 40 MEQ: 1500 TABLET, EXTENDED RELEASE ORAL at 09:14

## 2024-08-23 RX ADMIN — AMIODARONE HYDROCHLORIDE 1 MG/MIN: 1.8 INJECTION, SOLUTION INTRAVENOUS at 04:05

## 2024-08-23 RX ADMIN — CARBIDOPA AND LEVODOPA 1 TABLET: 25; 100 TABLET ORAL at 20:42

## 2024-08-23 RX ADMIN — Medication 3 MG: at 22:49

## 2024-08-23 RX ADMIN — FERROUS SULFATE TAB 325 MG (65 MG ELEMENTAL FE) 325 MG: 325 (65 FE) TAB at 13:48

## 2024-08-23 RX ADMIN — TICAGRELOR 90 MG: 90 TABLET ORAL at 09:17

## 2024-08-23 RX ADMIN — FERROUS SULFATE TAB 325 MG (65 MG ELEMENTAL FE) 325 MG: 325 (65 FE) TAB at 18:02

## 2024-08-23 RX ADMIN — SODIUM CHLORIDE: 9 INJECTION, SOLUTION INTRAVENOUS at 09:14

## 2024-08-23 RX ADMIN — TICAGRELOR 90 MG: 90 TABLET ORAL at 20:44

## 2024-08-23 RX ADMIN — FUROSEMIDE 40 MG: 10 INJECTION, SOLUTION INTRAMUSCULAR; INTRAVENOUS at 16:17

## 2024-08-23 ASSESSMENT — PAIN SCALES - GENERAL
PAINLEVEL_OUTOF10: 8
PAINLEVEL_OUTOF10: 5
PAINLEVEL_OUTOF10: 4

## 2024-08-23 ASSESSMENT — PAIN DESCRIPTION - FREQUENCY: FREQUENCY: CONTINUOUS

## 2024-08-23 ASSESSMENT — PAIN DESCRIPTION - ORIENTATION
ORIENTATION: MID;LOWER
ORIENTATION: MID

## 2024-08-23 ASSESSMENT — PAIN DESCRIPTION - LOCATION
LOCATION: BACK;CHEST
LOCATION: HEAD

## 2024-08-23 ASSESSMENT — PAIN DESCRIPTION - PAIN TYPE: TYPE: CHRONIC PAIN;ACUTE PAIN

## 2024-08-23 ASSESSMENT — PAIN DESCRIPTION - DESCRIPTORS
DESCRIPTORS: ACHING
DESCRIPTORS: ACHING

## 2024-08-23 ASSESSMENT — PAIN DESCRIPTION - ONSET: ONSET: ON-GOING

## 2024-08-23 ASSESSMENT — PAIN - FUNCTIONAL ASSESSMENT: PAIN_FUNCTIONAL_ASSESSMENT: PREVENTS OR INTERFERES SOME ACTIVE ACTIVITIES AND ADLS

## 2024-08-23 NOTE — ACP (ADVANCE CARE PLANNING)
Advance Care Planning     Advance Care Planning Activator (Inpatient)  Conversation Note      Date of ACP Conversation: 8/23/2024     Conversation Conducted with: Patient with Decision Making Capacity    ACP Activator: Sabina Lucas RN        Health Care Decision Maker:     Current Designated Health Care Decision Maker:     Primary Decision Maker: Elliott Marie - Child - [phone]    Primary Decision Maker: FrankAlek - Child    Today we documented Decision Maker(s) consistent with Legal Next of Kin hierarchy.    Care Preferences    Ventilation:  \"If you were in your present state of health and suddenly became very ill and were unable to breathe on your own, what would your preference be about the use of a ventilator (breathing machine) if it were available to you?\"      Would the patient desire the use of ventilator (breathing machine)?: yes    \"If your health worsens and it becomes clear that your chance of recovery is unlikely, what would your preference be about the use of a ventilator (breathing machine) if it were available to you?\"     Would the patient desire the use of ventilator (breathing machine)?: No      Resuscitation  \"CPR works best to restart the heart when there is a sudden event, like a heart attack, in someone who is otherwise healthy. Unfortunately, CPR does not typically restart the heart for people who have serious health conditions or who are very sick.\"    \"In the event your heart stopped as a result of an underlying serious health condition, would you want attempts to be made to restart your heart (answer \"yes\" for attempt to resuscitate) or would you prefer a natural death (answer \"no\" for do not attempt to resuscitate)?\" yes       [] Yes   [] No   Educated Patient / Decision Maker regarding differences between Advance Directives and portable DNR orders.    Length of ACP Conversation in minutes:      Conversation Outcomes:  ACP discussion completed    Follow-up plan:    [] Schedule  follow-up conversation to continue planning  [] Referred individual to Provider for additional questions/concerns   [] Advised patient/agent/surrogate to review completed ACP document and update if needed with changes in condition, patient preferences or care setting    [] This note routed to one or more involved healthcare providers

## 2024-08-23 NOTE — PROGRESS NOTES
Dr. Ladd still on the floor and updated that patient's HR is now in mid 40's. Order to hold evening dose of carvedilol received.

## 2024-08-23 NOTE — PROGRESS NOTES
08/23/24 1228   Encounter Summary   Encounter Overview/Reason Attempted Encounter  (Pt sleeping)

## 2024-08-23 NOTE — PROGRESS NOTES
Dr. Ladd notified that patient is c/o of extreme SOB and bilateral crackles. Order to d/c amio drip.     1830 patient asks to see Dr. Ladd. Dr. Ladd in room and assesses patient. Crackles in lungs noted. Order to transfer patient to icu bed and to give one time dose 40mg IV lasix at 1900.

## 2024-08-23 NOTE — PROGRESS NOTES
Spiritual Health Assessment/Progress Note  [facility]    Spiritual/Emotional Needs,  ,  ,      Name: Rowdy Marie MRN: [id number]    Age: 74 y.o.     Sex: male   Language: English   Denominational: [employer]   Atrial fibrillation with rapid ventricular response (HCC)     Date: 8/23/2024                           Spiritual Assessment began in [facility] [place] PROGRESSIVE CARE        Referral/Consult From: Rounding   Encounter Overview/Reason: Spiritual/Emotional Needs  Service Provided For: Patient    Pt shared his medical situation as well as the fact he lost his wife almost a year ago. While he misses her terribly, pt is also thankful she is no long suffering and he is assured of seeing her again by his tiffany perspective. Pt lives with his son, DIL, and grandson and is well supported with a wonderful attitude about life.    Tiffany, Belief, Meaning:   Patient identifies as spiritual and has beliefs or practices that help with coping during difficult times  Family/Friends No family/friends present      Importance and Influence:  Patient has spiritual/personal beliefs that influence decisions regarding their health  Family/Friends no family/friends present    Community:  Patient feels well-supported. Support system includes: Children  Family/Friends Other: no family/friends present    Assessment and Plan of Care:     Patient Interventions include: Facilitated expression of thoughts and feelings, Explored spiritual coping/struggle/distress and theological reflection, Affirmed coping skills/support systems, and Provided sacramental/Gnosticist ritual  Family/Friends Interventions include: Other: no family/friends present    Patient Plan of Care: Spiritual Care available upon further referral  Family/Friends Plan of Care: Other: no family/friends present    Electronically signed by Chaplain Nevin on 8/23/2024 at 5:55 PM

## 2024-08-23 NOTE — DISCHARGE INSTR - COC
Continuity of Care Form    Patient Name: Rowdy Marie   :  1950  MRN:  [id number]    Admit date:  2024  Discharge date:  2024    Code Status Order: Full Code   Advance Directives:   Advance Care Flowsheet Documentation             Admitting Physician:  Ian Bautista MD  PCP: Yandy Jones MD    Discharging [facility]  Discharging Unit Phone Number: [phone]    Emergency Contact:   Extended Emergency Contact Information  Primary Emergency Contact: Elliott Marie  Home Phone: [phone]  Relation: Child  Secondary Emergency Contact: Marie,Dan  Relation: Child    Past Surgical History:  Past Surgical History:   Procedure Laterality Date    CARDIAC CATHETERIZATION      CARDIAC PROCEDURE N/A 2024    sunny / Left heart cath / coronary angiography / op scmh performed by Andree Paulino MD at [facility] CARDIAC CATH LAB    CARDIAC PROCEDURE N/A 2024    Percutaneous coronary intervention performed by Andree Paulino MD at [facility] CARDIAC CATH LAB    CARDIAC PROCEDURE N/A 2024    taleb / Left heart cath / coronary angiography / op scmh performed by Antonio Love MD at [facility] CARDIAC CATH LAB    CARDIAC PROCEDURE N/A 2024    Percutaneous coronary intervention performed by Antonio Love MD at [facility] CARDIAC CATH LAB    CORONARY ANGIOPLASTY WITH STENT PLACEMENT      8 stents prior to CABG per patient, most recent 2023    CORONARY ARTERY BYPASS GRAFT          DIAGNOSTIC CARDIAC CATH LAB PROCEDURE  2024       Immunization History:   Immunization History   Administered Date(s) Administered    COVID-19, J&J, (age 18y+), IM, 0.5 mL 2021, 2022       Active Problems:  Patient Active Problem List   Diagnosis Code    Hypotension I95.9    Mild anemia D64.9    Chronic atrial fibrillation (HCC) I48.20    Coronary artery disease involving coronary bypass graft of native heart without angina pectoris I25.810    S/P CABG

## 2024-08-23 NOTE — PROGRESS NOTES
RN spoke with Dr. Ladd via telephone. MD ordered amiodarone 200mg BID and gave the following paramters for Coreg: if hr >60 and sbp >120 coreg should be given.

## 2024-08-23 NOTE — ED NOTES
Patient arrived via EMS squad from home this morning with chief complaints of chest pain, palpitations, and SOB. Patient applied nitro patch, and found no pain relief. Patient has hx of CAD/ bypass sx/ Afib. Patient is A&O X4 upon arrival and answering all questions appropriately at this time.

## 2024-08-23 NOTE — ED PROVIDER NOTES
eMERGENCY dEPARTMENT eNCOUnter      Pt Name: Rowdy Marie  MRN: [id number]  Birthdate 1950  Date of evaluation: 8/23/24      CHIEF COMPLAINT       Chief Complaint   Patient presents with    Chest Pain         HISTORY OF PRESENT ILLNESS    Rowdy Marie is a 74 y.o. male who presents complaining of chest pain.  Patient is coming in by EMS because he started having chest pain around 1230 this morning and it just has not gone away even after he put on a nitro patch.  Patient has a history of CAD and bypass surgery.  Patient is also has a history of atrial fibrillation but does not appear that he is on anticoagulation.  Patient states he sometimes goes into it but usually last less than 30 minutes but this 1 has not gone away.  Patient denies any pain or swelling in the legs.  Patient states he has had a cough today.  No sputum production no fevers.      REVIEW OF SYSTEMS       Review of Systems   Constitutional:  Negative for activity change, appetite change, chills, diaphoresis and fever.   HENT:  Negative for congestion, ear pain, facial swelling, nosebleeds, rhinorrhea, sinus pressure, sore throat and trouble swallowing.    Eyes:  Negative for pain, discharge and redness.   Respiratory:  Positive for cough. Negative for chest tightness, shortness of breath and wheezing.    Cardiovascular:  Positive for chest pain and palpitations. Negative for leg swelling.   Gastrointestinal:  Negative for abdominal pain, blood in stool, constipation, diarrhea, nausea and vomiting.   Genitourinary:  Negative for difficulty urinating, dysuria, flank pain, frequency, genital sores and hematuria.   Musculoskeletal:  Negative for arthralgias, back pain, gait problem, joint swelling, myalgias and neck pain.   Skin:  Negative for color change, pallor, rash and wound.   Neurological:  Negative for dizziness, tremors, seizures, syncope, speech difficulty, weakness, numbness and headaches.   Psychiatric/Behavioral:  Negative for  confusion, decreased concentration, hallucinations, self-injury, sleep disturbance and suicidal ideas.        PAST MEDICAL HISTORY     Past Medical History:   Diagnosis Date    A-fib ([facility])     PREET (acute kidney injury) ([facility])     Anemia     CAD (coronary artery disease)     Esophageal stricture     Hyperlipidemia     Hypokalemia     Hypomagnesemia     NSTEMI (non-ST elevated myocardial infarction) ([facility])     Orthostatic hypotension     Parkinsons ([facility])     Presence of Watchman left atrial appendage closure device     Prostate CA ([facility])        SURGICAL HISTORY       Past Surgical History:   Procedure Laterality Date    CARDIAC CATHETERIZATION      CARDIAC PROCEDURE N/A 4/11/2024    sunny / Left heart cath / coronary angiography / op scmh performed by Andree Paulino MD at [facility] CARDIAC CATH LAB    CARDIAC PROCEDURE N/A 4/11/2024    Percutaneous coronary intervention performed by Andree Paulino MD at [facility] CARDIAC CATH LAB    CARDIAC PROCEDURE N/A 5/22/2024    talmaura / Left heart cath / coronary angiography / op scmh performed by Antonio Love MD at [facility] CARDIAC CATH LAB    CARDIAC PROCEDURE N/A 5/22/2024    Percutaneous coronary intervention performed by Antonio Love MD at [facility] CARDIAC CATH LAB    CORONARY ANGIOPLASTY WITH STENT PLACEMENT      8 stents prior to CABG per patient, most recent 12/2023    CORONARY ARTERY BYPASS GRAFT      2014    DIAGNOSTIC CARDIAC CATH LAB PROCEDURE  04/11/2024       CURRENT MEDICATIONS       Previous Medications    ASPIRIN 81 MG EC TABLET    Take 1 tablet by mouth daily    CARBIDOPA-LEVODOPA (SINEMET)  MG PER TABLET    Take 1 tablet by mouth 3 times daily    COENZYME Q10 100 MG CAPS CAPSULE    Take 1 capsule by mouth daily    COMPRESSION STOCKINGS MISC    by Does not apply route    COMPRESSION STOCKINGS MISC    by Does not apply route    DOCUSATE SODIUM (COLACE) 50 MG CAPSULE    Take 1 capsule by mouth at bedtime    ESOMEPRAZOLE (NEXIUM) 20 MG DELAYED RELEASE CAPSULE    Take

## 2024-08-23 NOTE — ED NOTES
Report given to STAS Burgos from [facility].   Report method by phone   The following was reviewed with receiving RN:   Current vital signs:  /80   Pulse 98   Temp 98.4 °F (36.9 °C) (Oral)   Resp (!) 31   Ht 1.803 m (5' 11\")   Wt 68 kg (150 lb)   SpO2 96%   BMI 20.92 kg/m²                MEWS Score: 3     Any medication or safety alerts were reviewed. Any pending diagnostics and notifications were also reviewed, as well as any safety concerns or issues, abnormal labs, abnormal imaging, and abnormal assessment findings. Questions were answered.

## 2024-08-23 NOTE — CONSULTS
Date:   8/23/2024  Patient name: Rowdy Marie  Date of admission:  8/23/2024  3:35 AM  MRN:   [id number]  YOB: 1950  PCP: Yandy Jones MD    Reason for Admission: Atrial fibrillation with rapid ventricular response (HCC) [I48.91]  Chest pain, unspecified type [R07.9]    Cardiology consult: A-fib with RVR, multivessel CAD, chest pain, congestive heart failure diastolic acute on chronic       Referring physician Dr. Aracely Pierre    Cardiology consult: Chest pain, multivessel coronary artery disease                             Impression     Admission 6/6/2024 chest pain like heaviness he woke up with the chest pain left upper chest different from his previous admission 5/22/2024 started on IV nitroglycerin in ER with improvement, high-sensitivity troponin 20, no obvious ischemic ECG change     Admission 5/20/2024 left-sided chest pain radiating to the left arm and to the neck, no acute ECG changes, high-sensitivity troponin 29, 25, 23, proBNP 1550  Cardiac cath 5/22/2024 right femoral artery access  Patent LIMA to LAD distal LAD disease not amenable for PCI, patent SVG to diagonal with a patent stent, patent SVG to RPDA, 80% stenosis of SVG to OM and reduced to 0% BRENDA by Dr. Love     Admission 4/10/2024 for left chest pain radiating to left shoulder aggravating with the inspiration and shoulder movements almost constant in nature for more than 2 days  ECG showed no ischemic changes high-sensitivity troponin trending upward  Cardiac cath 4/11/2024  Multivessel CAD  Patent LIMA to the LAD with moderate nonobstructive disease in LAD post distal anastomosis  Patent SVG to OM 2 with patent stents  Patent SVG to RPDA  Successful PCI with BRENDA to ostial and mid SVG to diagonal.  Overall preserved LV function    Admission 1/31/2024 severe hypotension blood pressure 80 mmHg, dizziness  Episode of sharp chest pain no shortness of breath no diaphoresis ECG no ischemic changes on admission, normal  beta-blocker  Multivessel CAD, patent LIMA to LAD, distal LAD diffuse disease, patent SVG to OM and patent stent, patent SVG to PDA, stent placement in the SVG to diagonal 1 in April 2024 and stent placement SVG to OM on 5/22/2024  Normal LV systolic function ejection fraction more than 55%  History of paroxysmal A-fib, status post Watchman procedure  History of GI bleeding  Parkinson disease  Episodes of dizziness combination of previous C-spine surgery, Parkinson disease medications, postural hypotension/autonomic dysfunction      Patient Active Problem List:     Hypotension     Mild anemia     Chronic atrial fibrillation ([facility])     Coronary artery disease involving coronary bypass graft of native heart without angina pectoris     S/P CABG (coronary artery bypass graft)     GI bleed     Severe malnutrition ([facility])     Pharyngoesophageal dysphagia     Parkinson's disease without dyskinesia or fluctuating manifestations ([facility])     Acute intractable headache     H/O neck surgery     Chest pain     NSTEMI (non-ST elevated myocardial infarction) ([facility])     S/P angioplasty with stent     CAD, multiple vessel     CAP (community acquired pneumonia)     Angina pectoris ([facility])     Hyperlipidemia     Atrial fibrillation with rapid ventricular response ([facility])      Plan of Treatment:   Medications reviewed  1: Admission 8/23/2024 A-fib with RVR converted to sinus rhythm with amiodarone infusion and beta-blockers, continue amiodarone infusion and changed to amiodarone 200 mg twice daily tomorrow twice a day continue with the carvedilol 6.25 mg twice daily p.o.  2: Multivessel CAD, CABG, multiple stent, diastolic CHF  Continue aspirin 81 mg, Brilinta 90 mg twice a day, metoprolol 12.5 mg twice daily, Ranexa 1000 mg twice daily  Give Lasix 40 mg IV now then Bumex 1 mg daily  3: Parkinson disease continue carbidopa levodopa  4: Hypothyroidism continue current dose of levothyroxine 75 mcg  5: Anemia he is on iron supplement  Continue

## 2024-08-23 NOTE — PROGRESS NOTES
Pt transferred to ICU bed 2005. Patient attached to monitors, vital signs assessed, oriented to room, safety parameters in place.

## 2024-08-23 NOTE — PROGRESS NOTES
[facility] Internal Medicine  Aamir Caballero MD; Fransisco Cee MD; Ko Elias MD; MD Yandy Walter MD; Larissa Oro MD  [facility] Internal Medicine   IN-PATIENT SERVICE  [facility]                 Date:   8/23/2024  Patientname:  Rowdy Marie  Date of admission:  8/23/2024  3:35 AM  MRN:   [id number]  Account:  [id number]  YOB: 1950  PCP:    Yandy Jones MD  Room:   VLADIMIR/VLADIMIR  Code Status:    Full Code      Chief Complaint:     Chief Complaint   Patient presents with    Chest Pain       History of Present Illness:     Rowdy Marie is a 74 y.o. Non- / non  male who presents with Chest Pain   and is admitted to the hospital for the management of Atrial fibrillation with rapid ventricular response (HCC). Medical history significant for multivessel CAD, CABG x4, atrial fibrillation, s/p watchman's procedure, hypothyroidism and parkinson's disease. Patient underwent cardiac cath with stent placement on 4/11 and 5/22. According to patient, he suddenly developed chest pain and fast heart rate are 1230am. He applied a nitro patch without any relief. Patient states he commonly gets similar symptoms that last less than 30 minutes but symptoms are much worse tonight prompting him to call EMS. -140s, EKG confirms afib with RVR. Started on amiodarone gtt and given 1x dose of metoprolol 5 mg IV. Patient endorses improvement in symptoms once heart rate controlled.   Troponin 29 x2. WBC 14.4 likely reactive, no indications of infection. Denies fever, chills, abdominal pain, nausea, vomiting, diarrhea, and urinary symptoms. Symptoms are reported as acute, constant and severe.     Past Medical History:     Past Medical History:   Diagnosis Date    A-fib (HCC)     PREET (acute kidney injury) (HCC)     Anemia     CAD (coronary artery disease)     Esophageal stricture     Hyperlipidemia     Hypokalemia     Hypomagnesemia     NSTEMI (non-ST

## 2024-08-23 NOTE — H&P
[facility]   IN-PATIENT SERVICE   [facility]    HISTORY AND PHYSICAL EXAMINATION            Date:   8/23/2024  Patient name:  Rowdy Marie  Date of admission:  8/23/2024  3:35 AM  MRN:   [id number]  Account:  [id number]  YOB: 1950  PCP:    Yandy Jones MD  Room:   [id number]  Code Status:    Full Code    Chief Complaint:     Chief Complaint   Patient presents with    Chest Pain       History Obtained From:     patient    History of Present Illness:     The patient is a 74 y.o.  Non- / non  male who presents with Chest Pain   and he is admitted to the hospital for the management of    Rowdy Marie is a 74 y.o. Non- / non  male who presents with Chest Pain   and is admitted to the hospital for the management of Atrial fibrillation with rapid ventricular response (HCC). Medical history significant for multivessel CAD, CABG x4, atrial fibrillation, s/p watchman's procedure, hypothyroidism and parkinson's disease. Patient underwent cardiac cath with stent placement on 4/11 and 5/22. According to patient, he suddenly developed chest pain and fast heart rate are 1230am. He applied a nitro patch without any relief. Patient states he commonly gets similar symptoms that last less than 30 minutes but symptoms are much worse tonight prompting him to call EMS. -140s, EKG confirms afib with RVR. Started on amiodarone gtt and given 1x dose of metoprolol 5 mg IV. Patient endorses improvement in symptoms once heart rate controlled.   Troponin 29 x2. WBC 14.4 likely reactive, no indications of infection. Denies fever, chills, abdominal pain, nausea, vomiting, diarrhea, and urinary symptoms. Symptoms are reported as acute, constant and severe.      Patient seen and examined, complaining of shortness of breath no chest pain, on amiodarone drip    Past Medical History:     Past Medical History:   Diagnosis Date    A-fib (HCC)     PREET (acute kidney  Collection Time: 08/23/24  4:56 AM   Result Value Ref Range    Magnesium 1.7 1.6 - 2.6 mg/dL       Imaging/Diagnostics:    XR CHEST PORTABLE  Narrative: EXAMINATION:  ONE XRAY VIEW OF THE CHEST    8/23/2024 3:45 am    COMPARISON:  June 6, 2024    HISTORY:  ORDERING SYSTEM PROVIDED HISTORY: Chest Pain  TECHNOLOGIST PROVIDED HISTORY:  Chest Pain  Reason for Exam: Chest pain, cough- hx CAD and bypass surgery.  Hx A-fib  Additional signs and symptoms: Chest pain, cough- hx CAD and bypass surgery.  Hx A-fib  Relevant Medical/Surgical History: Chest pain, cough- hx CAD and bypass  surgery.  Hx A-fib    FINDINGS:  No lines or tubes. Normal cardiomediastinal silhouette. Status post CABG.  The lungs are clear without focal consolidation or pleural effusion.  No  suspicious pulmonary nodules.  Minimal interstitial edema.  No pneumothorax.    No acute osseous abnormality.  Status post ORIF of the left humerus.  Status  post sternotomy.  Impression: 1. Minimal interstitial edema.  2. Status post CABG.       Assessment :      Primary Problem  Atrial fibrillation with rapid ventricular response (HCC)    Active Hospital Problems    Diagnosis Date Noted    Atrial fibrillation with rapid ventricular response (HCC) [I48.91] 08/23/2024       Plan:     Patient status Admit as inpatient in the  Progressive Unit/Step down    A-fib with RVR, currently rate controlled, patient does have a history of A-fib, status post Watchman device, not on any anticoagulation, neurology consulted, TSH checked this morning was normal, echocardiogram done in April showed preserved action fraction  CAD status post stent last 1 in April, patient on aspirin and Brilinta  Hypertension currently blood pressure controlled, on Coreg  History of orthostatic hypotension, on Florinef  Elevated proBNP, acute on chronic diastolic heart failure, chest x-ray showed mild interstitial edema no pedal edema, will DC fluids,  Leukocytosis, chest x-ray nonacute, will check

## 2024-08-23 NOTE — CARE COORDINATION
Case Management Assessment  Initial Evaluation    Date/Time of Evaluation: 8/23/2024 12:49 PM  Assessment Completed by: Sabina Lucas RN    If patient is discharged prior to next notation, then this note serves as note for discharge by case management.    Patient Name: Rowdy Marie                   YOB: 1950  Diagnosis: Atrial fibrillation with rapid ventricular response (HCC) [I48.91]  Chest pain, unspecified type [R07.9]                   Date / Time: 8/23/2024  3:35 AM    Patient Admission Status: Inpatient   Readmission Risk (Low < 19, Mod (19-27), High > 27): Readmission Risk Score: 24.2    Current PCP: Yandy Jones MD  PCP verified by CM? Yes    Chart Reviewed: Yes      History Provided by: Patient, Medical Record  Patient Orientation: Alert and Oriented    Patient Cognition: Alert    Hospitalization in the last 30 days (Readmission):  No    If yes, Readmission Assessment in CM Navigator will be completed.    Advance Directives:      Code Status: Full Code   Patient's Primary Decision Maker is: Legal Next of Kin    Primary Decision Maker: Elliott Marie - [phone]    Primary Decision Maker: Alek Marie    Discharge Planning:    Patient lives with: Family Members Type of Home: House  Primary Care Giver: Self  Patient Support Systems include: Family Members, Children   Current Financial resources: Medicare, Medicaid  Current community resources: (S) ECF/Home Care ([facility] service)  Current services prior to admission: Durable Medical Equipment, Home Care            Current DME: Cane, Walker, Shower Chair, Oxygen Therapy (Comment)            Type of Home Care services:  OT, PT, Nursing Services    ADLS  Prior functional level: Independent in ADLs/IADLs  Current functional level: Independent in ADLs/IADLs    PT AM-PAC:   /24  OT AM-PAC:   /24    Family can provide assistance at DC: Yes  Would you like Case Management to discuss the discharge plan with any

## 2024-08-23 NOTE — PROGRESS NOTES
Physical Therapy        Physical Therapy Cancel Note      DATE: 2024    NAME: Rowdy Marie  MRN: [id number]   : 1950      Patient not seen this date for Physical Therapy due to:    Patient is not appropriate for PT evaluation/treatment at this time d/t currently on amiodarone drip per STAS Brooke. She requests PT to check back later this date. Cardiology to be in soon this AM to assess pt. PT Following. 916         Electronically signed by Rika Ruiz PT on 2024 at 9:24 AM

## 2024-08-24 LAB
ANION GAP SERPL CALCULATED.3IONS-SCNC: 12 MMOL/L (ref 9–17)
BASOPHILS # BLD: 0.1 K/UL (ref 0–0.2)
BASOPHILS NFR BLD: 1 % (ref 0–2)
BUN SERPL-MCNC: 21 MG/DL (ref 8–23)
CALCIUM SERPL-MCNC: 8.4 MG/DL (ref 8.6–10.4)
CHLORIDE SERPL-SCNC: 98 MMOL/L (ref 98–107)
CHOLEST SERPL-MCNC: 244 MG/DL
CHOLESTEROL/HDL RATIO: 5.2
CO2 SERPL-SCNC: 27 MMOL/L (ref 20–31)
CREAT SERPL-MCNC: 1 MG/DL (ref 0.7–1.2)
EOSINOPHIL # BLD: 0.5 K/UL (ref 0–0.4)
EOSINOPHILS RELATIVE PERCENT: 5 % (ref 0–4)
ERYTHROCYTE [DISTWIDTH] IN BLOOD BY AUTOMATED COUNT: 18.4 % (ref 11.5–14.9)
EST. AVERAGE GLUCOSE BLD GHB EST-MCNC: 100 MG/DL
GFR, ESTIMATED: 78 ML/MIN/1.73M2
GLUCOSE SERPL-MCNC: 116 MG/DL (ref 70–99)
HBA1C MFR BLD: 5.1 % (ref 4–6)
HCT VFR BLD AUTO: 28.8 % (ref 41–53)
HDLC SERPL-MCNC: 47 MG/DL
HGB BLD-MCNC: 9.2 G/DL (ref 13.5–17.5)
LDLC SERPL CALC-MCNC: 172 MG/DL (ref 0–130)
LYMPHOCYTES NFR BLD: 1.6 K/UL (ref 1–4.8)
LYMPHOCYTES RELATIVE PERCENT: 15 % (ref 24–44)
MAGNESIUM SERPL-MCNC: 2.1 MG/DL (ref 1.6–2.6)
MCH RBC QN AUTO: 26.9 PG (ref 26–34)
MCHC RBC AUTO-ENTMCNC: 32 G/DL (ref 31–37)
MCV RBC AUTO: 83.9 FL (ref 80–100)
MONOCYTES NFR BLD: 0.6 K/UL (ref 0.1–1.3)
MONOCYTES NFR BLD: 5 % (ref 1–7)
NEUTROPHILS NFR BLD: 74 % (ref 36–66)
NEUTS SEG NFR BLD: 7.9 K/UL (ref 1.3–9.1)
PLATELET # BLD AUTO: 270 K/UL (ref 150–450)
PMV BLD AUTO: 9.7 FL (ref 6–12)
POTASSIUM SERPL-SCNC: 3.1 MMOL/L (ref 3.7–5.3)
RBC # BLD AUTO: 3.44 M/UL (ref 4.5–5.9)
SODIUM SERPL-SCNC: 137 MMOL/L (ref 135–144)
TRIGL SERPL-MCNC: 127 MG/DL
WBC OTHER # BLD: 10.7 K/UL (ref 3.5–11)

## 2024-08-24 PROCEDURE — 83735 ASSAY OF MAGNESIUM: CPT

## 2024-08-24 PROCEDURE — 6370000000 HC RX 637 (ALT 250 FOR IP): Performed by: NURSE PRACTITIONER

## 2024-08-24 PROCEDURE — 80048 BASIC METABOLIC PNL TOTAL CA: CPT

## 2024-08-24 PROCEDURE — 80061 LIPID PANEL: CPT

## 2024-08-24 PROCEDURE — 6360000002 HC RX W HCPCS: Performed by: NURSE PRACTITIONER

## 2024-08-24 PROCEDURE — 6360000002 HC RX W HCPCS: Performed by: INTERNAL MEDICINE

## 2024-08-24 PROCEDURE — 99233 SBSQ HOSP IP/OBS HIGH 50: CPT | Performed by: INTERNAL MEDICINE

## 2024-08-24 PROCEDURE — 2580000003 HC RX 258: Performed by: NURSE PRACTITIONER

## 2024-08-24 PROCEDURE — 36415 COLL VENOUS BLD VENIPUNCTURE: CPT

## 2024-08-24 PROCEDURE — 83036 HEMOGLOBIN GLYCOSYLATED A1C: CPT

## 2024-08-24 PROCEDURE — 6370000000 HC RX 637 (ALT 250 FOR IP): Performed by: INTERNAL MEDICINE

## 2024-08-24 PROCEDURE — 85025 COMPLETE CBC W/AUTO DIFF WBC: CPT

## 2024-08-24 PROCEDURE — 2000000000 HC ICU R&B

## 2024-08-24 RX ORDER — CARVEDILOL 6.25 MG/1
6.25 TABLET ORAL ONCE
Status: DISCONTINUED | OUTPATIENT
Start: 2024-08-24 | End: 2024-08-27 | Stop reason: HOSPADM

## 2024-08-24 RX ORDER — MIDODRINE HYDROCHLORIDE 5 MG/1
5 TABLET ORAL 3 TIMES DAILY PRN
Status: DISCONTINUED | OUTPATIENT
Start: 2024-08-24 | End: 2024-08-27 | Stop reason: SDUPTHER

## 2024-08-24 RX ORDER — FUROSEMIDE 10 MG/ML
40 INJECTION INTRAMUSCULAR; INTRAVENOUS ONCE
Status: COMPLETED | OUTPATIENT
Start: 2024-08-24 | End: 2024-08-24

## 2024-08-24 RX ORDER — POTASSIUM CHLORIDE 1500 MG/1
40 TABLET, EXTENDED RELEASE ORAL ONCE
Status: COMPLETED | OUTPATIENT
Start: 2024-08-24 | End: 2024-08-24

## 2024-08-24 RX ADMIN — FLUDROCORTISONE ACETATE 0.1 MG: 0.1 TABLET ORAL at 19:33

## 2024-08-24 RX ADMIN — POTASSIUM CHLORIDE 40 MEQ: 1500 TABLET, EXTENDED RELEASE ORAL at 08:35

## 2024-08-24 RX ADMIN — CARBIDOPA AND LEVODOPA 1 TABLET: 25; 100 TABLET ORAL at 08:35

## 2024-08-24 RX ADMIN — SUCRALFATE 1 G: 1 TABLET ORAL at 08:35

## 2024-08-24 RX ADMIN — SODIUM CHLORIDE, PRESERVATIVE FREE 10 ML: 5 INJECTION INTRAVENOUS at 19:35

## 2024-08-24 RX ADMIN — LEVOTHYROXINE SODIUM 75 MCG: 0.07 TABLET ORAL at 08:34

## 2024-08-24 RX ADMIN — ACETAMINOPHEN 650 MG: 325 TABLET ORAL at 08:46

## 2024-08-24 RX ADMIN — CARBIDOPA AND LEVODOPA 1 TABLET: 25; 100 TABLET ORAL at 15:16

## 2024-08-24 RX ADMIN — RANOLAZINE 1000 MG: 500 TABLET, EXTENDED RELEASE ORAL at 08:37

## 2024-08-24 RX ADMIN — FLUDROCORTISONE ACETATE 0.1 MG: 0.1 TABLET ORAL at 08:37

## 2024-08-24 RX ADMIN — AMIODARONE HYDROCHLORIDE 200 MG: 200 TABLET ORAL at 08:35

## 2024-08-24 RX ADMIN — CARBIDOPA AND LEVODOPA 1 TABLET: 25; 100 TABLET ORAL at 19:33

## 2024-08-24 RX ADMIN — ACETAMINOPHEN 650 MG: 325 TABLET ORAL at 15:16

## 2024-08-24 RX ADMIN — Medication 100 MG: at 08:37

## 2024-08-24 RX ADMIN — ASPIRIN 81 MG: 81 TABLET, COATED ORAL at 08:34

## 2024-08-24 RX ADMIN — CARVEDILOL 6.25 MG: 6.25 TABLET, FILM COATED ORAL at 19:35

## 2024-08-24 RX ADMIN — CARVEDILOL 6.25 MG: 6.25 TABLET, FILM COATED ORAL at 08:35

## 2024-08-24 RX ADMIN — AMIODARONE HYDROCHLORIDE 200 MG: 200 TABLET ORAL at 19:33

## 2024-08-24 RX ADMIN — DOCUSATE SODIUM LIQUID 50 MG: 100 LIQUID ORAL at 19:32

## 2024-08-24 RX ADMIN — SUCRALFATE 1 G: 1 TABLET ORAL at 15:16

## 2024-08-24 RX ADMIN — FERROUS SULFATE TAB 325 MG (65 MG ELEMENTAL FE) 325 MG: 325 (65 FE) TAB at 08:35

## 2024-08-24 RX ADMIN — MIRTAZAPINE 7.5 MG: 15 TABLET, FILM COATED ORAL at 19:33

## 2024-08-24 RX ADMIN — ENOXAPARIN SODIUM 40 MG: 100 INJECTION SUBCUTANEOUS at 08:47

## 2024-08-24 RX ADMIN — SUCRALFATE 1 G: 1 TABLET ORAL at 19:33

## 2024-08-24 RX ADMIN — PREGABALIN 150 MG: 150 CAPSULE ORAL at 19:33

## 2024-08-24 RX ADMIN — RANOLAZINE 1000 MG: 500 TABLET, EXTENDED RELEASE ORAL at 19:34

## 2024-08-24 RX ADMIN — PANTOPRAZOLE SODIUM 40 MG: 40 TABLET, DELAYED RELEASE ORAL at 08:35

## 2024-08-24 RX ADMIN — POTASSIUM CHLORIDE 40 MEQ: 1500 TABLET, EXTENDED RELEASE ORAL at 13:30

## 2024-08-24 RX ADMIN — TICAGRELOR 90 MG: 90 TABLET ORAL at 19:34

## 2024-08-24 RX ADMIN — TICAGRELOR 90 MG: 90 TABLET ORAL at 08:47

## 2024-08-24 RX ADMIN — PREGABALIN 150 MG: 150 CAPSULE ORAL at 08:35

## 2024-08-24 RX ADMIN — FUROSEMIDE 40 MG: 10 INJECTION, SOLUTION INTRAMUSCULAR; INTRAVENOUS at 13:33

## 2024-08-24 RX ADMIN — FERROUS SULFATE TAB 325 MG (65 MG ELEMENTAL FE) 325 MG: 325 (65 FE) TAB at 13:31

## 2024-08-24 ASSESSMENT — PAIN DESCRIPTION - ONSET
ONSET: ON-GOING
ONSET: ON-GOING

## 2024-08-24 ASSESSMENT — PAIN DESCRIPTION - PAIN TYPE
TYPE: CHRONIC PAIN
TYPE: CHRONIC PAIN;ACUTE PAIN

## 2024-08-24 ASSESSMENT — PAIN DESCRIPTION - LOCATION
LOCATION: HEAD
LOCATION: HEAD;BACK
LOCATION: HEAD;BACK

## 2024-08-24 ASSESSMENT — PAIN SCALES - GENERAL
PAINLEVEL_OUTOF10: 5
PAINLEVEL_OUTOF10: 5
PAINLEVEL_OUTOF10: 4
PAINLEVEL_OUTOF10: 7

## 2024-08-24 ASSESSMENT — PAIN DESCRIPTION - FREQUENCY
FREQUENCY: CONTINUOUS
FREQUENCY: CONTINUOUS

## 2024-08-24 ASSESSMENT — PAIN DESCRIPTION - DESCRIPTORS
DESCRIPTORS: ACHING
DESCRIPTORS: ACHING

## 2024-08-24 ASSESSMENT — PAIN DESCRIPTION - ORIENTATION
ORIENTATION: MID
ORIENTATION: MID

## 2024-08-24 NOTE — PROGRESS NOTES
Date:   8/24/2024  Patient name: Rowdy Marie  Date of admission:  8/23/2024  3:35 AM  MRN:   [id number]  YOB: 1950  PCP: Yandy Jones MD    Reason for Admission: Atrial fibrillation with rapid ventricular response (HCC) [I48.91]  Chest pain, unspecified type [R07.9]    Cardiology consult: A-fib with RVR, multivessel CAD, chest pain, congestive heart failure diastolic acute on chronic       Referring physician Dr. Aracely Pierre                                  Impression    Admission 8/23/2024 A-fib with RVR acute onset palpitation, severe chest pain ECG showed A-fib with RVR, high-sensitivity troponin 29, proBNP 3481, chest x-ray showed minimal interstitial edema  A-fib converted to sinus rhythm with amiodarone infusion and beta-blocker    Admission 6/6/2024 chest pain like heaviness he woke up with the chest pain left upper chest different from his previous admission 5/22/2024 started on IV nitroglycerin in ER with improvement, high-sensitivity troponin 20, no obvious ischemic ECG change     Admission 5/20/2024 left-sided chest pain radiating to the left arm and to the neck, no acute ECG changes, high-sensitivity troponin 29, 25, 23, proBNP 1550  Cardiac cath 5/22/2024 right femoral artery access  Patent LIMA to LAD distal LAD disease not amenable for PCI, patent SVG to diagonal with a patent stent, patent SVG to RPDA, 80% stenosis of SVG to OM and reduced to 0% BRENDA by Dr. Love     Admission 4/10/2024 for left chest pain radiating to left shoulder aggravating with the inspiration and shoulder movements almost constant in nature for more than 2 days  ECG showed no ischemic changes high-sensitivity troponin trending upward  Cardiac cath 4/11/2024  Multivessel CAD  Patent LIMA to the LAD with moderate nonobstructive disease in LAD post distal anastomosis  Patent SVG to OM 2 with patent stents  Patent SVG to RPDA  Successful PCI with BRENDA to ostial and mid SVG to diagonal.  Overall preserved LV  small in caliber and not amendable for PCI similar to previous cath 4/11/2024  Patent SVG to diagonal with a patent stent  Patent SVG to RPDA  80% stenosis of SVG to OM reduced to 0% BRENDA    Chest x-ray 8/23/2024  Minimal interstitial edema  Status post CABG    Chest x-ray 5/20/2024  Mild diffuse interstitial prominence may represent mild pulmonary edema or atypical infection     2D echo 4/23/2024  Ejection fraction 55% normal LV wall thickness and wall motion  Moderate to severe mitral regurgitation  Normal RV size and function  IVC not well-visualized    History of present illness     74-year-old male with past medical history of multivessel coronary artery disease, CABG x 4, multiple stents,, recent stent placement in the SVG to OM on 5/22/2024 and stent placement in the SVG to diagonal 4/11/2024,, ejection fraction 55, paroxysmal A-fib, status post Watchman procedure, Parkinson disease, hypothyroidism got hospitalized on 8/23/2024 with a acute onset of palpitation and chest pain.  He said his chest pain was severe like a heavy pressure.  He gets episodes of palpitation normally it lasts only for few minutes but this time palpitation and chest pain persisted. EMS was called and his ECG showed A-fib with RVR.  Nonspecific ST-T changes and a troponin high-sensitivity was 29.  He did not have any fever or chills or abdominal pain or nausea.  He was started on IV amiodarone and he also received IV metoprolol.  His heart rate improved.  I advised to continue with amiodarone infusion and started him on carvedilol 6.25 mg twice daily.  He A-fib converted to sinus rhythm after many hours.     On admission blood pressure was 160/110, oxygen saturation 95%, heart rate 122 -130     WBC 14.4, hemoglobin 9.6, platelets 269  TSH 2.72, free T4 1 point  1 myoglobin 24, proBNP 3481, troponin 29,  Sodium 139, potassium 3.5, BUN 25, creatinine 1.1, magnesium 1.7     Current evaluation  Patient seen and examined in ICU  He was

## 2024-08-24 NOTE — PROGRESS NOTES
Dr Ladd updated that heart rate high as 120 then back down to 90's controlled afib. Blood pressure 96/56 ok to give an extra one time dose of 6.25 coreg po.

## 2024-08-24 NOTE — CONSULTS
[facility] PULMONARY & CRITICAL CARE SPECIALISTS   CONSULT NOTE:    DATE OF CONSULT 8/24/2024    REASON FOR CONSULTATION:  Shortness of breath A-fib with RVR      PCP Yandy Jones MD     CHIEF COMPLAINT: I had trouble breathing    HISTORY OF PRESENT ILLNESS:     Patient is a 74-year-old male.  He presented to the hospital.  He did have chest pain and felt a fast heart rate right after midnight on August 23.  He was seen in the ER where heart rate was 130s to 140s and the EKG was atrial fibrillation with rapid ventricular response.  Patient did have improvement of symptoms after being started on an amiodarone drip and given 1 dose of IV metoprolol.  Patient was admitted to the hospital.    Of note, the patient was seen by nursing staff at 6:20 PM revealed that the heart rate was in the mid 40s.  He was having problems with shortness of breath and reportedly they heard bilateral crackles.  Patient was transferred to ICU area.     Patient heart rate has been anywhere from 75-1 06.  The patient does not have any fevers.  O2 saturations 96 to 90% on room air.  He claims he is feeling better.        ALLERGIES:  Allergies   Allergen Reactions    Latex     Atorvastatin     Fluvastatin     Imdur [Isosorbide Nitrate]     Isosorbide Headaches     Patient denies having any allergy to this medication    Methadone     Nalbuphine     Oxycodone     Penicillins     Rosuvastatin     Statins     Zetia [Ezetimibe]        HOME MEDICATIONS:  Medications Prior to Admission: carbidopa-levodopa (SINEMET)  MG per tablet, Take 1 tablet by mouth 3 times daily  ticagrelor (BRILINTA) 90 MG TABS tablet, Take 1 tablet by mouth 2 times daily  ferrous sulfate (IRON 325) 325 (65 Fe) MG tablet, Take 1 tablet by mouth 3 times daily (with meals)  mirtazapine (REMERON) 7.5 MG tablet, Take 1 tablet by mouth nightly  pregabalin (LYRICA) 75 MG capsule, Take 2 capsules by mouth 2 times daily for 30 days. Max Daily Amount: 300 mg  meclizine  to OM May 2024, Dr. Marcie Love  2D echo April 2024 ejection fraction 55%.  Moderate to severe regurgitation with multiple jets  History of non-STEMI  History of placement of Watchman left atrial appendage closure device  History of prostate cancer  History of esophageal stricture  Non-smoker.  No history of asthma or COPD  Full resuscitation        Plan:      On Lovenox for DVT prophylaxis.  Cardiology to decide if other types of anticoagulation needed.  On p.o. amiodarone.  Following cardiology's lead  Chest x-ray reveals some mild congestive changes.  Patient will benefit from a repeat chest x-ray closer to discharge, PA and left lateral    Thank you for the consult    Jarred Ramirez MD

## 2024-08-24 NOTE — PROGRESS NOTES
[facility]   IN-PATIENT SERVICE   [facility]    Progress note            Date:   8/24/2024  Patient name:  Rowdy Marie  Date of admission:  8/23/2024  3:35 AM  MRN:   [id number]  Account:  [id number]  YOB: 1950  PCP:    Yandy Jones MD  Room:   2005/2005-01  Code Status:    Full Code    Chief Complaint:     Chief Complaint   Patient presents with    Chest Pain       History Obtained From:     patient    History of Present Illness:     The patient is a 74 y.o.  Non- / non  male who presents with Chest Pain   and he is admitted to the hospital for the management of    Rowdy Marie is a 74 y.o. Non- / non  male who presents with Chest Pain   and is admitted to the hospital for the management of Atrial fibrillation with rapid ventricular response (HCC). Medical history significant for multivessel CAD, CABG x4, atrial fibrillation, s/p watchman's procedure, hypothyroidism and parkinson's disease. Patient underwent cardiac cath with stent placement on 4/11 and 5/22. According to patient, he suddenly developed chest pain and fast heart rate are 1230am. He applied a nitro patch without any relief. Patient states he commonly gets similar symptoms that last less than 30 minutes but symptoms are much worse tonight prompting him to call EMS. -140s, EKG confirms afib with RVR. Started on amiodarone gtt and given 1x dose of metoprolol 5 mg IV. Patient endorses improvement in symptoms once heart rate controlled.   Troponin 29 x2. WBC 14.4 likely reactive, no indications of infection. Denies fever, chills, abdominal pain, nausea, vomiting, diarrhea, and urinary symptoms. Symptoms are reported as acute, constant and severe.      Patient seen and examined, complaining of shortness of breath no chest pain, on amiodarone drip    Past Medical History:     Past Medical History:   Diagnosis Date    A-fib (HCC)     PREET (acute kidney injury) (HCC)     Anemia     Temp (24hrs), Av.1 °F (36.7 °C), Min:97.5 °F (36.4 °C), Max:98.4 °F (36.9 °C)    No results for input(s): \"POCGLU\" in the last 72 hours.    Intake/Output Summary (Last 24 hours) at 2024 1751  Last data filed at 2024 1511  Gross per 24 hour   Intake 10.57 ml   Output 2700 ml   Net -2689.43 ml       General Appearance: Lethargic,  Mental status: oriented to person, place, and time with normal affect  Head:  normocephalic, atraumatic.  Eye: no icterus, redness, pupils equal and reactive, extraocular eye movements intact, conjunctiva clear  Ear: normal external ear, no discharge, hearing intact  Nose:  no drainage noted  Mouth: mucous membranes moist  Neck: supple, no carotid bruits, thyroid not palpable  Lungs: Bilateral equal air entry, clear to ausculation, no wheezing, rales or rhonchi, normal effort  Cardiovascular: normal rate, regular rhythm, no murmur, gallop, rub.  Abdomen: Soft, nontender, nondistended, normal bowel sounds, no hepatomegaly or splenomegaly  Neurologic: There are no new focal motor or sensory deficits, normal muscle tone and bulk, no abnormal sensation, normal speech, cranial nerves II through XII grossly intact  Skin: No gross lesions, rashes, bruising or bleeding on exposed skin area  Extremities:  peripheral pulses palpable, no pedal edema or calf pain with palpation  Psych: normal affect     Investigations:      Laboratory Testing:  Recent Results (from the past 24 hour(s))   Basic Metabolic Panel w/ Reflex to MG    Collection Time: 24  4:27 AM   Result Value Ref Range    Sodium 137 135 - 144 mmol/L    Potassium 3.1 (L) 3.7 - 5.3 mmol/L    Chloride 98 98 - 107 mmol/L    CO2 27 20 - 31 mmol/L    Anion Gap 12 9 - 17 mmol/L    Glucose 116 (H) 70 - 99 mg/dL    BUN 21 8 - 23 mg/dL    Creatinine 1.0 0.7 - 1.2 mg/dL    Est, Glom Filt Rate 78 >60 mL/min/1.73m2    Calcium 8.4 (L) 8.6 - 10.4 mg/dL   CBC with Auto Differential    Collection Time: 24  4:27 AM   Result Value

## 2024-08-24 NOTE — PLAN OF CARE
Problem: Discharge Planning  Goal: Discharge to home or other facility with appropriate resources  8/24/2024 0227 by Latasha Foote, RN  Outcome: Progressing  Flowsheets (Taken 8/23/2024 2000)  Discharge to home or other facility with appropriate resources: Identify barriers to discharge with patient and caregiver       Problem: Safety - Adult  Goal: Free from fall injury  8/24/2024 0227 by Latasha Foote, RN  Outcome: Progressing  Flowsheets (Taken 8/23/2024 2149)  Free From Fall Injury: Instruct family/caregiver on patient safety       Problem: Pain  Goal: Verbalizes/displays adequate comfort level or baseline comfort level  8/24/2024 0227 by Latasha Foote, RN  Outcome: Progressing  Flowsheets  Taken 8/24/2024 0000  Verbalizes/displays adequate comfort level or baseline comfort level:   Encourage patient to monitor pain and request assistance   Assess pain using appropriate pain scale  Taken 8/23/2024 1945  Verbalizes/displays adequate comfort level or baseline comfort level: Encourage patient to monitor pain and request assistance

## 2024-08-24 NOTE — PLAN OF CARE
Problem: Discharge Planning  Goal: Discharge to home or other facility with appropriate resources  Outcome: Progressing  Flowsheets (Taken 8/24/2024 1900)  Discharge to home or other facility with appropriate resources:   Identify barriers to discharge with patient and caregiver   Refer to discharge planning if patient needs post-hospital services based on physician order or complex needs related to functional status, cognitive ability or social support system     Problem: Safety - Adult  Goal: Free from fall injury  Outcome: Progressing  Flowsheets (Taken 8/24/2024 1900)  Free From Fall Injury:   Based on caregiver fall risk screen, instruct family/caregiver to ask for assistance with transferring infant if caregiver noted to have fall risk factors   Instruct family/caregiver on patient safety     Problem: Pain  Goal: Verbalizes/displays adequate comfort level or baseline comfort level  Outcome: Progressing  Flowsheets (Taken 8/24/2024 1900)  Verbalizes/displays adequate comfort level or baseline comfort level:   Encourage patient to monitor pain and request assistance   Assess pain using appropriate pain scale   Implement non-pharmacological measures as appropriate and evaluate response

## 2024-08-25 LAB
ANION GAP SERPL CALCULATED.3IONS-SCNC: 14 MMOL/L (ref 9–17)
BASOPHILS # BLD: 0.07 K/UL (ref 0–0.2)
BASOPHILS NFR BLD: 1 % (ref 0–2)
BUN SERPL-MCNC: 23 MG/DL (ref 8–23)
CALCIUM SERPL-MCNC: 8.8 MG/DL (ref 8.6–10.4)
CHLORIDE SERPL-SCNC: 101 MMOL/L (ref 98–107)
CO2 SERPL-SCNC: 24 MMOL/L (ref 20–31)
CREAT SERPL-MCNC: 1.1 MG/DL (ref 0.7–1.2)
EOSINOPHIL # BLD: 0.63 K/UL (ref 0–0.4)
EOSINOPHILS RELATIVE PERCENT: 9 % (ref 0–4)
ERYTHROCYTE [DISTWIDTH] IN BLOOD BY AUTOMATED COUNT: 18.9 % (ref 11.5–14.9)
GFR, ESTIMATED: 70 ML/MIN/1.73M2
GLUCOSE SERPL-MCNC: 128 MG/DL (ref 70–99)
HCT VFR BLD AUTO: 35.7 % (ref 41–53)
HGB BLD-MCNC: 10.7 G/DL (ref 13.5–17.5)
LYMPHOCYTES NFR BLD: 1.89 K/UL (ref 1–4.8)
LYMPHOCYTES RELATIVE PERCENT: 27 % (ref 24–44)
MAGNESIUM SERPL-MCNC: 2.1 MG/DL (ref 1.6–2.6)
MCH RBC QN AUTO: 26.1 PG (ref 26–34)
MCHC RBC AUTO-ENTMCNC: 30.1 G/DL (ref 31–37)
MCV RBC AUTO: 86.8 FL (ref 80–100)
MONOCYTES NFR BLD: 0.35 K/UL (ref 0.1–1.3)
MONOCYTES NFR BLD: 5 % (ref 1–7)
MORPHOLOGY: ABNORMAL
NEUTROPHILS NFR BLD: 58 % (ref 36–66)
NEUTS SEG NFR BLD: 4.06 K/UL (ref 1.3–9.1)
PLATELET # BLD AUTO: 260 K/UL (ref 150–450)
PMV BLD AUTO: 9.8 FL (ref 6–12)
POTASSIUM SERPL-SCNC: 3.7 MMOL/L (ref 3.7–5.3)
RBC # BLD AUTO: 4.11 M/UL (ref 4.5–5.9)
SODIUM SERPL-SCNC: 139 MMOL/L (ref 135–144)
WBC OTHER # BLD: 7 K/UL (ref 3.5–11)

## 2024-08-25 PROCEDURE — 36415 COLL VENOUS BLD VENIPUNCTURE: CPT

## 2024-08-25 PROCEDURE — 99233 SBSQ HOSP IP/OBS HIGH 50: CPT | Performed by: INTERNAL MEDICINE

## 2024-08-25 PROCEDURE — 6370000000 HC RX 637 (ALT 250 FOR IP): Performed by: NURSE PRACTITIONER

## 2024-08-25 PROCEDURE — 83735 ASSAY OF MAGNESIUM: CPT

## 2024-08-25 PROCEDURE — 2580000003 HC RX 258: Performed by: NURSE PRACTITIONER

## 2024-08-25 PROCEDURE — 6360000002 HC RX W HCPCS: Performed by: NURSE PRACTITIONER

## 2024-08-25 PROCEDURE — 85025 COMPLETE CBC W/AUTO DIFF WBC: CPT

## 2024-08-25 PROCEDURE — 6360000002 HC RX W HCPCS: Performed by: INTERNAL MEDICINE

## 2024-08-25 PROCEDURE — 6370000000 HC RX 637 (ALT 250 FOR IP): Performed by: INTERNAL MEDICINE

## 2024-08-25 PROCEDURE — 2500000003 HC RX 250 WO HCPCS: Performed by: INTERNAL MEDICINE

## 2024-08-25 PROCEDURE — 2000000000 HC ICU R&B

## 2024-08-25 PROCEDURE — 80048 BASIC METABOLIC PNL TOTAL CA: CPT

## 2024-08-25 RX ORDER — FUROSEMIDE 10 MG/ML
20 INJECTION INTRAMUSCULAR; INTRAVENOUS ONCE
Status: COMPLETED | OUTPATIENT
Start: 2024-08-25 | End: 2024-08-25

## 2024-08-25 RX ORDER — METOPROLOL TARTRATE 1 MG/ML
5 INJECTION, SOLUTION INTRAVENOUS EVERY 4 HOURS PRN
Status: DISCONTINUED | OUTPATIENT
Start: 2024-08-25 | End: 2024-08-27 | Stop reason: HOSPADM

## 2024-08-25 RX ORDER — CARVEDILOL 6.25 MG/1
6.25 TABLET ORAL ONCE
Status: COMPLETED | OUTPATIENT
Start: 2024-08-25 | End: 2024-08-25

## 2024-08-25 RX ORDER — METOPROLOL TARTRATE 1 MG/ML
5 INJECTION, SOLUTION INTRAVENOUS EVERY 6 HOURS
Status: DISCONTINUED | OUTPATIENT
Start: 2024-08-25 | End: 2024-08-25

## 2024-08-25 RX ORDER — BUMETANIDE 0.25 MG/ML
1 INJECTION INTRAMUSCULAR; INTRAVENOUS DAILY
Status: DISCONTINUED | OUTPATIENT
Start: 2024-08-26 | End: 2024-08-27 | Stop reason: HOSPADM

## 2024-08-25 RX ORDER — METOPROLOL TARTRATE 1 MG/ML
5 INJECTION, SOLUTION INTRAVENOUS ONCE
Status: DISCONTINUED | OUTPATIENT
Start: 2024-08-25 | End: 2024-08-27 | Stop reason: HOSPADM

## 2024-08-25 RX ADMIN — DOCUSATE SODIUM LIQUID 50 MG: 100 LIQUID ORAL at 19:29

## 2024-08-25 RX ADMIN — CARVEDILOL 6.25 MG: 6.25 TABLET, FILM COATED ORAL at 19:30

## 2024-08-25 RX ADMIN — FERROUS SULFATE TAB 325 MG (65 MG ELEMENTAL FE) 325 MG: 325 (65 FE) TAB at 09:36

## 2024-08-25 RX ADMIN — ASPIRIN 81 MG: 81 TABLET, COATED ORAL at 09:36

## 2024-08-25 RX ADMIN — METOPROLOL TARTRATE 5 MG: 1 INJECTION, SOLUTION INTRAVENOUS at 14:23

## 2024-08-25 RX ADMIN — SUCRALFATE 1 G: 1 TABLET ORAL at 14:22

## 2024-08-25 RX ADMIN — CARBIDOPA AND LEVODOPA 1 TABLET: 25; 100 TABLET ORAL at 14:22

## 2024-08-25 RX ADMIN — PREGABALIN 150 MG: 150 CAPSULE ORAL at 19:30

## 2024-08-25 RX ADMIN — PREGABALIN 150 MG: 150 CAPSULE ORAL at 09:35

## 2024-08-25 RX ADMIN — SODIUM CHLORIDE, PRESERVATIVE FREE 10 ML: 5 INJECTION INTRAVENOUS at 19:39

## 2024-08-25 RX ADMIN — RANOLAZINE 1000 MG: 500 TABLET, EXTENDED RELEASE ORAL at 09:35

## 2024-08-25 RX ADMIN — MIDODRINE HYDROCHLORIDE 5 MG: 5 TABLET ORAL at 02:53

## 2024-08-25 RX ADMIN — CARVEDILOL 6.25 MG: 6.25 TABLET, FILM COATED ORAL at 09:35

## 2024-08-25 RX ADMIN — CARBIDOPA AND LEVODOPA 1 TABLET: 25; 100 TABLET ORAL at 19:29

## 2024-08-25 RX ADMIN — CARBIDOPA AND LEVODOPA 1 TABLET: 25; 100 TABLET ORAL at 09:37

## 2024-08-25 RX ADMIN — AMIODARONE HYDROCHLORIDE 200 MG: 200 TABLET ORAL at 09:36

## 2024-08-25 RX ADMIN — RANOLAZINE 1000 MG: 500 TABLET, EXTENDED RELEASE ORAL at 19:30

## 2024-08-25 RX ADMIN — LEVOTHYROXINE SODIUM 75 MCG: 0.07 TABLET ORAL at 09:36

## 2024-08-25 RX ADMIN — TICAGRELOR 90 MG: 90 TABLET ORAL at 09:35

## 2024-08-25 RX ADMIN — CARVEDILOL 6.25 MG: 6.25 TABLET, FILM COATED ORAL at 02:53

## 2024-08-25 RX ADMIN — SODIUM CHLORIDE, PRESERVATIVE FREE 10 ML: 5 INJECTION INTRAVENOUS at 09:37

## 2024-08-25 RX ADMIN — SUCRALFATE 1 G: 1 TABLET ORAL at 09:36

## 2024-08-25 RX ADMIN — FLUDROCORTISONE ACETATE 0.1 MG: 0.1 TABLET ORAL at 19:30

## 2024-08-25 RX ADMIN — FLUDROCORTISONE ACETATE 0.1 MG: 0.1 TABLET ORAL at 09:37

## 2024-08-25 RX ADMIN — ENOXAPARIN SODIUM 40 MG: 100 INJECTION SUBCUTANEOUS at 09:34

## 2024-08-25 RX ADMIN — MIRTAZAPINE 7.5 MG: 15 TABLET, FILM COATED ORAL at 19:30

## 2024-08-25 RX ADMIN — PANTOPRAZOLE SODIUM 40 MG: 40 TABLET, DELAYED RELEASE ORAL at 09:36

## 2024-08-25 RX ADMIN — FUROSEMIDE 20 MG: 10 INJECTION, SOLUTION INTRAMUSCULAR; INTRAVENOUS at 19:31

## 2024-08-25 RX ADMIN — AMIODARONE HYDROCHLORIDE 200 MG: 200 TABLET ORAL at 19:30

## 2024-08-25 RX ADMIN — Medication 100 MG: at 09:35

## 2024-08-25 RX ADMIN — TICAGRELOR 90 MG: 90 TABLET ORAL at 19:30

## 2024-08-25 RX ADMIN — SUCRALFATE 1 G: 1 TABLET ORAL at 19:30

## 2024-08-25 NOTE — PROGRESS NOTES
[facility]   IN-PATIENT SERVICE   [facility]    Progress note            Date:   8/25/2024  Patient name:  Rowdy Marie  Date of admission:  8/23/2024  3:35 AM  MRN:   [id number]  Account:  [id number]  YOB: 1950  PCP:    Yandy Jones MD  Room:   2005/2005-01  Code Status:    Full Code    Chief Complaint:     Chief Complaint   Patient presents with    Chest Pain       History Obtained From:     patient    History of Present Illness:     The patient is a 74 y.o.  Non- / non  male who presents with Chest Pain   and he is admitted to the hospital for the management of    Rowdy Marie is a 74 y.o. Non- / non  male who presents with Chest Pain   and is admitted to the hospital for the management of Atrial fibrillation with rapid ventricular response (HCC). Medical history significant for multivessel CAD, CABG x4, atrial fibrillation, s/p watchman's procedure, hypothyroidism and parkinson's disease. Patient underwent cardiac cath with stent placement on 4/11 and 5/22. According to patient, he suddenly developed chest pain and fast heart rate are 1230am. He applied a nitro patch without any relief. Patient states he commonly gets similar symptoms that last less than 30 minutes but symptoms are much worse tonight prompting him to call EMS. -140s, EKG confirms afib with RVR. Started on amiodarone gtt and given 1x dose of metoprolol 5 mg IV. Patient endorses improvement in symptoms once heart rate controlled.   Troponin 29 x2. WBC 14.4 likely reactive, no indications of infection. Denies fever, chills, abdominal pain, nausea, vomiting, diarrhea, and urinary symptoms. Symptoms are reported as acute, constant and severe.      Patient seen and examined, complaining of shortness of breath no chest pain, on amiodarone drip    Past Medical History:     Past Medical History:   Diagnosis Date    A-fib (HCC)     PREET (acute kidney injury) (HCC)     Anemia     Temp (24hrs), Av.7 °F (36.5 °C), Min:97.5 °F (36.4 °C), Max:97.8 °F (36.6 °C)    No results for input(s): \"POCGLU\" in the last 72 hours.    Intake/Output Summary (Last 24 hours) at 2024 1800  Last data filed at 2024 0300  Gross per 24 hour   Intake --   Output 650 ml   Net -650 ml       General Appearance: Lethargic,  Mental status: oriented to person, place, and time with normal affect  Head:  normocephalic, atraumatic.  Eye: no icterus, redness, pupils equal and reactive, extraocular eye movements intact, conjunctiva clear  Ear: normal external ear, no discharge, hearing intact  Nose:  no drainage noted  Mouth: mucous membranes moist  Neck: supple, no carotid bruits, thyroid not palpable  Lungs: Bilateral equal air entry, clear to ausculation, no wheezing, rales or rhonchi, normal effort  Cardiovascular: normal rate, regular rhythm, no murmur, gallop, rub.  Abdomen: Soft, nontender, nondistended, normal bowel sounds, no hepatomegaly or splenomegaly  Neurologic: There are no new focal motor or sensory deficits, normal muscle tone and bulk, no abnormal sensation, normal speech, cranial nerves II through XII grossly intact  Skin: No gross lesions, rashes, bruising or bleeding on exposed skin area  Extremities:  peripheral pulses palpable, no pedal edema or calf pain with palpation  Psych: normal affect     Investigations:      Laboratory Testing:  Recent Results (from the past 24 hour(s))   Basic Metabolic Panel w/ Reflex to MG    Collection Time: 24  3:01 AM   Result Value Ref Range    Sodium 139 135 - 144 mmol/L    Potassium 3.7 3.7 - 5.3 mmol/L    Chloride 101 98 - 107 mmol/L    CO2 24 20 - 31 mmol/L    Anion Gap 14 9 - 17 mmol/L    Glucose 128 (H) 70 - 99 mg/dL    BUN 23 8 - 23 mg/dL    Creatinine 1.1 0.7 - 1.2 mg/dL    Est, Glom Filt Rate 70 >60 mL/min/1.73m2    Calcium 8.8 8.6 - 10.4 mg/dL   CBC with auto differential    Collection Time: 24  3:01 AM   Result Value Ref Range    WBC

## 2024-08-25 NOTE — PROGRESS NOTES
08/25/24 1222   Encounter Summary   Encounter Overview/Reason Spiritual/Emotional Needs   Service Provided For Patient   Referral/Consult From Rounding   Complexity of Encounter Low   Spiritual/Emotional needs   Type Spiritual Support   Assessment/Intervention/Outcome   Assessment Unable to assess   Intervention Prayer (assurance of)/[place]

## 2024-08-25 NOTE — PROGRESS NOTES
Call placed to Dr Ladd to updated that patient was sinus rhythm this AM, converted to afib but controlled rate. Around 1230-130 patient afib rate climbing higher and now sustaining more at a rate of 130's-140's . BP stable 's, orders received for 5mg IV lopressor IV.

## 2024-08-25 NOTE — PROGRESS NOTES
not amendable for PCI similar to previous cath 4/11/2024  Patent SVG to diagonal with a patent stent  Patent SVG to RPDA  80% stenosis of SVG to OM reduced to 0% BRENDA     Chest x-ray 8/23/2024  Minimal interstitial edema  Status post CABG     Chest x-ray 5/20/2024  Mild diffuse interstitial prominence may represent mild pulmonary edema or atypical infection     2D echo 4/23/2024  Ejection fraction 55% normal LV wall thickness and wall motion  Moderate to severe mitral regurgitation  Normal RV size and function  IVC not well-visualized    History of present illness     74-year-old male with past medical history of multivessel coronary artery disease, CABG x 4, multiple stents,, recent stent placement in the SVG to OM on 5/22/2024 and stent placement in the SVG to diagonal 4/11/2024,, ejection fraction 55, paroxysmal A-fib, status post Watchman procedure, Parkinson disease, hypothyroidism got hospitalized on 8/23/2024 with a acute onset of palpitation and chest pain.  He said his chest pain was severe like a heavy pressure.  He gets episodes of palpitation normally it lasts only for few minutes but this time palpitation and chest pain persisted. EMS was called and his ECG showed A-fib with RVR.  Nonspecific ST-T changes and a troponin high-sensitivity was 29.  He did not have any fever or chills or abdominal pain or nausea.  He was started on IV amiodarone and he also received IV metoprolol.  His heart rate improved.  I advised to continue with amiodarone infusion and started him on carvedilol 6.25 mg twice daily.  He A-fib converted to sinus rhythm after many hours.     On admission blood pressure was 160/110, oxygen saturation 95%, heart rate 122 -130     WBC 14.4, hemoglobin 9.6, platelets 269  TSH 2.72, free T4 1 point  1 myoglobin 24, proBNP 3481, troponin 29,  Sodium 139, potassium 3.5, BUN 25, creatinine 1.1, magnesium 1.7     Current evaluation  Patient seen and examined in ICU  He was resting with a couple  infarction) ([facility])     S/P angioplasty with stent     CAD, multiple vessel     CAP (community acquired pneumonia)     Angina pectoris ([facility])     Hyperlipidemia     Atrial fibrillation with rapid ventricular response ([facility])      Plan of Treatment:   Medications reviewed  1: Admission 8/23/2024 A-fib with RVR converted to sinus rhythm with amiodarone infusion and beta-blockers, but converted back to A-fib, converted back to sinus rhythm with IV metoprolol 5 mg couple of times ,continue amiodarone 200 mg twice daily  twice a day continue with the carvedilol 6.25 mg twice daily p.o.  2: Multivessel CAD, CABG, multiple stent, diastolic CHF give Lasix 40 mg IV x 1  Continue aspirin 81 mg, Brilinta 90 mg twice a day,  Ranexa 1000 mg twice daily     3: Parkinson disease continue carbidopa levodopa  4: Hypothyroidism continue current dose of levothyroxine 75 mcg  5: Anemia he is on iron supplement  Continue current dose of Remeron, docusate, Carafate and Protonix  Continue ECG monitoring  Keep potassium above 4 and magnesium above 2  Continue ECG monitor  Give Lasix 20 mg IV now and start Bumex 1 mg p.o. daily tomorrow    Electronically signed by Katty Ladd MD on 8/25/2024 at 5:27 PM

## 2024-08-25 NOTE — PROGRESS NOTES
Pulmonary Progress Note  Pulmonary and Critical Care Specialists      Patient - Rowdy Marie,  Age - 74 y.o.    - 1950      Room Number -    MRN -  [id number]   [facility] # - [id number]  Date of Admission -  2024  3:35 AM    Consulting Service/Physician   Consulting - Ian Bautista MD  Primary Care Physician - Yandy Jones MD     SUBJECTIVE   Patient in decent spirits.  Resting quietly.  O2 saturation is 97 to 98% on 2 L  OBJECTIVE   VITALS    height is 1.803 m (5' 11\") and weight is 68 kg (150 lb). His axillary temperature is 97.8 °F (36.6 °C). His blood pressure is 101/56 (abnormal) and his pulse is 97. His respiration is 22 and oxygen saturation is 98%.     Body mass index is 20.92 kg/m².  Temperature Range: Temp: 97.8 °F (36.6 °C) Temp  Av.7 °F (36.5 °C)  Min: 97.5 °F (36.4 °C)  Max: 97.8 °F (36.6 °C)  BP Range:  Systolic (24hrs), Av , Min:85 , Max:146     Diastolic (24hrs), Av, Min:47, Max:92    Pulse Range: Pulse  Av.8  Min: 66  Max: 117  Respiration Range: Resp  Av.3  Min: 14  Max: 25  Current Pulse Ox::  SpO2: 98 %  24HR Pulse Ox Range:  SpO2  Av %  Min: 96 %  Max: 100 %  Oxygen Amount and Delivery: O2 Flow Rate (L/min): 2 L/min    Wt Readings from Last 3 Encounters:   24 68 kg (150 lb)   08/15/24 68 kg (150 lb)   24 69.4 kg (153 lb)       I/O (24 Hours)    Intake/Output Summary (Last 24 hours) at 2024 1547  Last data filed at 2024 0300  Gross per 24 hour   Intake --   Output 650 ml   Net -650 ml       EXAM     General Appearance  Awake, alert, oriented, in no acute distress  HEENT - normocephalic, atraumatic.  Neck - Supple,  trachea midline   Lungs -coarse breath sounds no crackles rales or wheezes  Heart Exam:PMI normal. No lifts, heaves, or thrills. RRR. No murmurs, clicks, gallops, or rubs  Abdomen Exam: Abdomen soft, non-tender. BS normal.  Extremity Exam: No signs of cyanosis    MEDS      metoprolol  5 mg IntraVENous Once     p.o. amiodarone on Coreg  Lovenox for DVT prophylaxis    Transfer out of ICU out of ICU intermediate care area [place]      Electronically signed by Jarrde Ramirez MD on 8/25/2024 at 3:47 PM

## 2024-08-25 NOTE — PLAN OF CARE
Problem: Discharge Planning  Goal: Discharge to home or other facility with appropriate resources  Recent Flowsheet Documentation  Taken 8/25/2024 0000 by Niurka Smith RN  Discharge to home or other facility with appropriate resources:   Identify barriers to discharge with patient and caregiver   Arrange for needed discharge resources and transportation as appropriate  Taken 8/24/2024 2100 by Niurka Smith RN  Discharge to home or other facility with appropriate resources:   Identify barriers to discharge with patient and caregiver   Arrange for needed discharge resources and transportation as appropriate   Identify discharge learning needs (meds, wound care, etc)   Refer to discharge planning if patient needs post-hospital services based on physician order or complex needs related to functional status, cognitive ability or social support system     Problem: Safety - Adult  Goal: Free from fall injury  Recent Flowsheet Documentation  Taken 8/24/2024 2209 by Niurka Smith RN  Free From Fall Injury: Instruct family/caregiver on patient safety     Problem: Pain  Goal: Verbalizes/displays adequate comfort level or baseline comfort level  Recent Flowsheet Documentation  Taken 8/24/2024 2000 by Niurak Smith RN  Verbalizes/displays adequate comfort level or baseline comfort level:   Encourage patient to monitor pain and request assistance   Assess pain using appropriate pain scale   Administer analgesics based on type and severity of pain and evaluate response   Implement non-pharmacological measures as appropriate and evaluate response

## 2024-08-25 NOTE — PLAN OF CARE
Problem: Cardiovascular - Adult  Goal: Maintains optimal cardiac output and hemodynamic stability  Outcome: Progressing  Flowsheets (Taken 8/25/2024 1714)  Maintains optimal cardiac output and hemodynamic stability:   Monitor blood pressure and heart rate   Monitor urine output and notify Licensed Independent Practitioner for values outside of normal range   Assess for signs of decreased cardiac output   Administer fluid and/or volume expanders as ordered

## 2024-08-25 NOTE — PROGRESS NOTES
Writer updated  regarding HR going up to 130's intermittently and continued ectopy. MD states okay for 1x dose coreg and recheck of BMP w/ mag.

## 2024-08-25 NOTE — PLAN OF CARE
Problem: Discharge Planning  Goal: Discharge to home or other facility with appropriate resources  Outcome: Progressing  Flowsheets  Taken 8/25/2024 1713 by Joie Felix RN  Discharge to home or other facility with appropriate resources: Refer to discharge planning if patient needs post-hospital services based on physician order or complex needs related to functional status, cognitive ability or social support system    Problem: Safety - Adult  Goal: Free from fall injury  Outcome: Progressing  Flowsheets (Taken 8/25/2024 1713)  Free From Fall Injury:   Instruct family/caregiver on patient safety   Based on caregiver fall risk screen, instruct family/caregiver to ask for assistance with transferring infant if caregiver noted to have fall risk factors     Problem: Pain  Goal: Verbalizes/displays adequate comfort level or baseline comfort level  Outcome: Progressing  Flowsheets  Taken 8/25/2024 1713 by Joie Felix RN  Verbalizes/displays adequate comfort level or baseline comfort level:   Encourage patient to monitor pain and request assistance   Assess pain using appropriate pain scale   Implement non-pharmacological measures as appropriate and evaluate response    Problem: ABCDS Injury Assessment  Goal: Absence of physical injury  Outcome: Progressing  Flowsheets (Taken 8/25/2024 1713)  Absence of Physical Injury: Implement safety measures based on patient assessment

## 2024-08-26 LAB
ANION GAP SERPL CALCULATED.3IONS-SCNC: 15 MMOL/L (ref 9–17)
BASOPHILS # BLD: 0.1 K/UL (ref 0–0.2)
BASOPHILS NFR BLD: 1 % (ref 0–2)
BUN SERPL-MCNC: 24 MG/DL (ref 8–23)
CALCIUM SERPL-MCNC: 8.9 MG/DL (ref 8.6–10.4)
CHLORIDE SERPL-SCNC: 100 MMOL/L (ref 98–107)
CO2 SERPL-SCNC: 25 MMOL/L (ref 20–31)
CREAT SERPL-MCNC: 1.2 MG/DL (ref 0.7–1.2)
EOSINOPHIL # BLD: 0.7 K/UL (ref 0–0.4)
EOSINOPHILS RELATIVE PERCENT: 10 % (ref 0–4)
ERYTHROCYTE [DISTWIDTH] IN BLOOD BY AUTOMATED COUNT: 18.6 % (ref 11.5–14.9)
GFR, ESTIMATED: 63 ML/MIN/1.73M2
GLUCOSE SERPL-MCNC: 109 MG/DL (ref 70–99)
HCT VFR BLD AUTO: 28 % (ref 41–53)
HGB BLD-MCNC: 9 G/DL (ref 13.5–17.5)
LYMPHOCYTES NFR BLD: 1.6 K/UL (ref 1–4.8)
LYMPHOCYTES RELATIVE PERCENT: 21 % (ref 24–44)
MAGNESIUM SERPL-MCNC: 1.9 MG/DL (ref 1.6–2.6)
MCH RBC QN AUTO: 26.8 PG (ref 26–34)
MCHC RBC AUTO-ENTMCNC: 32 G/DL (ref 31–37)
MCV RBC AUTO: 83.8 FL (ref 80–100)
MONOCYTES NFR BLD: 0.7 K/UL (ref 0.1–1.3)
MONOCYTES NFR BLD: 9 % (ref 1–7)
NEUTROPHILS NFR BLD: 59 % (ref 36–66)
NEUTS SEG NFR BLD: 4.5 K/UL (ref 1.3–9.1)
PLATELET # BLD AUTO: 266 K/UL (ref 150–450)
PMV BLD AUTO: 9.6 FL (ref 6–12)
POTASSIUM SERPL-SCNC: 3.2 MMOL/L (ref 3.7–5.3)
RBC # BLD AUTO: 3.35 M/UL (ref 4.5–5.9)
SODIUM SERPL-SCNC: 140 MMOL/L (ref 135–144)
WBC OTHER # BLD: 7.7 K/UL (ref 3.5–11)

## 2024-08-26 PROCEDURE — 97116 GAIT TRAINING THERAPY: CPT

## 2024-08-26 PROCEDURE — 36415 COLL VENOUS BLD VENIPUNCTURE: CPT

## 2024-08-26 PROCEDURE — 97162 PT EVAL MOD COMPLEX 30 MIN: CPT

## 2024-08-26 PROCEDURE — 6360000002 HC RX W HCPCS: Performed by: NURSE PRACTITIONER

## 2024-08-26 PROCEDURE — 97530 THERAPEUTIC ACTIVITIES: CPT

## 2024-08-26 PROCEDURE — 6370000000 HC RX 637 (ALT 250 FOR IP): Performed by: NURSE PRACTITIONER

## 2024-08-26 PROCEDURE — 99232 SBSQ HOSP IP/OBS MODERATE 35: CPT | Performed by: INTERNAL MEDICINE

## 2024-08-26 PROCEDURE — 94761 N-INVAS EAR/PLS OXIMETRY MLT: CPT

## 2024-08-26 PROCEDURE — 85025 COMPLETE CBC W/AUTO DIFF WBC: CPT

## 2024-08-26 PROCEDURE — 6370000000 HC RX 637 (ALT 250 FOR IP): Performed by: INTERNAL MEDICINE

## 2024-08-26 PROCEDURE — 80048 BASIC METABOLIC PNL TOTAL CA: CPT

## 2024-08-26 PROCEDURE — 2060000000 HC ICU INTERMEDIATE R&B

## 2024-08-26 PROCEDURE — 97166 OT EVAL MOD COMPLEX 45 MIN: CPT

## 2024-08-26 PROCEDURE — 83735 ASSAY OF MAGNESIUM: CPT

## 2024-08-26 PROCEDURE — 2580000003 HC RX 258: Performed by: NURSE PRACTITIONER

## 2024-08-26 PROCEDURE — 6360000002 HC RX W HCPCS: Performed by: INTERNAL MEDICINE

## 2024-08-26 RX ORDER — AMIODARONE HYDROCHLORIDE 200 MG/1
200 TABLET ORAL DAILY
Status: DISCONTINUED | OUTPATIENT
Start: 2024-08-27 | End: 2024-08-27 | Stop reason: HOSPADM

## 2024-08-26 RX ORDER — HYDROCODONE BITARTRATE AND ACETAMINOPHEN 5; 325 MG/1; MG/1
1 TABLET ORAL EVERY 6 HOURS PRN
Status: DISCONTINUED | OUTPATIENT
Start: 2024-08-26 | End: 2024-08-27 | Stop reason: HOSPADM

## 2024-08-26 RX ORDER — CARVEDILOL 3.12 MG/1
3.12 TABLET ORAL 2 TIMES DAILY WITH MEALS
Status: DISCONTINUED | OUTPATIENT
Start: 2024-08-26 | End: 2024-08-26

## 2024-08-26 RX ORDER — CARVEDILOL 6.25 MG/1
6.25 TABLET ORAL 2 TIMES DAILY WITH MEALS
Status: DISCONTINUED | OUTPATIENT
Start: 2024-08-26 | End: 2024-08-27 | Stop reason: HOSPADM

## 2024-08-26 RX ORDER — MIDODRINE HYDROCHLORIDE 5 MG/1
5 TABLET ORAL EVERY 8 HOURS PRN
Status: DISCONTINUED | OUTPATIENT
Start: 2024-08-26 | End: 2024-08-27 | Stop reason: HOSPADM

## 2024-08-26 RX ADMIN — LEVOTHYROXINE SODIUM 75 MCG: 0.07 TABLET ORAL at 05:55

## 2024-08-26 RX ADMIN — PREGABALIN 150 MG: 150 CAPSULE ORAL at 20:43

## 2024-08-26 RX ADMIN — ACETAMINOPHEN 650 MG: 325 TABLET ORAL at 10:17

## 2024-08-26 RX ADMIN — CARBIDOPA AND LEVODOPA 1 TABLET: 25; 100 TABLET ORAL at 13:35

## 2024-08-26 RX ADMIN — SUCRALFATE 1 G: 1 TABLET ORAL at 11:56

## 2024-08-26 RX ADMIN — TICAGRELOR 90 MG: 90 TABLET ORAL at 20:43

## 2024-08-26 RX ADMIN — MIRTAZAPINE 7.5 MG: 15 TABLET, FILM COATED ORAL at 20:43

## 2024-08-26 RX ADMIN — Medication 100 MG: at 08:34

## 2024-08-26 RX ADMIN — SODIUM CHLORIDE, PRESERVATIVE FREE 10 ML: 5 INJECTION INTRAVENOUS at 08:35

## 2024-08-26 RX ADMIN — FERROUS SULFATE TAB 325 MG (65 MG ELEMENTAL FE) 325 MG: 325 (65 FE) TAB at 16:16

## 2024-08-26 RX ADMIN — CARVEDILOL 6.25 MG: 6.25 TABLET, FILM COATED ORAL at 07:48

## 2024-08-26 RX ADMIN — CARVEDILOL 6.25 MG: 6.25 TABLET, FILM COATED ORAL at 16:16

## 2024-08-26 RX ADMIN — FERROUS SULFATE TAB 325 MG (65 MG ELEMENTAL FE) 325 MG: 325 (65 FE) TAB at 11:56

## 2024-08-26 RX ADMIN — PREGABALIN 150 MG: 150 CAPSULE ORAL at 07:47

## 2024-08-26 RX ADMIN — CARBIDOPA AND LEVODOPA 1 TABLET: 25; 100 TABLET ORAL at 20:44

## 2024-08-26 RX ADMIN — SUCRALFATE 1 G: 1 TABLET ORAL at 16:16

## 2024-08-26 RX ADMIN — POTASSIUM CHLORIDE 40 MEQ: 1500 TABLET, EXTENDED RELEASE ORAL at 05:55

## 2024-08-26 RX ADMIN — RANOLAZINE 1000 MG: 500 TABLET, EXTENDED RELEASE ORAL at 20:44

## 2024-08-26 RX ADMIN — ENOXAPARIN SODIUM 40 MG: 100 INJECTION SUBCUTANEOUS at 07:49

## 2024-08-26 RX ADMIN — AMIODARONE HYDROCHLORIDE 200 MG: 200 TABLET ORAL at 07:48

## 2024-08-26 RX ADMIN — SUCRALFATE 1 G: 1 TABLET ORAL at 08:38

## 2024-08-26 RX ADMIN — PANTOPRAZOLE SODIUM 40 MG: 40 TABLET, DELAYED RELEASE ORAL at 05:55

## 2024-08-26 RX ADMIN — BUMETANIDE 1 MG: 0.25 INJECTION INTRAMUSCULAR; INTRAVENOUS at 07:48

## 2024-08-26 RX ADMIN — HYDROCODONE BITARTRATE AND ACETAMINOPHEN 1 TABLET: 5; 325 TABLET ORAL at 11:56

## 2024-08-26 RX ADMIN — TICAGRELOR 90 MG: 90 TABLET ORAL at 08:34

## 2024-08-26 RX ADMIN — SUCRALFATE 1 G: 1 TABLET ORAL at 20:44

## 2024-08-26 RX ADMIN — ASPIRIN 81 MG: 81 TABLET, COATED ORAL at 07:48

## 2024-08-26 RX ADMIN — FLUDROCORTISONE ACETATE 0.1 MG: 0.1 TABLET ORAL at 08:34

## 2024-08-26 RX ADMIN — RANOLAZINE 1000 MG: 500 TABLET, EXTENDED RELEASE ORAL at 08:34

## 2024-08-26 RX ADMIN — FERROUS SULFATE TAB 325 MG (65 MG ELEMENTAL FE) 325 MG: 325 (65 FE) TAB at 07:47

## 2024-08-26 RX ADMIN — SODIUM CHLORIDE, PRESERVATIVE FREE 10 ML: 5 INJECTION INTRAVENOUS at 20:46

## 2024-08-26 RX ADMIN — DOCUSATE SODIUM LIQUID 50 MG: 100 LIQUID ORAL at 20:44

## 2024-08-26 RX ADMIN — HYDROCODONE BITARTRATE AND ACETAMINOPHEN 1 TABLET: 5; 325 TABLET ORAL at 18:12

## 2024-08-26 RX ADMIN — CARBIDOPA AND LEVODOPA 1 TABLET: 25; 100 TABLET ORAL at 07:48

## 2024-08-26 ASSESSMENT — PAIN DESCRIPTION - LOCATION
LOCATION: HEAD

## 2024-08-26 ASSESSMENT — PAIN SCALES - GENERAL
PAINLEVEL_OUTOF10: 0
PAINLEVEL_OUTOF10: 6
PAINLEVEL_OUTOF10: 10
PAINLEVEL_OUTOF10: 10
PAINLEVEL_OUTOF10: 0

## 2024-08-26 ASSESSMENT — PAIN SCALES - WONG BAKER: WONGBAKER_NUMERICALRESPONSE: NO HURT

## 2024-08-26 NOTE — PROGRESS NOTES
Date:   8/26/2024  Patient name: Rowdy Marie  Date of admission:  8/23/2024  3:35 AM  MRN:   [id number]  YOB: 1950  PCP: Yandy Jones MD    Reason for Admission: Atrial fibrillation with rapid ventricular response (HCC) [I48.91]  Chest pain, unspecified type [R07.9]    Cardiology consult: A-fib with RVR, multivessel CAD, chest pain, congestive heart failure diastolic acute on chronic        Referring physician Dr. Aracely Pierre     Impression     Admission 8/23/2024 A-fib with RVR acute onset palpitation, severe chest pain ECG showed A-fib with RVR, high-sensitivity troponin 29, proBNP 3481, chest x-ray showed minimal interstitial edema  A-fib converted to sinus rhythm with amiodarone infusion and beta-blocker     Admission 6/6/2024 chest pain like heaviness he woke up with the chest pain left upper chest different from his previous admission 5/22/2024 started on IV nitroglycerin in ER with improvement, high-sensitivity troponin 20, no obvious ischemic ECG change     Admission 5/20/2024 left-sided chest pain radiating to the left arm and to the neck, no acute ECG changes, high-sensitivity troponin 29, 25, 23, proBNP 1550  Cardiac cath 5/22/2024 right femoral artery access  Patent LIMA to LAD distal LAD disease not amenable for PCI, patent SVG to diagonal with a patent stent, patent SVG to RPDA, 80% stenosis of SVG to OM and reduced to 0% BRENDA by Dr. Love     Admission 4/10/2024 for left chest pain radiating to left shoulder aggravating with the inspiration and shoulder movements almost constant in nature for more than 2 days  ECG showed no ischemic changes high-sensitivity troponin trending upward  Cardiac cath 4/11/2024  Multivessel CAD  Patent LIMA to the LAD with moderate nonobstructive disease in LAD post distal anastomosis  Patent SVG to OM 2 with patent stents  Patent SVG to RPDA  Successful PCI with BRENDA to ostial and mid SVG to diagonal.  Overall preserved LV function    Admission 1/31/2024

## 2024-08-26 NOTE — CARE COORDINATION
ONGOING DISCHARGE PLAN:    Patient is alert and oriented x4.    Spoke with patient regarding discharge plan and patient confirms that plan is still to return to home w/ Family.     Pt. Would like [facility] again, & he has been accepted.     PT/OT on board. Will follow for any rec/needs.     Pt. Is C/O \"Migraine\" today.     Cardio on board. Remains on IV Bumex, PO Amiodarone & PO Coreg.     Sating 98% on RA. Pulmonary on board.     Will continue to follow for additional discharge needs.    If patient is discharged prior to next notation, then this note serves as note for discharge by case management.    Electronically signed by Janice Rolle RN on 8/26/2024 at 1:11 PM

## 2024-08-26 NOTE — PLAN OF CARE
Problem: Discharge Planning  Goal: Discharge to home or other facility with appropriate resources  8/26/2024 1218 by Gerda Cano RN  Outcome: Progressing  Flowsheets  Taken 8/26/2024 1200 by Gerda Cano RN  Discharge to home or other facility with appropriate resources:   Arrange for needed discharge resources and transportation as appropriate   Identify barriers to discharge with patient and caregiver  Taken 8/26/2024 0800 by Gerda Cano RN  Discharge to home or other facility with appropriate resources:   Arrange for needed discharge resources and transportation as appropriate   Identify barriers to discharge with patient and caregiver   Identify discharge learning needs (meds, wound care, etc)     Problem: Safety - Adult  Goal: Free from fall injury  8/26/2024 1218 by Gerda Cano RN  Outcome: Progressing  Flowsheets (Taken 8/26/2024 0800)  Free From Fall Injury:   Instruct family/caregiver on patient safety   Based on caregiver fall risk screen, instruct family/caregiver to ask for assistance with transferring infan     Problem: Pain  Goal: Verbalizes/displays adequate comfort level or baseline comfort level  8/26/2024 1218 by Gerda Cano RN  Outcome: Progressing  Flowsheets  Taken 8/26/2024 1200 by Gerda Cano RN  Verbalizes/displays adequate comfort level or baseline comfort level:   Encourage patient to monitor pain and request assistance   Assess pain using appropriate pain scale  Taken 8/26/2024 0700 by Gerda aCno RN  Verbalizes/displays adequate comfort level or baseline comfort level:   Encourage patient to monitor pain and request assistance   Assess pain using appropriate pain scale     Problem: ABCDS Injury Assessment  Goal: Absence of physical injury  Recent Flowsheet Documentation  Taken 8/26/2024 0800 by Gerda Cano RN  Absence of Physical Injury: Implement safety measures based on patient assessment     Problem: Cardiovascular - Adult  Goal: Maintains optimal cardiac output and

## 2024-08-26 NOTE — PROGRESS NOTES
[facility]   IN-PATIENT SERVICE   [facility]    Progress note            Date:   8/26/2024  Patient name:  Rowdy Marie  Date of admission:  8/23/2024  3:35 AM  MRN:   [id number]  Account:  [id number]  YOB: 1950  PCP:    Yandy Jones MD  Room:   [address] [place] [address]  Code Status:    Full Code    Chief Complaint:     Chief Complaint   Patient presents with    Chest Pain       History Obtained From:     patient    History of Present Illness:     The patient is a 74 y.o.  Non- / non  male who presents with Chest Pain   and he is admitted to the hospital for the management of    Rowdy Marie is a 74 y.o. Non- / non  male who presents with Chest Pain   and is admitted to the hospital for the management of Atrial fibrillation with rapid ventricular response (HCC). Medical history significant for multivessel CAD, CABG x4, atrial fibrillation, s/p watchman's procedure, hypothyroidism and parkinson's disease. Patient underwent cardiac cath with stent placement on 4/11 and 5/22. According to patient, he suddenly developed chest pain and fast heart rate are 1230am. He applied a nitro patch without any relief. Patient states he commonly gets similar symptoms that last less than 30 minutes but symptoms are much worse tonight prompting him to call EMS. -140s, EKG confirms afib with RVR. Started on amiodarone gtt and given 1x dose of metoprolol 5 mg IV. Patient endorses improvement in symptoms once heart rate controlled.   Troponin 29 x2. WBC 14.4 likely reactive, no indications of infection. Denies fever, chills, abdominal pain, nausea, vomiting, diarrhea, and urinary symptoms. Symptoms are reported as acute, constant and severe.      Patient seen and examined, complaining of shortness of breath no chest pain, on amiodarone drip    Past Medical History:     Past Medical History:   Diagnosis Date    A-fib (HCC)     PREET (acute kidney injury) (HCC)     Anemia   Admit as inpatient in the  Progressive Unit/Step down    A-fib with RVR, currently rate controlled, patient does have a history of A-fib, status post Watchman device, not on any anticoagulation, neurology consulted, TSH checked this morning was normal, echocardiogram done in April showed preserved action fraction  CAD status post stent last 1 in April, patient on aspirin and Brilinta  Hypertension currently blood pressure controlled, on Coreg  History of orthostatic hypotension, on Florinef  Elevated proBNP, acute on chronic diastolic heart failure, chest x-ray showed mild interstitial edema no pedal edema, will DC fluids,  Leukocytosis, chest x-ray nonacute, will check UA  Anemia of chronic disease, hemoglobin is 9.6, no active bleeding present, continue to monitor  Parkinson disease on carbidopa levodopa  Hypothyroidism on levothyroxine  Aug 26  Seen examined face-to-face,  74 gentleman admitted with A-fib with RVR, status post Watchman device not on anticoagulation due to underlying anemia, cardiology on board, echocardiogram in April showed ejection fraction preserved, coronary disease with PCI in April on aspirin and Brilinta, initially started on amnio drip, transferred to medical ICU, converted, amnio changed to p.o.,   Pt still going in and out of RVR   overall prognosis poor,  Continue to monitor labs,  Transferred out of ICU,  Continue with physical therapy,  Possible SNF placement,  Severe headache this morning, started on Norco, Tylenol did not help    Consultations:   IP CONSULT TO CARDIOLOGY  IP CONSULT TO SPIRITUAL SERVICES  IP CONSULT TO CRITICAL CARE  IP CONSULT TO CRITICAL CARE     Patient is admitted as inpatient status because of co-morbidities listed above, severity of signs and symptoms as outlined, requirement for current medical therapies and most importantly because of direct risk to patient if care not provided in a hospital setting.    Larissa Oro MD  8/26/2024  2:28 PM    Copy sent to

## 2024-08-26 NOTE — PLAN OF CARE
Problem: Discharge Planning  Goal: Discharge to home or other facility with appropriate resources  8/26/2024 0239 by Niurka Smith, RN  Outcome: Progressing  Flowsheets  Taken 8/26/2024 0000  Discharge to home or other facility with appropriate resources:   Identify barriers to discharge with patient and caregiver   Arrange for needed discharge resources and transportation as appropriate   Identify discharge learning needs (meds, wound care, etc)   Refer to discharge planning if patient needs post-hospital services based on physician order or complex needs related to functional status, cognitive ability or social support system  Taken 8/25/2024 2000  Discharge to home or other facility with appropriate resources:   Identify barriers to discharge with patient and caregiver   Arrange for needed discharge resources and transportation as appropriate   Identify discharge learning needs (meds, wound care, etc)   Refer to discharge planning if patient needs post-hospital services based on physician order or complex needs related to functional status, cognitive ability or social support system     Problem: Safety - Adult  Goal: Free from fall injury  8/26/2024 0239 by Niurka Smith, RN  Outcome: Progressing  Flowsheets (Taken 8/25/2024 2034)  Free From Fall Injury: Instruct family/caregiver on patient safety     Problem: Pain  Goal: Verbalizes/displays adequate comfort level or baseline comfort level  8/26/2024 0239 by Niurka Smith, RN  Outcome: Progressing  Flowsheets  Taken 8/26/2024 0000  Verbalizes/displays adequate comfort level or baseline comfort level:   Encourage patient to monitor pain and request assistance   Assess pain using appropriate pain scale   Administer analgesics based on type and severity of pain and evaluate response   Implement non-pharmacological measures as appropriate and evaluate response  Taken 8/25/2024 2000  Verbalizes/displays adequate comfort level or baseline comfort level:   Encourage

## 2024-08-26 NOTE — PROGRESS NOTES
Patient complaining of severe migraines. Patient stated that he usually takes something OTC for migraines at home. Writer messaged Dr. Oro regarding this.

## 2024-08-26 NOTE — PROGRESS NOTES
Physical Therapy  Facility/Department: [facility] PROGRESSIVE CARE  Physical Therapy Initial Assessment    Name: Rowdy Marie  : 1950  MRN: [id number]  Date of Service: 2024    Discharge Recommendations:  Therapy recommended at discharge, Patient would benefit from continued therapy after discharge   PT Equipment Recommendations  Other: Pt has RW and Quad cane at home      Patient Diagnosis(es): The primary encounter diagnosis was Atrial fibrillation with rapid ventricular response (HCC). A diagnosis of Chest pain, unspecified type was also pertinent to this visit.  Past Medical History:  has a past medical history of A-fib ([facility]), PREET (acute kidney injury) ([facility]), Anemia, CAD (coronary artery disease), Esophageal stricture, Hyperlipidemia, Hypokalemia, Hypomagnesemia, NSTEMI (non-ST elevated myocardial infarction) ([facility]), Orthostatic hypotension, Parkinsons ([facility]), Presence of Watchman left atrial appendage closure device, and Prostate CA ([facility]).  Past Surgical History:  has a past surgical history that includes Coronary artery bypass graft; Cardiac catheterization; Coronary angioplasty with stent; Diagnostic Cardiac Cath Lab Procedure (2024); Cardiac procedure (N/A, 2024); Cardiac procedure (N/A, 2024); Cardiac procedure (N/A, 2024); and Cardiac procedure (N/A, 2024).    Assessment  Assessment: Pt presents with genralized weakness. Will benefit from continued therapy while here as well as at discharge  Treatment Diagnosis: Weakness  Therapy Prognosis: Good  Decision Making: Medium Complexity  History: Hx of parkinson's, CAD, A Fib, CABG, PREET.  Requires PT Follow-Up: Yes  Activity Tolerance  Activity Tolerance: Patient limited by endurance;Patient limited by fatigue    Plan  Physical Therapy Plan  General Plan:  (5 to 6 x/week)  Current Treatment Recommendations: Strengthening, Balance training, Functional mobility training, Transfer training, Endurance training, Gait training, Stair  training, Home exercise program, Safety education & training, Patient/Caregiver education & training, Therapeutic activities  Safety Devices  Type of Devices: All fall risk precautions in place, Bed alarm in place, Call light within reach, Gait belt, Patient at risk for falls, Left in bed, Nurse notified  Restraints  Restraints Initially in Place: No    Restrictions  Restrictions/Precautions  Restrictions/Precautions: Fall Risk, General Precautions, Up as Tolerated  Required Braces or Orthoses?: No  Implants present? : Metal implants (hardware in back, plates R ankle, LUE screw, pins bilat elbow, cardiac stents)     Subjective  Pain: Pt reports 3/10 headache pain. Earlier in a;m pt had severe migraine  General  Chart Reviewed: Yes  Patient assessed for rehabilitation services?: Yes  Additional Pertinent Hx: Rowdy Marie is a 74 y.o. Non- / non  male who presents with Chest Pain   and is admitted to the hospital for the management of Atrial fibrillation with rapid ventricular response (HCC). Medical history significant for multivessel CAD, CABG x4, atrial fibrillation, s/p watchman's procedure, hypothyroidism and parkinson's disease. Patient underwent cardiac cath with stent placement on 4/11 and 5/22. According to patient, he suddenly developed chest pain and fast heart rate are 1230am. He applied a nitro patch without any relief. Patient states he commonly gets similar symptoms that last less than 30 minutes but symptoms are much worse tonight prompting him to call EMS. -140s, EKG confirms afib with RVR. Started on amiodarone gtt and given 1x dose of metoprolol 5 mg IV. Patient endorses improvement in symptoms once heart rate controlled.   Troponin 29 x2. WBC 14.4 likely reactive, no indications of infection. Denies fever, chills, abdominal pain, nausea, vomiting, diarrhea, and urinary symptoms. Symptoms are reported as acute, constant and severe.  Referring Practitioner: Latisha Adrian,

## 2024-08-26 NOTE — PROGRESS NOTES
Pt pleasant and happy to see writer again. He's had migraine yesterday and felt another one coming on. He has such a strong tamiko and wonderful attitude about life and his situation. Pt welcomes prayer.    08/26/24 1927   Encounter Summary   Encounter Overview/Reason Spiritual/Emotional Needs;Follow-up   Service Provided For Patient   Referral/Consult From Rounding   Last Encounter  08/26/24   Complexity of Encounter Moderate   Spiritual/Emotional needs   Type Spiritual Support   Assessment/Intervention/Outcome   Assessment Calm   Intervention Active listening;Discussed illness injury and it’s impact;Explored/Affirmed feelings, thoughts, concerns;Explored Coping Skills/Resources;Discussed relationship with God;Prayer (assurance of)/[place];Sustaining Presence/Ministry of presence   Outcome Comfort;Engaged in conversation;Expressed feelings, needs, and concerns;Expressed Gratitude;Receptive

## 2024-08-26 NOTE — PROGRESS NOTES
Dr. Ladd notified that patient's heart rate keeps dropping in the 30's. MD would like amiodarone changed to daily and coreg dose decreased with parameters added.    Face Mask

## 2024-08-26 NOTE — PROGRESS NOTES
Writer notified  of  pt's sustained sinus bradycardia (HR 40-43) after evening coreg and amio, but asymptomatic and BP stable. MD states this is okay as long as he is asymptomatic and BP remains stables. No new orders at this time.

## 2024-08-26 NOTE — PROGRESS NOTES
ICU Progress Note (Non-Vent)  [facility]    Patient - Rowdy Marie,  Age - 74 y.o.    - 1950      Room Number -    N -  [id number]   [facility] # - [id number]  Date of Admission -  2024  3:35 AM    Events of Past 24 Hours   Episodes of bradycardia overnight but no drop in blood pressure.  Patient alert, oriented denies any chest pain or dyspnea remains on room air  It appears that he is going in and out of sinus rhythm with occasional PACs versus atrial fibrillation    Vitals    height is 1.803 m (5' 11\") and weight is 68 kg (150 lb). His oral temperature is 98 °F (36.7 °C). His blood pressure is 137/75 and his pulse is 65. His respiration is 19 and oxygen saturation is 97%.       Temperature Range: Temp: 98 °F (36.7 °C) Temp  Av.7 °F (36.5 °C)  Min: 97.3 °F (36.3 °C)  Max: 98 °F (36.7 °C)  BP Range:  Systolic (24hrs), Av , Min:101 , Max:158     Diastolic (24hrs), Av, Min:37, Max:87    Pulse Range: Pulse  Av.9  Min: 46  Max: 140  Respiration Range: Resp  Av.4  Min: 13  Max: 30  Current Pulse Ox::  SpO2: 97 %  24HR Pulse Ox Range:  SpO2  Av.4 %  Min: 91 %  Max: 100 %  Oxygen Amount and Delivery: O2 Flow Rate (L/min): 2 L/min    Wt Readings from Last 3 Encounters:   24 68 kg (150 lb)   08/15/24 68 kg (150 lb)   24 69.4 kg (153 lb)     I/O     Intake/Output Summary (Last 24 hours) at 2024 0911  Last data filed at 2024 0835  Gross per 24 hour   Intake 10 ml   Output 1900 ml   Net -1890 ml     DRAIN/TUBE OUTPUT       Invasive Lines   ICP PRESSURE RANGE  No data recorded  CVP PRESSURE RANGE  No data recorded      Medications      metoprolol  5 mg IntraVENous Once    bumetanide  1 mg IntraVENous Daily    carvedilol  6.25 mg Oral Once    aspirin  81 mg Oral Daily    carbidopa-levodopa  1 tablet Oral TID    docusate  50 mg Oral Nightly    coenzyme  with multiple jets  History of non-STEMI  History of placement of Watchman left atrial appendage closure device patient states it is February 2024 at [facility].  History of prostate cancer  History of esophageal stricture  Hypothyroid, on levothyroxine; normal thyroid function on admission  History of orthostatic hypotension-on Florinef  Non-smoker.  No history of asthma or COPD  Patient admits not to be the best historian,\" my memory is shot.\"  Full code      Hemodynamically stable, can move out of ICU  Continue to correct electrolyte abnormalities-potassium replaced  Needs mobilization has not been really out of bed  Would benefit from outpatient pulmonary function testing  Discussed with cardiology      Critical Care Time   0 min    Electronically signed by Balbir Metz MD on 8/26/2024 at 9:11 AM

## 2024-08-27 VITALS
DIASTOLIC BLOOD PRESSURE: 67 MMHG | HEIGHT: 71 IN | TEMPERATURE: 97.5 F | SYSTOLIC BLOOD PRESSURE: 135 MMHG | RESPIRATION RATE: 16 BRPM | WEIGHT: 150 LBS | OXYGEN SATURATION: 100 % | BODY MASS INDEX: 21 KG/M2 | HEART RATE: 63 BPM

## 2024-08-27 LAB
ANION GAP SERPL CALCULATED.3IONS-SCNC: 11 MMOL/L (ref 9–17)
BUN SERPL-MCNC: 24 MG/DL (ref 8–23)
CALCIUM SERPL-MCNC: 8.7 MG/DL (ref 8.6–10.4)
CHLORIDE SERPL-SCNC: 99 MMOL/L (ref 98–107)
CO2 SERPL-SCNC: 25 MMOL/L (ref 20–31)
CREAT SERPL-MCNC: 1.2 MG/DL (ref 0.7–1.2)
GFR, ESTIMATED: 63 ML/MIN/1.73M2
GLUCOSE SERPL-MCNC: 101 MG/DL (ref 70–99)
POTASSIUM SERPL-SCNC: 3.9 MMOL/L (ref 3.7–5.3)
SODIUM SERPL-SCNC: 135 MMOL/L (ref 135–144)

## 2024-08-27 PROCEDURE — 80048 BASIC METABOLIC PNL TOTAL CA: CPT

## 2024-08-27 PROCEDURE — 6360000002 HC RX W HCPCS: Performed by: INTERNAL MEDICINE

## 2024-08-27 PROCEDURE — 6370000000 HC RX 637 (ALT 250 FOR IP): Performed by: NURSE PRACTITIONER

## 2024-08-27 PROCEDURE — 36415 COLL VENOUS BLD VENIPUNCTURE: CPT

## 2024-08-27 PROCEDURE — 2580000003 HC RX 258: Performed by: NURSE PRACTITIONER

## 2024-08-27 PROCEDURE — 6370000000 HC RX 637 (ALT 250 FOR IP): Performed by: INTERNAL MEDICINE

## 2024-08-27 PROCEDURE — 6360000002 HC RX W HCPCS: Performed by: NURSE PRACTITIONER

## 2024-08-27 PROCEDURE — 2500000003 HC RX 250 WO HCPCS: Performed by: INTERNAL MEDICINE

## 2024-08-27 PROCEDURE — 99239 HOSP IP/OBS DSCHRG MGMT >30: CPT | Performed by: INTERNAL MEDICINE

## 2024-08-27 RX ORDER — BUMETANIDE 1 MG/1
1 TABLET ORAL DAILY
Qty: 30 TABLET | Refills: 2 | Status: SHIPPED | OUTPATIENT
Start: 2024-08-27

## 2024-08-27 RX ORDER — CARVEDILOL 6.25 MG/1
6.25 TABLET ORAL 2 TIMES DAILY WITH MEALS
Qty: 60 TABLET | Refills: 3 | Status: SHIPPED | OUTPATIENT
Start: 2024-08-27

## 2024-08-27 RX ORDER — FLUDROCORTISONE ACETATE 0.1 MG/1
TABLET ORAL
Qty: 7 TABLET | Refills: 0 | Status: SHIPPED | OUTPATIENT
Start: 2024-08-27

## 2024-08-27 RX ORDER — AMIODARONE HYDROCHLORIDE 200 MG/1
200 TABLET ORAL DAILY
Qty: 30 TABLET | Refills: 0 | Status: SHIPPED | OUTPATIENT
Start: 2024-08-28

## 2024-08-27 RX ADMIN — PANTOPRAZOLE SODIUM 40 MG: 40 TABLET, DELAYED RELEASE ORAL at 05:50

## 2024-08-27 RX ADMIN — SODIUM CHLORIDE, PRESERVATIVE FREE 10 ML: 5 INJECTION INTRAVENOUS at 07:40

## 2024-08-27 RX ADMIN — HYDROCODONE BITARTRATE AND ACETAMINOPHEN 1 TABLET: 5; 325 TABLET ORAL at 07:37

## 2024-08-27 RX ADMIN — BUMETANIDE 1 MG: 0.25 INJECTION INTRAMUSCULAR; INTRAVENOUS at 07:40

## 2024-08-27 RX ADMIN — CARVEDILOL 6.25 MG: 6.25 TABLET, FILM COATED ORAL at 07:39

## 2024-08-27 RX ADMIN — HYDROCODONE BITARTRATE AND ACETAMINOPHEN 1 TABLET: 5; 325 TABLET ORAL at 01:26

## 2024-08-27 RX ADMIN — SUCRALFATE 1 G: 1 TABLET ORAL at 16:12

## 2024-08-27 RX ADMIN — FERROUS SULFATE TAB 325 MG (65 MG ELEMENTAL FE) 325 MG: 325 (65 FE) TAB at 07:39

## 2024-08-27 RX ADMIN — SUCRALFATE 1 G: 1 TABLET ORAL at 12:05

## 2024-08-27 RX ADMIN — ENOXAPARIN SODIUM 40 MG: 100 INJECTION SUBCUTANEOUS at 07:40

## 2024-08-27 RX ADMIN — SUCRALFATE 1 G: 1 TABLET ORAL at 07:38

## 2024-08-27 RX ADMIN — AMIODARONE HYDROCHLORIDE 200 MG: 200 TABLET ORAL at 07:38

## 2024-08-27 RX ADMIN — PREGABALIN 150 MG: 150 CAPSULE ORAL at 07:39

## 2024-08-27 RX ADMIN — METOPROLOL TARTRATE 5 MG: 1 INJECTION, SOLUTION INTRAVENOUS at 01:52

## 2024-08-27 RX ADMIN — TICAGRELOR 90 MG: 90 TABLET ORAL at 07:38

## 2024-08-27 RX ADMIN — FERROUS SULFATE TAB 325 MG (65 MG ELEMENTAL FE) 325 MG: 325 (65 FE) TAB at 12:05

## 2024-08-27 RX ADMIN — RANOLAZINE 1000 MG: 500 TABLET, EXTENDED RELEASE ORAL at 07:38

## 2024-08-27 RX ADMIN — CARVEDILOL 6.25 MG: 6.25 TABLET, FILM COATED ORAL at 16:12

## 2024-08-27 RX ADMIN — CARBIDOPA AND LEVODOPA 1 TABLET: 25; 100 TABLET ORAL at 07:38

## 2024-08-27 RX ADMIN — LEVOTHYROXINE SODIUM 75 MCG: 0.07 TABLET ORAL at 05:50

## 2024-08-27 RX ADMIN — HYDROCODONE BITARTRATE AND ACETAMINOPHEN 1 TABLET: 5; 325 TABLET ORAL at 16:12

## 2024-08-27 RX ADMIN — ASPIRIN 81 MG: 81 TABLET, COATED ORAL at 07:38

## 2024-08-27 RX ADMIN — CARBIDOPA AND LEVODOPA 1 TABLET: 25; 100 TABLET ORAL at 12:05

## 2024-08-27 RX ADMIN — FERROUS SULFATE TAB 325 MG (65 MG ELEMENTAL FE) 325 MG: 325 (65 FE) TAB at 16:12

## 2024-08-27 RX ADMIN — Medication 100 MG: at 07:40

## 2024-08-27 ASSESSMENT — PAIN SCALES - GENERAL
PAINLEVEL_OUTOF10: 5
PAINLEVEL_OUTOF10: 9
PAINLEVEL_OUTOF10: 9
PAINLEVEL_OUTOF10: 0

## 2024-08-27 ASSESSMENT — PAIN DESCRIPTION - DESCRIPTORS
DESCRIPTORS: THROBBING
DESCRIPTORS: ACHING
DESCRIPTORS: THROBBING

## 2024-08-27 ASSESSMENT — PAIN DESCRIPTION - ORIENTATION
ORIENTATION: MID

## 2024-08-27 ASSESSMENT — PAIN DESCRIPTION - LOCATION
LOCATION: CHEST
LOCATION: HEAD
LOCATION: HEAD

## 2024-08-27 ASSESSMENT — PAIN DESCRIPTION - FREQUENCY: FREQUENCY: CONTINUOUS

## 2024-08-27 ASSESSMENT — PAIN - FUNCTIONAL ASSESSMENT
PAIN_FUNCTIONAL_ASSESSMENT: ACTIVITIES ARE NOT PREVENTED
PAIN_FUNCTIONAL_ASSESSMENT: ACTIVITIES ARE NOT PREVENTED
PAIN_FUNCTIONAL_ASSESSMENT: PREVENTS OR INTERFERES SOME ACTIVE ACTIVITIES AND ADLS

## 2024-08-27 ASSESSMENT — PAIN DESCRIPTION - ONSET: ONSET: ON-GOING

## 2024-08-27 ASSESSMENT — PAIN DESCRIPTION - PAIN TYPE: TYPE: CHRONIC PAIN

## 2024-08-27 ASSESSMENT — PAIN DESCRIPTION - DIRECTION: RADIATING_TOWARDS: DENIES

## 2024-08-27 NOTE — PROGRESS NOTES
Physical Therapy  DATE: 2024    NAME: Rowdy Marie  MRN: [id number]   : 1950    Patient not seen this date for Physical Therapy due to:      [] Cancel by RN or physician due to:    [] Hemodialysis    [] Critical Lab Value Level     [] Blood transfusion in progress    [] Acute or unstable cardiovascular status   _MAP < 55 or more than >115  _HR < 40 or > 130    [] Acute or unstable pulmonary status   -FiO2 > 60%   _RR < 5 or >40    _O2 sats < 85%    [] Strict Bedrest    [] Off Unit for surgery or procedure    [] Off Unit for testing       [] Pending imaging to R/O fracture    [x] Refusal by Patient; checked on pt @ 0912. Pt lying in bed stating he has a migraine. Defers therapy at this time. Will continue to follow.       [] Other      [] PT being discontinued at this time. Patient independent. No further needs.     [] PT being discontinued at this time as the patient has been transferred to hospice care. No further needs.      Electronically signed by Tammy Middleton PTA on 24 at 1:28 PM EDT

## 2024-08-27 NOTE — DISCHARGE INSTR - DIET

## 2024-08-27 NOTE — PROGRESS NOTES
CLINICAL PHARMACY NOTE: MEDS TO BEDS    Total # of Prescriptions Filled: 4   The following medications were delivered to the patient:  Bumetanide 1MG Tablets  Amiodarone HCL 200MG Tablets  Fludrocortisone Acetate 0.1MG Tablets  Carvedilol 6.25MG Tablets   Additional Documentation:  Delivered to the room and signed for by the PT at 12:31PM 8/27/24

## 2024-08-27 NOTE — CARE COORDINATION
ONGOING DISCHARGE PLAN:    Patient is alert and oriented x4.    Spoke with patient regarding discharge plan and patient confirms that plan is still  to return to home w/ Family.     Pt. Will have [facility] at home.     PT/OT on board. Rec Home W/ Assist.      Pt stilll C/O SL. \"Migraine\" today.     Cardio on board. Remains on IV Bumex, PO Amiodarone & PO Coreg. Per Notes, Pt. Has Watchman.    Pt sating 98% on RA. Pulmonary on board.       Will continue to follow for additional discharge needs.    If patient is discharged prior to next notation, then this note serves as note for discharge by case management.    Electronically signed by Janice Rolle RN on 8/27/2024 at 9:54 AM

## 2024-08-27 NOTE — PROGRESS NOTES
Discharge teaching and instructions for diagnosis of Afib, amiodarone, and coreg completed with patient and daughter-in-law using teachback method. AVS reviewed. Prescriptions delivered with meds to beds. Patient voiced understanding regarding prescriptions, follow up appointments, and care of self at home. Denies questions. IV d/c'd with cath intact and drsg applied. Skin Pk/W/D. Easy respirations. Denies pain. D/c'd with all belongings. Discharged in a wheelchair to  home with support per family

## 2024-08-27 NOTE — PROGRESS NOTES
[facility]   IN-PATIENT SERVICE   [facility]    Progress note            Date:   8/27/2024  Patient name:  Rowdy Marie  Date of admission:  8/23/2024  3:35 AM  MRN:   [id number]  Account:  [id number]  YOB: 1950  PCP:    Yandy Jones MD  Room:   [address] [place] [address]  Code Status:    Full Code    Chief Complaint:     Chief Complaint   Patient presents with    Chest Pain       History Obtained From:     patient    History of Present Illness:     The patient is a 74 y.o.  Non- / non  male who presents with Chest Pain   and he is admitted to the hospital for the management of    Rowdy Marie is a 74 y.o. Non- / non  male who presents with Chest Pain   and is admitted to the hospital for the management of Atrial fibrillation with rapid ventricular response (HCC). Medical history significant for multivessel CAD, CABG x4, atrial fibrillation, s/p watchman's procedure, hypothyroidism and parkinson's disease. Patient underwent cardiac cath with stent placement on 4/11 and 5/22. According to patient, he suddenly developed chest pain and fast heart rate are 1230am. He applied a nitro patch without any relief. Patient states he commonly gets similar symptoms that last less than 30 minutes but symptoms are much worse tonight prompting him to call EMS. -140s, EKG confirms afib with RVR. Started on amiodarone gtt and given 1x dose of metoprolol 5 mg IV. Patient endorses improvement in symptoms once heart rate controlled.   Troponin 29 x2. WBC 14.4 likely reactive, no indications of infection. Denies fever, chills, abdominal pain, nausea, vomiting, diarrhea, and urinary symptoms. Symptoms are reported as acute, constant and severe.      Patient seen and examined, complaining of shortness of breath no chest pain, on amiodarone drip    Past Medical History:     Past Medical History:   Diagnosis Date    A-fib (HCC)     PREET (acute kidney injury) (HCC)     Anemia   anemia, cardiology on board, echocardiogram in April showed ejection fraction preserved, coronary disease with PCI in April on aspirin and Brilinta, initially started on amnio drip, transferred to medical ICU, converted, amnio changed to p.o.,   Pt still going in and out of RVR   overall prognosis poor,  Continue to monitor labs,  Transferred out of ICU,  Continue with physical therapy,  Possible SNF placement,  Severe headache this morning, started on Norco, Tylenol did not help        August 27  Patient seen and examined  Patient states that he feels good was slightly wobbly today, states that he has not gotten up to bed, but he feels good to go home, will observe next couple of hours since patient just got up and started going to the bathroom, stating that his grandsons, coming another 5 hours, will monitor,  Patient also had hypokalemia which was replaced check BMP today  Silvano was DC'd, will taper at the time of discharge instructions return, patient to take 1 tablet daily then decrease to every 48 hours until seen by PCP  But denies any dizziness lightheadedness chest pain shortness of breath  Rate controlled  Anemia of chronic disease  Discharge today P  Consultations:   IP CONSULT TO CARDIOLOGY  IP CONSULT TO SPIRITUAL SERVICES  IP CONSULT TO CRITICAL CARE  IP CONSULT TO CRITICAL CARE     Patient is admitted as inpatient status because of co-morbidities listed above, severity of signs and symptoms as outlined, requirement for current medical therapies and most importantly because of direct risk to patient if care not provided in a hospital setting.    Aracely Pierre MD  8/27/2024  10:53 AM    Copy sent to Yandy Haddad MD    Please note that this chart was generated using voice recognition Dragon dictation software.  Although every effort was made to ensure the accuracy of this automated transcription, some errors in transcription may have occurred.

## 2024-08-27 NOTE — PROGRESS NOTES
[facility]   Occupational Therapy Evaluation  Date: 24  Patient Name: Rowdy Marie       Room: 7-01  MRN: [id number]  Account: [id number]   : 1950  (74 y.o.) Gender: male     Discharge Recommendations:  Further Occupational Therapy is recommended upon facility discharge.    OT Equipment Recommendations  Other: TBD    Referring Practitioner: Latisha Adrian APRN - NP  Diagnosis: Atrial fibrillation with rapid ventricular response     Treatment Diagnosis: Impaired self-care status    Past Medical History:  has a past medical history of A-fib ([facility]), PREET (acute kidney injury) ([facility]), Anemia, CAD (coronary artery disease), Esophageal stricture, Hyperlipidemia, Hypokalemia, Hypomagnesemia, NSTEMI (non-ST elevated myocardial infarction) ([facility]), Orthostatic hypotension, Parkinsons ([facility]), Presence of Watchman left atrial appendage closure device, and Prostate CA ([facility]).    Past Surgical History:   has a past surgical history that includes Coronary artery bypass graft; Cardiac catheterization; Coronary angioplasty with stent; Diagnostic Cardiac Cath Lab Procedure (2024); Cardiac procedure (N/A, 2024); Cardiac procedure (N/A, 2024); Cardiac procedure (N/A, 2024); and Cardiac procedure (N/A, 2024).    Restrictions  Restrictions/Precautions  Restrictions/Precautions: Fall Risk, General Precautions, Up as Tolerated  Required Braces or Orthoses?: No  Implants present? : Metal implants (hardware in back, plates R ankle, LUE screw, pins bilat elbow, cardiac stents)      Vitals  Vitals  O2 Device: None (Room air)     Subjective  Subjective: \"It's not all the way gone but it's a lot better\" pt states about migraine  Comments: Okay for OT/PT eval per RN Gerda  Subjective  Pain: Pt reports 3/10 headache pain    Social/Functional History  Social/Functional History  Lives With: Son, Family (Daughter in law and grandson)  Type of Home: House  Home Layout: Multi-level,

## 2024-08-27 NOTE — PROGRESS NOTES
[facility] PULMONARY,CRITICAL CARE & SLEEP   Jarred Ramirez MD/Balbir COBURN AGAP-BC, NP-C      Lily COBURN NP-C     Maldonado COBURN NP-C                                          Pulmonary Progress Note    Patient - Rowdy Marie   Age - 74 y.o.   - 1950  MRN - [id number]  Acct # - [id number]  Date of Admission - 2024  3:35 AM    Consulting Service/Physician:       Primary Care Physician: Yandy Jones MD    SUBJECTIVE:     Chief Complaint:   Chief Complaint   Patient presents with    Chest Pain     Subjective:    Rowdy is seen lying in bed on room air.  He states he does have some mild shortness of breath with exertion.  He reports seeing a pulmonologist in [place] 4 to 5 years ago due to dyspnea on exertion.  He denies a history of asthma or COPD.  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  He is being diuresed with Bumex.    VITALS  /83   Pulse 70   Temp 97.8 °F (36.6 °C) (Oral)   Resp 16   Ht 1.803 m (5' 11\")   Wt 68 kg (150 lb)   SpO2 98%   BMI 20.92 kg/m²   Wt Readings from Last 3 Encounters:   24 68 kg (150 lb)   08/15/24 68 kg (150 lb)   24 69.4 kg (153 lb)     I/O (24 Hours)    Intake/Output Summary (Last 24 hours) at 2024 0946  Last data filed at 2024 0618  Gross per 24 hour   Intake 300 ml   Output 1100 ml   Net -800 ml     Ventilator:      Exam:   Physical Exam   Constitutional: Thin elderly gentleman lying in bed on room air no distress.   HENT: Unremarkable  Head: Normocephalic and atraumatic.   Eyes: EOM are normal. Pupils are equal, round, and reactive to light.   Neck: Neck supple.   Cardiovascular:  Regular rate and rhythm.  Normal heart tones.  No JVD.    Pulmonary/Chest: Respirations even and unlabored, lungs clear anteriorly, on room air with pulse ox 98%   Abdominal: Soft. Bowel sounds are normal.   Musculoskeletal: Normal range of motion.   Neurological: Patient is alert and  IntraVENous, Q6H PRN, Latisha Adrian, APRN - NP    melatonin tablet 3 mg, 3 mg, Oral, Nightly PRN, Latisha Adrian APRN - NP, 3 mg at 08/23/24 8746    polyethylene glycol (GLYCOLAX) packet 17 g, 17 g, Oral, Daily PRN, Latisha Adrian, APRN - NP    bisacodyl (DULCOLAX) suppository 10 mg, 10 mg, Rectal, Daily PRN, Latisha Adrian APRN - NP    acetaminophen (TYLENOL) tablet 650 mg, 650 mg, Oral, Q6H PRN, 650 mg at 08/26/24 1017 **OR** acetaminophen (TYLENOL) suppository 650 mg, 650 mg, Rectal, Q6H PRN, Latisha Adrian APRN - NP    Lab Results:     Lab Results   Component Value Date    WBC 7.7 08/26/2024    HGB 9.0 (L) 08/26/2024    HCT 28.0 (L) 08/26/2024    MCV 83.8 08/26/2024     08/26/2024     Lab Results   Component Value Date    CALCIUM 8.9 08/26/2024     08/26/2024    K 3.2 (L) 08/26/2024    CO2 25 08/26/2024     08/26/2024    BUN 24 (H) 08/26/2024    CREATININE 1.2 08/26/2024       Lab Results   Component Value Date    INR 1.1 08/23/2024    PROTIME 14.5 08/23/2024       Radiology:       ASSESSMENT:       Acute respiratory failure with hypoxemia-resolved; probably secondary to pulmonary edema  Atrial fibrillation.  Patient had initial episode of RVR.  Course was notable for some bradycardia in the 40s.  Amiodarone drip stopped.  Patient transferred to ICU  Patient is status post coronary bypass graft ,2014,---- LIMA to LAD, SVG to diagonal, SVG to R PDA.,  SVG to OM.  [facility] (Patient cannot remember the thoracic surgeon)  Status post cardiac catheterization with stent placement SVG to OM May 2024, Dr. Marcie Love  2D echo April 2024 ejection fraction 55%.  Moderate to severe regurgitation with multiple jets  History of non-STEMI  History of placement of Watchman left atrial appendage closure device patient states it is February 2024 at [facility].  History of prostate cancer  History of esophageal stricture  Hypothyroid, on levothyroxine; normal thyroid function on

## 2024-08-27 NOTE — CARE COORDINATION
Continuity of Care Form    Patient Name: Rowdy Marie   :  1950  MRN:  [id number]    Admit date:  2024  Discharge date:  2024    Code Status Order: Full Code   Advance Directives:   Advance Care Flowsheet Documentation             Admitting Physician:  Ian Bautista MD  PCP: Yandy Jones MD    Discharging Nurse[facility]  Discharging Unit Phone Number: [phone]    Emergency Contact:   Extended Emergency Contact Information  Primary Emergency Contact: Elliott Marie  Home Phone: [phone]  Relation: Child  Secondary Emergency Contact: Marie,Dan  Relation: Child    Past Surgical History:  Past Surgical History:   Procedure Laterality Date    CARDIAC CATHETERIZATION      CARDIAC PROCEDURE N/A 2024    sunny / Left heart cath / coronary angiography / op scmh performed by Andree Paulino MD at [facility] CARDIAC CATH LAB    CARDIAC PROCEDURE N/A 2024    Percutaneous coronary intervention performed by Andree aPulino MD at [facility] CARDIAC CATH LAB    CARDIAC PROCEDURE N/A 2024    taleb / Left heart cath / coronary angiography / op scmh performed by Antonio Love MD at [facility] CARDIAC CATH LAB    CARDIAC PROCEDURE N/A 2024    Percutaneous coronary intervention performed by Antonio Love MD at [facility] CARDIAC CATH LAB    CORONARY ANGIOPLASTY WITH STENT PLACEMENT      8 stents prior to CABG per patient, most recent 2023    CORONARY ARTERY BYPASS GRAFT          DIAGNOSTIC CARDIAC CATH LAB PROCEDURE  2024       Immunization History:   Immunization History   Administered Date(s) Administered    COVID-19, J&J, (age 18y+), IM, 0.5 mL 2021, 2022       Active Problems:  Patient Active Problem List   Diagnosis Code    Hypotension I95.9    Mild anemia D64.9    Chronic atrial fibrillation (HCC) I48.20    Coronary artery disease involving coronary bypass graft of native heart without angina pectoris I25.810    S/P CABG  done    Treatments at the Time of Hospital Discharge:   Respiratory Treatments: N/A  Oxygen Therapy:  is not on home oxygen therapy.  Ventilator:    - No ventilator support    Rehab Therapies: Physical Therapy and Occupational Therapy  Weight Bearing Status/Restrictions: No weight bearing restrictions  Other Medical Equipment (for information only, NOT a DME order):  cane, walker, and bath bench  Other Treatments: Skilled Nursing assessment and monitoring. Medication education and monitoring per protocol.      Patient's personal belongings (please select all that are sent with patient):      RN SIGNATURE:  Electronically signed by Rena Melvin RN on 8/27/24 at 12:58 PM EDT    CASE MANAGEMENT/SOCIAL WORK SECTION    Inpatient Status Date: 8/23/2024    Readmission Risk Assessment Score:  Readmission Risk              Risk of Unplanned Readmission:  38           Discharging to Facility/ Agency   [facility]  [address]  [facility] [address]  Phone [phone]  Fax  [phone]     Dialysis Facility (if applicable)   Name:  Address:  Dialysis Schedule:  Phone:  Fax:    / signature: Electronically signed by Sabina Lucas RN on 8/23/24 at 12:58 PM EDT    PHYSICIAN SECTION    Prognosis: Good    Condition at Discharge: Stable    Rehab Potential (if transferring to Rehab): Good    Recommended Labs or Other Treatments After Discharge:     Physician Certification: I certify the above information and transfer of Rowdy Marie  is necessary for the continuing treatment of the diagnosis listed and that he requires Home Care for less 30 days.     Update Admission H&P: No change in H&P    PHYSICIAN SIGNATURE:  Electronically signed by Aracely Pierre MD on 8/27/24 at 4:01 PM EDT    Medication List    START taking these medications    START taking these medications  amiodarone 200 MG tablet  Commonly known as: CORDARONE  Take 1 tablet by mouth daily  Start taking on: August 28,

## 2024-08-27 NOTE — PROGRESS NOTES
labs checked  He was resting on bed  He had transient bradycardia yesterday, dose of amiodarone reduced to 200 mg once a day  He did not appear in any significant distress  Neck veins were normal lungs were clear on auscultation no peripheral  ECG monitor sinus rhythm    Blood pressure 110/60 supine 110/60 sitting and 100/60 standing ,heart rate 65 supine oxygen saturation 99% room air  Potassium 3.9, sodium 135, creatinine 1.2, BUN 24, magnesium was 1.9 yesterday  Hemoglobin 9.9      Medications:   Scheduled Meds:   amiodarone  200 mg Oral Daily    carvedilol  6.25 mg Oral BID WC    metoprolol  5 mg IntraVENous Once    bumetanide  1 mg IntraVENous Daily    carvedilol  6.25 mg Oral Once    aspirin  81 mg Oral Daily    carbidopa-levodopa  1 tablet Oral TID    docusate  50 mg Oral Nightly    coenzyme Q10  100 mg Oral Daily    pantoprazole  40 mg Oral QAM AC    ferrous sulfate  325 mg Oral TID WC    levothyroxine  75 mcg Oral Daily    mirtazapine  7.5 mg Oral Nightly    nitroGLYCERIN  1 patch TransDERmal Daily    pregabalin  150 mg Oral BID    ranolazine  1,000 mg Oral BID    sucralfate  1 g Oral 4x Daily    ticagrelor  90 mg Oral BID    sodium chloride flush  5-40 mL IntraVENous 2 times per day    enoxaparin  40 mg SubCUTAneous Daily     Continuous Infusions:   sodium chloride 5 mL/hr at 08/23/24 2300     CBC:   Recent Labs     08/25/24  0301 08/26/24  0410   WBC 7.0 7.7   HGB 10.7* 9.0*    266     BMP:    Recent Labs     08/25/24  0301 08/26/24  0410 08/27/24  1119    140 135   K 3.7 3.2* 3.9    100 99   CO2 24 25 25   BUN 23 24* 24*   CREATININE 1.1 1.2 1.2   GLUCOSE 128* 109* 101*     Hepatic: No results for input(s): \"AST\", \"ALT\", \"BILITOT\", \"ALKPHOS\" in the last 72 hours.    Invalid input(s): \"ALB\"  Troponin: No results for input(s): \"TROPONINI\" in the last 72 hours.  BNP: No results for input(s): \"BNP\" in the last 72 hours.  Lipids: No results for input(s): \"CHOL\", \"HDL\" in the last 72

## 2024-08-28 ENCOUNTER — HOSPITAL ENCOUNTER (OUTPATIENT)
Dept: MRI IMAGING | Age: 74
Discharge: HOME | End: 2024-08-30
Attending: INTERNAL MEDICINE
Payer: MEDICARE

## 2024-08-28 ENCOUNTER — HOSPITAL ENCOUNTER (OUTPATIENT)
Dept: CT IMAGING | Age: 74
Discharge: HOME | End: 2024-08-30
Attending: INTERNAL MEDICINE
Payer: MEDICARE

## 2024-08-28 ENCOUNTER — HOSPITAL ENCOUNTER (OUTPATIENT)
Dept: NUCLEAR MEDICINE | Age: 74
Discharge: HOME | End: 2024-08-30
Attending: INTERNAL MEDICINE
Payer: MEDICARE

## 2024-08-28 DIAGNOSIS — N28.89 RIGHT RENAL MASS: ICD-10-CM

## 2024-08-28 DIAGNOSIS — M89.8X5 LYTIC BONE LESION OF HIP: ICD-10-CM

## 2024-08-28 PROCEDURE — 2580000003 HC RX 258: Performed by: INTERNAL MEDICINE

## 2024-08-28 PROCEDURE — 3430000000 HC RX DIAGNOSTIC RADIOPHARMACEUTICAL: Performed by: INTERNAL MEDICINE

## 2024-08-28 PROCEDURE — 74183 MRI ABD W/O CNTR FLWD CNTR: CPT

## 2024-08-28 PROCEDURE — 6360000004 HC RX CONTRAST MEDICATION: Performed by: INTERNAL MEDICINE

## 2024-08-28 PROCEDURE — 74177 CT ABD & PELVIS W/CONTRAST: CPT

## 2024-08-28 PROCEDURE — A9503 TC99M MEDRONATE: HCPCS | Performed by: INTERNAL MEDICINE

## 2024-08-28 PROCEDURE — A9579 GAD-BASE MR CONTRAST NOS,1ML: HCPCS | Performed by: INTERNAL MEDICINE

## 2024-08-28 PROCEDURE — 78306 BONE IMAGING WHOLE BODY: CPT | Performed by: INTERNAL MEDICINE

## 2024-08-28 RX ORDER — SODIUM CHLORIDE 0.9 % (FLUSH) 0.9 %
10 SYRINGE (ML) INJECTION PRN
Status: DISCONTINUED | OUTPATIENT
Start: 2024-08-28 | End: 2024-08-31 | Stop reason: HOSPADM

## 2024-08-28 RX ORDER — TC 99M MEDRONATE 20 MG/10ML
25 INJECTION, POWDER, LYOPHILIZED, FOR SOLUTION INTRAVENOUS
Status: COMPLETED | OUTPATIENT
Start: 2024-08-28 | End: 2024-08-28

## 2024-08-28 RX ORDER — 0.9 % SODIUM CHLORIDE 0.9 %
45 INTRAVENOUS SOLUTION INTRAVENOUS ONCE
Status: COMPLETED | OUTPATIENT
Start: 2024-08-28 | End: 2024-08-28

## 2024-08-28 RX ORDER — 0.9 % SODIUM CHLORIDE 0.9 %
100 INTRAVENOUS SOLUTION INTRAVENOUS ONCE
Status: COMPLETED | OUTPATIENT
Start: 2024-08-28 | End: 2024-08-28

## 2024-08-28 RX ORDER — IOPAMIDOL 755 MG/ML
75 INJECTION, SOLUTION INTRAVASCULAR
Status: COMPLETED | OUTPATIENT
Start: 2024-08-28 | End: 2024-08-28

## 2024-08-28 RX ADMIN — IOPAMIDOL 75 ML: 755 INJECTION, SOLUTION INTRAVENOUS at 09:47

## 2024-08-28 RX ADMIN — SODIUM CHLORIDE, PRESERVATIVE FREE 10 ML: 5 INJECTION INTRAVENOUS at 11:23

## 2024-08-28 RX ADMIN — SODIUM CHLORIDE, PRESERVATIVE FREE 10 ML: 5 INJECTION INTRAVENOUS at 09:25

## 2024-08-28 RX ADMIN — TC 99M MEDRONATE 24.5 MILLICURIE: 20 INJECTION, POWDER, LYOPHILIZED, FOR SOLUTION INTRAVENOUS at 09:25

## 2024-08-28 RX ADMIN — SODIUM CHLORIDE 100 ML: 9 INJECTION, SOLUTION INTRAVENOUS at 09:47

## 2024-08-28 RX ADMIN — GADOTERIDOL 15 ML: 279.3 INJECTION, SOLUTION INTRAVENOUS at 11:23

## 2024-08-28 RX ADMIN — SODIUM CHLORIDE, PRESERVATIVE FREE 10 ML: 5 INJECTION INTRAVENOUS at 09:47

## 2024-08-28 RX ADMIN — SODIUM CHLORIDE 45 ML: 9 INJECTION, SOLUTION INTRAVENOUS at 11:23

## 2024-08-29 NOTE — PROGRESS NOTES
Physician Progress Note      PATIENT:               RADHA IVEY  CSN #:                  [id number]  :                       1950  ADMIT DATE:       2024 3:35 AM  DISCH DATE:        2024 6:14 PM  RESPONDING  PROVIDER #:        Aracely Pierre MD          QUERY TEXT:    Patient admitted with AFIB. Noted documentation of acute respiratory failure   in PN on . In order to support the diagnosis of acute respiratory   failure, please include additional clinical indicators in your documentation.    Or please document if the diagnosis of acute respiratory failure has been   ruled out after further study.    The medical record reflects the following:  Risk Factors: CHF,CAD  Clinical Indicators: PN -Acute respiratory failure with   hypoxemia-resolved; probably secondary to pulmonary edema  PN -Pulmonary/Chest: Respirations even and unlabored, lungs clear   anteriorly, on room air with pulse ox 98%  PN -RESPIRATORY:  negative for shortness of breath, cough, congestion,   wheezing. Lungs: Bilateral equal air entry, clear to auscultation, no   wheezing, rales or rhonchi, normal effort  SPO2-%,RR-26,31,32,21,20  Treatment: 2L,Pulse oxymonitoring    Thank you  RAMON Cotter,CDS  Options provided:  -- Acute Respiratory Failure as evidenced by, Please document evidence.  -- Acute Respiratory Failure ruled out after study  -- Other - I will add my own diagnosis  -- Disagree - Not applicable / Not valid  -- Disagree - Clinically unable to determine / Unknown  -- Refer to Clinical Documentation Reviewer    PROVIDER RESPONSE TEXT:    Acute Respiratory Failure has been ruled out after study.    Query created by: Vahe Bai on 2024 12:29 AM      Electronically signed by:  Aracely Pierre MD 2024 3:48 PM

## 2024-08-30 NOTE — RESULT ENCOUNTER NOTE
Satisfactory results  Pancreas MRI recommended in 2 years  Please check with patient if he has any symptoms of left hip-MRI is recommended if any symptoms are present.  Please let me know

## 2024-09-03 ENCOUNTER — TELEPHONE (OUTPATIENT)
Dept: INTERNAL MEDICINE CLINIC | Age: 74
End: 2024-09-03

## 2024-09-03 NOTE — TELEPHONE ENCOUNTER
[facility] care called to verify if Dr Jones will be the follow physician for home care orders, confirmed.

## 2024-09-03 NOTE — DISCHARGE SUMMARY
- F [phone]  [address] [place] [address]      Phone: [phone]   amiodarone 200 MG tablet  bumetanide 1 MG tablet  carvedilol 6.25 MG tablet  fludrocortisone 0.1 MG tablet         Time Spent on discharge is  35 mins in patient examination, evaluation, counseling as well as medication reconciliation, prescriptions for required medications, discharge plan and follow up.    Electronically signed by   Aracely Pierre MD  9/3/2024  12:33 PM      Thank you Yandy Haddad MD for the opportunity to be involved in this patient's care.

## 2024-09-10 ENCOUNTER — OFFICE VISIT (OUTPATIENT)
Dept: INTERNAL MEDICINE CLINIC | Age: 74
End: 2024-09-10
Payer: MEDICARE

## 2024-09-10 VITALS
SYSTOLIC BLOOD PRESSURE: 104 MMHG | BODY MASS INDEX: 21.7 KG/M2 | HEIGHT: 71 IN | HEART RATE: 85 BPM | DIASTOLIC BLOOD PRESSURE: 60 MMHG | WEIGHT: 155 LBS | OXYGEN SATURATION: 100 %

## 2024-09-10 DIAGNOSIS — I95.1 ORTHOSTATIC HYPOTENSION: ICD-10-CM

## 2024-09-10 DIAGNOSIS — Z95.1 S/P CABG (CORONARY ARTERY BYPASS GRAFT): ICD-10-CM

## 2024-09-10 DIAGNOSIS — I48.20 CHRONIC ATRIAL FIBRILLATION (HCC): Primary | ICD-10-CM

## 2024-09-10 DIAGNOSIS — I25.10 CAD, MULTIPLE VESSEL: ICD-10-CM

## 2024-09-10 DIAGNOSIS — G20.A1 PARKINSON'S DISEASE WITHOUT DYSKINESIA OR FLUCTUATING MANIFESTATIONS (HCC): ICD-10-CM

## 2024-09-10 PROCEDURE — 99214 OFFICE O/P EST MOD 30 MIN: CPT | Performed by: INTERNAL MEDICINE

## 2024-09-10 PROCEDURE — 1123F ACP DISCUSS/DSCN MKR DOCD: CPT | Performed by: INTERNAL MEDICINE

## 2024-09-10 RX ORDER — CARVEDILOL 3.12 MG/1
3.12 TABLET ORAL 2 TIMES DAILY
Qty: 60 TABLET | Refills: 3 | Status: SHIPPED | OUTPATIENT
Start: 2024-09-10 | End: 2024-09-11 | Stop reason: SDUPTHER

## 2024-09-10 RX ORDER — CARBIDOPA AND LEVODOPA 25; 100 MG/1; MG/1
1 TABLET ORAL 4 TIMES DAILY
Qty: 120 TABLET | Refills: 2 | Status: SHIPPED | OUTPATIENT
Start: 2024-09-10

## 2024-09-11 DIAGNOSIS — I48.20 CHRONIC ATRIAL FIBRILLATION (HCC): ICD-10-CM

## 2024-09-11 DIAGNOSIS — I95.1 ORTHOSTATIC HYPOTENSION: ICD-10-CM

## 2024-09-11 DIAGNOSIS — Z95.1 S/P CABG (CORONARY ARTERY BYPASS GRAFT): ICD-10-CM

## 2024-09-11 RX ORDER — CARVEDILOL 3.12 MG/1
3.12 TABLET ORAL 2 TIMES DAILY
Qty: 180 TABLET | Refills: 0 | Status: SHIPPED | OUTPATIENT
Start: 2024-09-11

## 2024-09-18 RX ORDER — BUMETANIDE 1 MG/1
1 TABLET ORAL DAILY
Qty: 30 TABLET | Refills: 2 | Status: SHIPPED | OUTPATIENT
Start: 2024-09-18

## 2024-09-18 RX ORDER — AMIODARONE HYDROCHLORIDE 200 MG/1
200 TABLET ORAL DAILY
Qty: 30 TABLET | Refills: 0 | Status: SHIPPED | OUTPATIENT
Start: 2024-09-18

## 2024-09-23 ENCOUNTER — INITIAL CONSULT (OUTPATIENT)
Age: 74
End: 2024-09-23
Payer: MEDICARE

## 2024-09-23 VITALS
TEMPERATURE: 98 F | OXYGEN SATURATION: 98 % | WEIGHT: 158 LBS | HEIGHT: 71 IN | SYSTOLIC BLOOD PRESSURE: 131 MMHG | BODY MASS INDEX: 22.12 KG/M2 | DIASTOLIC BLOOD PRESSURE: 78 MMHG | HEART RATE: 76 BPM

## 2024-09-23 DIAGNOSIS — K25.4 GASTROINTESTINAL HEMORRHAGE ASSOCIATED WITH GASTRIC ULCER: ICD-10-CM

## 2024-09-23 DIAGNOSIS — I25.810 CORONARY ARTERY DISEASE INVOLVING CORONARY BYPASS GRAFT OF NATIVE HEART WITHOUT ANGINA PECTORIS: ICD-10-CM

## 2024-09-23 DIAGNOSIS — Z95.1 S/P CABG (CORONARY ARTERY BYPASS GRAFT): ICD-10-CM

## 2024-09-23 DIAGNOSIS — I48.91 ATRIAL FIBRILLATION WITH RAPID VENTRICULAR RESPONSE (HCC): Primary | ICD-10-CM

## 2024-09-23 PROCEDURE — 1123F ACP DISCUSS/DSCN MKR DOCD: CPT | Performed by: SPECIALIST

## 2024-09-23 PROCEDURE — 99205 OFFICE O/P NEW HI 60 MIN: CPT | Performed by: SPECIALIST

## 2024-09-23 ASSESSMENT — ENCOUNTER SYMPTOMS
GASTROINTESTINAL NEGATIVE: 1
EYES NEGATIVE: 1
RESPIRATORY NEGATIVE: 1
ALLERGIC/IMMUNOLOGIC NEGATIVE: 1

## 2024-09-24 ENCOUNTER — OFFICE VISIT (OUTPATIENT)
Dept: INTERNAL MEDICINE CLINIC | Age: 74
End: 2024-09-24
Payer: MEDICARE

## 2024-09-24 VITALS
BODY MASS INDEX: 22.4 KG/M2 | WEIGHT: 160 LBS | HEIGHT: 71 IN | OXYGEN SATURATION: 99 % | SYSTOLIC BLOOD PRESSURE: 116 MMHG | DIASTOLIC BLOOD PRESSURE: 72 MMHG | HEART RATE: 72 BPM

## 2024-09-24 DIAGNOSIS — Z23 NEED FOR VACCINATION: ICD-10-CM

## 2024-09-24 DIAGNOSIS — Z95.1 S/P CABG (CORONARY ARTERY BYPASS GRAFT): ICD-10-CM

## 2024-09-24 DIAGNOSIS — I48.20 CHRONIC ATRIAL FIBRILLATION (HCC): ICD-10-CM

## 2024-09-24 DIAGNOSIS — R63.4 UNEXPLAINED WEIGHT LOSS: ICD-10-CM

## 2024-09-24 DIAGNOSIS — I95.1 ORTHOSTATIC HYPOTENSION: Primary | ICD-10-CM

## 2024-09-24 PROCEDURE — 1123F ACP DISCUSS/DSCN MKR DOCD: CPT | Performed by: INTERNAL MEDICINE

## 2024-09-24 PROCEDURE — G0008 ADMIN INFLUENZA VIRUS VAC: HCPCS | Performed by: INTERNAL MEDICINE

## 2024-09-24 PROCEDURE — 90653 IIV ADJUVANT VACCINE IM: CPT | Performed by: INTERNAL MEDICINE

## 2024-09-24 PROCEDURE — 99214 OFFICE O/P EST MOD 30 MIN: CPT | Performed by: INTERNAL MEDICINE

## 2024-09-24 RX ORDER — BUMETANIDE 0.5 MG/1
0.5 TABLET ORAL DAILY
Qty: 30 TABLET | Refills: 3 | Status: SHIPPED | OUTPATIENT
Start: 2024-09-24

## 2024-09-26 ENCOUNTER — TELEPHONE (OUTPATIENT)
Dept: INTERNAL MEDICINE CLINIC | Age: 74
End: 2024-09-26

## 2024-09-26 ENCOUNTER — PATIENT MESSAGE (OUTPATIENT)
Dept: INTERNAL MEDICINE CLINIC | Age: 74
End: 2024-09-26

## 2024-09-27 ENCOUNTER — TELEPHONE (OUTPATIENT)
Dept: INTERNAL MEDICINE CLINIC | Age: 74
End: 2024-09-27

## 2024-10-02 ENCOUNTER — TELEPHONE (OUTPATIENT)
Age: 74
End: 2024-10-02

## 2024-10-02 DIAGNOSIS — I48.0 PAROXYSMAL ATRIAL FIBRILLATION (HCC): Primary | ICD-10-CM

## 2024-10-02 NOTE — TELEPHONE ENCOUNTER
Unable to reach patient, voicemail full. Left message for son to call. Surgery cancelled. Patient needs 24 Holter for two weeks and follow-up with Dr. Gomez.

## 2024-10-03 ENCOUNTER — APPOINTMENT (OUTPATIENT)
Dept: GENERAL RADIOLOGY | Age: 74
DRG: 202 | End: 2024-10-03
Payer: MEDICARE

## 2024-10-03 ENCOUNTER — APPOINTMENT (OUTPATIENT)
Dept: CT IMAGING | Age: 74
DRG: 202 | End: 2024-10-03
Payer: MEDICARE

## 2024-10-03 ENCOUNTER — HOSPITAL ENCOUNTER (INPATIENT)
Age: 74
LOS: 4 days | Discharge: HOME HEALTH | DRG: 202 | End: 2024-10-07
Attending: EMERGENCY MEDICINE | Admitting: STUDENT IN AN ORGANIZED HEALTH CARE EDUCATION/TRAINING PROGRAM
Payer: MEDICARE

## 2024-10-03 DIAGNOSIS — B34.8 RHINOVIRUS INFECTION: ICD-10-CM

## 2024-10-03 DIAGNOSIS — R00.2 PALPITATIONS: ICD-10-CM

## 2024-10-03 DIAGNOSIS — R07.9 CHEST PAIN, UNSPECIFIED TYPE: Primary | ICD-10-CM

## 2024-10-03 DIAGNOSIS — I44.7 LEFT BUNDLE BRANCH BLOCK: ICD-10-CM

## 2024-10-03 DIAGNOSIS — N17.9 AKI (ACUTE KIDNEY INJURY) (HCC): ICD-10-CM

## 2024-10-03 LAB
ALBUMIN SERPL-MCNC: 5.1 G/DL (ref 3.5–5.2)
ALBUMIN/GLOB SERPL: 2 {RATIO} (ref 1–2.5)
ALP SERPL-CCNC: 98 U/L (ref 40–129)
ALT SERPL-CCNC: 17 U/L (ref 10–50)
ANION GAP SERPL CALCULATED.3IONS-SCNC: 21 MMOL/L (ref 9–16)
AST SERPL-CCNC: 31 U/L (ref 10–50)
B PARAP IS1001 DNA NPH QL NAA+NON-PROBE: NOT DETECTED
B PERT DNA SPEC QL NAA+PROBE: NOT DETECTED
BASOPHILS # BLD: 0.09 K/UL (ref 0–0.2)
BASOPHILS NFR BLD: 1 % (ref 0–2)
BILIRUB SERPL-MCNC: 0.4 MG/DL (ref 0–1.2)
BUN SERPL-MCNC: 50 MG/DL (ref 8–23)
C PNEUM DNA NPH QL NAA+NON-PROBE: NOT DETECTED
CALCIUM SERPL-MCNC: 10 MG/DL (ref 8.6–10.4)
CHLORIDE SERPL-SCNC: 97 MMOL/L (ref 98–107)
CO2 SERPL-SCNC: 19 MMOL/L (ref 20–31)
CREAT SERPL-MCNC: 1.9 MG/DL (ref 0.7–1.2)
EOSINOPHIL # BLD: 0.16 K/UL (ref 0–0.44)
EOSINOPHILS RELATIVE PERCENT: 2 % (ref 1–4)
ERYTHROCYTE [DISTWIDTH] IN BLOOD BY AUTOMATED COUNT: 17.8 % (ref 11.8–14.4)
FLUAV RNA NPH QL NAA+NON-PROBE: NOT DETECTED
FLUBV RNA NPH QL NAA+NON-PROBE: NOT DETECTED
GFR, ESTIMATED: 36 ML/MIN/1.73M2
GLUCOSE SERPL-MCNC: 122 MG/DL (ref 74–99)
HADV DNA NPH QL NAA+NON-PROBE: NOT DETECTED
HCOV 229E RNA NPH QL NAA+NON-PROBE: NOT DETECTED
HCOV HKU1 RNA NPH QL NAA+NON-PROBE: NOT DETECTED
HCOV NL63 RNA NPH QL NAA+NON-PROBE: NOT DETECTED
HCOV OC43 RNA NPH QL NAA+NON-PROBE: NOT DETECTED
HCT VFR BLD AUTO: 48.6 % (ref 40.7–50.3)
HGB BLD-MCNC: 15.5 G/DL (ref 13–17)
HMPV RNA NPH QL NAA+NON-PROBE: NOT DETECTED
HPIV1 RNA NPH QL NAA+NON-PROBE: NOT DETECTED
HPIV2 RNA NPH QL NAA+NON-PROBE: NOT DETECTED
HPIV3 RNA NPH QL NAA+NON-PROBE: NOT DETECTED
HPIV4 RNA NPH QL NAA+NON-PROBE: NOT DETECTED
IMM GRANULOCYTES # BLD AUTO: 0.1 K/UL (ref 0–0.3)
IMM GRANULOCYTES NFR BLD: 1 %
INR PPP: 1.1
LACTIC ACID, SEPSIS WHOLE BLOOD: 2.1 MMOL/L (ref 0.5–1.9)
LACTIC ACID, WHOLE BLOOD: 2.6 MMOL/L (ref 0.7–2.1)
LIPASE SERPL-CCNC: 59 U/L (ref 13–60)
LYMPHOCYTES NFR BLD: 1.91 K/UL (ref 1.1–3.7)
LYMPHOCYTES RELATIVE PERCENT: 20 % (ref 24–43)
M PNEUMO DNA NPH QL NAA+NON-PROBE: NOT DETECTED
MAGNESIUM SERPL-MCNC: 2.5 MG/DL (ref 1.6–2.4)
MCH RBC QN AUTO: 29.1 PG (ref 25.2–33.5)
MCHC RBC AUTO-ENTMCNC: 31.9 G/DL (ref 28.4–34.8)
MCV RBC AUTO: 91.2 FL (ref 82.6–102.9)
MONOCYTES NFR BLD: 0.66 K/UL (ref 0.1–1.2)
MONOCYTES NFR BLD: 7 % (ref 3–12)
NEUTROPHILS NFR BLD: 69 % (ref 36–65)
NEUTS SEG NFR BLD: 6.78 K/UL (ref 1.5–8.1)
NRBC BLD-RTO: 0 PER 100 WBC
PLATELET # BLD AUTO: 326 K/UL (ref 138–453)
PMV BLD AUTO: 11.1 FL (ref 8.1–13.5)
POTASSIUM SERPL-SCNC: 5 MMOL/L (ref 3.7–5.3)
PROT SERPL-MCNC: 8.4 G/DL (ref 6.6–8.7)
PROTHROMBIN TIME: 14.5 SEC (ref 11.7–14.9)
RBC # BLD AUTO: 5.33 M/UL (ref 4.21–5.77)
RBC # BLD: ABNORMAL 10*6/UL
RSV RNA NPH QL NAA+NON-PROBE: NOT DETECTED
RV+EV RNA NPH QL NAA+NON-PROBE: DETECTED
SARS-COV-2 RNA NPH QL NAA+NON-PROBE: NOT DETECTED
SODIUM SERPL-SCNC: 137 MMOL/L (ref 136–145)
SPECIMEN DESCRIPTION: ABNORMAL
TROPONIN I SERPL HS-MCNC: 27 NG/L (ref 0–22)
TROPONIN I SERPL HS-MCNC: 28 NG/L (ref 0–22)
WBC OTHER # BLD: 9.7 K/UL (ref 3.5–11.3)

## 2024-10-03 PROCEDURE — 85025 COMPLETE CBC W/AUTO DIFF WBC: CPT

## 2024-10-03 PROCEDURE — 87154 CUL TYP ID BLD PTHGN 6+ TRGT: CPT

## 2024-10-03 PROCEDURE — 0202U NFCT DS 22 TRGT SARS-COV-2: CPT

## 2024-10-03 PROCEDURE — 71045 X-RAY EXAM CHEST 1 VIEW: CPT

## 2024-10-03 PROCEDURE — 85610 PROTHROMBIN TIME: CPT

## 2024-10-03 PROCEDURE — 6360000002 HC RX W HCPCS

## 2024-10-03 PROCEDURE — 80053 COMPREHEN METABOLIC PANEL: CPT

## 2024-10-03 PROCEDURE — 83690 ASSAY OF LIPASE: CPT

## 2024-10-03 PROCEDURE — 84484 ASSAY OF TROPONIN QUANT: CPT

## 2024-10-03 PROCEDURE — 83605 ASSAY OF LACTIC ACID: CPT

## 2024-10-03 PROCEDURE — 87040 BLOOD CULTURE FOR BACTERIA: CPT

## 2024-10-03 PROCEDURE — 99223 1ST HOSP IP/OBS HIGH 75: CPT | Performed by: INTERNAL MEDICINE

## 2024-10-03 PROCEDURE — 74174 CTA ABD&PLVS W/CONTRAST: CPT

## 2024-10-03 PROCEDURE — 83735 ASSAY OF MAGNESIUM: CPT

## 2024-10-03 PROCEDURE — 87205 SMEAR GRAM STAIN: CPT

## 2024-10-03 PROCEDURE — 6360000004 HC RX CONTRAST MEDICATION

## 2024-10-03 PROCEDURE — 71275 CT ANGIOGRAPHY CHEST: CPT

## 2024-10-03 PROCEDURE — 99285 EMERGENCY DEPT VISIT HI MDM: CPT

## 2024-10-03 PROCEDURE — 2060000000 HC ICU INTERMEDIATE R&B

## 2024-10-03 PROCEDURE — 2580000003 HC RX 258

## 2024-10-03 PROCEDURE — 6370000000 HC RX 637 (ALT 250 FOR IP)

## 2024-10-03 PROCEDURE — 96365 THER/PROPH/DIAG IV INF INIT: CPT

## 2024-10-03 RX ORDER — OXYMETAZOLINE HYDROCHLORIDE 0.05 G/100ML
2 SPRAY NASAL 2 TIMES DAILY PRN
Status: ACTIVE | OUTPATIENT
Start: 2024-10-03 | End: 2024-10-06

## 2024-10-03 RX ORDER — POTASSIUM CHLORIDE 7.45 MG/ML
10 INJECTION INTRAVENOUS PRN
Status: DISCONTINUED | OUTPATIENT
Start: 2024-10-03 | End: 2024-10-07 | Stop reason: HOSPADM

## 2024-10-03 RX ORDER — IPRATROPIUM BROMIDE 42 UG/1
2 SPRAY, METERED NASAL 2 TIMES DAILY
Status: DISCONTINUED | OUTPATIENT
Start: 2024-10-03 | End: 2024-10-07 | Stop reason: HOSPADM

## 2024-10-03 RX ORDER — CARBIDOPA AND LEVODOPA 25; 100 MG/1; MG/1
1 TABLET ORAL 4 TIMES DAILY
Status: DISCONTINUED | OUTPATIENT
Start: 2024-10-03 | End: 2024-10-07 | Stop reason: HOSPADM

## 2024-10-03 RX ORDER — MECLIZINE HCL 12.5 MG 12.5 MG/1
25 TABLET ORAL 3 TIMES DAILY PRN
Status: DISCONTINUED | OUTPATIENT
Start: 2024-10-03 | End: 2024-10-04

## 2024-10-03 RX ORDER — ONDANSETRON 4 MG/1
4 TABLET, ORALLY DISINTEGRATING ORAL EVERY 8 HOURS PRN
Status: DISCONTINUED | OUTPATIENT
Start: 2024-10-03 | End: 2024-10-07 | Stop reason: HOSPADM

## 2024-10-03 RX ORDER — PANTOPRAZOLE SODIUM 40 MG/1
40 TABLET, DELAYED RELEASE ORAL
Status: DISCONTINUED | OUTPATIENT
Start: 2024-10-04 | End: 2024-10-07 | Stop reason: HOSPADM

## 2024-10-03 RX ORDER — 0.9 % SODIUM CHLORIDE 0.9 %
1000 INTRAVENOUS SOLUTION INTRAVENOUS ONCE
Status: COMPLETED | OUTPATIENT
Start: 2024-10-03 | End: 2024-10-03

## 2024-10-03 RX ORDER — TRAMADOL HYDROCHLORIDE 50 MG/1
50 TABLET ORAL 3 TIMES DAILY PRN
Status: DISCONTINUED | OUTPATIENT
Start: 2024-10-03 | End: 2024-10-07 | Stop reason: HOSPADM

## 2024-10-03 RX ORDER — TRAMADOL HYDROCHLORIDE 50 MG/1
50 TABLET ORAL EVERY 8 HOURS PRN
Status: CANCELLED | OUTPATIENT
Start: 2024-10-03

## 2024-10-03 RX ORDER — MAGNESIUM SULFATE 1 G/100ML
1000 INJECTION INTRAVENOUS PRN
Status: DISCONTINUED | OUTPATIENT
Start: 2024-10-03 | End: 2024-10-07 | Stop reason: HOSPADM

## 2024-10-03 RX ORDER — MIDODRINE HYDROCHLORIDE 5 MG/1
10 TABLET ORAL 3 TIMES DAILY PRN
Status: DISCONTINUED | OUTPATIENT
Start: 2024-10-03 | End: 2024-10-07 | Stop reason: HOSPADM

## 2024-10-03 RX ORDER — POTASSIUM CHLORIDE 750 MG/1
10 CAPSULE, EXTENDED RELEASE ORAL EVERY MORNING
Status: DISCONTINUED | OUTPATIENT
Start: 2024-10-04 | End: 2024-10-07 | Stop reason: HOSPADM

## 2024-10-03 RX ORDER — ONDANSETRON 2 MG/ML
4 INJECTION INTRAMUSCULAR; INTRAVENOUS ONCE
Status: DISCONTINUED | OUTPATIENT
Start: 2024-10-03 | End: 2024-10-03

## 2024-10-03 RX ORDER — ENOXAPARIN SODIUM 100 MG/ML
40 INJECTION SUBCUTANEOUS DAILY
Status: DISCONTINUED | OUTPATIENT
Start: 2024-10-04 | End: 2024-10-07 | Stop reason: HOSPADM

## 2024-10-03 RX ORDER — AMIODARONE HYDROCHLORIDE 200 MG/1
200 TABLET ORAL DAILY
Status: DISCONTINUED | OUTPATIENT
Start: 2024-10-04 | End: 2024-10-07 | Stop reason: HOSPADM

## 2024-10-03 RX ORDER — IOPAMIDOL 755 MG/ML
100 INJECTION, SOLUTION INTRAVASCULAR
Status: COMPLETED | OUTPATIENT
Start: 2024-10-03 | End: 2024-10-03

## 2024-10-03 RX ORDER — SODIUM CHLORIDE 0.9 % (FLUSH) 0.9 %
10 SYRINGE (ML) INJECTION PRN
Status: DISCONTINUED | OUTPATIENT
Start: 2024-10-03 | End: 2024-10-07 | Stop reason: HOSPADM

## 2024-10-03 RX ORDER — UREA 10 %
1000 LOTION (ML) TOPICAL DAILY
Status: DISCONTINUED | OUTPATIENT
Start: 2024-10-04 | End: 2024-10-07 | Stop reason: HOSPADM

## 2024-10-03 RX ORDER — LIDOCAINE 4 G/G
1 PATCH TOPICAL DAILY
Status: DISCONTINUED | OUTPATIENT
Start: 2024-10-03 | End: 2024-10-07 | Stop reason: HOSPADM

## 2024-10-03 RX ORDER — UBIDECARENONE 100 MG
100 CAPSULE ORAL DAILY
Status: DISCONTINUED | OUTPATIENT
Start: 2024-10-04 | End: 2024-10-03

## 2024-10-03 RX ORDER — IPRATROPIUM BROMIDE AND ALBUTEROL SULFATE 2.5; .5 MG/3ML; MG/3ML
1 SOLUTION RESPIRATORY (INHALATION)
Status: DISCONTINUED | OUTPATIENT
Start: 2024-10-04 | End: 2024-10-04

## 2024-10-03 RX ORDER — ASPIRIN 81 MG/1
81 TABLET ORAL DAILY
Status: DISCONTINUED | OUTPATIENT
Start: 2024-10-04 | End: 2024-10-07 | Stop reason: HOSPADM

## 2024-10-03 RX ORDER — POTASSIUM CHLORIDE 1500 MG/1
40 TABLET, EXTENDED RELEASE ORAL PRN
Status: DISCONTINUED | OUTPATIENT
Start: 2024-10-03 | End: 2024-10-07 | Stop reason: HOSPADM

## 2024-10-03 RX ORDER — SUCRALFATE 1 G/1
1 TABLET ORAL 4 TIMES DAILY
Status: DISCONTINUED | OUTPATIENT
Start: 2024-10-03 | End: 2024-10-07 | Stop reason: HOSPADM

## 2024-10-03 RX ORDER — MAGNESIUM SULFATE IN WATER 40 MG/ML
2000 INJECTION, SOLUTION INTRAVENOUS ONCE
Status: DISCONTINUED | OUTPATIENT
Start: 2024-10-03 | End: 2024-10-03

## 2024-10-03 RX ORDER — ECHINACEA PURPUREA EXTRACT 125 MG
2 TABLET ORAL 3 TIMES DAILY
Status: DISCONTINUED | OUTPATIENT
Start: 2024-10-03 | End: 2024-10-07 | Stop reason: HOSPADM

## 2024-10-03 RX ORDER — NITROGLYCERIN 0.4 MG/1
0.4 TABLET SUBLINGUAL EVERY 5 MIN PRN
Status: DISCONTINUED | OUTPATIENT
Start: 2024-10-03 | End: 2024-10-07 | Stop reason: HOSPADM

## 2024-10-03 RX ORDER — MIRTAZAPINE 15 MG/1
7.5 TABLET, FILM COATED ORAL NIGHTLY
Status: DISCONTINUED | OUTPATIENT
Start: 2024-10-03 | End: 2024-10-07 | Stop reason: HOSPADM

## 2024-10-03 RX ORDER — ACETAMINOPHEN 500 MG
1000 TABLET ORAL ONCE
Status: COMPLETED | OUTPATIENT
Start: 2024-10-03 | End: 2024-10-03

## 2024-10-03 RX ORDER — MAGNESIUM HYDROXIDE/ALUMINUM HYDROXICE/SIMETHICONE 120; 1200; 1200 MG/30ML; MG/30ML; MG/30ML
30 SUSPENSION ORAL EVERY 6 HOURS PRN
Status: DISCONTINUED | OUTPATIENT
Start: 2024-10-03 | End: 2024-10-07 | Stop reason: HOSPADM

## 2024-10-03 RX ORDER — FERROUS SULFATE 325(65) MG
325 TABLET, DELAYED RELEASE (ENTERIC COATED) ORAL
Status: DISCONTINUED | OUTPATIENT
Start: 2024-10-04 | End: 2024-10-04

## 2024-10-03 RX ORDER — SODIUM CHLORIDE 9 MG/ML
INJECTION, SOLUTION INTRAVENOUS PRN
Status: DISCONTINUED | OUTPATIENT
Start: 2024-10-03 | End: 2024-10-07 | Stop reason: HOSPADM

## 2024-10-03 RX ORDER — BENZONATATE 100 MG/1
200 CAPSULE ORAL 3 TIMES DAILY PRN
Status: DISCONTINUED | OUTPATIENT
Start: 2024-10-03 | End: 2024-10-07 | Stop reason: HOSPADM

## 2024-10-03 RX ORDER — ACETAMINOPHEN 650 MG/1
650 SUPPOSITORY RECTAL EVERY 6 HOURS PRN
Status: DISCONTINUED | OUTPATIENT
Start: 2024-10-03 | End: 2024-10-07 | Stop reason: HOSPADM

## 2024-10-03 RX ORDER — NITROGLYCERIN 40 MG/1
1 PATCH TRANSDERMAL DAILY
Status: DISCONTINUED | OUTPATIENT
Start: 2024-10-04 | End: 2024-10-07 | Stop reason: HOSPADM

## 2024-10-03 RX ORDER — LEVOTHYROXINE SODIUM 75 UG/1
75 TABLET ORAL DAILY
Status: DISCONTINUED | OUTPATIENT
Start: 2024-10-04 | End: 2024-10-07 | Stop reason: HOSPADM

## 2024-10-03 RX ORDER — ASPIRIN 81 MG/1
81 TABLET, CHEWABLE ORAL DAILY
Status: DISCONTINUED | OUTPATIENT
Start: 2024-10-04 | End: 2024-10-03

## 2024-10-03 RX ORDER — ONDANSETRON 2 MG/ML
4 INJECTION INTRAMUSCULAR; INTRAVENOUS EVERY 6 HOURS PRN
Status: DISCONTINUED | OUTPATIENT
Start: 2024-10-03 | End: 2024-10-07 | Stop reason: HOSPADM

## 2024-10-03 RX ORDER — MAGNESIUM SULFATE IN WATER 40 MG/ML
2000 INJECTION, SOLUTION INTRAVENOUS ONCE
Status: COMPLETED | OUTPATIENT
Start: 2024-10-03 | End: 2024-10-03

## 2024-10-03 RX ORDER — SODIUM CHLORIDE 9 MG/ML
INJECTION, SOLUTION INTRAVENOUS CONTINUOUS
Status: ACTIVE | OUTPATIENT
Start: 2024-10-03 | End: 2024-10-05

## 2024-10-03 RX ORDER — PANTOPRAZOLE SODIUM 40 MG/1
40 TABLET, DELAYED RELEASE ORAL
Status: DISCONTINUED | OUTPATIENT
Start: 2024-10-04 | End: 2024-10-03 | Stop reason: SDUPTHER

## 2024-10-03 RX ORDER — BUMETANIDE 0.5 MG/1
0.5 TABLET ORAL DAILY
Status: CANCELLED | OUTPATIENT
Start: 2024-10-04

## 2024-10-03 RX ORDER — TRAMADOL HYDROCHLORIDE 50 MG/1
25 TABLET ORAL 3 TIMES DAILY PRN
Status: DISCONTINUED | OUTPATIENT
Start: 2024-10-03 | End: 2024-10-07 | Stop reason: HOSPADM

## 2024-10-03 RX ORDER — CARVEDILOL 6.25 MG/1
3.12 TABLET ORAL 2 TIMES DAILY
Status: DISCONTINUED | OUTPATIENT
Start: 2024-10-03 | End: 2024-10-07 | Stop reason: HOSPADM

## 2024-10-03 RX ORDER — GUAIFENESIN 600 MG/1
600 TABLET, EXTENDED RELEASE ORAL 2 TIMES DAILY
Status: DISCONTINUED | OUTPATIENT
Start: 2024-10-03 | End: 2024-10-07 | Stop reason: HOSPADM

## 2024-10-03 RX ORDER — ACETAMINOPHEN 325 MG/1
650 TABLET ORAL EVERY 6 HOURS PRN
Status: DISCONTINUED | OUTPATIENT
Start: 2024-10-03 | End: 2024-10-07 | Stop reason: HOSPADM

## 2024-10-03 RX ORDER — MIDODRINE HYDROCHLORIDE 5 MG/1
2.5 TABLET ORAL 3 TIMES DAILY PRN
Status: CANCELLED | OUTPATIENT
Start: 2024-10-03

## 2024-10-03 RX ORDER — SODIUM CHLORIDE 0.9 % (FLUSH) 0.9 %
5-40 SYRINGE (ML) INJECTION EVERY 12 HOURS SCHEDULED
Status: DISCONTINUED | OUTPATIENT
Start: 2024-10-03 | End: 2024-10-07 | Stop reason: HOSPADM

## 2024-10-03 RX ORDER — IPRATROPIUM BROMIDE AND ALBUTEROL SULFATE 2.5; .5 MG/3ML; MG/3ML
1 SOLUTION RESPIRATORY (INHALATION) EVERY 4 HOURS PRN
Status: DISCONTINUED | OUTPATIENT
Start: 2024-10-03 | End: 2024-10-07 | Stop reason: HOSPADM

## 2024-10-03 RX ORDER — RANOLAZINE 500 MG/1
1000 TABLET, EXTENDED RELEASE ORAL 2 TIMES DAILY
Status: DISCONTINUED | OUTPATIENT
Start: 2024-10-03 | End: 2024-10-07 | Stop reason: HOSPADM

## 2024-10-03 RX ADMIN — IOPAMIDOL 100 ML: 755 INJECTION, SOLUTION INTRAVENOUS at 19:33

## 2024-10-03 RX ADMIN — ACETAMINOPHEN 1000 MG: 500 TABLET ORAL at 18:45

## 2024-10-03 RX ADMIN — MAGNESIUM SULFATE HEPTAHYDRATE 2000 MG: 40 INJECTION, SOLUTION INTRAVENOUS at 15:24

## 2024-10-03 RX ADMIN — SODIUM CHLORIDE 1000 ML: 9 INJECTION, SOLUTION INTRAVENOUS at 16:02

## 2024-10-03 ASSESSMENT — PAIN SCALES - GENERAL
PAINLEVEL_OUTOF10: 0
PAINLEVEL_OUTOF10: 8

## 2024-10-03 ASSESSMENT — PAIN - FUNCTIONAL ASSESSMENT: PAIN_FUNCTIONAL_ASSESSMENT: 0-10

## 2024-10-03 ASSESSMENT — PAIN DESCRIPTION - LOCATION: LOCATION: CHEST

## 2024-10-03 NOTE — ED PROVIDER NOTES
[facility] ED  Emergency Department  Faculty Sign-Out Addendum     Care of Rowdy Marie was assumed from previous attending and is being seen for Chest Pain  .  The patient's initial evaluation and plan have been discussed with the prior provider who initially evaluated the patient.    Handoff taken on the following patient from prior Attending Physician:    Attestation    I was available and discussed any additional care issues that arose and coordinated the management plans with the resident(s) caring for the patient during my duty period. Any areas of disagreement with resident’s documentation of care or procedures are noted on the chart. I was personally present for the key portions of any/all procedures during my duty period. I have documented in the chart those procedures where I was not present during the key portions.      EMERGENCY DEPARTMENT COURSE / MEDICAL DECISION MAKING:       MEDICATIONS GIVEN:  Orders Placed This Encounter   Medications    DISCONTD: ondansetron (ZOFRAN) injection 4 mg    DISCONTD: magnesium sulfate 2000 mg in 50 mL IVPB premix    magnesium sulfate 2000 mg in 50 mL IVPB premix    sodium chloride 0.9 % bolus 1,000 mL       LABS / RADIOLOGY:     Labs Reviewed   CBC WITH AUTO DIFFERENTIAL - Abnormal; Notable for the following components:       Result Value    RDW 17.8 (*)     Neutrophils % 69 (*)     Lymphocytes % 20 (*)     Immature Granulocytes % 1 (*)     All other components within normal limits   TROPONIN - Abnormal; Notable for the following components:    Troponin, High Sensitivity 28 (*)     All other components within normal limits   COMPREHENSIVE METABOLIC PANEL - Abnormal; Notable for the following components:    Chloride 97 (*)     CO2 19 (*)     Anion Gap 21 (*)     Glucose 122 (*)     BUN 50 (*)     Creatinine 1.9 (*)     Est, Glom Filt Rate 36 (*)     All other components within normal limits   MAGNESIUM - Abnormal; Notable for the following

## 2024-10-03 NOTE — ED TRIAGE NOTES
Pt arrived to ED12 via [facility]  Per EMS report, pt called out for chest pain and dizziness. Once connected the EKG showed ST- Elevation. Per EMS pt Is on a blood thinner, but unsure which one.  Upon arrival pt is alert and speaking full sentences. Pt has the complaint of Chest pain, lightheadedness, dizziness, and headache that has worsened over the last 24 hours. Pt states he normally has chest pain due to his Afib and extensive cardiac history but this pain is \"different\". Pt states the dizziness and lightheadedness started on 9/28.   Dr barnett at bedside to assess pt upon arrival.  Pt placed on cont cardiac monitor, ekg completed, iv established, labs drawn, labeled and will send to lab via orders.

## 2024-10-03 NOTE — ED PROVIDER NOTES
[facility]     Emergency Department     Faculty Note/ Attestation      Pt Name: Rowdy Marie                                       MRN: [id number]  Birthdate 1950  Date of evaluation: 10/3/2024    Patients PCP:    Yandy Jones MD    Note Started: 3:15 PM EDT      Attestation  I performed a history and physical examination of the patient and discussed management with the resident. I reviewed the resident’s note and agree with the documented findings and plan of care. Any areas of disagreement are noted on the chart. I was personally present for the key portions of any procedures. I have documented in the chart those procedures where I was not present during the key portions. I have reviewed the emergency nurses triage note. I agree with the chief complaint, past medical history, past surgical history, allergies, medications, social and family history as documented unless otherwise noted below.    For Physician Assistant/ Nurse Practitioner cases/documentation I have personally evaluated this patient and have completed at least one if not all key elements of the E/M (history, physical exam, and MDM). Additional findings are as noted.      Initial Screens:        [place] Coma Scale  Eye Opening: Spontaneous  Best Verbal Response: Oriented  Best Motor Response: Obeys commands  [place] Coma Scale Score: 15    Vitals:    Vitals:    10/03/24 1511   BP: (!) 114/95   Pulse: (!) 108   Resp: 15   SpO2: (!) 69%   Weight: 68 kg (150 lb)   Height: 1.803 m (5' 11\")       CHIEF COMPLAINT       Chief Complaint   Patient presents with    Chest Pain             DIAGNOSTIC RESULTS             RADIOLOGY:   XR CHEST PORTABLE    (Results Pending)         LABS:  Labs Reviewed   CBC WITH AUTO DIFFERENTIAL   TROPONIN   TROPONIN   LIPASE   COMPREHENSIVE METABOLIC PANEL   URINALYSIS WITH REFLEX TO CULTURE         EMERGENCY DEPARTMENT COURSE:     -------------------------  BP: (!) 114/95,  , Pulse: (!) 108,

## 2024-10-03 NOTE — ED PROVIDER NOTES
[facility] 5C STEPDOWN  Emergency Department Encounter  Emergency Medicine Resident     Pt Name:Rowdy Marie  MRN: [id number]  Birthdate 1950  Date of evaluation: 10/3/24  PCP:  Yandy Jones MD  Note Started: 3:17 PM EDT      CHIEF COMPLAINT       Chief Complaint   Patient presents with    Chest Pain       HISTORY OF PRESENT ILLNESS  (Location/Symptom, Timing/Onset, Context/Setting, Quality, Duration, Modifying Factors, Severity.)      Rowdy Marie is a 74 y.o. male past medical history of CAD status post stents and CABG, recently admitted for A-fib with RVR who presents with chest pain.  Patient reports chronic chest pain however worsened about 4 hours ago, did have episodes of nausea and vomiting, denies nausea at this time.  Patient does report abdominal pain.  Patient reports recent URI symptoms after receiving the influenza vaccine last week. Does endorse some left sided abdominal pain.    PAST MEDICAL / SURGICAL / SOCIAL / FAMILY HISTORY      has a past medical history of A-fib (HCC), PREET (acute kidney injury) (HCC), Anemia, CAD (coronary artery disease), Esophageal stricture, Hyperlipidemia, Hypokalemia, Hypomagnesemia, NSTEMI (non-ST elevated myocardial infarction) ([facility]), Orthostatic hypotension, Parkinsons (HCC), Presence of Watchman left atrial appendage closure device, and Prostate CA ([facility]).     has a past surgical history that includes Coronary artery bypass graft; Cardiac catheterization; Coronary angioplasty with stent; Diagnostic Cardiac Cath Lab Procedure (04/11/2024); Cardiac procedure (N/A, 4/11/2024); Cardiac procedure (N/A, 4/11/2024); Cardiac procedure (N/A, 5/22/2024); and Cardiac procedure (N/A, 5/22/2024).    Social History     Socioeconomic History    Marital status:      Spouse name: Not on file    Number of children: Not on file    Years of education: Not on file    Highest education level: Not on file   Occupational History    Not on file   Tobacco Use    Smoking

## 2024-10-04 PROBLEM — R79.89 ELEVATED TROPONIN: Status: ACTIVE | Noted: 2024-10-04

## 2024-10-04 PROBLEM — I34.0 MITRAL REGURGITATION: Status: ACTIVE | Noted: 2024-10-04

## 2024-10-04 PROBLEM — N18.9 CKD (CHRONIC KIDNEY DISEASE): Status: ACTIVE | Noted: 2024-10-04

## 2024-10-04 PROBLEM — Z85.46 HISTORY OF PROSTATE CANCER: Status: ACTIVE | Noted: 2024-10-04

## 2024-10-04 LAB
ANION GAP SERPL CALCULATED.3IONS-SCNC: 11 MMOL/L (ref 9–16)
ANION GAP SERPL CALCULATED.3IONS-SCNC: 17 MMOL/L (ref 9–16)
BACTERIA URNS QL MICRO: NORMAL
BILIRUB UR QL STRIP: NEGATIVE
BODY TEMPERATURE: 37
BUN SERPL-MCNC: 37 MG/DL (ref 8–23)
BUN SERPL-MCNC: 41 MG/DL (ref 8–23)
CALCIUM SERPL-MCNC: 8.6 MG/DL (ref 8.6–10.4)
CALCIUM SERPL-MCNC: 9.1 MG/DL (ref 8.6–10.4)
CASTS #/AREA URNS LPF: NORMAL /LPF (ref 0–8)
CHLORIDE SERPL-SCNC: 102 MMOL/L (ref 98–107)
CHLORIDE SERPL-SCNC: 105 MMOL/L (ref 98–107)
CHOLEST SERPL-MCNC: 410 MG/DL (ref 0–199)
CHOLESTEROL/HDL RATIO: 11
CLARITY UR: CLEAR
CO2 SERPL-SCNC: 17 MMOL/L (ref 20–31)
CO2 SERPL-SCNC: 20 MMOL/L (ref 20–31)
COHGB MFR BLD: 0.5 % (ref 0–5)
COLOR UR: YELLOW
CORTIS SERPL-MCNC: 19.5 UG/DL (ref 2.5–19.5)
CREAT SERPL-MCNC: 1.4 MG/DL (ref 0.7–1.2)
CREAT SERPL-MCNC: 1.4 MG/DL (ref 0.7–1.2)
EPI CELLS #/AREA URNS HPF: NORMAL /HPF (ref 0–5)
ERYTHROCYTE [DISTWIDTH] IN BLOOD BY AUTOMATED COUNT: 17.7 % (ref 11.8–14.4)
FIO2 ON VENT: ABNORMAL %
GFR, ESTIMATED: 52 ML/MIN/1.73M2
GFR, ESTIMATED: 52 ML/MIN/1.73M2
GLUCOSE SERPL-MCNC: 121 MG/DL (ref 74–99)
GLUCOSE SERPL-MCNC: 140 MG/DL (ref 74–99)
GLUCOSE UR STRIP-MCNC: NEGATIVE MG/DL
HCO3 VENOUS: 21.1 MMOL/L (ref 24–30)
HCT VFR BLD AUTO: 42.1 % (ref 40.7–50.3)
HDLC SERPL-MCNC: 38 MG/DL
HGB BLD-MCNC: 13.1 G/DL (ref 13–17)
HGB UR QL STRIP.AUTO: NEGATIVE
KETONES UR STRIP-MCNC: ABNORMAL MG/DL
LACTIC ACID, WHOLE BLOOD: 1.4 MMOL/L (ref 0.7–2.1)
LDLC SERPL CALC-MCNC: 319 MG/DL (ref 0–100)
LEUKOCYTE ESTERASE UR QL STRIP: NEGATIVE
MAGNESIUM SERPL-MCNC: 2.8 MG/DL (ref 1.6–2.4)
MCH RBC QN AUTO: 29 PG (ref 25.2–33.5)
MCHC RBC AUTO-ENTMCNC: 31.1 G/DL (ref 28.4–34.8)
MCV RBC AUTO: 93.3 FL (ref 82.6–102.9)
MYOGLOBIN SERPL-MCNC: 61 NG/ML (ref 28–72)
NEGATIVE BASE EXCESS, VEN: 5.1 MMOL/L (ref 0–2)
NITRITE UR QL STRIP: NEGATIVE
NRBC BLD-RTO: 0 PER 100 WBC
O2 SAT, VEN: 24.8 % (ref 60–85)
PCO2 VENOUS: 45.5 MM HG (ref 39–55)
PH UR STRIP: 5.5 [PH] (ref 5–8)
PH VENOUS: 7.29 (ref 7.32–7.42)
PLATELET # BLD AUTO: 276 K/UL (ref 138–453)
PMV BLD AUTO: 11 FL (ref 8.1–13.5)
PO2 VENOUS: 20.8 MM HG (ref 30–50)
POTASSIUM SERPL-SCNC: 3.7 MMOL/L (ref 3.7–5.3)
POTASSIUM SERPL-SCNC: 3.9 MMOL/L (ref 3.7–5.3)
PROT UR STRIP-MCNC: ABNORMAL MG/DL
RBC # BLD AUTO: 4.51 M/UL (ref 4.21–5.77)
RBC #/AREA URNS HPF: NORMAL /HPF (ref 0–4)
SODIUM SERPL-SCNC: 136 MMOL/L (ref 136–145)
SODIUM SERPL-SCNC: 136 MMOL/L (ref 136–145)
SP GR UR STRIP: 1.03 (ref 1–1.03)
TRIGL SERPL-MCNC: 265 MG/DL
TROPONIN I SERPL HS-MCNC: 31 NG/L (ref 0–22)
TROPONIN I SERPL HS-MCNC: 33 NG/L (ref 0–22)
UROBILINOGEN UR STRIP-ACNC: NORMAL EU/DL (ref 0–1)
VLDLC SERPL CALC-MCNC: 53 MG/DL
WBC #/AREA URNS HPF: NORMAL /HPF (ref 0–5)
WBC OTHER # BLD: 10.4 K/UL (ref 3.5–11.3)

## 2024-10-04 PROCEDURE — 82805 BLOOD GASES W/O2 SATURATION: CPT

## 2024-10-04 PROCEDURE — 6370000000 HC RX 637 (ALT 250 FOR IP): Performed by: NURSE PRACTITIONER

## 2024-10-04 PROCEDURE — 80048 BASIC METABOLIC PNL TOTAL CA: CPT

## 2024-10-04 PROCEDURE — 83605 ASSAY OF LACTIC ACID: CPT

## 2024-10-04 PROCEDURE — 6360000002 HC RX W HCPCS: Performed by: NURSE PRACTITIONER

## 2024-10-04 PROCEDURE — 83735 ASSAY OF MAGNESIUM: CPT

## 2024-10-04 PROCEDURE — 99223 1ST HOSP IP/OBS HIGH 75: CPT | Performed by: INTERNAL MEDICINE

## 2024-10-04 PROCEDURE — 80061 LIPID PANEL: CPT

## 2024-10-04 PROCEDURE — 94640 AIRWAY INHALATION TREATMENT: CPT

## 2024-10-04 PROCEDURE — 82533 TOTAL CORTISOL: CPT

## 2024-10-04 PROCEDURE — 2580000003 HC RX 258: Performed by: NURSE PRACTITIONER

## 2024-10-04 PROCEDURE — 81001 URINALYSIS AUTO W/SCOPE: CPT

## 2024-10-04 PROCEDURE — 36415 COLL VENOUS BLD VENIPUNCTURE: CPT

## 2024-10-04 PROCEDURE — 83874 ASSAY OF MYOGLOBIN: CPT

## 2024-10-04 PROCEDURE — 99232 SBSQ HOSP IP/OBS MODERATE 35: CPT | Performed by: STUDENT IN AN ORGANIZED HEALTH CARE EDUCATION/TRAINING PROGRAM

## 2024-10-04 PROCEDURE — 6370000000 HC RX 637 (ALT 250 FOR IP): Performed by: INTERNAL MEDICINE

## 2024-10-04 PROCEDURE — 2060000000 HC ICU INTERMEDIATE R&B

## 2024-10-04 PROCEDURE — 87040 BLOOD CULTURE FOR BACTERIA: CPT

## 2024-10-04 PROCEDURE — 92610 EVALUATE SWALLOWING FUNCTION: CPT

## 2024-10-04 PROCEDURE — 84484 ASSAY OF TROPONIN QUANT: CPT

## 2024-10-04 PROCEDURE — 85027 COMPLETE CBC AUTOMATED: CPT

## 2024-10-04 RX ORDER — SENNA AND DOCUSATE SODIUM 50; 8.6 MG/1; MG/1
2 TABLET, FILM COATED ORAL DAILY
Status: DISCONTINUED | OUTPATIENT
Start: 2024-10-04 | End: 2024-10-07 | Stop reason: HOSPADM

## 2024-10-04 RX ADMIN — GUAIFENESIN 600 MG: 600 TABLET, EXTENDED RELEASE ORAL at 09:05

## 2024-10-04 RX ADMIN — CARVEDILOL 3.12 MG: 6.25 TABLET, FILM COATED ORAL at 09:01

## 2024-10-04 RX ADMIN — GUAIFENESIN 600 MG: 600 TABLET, EXTENDED RELEASE ORAL at 00:15

## 2024-10-04 RX ADMIN — CARBIDOPA AND LEVODOPA 1 TABLET: 25; 100 TABLET ORAL at 18:01

## 2024-10-04 RX ADMIN — SALINE NASAL SPRAY 2 SPRAY: 1.5 SOLUTION NASAL at 10:02

## 2024-10-04 RX ADMIN — GUAIFENESIN 600 MG: 600 TABLET, EXTENDED RELEASE ORAL at 21:19

## 2024-10-04 RX ADMIN — ASPIRIN 81 MG: 81 TABLET, COATED ORAL at 09:01

## 2024-10-04 RX ADMIN — SODIUM CHLORIDE, PRESERVATIVE FREE 5 ML: 5 INJECTION INTRAVENOUS at 10:03

## 2024-10-04 RX ADMIN — SUCRALFATE 1 G: 1 TABLET ORAL at 00:15

## 2024-10-04 RX ADMIN — CARBIDOPA AND LEVODOPA 1 TABLET: 25; 100 TABLET ORAL at 21:19

## 2024-10-04 RX ADMIN — CARVEDILOL 3.12 MG: 6.25 TABLET, FILM COATED ORAL at 21:19

## 2024-10-04 RX ADMIN — IPRATROPIUM BROMIDE 2 SPRAY: 21 SPRAY NASAL at 00:16

## 2024-10-04 RX ADMIN — RANOLAZINE 1000 MG: 500 TABLET, FILM COATED, EXTENDED RELEASE ORAL at 09:00

## 2024-10-04 RX ADMIN — TICAGRELOR 90 MG: 90 TABLET ORAL at 09:00

## 2024-10-04 RX ADMIN — LEVOTHYROXINE SODIUM 75 MCG: 75 TABLET ORAL at 09:01

## 2024-10-04 RX ADMIN — SUCRALFATE 1 G: 1 TABLET ORAL at 09:03

## 2024-10-04 RX ADMIN — SALINE NASAL SPRAY 2 SPRAY: 1.5 SOLUTION NASAL at 21:20

## 2024-10-04 RX ADMIN — CARBIDOPA AND LEVODOPA 1 TABLET: 25; 100 TABLET ORAL at 00:15

## 2024-10-04 RX ADMIN — CARBIDOPA AND LEVODOPA 1 TABLET: 25; 100 TABLET ORAL at 10:00

## 2024-10-04 RX ADMIN — IPRATROPIUM BROMIDE 2 SPRAY: 21 SPRAY NASAL at 21:20

## 2024-10-04 RX ADMIN — CARVEDILOL 3.12 MG: 6.25 TABLET, FILM COATED ORAL at 00:15

## 2024-10-04 RX ADMIN — CYANOCOBALAMIN TAB 500 MCG 1000 MCG: 500 TAB at 09:55

## 2024-10-04 RX ADMIN — SUCRALFATE 1 G: 1 TABLET ORAL at 12:24

## 2024-10-04 RX ADMIN — AMIODARONE HYDROCHLORIDE 200 MG: 200 TABLET ORAL at 09:04

## 2024-10-04 RX ADMIN — SUCRALFATE 1 G: 1 TABLET ORAL at 21:19

## 2024-10-04 RX ADMIN — TRAMADOL HYDROCHLORIDE 50 MG: 50 TABLET ORAL at 12:24

## 2024-10-04 RX ADMIN — RANOLAZINE 1000 MG: 500 TABLET, FILM COATED, EXTENDED RELEASE ORAL at 00:15

## 2024-10-04 RX ADMIN — SUCRALFATE 1 G: 1 TABLET ORAL at 18:00

## 2024-10-04 RX ADMIN — SODIUM CHLORIDE: 9 INJECTION, SOLUTION INTRAVENOUS at 21:29

## 2024-10-04 RX ADMIN — DOCUSATE SODIUM LIQUID 50 MG: 100 LIQUID ORAL at 21:20

## 2024-10-04 RX ADMIN — MIRTAZAPINE 7.5 MG: 15 TABLET, FILM COATED ORAL at 21:18

## 2024-10-04 RX ADMIN — CARBIDOPA AND LEVODOPA 1 TABLET: 25; 100 TABLET ORAL at 12:23

## 2024-10-04 RX ADMIN — TICAGRELOR 90 MG: 90 TABLET ORAL at 00:15

## 2024-10-04 RX ADMIN — TICAGRELOR 90 MG: 90 TABLET ORAL at 21:19

## 2024-10-04 RX ADMIN — SALINE NASAL SPRAY 2 SPRAY: 1.5 SOLUTION NASAL at 00:16

## 2024-10-04 RX ADMIN — MIRTAZAPINE 7.5 MG: 15 TABLET, FILM COATED ORAL at 00:16

## 2024-10-04 RX ADMIN — SODIUM CHLORIDE, PRESERVATIVE FREE 10 ML: 5 INJECTION INTRAVENOUS at 22:47

## 2024-10-04 RX ADMIN — POTASSIUM CHLORIDE 10 MEQ: 750 CAPSULE, EXTENDED RELEASE ORAL at 09:54

## 2024-10-04 RX ADMIN — PANTOPRAZOLE SODIUM 40 MG: 40 TABLET, DELAYED RELEASE ORAL at 09:00

## 2024-10-04 RX ADMIN — SODIUM CHLORIDE, PRESERVATIVE FREE 10 ML: 5 INJECTION INTRAVENOUS at 00:23

## 2024-10-04 RX ADMIN — ENOXAPARIN SODIUM 40 MG: 100 INJECTION SUBCUTANEOUS at 08:59

## 2024-10-04 RX ADMIN — IPRATROPIUM BROMIDE AND ALBUTEROL SULFATE 1 DOSE: .5; 2.5 SOLUTION RESPIRATORY (INHALATION) at 07:51

## 2024-10-04 RX ADMIN — RANOLAZINE 1000 MG: 500 TABLET, FILM COATED, EXTENDED RELEASE ORAL at 21:19

## 2024-10-04 RX ADMIN — ONDANSETRON 4 MG: 2 INJECTION INTRAMUSCULAR; INTRAVENOUS at 12:23

## 2024-10-04 RX ADMIN — SODIUM CHLORIDE: 9 INJECTION, SOLUTION INTRAVENOUS at 00:39

## 2024-10-04 ASSESSMENT — PAIN DESCRIPTION - LOCATION: LOCATION: HEAD

## 2024-10-04 ASSESSMENT — PAIN SCALES - GENERAL: PAINLEVEL_OUTOF10: 9

## 2024-10-04 ASSESSMENT — PAIN DESCRIPTION - DESCRIPTORS: DESCRIPTORS: STABBING;POUNDING

## 2024-10-04 NOTE — RT PROTOCOL NOTE
RT Nebulizer Bronchodilator Protocol Note    There is a bronchodilator order in the chart from a provider indicating to follow the RT Bronchodilator Protocol and there is an “Initiate RT Bronchodilator Protocol” order as well (see protocol at bottom of note).    CXR Findings:  XR CHEST PORTABLE    Result Date: 10/3/2024  No acute airspace disease identified.       The findings from the last RT Protocol Assessment were as follows:  Smoking: None or smoker <15 pack years  Respiratory Pattern: Regular pattern and RR 12-20 bpm  Breath Sounds: Clear breath sounds  Cough: Strong, spontaneous, non-productive  Indication for Bronchodilator Therapy:    Bronchodilator Assessment Score: 0    Aerosolized bronchodilator medication orders have been revised according to the RT Nebulizer Bronchodilator Protocol below.    Respiratory Therapist to perform RT Therapy Protocol Assessment initially then follow the protocol.  Repeat RT Therapy Protocol Assessment PRN for score 0-3 or on second treatment, BID, and PRN for scores above 3.    No Indications - adjust the frequency to every 6 hours PRN wheezing or bronchospasm, if no treatments needed after 48 hours then discontinue using Per Protocol order mode.     If indication present, adjust the RT bronchodilator orders based on the Bronchodilator Assessment Score as indicated below.  If a patient is on this medication at home then do not decrease Frequency below that used at home.    0-3 - enter or revise RT bronchodilator order(s) to equivalent RT Bronchodilator order with Frequency of every 4 hours PRN for wheezing or increased work of breathing using Per Protocol order mode.       4-6 - enter or revise RT Bronchodilator order(s) to two equivalent RT bronchodilator orders with one order with BID Frequency and one order with Frequency of every 4 hours PRN wheezing or increased work of breathing using Per Protocol order mode.         7-10 - enter or revise RT Bronchodilator order(s) to

## 2024-10-04 NOTE — CONSULTS
[facility]    Consult / H&P               Today's Date: 10/4/2024  Patient Name: Rowdy Marie  Date of admission: 10/3/2024  3:09 PM  Patient's age: 74 y.o., 1950  Admission Dx: Rhinovirus infection [B34.8]    Requesting Physician: Ana Lund Sra, MD    Cardiac Evaluation Reason:  Elevated troponins    History Obtained From: patient and chart review     History of Present Illness:    74 y.o.  male w/ chronic GERD, prior esophageal stricture, multi vessel CAD s/p CABG 2014/ prior stents w/ recent LHC 05/22/2024 w/ PCI w/ BRENDA to SVG to 2nd marginal graft, prior LHC w/ PCI w/ BRENDA to ostial and mid SVG-diag on 04/11/2024, Pafib s/p watchman 11/2023 (w/ history of prior GIB w/ PUD), HFpEF w/ MR (echo 04/2024 moderate-severe MR, EF 55%), CKD3, Parkinson's w/ history of orthostatic hypotension symptomatic w/ dizziness who presents with Chest Pain   and is admitted to the hospital for the management of Rhinovirus infection.     Patient describes nonspecific flulike symptoms that began last week with rhinorrhea, subjective fevers and chills, cough with congestion, sinus headaches.  Patient describes worsening anorexia with poor p.o. intake, ongoing indigestion with heartburn.  States prior to arrival he developed acute dizziness that began around 10 AM.  Attempted to get up out of bed but felt acutely worse with worsening palpitations, symptomatic tachycardia.  Subsequently developed pressure-like chest pain midsternum with chest pain that lasted approximately an hour, waxed and waned in intensity with acute nausea status post emesis.       Patient does struggle with orthostasis with intermittent dizziness with worsening symptoms with the flu symptoms with severe vertiginous symptoms with near syncope with even attempting to sit up, out of bed or any activity.  He did describe chest tightness with coughing episodes.  + Diffuse myalgias with chronic lower back pain.  + Chronic recurrent

## 2024-10-04 NOTE — CONSULTS
Comprehensive Nutrition Assessment    Type and Reason for Visit:  Initial, Consult    Nutrition Recommendations/Plan:   Continue current diet.  Start Ensure Plus ONS BID.  Will monitor labs, weights, intake, GI status, and plan of care.     Malnutrition Assessment:  Malnutrition Status:  Severe malnutrition (10/04/24 1017)    Context:  Acute Illness     Findings of the 6 clinical characteristics of malnutrition:  Energy Intake:  75% or less of estimated energy requirements for 7 or more days  Weight Loss:  1% to 2% over 1 week     Body Fat Loss:  Mild body fat loss Buccal region, Orbital   Muscle Mass Loss:  Mild muscle mass loss Clavicles (pectoralis & deltoids), Temples (temporalis)  Fluid Accumulation:  No significant fluid accumulation     Strength:  Not Performed    Nutrition Assessment:    Pt admitted for management of Rhinovirus infection. Pt reports decreased appetite the past week, during visit noted breakfast tray with only a few bites taken. Discussed the addition of ONS, pt states they have been drinking Boost at home and agreeable to ONS during stay.    Nutrition Related Findings:    No edema. Last BM 10/3. Labs: Mg 2.8. Meds: remeron. Wound Type: None       Current Nutrition Intake & Therapies:    Average Meal Intake: 1-25%  Average Supplements Intake: None Ordered  ADULT DIET; Regular; No Added Salt (3-4 gm)    Anthropometric Measures:  Height: 180.3 cm (5' 10.98\")  Ideal Body Weight (IBW): 172 lbs (78 kg)       Current Body Weight: 68 kg (149 lb 14.6 oz), 87.2 % IBW. Weight Source: Stated  Current BMI (kg/m2): 20.9  Usual Body Weight: 70.3 kg (155 lb)  % Weight Change (Calculated): -3.3     BMI Categories: Underweight (BMI less than 22) age over 65    Estimated Daily Nutrient Needs:  Energy Requirements Based On: Kcal/kg  Weight Used for Energy Requirements: Current  Energy (kcal/day): 5346-8302 kcal/d per 25-30 kcal/kg  Weight Used for Protein Requirements: Current  Protein (g/day): 102 g/d per

## 2024-10-04 NOTE — ED NOTES
Labeled blood specimens sent to lab via tube system.    [] Lavender   [] on ice   [] Blue   [x] Green/yellow  [x] Green/black [] on ice  [] Pink  [] Red  [] Yellow  [] on ice  [x] Blood Cultures  [] x1 [x] x2   
Pt provided with additional warm blanket.  
Pt provided with urinal for urine sample.  
Report given to STAS Vaughan.  All questions answered.   
 Take 1 tablet by mouth daily     Orders Placed This Encounter   Medications    DISCONTD: ondansetron (ZOFRAN) injection 4 mg    DISCONTD: magnesium sulfate 2000 mg in 50 mL IVPB premix    magnesium sulfate 2000 mg in 50 mL IVPB premix    sodium chloride 0.9 % bolus 1,000 mL    acetaminophen (TYLENOL) tablet 1,000 mg    iopamidol (ISOVUE-370) 76 % injection 100 mL       SURGICAL HISTORY       Past Surgical History:   Procedure Laterality Date    CARDIAC CATHETERIZATION      CARDIAC PROCEDURE N/A 4/11/2024    sunny / Left heart cath / coronary angiography / op scmh performed by Andree Paulino MD at [facility] CARDIAC CATH LAB    CARDIAC PROCEDURE N/A 4/11/2024    Percutaneous coronary intervention performed by Andree Paulino MD at [facility] CARDIAC CATH LAB    CARDIAC PROCEDURE N/A 5/22/2024    jossue / Left heart cath / coronary angiography / op scmh performed by Antonio Love MD at [facility] CARDIAC CATH LAB    CARDIAC PROCEDURE N/A 5/22/2024    Percutaneous coronary intervention performed by Antonio Love MD at [facility] CARDIAC CATH LAB    CORONARY ANGIOPLASTY WITH STENT PLACEMENT      8 stents prior to CABG per patient, most recent 12/2023    CORONARY ARTERY BYPASS GRAFT      2014    DIAGNOSTIC CARDIAC CATH LAB PROCEDURE  04/11/2024       PAST MEDICAL HISTORY       Past Medical History:   Diagnosis Date    A-fib ([facility])     PREET (acute kidney injury) ([facility])     Anemia     CAD (coronary artery disease)     Esophageal stricture     Hyperlipidemia     Hypokalemia     Hypomagnesemia     NSTEMI (non-ST elevated myocardial infarction) ([facility])     Orthostatic hypotension     Parkinsons ([facility])     Presence of Watchman left atrial appendage closure device     Prostate CA ([facility])        Labs:  Labs Reviewed   CBC WITH AUTO DIFFERENTIAL - Abnormal; Notable for the following components:       Result Value    RDW 17.8 (*)     Neutrophils % 69 (*)     Lymphocytes % 20 (*)     Immature Granulocytes % 1 (*)     All other components within normal

## 2024-10-04 NOTE — ACP (ADVANCE CARE PLANNING)
Advance Care Planning     Advance Care Planning Inpatient Note  [facility] Department    Today's Date: 10/4/2024  Unit: [facility] 5C STEPDOWN    Received request from patient.  Upon review of chart and communication with care team, patient's decision making abilities are not in question.. Patient was present in the room during visit.    Goals of ACP Conversation:  Discuss advance care planning documents  Facilitate a discussion related to patient's goals of care as they align with the patient's values and beliefs.    Health Care Decision Makers:      Primary Decision Maker: Kye Marie - Daughter-in-Law - [phone]    Secondary Decision Maker: Elliott Marie - Child - [phone]    Supplemental (Other) Decision Maker: Patrick Marie - Megan - [phone]  Summary:  Completed New Documents  Updated Healthcare Decision Maker    Pt expressed that he would like his daughter-in-law, Kye Marie (cell phone number: [phone]), as his primary healthcare agent. Pt also expressed that he would like his son, Elliott Marie (cell phone number: [phone]), as his first alternate healthcare agent and his other son, Patrick Marie (cell phone number: [phone]), as his second alternate healthcare agent.     Advance Care Planning Documents (Patient Wishes):  Healthcare Power of /Advance Directive Appointment of Health Care Agent     Assessment:    I introduced myself, my role, and the purpose of my visit. Pt in bed resting comfortably and agreeable to this visit.     Pt states that his wife passed away last December 2023. Per pt, his son, Elliott Marie, and his family all live together in the same household. Elliott's wife, Kye Marie (pt's daughter-in-law), is like a daughter to him and would like Kye Marie to be his primary healthcare agent. Pt also expressed that he would like his son, Elliott Marie, as his first alternate healthcare agent and his other son, Patrick Marie, who lives in [place], as his second

## 2024-10-04 NOTE — H&P
tablet Take 1 tablet by mouth Daily    Provider, MD Bailey        Allergies:     Latex, Atorvastatin, Fluvastatin, Imdur [isosorbide nitrate], Isosorbide, Methadone, Nalbuphine, Oxycodone, Penicillins, Rosuvastatin, Statins, and Zetia [ezetimibe]    Social History:     Tobacco:    reports that he has never smoked. He has never used smokeless tobacco.  Alcohol:      reports no history of alcohol use.  Drug Use:  reports no history of drug use.  Lives independently, denies tobacco excessive binge drinking or illegal drugs, does walk independently    Family History:     Denies family history of DVT PE    Review of Systems:     Positive and Negative as described in HPI.    Review of Systems  CONSTITUTIONAL:  + fevers, chills, fatigue, denies weight loss   CARDIOVASCULAR: Frequent episodes of symptomatic palpitations, tachycardia with recurrent episodes of orthostasis, near syncope few times a week especially with out of bed, acutely worse over the past week.  Denies PND/ orthopnea.    RESPIRATORY: Denies recent recurrent bronchitis, pneumonia.  Denies COPD/ asthma.  Denies apnea.  Denies issues with aspiration pneumonia  GASTROINTESTINAL:  + Indigestion.  denies  diarrhea, constipation, abdominal pain.  Denies dysphasia, no odynophagia.   GENITOURINARY: Denies difficulty urinating, hesitancy, dysuria, frequency, flank pain or hematuria   DERM:  negative for rash, skin lesions, easy bruising   HEMATOLOGIC/LYMPHATIC:  Denies easy bruising, bleeding issues.  Denies rectal bleeding.  Denies prior DVT/PE.  Currently remission from prostate cancer, not currently on treatment.  Status post XRT  ALLERGIC/IMMUNOLOGIC:  negative for urticaria , itching  ENDOCRINE:  denies DM, borderline DM.  Denies heat/ cold intolerance  MUSCULOSKELETAL: Chronic lumbago with stable radicular symptoms, + arthritis with joint pains, muscle aches.  Does use NSAIDs daily  NEUROLOGICAL: +prior TIA but denies any recent strokelike symptoms,

## 2024-10-05 LAB
ANION GAP SERPL CALCULATED.3IONS-SCNC: 9 MMOL/L (ref 9–16)
BACTERIA URNS QL MICRO: NORMAL
BASOPHILS # BLD: 0.13 K/UL (ref 0–0.2)
BASOPHILS NFR BLD: 2 % (ref 0–2)
BILIRUB UR QL STRIP: NEGATIVE
BUN SERPL-MCNC: 23 MG/DL (ref 8–23)
CALCIUM SERPL-MCNC: 8.3 MG/DL (ref 8.6–10.4)
CASTS #/AREA URNS LPF: NORMAL /LPF (ref 0–8)
CHLORIDE SERPL-SCNC: 108 MMOL/L (ref 98–107)
CLARITY UR: CLEAR
CO2 SERPL-SCNC: 20 MMOL/L (ref 20–31)
COLOR UR: ABNORMAL
CREAT SERPL-MCNC: 1.2 MG/DL (ref 0.7–1.2)
EOSINOPHIL # BLD: 0.59 K/UL (ref 0–0.44)
EOSINOPHILS RELATIVE PERCENT: 8 % (ref 1–4)
EPI CELLS #/AREA URNS HPF: NORMAL /HPF (ref 0–5)
ERYTHROCYTE [DISTWIDTH] IN BLOOD BY AUTOMATED COUNT: 18.1 % (ref 11.8–14.4)
GFR, ESTIMATED: 62 ML/MIN/1.73M2
GLUCOSE SERPL-MCNC: 96 MG/DL (ref 74–99)
GLUCOSE UR STRIP-MCNC: NEGATIVE MG/DL
HCT VFR BLD AUTO: 36.2 % (ref 40.7–50.3)
HGB BLD-MCNC: 11.3 G/DL (ref 13–17)
HGB UR QL STRIP.AUTO: NEGATIVE
IMM GRANULOCYTES # BLD AUTO: 0.06 K/UL (ref 0–0.3)
IMM GRANULOCYTES NFR BLD: 1 %
KETONES UR STRIP-MCNC: ABNORMAL MG/DL
LEUKOCYTE ESTERASE UR QL STRIP: ABNORMAL
LYMPHOCYTES NFR BLD: 1.47 K/UL (ref 1.1–3.7)
LYMPHOCYTES RELATIVE PERCENT: 19 % (ref 24–43)
MCH RBC QN AUTO: 28.8 PG (ref 25.2–33.5)
MCHC RBC AUTO-ENTMCNC: 31.2 G/DL (ref 28.4–34.8)
MCV RBC AUTO: 92.1 FL (ref 82.6–102.9)
MICROORGANISM SPEC CULT: ABNORMAL
MONOCYTES NFR BLD: 0.61 K/UL (ref 0.1–1.2)
MONOCYTES NFR BLD: 8 % (ref 3–12)
NEUTROPHILS NFR BLD: 62 % (ref 36–65)
NEUTS SEG NFR BLD: 4.71 K/UL (ref 1.5–8.1)
NITRITE UR QL STRIP: NEGATIVE
NRBC BLD-RTO: 0 PER 100 WBC
PH UR STRIP: 5.5 [PH] (ref 5–8)
PLATELET # BLD AUTO: 245 K/UL (ref 138–453)
PMV BLD AUTO: 11.5 FL (ref 8.1–13.5)
POTASSIUM SERPL-SCNC: 3.8 MMOL/L (ref 3.7–5.3)
PROT UR STRIP-MCNC: ABNORMAL MG/DL
RBC # BLD AUTO: 3.93 M/UL (ref 4.21–5.77)
RBC # BLD: ABNORMAL 10*6/UL
RBC #/AREA URNS HPF: NORMAL /HPF (ref 0–4)
SERVICE CMNT-IMP: ABNORMAL
SODIUM SERPL-SCNC: 137 MMOL/L (ref 136–145)
SP GR UR STRIP: 1.03 (ref 1–1.03)
SPECIMEN DESCRIPTION: ABNORMAL
UROBILINOGEN UR STRIP-ACNC: NORMAL EU/DL (ref 0–1)
WBC #/AREA URNS HPF: NORMAL /HPF (ref 0–5)
WBC OTHER # BLD: 7.6 K/UL (ref 3.5–11.3)

## 2024-10-05 PROCEDURE — 80048 BASIC METABOLIC PNL TOTAL CA: CPT

## 2024-10-05 PROCEDURE — 2060000000 HC ICU INTERMEDIATE R&B

## 2024-10-05 PROCEDURE — 6360000002 HC RX W HCPCS: Performed by: NURSE PRACTITIONER

## 2024-10-05 PROCEDURE — 6370000000 HC RX 637 (ALT 250 FOR IP): Performed by: NURSE PRACTITIONER

## 2024-10-05 PROCEDURE — 6370000000 HC RX 637 (ALT 250 FOR IP): Performed by: STUDENT IN AN ORGANIZED HEALTH CARE EDUCATION/TRAINING PROGRAM

## 2024-10-05 PROCEDURE — 6370000000 HC RX 637 (ALT 250 FOR IP): Performed by: INTERNAL MEDICINE

## 2024-10-05 PROCEDURE — 85025 COMPLETE CBC W/AUTO DIFF WBC: CPT

## 2024-10-05 PROCEDURE — 36415 COLL VENOUS BLD VENIPUNCTURE: CPT

## 2024-10-05 PROCEDURE — 99232 SBSQ HOSP IP/OBS MODERATE 35: CPT | Performed by: STUDENT IN AN ORGANIZED HEALTH CARE EDUCATION/TRAINING PROGRAM

## 2024-10-05 PROCEDURE — 99233 SBSQ HOSP IP/OBS HIGH 50: CPT | Performed by: NURSE PRACTITIONER

## 2024-10-05 RX ADMIN — CYANOCOBALAMIN TAB 500 MCG 1000 MCG: 500 TAB at 11:57

## 2024-10-05 RX ADMIN — SENNOSIDES AND DOCUSATE SODIUM 2 TABLET: 50; 8.6 TABLET ORAL at 08:48

## 2024-10-05 RX ADMIN — POTASSIUM CHLORIDE 10 MEQ: 750 CAPSULE, EXTENDED RELEASE ORAL at 08:47

## 2024-10-05 RX ADMIN — AMIODARONE HYDROCHLORIDE 200 MG: 200 TABLET ORAL at 08:49

## 2024-10-05 RX ADMIN — SUCRALFATE 1 G: 1 TABLET ORAL at 13:39

## 2024-10-05 RX ADMIN — GUAIFENESIN 600 MG: 600 TABLET, EXTENDED RELEASE ORAL at 20:35

## 2024-10-05 RX ADMIN — PANTOPRAZOLE SODIUM 40 MG: 40 TABLET, DELAYED RELEASE ORAL at 08:47

## 2024-10-05 RX ADMIN — SALINE NASAL SPRAY 2 SPRAY: 1.5 SOLUTION NASAL at 20:49

## 2024-10-05 RX ADMIN — SUCRALFATE 1 G: 1 TABLET ORAL at 20:35

## 2024-10-05 RX ADMIN — SUCRALFATE 1 G: 1 TABLET ORAL at 16:45

## 2024-10-05 RX ADMIN — SALINE NASAL SPRAY 2 SPRAY: 1.5 SOLUTION NASAL at 16:45

## 2024-10-05 RX ADMIN — SALINE NASAL SPRAY 2 SPRAY: 1.5 SOLUTION NASAL at 08:50

## 2024-10-05 RX ADMIN — ENOXAPARIN SODIUM 40 MG: 100 INJECTION SUBCUTANEOUS at 08:47

## 2024-10-05 RX ADMIN — CARVEDILOL 3.12 MG: 6.25 TABLET, FILM COATED ORAL at 20:35

## 2024-10-05 RX ADMIN — GUAIFENESIN 600 MG: 600 TABLET, EXTENDED RELEASE ORAL at 08:48

## 2024-10-05 RX ADMIN — IPRATROPIUM BROMIDE 2 SPRAY: 21 SPRAY NASAL at 20:49

## 2024-10-05 RX ADMIN — CARBIDOPA AND LEVODOPA 1 TABLET: 25; 100 TABLET ORAL at 20:35

## 2024-10-05 RX ADMIN — CARVEDILOL 3.12 MG: 6.25 TABLET, FILM COATED ORAL at 08:49

## 2024-10-05 RX ADMIN — CARBIDOPA AND LEVODOPA 1 TABLET: 25; 100 TABLET ORAL at 08:48

## 2024-10-05 RX ADMIN — MIRTAZAPINE 7.5 MG: 15 TABLET, FILM COATED ORAL at 20:49

## 2024-10-05 RX ADMIN — CARBIDOPA AND LEVODOPA 1 TABLET: 25; 100 TABLET ORAL at 16:45

## 2024-10-05 RX ADMIN — DOCUSATE SODIUM LIQUID 50 MG: 100 LIQUID ORAL at 20:49

## 2024-10-05 RX ADMIN — CARBIDOPA AND LEVODOPA 1 TABLET: 25; 100 TABLET ORAL at 13:39

## 2024-10-05 RX ADMIN — RANOLAZINE 1000 MG: 500 TABLET, FILM COATED, EXTENDED RELEASE ORAL at 20:35

## 2024-10-05 RX ADMIN — TICAGRELOR 90 MG: 90 TABLET ORAL at 20:35

## 2024-10-05 RX ADMIN — ASPIRIN 81 MG: 81 TABLET, COATED ORAL at 08:49

## 2024-10-05 RX ADMIN — TICAGRELOR 90 MG: 90 TABLET ORAL at 08:48

## 2024-10-05 RX ADMIN — IPRATROPIUM BROMIDE 2 SPRAY: 21 SPRAY NASAL at 08:50

## 2024-10-05 RX ADMIN — RANOLAZINE 1000 MG: 500 TABLET, FILM COATED, EXTENDED RELEASE ORAL at 08:48

## 2024-10-05 RX ADMIN — SUCRALFATE 1 G: 1 TABLET ORAL at 08:48

## 2024-10-05 RX ADMIN — LEVOTHYROXINE SODIUM 75 MCG: 75 TABLET ORAL at 08:48

## 2024-10-06 LAB
ANION GAP SERPL CALCULATED.3IONS-SCNC: 10 MMOL/L (ref 9–16)
BASOPHILS # BLD: 0.11 K/UL (ref 0–0.2)
BASOPHILS NFR BLD: 1 % (ref 0–2)
BUN SERPL-MCNC: 20 MG/DL (ref 8–23)
CALCIUM SERPL-MCNC: 8.9 MG/DL (ref 8.6–10.4)
CHLORIDE SERPL-SCNC: 108 MMOL/L (ref 98–107)
CO2 SERPL-SCNC: 19 MMOL/L (ref 20–31)
CREAT SERPL-MCNC: 1.1 MG/DL (ref 0.7–1.2)
DATE, STOOL #1: NORMAL
EOSINOPHIL # BLD: 0.47 K/UL (ref 0–0.44)
EOSINOPHILS RELATIVE PERCENT: 6 % (ref 1–4)
ERYTHROCYTE [DISTWIDTH] IN BLOOD BY AUTOMATED COUNT: 18.3 % (ref 11.8–14.4)
GFR, ESTIMATED: 69 ML/MIN/1.73M2
GLUCOSE SERPL-MCNC: 85 MG/DL (ref 74–99)
HCT VFR BLD AUTO: 34.6 % (ref 40.7–50.3)
HCT VFR BLD AUTO: 34.7 % (ref 40.7–50.3)
HCT VFR BLD AUTO: 35.6 % (ref 40.7–50.3)
HCT VFR BLD AUTO: 35.9 % (ref 40.7–50.3)
HEMOCCULT SP1 STL QL: NEGATIVE
HGB BLD-MCNC: 10.7 G/DL (ref 13–17)
HGB BLD-MCNC: 11 G/DL (ref 13–17)
HGB BLD-MCNC: 11.1 G/DL (ref 13–17)
HGB BLD-MCNC: 11.1 G/DL (ref 13–17)
IMM GRANULOCYTES # BLD AUTO: 0.06 K/UL (ref 0–0.3)
IMM GRANULOCYTES NFR BLD: 1 %
LYMPHOCYTES NFR BLD: 1.6 K/UL (ref 1.1–3.7)
LYMPHOCYTES RELATIVE PERCENT: 19 % (ref 24–43)
MAGNESIUM SERPL-MCNC: 2 MG/DL (ref 1.6–2.4)
MCH RBC QN AUTO: 28.8 PG (ref 25.2–33.5)
MCHC RBC AUTO-ENTMCNC: 30.9 G/DL (ref 28.4–34.8)
MCV RBC AUTO: 93.2 FL (ref 82.6–102.9)
MONOCYTES NFR BLD: 0.69 K/UL (ref 0.1–1.2)
MONOCYTES NFR BLD: 8 % (ref 3–12)
NEUTROPHILS NFR BLD: 65 % (ref 36–65)
NEUTS SEG NFR BLD: 5.46 K/UL (ref 1.5–8.1)
NRBC BLD-RTO: 0 PER 100 WBC
PLATELET # BLD AUTO: 232 K/UL (ref 138–453)
PMV BLD AUTO: 11.4 FL (ref 8.1–13.5)
POTASSIUM SERPL-SCNC: 3.3 MMOL/L (ref 3.7–5.3)
RBC # BLD AUTO: 3.85 M/UL (ref 4.21–5.77)
RBC # BLD: ABNORMAL 10*6/UL
SODIUM SERPL-SCNC: 137 MMOL/L (ref 136–145)
TIME, STOOL #1: NORMAL
WBC OTHER # BLD: 8.4 K/UL (ref 3.5–11.3)

## 2024-10-06 PROCEDURE — 6370000000 HC RX 637 (ALT 250 FOR IP): Performed by: NURSE PRACTITIONER

## 2024-10-06 PROCEDURE — 2060000000 HC ICU INTERMEDIATE R&B

## 2024-10-06 PROCEDURE — 83735 ASSAY OF MAGNESIUM: CPT

## 2024-10-06 PROCEDURE — 85018 HEMOGLOBIN: CPT

## 2024-10-06 PROCEDURE — 82270 OCCULT BLOOD FECES: CPT

## 2024-10-06 PROCEDURE — 80048 BASIC METABOLIC PNL TOTAL CA: CPT

## 2024-10-06 PROCEDURE — 36415 COLL VENOUS BLD VENIPUNCTURE: CPT

## 2024-10-06 PROCEDURE — 85014 HEMATOCRIT: CPT

## 2024-10-06 PROCEDURE — 99233 SBSQ HOSP IP/OBS HIGH 50: CPT | Performed by: NURSE PRACTITIONER

## 2024-10-06 PROCEDURE — 85025 COMPLETE CBC W/AUTO DIFF WBC: CPT

## 2024-10-06 PROCEDURE — 6370000000 HC RX 637 (ALT 250 FOR IP): Performed by: INTERNAL MEDICINE

## 2024-10-06 PROCEDURE — 2580000003 HC RX 258: Performed by: NURSE PRACTITIONER

## 2024-10-06 PROCEDURE — 99232 SBSQ HOSP IP/OBS MODERATE 35: CPT | Performed by: STUDENT IN AN ORGANIZED HEALTH CARE EDUCATION/TRAINING PROGRAM

## 2024-10-06 RX ADMIN — CARVEDILOL 3.12 MG: 6.25 TABLET, FILM COATED ORAL at 20:32

## 2024-10-06 RX ADMIN — SALINE NASAL SPRAY 2 SPRAY: 1.5 SOLUTION NASAL at 14:43

## 2024-10-06 RX ADMIN — CARBIDOPA AND LEVODOPA 1 TABLET: 25; 100 TABLET ORAL at 18:00

## 2024-10-06 RX ADMIN — SUCRALFATE 1 G: 1 TABLET ORAL at 09:06

## 2024-10-06 RX ADMIN — ONDANSETRON 4 MG: 4 TABLET, ORALLY DISINTEGRATING ORAL at 14:43

## 2024-10-06 RX ADMIN — SALINE NASAL SPRAY 2 SPRAY: 1.5 SOLUTION NASAL at 09:06

## 2024-10-06 RX ADMIN — TICAGRELOR 90 MG: 90 TABLET ORAL at 20:34

## 2024-10-06 RX ADMIN — ASPIRIN 81 MG: 81 TABLET, COATED ORAL at 09:06

## 2024-10-06 RX ADMIN — SUCRALFATE 1 G: 1 TABLET ORAL at 13:07

## 2024-10-06 RX ADMIN — TRAMADOL HYDROCHLORIDE 50 MG: 50 TABLET ORAL at 23:41

## 2024-10-06 RX ADMIN — CYANOCOBALAMIN TAB 500 MCG 1000 MCG: 500 TAB at 09:08

## 2024-10-06 RX ADMIN — IPRATROPIUM BROMIDE 2 SPRAY: 21 SPRAY NASAL at 09:06

## 2024-10-06 RX ADMIN — AMIODARONE HYDROCHLORIDE 200 MG: 200 TABLET ORAL at 09:06

## 2024-10-06 RX ADMIN — SODIUM CHLORIDE, PRESERVATIVE FREE 5 ML: 5 INJECTION INTRAVENOUS at 20:55

## 2024-10-06 RX ADMIN — CARVEDILOL 3.12 MG: 6.25 TABLET, FILM COATED ORAL at 09:06

## 2024-10-06 RX ADMIN — CARBIDOPA AND LEVODOPA 1 TABLET: 25; 100 TABLET ORAL at 09:06

## 2024-10-06 RX ADMIN — SUCRALFATE 1 G: 1 TABLET ORAL at 18:01

## 2024-10-06 RX ADMIN — POTASSIUM CHLORIDE 10 MEQ: 750 CAPSULE, EXTENDED RELEASE ORAL at 09:05

## 2024-10-06 RX ADMIN — CARBIDOPA AND LEVODOPA 1 TABLET: 25; 100 TABLET ORAL at 13:07

## 2024-10-06 RX ADMIN — RANOLAZINE 1000 MG: 500 TABLET, FILM COATED, EXTENDED RELEASE ORAL at 09:06

## 2024-10-06 RX ADMIN — SODIUM CHLORIDE, PRESERVATIVE FREE 10 ML: 5 INJECTION INTRAVENOUS at 09:07

## 2024-10-06 RX ADMIN — GUAIFENESIN 600 MG: 600 TABLET, EXTENDED RELEASE ORAL at 09:06

## 2024-10-06 RX ADMIN — LEVOTHYROXINE SODIUM 75 MCG: 75 TABLET ORAL at 05:31

## 2024-10-06 RX ADMIN — GUAIFENESIN 600 MG: 600 TABLET, EXTENDED RELEASE ORAL at 20:32

## 2024-10-06 RX ADMIN — SUCRALFATE 1 G: 1 TABLET ORAL at 20:33

## 2024-10-06 RX ADMIN — MIRTAZAPINE 7.5 MG: 15 TABLET, FILM COATED ORAL at 20:40

## 2024-10-06 RX ADMIN — RANOLAZINE 1000 MG: 500 TABLET, FILM COATED, EXTENDED RELEASE ORAL at 20:34

## 2024-10-06 RX ADMIN — PANTOPRAZOLE SODIUM 40 MG: 40 TABLET, DELAYED RELEASE ORAL at 05:31

## 2024-10-06 RX ADMIN — CARBIDOPA AND LEVODOPA 1 TABLET: 25; 100 TABLET ORAL at 23:09

## 2024-10-06 ASSESSMENT — PAIN DESCRIPTION - DESCRIPTORS: DESCRIPTORS: ACHING

## 2024-10-06 ASSESSMENT — PAIN SCALES - GENERAL: PAINLEVEL_OUTOF10: 8

## 2024-10-06 ASSESSMENT — PAIN - FUNCTIONAL ASSESSMENT: PAIN_FUNCTIONAL_ASSESSMENT: ACTIVITIES ARE NOT PREVENTED

## 2024-10-06 ASSESSMENT — PAIN DESCRIPTION - LOCATION: LOCATION: BACK

## 2024-10-06 NOTE — CARE COORDINATION
10/06/24 1730   Service Assessment   Patient Orientation Alert and Oriented   Cognition Alert   History Provided By Patient   Primary Caregiver Self   Support Systems Children   PCP Verified by CM Yes   Last Visit to PCP Within last 3 months   Prior Functional Level Independent in ADLs/IADLs   Current Functional Level Assistance with the following:;Bathing;Mobility;Shopping;Cooking   Can patient return to prior living arrangement Unknown at present   Ability to make needs known: Good   Family able to assist with home care needs: Yes   Would you like for me to discuss the discharge plan with any other family members/significant others, and if so, who? Yes  (son or dtr inlaw)   Financial Resources Medicare   Social/Functional History   Lives With Family  (lives with son Theo turner in [place] and grandson)   Type of Home House   Home Layout Multi-level;Bed/Bath upstairs   Home Access Stairs to enter with rails   Entrance Stairs - Number of Steps 5-6   Home Equipment Cane;Walker - Rolling   ADL Assistance Needs assistance  (pt was independent prior but since being sick he has become weak)   Ambulation Assistance Needs assistance   Transfer Assistance Needs assistance   Active  Yes  (only short distances)   Mode of Transportation Car   Occupation Retired   Discharge Planning   Type of Residence Skilled Nursing Facility;House  (home c/Hc or SNF)   Living Arrangements Family Members     Case Management Assessment  Initial Evaluation    Date/Time of Evaluation: 10/6/2024 5:35 PM  Assessment Completed by: Julia Cooney RN    If patient is discharged prior to next notation, then this note serves as note for discharge by case management.    Patient Name: Rowdy Marie                   YOB: 1950  Diagnosis: Rhinovirus infection [B34.8]                   Date / Time: 10/3/2024  3:09 PM    Patient Admission Status: Inpatient   Readmission Risk (Low < 19, Mod (19-27), High > 27): Readmission Risk Score:

## 2024-10-07 ENCOUNTER — APPOINTMENT (OUTPATIENT)
Age: 74
DRG: 202 | End: 2024-10-07
Payer: MEDICARE

## 2024-10-07 VITALS
DIASTOLIC BLOOD PRESSURE: 61 MMHG | BODY MASS INDEX: 21 KG/M2 | WEIGHT: 150 LBS | HEART RATE: 65 BPM | RESPIRATION RATE: 20 BRPM | TEMPERATURE: 97.7 F | SYSTOLIC BLOOD PRESSURE: 96 MMHG | OXYGEN SATURATION: 99 % | HEIGHT: 71 IN

## 2024-10-07 LAB
ANION GAP SERPL CALCULATED.3IONS-SCNC: 10 MMOL/L (ref 9–16)
BASOPHILS # BLD: 0.1 K/UL (ref 0–0.2)
BASOPHILS NFR BLD: 1 % (ref 0–2)
BUN SERPL-MCNC: 13 MG/DL (ref 8–23)
CALCIUM SERPL-MCNC: 9 MG/DL (ref 8.6–10.4)
CHLORIDE SERPL-SCNC: 106 MMOL/L (ref 98–107)
CO2 SERPL-SCNC: 23 MMOL/L (ref 20–31)
CREAT SERPL-MCNC: 1.2 MG/DL (ref 0.7–1.2)
ECHO BSA: 1.85 M2
EKG ATRIAL RATE: 110 BPM
EKG ATRIAL RATE: 59 BPM
EKG P AXIS: 63 DEGREES
EKG P-R INTERVAL: 146 MS
EKG P-R INTERVAL: 170 MS
EKG Q-T INTERVAL: 426 MS
EKG Q-T INTERVAL: 454 MS
EKG QRS DURATION: 116 MS
EKG QRS DURATION: 138 MS
EKG QTC CALCULATION (BAZETT): 449 MS
EKG QTC CALCULATION (BAZETT): 576 MS
EKG R AXIS: -4 DEGREES
EKG R AXIS: 40 DEGREES
EKG T AXIS: -154 DEGREES
EKG T AXIS: 77 DEGREES
EKG VENTRICULAR RATE: 110 BPM
EKG VENTRICULAR RATE: 59 BPM
EOSINOPHIL # BLD: 0.71 K/UL (ref 0–0.44)
EOSINOPHILS RELATIVE PERCENT: 8 % (ref 1–4)
ERYTHROCYTE [DISTWIDTH] IN BLOOD BY AUTOMATED COUNT: 18 % (ref 11.8–14.4)
GFR, ESTIMATED: 64 ML/MIN/1.73M2
GLUCOSE SERPL-MCNC: 97 MG/DL (ref 74–99)
HCT VFR BLD AUTO: 37.4 % (ref 40.7–50.3)
HCT VFR BLD AUTO: 41.3 % (ref 40.7–50.3)
HGB BLD-MCNC: 11.9 G/DL (ref 13–17)
HGB BLD-MCNC: 12.8 G/DL (ref 13–17)
IMM GRANULOCYTES # BLD AUTO: 0.08 K/UL (ref 0–0.3)
IMM GRANULOCYTES NFR BLD: 1 %
LYMPHOCYTES NFR BLD: 1.78 K/UL (ref 1.1–3.7)
LYMPHOCYTES RELATIVE PERCENT: 20 % (ref 24–43)
MCH RBC QN AUTO: 29 PG (ref 25.2–33.5)
MCHC RBC AUTO-ENTMCNC: 31.8 G/DL (ref 28.4–34.8)
MCV RBC AUTO: 91 FL (ref 82.6–102.9)
MONOCYTES NFR BLD: 0.76 K/UL (ref 0.1–1.2)
MONOCYTES NFR BLD: 9 % (ref 3–12)
NEUTROPHILS NFR BLD: 61 % (ref 36–65)
NEUTS SEG NFR BLD: 5.49 K/UL (ref 1.5–8.1)
NRBC BLD-RTO: 0 PER 100 WBC
PLATELET # BLD AUTO: 236 K/UL (ref 138–453)
PMV BLD AUTO: 11.2 FL (ref 8.1–13.5)
POTASSIUM SERPL-SCNC: 3.7 MMOL/L (ref 3.7–5.3)
RBC # BLD AUTO: 4.11 M/UL (ref 4.21–5.77)
RBC # BLD: ABNORMAL 10*6/UL
SODIUM SERPL-SCNC: 139 MMOL/L (ref 136–145)
WBC OTHER # BLD: 8.9 K/UL (ref 3.5–11.3)

## 2024-10-07 PROCEDURE — 97161 PT EVAL LOW COMPLEX 20 MIN: CPT

## 2024-10-07 PROCEDURE — 51798 US URINE CAPACITY MEASURE: CPT

## 2024-10-07 PROCEDURE — 85018 HEMOGLOBIN: CPT

## 2024-10-07 PROCEDURE — 97116 GAIT TRAINING THERAPY: CPT

## 2024-10-07 PROCEDURE — 2580000003 HC RX 258: Performed by: NURSE PRACTITIONER

## 2024-10-07 PROCEDURE — 85014 HEMATOCRIT: CPT

## 2024-10-07 PROCEDURE — 97166 OT EVAL MOD COMPLEX 45 MIN: CPT

## 2024-10-07 PROCEDURE — 36415 COLL VENOUS BLD VENIPUNCTURE: CPT

## 2024-10-07 PROCEDURE — 6370000000 HC RX 637 (ALT 250 FOR IP): Performed by: INTERNAL MEDICINE

## 2024-10-07 PROCEDURE — 85025 COMPLETE CBC W/AUTO DIFF WBC: CPT

## 2024-10-07 PROCEDURE — 93242 EXT ECG>48HR<7D RECORDING: CPT

## 2024-10-07 PROCEDURE — 6370000000 HC RX 637 (ALT 250 FOR IP): Performed by: NURSE PRACTITIONER

## 2024-10-07 PROCEDURE — 99239 HOSP IP/OBS DSCHRG MGMT >30: CPT | Performed by: STUDENT IN AN ORGANIZED HEALTH CARE EDUCATION/TRAINING PROGRAM

## 2024-10-07 PROCEDURE — 97530 THERAPEUTIC ACTIVITIES: CPT

## 2024-10-07 PROCEDURE — 6360000002 HC RX W HCPCS: Performed by: NURSE PRACTITIONER

## 2024-10-07 PROCEDURE — 80048 BASIC METABOLIC PNL TOTAL CA: CPT

## 2024-10-07 PROCEDURE — 97535 SELF CARE MNGMENT TRAINING: CPT

## 2024-10-07 RX ORDER — BENZONATATE 200 MG/1
200 CAPSULE ORAL 3 TIMES DAILY PRN
Qty: 21 CAPSULE | Refills: 0 | Status: SHIPPED | OUTPATIENT
Start: 2024-10-07 | End: 2024-10-14

## 2024-10-07 RX ORDER — GUAIFENESIN 600 MG/1
600 TABLET, EXTENDED RELEASE ORAL 2 TIMES DAILY
Qty: 14 TABLET | Refills: 0 | Status: SHIPPED | OUTPATIENT
Start: 2024-10-07 | End: 2024-10-14

## 2024-10-07 RX ADMIN — TRAMADOL HYDROCHLORIDE 50 MG: 50 TABLET ORAL at 08:26

## 2024-10-07 RX ADMIN — TICAGRELOR 90 MG: 90 TABLET ORAL at 08:29

## 2024-10-07 RX ADMIN — RANOLAZINE 1000 MG: 500 TABLET, FILM COATED, EXTENDED RELEASE ORAL at 08:30

## 2024-10-07 RX ADMIN — IPRATROPIUM BROMIDE 2 SPRAY: 21 SPRAY NASAL at 08:28

## 2024-10-07 RX ADMIN — CARBIDOPA AND LEVODOPA 1 TABLET: 25; 100 TABLET ORAL at 08:30

## 2024-10-07 RX ADMIN — SUCRALFATE 1 G: 1 TABLET ORAL at 08:29

## 2024-10-07 RX ADMIN — GUAIFENESIN 600 MG: 600 TABLET, EXTENDED RELEASE ORAL at 08:30

## 2024-10-07 RX ADMIN — PANTOPRAZOLE SODIUM 40 MG: 40 TABLET, DELAYED RELEASE ORAL at 05:11

## 2024-10-07 RX ADMIN — POTASSIUM CHLORIDE 10 MEQ: 750 CAPSULE, EXTENDED RELEASE ORAL at 08:31

## 2024-10-07 RX ADMIN — ENOXAPARIN SODIUM 40 MG: 100 INJECTION SUBCUTANEOUS at 08:27

## 2024-10-07 RX ADMIN — SODIUM CHLORIDE, PRESERVATIVE FREE 10 ML: 5 INJECTION INTRAVENOUS at 08:44

## 2024-10-07 RX ADMIN — LEVOTHYROXINE SODIUM 75 MCG: 75 TABLET ORAL at 05:10

## 2024-10-07 RX ADMIN — SALINE NASAL SPRAY 2 SPRAY: 1.5 SOLUTION NASAL at 08:28

## 2024-10-07 RX ADMIN — ASPIRIN 81 MG: 81 TABLET, COATED ORAL at 08:30

## 2024-10-07 RX ADMIN — CYANOCOBALAMIN TAB 500 MCG 1000 MCG: 500 TAB at 08:28

## 2024-10-07 RX ADMIN — CARVEDILOL 3.12 MG: 6.25 TABLET, FILM COATED ORAL at 08:29

## 2024-10-07 RX ADMIN — AMIODARONE HYDROCHLORIDE 200 MG: 200 TABLET ORAL at 08:29

## 2024-10-07 ASSESSMENT — PAIN DESCRIPTION - ORIENTATION: ORIENTATION: LEFT;RIGHT;LOWER

## 2024-10-07 ASSESSMENT — PAIN SCALES - GENERAL
PAINLEVEL_OUTOF10: 7
PAINLEVEL_OUTOF10: 4
PAINLEVEL_OUTOF10: 7

## 2024-10-07 ASSESSMENT — PAIN DESCRIPTION - LOCATION: LOCATION: BACK;LEG

## 2024-10-07 ASSESSMENT — PAIN DESCRIPTION - DESCRIPTORS: DESCRIPTORS: ACHING

## 2024-10-07 NOTE — CARE COORDINATION
Met with the patient and his son. States he's going home. [facility] has already called to establish service. Denies needs at this time.

## 2024-10-07 NOTE — PROGRESS NOTES
[facility]  Progress Note                   Date:   10/6/2024  Patient name: Rowdy Marie  Date of admission:  10/3/2024  3:09 PM  MRN:   [id number]  YOB: 1950  PCP: Yandy Jones MD    Reason for Admission: Rhinovirus infection [B34.8]    Subjective:       Clinical Changes /Abnormalities: Stable from CV standpoint. No acute CV issues/concerns.     Review of Systems    Medications:   Scheduled Meds:   sennosides-docusate sodium  2 tablet Oral Daily    levothyroxine  75 mcg Oral Daily    docusate  50 mg Oral Nightly    potassium chloride  10 mEq Oral QAM    nitroGLYCERIN  1 patch TransDERmal Daily    ranolazine  1,000 mg Oral BID    vitamin B-12  1,000 mcg Oral Daily    sucralfate  1 g Oral 4x Daily    mirtazapine  7.5 mg Oral Nightly    ticagrelor  90 mg Oral BID    carbidopa-levodopa  1 tablet Oral 4x Daily    carvedilol  3.125 mg Oral BID    amiodarone  200 mg Oral Daily    pantoprazole  40 mg Oral QAM AC    sodium chloride flush  5-40 mL IntraVENous 2 times per day    enoxaparin  40 mg SubCUTAneous Daily    aspirin  81 mg Oral Daily    lidocaine  1 patch TransDERmal Daily    guaiFENesin  600 mg Oral BID    sodium chloride  2 spray Each Nostril TID    ipratropium  2 spray Each Nostril BID     Continuous Infusions:   sodium chloride       CBC:   Recent Labs     10/04/24  0307 10/05/24  0909 10/06/24  0548 10/06/24  0747   WBC 10.4 7.6 8.4  --    HGB 13.1 11.3* 11.1* 10.7*    245 232  --      BMP:    Recent Labs     10/04/24  1836 10/05/24  1456 10/06/24  0548    137 137   K 3.9 3.8 3.3*    108* 108*   CO2 20 20 19*   BUN 37* 23 20   CREATININE 1.4* 1.2 1.1   GLUCOSE 121* 96 85     Hepatic:  Recent Labs     10/03/24  1516   AST 31   ALT 17   BILITOT 0.4   ALKPHOS 98     Troponin:   Recent Labs     10/03/24  1524 10/04/24  0044 10/04/24  0307   TROPHS 27* 33* 31*     BNP: No results for input(s): \"BNP\" in the last 72 hours.  Lipids:   Recent Labs     
[facility]    [facility]   IN-PATIENT SERVICE   [facility]    Progress Note    10/5/2024    5:03 PM    Name:   Rowdy Marie  MRN:     [id number]     Acct:      [id number]   Room:   0544/0544-01   Day:  2  Admit Date:  10/3/2024  3:09 PM    PCP:   Yandy Jones MD  Code Status:  Full Code    Subjective:     C/C:   Chief Complaint   Patient presents with    Chest Pain     Interval History Status: improved.     Seen at bedside, hemodynamically stable,   No new complaints no acute events overnight  States that he feels much better  Denies any nausea/dizziness/abdominal pain/nausea/vomiting/diarrhea today, no fever or chills chronic back pain unchanged from before  Labs reviewed kidney function improving, repeat blood cultures negative so far  Brief History:     Per my colleague     \"Rowdy Marie is a 74 y.o.  male w/ chronic GERD, prior esophageal stricture, multi vessel CAD s/p CABG 2014/ prior stents w/ recent LHC 05/22/2024 w/ PCI w/ BRENDA to SVG to 2nd marginal graft, prior LHC w/ PCI w/ BRENDA to ostial and mid SVG-diag on 04/11/2024, Pafib s/p watchman 11/2023 (w/ history of prior GIB w/ PUD), HFpEF w/ MR (echo 04/2024 moderate-severe MR, EF 55%), CKD3, Parkinson's w/ history of orthostatic hypotension symptomatic w/ dizziness who presents with Chest Pain   and is admitted to the hospital for the management of Rhinovirus infection.     Patient describes nonspecific flulike symptoms that began last week with rhinorrhea, subjective fevers and chills, cough with congestion, sinus headaches.  Patient describes worsening anorexia with poor p.o. intake, ongoing indigestion with heartburn.  States prior to arrival he developed acute dizziness that began around 10 AM.  Attempted to get up out of bed but felt acutely worse with worsening palpitations, symptomatic tachycardia.  Subsequently developed pressure-like chest pain midsternum with chest pain that lasted approximately an 
@[email or web address]    [facility]   IN-PATIENT SERVICE   [facility]    Progress Note    10/6/2024    3:36 PM    Name:   Rowdy Marie  MRN:     [id number]     Acct:      [id number]   Room:   0544/0544-01   Day:  3  Admit Date:  10/3/2024  3:09 PM    PCP:   Yandy Jones MD  Code Status:  Full Code    Subjective:     C/C:   Chief Complaint   Patient presents with    Chest Pain     Interval History Status: improved.     Seen at bedside, hemodynamically stable,   Overnight concern for black stools, otherwise patient denies any new complaints  Labs reviewed, potassium 3.3 .r  Brief History:     Per my colleague     \"Rowdy Marie is a 74 y.o.  male w/ chronic GERD, prior esophageal stricture, multi vessel CAD s/p CABG 2014/ prior stents w/ recent LHC 05/22/2024 w/ PCI w/ BRENDA to SVG to 2nd marginal graft, prior LHC w/ PCI w/ BRENDA to ostial and mid SVG-diag on 04/11/2024, Pafib s/p watchman 11/2023 (w/ history of prior GIB w/ PUD), HFpEF w/ MR (echo 04/2024 moderate-severe MR, EF 55%), CKD3, Parkinson's w/ history of orthostatic hypotension symptomatic w/ dizziness who presents with Chest Pain   and is admitted to the hospital for the management of Rhinovirus infection.     Patient describes nonspecific flulike symptoms that began last week with rhinorrhea, subjective fevers and chills, cough with congestion, sinus headaches.  Patient describes worsening anorexia with poor p.o. intake, ongoing indigestion with heartburn.  States prior to arrival he developed acute dizziness that began around 10 AM.  Attempted to get up out of bed but felt acutely worse with worsening palpitations, symptomatic tachycardia.  Subsequently developed pressure-like chest pain midsternum with chest pain that lasted approximately an hour, waxed and waned in intensity with acute nausea status post emesis.       Patient does struggle with orthostasis with intermittent dizziness with worsening symptoms with the 
@[facility]    [facility]   IN-PATIENT SERVICE   [facility]    Progress Note    10/4/2024    6:37 PM    Name:   Rowdy Marie  MRN:     [id number]     Acct:      [id number]   Room:   0544/0544-01   Day:  1  Admit Date:  10/3/2024  3:09 PM    PCP:   Yandy Jones MD  Code Status:  Full Code    Subjective:     C/C:   Chief Complaint   Patient presents with    Chest Pain     Interval History Status: improved.     Seen at bedside, hemodynamically stable, tachycardia improved from before, afebrile overnight  Patient states that he has been feeling weak, nauseous, dizzy, having abdominal pain since his flu shot 1 week ago, started having vomiting yesterday, also complained of chest pain, fever, chills, no diarrhea/urinary complaints  Chronic back pain unchanged from before  Labs reviewed kidney function improving, lactic acidosis resolved,   One of the blood cultures positive likely contaminant, repeat blood culture sent  Brief History:     Per my colleague     \"Rowdy Marie is a 74 y.o.  male w/ chronic GERD, prior esophageal stricture, multi vessel CAD s/p CABG 2014/ prior stents w/ recent LHC 05/22/2024 w/ PCI w/ BRENDA to SVG to 2nd marginal graft, prior LHC w/ PCI w/ BRENDA to ostial and mid SVG-diag on 04/11/2024, Pafib s/p watchman 11/2023 (w/ history of prior GIB w/ PUD), HFpEF w/ MR (echo 04/2024 moderate-severe MR, EF 55%), CKD3, Parkinson's w/ history of orthostatic hypotension symptomatic w/ dizziness who presents with Chest Pain   and is admitted to the hospital for the management of Rhinovirus infection.     Patient describes nonspecific flulike symptoms that began last week with rhinorrhea, subjective fevers and chills, cough with congestion, sinus headaches.  Patient describes worsening anorexia with poor p.o. intake, ongoing indigestion with heartburn.  States prior to arrival he developed acute dizziness that began around 10 AM.  Attempted to get up out of bed 
Occupational Therapy  Facility/Department: [address] STEPDOWN   Occupational Therapy Initial Evaluation    Patient Name: Rowdy Marie        MRN: [id number]    : 1950    Date of Service: 10/7/2024    Discharge Recommendations  Discharge Recommendations: Patient would benefit from continued therapy after discharge    OT Equipment Recommendations  Equipment Needed: Yes  Mobility Devices: ADL Assistive Devices  ADL Assistive Devices: Grab Bars - shower, Transfer Tub Bench, Sock-Aid Hard, Long-handled Shoe Horn, Long-handled Sponge, Reacher    Chief Complaint   Patient presents with    Chest Pain     Past Medical History:  has a past medical history of A-fib ([facility]), PREET (acute kidney injury) ([facility]), Anemia, CAD (coronary artery disease), Esophageal stricture, Hyperlipidemia, Hypokalemia, Hypomagnesemia, NSTEMI (non-ST elevated myocardial infarction) ([facility]), Orthostatic hypotension, Parkinsons ([facility]), Presence of Watchman left atrial appendage closure device, and Prostate CA ([facility]).  Past Surgical History:  has a past surgical history that includes Coronary artery bypass graft; Cardiac catheterization; Coronary angioplasty with stent; Diagnostic Cardiac Cath Lab Procedure (2024); Cardiac procedure (N/A, 2024); Cardiac procedure (N/A, 2024); Cardiac procedure (N/A, 2024); and Cardiac procedure (N/A, 2024).    Assessment  Performance deficits / Impairments: Decreased functional mobility ;Decreased strength;Decreased safe awareness;Decreased balance;Decreased ADL status;Decreased high-level IADLs  Assessment: Pt agreed to OT this date. Pt exhibited SBA for bed mobility, CGA for functional sit <> stand and CGA for functional mobility. Transfers/mobility completed utilizing RW. Per report, pt is typically IND for ADLs, IADLs, and functional mobility tasks with reliance on a cane or walker. Pt demo. functional activities including grooming standing sink side w/ SBA and LB dressing SBA. Activity 
PHARMACY NOTE:    Rowdy Marie was ordered coenzyme Q10.   As per the [facility] Formulary Committee policy, herbals and certain dietary supplements will be discontinued.  The herbal or dietary supplement may be continued after discharge from the hospital.    
Spiritual Health History and Assessment/Progress Note  [facility]    (P) Initial Encounter,  ,  ,      Name: Rowdy Marie MRN: [id number]    Age: 74 y.o.     Sex: male   Language: English   Oriental orthodox: Taoist   Rhinovirus infection     Date: 10/3/2024            Total Time Calculated: (P) 10 min              Spiritual Assessment began in [facility] ED        Referral/Consult From: (P) Rounding   Encounter Overview/Reason: (P) Initial Encounter  Service Provided For: (P) Patient    Tiffany, Belief, Meaning:   Patient unable to assess at this time  Family/Friends Other: N/A      Importance and Influence:  Patient has no beliefs influential to healthcare decision-making identified during this visit  Family/Friends have no beliefs influential to healthcare decision-making identified during this visit    Community:  Patient feels well-supported. Support system includes: Children and Extended family  Family/Friends Other: N/A    Assessment and Plan of Care:     Patient Interventions include: Facilitated expression of thoughts and feelings.  provided a supportive presence through active listening and words of affirmation.  Family/Friends Interventions include: Other: N/A    Patient Plan of Care: Spiritual Care available upon further referral  Family/Friends Plan of Care: Spiritual Care available upon further referral    Electronically signed by Chaplain Justin on 10/3/2024 at 9:23 PM   
10/04/24  0044 10/04/24  0307   TROPHS 27* 33* 31*     BNP: No results for input(s): \"BNP\" in the last 72 hours.  Lipids:   Recent Labs     10/04/24  0307   CHOL 410*   HDL 38*     INR:   Recent Labs     10/03/24  1516   INR 1.1       Objective:   Vitals: BP (!) 114/51   Pulse 61   Temp 98.1 °F (36.7 °C) (Oral)   Resp 16   Ht 1.803 m (5' 10.98\")   Wt 68 kg (150 lb)   SpO2 96%   BMI 20.93 kg/m²   General appearance: alert and cooperative with exam  HEENT: Head: Normocephalic, no lesions, without obvious abnormality.  Neck:no JVD, trachea midline, no adenopathy  Lungs: Clear to auscultation throughout, slightly diminished, no rales. No distress  Heart: Irregular rate and rhythm, s1/s2 auscultated, + murmurs, rate stable  Abdomen: soft, mild tenderness, bowel sounds active, non-distended  Extremities: no edema  Neurologic: not done    [facility] 05/22/2024  Severe native vessel CAD.   PAtent LIMA-LAD. LAD distal to touch down has diffuse instent restenosis, small in caliber and not amenable for PCI.Similar to previous cath.   Patent SVG-Diagonal with patent stents.   Patent SVG-RPDA.   80% stenosis of SVG-OM reduced to 0% BRENDA.      Diagnostic  Dominance: Right  Left Main   Size of vessel >=2.0 mm. The vessel exhibits minimal luminal irregularities.      Left Anterior Descending   Size of vessel >=2.0 mm. 100% proximal stenosis.      Left Circumflex   Size of vessel >=2.0 mm. 40% ostial stenosis.      Right Coronary Artery   Size of vessel >=2.0 mm. 100% proximal stenosis.      LIMA Graft To Mid LAD   The graft exhibits minimal luminal irregularities. The LAD distal to touch down has multiple stents with diffuse disease and instent restenosis with around 60-70%, small caliber and diffuse disease and not amenable for PCI.      Vein Graft To 1st Diag   Vein The graft exhibits minimal luminal irregularities. Patent stents.      Graft To 2nd Mrg   Patent ostial stent with 80% mid hazy stenosis reduced to 0% using 2.5 mm 
Requirements: Current  Protein (g/day): 102 g/d per 1.5 g/kg  Method Used for Fluid Requirements: 1 ml/kcal  Fluid (ml/day): or per physician    Nutrition Diagnosis:   Severe malnutrition, In context of acute illness or injury related to inadequate protein-energy intake as evidenced by Criteria as identified in malnutrition assessment, poor intake prior to admission, weight loss greater than or equal to 2% in 1 week    Nutrition Interventions:   Food and/or Nutrient Delivery: Continue Current Diet, Modify Oral Nutrition Supplement  Nutrition Education/Counseling: No recommendation at this time  Coordination of Nutrition Care: Continue to monitor while inpatient  Plan of Care discussed with: pt    Goals:  Previous Goal Met: Progressing toward Goal(s)  Goals: PO intake 50% or greater       Nutrition Monitoring and Evaluation:   Behavioral-Environmental Outcomes: None Identified  Food/Nutrient Intake Outcomes: Supplement Intake, Food and Nutrient Intake  Physical Signs/Symptoms Outcomes: Biochemical Data, Skin, Weight, GI Status, Fluid Status or Edema    Discharge Planning:    Too soon to determine     CHAVEZ VALENZUELA  Contact: 1-2038    
be rather difficult.  Vision/Hearing  Vision  Vision: Impaired (Blind left eye)  Hearing  Hearing: Within functional limits    Cognition   Orientation  Orientation Level: Oriented X4  Cognition  Overall Cognitive Status: WFL  Cognition Comment: Acknowledges the sit up and pause, stand up and pause precaution to avoid faintness.  Verbalizes that he takes his phone and straight cane when walking their property.  He was educated to take someone along with him the first time while he's still feeling weak.    Objective  Strength RLE  Strength RLE: Exception  R Hip Flexion: 4+/5  R Hip Extension: 4+/5  R Knee Extension: 5/5  R Ankle Dorsiflexion: 4+/5  R Ankle Plantar flexion: 4+/5  Strength LLE  Strength LLE: Exception  L Hip Flexion: 4+/5  L Hip Extension: 4+/5  L Knee Extension: 5/5  L Ankle Dorsiflexion: 4+/5  L Ankle Plantar Flexion: 4+/5         Intact light touch sensation.  Bed mobility  Supine to Sit: Stand by assistance  Sit to Supine: Stand by assistance  Transfers  Sit to Stand: Contact guard assistance  Stand to Sit: Contact guard assistance  Ambulation  Surface: Level tile  Device: Rolling Walker  Assistance: Stand by assistance  Gait Deviations: None  Distance: 55'  Comments: Back and forth across room with no difficulty on turns in small spaces, minimal to no shortness of breath.     Balance  Standing - Static: Fair;+  Standing - Dynamic: Fair;+  Single Leg Stance R Le  Single Leg Stance L Le  Comments: Educated in when his unilateral stance deficit is a factor, such as stepping over objects, stepping onto a curb.  Romberg/Narrow Base of Support  Eyes Open: 10 seconds  Sway: Moderate  Eyes Closed: 10 seconds (Feet shoulder-width for eyes closed based on his unsteadiness in Romberg.)          AM-PAC - Mobility    AM-PAC Basic Mobility - Inpatient   How much help is needed turning from your back to your side while in a flat bed without using bedrails?: None  How much help is needed moving from lying 
non-ischemic myocardial injury due to   PREET, rhino virus infection confirmed  -- Type II MI due to, please add supporting documentation for type II MI  -- Other - I will add my own diagnosis  -- Disagree - Not applicable / Not valid  -- Disagree - Clinically unable to determine / Unknown  -- Refer to Clinical Documentation Reviewer    PROVIDER RESPONSE TEXT:    Type II MI was ruled out after study and demand ischemia due to PREET, rhino   virus infection confirmed.    Query created by: Theo Rosa on 10/4/2024 1:22 PM      Electronically signed by:  Ana KOHLER Sra, MD 10/4/2024 2:12 PM          
of Function    Education  Patient Education: Yes  Patient Education Response: Verbalizes understanding             Therapy Time  [id number]    RAJAN Harley  10/4/2024 10:43 AM

## 2024-10-07 NOTE — DISCHARGE SUMMARY
tablet  Commonly known as: PROTONIX  Take 1 tablet by mouth every morning (before breakfast)     potassium chloride 10 MEQ extended release tablet  Commonly known as: KLOR-CON     pregabalin 75 MG capsule  Commonly known as: LYRICA  Take 2 capsules by mouth 2 times daily for 30 days. Max Daily Amount: 300 mg     ranolazine 1000 MG extended release tablet  Commonly known as: RANEXA  Take 1 tablet by mouth 2 times daily     sucralfate 1 GM tablet  Commonly known as: Carafate  Take 1 tablet by mouth 4 times daily     ticagrelor 90 MG Tabs tablet  Commonly known as: BRILINTA  Take 1 tablet by mouth 2 times daily     traMADol 50 MG tablet  Commonly known as: ULTRAM     vitamin B-12 1000 MCG tablet  Commonly known as: CYANOCOBALAMIN  Take 1 tablet by mouth daily            STOP taking these medications      bumetanide 0.5 MG tablet  Commonly known as: Bumex     fludrocortisone 0.1 MG tablet  Commonly known as: FLORINEF               Where to Get Your Medications        These medications were sent to [address] -  [phone] - F [phone]  [address] [place] [id number]      Phone: [phone]   benzonatate 200 MG capsule  guaiFENesin 600 MG extended release tablet         No discharge procedures on file.    Time Spent on discharge is  35 mins in patient examination, evaluation, counseling as well as medication reconciliation, prescriptions for required medications, discharge plan and follow up.    Electronically signed by   nAa Lund Sra, MD  10/7/2024  4:41 PM      Thank you Yandy Haddad MD for the opportunity to be involved in this patient's care.

## 2024-10-07 NOTE — DISCHARGE INSTRUCTIONS
Continue taking medications as prescribed, follow up with PCP and heart doctor as outpatient as discussed, will provide heart monitor, please follow up with heart doctor for results

## 2024-10-07 NOTE — PLAN OF CARE
Problem: Discharge Planning  Goal: Discharge to home or other facility with appropriate resources  10/5/2024 1110 by Teresa Acosta RN  Outcome: Progressing  10/5/2024 0245 by Benita Mendosa RN  Outcome: Progressing     Problem: Pain  Goal: Verbalizes/displays adequate comfort level or baseline comfort level  10/5/2024 1110 by Teresa Acosta RN  Outcome: Progressing  10/5/2024 0245 by Benita Mendosa RN  Outcome: Progressing     Problem: ABCDS Injury Assessment  Goal: Absence of physical injury  10/5/2024 1110 by Teresa Acosta RN  Outcome: Progressing  10/5/2024 0245 by Benita Mendosa RN  Outcome: Progressing     Problem: Nutrition Deficit:  Goal: Optimize nutritional status  10/5/2024 1110 by Teresa Acosta RN  Outcome: Progressing  10/5/2024 0245 by Benita Mendosa RN  Outcome: Progressing     Problem: Skin/Tissue Integrity  Goal: Absence of new skin breakdown  Description: 1.  Monitor for areas of redness and/or skin breakdown  2.  Assess vascular access sites hourly  3.  Every 4-6 hours minimum:  Change oxygen saturation probe site  4.  Every 4-6 hours:  If on nasal continuous positive airway pressure, respiratory therapy assess nares and determine need for appliance change or resting period.  10/5/2024 1110 by Teresa Acosta RN  Outcome: Progressing  10/5/2024 0245 by Benita Mendosa RN  Outcome: Progressing     Problem: Safety - Adult  Goal: Free from fall injury  10/5/2024 1110 by Teresa Acosta RN  Outcome: Progressing  10/5/2024 0245 by Benita Mendosa RN  Outcome: Progressing     
  Problem: Discharge Planning  Goal: Discharge to home or other facility with appropriate resources  10/6/2024 1123 by Teresa Acosta RN  Outcome: Progressing  10/6/2024 0619 by Ayesha Carmona RN  Outcome: Progressing     Problem: Pain  Goal: Verbalizes/displays adequate comfort level or baseline comfort level  10/6/2024 1123 by Teresa Acosta RN  Outcome: Progressing  10/6/2024 0619 by Ayesha Carmona RN  Outcome: Progressing     Problem: ABCDS Injury Assessment  Goal: Absence of physical injury  Outcome: Progressing     Problem: Nutrition Deficit:  Goal: Optimize nutritional status  10/6/2024 1123 by Teresa Acosta RN  Outcome: Progressing  10/6/2024 0619 by Ayesha Carmona RN  Outcome: Progressing     Problem: Skin/Tissue Integrity  Goal: Absence of new skin breakdown  Description: 1.  Monitor for areas of redness and/or skin breakdown  2.  Assess vascular access sites hourly  3.  Every 4-6 hours minimum:  Change oxygen saturation probe site  4.  Every 4-6 hours:  If on nasal continuous positive airway pressure, respiratory therapy assess nares and determine need for appliance change or resting period.  10/6/2024 1123 by Teresa Acosta RN  Outcome: Progressing  10/6/2024 0619 by Ayesha Carmona RN  Outcome: Progressing     Problem: Safety - Adult  Goal: Free from fall injury  10/6/2024 1123 by Teresa Acosta RN  Outcome: Progressing  10/6/2024 0619 by Ayesha Carmona RN  Outcome: Progressing     Problem: Neurosensory - Adult  Goal: Achieves stable or improved neurological status  Outcome: Progressing  Goal: Absence of seizures  Outcome: Progressing  Goal: Remains free of injury related to seizures activity  Outcome: Progressing  Goal: Achieves maximal functionality and self care  Outcome: Progressing     
  Problem: Discharge Planning  Goal: Discharge to home or other facility with appropriate resources  Outcome: Progressing     Problem: Pain  Goal: Verbalizes/displays adequate comfort level or baseline comfort level  Outcome: Progressing     Problem: ABCDS Injury Assessment  Goal: Absence of physical injury  Outcome: Progressing     Problem: Nutrition Deficit:  Goal: Optimize nutritional status  Outcome: Progressing     Problem: Skin/Tissue Integrity  Goal: Absence of new skin breakdown  Description: 1.  Monitor for areas of redness and/or skin breakdown  2.  Assess vascular access sites hourly  3.  Every 4-6 hours minimum:  Change oxygen saturation probe site  4.  Every 4-6 hours:  If on nasal continuous positive airway pressure, respiratory therapy assess nares and determine need for appliance change or resting period.  Outcome: Progressing     Problem: Safety - Adult  Goal: Free from fall injury  Outcome: Progressing     
  Problem: Discharge Planning  Goal: Discharge to home or other facility with appropriate resources  Outcome: Progressing     Problem: Pain  Goal: Verbalizes/displays adequate comfort level or baseline comfort level  Outcome: Progressing     Problem: ABCDS Injury Assessment  Goal: Absence of physical injury  Outcome: Progressing     Problem: Nutrition Deficit:  Goal: Optimize nutritional status  Outcome: Progressing     Problem: Skin/Tissue Integrity  Goal: Absence of new skin breakdown  Description: 1.  Monitor for areas of redness and/or skin breakdown  2.  Assess vascular access sites hourly  3.  Every 4-6 hours minimum:  Change oxygen saturation probe site  4.  Every 4-6 hours:  If on nasal continuous positive airway pressure, respiratory therapy assess nares and determine need for appliance change or resting period.  Outcome: Progressing     Problem: Safety - Adult  Goal: Free from fall injury  Outcome: Progressing     Problem: Neurosensory - Adult  Goal: Achieves stable or improved neurological status  Outcome: Progressing  Goal: Absence of seizures  Outcome: Progressing  Goal: Remains free of injury related to seizures activity  Outcome: Progressing  Goal: Achieves maximal functionality and self care  Outcome: Progressing     
  Problem: Discharge Planning  Goal: Discharge to home or other facility with appropriate resources  Outcome: Progressing     Problem: Pain  Goal: Verbalizes/displays adequate comfort level or baseline comfort level  Outcome: Progressing     Problem: Nutrition Deficit:  Goal: Optimize nutritional status  Outcome: Progressing     Problem: Skin/Tissue Integrity  Goal: Absence of new skin breakdown  Description: 1.  Monitor for areas of redness and/or skin breakdown  2.  Assess vascular access sites hourly  3.  Every 4-6 hours minimum:  Change oxygen saturation probe site  4.  Every 4-6 hours:  If on nasal continuous positive airway pressure, respiratory therapy assess nares and determine need for appliance change or resting period.  Outcome: Progressing     Problem: Safety - Adult  Goal: Free from fall injury  Outcome: Progressing     
  Problem: Discharge Planning  Goal: Discharge to home or other facility with appropriate resources  Outcome: Progressing  Flowsheets (Taken 10/4/2024 0700)  Discharge to home or other facility with appropriate resources:   Arrange for needed discharge resources and transportation as appropriate   Identify barriers to discharge with patient and caregiver     Problem: Pain  Goal: Verbalizes/displays adequate comfort level or baseline comfort level  Outcome: Progressing     Problem: ABCDS Injury Assessment  Goal: Absence of physical injury  Outcome: Progressing  Flowsheets (Taken 10/4/2024 1106)  Absence of Physical Injury: Implement safety measures based on patient assessment     Problem: Nutrition Deficit:  Goal: Optimize nutritional status  Outcome: Progressing     Problem: Skin/Tissue Integrity  Goal: Absence of new skin breakdown  Description: 1.  Monitor for areas of redness and/or skin breakdown  2.  Assess vascular access sites hourly  3.  Every 4-6 hours minimum:  Change oxygen saturation probe site  4.  Every 4-6 hours:  If on nasal continuous positive airway pressure, respiratory therapy assess nares and determine need for appliance change or resting period.  Outcome: Progressing     Problem: Safety - Adult  Goal: Free from fall injury  Outcome: Progressing     
assess nares and determine need for appliance change or resting period.  10/7/2024 1217 by Mendy Thomason RN  Outcome: Adequate for Discharge  10/7/2024 0424 by Torrie Chin RN  Outcome: Progressing     Problem: Safety - Adult  Goal: Free from fall injury  10/7/2024 1217 by Mendy Thomason RN  Outcome: Adequate for Discharge  10/7/2024 0424 by Torrie Chin RN  Outcome: Progressing     Problem: Neurosensory - Adult  Goal: Achieves stable or improved neurological status  10/7/2024 1217 by Mendy Thomason RN  Outcome: Adequate for Discharge  Flowsheets (Taken 10/7/2024 0800)  Achieves stable or improved neurological status:   Assess for and report changes in neurological status   Initiate measures to prevent increased intracranial pressure   Maintain blood pressure and fluid volume within ordered parameters to optimize cerebral perfusion and minimize risk of hemorrhage  10/7/2024 0424 by Torrie Chin RN  Outcome: Progressing  Goal: Absence of seizures  10/7/2024 1217 by Mendy Thomason RN  Outcome: Adequate for Discharge  Flowsheets (Taken 10/7/2024 0800)  Absence of seizures: Monitor for seizure activity.  If seizure occurs, document type and location of movements and any associated apnea  10/7/2024 0424 by Torrie Chin RN  Outcome: Progressing  Goal: Remains free of injury related to seizures activity  10/7/2024 1217 by Mendy Thomason RN  Outcome: Adequate for Discharge  Flowsheets (Taken 10/7/2024 0800)  Remains free of injury related to seizure activity:   Maintain airway, patient safety  and administer oxygen as ordered   Monitor patient for seizure activity, document and report duration and description of seizure to Licensed Independent Practitioner  10/7/2024 0424 by Trorie Chin RN  Outcome: Progressing  Goal: Achieves maximal functionality and self care  10/7/2024 1217 by Mendy Thomason RN  Outcome: Adequate for Discharge  Flowsheets (Taken 10/7/2024 0800)  Achieves maximal functionality 
10/7/2024 0800)  Remains free of injury related to seizure activity:   Maintain airway, patient safety  and administer oxygen as ordered   Monitor patient for seizure activity, document and report duration and description of seizure to Licensed Independent Practitioner  10/7/2024 0424 by Torrie Chin, RN  Outcome: Progressing  Goal: Achieves maximal functionality and self care  10/7/2024 1504 by Mendy Thomason, RN  Outcome: Completed  10/7/2024 1217 by Mendy Thomason, RN  Outcome: Adequate for Discharge  Flowsheets (Taken 10/7/2024 0800)  Achieves maximal functionality and self care:   Monitor swallowing and airway patency with patient fatigue and changes in neurological status   Encourage and assist patient to increase activity and self care with guidance from physical therapy/occupational therapy  10/7/2024 0424 by Torrie Chin, RN  Outcome: Progressing

## 2024-10-07 NOTE — DISCHARGE INSTR - COC
Discharging to Facility/ Agency   Name: [facility]     Phone: [phone]        / signature: Electronically signed by MICHELLE DAVIS on 10/7/24 at 3:34 PM EDT    PHYSICIAN SECTION    Prognosis: Fair    Condition at Discharge: Stable    Rehab Potential (if transferring to Rehab): Fair    Recommended Labs or Other Treatments After Discharge: PT/OT    Physician Certification: I certify the above information and transfer of Rowdy Marie  is necessary for the continuing treatment of the diagnosis listed and that he requires Home Care for greater 30 days.     Update Admission H&P: No change in H&P    PHYSICIAN SIGNATURE:  Electronically signed by Ana Lund Sra, MD on 10/7/24 at 3:35 PM EDT

## 2024-10-08 ENCOUNTER — CARE COORDINATION (OUTPATIENT)
Dept: CARE COORDINATION | Age: 74
End: 2024-10-08

## 2024-10-08 LAB
MICROORGANISM SPEC CULT: NORMAL
SERVICE CMNT-IMP: NORMAL
SPECIMEN DESCRIPTION: NORMAL

## 2024-10-08 NOTE — CARE COORDINATION
Care Transitions Note    Initial Call - Call within 2 business days of discharge: Yes    Patient Current Location:  Home: [address] [place] [address] [place] [address]    Care Transition Nurse contacted the patient by telephone to perform post hospital discharge assessment, verified name and  as identifiers. Provided introduction to self, and explanation of the Care Transition Nurse role.     Patient: Rowdy Marie    Patient : 1950   MRN: [id number]    Reason for Admission: rhinovirus  Discharge Date: 10/7/24  RURS: Readmission Risk Score: 29.1      Last Discharge Facility       Date Complaint Diagnosis Description Type Department Provider    10/3/24 Chest Pain Chest pain, unspecified type ... ED to Hosp-Admission (Discharged) (ADMITTED) [facility] Fannie, Ana Lund MD; Jennifer,...            Was this an external facility discharge? No    Additional needs identified to be addressed with provider   High priority: No HFU appts available within 7 day window. Patient scheduled for . Please contact patient for HFU within 7 days             Method of communication with provider: chart routing.    Patients top risk factors for readmission: medical condition-complex medical needs.    Interventions to address risk factors:   Review of patient management of conditions/medications: symptoms, HFU appts, medication  Home Health: [facility] Summary Note: Patient reached for OLIVERIO call. States doing well after IP stay for rhinovirus. States minimal cough. Home with [facility]. States have called, not been out yet. Confirms has AVS, wearing Holter monitor, has tessalon and mucinex. States understanding to stop Bumex and florinef. He is unable to complete med rec, states DIL sets up medications and she is not available. [facility] is aware has all previous medications in home. PCP HFU scheduled for , soonest available, updated CTN will msg ofc to contact him for sooner appt. Reviewed instruction to follow up with

## 2024-10-09 ENCOUNTER — CARE COORDINATION (OUTPATIENT)
Dept: CARE COORDINATION | Age: 74
End: 2024-10-09

## 2024-10-09 LAB
MICROORGANISM SPEC CULT: NORMAL
MICROORGANISM SPEC CULT: NORMAL
SERVICE CMNT-IMP: NORMAL
SERVICE CMNT-IMP: NORMAL
SPECIMEN DESCRIPTION: NORMAL
SPECIMEN DESCRIPTION: NORMAL

## 2024-10-09 NOTE — CARE COORDINATION
Care Transitions Note    Follow Up Call     Attempted to reach patient for transitions of care follow up.  Unable to reach patient.      Outreach Attempts:   Multiple attempts to contact patient at phone numbers on file.   Unable to leave message.     Care Summary Note: No answer, VM not set up. Unable to complete med rec.    Follow Up Appointment:   Future Appointments         Provider Specialty Dept Phone    10/14/2024 1:15 PM Agapito Gomez MD Cardiac Electrophysiology [phone]    10/24/2024 3:45 PM Mohit Adan MD Oncology [phone]    11/5/2024 10:15 AM Yandy Jones MD Internal Medicine [phone]    12/27/2024 10:00 AM Yandy Jones MD Internal Medicine [phone]            Plan for follow-up call in 2-5 days based on severity of symptoms and risk factors. Plan for next call: symptom management-any dizziness or palpitation? How is fatigue?      Tracy Cordon RN

## 2024-10-11 ENCOUNTER — TELEPHONE (OUTPATIENT)
Age: 74
End: 2024-10-11

## 2024-10-11 NOTE — TELEPHONE ENCOUNTER
Attempted to contact patient in regards to needing Holter results prior to his appt, attempted to let him know that the results are not in yet from the Holter that he had done, VM is full so no message was left

## 2024-10-16 ENCOUNTER — CARE COORDINATION (OUTPATIENT)
Dept: CARE COORDINATION | Age: 74
End: 2024-10-16

## 2024-10-16 NOTE — CARE COORDINATION
Care Transitions Note    Follow Up Call     Attempted to reach patient for transitions of care follow up.  Unable to reach patient.      Outreach Attempts:   Multiple attempts to contact patient at phone numbers on file.   Unable to leave message.     Care Summary Note: Second attempt, VM not set up. Closing for OLIVERIO    Follow Up Appointment:   Future Appointments         Provider Specialty Dept Phone    10/24/2024 3:45 PM Mohit Adan MD Oncology [phone]    11/5/2024 10:15 AM Yandy Jones MD Internal Medicine [phone]    12/27/2024 10:00 AM Yandy Jones MD Internal Medicine [phone]            No further follow-up call indicated based on severity of symptoms and risk factors.   Tracy Cordon, RN

## 2024-10-21 DIAGNOSIS — R11.0 NAUSEA: ICD-10-CM

## 2024-10-21 DIAGNOSIS — K29.70 GASTRITIS WITHOUT BLEEDING, UNSPECIFIED CHRONICITY, UNSPECIFIED GASTRITIS TYPE: ICD-10-CM

## 2024-10-22 RX ORDER — SUCRALFATE 1 G/1
1 TABLET ORAL 4 TIMES DAILY
Qty: 120 TABLET | Refills: 3 | Status: SHIPPED | OUTPATIENT
Start: 2024-10-22

## 2024-10-22 RX ORDER — AMIODARONE HYDROCHLORIDE 200 MG/1
200 TABLET ORAL DAILY
Qty: 30 TABLET | Refills: 0 | Status: SHIPPED | OUTPATIENT
Start: 2024-10-22

## 2024-10-26 LAB — ECHO BSA: 1.85 M2

## 2024-10-28 LAB — ECHO BSA: 1.85 M2

## 2024-11-03 PROBLEM — R79.89 ELEVATED TROPONIN: Status: RESOLVED | Noted: 2024-10-04 | Resolved: 2024-11-03

## 2024-11-05 ENCOUNTER — APPOINTMENT (OUTPATIENT)
Dept: CT IMAGING | Age: 74
DRG: 092 | End: 2024-11-05
Payer: MEDICARE

## 2024-11-05 ENCOUNTER — HOSPITAL ENCOUNTER (INPATIENT)
Age: 74
LOS: 2 days | Discharge: HOME | DRG: 092 | End: 2024-11-07
Attending: EMERGENCY MEDICINE | Admitting: INTERNAL MEDICINE
Payer: MEDICARE

## 2024-11-05 ENCOUNTER — OFFICE VISIT (OUTPATIENT)
Dept: INTERNAL MEDICINE CLINIC | Age: 74
End: 2024-11-05

## 2024-11-05 VITALS
WEIGHT: 150 LBS | BODY MASS INDEX: 21.47 KG/M2 | OXYGEN SATURATION: 100 % | DIASTOLIC BLOOD PRESSURE: 60 MMHG | SYSTOLIC BLOOD PRESSURE: 108 MMHG | HEIGHT: 70 IN | HEART RATE: 74 BPM

## 2024-11-05 DIAGNOSIS — R29.90 STROKE-LIKE EPISODE: Primary | ICD-10-CM

## 2024-11-05 DIAGNOSIS — R42 DIZZINESS: Primary | ICD-10-CM

## 2024-11-05 DIAGNOSIS — I25.810 CORONARY ARTERY DISEASE INVOLVING CORONARY BYPASS GRAFT OF NATIVE HEART WITHOUT ANGINA PECTORIS: ICD-10-CM

## 2024-11-05 DIAGNOSIS — R53.1 GENERALIZED WEAKNESS: ICD-10-CM

## 2024-11-05 DIAGNOSIS — G20.A1 PARKINSON'S DISEASE WITHOUT DYSKINESIA OR FLUCTUATING MANIFESTATIONS (HCC): ICD-10-CM

## 2024-11-05 DIAGNOSIS — E86.1 HYPOTENSION DUE TO HYPOVOLEMIA: ICD-10-CM

## 2024-11-05 DIAGNOSIS — E43 SEVERE MALNUTRITION (HCC): ICD-10-CM

## 2024-11-05 DIAGNOSIS — I48.20 CHRONIC ATRIAL FIBRILLATION (HCC): ICD-10-CM

## 2024-11-05 DIAGNOSIS — I25.10 CAD, MULTIPLE VESSEL: ICD-10-CM

## 2024-11-05 DIAGNOSIS — E86.0 DEHYDRATION: ICD-10-CM

## 2024-11-05 DIAGNOSIS — Z85.46 HISTORY OF PROSTATE CANCER: ICD-10-CM

## 2024-11-05 DIAGNOSIS — R20.0 LEFT ARM NUMBNESS: ICD-10-CM

## 2024-11-05 PROBLEM — R29.810 FACIAL DROOP: Status: ACTIVE | Noted: 2024-11-05

## 2024-11-05 LAB
ALBUMIN SERPL-MCNC: 4.4 G/DL (ref 3.5–5.2)
ALP SERPL-CCNC: 79 U/L (ref 40–129)
ALT SERPL-CCNC: 17 U/L (ref 10–50)
ANION GAP SERPL CALCULATED.3IONS-SCNC: 11 MMOL/L (ref 9–16)
AST SERPL-CCNC: 22 U/L (ref 10–50)
BACTERIA URNS QL MICRO: ABNORMAL
BASOPHILS # BLD: 0.1 K/UL (ref 0–0.2)
BASOPHILS NFR BLD: 1 % (ref 0–2)
BILIRUB SERPL-MCNC: 0.3 MG/DL (ref 0–1.2)
BILIRUB UR QL STRIP: ABNORMAL
BUN SERPL-MCNC: 21 MG/DL (ref 8–23)
CALCIUM SERPL-MCNC: 9.4 MG/DL (ref 8.6–10.4)
CASTS #/AREA URNS LPF: ABNORMAL /LPF
CHLORIDE SERPL-SCNC: 96 MMOL/L (ref 98–107)
CLARITY UR: CLEAR
CO2 SERPL-SCNC: 27 MMOL/L (ref 20–31)
COLOR UR: ABNORMAL
CREAT SERPL-MCNC: 1.4 MG/DL (ref 0.7–1.2)
EKG ATRIAL RATE: 58 BPM
EKG P AXIS: 96 DEGREES
EKG P-R INTERVAL: 166 MS
EKG Q-T INTERVAL: 452 MS
EKG QRS DURATION: 98 MS
EKG QTC CALCULATION (BAZETT): 443 MS
EKG R AXIS: -156 DEGREES
EKG T AXIS: 90 DEGREES
EKG VENTRICULAR RATE: 58 BPM
EOSINOPHIL # BLD: 0.2 K/UL (ref 0–0.4)
EOSINOPHILS RELATIVE PERCENT: 2 % (ref 0–4)
EPI CELLS #/AREA URNS HPF: ABNORMAL /HPF
ERYTHROCYTE [DISTWIDTH] IN BLOOD BY AUTOMATED COUNT: 17.3 % (ref 11.5–14.9)
FOLATE SERPL-MCNC: 15.9 NG/ML (ref 4.8–24.2)
GFR, ESTIMATED: 52 ML/MIN/1.73M2
GLUCOSE SERPL-MCNC: 94 MG/DL (ref 74–99)
GLUCOSE UR STRIP-MCNC: NEGATIVE MG/DL
HCT VFR BLD AUTO: 39.5 % (ref 41–53)
HGB BLD-MCNC: 13.4 G/DL (ref 13.5–17.5)
HGB UR QL STRIP.AUTO: NEGATIVE
KETONES UR STRIP-MCNC: NEGATIVE MG/DL
LEUKOCYTE ESTERASE UR QL STRIP: ABNORMAL
LYMPHOCYTES NFR BLD: 1.5 K/UL (ref 1–4.8)
LYMPHOCYTES RELATIVE PERCENT: 18 % (ref 24–44)
MCH RBC QN AUTO: 30.9 PG (ref 26–34)
MCHC RBC AUTO-ENTMCNC: 33.8 G/DL (ref 31–37)
MCV RBC AUTO: 91.4 FL (ref 80–100)
MONOCYTES NFR BLD: 0.7 K/UL (ref 0.1–1.3)
MONOCYTES NFR BLD: 8 % (ref 1–7)
NEUTROPHILS NFR BLD: 71 % (ref 36–66)
NEUTS SEG NFR BLD: 5.7 K/UL (ref 1.3–9.1)
NITRITE UR QL STRIP: NEGATIVE
PH UR STRIP: 6 [PH] (ref 5–8)
PLATELET # BLD AUTO: 279 K/UL (ref 150–450)
PMV BLD AUTO: 8.6 FL (ref 6–12)
POTASSIUM SERPL-SCNC: 4 MMOL/L (ref 3.7–5.3)
PROT SERPL-MCNC: 7.5 G/DL (ref 6.6–8.7)
PROT UR STRIP-MCNC: ABNORMAL MG/DL
RBC # BLD AUTO: 4.33 M/UL (ref 4.5–5.9)
RBC #/AREA URNS HPF: ABNORMAL /HPF
SODIUM SERPL-SCNC: 134 MMOL/L (ref 136–145)
SP GR UR STRIP: 1.02 (ref 1–1.03)
T3FREE SERPL-MCNC: 2 PG/ML (ref 2–4.4)
T4 FREE SERPL-MCNC: 1.4 NG/DL (ref 0.9–1.7)
TROPONIN I SERPL HS-MCNC: 16 NG/L (ref 0–22)
TROPONIN I SERPL HS-MCNC: 17 NG/L (ref 0–22)
TSH SERPL DL<=0.05 MIU/L-ACNC: 5.99 UIU/ML (ref 0.27–4.2)
UROBILINOGEN UR STRIP-ACNC: NORMAL EU/DL (ref 0–1)
VIT B12 SERPL-MCNC: 1028 PG/ML (ref 232–1245)
WBC #/AREA URNS HPF: ABNORMAL /HPF
WBC OTHER # BLD: 8.1 K/UL (ref 3.5–11)

## 2024-11-05 PROCEDURE — 2060000000 HC ICU INTERMEDIATE R&B

## 2024-11-05 PROCEDURE — 93010 ELECTROCARDIOGRAM REPORT: CPT | Performed by: INTERNAL MEDICINE

## 2024-11-05 PROCEDURE — 6370000000 HC RX 637 (ALT 250 FOR IP)

## 2024-11-05 PROCEDURE — 99285 EMERGENCY DEPT VISIT HI MDM: CPT

## 2024-11-05 PROCEDURE — 84425 ASSAY OF VITAMIN B-1: CPT

## 2024-11-05 PROCEDURE — 36415 COLL VENOUS BLD VENIPUNCTURE: CPT

## 2024-11-05 PROCEDURE — 84481 FREE ASSAY (FT-3): CPT

## 2024-11-05 PROCEDURE — 81001 URINALYSIS AUTO W/SCOPE: CPT

## 2024-11-05 PROCEDURE — 82746 ASSAY OF FOLIC ACID SERUM: CPT

## 2024-11-05 PROCEDURE — 84484 ASSAY OF TROPONIN QUANT: CPT

## 2024-11-05 PROCEDURE — 80053 COMPREHEN METABOLIC PANEL: CPT

## 2024-11-05 PROCEDURE — 2580000003 HC RX 258

## 2024-11-05 PROCEDURE — 85025 COMPLETE CBC W/AUTO DIFF WBC: CPT

## 2024-11-05 PROCEDURE — 82607 VITAMIN B-12: CPT

## 2024-11-05 PROCEDURE — 6360000002 HC RX W HCPCS

## 2024-11-05 PROCEDURE — 84439 ASSAY OF FREE THYROXINE: CPT

## 2024-11-05 PROCEDURE — 93005 ELECTROCARDIOGRAM TRACING: CPT | Performed by: EMERGENCY MEDICINE

## 2024-11-05 PROCEDURE — 93005 ELECTROCARDIOGRAM TRACING: CPT | Performed by: INTERNAL MEDICINE

## 2024-11-05 PROCEDURE — 84443 ASSAY THYROID STIM HORMONE: CPT

## 2024-11-05 PROCEDURE — 70450 CT HEAD/BRAIN W/O DYE: CPT

## 2024-11-05 RX ORDER — SODIUM CHLORIDE 0.9 % (FLUSH) 0.9 %
5-40 SYRINGE (ML) INJECTION PRN
Status: DISCONTINUED | OUTPATIENT
Start: 2024-11-05 | End: 2024-11-07 | Stop reason: HOSPADM

## 2024-11-05 RX ORDER — POLYETHYLENE GLYCOL 3350 17 G/17G
17 POWDER, FOR SOLUTION ORAL DAILY PRN
Status: DISCONTINUED | OUTPATIENT
Start: 2024-11-05 | End: 2024-11-07 | Stop reason: HOSPADM

## 2024-11-05 RX ORDER — SODIUM CHLORIDE 0.9 % (FLUSH) 0.9 %
5-40 SYRINGE (ML) INJECTION EVERY 12 HOURS SCHEDULED
Status: DISCONTINUED | OUTPATIENT
Start: 2024-11-05 | End: 2024-11-07 | Stop reason: HOSPADM

## 2024-11-05 RX ORDER — PANTOPRAZOLE SODIUM 40 MG/1
40 TABLET, DELAYED RELEASE ORAL
Status: DISCONTINUED | OUTPATIENT
Start: 2024-11-06 | End: 2024-11-07 | Stop reason: HOSPADM

## 2024-11-05 RX ORDER — AMIODARONE HYDROCHLORIDE 200 MG/1
200 TABLET ORAL DAILY
Status: DISCONTINUED | OUTPATIENT
Start: 2024-11-05 | End: 2024-11-07 | Stop reason: HOSPADM

## 2024-11-05 RX ORDER — MIRTAZAPINE 15 MG/1
7.5 TABLET, FILM COATED ORAL NIGHTLY
Status: DISCONTINUED | OUTPATIENT
Start: 2024-11-05 | End: 2024-11-07 | Stop reason: HOSPADM

## 2024-11-05 RX ORDER — MECLIZINE HCL 12.5 MG 12.5 MG/1
12.5 TABLET ORAL 3 TIMES DAILY PRN
COMMUNITY

## 2024-11-05 RX ORDER — ENOXAPARIN SODIUM 100 MG/ML
40 INJECTION SUBCUTANEOUS DAILY
Status: DISCONTINUED | OUTPATIENT
Start: 2024-11-05 | End: 2024-11-07 | Stop reason: HOSPADM

## 2024-11-05 RX ORDER — MECLIZINE HYDROCHLORIDE 25 MG/1
12.5 TABLET ORAL 3 TIMES DAILY PRN
Status: DISCONTINUED | OUTPATIENT
Start: 2024-11-05 | End: 2024-11-07 | Stop reason: HOSPADM

## 2024-11-05 RX ORDER — SODIUM CHLORIDE 9 MG/ML
INJECTION, SOLUTION INTRAVENOUS PRN
Status: DISCONTINUED | OUTPATIENT
Start: 2024-11-05 | End: 2024-11-07 | Stop reason: HOSPADM

## 2024-11-05 RX ORDER — PREGABALIN 150 MG/1
150 CAPSULE ORAL 2 TIMES DAILY
Status: DISCONTINUED | OUTPATIENT
Start: 2024-11-05 | End: 2024-11-06

## 2024-11-05 RX ORDER — FERROUS SULFATE 325(65) MG
325 TABLET ORAL
Status: DISCONTINUED | OUTPATIENT
Start: 2024-11-05 | End: 2024-11-07 | Stop reason: HOSPADM

## 2024-11-05 RX ORDER — MIDODRINE HYDROCHLORIDE 2.5 MG/1
2.5 TABLET ORAL 3 TIMES DAILY PRN
Status: DISCONTINUED | OUTPATIENT
Start: 2024-11-05 | End: 2024-11-07 | Stop reason: HOSPADM

## 2024-11-05 RX ORDER — SODIUM CHLORIDE 9 MG/ML
INJECTION, SOLUTION INTRAVENOUS CONTINUOUS
Status: ACTIVE | OUTPATIENT
Start: 2024-11-05 | End: 2024-11-06

## 2024-11-05 RX ORDER — CARBIDOPA AND LEVODOPA 25; 100 MG/1; MG/1
1 TABLET ORAL 4 TIMES DAILY
Status: DISCONTINUED | OUTPATIENT
Start: 2024-11-05 | End: 2024-11-07 | Stop reason: HOSPADM

## 2024-11-05 RX ORDER — ONDANSETRON 2 MG/ML
4 INJECTION INTRAMUSCULAR; INTRAVENOUS EVERY 6 HOURS PRN
Status: DISCONTINUED | OUTPATIENT
Start: 2024-11-05 | End: 2024-11-07 | Stop reason: HOSPADM

## 2024-11-05 RX ORDER — LANOLIN ALCOHOL/MO/W.PET/CERES
1000 CREAM (GRAM) TOPICAL DAILY
Status: DISCONTINUED | OUTPATIENT
Start: 2024-11-05 | End: 2024-11-07 | Stop reason: HOSPADM

## 2024-11-05 RX ORDER — ONDANSETRON 4 MG/1
4 TABLET, ORALLY DISINTEGRATING ORAL EVERY 8 HOURS PRN
Status: DISCONTINUED | OUTPATIENT
Start: 2024-11-05 | End: 2024-11-07 | Stop reason: HOSPADM

## 2024-11-05 RX ORDER — ASPIRIN 81 MG/1
81 TABLET ORAL DAILY
Status: DISCONTINUED | OUTPATIENT
Start: 2024-11-05 | End: 2024-11-07 | Stop reason: HOSPADM

## 2024-11-05 RX ORDER — LEVOTHYROXINE SODIUM 75 UG/1
75 TABLET ORAL DAILY
Status: DISCONTINUED | OUTPATIENT
Start: 2024-11-05 | End: 2024-11-07 | Stop reason: HOSPADM

## 2024-11-05 RX ORDER — CARVEDILOL 3.12 MG/1
3.12 TABLET ORAL 2 TIMES DAILY
Status: DISCONTINUED | OUTPATIENT
Start: 2024-11-05 | End: 2024-11-06

## 2024-11-05 RX ADMIN — TICAGRELOR 90 MG: 90 TABLET ORAL at 19:24

## 2024-11-05 RX ADMIN — SODIUM CHLORIDE: 9 INJECTION, SOLUTION INTRAVENOUS at 18:16

## 2024-11-05 RX ADMIN — FERROUS SULFATE TAB 325 MG (65 MG ELEMENTAL FE) 325 MG: 325 (65 FE) TAB at 18:16

## 2024-11-05 RX ADMIN — MIRTAZAPINE 7.5 MG: 15 TABLET, FILM COATED ORAL at 19:24

## 2024-11-05 RX ADMIN — ENOXAPARIN SODIUM 40 MG: 100 INJECTION SUBCUTANEOUS at 18:16

## 2024-11-05 RX ADMIN — CARVEDILOL 3.12 MG: 3.12 TABLET, FILM COATED ORAL at 19:24

## 2024-11-05 RX ADMIN — CARBIDOPA AND LEVODOPA 1 TABLET: 25; 100 TABLET ORAL at 19:24

## 2024-11-05 RX ADMIN — CEFTRIAXONE SODIUM 1000 MG: 1 INJECTION, POWDER, FOR SOLUTION INTRAMUSCULAR; INTRAVENOUS at 18:16

## 2024-11-05 ASSESSMENT — ENCOUNTER SYMPTOMS
DIARRHEA: 0
NAUSEA: 1
CHEST TIGHTNESS: 0
ABDOMINAL PAIN: 0
VOMITING: 1
COUGH: 1
SHORTNESS OF BREATH: 0

## 2024-11-05 NOTE — FLOWSHEET NOTE
Orthostatic blood pressure     11/05/24 1741   Vital Signs   Blood Pressure Lying 188/90   Pulse Lying 60 PER MINUTE   Blood Pressure Sitting 149/96   Pulse Sitting 73 PER MINUTE   Blood Pressure Standing 113/79   Pulse Standing 87 PER MINUTE

## 2024-11-05 NOTE — ED PROVIDER NOTES
DISPOSITION Admitted 11/05/2024 03:05:43 PM           PATIENT REFERRED TO:  No follow-up provider specified.    DISCHARGE MEDICATIONS:  New Prescriptions    No medications on file         Alex Newman MD  Attending Emergency Physician                     Alex Newman MD  11/05/24 3175

## 2024-11-05 NOTE — PLAN OF CARE
Please have patient or POA answer all MRI Screening questions in Epic. Pt must be dressed in hospital clothes for exam. Thanks!

## 2024-11-05 NOTE — PROGRESS NOTES
\"Have you been to the ER, urgent care clinic since your last visit?  Hospitalized since your last visit?\"    [facility] 10/3-10/7 rhinovirus     SUBJECTIVE:  Rowdy Marie is a 74 y.o. male patient who  comes for complaints of   Chief Complaint   Patient presents with    Fatigue     [facility] 10/3-10/7 rhinovirus     Numbness     Left arm numbness for about a week       Dizziness     Chronic, has gotten worse in the pass few days        Post hosp discharge-was admitted with rhinovirus infection  Here with son  Reports new left arm numbness, and left facial droop  LKW ?1 week ago- pt family not sure when this started, familymember on phone call- states did not note this recently    C/o lot of weakness  Has been falling/ c/o dizziness.- spinning sensation  Near fall recetnly  No syncope   Denies chest pain      High risk due to history of A-fib, sinus rhythm on recent Holter  History of previous stroke,, CAD s/p CABG, prostate cancer, Parkinson's disease  Patient is currently on aspirin and Brilinta    Need to rule out dehydration/anemia/cardiac events/new stroke/PE/      Patient advised to proceed to ER-agreed    REVIEW OF SYSTEMS (except Subjective (HPI))  GENERAL: Positive for extreme weakness   RESPIRATORY: Negative for cough, wheezing or shortness of breath  CARDIOVASCULAR: Negative for chest pain or palpitations.  GI: no nausea, vomiting, or diarrhea  NEURO: No history of headaches positive for new left facial droop left arm numbness    Past Medical History:   Diagnosis Date    A-fib (HCC)     PREET (acute kidney injury) ([facility])     Anemia     CAD (coronary artery disease)     Esophageal stricture     Hyperlipidemia     Hypokalemia     Hypomagnesemia     NSTEMI (non-ST elevated myocardial infarction) ([facility])     Orthostatic hypotension     Parkinsons (HCC)     Presence of Watchman left atrial appendage closure device     Prostate CA ([facility]ly [facility])        SOCIAL HISTORY:  Social History     Socioeconomic History    Marital

## 2024-11-05 NOTE — H&P
[facility]  IN-PATIENT SERVICE  [facility]  IN-PATIENT SERVICE   [facility]     HISTORY AND PHYSICAL EXAMINATION            Date:   11/5/2024  Patient name:  Rowdy Marie  Date of admission:  11/5/2024 11:20 AM  MRN:   [id number]  Account:  [id number]  YOB: 1950  PCP:    Yandy Jones MD  Room:   [address] [place] [address]  Code Status:    Prior    Chief Complaint:     Chief Complaint   Patient presents with    Facial Droop     Pt was at PCP office today for numbness in left hand and facial droop since last Friday        History Obtained From:     patient, electronic medical record    History of Present Illness:     The patient is a 74 y.o. male who presents with diffuse weakness for ~1 month, left-sided facial droop, left-sided numbness for ~1 week, as well as nausea, vomiting, and dizziness and feeling off-balance. He says the weakness has been present since he was treated for enterovirus infection in October. He also complains of headache and burning sensation when he urinates.    His PMH is significant for previous stroke, A-fib, CAD s/p CABG, hyperlipidemia, mitral regurgitation, prostate cancer, Parkinson's disease. He is on amiodarone, aspirin and ticagrelor, levothyroxine.    In the ER, he was hemodynamically stable with SBP in 140's, had a very slight hyponatremia of 134, a slightly increased creatinine of 1.4, high TSH of 5.99, his UA was very dubious for UTI, and a head CT scan showed mild central and cortical cerebral atrophy, mild chronic deep white matter ischemic changes, EKG showed sinus rhythm.     He is being admitted to PCU.    Past Medical History:     Past Medical History:   Diagnosis Date    A-fib (HCC)     PREET (acute kidney injury) (HCC)     Anemia     CAD (coronary artery disease)     Esophageal stricture     Hyperlipidemia     Hypokalemia     Hypomagnesemia     NSTEMI (non-ST elevated

## 2024-11-05 NOTE — ED NOTES
Report given to STAS Valencia from U.   Report method by phone   The following was reviewed with receiving RN:   Current vital signs:  BP (!) 144/97   Pulse 60   Temp 97.8 °F (36.6 °C)   Resp 20   Ht 1.778 m (5' 10\")   Wt 68 kg (150 lb)   SpO2 100%   BMI 21.52 kg/m²                MEWS Score: 0     Any medication or safety alerts were reviewed. Any pending diagnostics and notifications were also reviewed, as well as any safety concerns or issues, abnormal labs, abnormal imaging, and abnormal assessment findings. Questions were answered.

## 2024-11-06 ENCOUNTER — APPOINTMENT (OUTPATIENT)
Dept: MRI IMAGING | Age: 74
DRG: 092 | End: 2024-11-06
Payer: MEDICARE

## 2024-11-06 LAB
ANION GAP SERPL CALCULATED.3IONS-SCNC: 11 MMOL/L (ref 9–16)
BASOPHILS # BLD: 0.2 K/UL (ref 0–0.2)
BASOPHILS NFR BLD: 2 % (ref 0–2)
BUN SERPL-MCNC: 20 MG/DL (ref 8–23)
CALCIUM SERPL-MCNC: 9 MG/DL (ref 8.6–10.4)
CHLORIDE SERPL-SCNC: 102 MMOL/L (ref 98–107)
CHOLEST SERPL-MCNC: 282 MG/DL (ref 0–199)
CHOLESTEROL/HDL RATIO: 6.4
CO2 SERPL-SCNC: 25 MMOL/L (ref 20–31)
CREAT SERPL-MCNC: 1.2 MG/DL (ref 0.7–1.2)
EKG ATRIAL RATE: 56 BPM
EKG P AXIS: 61 DEGREES
EKG P-R INTERVAL: 166 MS
EKG Q-T INTERVAL: 460 MS
EKG QRS DURATION: 96 MS
EKG QTC CALCULATION (BAZETT): 443 MS
EKG R AXIS: -32 DEGREES
EKG T AXIS: 89 DEGREES
EKG VENTRICULAR RATE: 56 BPM
EOSINOPHIL # BLD: 0.3 K/UL (ref 0–0.4)
EOSINOPHILS RELATIVE PERCENT: 4 % (ref 0–4)
ERYTHROCYTE [DISTWIDTH] IN BLOOD BY AUTOMATED COUNT: 17.4 % (ref 11.5–14.9)
EST. AVERAGE GLUCOSE BLD GHB EST-MCNC: 91 MG/DL
GFR, ESTIMATED: 63 ML/MIN/1.73M2
GLUCOSE SERPL-MCNC: 105 MG/DL (ref 74–99)
HBA1C MFR BLD: 4.8 % (ref 4–6)
HCT VFR BLD AUTO: 39 % (ref 41–53)
HDLC SERPL-MCNC: 44 MG/DL
HGB BLD-MCNC: 13.1 G/DL (ref 13.5–17.5)
LDLC SERPL CALC-MCNC: 167 MG/DL (ref 0–100)
LYMPHOCYTES NFR BLD: 1.6 K/UL (ref 1–4.8)
LYMPHOCYTES RELATIVE PERCENT: 20 % (ref 24–44)
MCH RBC QN AUTO: 31.1 PG (ref 26–34)
MCHC RBC AUTO-ENTMCNC: 33.6 G/DL (ref 31–37)
MCV RBC AUTO: 92.7 FL (ref 80–100)
MONOCYTES NFR BLD: 0.7 K/UL (ref 0.1–1.3)
MONOCYTES NFR BLD: 9 % (ref 1–7)
NEUTROPHILS NFR BLD: 65 % (ref 36–66)
NEUTS SEG NFR BLD: 5.1 K/UL (ref 1.3–9.1)
PLATELET # BLD AUTO: 256 K/UL (ref 150–450)
PMV BLD AUTO: 8.5 FL (ref 6–12)
POTASSIUM SERPL-SCNC: 4.1 MMOL/L (ref 3.7–5.3)
RBC # BLD AUTO: 4.21 M/UL (ref 4.5–5.9)
SODIUM SERPL-SCNC: 138 MMOL/L (ref 136–145)
TRIGL SERPL-MCNC: 357 MG/DL (ref 0–149)
WBC OTHER # BLD: 7.9 K/UL (ref 3.5–11)

## 2024-11-06 PROCEDURE — 97166 OT EVAL MOD COMPLEX 45 MIN: CPT

## 2024-11-06 PROCEDURE — 6370000000 HC RX 637 (ALT 250 FOR IP)

## 2024-11-06 PROCEDURE — 97530 THERAPEUTIC ACTIVITIES: CPT

## 2024-11-06 PROCEDURE — 85025 COMPLETE CBC W/AUTO DIFF WBC: CPT

## 2024-11-06 PROCEDURE — 80061 LIPID PANEL: CPT

## 2024-11-06 PROCEDURE — 70551 MRI BRAIN STEM W/O DYE: CPT

## 2024-11-06 PROCEDURE — 2580000003 HC RX 258

## 2024-11-06 PROCEDURE — 2060000000 HC ICU INTERMEDIATE R&B

## 2024-11-06 PROCEDURE — 97116 GAIT TRAINING THERAPY: CPT

## 2024-11-06 PROCEDURE — 6360000002 HC RX W HCPCS

## 2024-11-06 PROCEDURE — 36415 COLL VENOUS BLD VENIPUNCTURE: CPT

## 2024-11-06 PROCEDURE — 80048 BASIC METABOLIC PNL TOTAL CA: CPT

## 2024-11-06 PROCEDURE — 83036 HEMOGLOBIN GLYCOSYLATED A1C: CPT

## 2024-11-06 PROCEDURE — 99222 1ST HOSP IP/OBS MODERATE 55: CPT | Performed by: PSYCHIATRY & NEUROLOGY

## 2024-11-06 PROCEDURE — 97162 PT EVAL MOD COMPLEX 30 MIN: CPT

## 2024-11-06 PROCEDURE — 2500000003 HC RX 250 WO HCPCS

## 2024-11-06 RX ORDER — PREGABALIN 25 MG/1
50 CAPSULE ORAL 2 TIMES DAILY
Status: DISCONTINUED | OUTPATIENT
Start: 2024-11-06 | End: 2024-11-07 | Stop reason: HOSPADM

## 2024-11-06 RX ORDER — ACETAMINOPHEN 325 MG/1
650 TABLET ORAL EVERY 4 HOURS PRN
Status: DISCONTINUED | OUTPATIENT
Start: 2024-11-06 | End: 2024-11-07 | Stop reason: HOSPADM

## 2024-11-06 RX ADMIN — CYANOCOBALAMIN TAB 1000 MCG 1000 MCG: 1000 TAB at 08:35

## 2024-11-06 RX ADMIN — TICAGRELOR 90 MG: 90 TABLET ORAL at 08:35

## 2024-11-06 RX ADMIN — CARBIDOPA AND LEVODOPA 1 TABLET: 25; 100 TABLET ORAL at 08:35

## 2024-11-06 RX ADMIN — TICAGRELOR 90 MG: 90 TABLET ORAL at 20:45

## 2024-11-06 RX ADMIN — SODIUM CHLORIDE, PRESERVATIVE FREE 10 ML: 5 INJECTION INTRAVENOUS at 20:46

## 2024-11-06 RX ADMIN — FERROUS SULFATE TAB 325 MG (65 MG ELEMENTAL FE) 325 MG: 325 (65 FE) TAB at 16:51

## 2024-11-06 RX ADMIN — ACETAMINOPHEN 650 MG: 325 TABLET ORAL at 20:45

## 2024-11-06 RX ADMIN — FERROUS SULFATE TAB 325 MG (65 MG ELEMENTAL FE) 325 MG: 325 (65 FE) TAB at 12:34

## 2024-11-06 RX ADMIN — CARBIDOPA AND LEVODOPA 1 TABLET: 25; 100 TABLET ORAL at 16:51

## 2024-11-06 RX ADMIN — PANTOPRAZOLE SODIUM 40 MG: 40 TABLET, DELAYED RELEASE ORAL at 05:21

## 2024-11-06 RX ADMIN — CARBIDOPA AND LEVODOPA 1 TABLET: 25; 100 TABLET ORAL at 12:34

## 2024-11-06 RX ADMIN — MIRTAZAPINE 7.5 MG: 15 TABLET, FILM COATED ORAL at 20:45

## 2024-11-06 RX ADMIN — SODIUM CHLORIDE: 9 INJECTION, SOLUTION INTRAVENOUS at 05:23

## 2024-11-06 RX ADMIN — ENOXAPARIN SODIUM 40 MG: 100 INJECTION SUBCUTANEOUS at 08:35

## 2024-11-06 RX ADMIN — AMIODARONE HYDROCHLORIDE 200 MG: 200 TABLET ORAL at 08:35

## 2024-11-06 RX ADMIN — ACETAMINOPHEN 650 MG: 325 TABLET ORAL at 16:51

## 2024-11-06 RX ADMIN — CEFTRIAXONE SODIUM 1000 MG: 1 INJECTION, POWDER, FOR SOLUTION INTRAMUSCULAR; INTRAVENOUS at 17:45

## 2024-11-06 RX ADMIN — LEVOTHYROXINE SODIUM 75 MCG: 0.07 TABLET ORAL at 05:21

## 2024-11-06 RX ADMIN — ASPIRIN 81 MG: 81 TABLET, COATED ORAL at 08:35

## 2024-11-06 RX ADMIN — CARBIDOPA AND LEVODOPA 1 TABLET: 25; 100 TABLET ORAL at 20:45

## 2024-11-06 RX ADMIN — ANTI-FUNGAL POWDER MICONAZOLE NITRATE TALC FREE: 1.42 POWDER TOPICAL at 08:13

## 2024-11-06 RX ADMIN — FERROUS SULFATE TAB 325 MG (65 MG ELEMENTAL FE) 325 MG: 325 (65 FE) TAB at 08:35

## 2024-11-06 ASSESSMENT — PAIN SCALES - GENERAL
PAINLEVEL_OUTOF10: 8
PAINLEVEL_OUTOF10: 6
PAINLEVEL_OUTOF10: 5

## 2024-11-06 ASSESSMENT — PAIN DESCRIPTION - LOCATION
LOCATION: HEAD
LOCATION: HEAD

## 2024-11-06 ASSESSMENT — PAIN DESCRIPTION - DESCRIPTORS
DESCRIPTORS: THROBBING
DESCRIPTORS: SORE;ACHING

## 2024-11-06 ASSESSMENT — PAIN DESCRIPTION - ORIENTATION
ORIENTATION: OTHER (COMMENT)
ORIENTATION: MID

## 2024-11-06 ASSESSMENT — PAIN - FUNCTIONAL ASSESSMENT: PAIN_FUNCTIONAL_ASSESSMENT: ACTIVITIES ARE NOT PREVENTED

## 2024-11-06 ASSESSMENT — PAIN DESCRIPTION - PAIN TYPE: TYPE: ACUTE PAIN

## 2024-11-06 ASSESSMENT — PAIN DESCRIPTION - FREQUENCY: FREQUENCY: INTERMITTENT

## 2024-11-06 ASSESSMENT — PAIN DESCRIPTION - ONSET: ONSET: SUDDEN

## 2024-11-06 NOTE — CARE COORDINATION
DISCHARGE PLANNING NOTE:    Patient and/or primary caregiver participated in post-hospital care planning and their ability to address care needs was assessed. Yes    Family/caregiver is willing and able to care for patient at home.  Yes    Family/caregiver educated on resources available including durable medical equipment and respite care. Yes    Electronically signed by Esther Nix RN on 11/6/2024 at 2:11 PM      
AM-PAC: 20 /24    Family can provide assistance at DC: Yes  Would you like Case Management to discuss the discharge plan with any other family members/significant others, and if so, who? Yes (Son and daughter in law)  Plans to Return to Present Housing: Yes  Other Identified Issues/Barriers to RETURNING to current housing: none  Potential Assistance needed at discharge: Skilled Nursing Facility            Potential DME:    Patient expects to discharge to: House  Plan for transportation at discharge: Family    Financial    Payor: [facility] MEDICARE / Plan: [employer]IERR[employer] MEDIBLUE ESSENTIAL/PLUS / Product Type: *No Product type* /     Does insurance require precert for SNF: Yes    Potential assistance Purchasing Medications: No  Meds-to-Beds request:        [id number] - [address] [phone] - F [phone]  [address] [place] [id number]  Phone: [phone] Fax: [phone]    [id number] [address] [facility] [phone] - F [phone]  [address] [place] [id number]  Phone: [phone] Fax: [phone]      Notes:    Factors facilitating achievement of predicted outcomes: Family support, Cooperative, and Pleasant    Barriers to discharge: none    Additional Case Management Notes: 11/06 [facility] MEDICARE. Pt is from \[facility] [place] [facility]\"" level home w/son and sons family. Son and daughter in law help care for pt and transport. DME Walker, [place], [place], Raised toilet seat, [place], O2 2L prn ([employer]). VNS Current w/[employer]. Neuro Consult-stroke like symptoms, NIH-0. CT done, MRI pending. Stroke questions complete. DAVID NEEDS SIGNED/COMPLETED. //AM     The Plan for Transition of Care is related to the following treatment goals of Dehydration [E86.0]  Facial droop [R29.810]  Generalized weakness [R53.1]  Stroke-like episode [R29.90]    IF APPLICABLE: The Patient and/or patient representative Rowdy and his family were provided with a choice of provider and

## 2024-11-06 NOTE — ACP (ADVANCE CARE PLANNING)
Advance Care Planning     Advance Care Planning Activator (Inpatient)  Conversation Note      Date of ACP Conversation: 11/6/2024     Conversation Conducted with: Patient with Decision Making Capacity    ACP Activator: Esther Nix RN    Health Care Decision Maker:     Current Designated Health Care Decision Maker:     Primary Decision Maker: Kye Marie - Daughter-in-Law - [phone]    Secondary Decision Maker: Elliott Marie - Child - [phone]    Supplemental (Other) Decision Maker: Patrick Marie - Child - [phone]  Click here to complete Healthcare Decision Makers including section of the Healthcare Decision Maker Relationship (ie \"Primary\")  Today we documented Decision Maker(s) consistent with Legal Next of Kin hierarchy.    Care Preferences    Ventilation:  \"If you were in your present state of health and suddenly became very ill and were unable to breathe on your own, what would your preference be about the use of a ventilator (breathing machine) if it were available to you?\"      Would the patient desire the use of ventilator (breathing machine)?: yes    \"If your health worsens and it becomes clear that your chance of recovery is unlikely, what would your preference be about the use of a ventilator (breathing machine) if it were available to you?\"     Would the patient desire the use of ventilator (breathing machine)?: No      Resuscitation  \"CPR works best to restart the heart when there is a sudden event, like a heart attack, in someone who is otherwise healthy. Unfortunately, CPR does not typically restart the heart for people who have serious health conditions or who are very sick.\"    \"In the event your heart stopped as a result of an underlying serious health condition, would you want attempts to be made to restart your heart (answer \"yes\" for attempt to resuscitate) or would you prefer a natural death (answer \"no\" for do not attempt to resuscitate)?\" yes       [] Yes   [] No   Educated

## 2024-11-06 NOTE — PLAN OF CARE
Problem: Discharge Planning  Goal: Discharge to home or other facility with appropriate resources  11/6/2024 1539 by Antionette Emery, RN  Outcome: Progressing     Problem: Safety - Adult  Goal: Free from fall injury  11/6/2024 1539 by Antionette Emery, RN  Outcome: Progressing     Problem: ABCDS Injury Assessment  Goal: Absence of physical injury  11/6/2024 1539 by Antionette Emery, RN  Outcome: Progressing     Problem: Nutrition Deficit:  Goal: Optimize nutritional status  Outcome: Progressing

## 2024-11-07 ENCOUNTER — TELEPHONE (OUTPATIENT)
Dept: INTERNAL MEDICINE CLINIC | Age: 74
End: 2024-11-07

## 2024-11-07 VITALS
SYSTOLIC BLOOD PRESSURE: 126 MMHG | HEIGHT: 70 IN | BODY MASS INDEX: 20.83 KG/M2 | WEIGHT: 145.5 LBS | TEMPERATURE: 99.1 F | RESPIRATION RATE: 16 BRPM | DIASTOLIC BLOOD PRESSURE: 71 MMHG | HEART RATE: 84 BPM | OXYGEN SATURATION: 96 %

## 2024-11-07 LAB
ABSOLUTE BANDS: 0.08 K/UL (ref 0–1)
ANION GAP SERPL CALCULATED.3IONS-SCNC: 11 MMOL/L (ref 9–16)
BANDS: 1 % (ref 0–10)
BASOPHILS # BLD: 0 K/UL (ref 0–0.2)
BASOPHILS NFR BLD: 0 % (ref 0–2)
BUN SERPL-MCNC: 18 MG/DL (ref 8–23)
CALCIUM SERPL-MCNC: 9.1 MG/DL (ref 8.6–10.4)
CHLORIDE SERPL-SCNC: 103 MMOL/L (ref 98–107)
CO2 SERPL-SCNC: 24 MMOL/L (ref 20–31)
CREAT SERPL-MCNC: 1.1 MG/DL (ref 0.7–1.2)
EOSINOPHIL # BLD: 0.47 K/UL (ref 0–0.4)
EOSINOPHILS RELATIVE PERCENT: 6 % (ref 0–4)
ERYTHROCYTE [DISTWIDTH] IN BLOOD BY AUTOMATED COUNT: 17.6 % (ref 11.5–14.9)
GFR, ESTIMATED: 70 ML/MIN/1.73M2
GLUCOSE SERPL-MCNC: 102 MG/DL (ref 74–99)
HCT VFR BLD AUTO: 35.8 % (ref 41–53)
HGB BLD-MCNC: 12.5 G/DL (ref 13.5–17.5)
LYMPHOCYTES NFR BLD: 1.25 K/UL (ref 1–4.8)
LYMPHOCYTES RELATIVE PERCENT: 16 % (ref 24–44)
MCH RBC QN AUTO: 31.3 PG (ref 26–34)
MCHC RBC AUTO-ENTMCNC: 34.9 G/DL (ref 31–37)
MCV RBC AUTO: 89.7 FL (ref 80–100)
MONOCYTES NFR BLD: 0.86 K/UL (ref 0.1–1.3)
MONOCYTES NFR BLD: 11 % (ref 1–7)
MORPHOLOGY: ABNORMAL
NEUTROPHILS NFR BLD: 66 % (ref 36–66)
NEUTS SEG NFR BLD: 5.14 K/UL (ref 1.3–9.1)
PLATELET # BLD AUTO: 245 K/UL (ref 150–450)
PMV BLD AUTO: 8.6 FL (ref 6–12)
POTASSIUM SERPL-SCNC: 3.9 MMOL/L (ref 3.7–5.3)
RBC # BLD AUTO: 3.99 M/UL (ref 4.5–5.9)
SODIUM SERPL-SCNC: 138 MMOL/L (ref 136–145)
WBC OTHER # BLD: 7.8 K/UL (ref 3.5–11)

## 2024-11-07 PROCEDURE — 80048 BASIC METABOLIC PNL TOTAL CA: CPT

## 2024-11-07 PROCEDURE — 6360000002 HC RX W HCPCS

## 2024-11-07 PROCEDURE — 99231 SBSQ HOSP IP/OBS SF/LOW 25: CPT | Performed by: PSYCHIATRY & NEUROLOGY

## 2024-11-07 PROCEDURE — 36415 COLL VENOUS BLD VENIPUNCTURE: CPT

## 2024-11-07 PROCEDURE — 85025 COMPLETE CBC W/AUTO DIFF WBC: CPT

## 2024-11-07 PROCEDURE — 6370000000 HC RX 637 (ALT 250 FOR IP)

## 2024-11-07 RX ORDER — CIPROFLOXACIN 500 MG/1
500 TABLET, FILM COATED ORAL 2 TIMES DAILY
Qty: 14 TABLET | Refills: 0 | Status: SHIPPED | OUTPATIENT
Start: 2024-11-07 | End: 2024-11-14

## 2024-11-07 RX ADMIN — ENOXAPARIN SODIUM 40 MG: 100 INJECTION SUBCUTANEOUS at 09:02

## 2024-11-07 RX ADMIN — FERROUS SULFATE TAB 325 MG (65 MG ELEMENTAL FE) 325 MG: 325 (65 FE) TAB at 13:41

## 2024-11-07 RX ADMIN — LEVOTHYROXINE SODIUM 75 MCG: 0.07 TABLET ORAL at 05:36

## 2024-11-07 RX ADMIN — CARBIDOPA AND LEVODOPA 1 TABLET: 25; 100 TABLET ORAL at 09:03

## 2024-11-07 RX ADMIN — CYANOCOBALAMIN TAB 1000 MCG 1000 MCG: 1000 TAB at 09:03

## 2024-11-07 RX ADMIN — FERROUS SULFATE TAB 325 MG (65 MG ELEMENTAL FE) 325 MG: 325 (65 FE) TAB at 09:03

## 2024-11-07 RX ADMIN — ASPIRIN 81 MG: 81 TABLET, COATED ORAL at 09:03

## 2024-11-07 RX ADMIN — ANTI-FUNGAL POWDER MICONAZOLE NITRATE TALC FREE: 1.42 POWDER TOPICAL at 09:03

## 2024-11-07 RX ADMIN — PANTOPRAZOLE SODIUM 40 MG: 40 TABLET, DELAYED RELEASE ORAL at 05:36

## 2024-11-07 RX ADMIN — TICAGRELOR 90 MG: 90 TABLET ORAL at 09:03

## 2024-11-07 RX ADMIN — AMIODARONE HYDROCHLORIDE 200 MG: 200 TABLET ORAL at 09:03

## 2024-11-07 RX ADMIN — FERROUS SULFATE TAB 325 MG (65 MG ELEMENTAL FE) 325 MG: 325 (65 FE) TAB at 16:50

## 2024-11-07 RX ADMIN — CARBIDOPA AND LEVODOPA 1 TABLET: 25; 100 TABLET ORAL at 13:42

## 2024-11-07 RX ADMIN — CARBIDOPA AND LEVODOPA 1 TABLET: 25; 100 TABLET ORAL at 16:50

## 2024-11-07 ASSESSMENT — PAIN SCALES - GENERAL: PAINLEVEL_OUTOF10: 4

## 2024-11-07 NOTE — PROGRESS NOTES
[facility] Neurology   IN-PATIENT SERVICE      NEUROLOGY PROGRESS  NOTE                Interval History:     No current complaints, stating he is feeling much better today.  Strength is improved, less dizziness.    History of Present Illness:     The patient is a 74 y.o. male who presents with Facial Droop (Pt was at PCP office today for numbness in left hand and facial droop since last Friday )  . The patient was seen and examined and the chart was reviewed.  Patient with history of Parkinson's disease presents to the hospital with 1 week history of generalized weakness, left side greater than right.  Presenting to the hospital at [facility] overnight after being sent by PCP after office visit.  Per chart review complaining of a lot of weakness, falling, dizziness.  In the ED undergoing CT head without acute intracranial abnormalities.  SBP in the 120s.  Admitted for further workup.     Currently he is awake alert and oriented.  He states he feels better than he did yesterday.  He states he used to follow-up with [facility] as far as his neurologist for his Parkinson's.  He is on Sinemet 25/100 mg 1 tablet 4 times daily.  Parkinsonian symptoms appear to be fairly well-controlled at this time.  I did review the MRI of the brain with him which does not reveal any evidence of acute abnormalities.  I did review with him his orthostatic vital signs which are significantly positive.  Physical Exam:   /75   Pulse 74   Temp 98.2 °F (36.8 °C) (Oral)   Resp 16   Ht 1.778 m (5' 10\")   Wt 66 kg (145 lb 8.1 oz)   SpO2 98%   BMI 20.88 kg/m²   Temp (24hrs), Av.2 °F (36.8 °C), Min:97.8 °F (36.6 °C), Max:98.6 °F (37 °C)      Neurological examination:    Mental status    Alert and oriented x 3; following all commands; speech is fluent, no dysarthria, aphasia.       Cranial nerves    II - visual fields intact to confrontation; pupils reactive  III, IV, VI - extraocular muscles intact; no MIGUELINA; no 
Discharge paperwork reviewed. IV removed. Heart monitor off. Patient has all belongings. Patients brother is here to get him he was taken downstairs via wheelchair.   
Dr Stewart notified of new consult and BP med parameters.   
No [facility] per Dr Stewart, neuro.   
Occupational Therapy  Facility/Department: [facility] PROGRESSIVE CARE  Occupational Therapy Initial Assessment    Name: Rowdy Marie  : 1950  MRN: [id number]  Date of Service: 2024    RN reports patient is medically stable for therapy treatment this date.    Chart reviewed prior to treatment and patient is agreeable for therapy.  All lines intact and patient positioned comfortably at end of treatment.  All patient needs addressed prior to ending therapy session.      Discharge Recommendations:  Patient would benefit from continued therapy after discharge  Due to recent hospitalization and medical condition, pt would benefit from additional intermittent skilled therapy at time of discharge.  Please refer to the AM-PAC score for current functional status.   OT Equipment Recommendations  Equipment Needed: Yes  Mobility Devices: ADL Assistive Devices  ADL Assistive Devices: Emergency Alert System;Long-handled Sponge;Long-handled Shoe Horn;Transfer Tub Bench     PER HPI: The patient is a 74 y.o. male who presents with diffuse weakness for ~1 month, left-sided facial droop, left-sided numbness for ~1 week, as well as nausea, vomiting, and dizziness and feeling off-balance. He says the weakness has been present since he was treated for enterovirus infection in October. He also complains of headache and burning sensation when he urinates.     His PMH is significant for previous stroke, A-fib, CAD s/p CABG, hyperlipidemia, mitral regurgitation, prostate cancer, Parkinson's disease. He is on amiodarone, aspirin and ticagrelor, levothyroxine.     In the ER, he was hemodynamically stable with SBP in 140's, had a very slight hyponatremia of 134, a slightly increased creatinine of 1.4, high TSH of 5.99, his UA was very dubious for UTI, and a head CT scan showed mild central and cortical cerebral atrophy, mild chronic deep white matter ischemic changes, EKG showed sinus rhythm.      He is being admitted to PCU.    Patient 
Perfect serve sent to Dr Elias about patients 9/10 headache and no meds ordered for pain.   
Pharmacy Medication History Note      List of current medications patient is taking is complete.    Source of information: patient, Sure Scripts, OARRS, Care Everywhere    Changes made to medication list:  Medications removed (include reason, ex. therapy complete or physician discontinued, noncompliance):  None     Medications flagged for provider review:  Pregabalin - last filled 7/26/24 with quantity 120 for 30 days per [place]ARRS, patient reports he needs a refill    Medications added/doses adjusted:  Lidocaine patches are applied to the lower back  Nystatin powder is applied to the groin  Meclizine changed to 12.5 mg three times daily as needed     Other notes (ex. Recent course of antibiotics, Coumadin dosing):  Per [place]ARRS, last fill of tramadol was on 10/16/24 with quantity 90 for 30 days.   The patient is prescribed pantoprazole, but reports also taking esomeprazole over the counter.      Current Home Medication List at Time of Admission:  Prior to Admission medications    Medication Sig   meclizine (ANTIVERT) 12.5 MG tablet Take 1 tablet by mouth 3 times daily as needed for Dizziness   amiodarone (CORDARONE) 200 MG tablet TAKE 1 TABLET BY MOUTH DAILY   sucralfate (CARAFATE) 1 GM tablet Take 1 tablet by mouth 4 times daily   carvedilol (COREG) 3.125 MG tablet Take 1 tablet by mouth 2 times daily   carbidopa-levodopa (SINEMET)  MG per tablet Take 1 tablet by mouth 4 times daily   ticagrelor (BRILINTA) 90 MG TABS tablet Take 1 tablet by mouth 2 times daily   ferrous sulfate (IRON 325) 325 (65 Fe) MG tablet Take 1 tablet by mouth 3 times daily (with meals)   mirtazapine (REMERON) 7.5 MG tablet Take 1 tablet by mouth nightly   pantoprazole (PROTONIX) 40 MG tablet Take 1 tablet by mouth every morning (before breakfast)   vitamin B-12 (CYANOCOBALAMIN) 1000 MCG tablet Take 1 tablet by mouth daily   ranolazine (RANEXA) 1000 MG extended release tablet Take 1 tablet by mouth 2 times daily   nitroGLYCERIN (NITRODUR) 0.2 
Physical Therapy        Physical Therapy Cancel Note      DATE: 2024    NAME: Rowdy Marie  MRN: [id number]   : 1950      Patient not seen this date for Physical Therapy due to:    Other: Cx; patient preparing for discharge to home. Will continue to follow for patient needs/updates.      Electronically signed by Roxana Montes PTA on 2024 at 3:34 PM      
Physical Therapy  Facility/Department: [facility] PROGRESSIVE CARE  Physical Therapy Initial Assessment    Name: Rowdy Marie  : 1950  MRN: [id number]  Date of Service: 2024    Discharge Recommendations:  Continue to assess pending progress, Patient would benefit from continued therapy after discharge, Therapy recommended at discharge   PT Equipment Recommendations  Equipment Needed:  (TBD)      Patient Diagnosis(es): The primary encounter diagnosis was Stroke-like episode. Diagnoses of Dehydration and Generalized weakness were also pertinent to this visit.  Past Medical History:  has a past medical history of A-fib ([facility]), PREET (acute kidney injury) ([facility]), Anemia, CAD (coronary artery disease), Esophageal stricture, Hyperlipidemia, Hypokalemia, Hypomagnesemia, NSTEMI (non-ST elevated myocardial infarction) ([facility]), Orthostatic hypotension, Parkinsons ([facility]), Presence of Watchman left atrial appendage closure device, and Prostate CA ([facility]).  Past Surgical History:  has a past surgical history that includes Coronary artery bypass graft; Cardiac catheterization; Coronary angioplasty with stent; Diagnostic Cardiac Cath Lab Procedure (2024); Cardiac procedure (N/A, 2024); Cardiac procedure (N/A, 2024); Cardiac procedure (N/A, 2024); and Cardiac procedure (N/A, 2024).    Assessment  Body Structures, Functions, Activity Limitations Requiring Skilled Therapeutic Intervention: Decreased functional mobility ;Decreased endurance;Increased pain;Decreased balance;Decreased strength  Assessment: continue per POC to maxmize potential for safe D/C  Treatment Diagnosis: impaired mobility due to weakness  Specific Instructions for Next Treatment: advance gait distance using wheeled walker and progress to 3-5 steps w/ left rail, instruct in HEP  Therapy Prognosis: Good  Decision Making: Medium Complexity  History: pt admitted due to dehydration, generalized weakness and stroke-like symptoms  Exam: ROM, MMT, 
muscle mass loss Temples (temporalis), Hand   (interosseous)  Fluid Accumulation:  No fluid accumulation   Strength:  Not Performed    Nutrition Diagnosis:  ? in context of acute illness or injury, Moderate malnutrition related to   early satiety as evidenced by intake 0-25%, poor intake prior to admission,   weight loss, loss of subcutaneous fat, criteria as identified in malnutrition   assessment, muscle loss    Treatment:  Nutrition Interventions:  Food and/or Nutrient Delivery: Continue Current Diet, Continue Oral Nutrition   Supplement  Nutrition Education/Counseling: No recommendation at this time  Coordination of Nutrition Care: Continue to monitor while inpatient    ASPEN Criteria:    https://aspenjournals.onlinelibrary.henson.com/doi/full/10.1177/589158079435449  5    Thank you,  Anthony Gonzalez RN, CDI  Options provided:  -- Protein calorie malnutrition moderate  -- Other - I will add my own diagnosis  -- Disagree - Not applicable / Not valid  -- Disagree - Clinically unable to determine / Unknown  -- Refer to Clinical Documentation Reviewer    PROVIDER RESPONSE TEXT:    This patient has moderate protein calorie malnutrition.    Query created by: Anthony Gonzalez on 11/6/2024 2:47 PM      Electronically signed by:  Ko Elias MD 11/6/2024 2:49 PM          
Nutrition Focused Physical Findings, Skin, Weight    Discharge Planning:    Continue Oral Nutrition Supplement     Yasmine Marc, YUE  Contact: [phone]    
week Reason for Exam: focal signs for ~1 week Initial evaluation FINDINGS: INTRACRANIAL STRUCTURES/VENTRICLES: There is no acute infarct.  The ventricles and cisternal spaces are prominent consistent with cerebral atrophy. There are several punctate and confluent areas of high-signal in the periventricular white matter and centrum semiovale  that are likely related to chronic small vessel ischemic disease.  Previous area of injury or insult in the right frontal lobe.  There are dilated perivascular spaces in the basal ganglia bilaterally.  There is no midline shift or mass effect. There are no areas of restricted diffusion. ORBITS: The visualized portion of the orbits demonstrate no acute abnormality. SINUSES: There is mild mucoperiosteal thickening of the maxillary sinuses and ethmoid air cells. The remainder of the sinuses are clear.  There is partial opacification of the right mastoid air cells.  The left mastoid air cells are clear. BONES/SOFT TISSUES: The bone marrow signal intensity appears normal. The soft tissues demonstrate no acute abnormality.     Cerebral atrophy.  Mild-to-moderate chronic small vessel ischemic changes. Previous area of injury or insult in the right frontal lobe.  No acute brain parenchymal abnormality.     CT HEAD WO CONTRAST    Result Date: 11/5/2024  EXAMINATION: CT OF THE HEAD WITHOUT CONTRAST  11/5/2024 9:30 am TECHNIQUE: CT of the head was performed without the administration of intravenous contrast. Automated exposure control, iterative reconstruction, and/or weight based adjustment of the mA/kV was utilized to reduce the radiation dose to as low as reasonably achievable. COMPARISON: 02/01/2024 HISTORY: ORDERING SYSTEM PROVIDED HISTORY: r. facial droop, L arm numbness TECHNOLOGIST PROVIDED HISTORY: r. facial droop, L arm numbness Decision Support Exception - unselect if not a suspected or confirmed emergency medical condition->Emergency Medical Condition (MA) Reason for Exam: r. 
T4 Free 1.4 0.9 - 1.7 ng/dL   Urinalysis with Reflex to Culture    Collection Time: 11/05/24  2:11 PM    Specimen: Urine   Result Value Ref Range    Color, UA Dark Yellow (A) Yellow    Turbidity UA Clear Clear    Glucose, Ur NEGATIVE NEGATIVE mg/dL    Bilirubin, Urine NEGATIVE  Verified by ictotest. (A) NEGATIVE    Ketones, Urine NEGATIVE NEGATIVE mg/dL    Specific Gravity, UA 1.020 1.000 - 1.030    Urine Hgb NEGATIVE NEGATIVE    pH, Urine 6.0 5.0 - 8.0    Protein, UA TRACE (A) NEGATIVE mg/dL    Urobilinogen, Urine Normal 0.0 - 1.0 EU/dL    Nitrite, Urine NEGATIVE NEGATIVE    Leukocyte Esterase, Urine SMALL (A) NEGATIVE   Microscopic Urinalysis    Collection Time: 11/05/24  2:11 PM   Result Value Ref Range    WBC, UA 0 TO 2 (A) 0 TO 5 /HPF    RBC, UA 0 TO 2 0 TO 2 /HPF    Casts UA 3 to 5 (A) None /LPF    Epithelial Cells, UA 0 TO 2 /HPF    Bacteria, UA None None   Vitamin B12 & Folate    Collection Time: 11/05/24  4:05 PM   Result Value Ref Range    Vitamin B-12 1028 232 - 1245 pg/mL    Folate 15.9 4.8 - 24.2 ng/mL   Basic Metabolic Panel w/ Reflex to MG    Collection Time: 11/06/24  5:49 AM   Result Value Ref Range    Sodium 138 136 - 145 mmol/L    Potassium 4.1 3.7 - 5.3 mmol/L    Chloride 102 98 - 107 mmol/L    CO2 25 20 - 31 mmol/L    Anion Gap 11 9 - 16 mmol/L    Glucose 105 (H) 74 - 99 mg/dL    BUN 20 8 - 23 mg/dL    Creatinine 1.2 0.7 - 1.2 mg/dL    Est, Glom Filt Rate 63 >60 mL/min/1.73m2    Calcium 9.0 8.6 - 10.4 mg/dL   Hemoglobin A1c    Collection Time: 11/06/24  5:49 AM   Result Value Ref Range    Hemoglobin A1C 4.8 4.0 - 6.0 %    Estimated Avg Glucose 91 mg/dL   Lipid Panel    Collection Time: 11/06/24  5:49 AM   Result Value Ref Range    Cholesterol, Total 282 (H) 0 - 199 mg/dL    HDL 44 >40 mg/dL    LDL Cholesterol 167 (H) 0 - 100 mg/dL    Chol/HDL Ratio 6.4     Triglycerides 357 (H) 0 - 149 mg/dL   CBC with Auto Differential    Collection Time: 11/06/24  5:49 AM   Result Value Ref Range    WBC 7.9

## 2024-11-07 NOTE — CONSULTS
Date:   11/7/2024  Patient name: Rowdy Marie  Date of admission:  11/5/2024 11:20 AM  MRN:   [id number]  YOB: 1950  PCP: Yandy Jones MD    Reason for Admission: Dehydration [E86.0]  Facial droop [R29.810]  Generalized weakness [R53.1]  Stroke-like episode [R29.90]    Cardiology consult: Multivessel CAD, history of A-fib, CHF with a preserved EF       Referring physician Dr. Ko Elias    Impression    Admission 11/5/2024 with  left facial droop, left arm and left leg numbness  Negative neurological workup for CVA    Paroxysmal atrial fibrillation Watchman procedure 1/14/2023, current rhythm sinus rhythm on amiodarone  Ejection fraction 60 to 65%  Coronary artery disease, CABG times 4/2014, stent placement times 5 December 2022  LIMA to LAD, SVG to OM 3, ramus, RCA patent grafts cardiac cath December 2019  Cardiac cath 5/22/2024 right femoral artery access  Patent LIMA to LAD distal LAD disease not amenable for PCI, patent SVG to diagonal with a patent stent, patent SVG to RPDA, 80% stenosis of SVG to OM and reduced to 0% BERNDA by Dr. Love  Hypothyroidism  Hyperlipidemia lab work 11/6/2024 cholesterol 282, HDL 44, , triglycerides 357  History of prostate cancer/brachytherapy, radioactive seeding  Lytic lesion within the bilateral iliac bones as per CT abdomen pelvis 8/8/2024  History of anemia  History of gastritis, esophageal stricture  Parkinson disease on Sinemet  Back pain, back surgery  Episodes of dizziness, combination of previous C-spine surgery, postural hypotension/autonomic dysfunction patient has Parkinson disease on medication, dizziness aggravates by head and neck movements  Normal B12 1970, serum iron 57, iron saturation 21, folate more than 20  MRA neck and head without contrast 2/9/2023  No evidence of large vessel vascular occlusion or intracranial aneurysm within the limits of MR angiographic technique  No hemodynamically significant stenosis or occlusion in the 
atrophy.    Mild chronic deep white matter ischemic changes    No acute intracranial abnormalities are noted.      MRI brain: No acute intracranial abnormalities.  Diffuse cerebral atrophy.     I personally reviewed all of the above medications, clinical laboratory, imaging and other diagnostic tests.         Impression:      Generalized weakness, fatigue likely secondary to bradycardia, orthostatic hypotension  Idiopathic Parkinson's disease    Plan:     Significant drop in blood pressure from lying to standing from 180s to 113.  Recommend compression stockings.  Cardiology has been consulted; will leave further management of this to their team.  From neurostandpoint his parkinsonian symptoms appear to be fairly well-controlled we will maintain Sinemet 25/100 mg 1 tablet 4 times daily.  I did review his MRI of the brain which does not reveal any evidence of acute stroke.  Continue PT OT.  We will follow       Thank you for this very interesting consultation.      Electronically signed by Thierry Stewart DO on 11/6/2024 at 11:07 AM      Thierry Stewart DO  [facility] [place]  Neurology

## 2024-11-07 NOTE — PLAN OF CARE
Problem: Discharge Planning  Goal: Discharge to home or other facility with appropriate resources  11/7/2024 1620 by Bre Sahu RN  Outcome: Adequate for Discharge     Problem: Safety - Adult  Goal: Free from fall injury  11/7/2024 1620 by Bre Sahu RN  Outcome: Adequate for Discharge     Problem: ABCDS Injury Assessment  Goal: Absence of physical injury  11/7/2024 1620 by Bre Sahu RN  Outcome: Adequate for Discharge     Problem: Nutrition Deficit:  Goal: Optimize nutritional status  11/7/2024 1620 by Bre Sahu, RN  Outcome: Adequate for Discharge     Problem: Pain  Goal: Verbalizes/displays adequate comfort level or baseline comfort level  11/7/2024 1620 by Bre Sahu RN  Outcome: Adequate for Discharge

## 2024-11-07 NOTE — PLAN OF CARE
Problem: Discharge Planning  Goal: Discharge to home or other facility with appropriate resources  11/7/2024 0312 by Fariha Long RN  Outcome: Progressing  Flowsheets (Taken 11/7/2024 0312)  Discharge to home or other facility with appropriate resources: Identify barriers to discharge with patient and caregiver     Problem: Safety - Adult  Goal: Free from fall injury  11/7/2024 0312 by Fariha Long, RN  Outcome: Progressing  Flowsheets (Taken 11/7/2024 0312)  Free From Fall Injury:   Instruct family/caregiver on patient safety   Based on caregiver fall risk screen, instruct family/caregiver to ask for assistance with transferring infant if caregiver noted to have fall risk factors  Note: The patient remained free from falls this shift, call light within reach, bed in locked and lowest position.  Side rails up x2.      Problem: ABCDS Injury Assessment  Goal: Absence of physical injury  11/7/2024 0312 by Fariha Long RN  Outcome: Progressing  Flowsheets (Taken 11/7/2024 0312)  Absence of Physical Injury: Implement safety measures based on patient assessment  Note: Patient remained free from injury this shift. Call light within reach. Bed locked and in lowest position. Side rails x2. Pathways clear.      Problem: Nutrition Deficit:  Goal: Optimize nutritional status  11/7/2024 0312 by Fariha Long RN  Outcome: Progressing  Flowsheets (Taken 11/7/2024 0312)  Nutrient intake appropriate for improving, restoring, or maintaining nutritional needs:   Assess nutritional status and recommend course of action   Monitor oral intake, labs, and treatment plans     Problem: Pain  Goal: Verbalizes/displays adequate comfort level or baseline comfort level  Flowsheets (Taken 11/7/2024 0312)  Verbalizes/displays adequate comfort level or baseline comfort level:   Encourage patient to monitor pain and request assistance   Assess pain using appropriate pain scale  Note: Pain assessment completed. Administered PRN pain medication

## 2024-11-07 NOTE — DISCHARGE INSTRUCTIONS
-If you begin to experience any symptoms such as chest pain, shortness of breath, nausea, vomiting, dizziness, drowsiness, abdominal pain, loss of consciousness, or any other symptoms you find concerning please return to the ED for follow-up evaluation.  -If you have been given medication please take them as prescribed. Do not take more medication than recommended at any given time.   -Please follow-up with your primary care provider within 7 days for continued care.   -Please feel free return to the hospital if your symptoms worsen or any new concerning symptoms develop.  Follow-up with your primary care physician as needed for all other the concerns.

## 2024-11-07 NOTE — DISCHARGE SUMMARY
cardiology with outpatient follow-up.      Significant therapeutic interventions: As above    Significant Diagnostic Studies:   Labs / Micro:  CBC:   Lab Results   Component Value Date/Time    WBC 7.8 11/07/2024 05:40 AM    RBC 3.99 11/07/2024 05:40 AM    HGB 12.5 11/07/2024 05:40 AM    HCT 35.8 11/07/2024 05:40 AM    MCV 89.7 11/07/2024 05:40 AM    MCH 31.3 11/07/2024 05:40 AM    MCHC 34.9 11/07/2024 05:40 AM    RDW 17.6 11/07/2024 05:40 AM     11/07/2024 05:40 AM     BMP:    Lab Results   Component Value Date/Time    GLUCOSE 102 11/07/2024 05:40 AM     11/07/2024 05:40 AM    K 3.9 11/07/2024 05:40 AM     11/07/2024 05:40 AM    CO2 24 11/07/2024 05:40 AM    ANIONGAP 11 11/07/2024 05:40 AM    BUN 18 11/07/2024 05:40 AM    CREATININE 1.1 11/07/2024 05:40 AM    CALCIUM 9.1 11/07/2024 05:40 AM    LABGLOM 70 11/07/2024 05:40 AM    LABGLOM 78 04/24/2024 07:32 AM     HFP:  No components found for: \"AP\", \"ALB\", \"PROT\", \"SGOT\", \"SGPT\", \"TBIL\", \"DBILCALC\"  CMP:    Lab Results   Component Value Date/Time    GLUCOSE 102 11/07/2024 05:40 AM     11/07/2024 05:40 AM    K 3.9 11/07/2024 05:40 AM     11/07/2024 05:40 AM    CO2 24 11/07/2024 05:40 AM    BUN 18 11/07/2024 05:40 AM    CREATININE 1.1 11/07/2024 05:40 AM    ANIONGAP 11 11/07/2024 05:40 AM    ALKPHOS 79 11/05/2024 12:56 PM    ALT 17 11/05/2024 12:56 PM    AST 22 11/05/2024 12:56 PM    BILITOT 0.3 11/05/2024 12:56 PM    ALBUMIN 4.4 11/05/2024 12:56 PM    LABGLOM 70 11/07/2024 05:40 AM    LABGLOM 78 04/24/2024 07:32 AM    CALCIUM 9.1 11/07/2024 05:40 AM     PT/INR:    Lab Results   Component Value Date/Time    PROTIME 14.5 10/03/2024 03:16 PM    INR 1.1 10/03/2024 03:16 PM     PTT:   Lab Results   Component Value Date/Time    APTT 38.7 08/23/2024 03:55 AM     FLP:    Lab Results   Component Value Date/Time    CHOL 282 11/06/2024 05:49 AM    TRIG 357 11/06/2024 05:49 AM    HDL 44 11/06/2024 05:49 AM     U/A:    Lab Results   Component Value

## 2024-11-08 ENCOUNTER — TELEPHONE (OUTPATIENT)
Dept: INTERNAL MEDICINE CLINIC | Age: 74
End: 2024-11-08

## 2024-11-08 NOTE — TELEPHONE ENCOUNTER
Care Transitions Initial Follow Up Call    Outreach made within 2 business days of discharge: Yes    Patient: Rowdy Marie Patient : 1950   MRN: [id number]  Reason for Admission: facial droop  Discharge Date: 24       Spoke with: self     Discharge department/facility: home     TCM Interactive Patient Contact:  Was patient able to fill all prescriptions: Yes  Was patient instructed to bring all medications to the follow-up visit: Yes  Is patient taking all medications as directed in the discharge summary? Yes  Does patient understand their discharge instructions: Yes  Does patient have questions or concerns that need addressed prior to 7-14 day follow up office visit: no    Additional needs identified to be addressed with provider  No needs identified             SPOKE WITH PATIENT, DENIED SOONER APPOINTMENT   Follow Up  Future Appointments   Date Time Provider Department Center   2024 10:00 AM Yandy Jones MD [facility] ECC DEP       Diane Asher MA

## 2024-11-09 LAB — VIT B1 PYROPHOSHATE BLD-SCNC: 117 NMOL/L (ref 70–180)

## 2024-11-14 ENCOUNTER — PATIENT MESSAGE (OUTPATIENT)
Dept: INTERNAL MEDICINE CLINIC | Age: 74
End: 2024-11-14

## 2024-11-15 RX ORDER — POTASSIUM CHLORIDE 750 MG/1
10 TABLET, EXTENDED RELEASE ORAL EVERY MORNING
Qty: 60 TABLET | Refills: 0 | Status: ON HOLD | OUTPATIENT
Start: 2024-11-15

## 2024-11-15 RX ORDER — RANOLAZINE 1000 MG/1
1000 TABLET, EXTENDED RELEASE ORAL 2 TIMES DAILY
Qty: 60 TABLET | Refills: 3 | Status: ON HOLD | OUTPATIENT
Start: 2024-11-15

## 2024-11-16 ENCOUNTER — HOSPITAL ENCOUNTER (INPATIENT)
Age: 74
LOS: 4 days | Discharge: HOME | DRG: 312 | End: 2024-11-20
Attending: EMERGENCY MEDICINE | Admitting: INTERNAL MEDICINE
Payer: MEDICARE

## 2024-11-16 ENCOUNTER — APPOINTMENT (OUTPATIENT)
Dept: GENERAL RADIOLOGY | Age: 74
DRG: 312 | End: 2024-11-16
Payer: MEDICARE

## 2024-11-16 DIAGNOSIS — R55 CONVULSIVE SYNCOPE: ICD-10-CM

## 2024-11-16 DIAGNOSIS — K29.70 GASTRITIS WITHOUT BLEEDING, UNSPECIFIED CHRONICITY, UNSPECIFIED GASTRITIS TYPE: ICD-10-CM

## 2024-11-16 DIAGNOSIS — R11.0 NAUSEA: ICD-10-CM

## 2024-11-16 DIAGNOSIS — R29.810 FACIAL DROOP: ICD-10-CM

## 2024-11-16 DIAGNOSIS — I48.91 ATRIAL FIBRILLATION WITH RAPID VENTRICULAR RESPONSE (HCC): ICD-10-CM

## 2024-11-16 DIAGNOSIS — I95.1 ORTHOSTATIC HYPOTENSION: Primary | ICD-10-CM

## 2024-11-16 LAB
ALBUMIN SERPL-MCNC: 4.3 G/DL (ref 3.5–5.2)
ALP SERPL-CCNC: 87 U/L (ref 40–129)
ALT SERPL-CCNC: 18 U/L (ref 10–50)
ANION GAP SERPL CALCULATED.3IONS-SCNC: 13 MMOL/L (ref 9–16)
AST SERPL-CCNC: 24 U/L (ref 10–50)
BASOPHILS # BLD: 0.1 K/UL (ref 0–0.2)
BASOPHILS NFR BLD: 1 % (ref 0–2)
BILIRUB SERPL-MCNC: 0.3 MG/DL (ref 0–1.2)
BUN SERPL-MCNC: 26 MG/DL (ref 8–23)
CALCIUM SERPL-MCNC: 9.6 MG/DL (ref 8.6–10.4)
CHLORIDE SERPL-SCNC: 98 MMOL/L (ref 98–107)
CO2 SERPL-SCNC: 24 MMOL/L (ref 20–31)
CREAT SERPL-MCNC: 1.3 MG/DL (ref 0.7–1.2)
EOSINOPHIL # BLD: 0.2 K/UL (ref 0–0.4)
EOSINOPHILS RELATIVE PERCENT: 2 % (ref 0–4)
ERYTHROCYTE [DISTWIDTH] IN BLOOD BY AUTOMATED COUNT: 17 % (ref 11.5–14.9)
GFR, ESTIMATED: 57 ML/MIN/1.73M2
GLUCOSE SERPL-MCNC: 113 MG/DL (ref 74–99)
HCT VFR BLD AUTO: 40.5 % (ref 41–53)
HGB BLD-MCNC: 13.3 G/DL (ref 13.5–17.5)
LYMPHOCYTES NFR BLD: 1.3 K/UL (ref 1–4.8)
LYMPHOCYTES RELATIVE PERCENT: 11 % (ref 24–44)
MCH RBC QN AUTO: 30.8 PG (ref 26–34)
MCHC RBC AUTO-ENTMCNC: 32.8 G/DL (ref 31–37)
MCV RBC AUTO: 93.7 FL (ref 80–100)
MONOCYTES NFR BLD: 0.9 K/UL (ref 0.1–1.3)
MONOCYTES NFR BLD: 8 % (ref 1–7)
NEUTROPHILS NFR BLD: 78 % (ref 36–66)
NEUTS SEG NFR BLD: 8.9 K/UL (ref 1.3–9.1)
PLATELET # BLD AUTO: 289 K/UL (ref 150–450)
PMV BLD AUTO: 8.7 FL (ref 6–12)
POTASSIUM SERPL-SCNC: 4.2 MMOL/L (ref 3.7–5.3)
PROT SERPL-MCNC: 7.4 G/DL (ref 6.6–8.7)
RBC # BLD AUTO: 4.32 M/UL (ref 4.5–5.9)
SODIUM SERPL-SCNC: 135 MMOL/L (ref 136–145)
TROPONIN I SERPL HS-MCNC: 14 NG/L (ref 0–22)
TROPONIN I SERPL HS-MCNC: 15 NG/L (ref 0–22)
WBC OTHER # BLD: 11.4 K/UL (ref 3.5–11)

## 2024-11-16 PROCEDURE — 6370000000 HC RX 637 (ALT 250 FOR IP): Performed by: NURSE PRACTITIONER

## 2024-11-16 PROCEDURE — 80053 COMPREHEN METABOLIC PANEL: CPT

## 2024-11-16 PROCEDURE — 96374 THER/PROPH/DIAG INJ IV PUSH: CPT

## 2024-11-16 PROCEDURE — 85025 COMPLETE CBC W/AUTO DIFF WBC: CPT

## 2024-11-16 PROCEDURE — 84484 ASSAY OF TROPONIN QUANT: CPT

## 2024-11-16 PROCEDURE — 36415 COLL VENOUS BLD VENIPUNCTURE: CPT

## 2024-11-16 PROCEDURE — 99285 EMERGENCY DEPT VISIT HI MDM: CPT

## 2024-11-16 PROCEDURE — 73502 X-RAY EXAM HIP UNI 2-3 VIEWS: CPT

## 2024-11-16 PROCEDURE — 2500000003 HC RX 250 WO HCPCS: Performed by: NURSE PRACTITIONER

## 2024-11-16 PROCEDURE — 2580000003 HC RX 258: Performed by: NURSE PRACTITIONER

## 2024-11-16 PROCEDURE — 2580000003 HC RX 258: Performed by: EMERGENCY MEDICINE

## 2024-11-16 PROCEDURE — 93005 ELECTROCARDIOGRAM TRACING: CPT | Performed by: EMERGENCY MEDICINE

## 2024-11-16 PROCEDURE — 6360000002 HC RX W HCPCS: Performed by: EMERGENCY MEDICINE

## 2024-11-16 PROCEDURE — 2060000000 HC ICU INTERMEDIATE R&B

## 2024-11-16 RX ORDER — ASPIRIN 81 MG/1
81 TABLET ORAL DAILY
Status: DISCONTINUED | OUTPATIENT
Start: 2024-11-17 | End: 2024-11-20 | Stop reason: HOSPADM

## 2024-11-16 RX ORDER — NITROGLYCERIN 40 MG/1
1 PATCH TRANSDERMAL DAILY
Status: DISCONTINUED | OUTPATIENT
Start: 2024-11-17 | End: 2024-11-18

## 2024-11-16 RX ORDER — SODIUM CHLORIDE 9 MG/ML
INJECTION, SOLUTION INTRAVENOUS PRN
Status: DISCONTINUED | OUTPATIENT
Start: 2024-11-16 | End: 2024-11-20 | Stop reason: HOSPADM

## 2024-11-16 RX ORDER — SODIUM CHLORIDE 0.9 % (FLUSH) 0.9 %
5-40 SYRINGE (ML) INJECTION EVERY 12 HOURS SCHEDULED
Status: DISCONTINUED | OUTPATIENT
Start: 2024-11-16 | End: 2024-11-20 | Stop reason: HOSPADM

## 2024-11-16 RX ORDER — POLYETHYLENE GLYCOL 3350 17 G/17G
17 POWDER, FOR SOLUTION ORAL DAILY PRN
Status: DISCONTINUED | OUTPATIENT
Start: 2024-11-16 | End: 2024-11-20 | Stop reason: HOSPADM

## 2024-11-16 RX ORDER — SODIUM CHLORIDE 9 MG/ML
INJECTION, SOLUTION INTRAVENOUS CONTINUOUS
Status: DISCONTINUED | OUTPATIENT
Start: 2024-11-16 | End: 2024-11-20 | Stop reason: HOSPADM

## 2024-11-16 RX ORDER — AMIODARONE HYDROCHLORIDE 200 MG/1
200 TABLET ORAL DAILY
Status: DISCONTINUED | OUTPATIENT
Start: 2024-11-17 | End: 2024-11-20 | Stop reason: HOSPADM

## 2024-11-16 RX ORDER — ACETAMINOPHEN 325 MG/1
650 TABLET ORAL EVERY 6 HOURS PRN
Status: DISCONTINUED | OUTPATIENT
Start: 2024-11-16 | End: 2024-11-20 | Stop reason: HOSPADM

## 2024-11-16 RX ORDER — MIRTAZAPINE 15 MG/1
7.5 TABLET, FILM COATED ORAL NIGHTLY
Status: DISCONTINUED | OUTPATIENT
Start: 2024-11-16 | End: 2024-11-20 | Stop reason: HOSPADM

## 2024-11-16 RX ORDER — SUCRALFATE 1 G/1
1 TABLET ORAL 4 TIMES DAILY
Status: DISCONTINUED | OUTPATIENT
Start: 2024-11-16 | End: 2024-11-20 | Stop reason: HOSPADM

## 2024-11-16 RX ORDER — PANTOPRAZOLE SODIUM 40 MG/1
40 TABLET, DELAYED RELEASE ORAL
Status: DISCONTINUED | OUTPATIENT
Start: 2024-11-17 | End: 2024-11-20 | Stop reason: HOSPADM

## 2024-11-16 RX ORDER — MIDODRINE HYDROCHLORIDE 2.5 MG/1
2.5 TABLET ORAL 3 TIMES DAILY PRN
Status: DISCONTINUED | OUTPATIENT
Start: 2024-11-16 | End: 2024-11-20 | Stop reason: HOSPADM

## 2024-11-16 RX ORDER — POTASSIUM CHLORIDE 1500 MG/1
40 TABLET, EXTENDED RELEASE ORAL PRN
Status: DISCONTINUED | OUTPATIENT
Start: 2024-11-16 | End: 2024-11-20 | Stop reason: HOSPADM

## 2024-11-16 RX ORDER — ACETAMINOPHEN 650 MG/1
650 SUPPOSITORY RECTAL EVERY 6 HOURS PRN
Status: DISCONTINUED | OUTPATIENT
Start: 2024-11-16 | End: 2024-11-20 | Stop reason: HOSPADM

## 2024-11-16 RX ORDER — LEVOTHYROXINE SODIUM 75 UG/1
75 TABLET ORAL DAILY
Status: DISCONTINUED | OUTPATIENT
Start: 2024-11-17 | End: 2024-11-20 | Stop reason: HOSPADM

## 2024-11-16 RX ORDER — CARVEDILOL 3.12 MG/1
3.12 TABLET ORAL 2 TIMES DAILY
Status: DISCONTINUED | OUTPATIENT
Start: 2024-11-16 | End: 2024-11-20 | Stop reason: HOSPADM

## 2024-11-16 RX ORDER — RANOLAZINE 500 MG/1
1000 TABLET, EXTENDED RELEASE ORAL 2 TIMES DAILY
Status: DISCONTINUED | OUTPATIENT
Start: 2024-11-16 | End: 2024-11-20 | Stop reason: HOSPADM

## 2024-11-16 RX ORDER — BISACODYL 10 MG
10 SUPPOSITORY, RECTAL RECTAL DAILY PRN
Status: DISCONTINUED | OUTPATIENT
Start: 2024-11-16 | End: 2024-11-20 | Stop reason: HOSPADM

## 2024-11-16 RX ORDER — 0.9 % SODIUM CHLORIDE 0.9 %
1000 INTRAVENOUS SOLUTION INTRAVENOUS ONCE
Status: COMPLETED | OUTPATIENT
Start: 2024-11-16 | End: 2024-11-16

## 2024-11-16 RX ORDER — ONDANSETRON 2 MG/ML
4 INJECTION INTRAMUSCULAR; INTRAVENOUS EVERY 6 HOURS PRN
Status: DISCONTINUED | OUTPATIENT
Start: 2024-11-16 | End: 2024-11-20 | Stop reason: HOSPADM

## 2024-11-16 RX ORDER — ONDANSETRON 4 MG/1
4 TABLET, ORALLY DISINTEGRATING ORAL EVERY 8 HOURS PRN
Status: DISCONTINUED | OUTPATIENT
Start: 2024-11-16 | End: 2024-11-20 | Stop reason: HOSPADM

## 2024-11-16 RX ORDER — LIDOCAINE 4 G/G
1 PATCH TOPICAL DAILY
Status: DISCONTINUED | OUTPATIENT
Start: 2024-11-17 | End: 2024-11-20 | Stop reason: HOSPADM

## 2024-11-16 RX ORDER — ENOXAPARIN SODIUM 100 MG/ML
40 INJECTION SUBCUTANEOUS DAILY
Status: DISCONTINUED | OUTPATIENT
Start: 2024-11-17 | End: 2024-11-20 | Stop reason: HOSPADM

## 2024-11-16 RX ORDER — POTASSIUM CHLORIDE 750 MG/1
10 CAPSULE, EXTENDED RELEASE ORAL DAILY
Status: DISCONTINUED | OUTPATIENT
Start: 2024-11-17 | End: 2024-11-20 | Stop reason: HOSPADM

## 2024-11-16 RX ORDER — SODIUM CHLORIDE 0.9 % (FLUSH) 0.9 %
10 SYRINGE (ML) INJECTION PRN
Status: DISCONTINUED | OUTPATIENT
Start: 2024-11-16 | End: 2024-11-20 | Stop reason: HOSPADM

## 2024-11-16 RX ORDER — CARBIDOPA AND LEVODOPA 25; 100 MG/1; MG/1
1 TABLET ORAL 4 TIMES DAILY
Status: DISCONTINUED | OUTPATIENT
Start: 2024-11-16 | End: 2024-11-20 | Stop reason: HOSPADM

## 2024-11-16 RX ORDER — TRAMADOL HYDROCHLORIDE 50 MG/1
50 TABLET ORAL EVERY 8 HOURS PRN
Status: DISCONTINUED | OUTPATIENT
Start: 2024-11-16 | End: 2024-11-20 | Stop reason: HOSPADM

## 2024-11-16 RX ORDER — POTASSIUM CHLORIDE 750 MG/1
10 TABLET, EXTENDED RELEASE ORAL EVERY MORNING
Status: DISCONTINUED | OUTPATIENT
Start: 2024-11-17 | End: 2024-11-16 | Stop reason: SDUPTHER

## 2024-11-16 RX ORDER — MAGNESIUM SULFATE HEPTAHYDRATE 40 MG/ML
2000 INJECTION, SOLUTION INTRAVENOUS PRN
Status: DISCONTINUED | OUTPATIENT
Start: 2024-11-16 | End: 2024-11-20 | Stop reason: HOSPADM

## 2024-11-16 RX ADMIN — SODIUM CHLORIDE 1000 ML: 9 INJECTION, SOLUTION INTRAVENOUS at 18:42

## 2024-11-16 RX ADMIN — CARVEDILOL 3.12 MG: 3.12 TABLET, FILM COATED ORAL at 22:57

## 2024-11-16 RX ADMIN — MIRTAZAPINE 7.5 MG: 15 TABLET, FILM COATED ORAL at 22:56

## 2024-11-16 RX ADMIN — SODIUM CHLORIDE: 9 INJECTION, SOLUTION INTRAVENOUS at 22:53

## 2024-11-16 RX ADMIN — DOCUSATE SODIUM LIQUID 50 MG: 100 LIQUID ORAL at 22:58

## 2024-11-16 RX ADMIN — RANOLAZINE 1000 MG: 500 TABLET, EXTENDED RELEASE ORAL at 22:56

## 2024-11-16 RX ADMIN — TRAMADOL HYDROCHLORIDE 50 MG: 50 TABLET ORAL at 22:57

## 2024-11-16 RX ADMIN — SUCRALFATE 1 G: 1 TABLET ORAL at 22:56

## 2024-11-16 RX ADMIN — TICAGRELOR 90 MG: 90 TABLET ORAL at 22:56

## 2024-11-16 RX ADMIN — ANTI-FUNGAL POWDER MICONAZOLE NITRATE TALC FREE: 1.42 POWDER TOPICAL at 22:58

## 2024-11-16 RX ADMIN — SODIUM CHLORIDE, PRESERVATIVE FREE 10 ML: 5 INJECTION INTRAVENOUS at 22:59

## 2024-11-16 RX ADMIN — HYDROMORPHONE HYDROCHLORIDE 1 MG: 1 INJECTION, SOLUTION INTRAMUSCULAR; INTRAVENOUS; SUBCUTANEOUS at 17:03

## 2024-11-16 RX ADMIN — CARBIDOPA AND LEVODOPA 1 TABLET: 25; 100 TABLET ORAL at 22:57

## 2024-11-16 ASSESSMENT — PAIN SCALES - GENERAL
PAINLEVEL_OUTOF10: 10
PAINLEVEL_OUTOF10: 8
PAINLEVEL_OUTOF10: 10
PAINLEVEL_OUTOF10: 7
PAINLEVEL_OUTOF10: 8

## 2024-11-16 ASSESSMENT — PAIN DESCRIPTION - DESCRIPTORS
DESCRIPTORS: ACHING;CRAMPING
DESCRIPTORS: ACHING;SHOOTING
DESCRIPTORS: ACHING

## 2024-11-16 ASSESSMENT — PAIN DESCRIPTION - ORIENTATION
ORIENTATION: LEFT

## 2024-11-16 ASSESSMENT — PAIN DESCRIPTION - PAIN TYPE: TYPE: ACUTE PAIN

## 2024-11-16 ASSESSMENT — PAIN DESCRIPTION - LOCATION
LOCATION: HIP

## 2024-11-16 ASSESSMENT — PAIN - FUNCTIONAL ASSESSMENT: PAIN_FUNCTIONAL_ASSESSMENT: 0-10

## 2024-11-16 ASSESSMENT — PAIN SCALES - WONG BAKER: WONGBAKER_NUMERICALRESPONSE: HURTS A LITTLE BIT

## 2024-11-16 NOTE — ED TRIAGE NOTES
Pt states he had another \"episode\" where his legs start trembling, he gets dizzy and falls. Today he fell in the bathroom. C/O left hip pain.

## 2024-11-16 NOTE — ED PROVIDER NOTES
[facility] ED  EMERGENCY DEPARTMENT ENCOUNTER      Pt Name: Rowdy Marie  MRN: [id number]  Birthdate 1950  Date of evaluation: 11/16/24      CHIEF COMPLAINT       Chief Complaint   Patient presents with    Dizziness           Fall    Tremors    Hip Pain     HISTORY OF PRESENT ILLNESS   HPI 74 y.o. male presents with c/o dizziness, tremors fall and hip pain.  Patient reports that earlier today he had a fall in his bathroom.  He says that he was going in the use the bathroom he got dizzy felt tremors and fell hitting the left side of his hip on the toilet.  He said that he broke his fall by grabbing the metal bar attached to the wall, he says he did not lose consciousness he did not hit his head.  He denies any other injury other than his left hip.  He has been having a lot of severe pain in his hip and he cannot walk on it.  Patient reports taking.  Has hqe episodes of this over the last several months.  He was recently admitted to the hospital between November 5 and November 11.  An MRI of his brain was done that showed chronic small vessel ischemic changes but no acute infarct.  He states that he has not been eating very much recently.  He states that he just does not have much of an appetite.  He denies any abdominal pain chest pain difficulty breathing.  He has chronic back pain but he denies any new symptoms or worsening of his back pain.     Daughter noted r. Arm twitching, he didn't resppond, and was confused afterwards.     REVIEW OF SYSTEMS       Review of Systems  10 systems reviewed and negative unless otherwise noted in the HPI    PAST MEDICAL HISTORY     Past Medical History:   Diagnosis Date    A-fib (HCC)     PREET (acute kidney injury) ([facility])     Anemia     CAD (coronary artery disease)     Esophageal stricture     Hyperlipidemia     Hypokalemia     Hypomagnesemia     NSTEMI (non-ST elevated myocardial infarction) ([facility])     Orthostatic hypotension     Parkinsons (HCC)     Presence of Watchman

## 2024-11-17 ENCOUNTER — APPOINTMENT (OUTPATIENT)
Dept: CT IMAGING | Age: 74
DRG: 312 | End: 2024-11-17
Payer: MEDICARE

## 2024-11-17 LAB
ANION GAP SERPL CALCULATED.3IONS-SCNC: 11 MMOL/L (ref 9–16)
BACTERIA URNS QL MICRO: ABNORMAL
BASOPHILS # BLD: 0.1 K/UL (ref 0–0.2)
BASOPHILS NFR BLD: 1 % (ref 0–2)
BILIRUB UR QL STRIP: NEGATIVE
BUN SERPL-MCNC: 21 MG/DL (ref 8–23)
CALCIUM SERPL-MCNC: 8.6 MG/DL (ref 8.6–10.4)
CASTS #/AREA URNS LPF: ABNORMAL /LPF
CHLORIDE SERPL-SCNC: 102 MMOL/L (ref 98–107)
CLARITY UR: CLEAR
CO2 SERPL-SCNC: 23 MMOL/L (ref 20–31)
COLOR UR: ABNORMAL
CREAT SERPL-MCNC: 1.2 MG/DL (ref 0.7–1.2)
EOSINOPHIL # BLD: 0.4 K/UL (ref 0–0.4)
EOSINOPHILS RELATIVE PERCENT: 4 % (ref 0–4)
EPI CELLS #/AREA URNS HPF: ABNORMAL /HPF
ERYTHROCYTE [DISTWIDTH] IN BLOOD BY AUTOMATED COUNT: 17.6 % (ref 11.5–14.9)
GFR, ESTIMATED: 63 ML/MIN/1.73M2
GLUCOSE SERPL-MCNC: 100 MG/DL (ref 74–99)
GLUCOSE UR STRIP-MCNC: NEGATIVE MG/DL
HCT VFR BLD AUTO: 35.6 % (ref 41–53)
HGB BLD-MCNC: 12.1 G/DL (ref 13.5–17.5)
HGB UR QL STRIP.AUTO: NEGATIVE
KETONES UR STRIP-MCNC: NEGATIVE MG/DL
LEUKOCYTE ESTERASE UR QL STRIP: ABNORMAL
LYMPHOCYTES NFR BLD: 2 K/UL (ref 1–4.8)
LYMPHOCYTES RELATIVE PERCENT: 19 % (ref 24–44)
MCH RBC QN AUTO: 31.5 PG (ref 26–34)
MCHC RBC AUTO-ENTMCNC: 34 G/DL (ref 31–37)
MCV RBC AUTO: 92.5 FL (ref 80–100)
MONOCYTES NFR BLD: 0.9 K/UL (ref 0.1–1.3)
MONOCYTES NFR BLD: 9 % (ref 1–7)
NEUTROPHILS NFR BLD: 67 % (ref 36–66)
NEUTS SEG NFR BLD: 7 K/UL (ref 1.3–9.1)
NITRITE UR QL STRIP: NEGATIVE
PH UR STRIP: 5.5 [PH] (ref 5–8)
PLATELET # BLD AUTO: 248 K/UL (ref 150–450)
PMV BLD AUTO: 8.7 FL (ref 6–12)
POTASSIUM SERPL-SCNC: 3.9 MMOL/L (ref 3.7–5.3)
PROT UR STRIP-MCNC: NEGATIVE MG/DL
RBC # BLD AUTO: 3.84 M/UL (ref 4.5–5.9)
RBC #/AREA URNS HPF: ABNORMAL /HPF
SODIUM SERPL-SCNC: 136 MMOL/L (ref 136–145)
SP GR UR STRIP: 1.01 (ref 1–1.03)
UROBILINOGEN UR STRIP-ACNC: NORMAL EU/DL (ref 0–1)
WBC #/AREA URNS HPF: ABNORMAL /HPF
WBC OTHER # BLD: 10.4 K/UL (ref 3.5–11)

## 2024-11-17 PROCEDURE — 6360000002 HC RX W HCPCS: Performed by: NURSE PRACTITIONER

## 2024-11-17 PROCEDURE — 97162 PT EVAL MOD COMPLEX 30 MIN: CPT

## 2024-11-17 PROCEDURE — 97112 NEUROMUSCULAR REEDUCATION: CPT

## 2024-11-17 PROCEDURE — 70450 CT HEAD/BRAIN W/O DYE: CPT

## 2024-11-17 PROCEDURE — 85025 COMPLETE CBC W/AUTO DIFF WBC: CPT

## 2024-11-17 PROCEDURE — 2060000000 HC ICU INTERMEDIATE R&B

## 2024-11-17 PROCEDURE — 81001 URINALYSIS AUTO W/SCOPE: CPT

## 2024-11-17 PROCEDURE — 36415 COLL VENOUS BLD VENIPUNCTURE: CPT

## 2024-11-17 PROCEDURE — 6370000000 HC RX 637 (ALT 250 FOR IP): Performed by: NURSE PRACTITIONER

## 2024-11-17 PROCEDURE — 99223 1ST HOSP IP/OBS HIGH 75: CPT | Performed by: INTERNAL MEDICINE

## 2024-11-17 PROCEDURE — 6370000000 HC RX 637 (ALT 250 FOR IP): Performed by: INTERNAL MEDICINE

## 2024-11-17 PROCEDURE — 80048 BASIC METABOLIC PNL TOTAL CA: CPT

## 2024-11-17 PROCEDURE — 2580000003 HC RX 258: Performed by: NURSE PRACTITIONER

## 2024-11-17 RX ORDER — FLUDROCORTISONE ACETATE 0.1 MG/1
0.05 TABLET ORAL DAILY
Status: DISCONTINUED | OUTPATIENT
Start: 2024-11-17 | End: 2024-11-20 | Stop reason: HOSPADM

## 2024-11-17 RX ORDER — MECLIZINE HYDROCHLORIDE 25 MG/1
12.5 TABLET ORAL 3 TIMES DAILY PRN
Status: DISCONTINUED | OUTPATIENT
Start: 2024-11-17 | End: 2024-11-20 | Stop reason: HOSPADM

## 2024-11-17 RX ADMIN — POTASSIUM CHLORIDE 10 MEQ: 750 CAPSULE, EXTENDED RELEASE ORAL at 09:36

## 2024-11-17 RX ADMIN — SUCRALFATE 1 G: 1 TABLET ORAL at 16:26

## 2024-11-17 RX ADMIN — CARBIDOPA AND LEVODOPA 1 TABLET: 25; 100 TABLET ORAL at 09:36

## 2024-11-17 RX ADMIN — SUCRALFATE 1 G: 1 TABLET ORAL at 09:37

## 2024-11-17 RX ADMIN — Medication 3 MG: at 03:56

## 2024-11-17 RX ADMIN — ACETAMINOPHEN 650 MG: 325 TABLET ORAL at 03:56

## 2024-11-17 RX ADMIN — SUCRALFATE 1 G: 1 TABLET ORAL at 13:16

## 2024-11-17 RX ADMIN — LEVOTHYROXINE SODIUM 75 MCG: 0.07 TABLET ORAL at 05:09

## 2024-11-17 RX ADMIN — ENOXAPARIN SODIUM 40 MG: 100 INJECTION SUBCUTANEOUS at 09:37

## 2024-11-17 RX ADMIN — SODIUM CHLORIDE, PRESERVATIVE FREE 10 ML: 5 INJECTION INTRAVENOUS at 21:17

## 2024-11-17 RX ADMIN — CARBIDOPA AND LEVODOPA 1 TABLET: 25; 100 TABLET ORAL at 21:15

## 2024-11-17 RX ADMIN — ANTI-FUNGAL POWDER MICONAZOLE NITRATE TALC FREE: 1.42 POWDER TOPICAL at 21:17

## 2024-11-17 RX ADMIN — PANTOPRAZOLE SODIUM 40 MG: 40 TABLET, DELAYED RELEASE ORAL at 05:09

## 2024-11-17 RX ADMIN — CARBIDOPA AND LEVODOPA 1 TABLET: 25; 100 TABLET ORAL at 13:16

## 2024-11-17 RX ADMIN — DOCUSATE SODIUM LIQUID 50 MG: 100 LIQUID ORAL at 21:16

## 2024-11-17 RX ADMIN — ASPIRIN 81 MG: 81 TABLET, COATED ORAL at 09:37

## 2024-11-17 RX ADMIN — CARVEDILOL 3.12 MG: 3.12 TABLET, FILM COATED ORAL at 09:37

## 2024-11-17 RX ADMIN — RANOLAZINE 1000 MG: 500 TABLET, EXTENDED RELEASE ORAL at 21:13

## 2024-11-17 RX ADMIN — MECLIZINE HYDROCHLORIDE 12.5 MG: 25 TABLET ORAL at 16:25

## 2024-11-17 RX ADMIN — TRAMADOL HYDROCHLORIDE 50 MG: 50 TABLET ORAL at 21:14

## 2024-11-17 RX ADMIN — RANOLAZINE 1000 MG: 500 TABLET, EXTENDED RELEASE ORAL at 09:37

## 2024-11-17 RX ADMIN — TICAGRELOR 90 MG: 90 TABLET ORAL at 21:15

## 2024-11-17 RX ADMIN — CARVEDILOL 3.12 MG: 3.12 TABLET, FILM COATED ORAL at 21:15

## 2024-11-17 RX ADMIN — SODIUM CHLORIDE: 9 INJECTION, SOLUTION INTRAVENOUS at 11:50

## 2024-11-17 RX ADMIN — TRAMADOL HYDROCHLORIDE 50 MG: 50 TABLET ORAL at 11:47

## 2024-11-17 RX ADMIN — SUCRALFATE 1 G: 1 TABLET ORAL at 21:13

## 2024-11-17 RX ADMIN — FLUDROCORTISONE ACETATE 0.05 MG: 0.1 TABLET ORAL at 13:16

## 2024-11-17 RX ADMIN — MIRTAZAPINE 7.5 MG: 15 TABLET, FILM COATED ORAL at 21:15

## 2024-11-17 RX ADMIN — Medication 3 MG: at 21:15

## 2024-11-17 RX ADMIN — TICAGRELOR 90 MG: 90 TABLET ORAL at 09:36

## 2024-11-17 RX ADMIN — ANTI-FUNGAL POWDER MICONAZOLE NITRATE TALC FREE: 1.42 POWDER TOPICAL at 09:37

## 2024-11-17 RX ADMIN — AMIODARONE HYDROCHLORIDE 200 MG: 200 TABLET ORAL at 09:37

## 2024-11-17 RX ADMIN — CARBIDOPA AND LEVODOPA 1 TABLET: 25; 100 TABLET ORAL at 16:26

## 2024-11-17 ASSESSMENT — PAIN SCALES - GENERAL
PAINLEVEL_OUTOF10: 7
PAINLEVEL_OUTOF10: 8
PAINLEVEL_OUTOF10: 7
PAINLEVEL_OUTOF10: 7
PAINLEVEL_OUTOF10: 9
PAINLEVEL_OUTOF10: 7
PAINLEVEL_OUTOF10: 10
PAINLEVEL_OUTOF10: 8
PAINLEVEL_OUTOF10: 9
PAINLEVEL_OUTOF10: 8

## 2024-11-17 ASSESSMENT — PAIN - FUNCTIONAL ASSESSMENT
PAIN_FUNCTIONAL_ASSESSMENT: PREVENTS OR INTERFERES SOME ACTIVE ACTIVITIES AND ADLS

## 2024-11-17 ASSESSMENT — PAIN DESCRIPTION - ONSET
ONSET: ON-GOING
ONSET: SUDDEN

## 2024-11-17 ASSESSMENT — PAIN SCALES - WONG BAKER
WONGBAKER_NUMERICALRESPONSE: HURTS A LITTLE BIT

## 2024-11-17 ASSESSMENT — PAIN DESCRIPTION - ORIENTATION
ORIENTATION: LEFT;LOWER
ORIENTATION: LEFT
ORIENTATION: LOWER;LEFT
ORIENTATION: LEFT

## 2024-11-17 ASSESSMENT — PAIN DESCRIPTION - LOCATION
LOCATION: HIP
LOCATION: GENERALIZED
LOCATION: BACK;HIP
LOCATION: HIP;BACK
LOCATION: GENERALIZED;HIP
LOCATION: HIP;BACK

## 2024-11-17 ASSESSMENT — PAIN DESCRIPTION - FREQUENCY
FREQUENCY: INTERMITTENT
FREQUENCY: INTERMITTENT

## 2024-11-17 ASSESSMENT — PAIN DESCRIPTION - PAIN TYPE
TYPE: CHRONIC PAIN

## 2024-11-17 ASSESSMENT — PAIN DESCRIPTION - DESCRIPTORS
DESCRIPTORS: ACHING;CRAMPING
DESCRIPTORS: ACHING
DESCRIPTORS: SHARP
DESCRIPTORS: CRAMPING;ACHING;TIGHTNESS
DESCRIPTORS: ACHING

## 2024-11-17 NOTE — H&P
0.7 - 1.2 mg/dL    Est, Glom Filt Rate 57 (L) >60 mL/min/1.73m2    Calcium 9.6 8.6 - 10.4 mg/dL    Total Protein 7.4 6.6 - 8.7 g/dL    Albumin 4.3 3.5 - 5.2 g/dL    Total Bilirubin 0.3 0.0 - 1.2 mg/dL    Alkaline Phosphatase 87 40 - 129 U/L    ALT 18 10 - 50 U/L    AST 24 10 - 50 U/L   Troponin    Collection Time: 11/16/24  4:35 PM   Result Value Ref Range    Troponin, High Sensitivity 14 0 - 22 ng/L   Troponin    Collection Time: 11/16/24  6:20 PM   Result Value Ref Range    Troponin, High Sensitivity 15 0 - 22 ng/L   Urinalysis with Reflex to Culture    Collection Time: 11/17/24  2:54 AM    Specimen: Urine   Result Value Ref Range    Color, UA Dark Yellow (A) Yellow    Turbidity UA Clear Clear    Glucose, Ur NEGATIVE NEGATIVE mg/dL    Bilirubin, Urine NEGATIVE NEGATIVE    Ketones, Urine NEGATIVE NEGATIVE mg/dL    Specific Gravity, UA 1.015 1.000 - 1.030    Urine Hgb NEGATIVE NEGATIVE    pH, Urine 5.5 5.0 - 8.0    Protein, UA NEGATIVE NEGATIVE mg/dL    Urobilinogen, Urine Normal 0.0 - 1.0 EU/dL    Nitrite, Urine NEGATIVE NEGATIVE    Leukocyte Esterase, Urine TRACE (A) NEGATIVE   Microscopic Urinalysis    Collection Time: 11/17/24  2:54 AM   Result Value Ref Range    WBC, UA 0 TO 2 (A) 0 TO 5 /HPF    RBC, UA 0 TO 2 0 TO 2 /HPF    Casts UA 0 TO 2 (A) None /LPF    Epithelial Cells, UA 0 TO 2 /HPF    Bacteria, UA None None   Basic Metabolic Panel w/ Reflex to MG    Collection Time: 11/17/24  5:27 AM   Result Value Ref Range    Sodium 136 136 - 145 mmol/L    Potassium 3.9 3.7 - 5.3 mmol/L    Chloride 102 98 - 107 mmol/L    CO2 23 20 - 31 mmol/L    Anion Gap 11 9 - 16 mmol/L    Glucose 100 (H) 74 - 99 mg/dL    BUN 21 8 - 23 mg/dL    Creatinine 1.2 0.7 - 1.2 mg/dL    Est, Glom Filt Rate 63 >60 mL/min/1.73m2    Calcium 8.6 8.6 - 10.4 mg/dL   CBC with auto differential    Collection Time: 11/17/24  5:27 AM   Result Value Ref Range    WBC 10.4 3.5 - 11.0 k/uL    RBC 3.84 (L) 4.5 - 5.9 m/uL    Hemoglobin 12.1 (L) 13.5 - 17.5

## 2024-11-17 NOTE — PLAN OF CARE
Problem: Pain  Goal: Verbalizes/displays adequate comfort level or baseline comfort level  11/17/2024 1710 by Gerda Saenz, RN  Outcome: Not Progressing  Flowsheets (Taken 11/17/2024 1709)  Verbalizes/displays adequate comfort level or baseline comfort level: Assess pain using appropriate pain scale  Note: Pt is having chronic pain but pharmacological interventions are not helping enough     Problem: Safety - Adult  Goal: Free from fall injury  11/17/2024 1710 by Gerda Saenz, RN  Outcome: Progressing  Note: Pt free from falls this shift.Bed alarm on, call light in place, 2x side rails raised, gripper socks on.      Problem: Skin/Tissue Integrity  Goal: Absence of new skin breakdown  Description: 1.  Monitor for areas of redness and/or skin breakdown  2.  Assess vascular access sites hourly  3.  Every 4-6 hours minimum:  Change oxygen saturation probe site  4.  Every 4-6 hours:  If on nasal continuous positive airway pressure, respiratory therapy assess nares and determine need for appliance change or resting period.  11/17/2024 1710 by Gerda Saenz, RN  Outcome: Progressing  Note: No new skin breakdown found this shift. Q2 turn, special mattress in place, protected bony areas.     Problem: Nutrition Deficit:  Goal: Optimize nutritional status  11/17/2024 1710 by Gerda Saenz, RN  Outcome: Progressing  Flowsheets (Taken 11/17/2024 1414 by Josephine Peter, RD, LD)  Nutrient intake appropriate for improving, restoring, or maintaining nutritional needs:   Monitor oral intake, labs, and treatment plans   Recommend appropriate diets, oral nutritional supplements, and vitamin/mineral supplements   Assess nutritional status and recommend course of action  Note: Pt has been able to eat about 50% of meals this shift

## 2024-11-17 NOTE — PLAN OF CARE
Problem: Discharge Planning  Goal: Discharge to home or other facility with appropriate resources  Outcome: Progressing  Flowsheets  Taken 11/17/2024 0323  Discharge to home or other facility with appropriate resources:   Identify barriers to discharge with patient and caregiver   Refer to discharge planning if patient needs post-hospital services based on physician order or complex needs related to functional status, cognitive ability or social support system   Identify discharge learning needs (meds, wound care, etc)  Taken 11/16/2024 2322  Discharge to home or other facility with appropriate resources:   Arrange for interpreters to assist at discharge as needed   Arrange for needed discharge resources and transportation as appropriate   Identify discharge learning needs (meds, wound care, etc)     Problem: Pain  Goal: Verbalizes/displays adequate comfort level or baseline comfort level  Outcome: Progressing  Flowsheets (Taken 11/17/2024 0323)  Verbalizes/displays adequate comfort level or baseline comfort level:   Encourage patient to monitor pain and request assistance   Consider cultural and social influences on pain and pain management   Administer analgesics based on type and severity of pain and evaluate response  Note: Pt medicated with pain medication prn.  Assessed all pain characteristics including level, type, location, frequency, and onset.  Non-pharmacologic interventions offered to pt as well.  Pt states pain is tolerable at this time.        Problem: Safety - Adult  Goal: Free from fall injury  Outcome: Progressing  Note: No falls noted this shift. Patient ambulates with x1 staff assistance without difficulty.  Bed kept in low position. Safe environment maintained. Bedside table & call light in reach. Uses call light appropriately when needing assistance.        Problem: Skin/Tissue Integrity  Goal: Absence of new skin breakdown  Description: 1.  Monitor for areas of redness and/or skin

## 2024-11-17 NOTE — ACP (ADVANCE CARE PLANNING)
minutes:      Conversation Outcomes:  ACP discussion completed    Follow-up plan:    [] Schedule follow-up conversation to continue planning  [] Referred individual to Provider for additional questions/concerns   [] Advised patient/agent/surrogate to review completed ACP document and update if needed with changes in condition, patient preferences or care setting    [] This note routed to one or more involved healthcare providers

## 2024-11-17 NOTE — DISCHARGE INSTR - COC
2022    Influenza, FLUAD, (age 65 y+), IM, Trivalent PF, 0.5mL 2024       Active Problems:  Patient Active Problem List   Diagnosis Code    Hypotension I95.9    Mild anemia D64.9    Chronic atrial fibrillation (HCC) I48.20    Coronary artery disease involving coronary bypass graft of native heart without angina pectoris I25.810    S/P CABG (coronary artery bypass graft) Z95.1    GI bleed K92.2    Severe malnutrition ([facility]) E43    Pharyngoesophageal dysphagia R13.14    Parkinson's disease without dyskinesia or fluctuating manifestations ([facility]) G20.A1    Acute intractable headache R51.9    H/O neck surgery Z98.890    Chest pain R07.9    NSTEMI (non-ST elevated myocardial infarction) ([facility]) I21.4    S/P angioplasty with stent Z95.820    CAD, multiple vessel I25.10    CAP (community acquired pneumonia) J18.9    Angina pectoris ([facility]) I20.9    Hyperlipidemia E78.5    Atrial fibrillation with rapid ventricular response ([facility]) I48.91    Rhinovirus infection B34.8    History of prostate cancer Z85.46    CKD (chronic kidney disease) N18.9    Mitral regurgitation I34.0    Facial droop R29.810    Orthostatic hypotension I95.1       Isolation/Infection:   Isolation            No Isolation          Patient Infection Status       Infection Onset Added Last Indicated Last Indicated By Review Planned Expiration Resolved Resolved By    None active    Resolved    Rhinovirus 10/03/24 10/03/24 10/03/24 Respiratory Panel, Molecular, with COVID-19 (Restricted: peds pts or suitable admitted adults)  history 10/13/24 Infection                        Nurse Assessment:  Last Vital Signs: /69   Pulse 72   Temp 97.7 °F (36.5 °C) (Oral)   Resp 16   Ht 1.829 m (6')   Wt 65.3 kg (143 lb 15.4 oz)   SpO2 98%   BMI 19.52 kg/m²     Last documented pain score (0-10 scale): Pain Level: 8  Last Weight:   Wt Readings from Last 1 Encounters:   24 65.3 kg (143 lb 15.4 oz)     Mental Status:  oriented and alert    IV

## 2024-11-18 PROBLEM — R55 CONVULSIVE SYNCOPE: Status: ACTIVE | Noted: 2024-11-18

## 2024-11-18 LAB
ANION GAP SERPL CALCULATED.3IONS-SCNC: 11 MMOL/L (ref 9–16)
BASOPHILS # BLD: 0.1 K/UL (ref 0–0.2)
BASOPHILS NFR BLD: 1 % (ref 0–2)
BUN SERPL-MCNC: 13 MG/DL (ref 8–23)
CALCIUM SERPL-MCNC: 8.5 MG/DL (ref 8.6–10.4)
CHLORIDE SERPL-SCNC: 105 MMOL/L (ref 98–107)
CO2 SERPL-SCNC: 22 MMOL/L (ref 20–31)
CREAT SERPL-MCNC: 1.1 MG/DL (ref 0.7–1.2)
EKG ATRIAL RATE: 68 BPM
EKG P AXIS: 56 DEGREES
EKG P-R INTERVAL: 168 MS
EKG Q-T INTERVAL: 434 MS
EKG QRS DURATION: 94 MS
EKG QTC CALCULATION (BAZETT): 461 MS
EKG R AXIS: -24 DEGREES
EKG T AXIS: 99 DEGREES
EKG VENTRICULAR RATE: 68 BPM
EOSINOPHIL # BLD: 0.4 K/UL (ref 0–0.4)
EOSINOPHILS RELATIVE PERCENT: 5 % (ref 0–4)
ERYTHROCYTE [DISTWIDTH] IN BLOOD BY AUTOMATED COUNT: 17.4 % (ref 11.5–14.9)
GFR, ESTIMATED: 70 ML/MIN/1.73M2
GLUCOSE SERPL-MCNC: 90 MG/DL (ref 74–99)
HCT VFR BLD AUTO: 37.1 % (ref 41–53)
HGB BLD-MCNC: 12 G/DL (ref 13.5–17.5)
LYMPHOCYTES NFR BLD: 1.4 K/UL (ref 1–4.8)
LYMPHOCYTES RELATIVE PERCENT: 18 % (ref 24–44)
MCH RBC QN AUTO: 31.1 PG (ref 26–34)
MCHC RBC AUTO-ENTMCNC: 32.2 G/DL (ref 31–37)
MCV RBC AUTO: 96.3 FL (ref 80–100)
MONOCYTES NFR BLD: 0.7 K/UL (ref 0.1–1.3)
MONOCYTES NFR BLD: 8 % (ref 1–7)
NEUTROPHILS NFR BLD: 68 % (ref 36–66)
NEUTS SEG NFR BLD: 5.6 K/UL (ref 1.3–9.1)
PLATELET # BLD AUTO: 225 K/UL (ref 150–450)
PMV BLD AUTO: 8.7 FL (ref 6–12)
POTASSIUM SERPL-SCNC: 3.9 MMOL/L (ref 3.7–5.3)
RBC # BLD AUTO: 3.85 M/UL (ref 4.5–5.9)
SODIUM SERPL-SCNC: 138 MMOL/L (ref 136–145)
WBC OTHER # BLD: 8.2 K/UL (ref 3.5–11)

## 2024-11-18 PROCEDURE — 6370000000 HC RX 637 (ALT 250 FOR IP): Performed by: INTERNAL MEDICINE

## 2024-11-18 PROCEDURE — 99233 SBSQ HOSP IP/OBS HIGH 50: CPT | Performed by: INTERNAL MEDICINE

## 2024-11-18 PROCEDURE — 2060000000 HC ICU INTERMEDIATE R&B

## 2024-11-18 PROCEDURE — 6370000000 HC RX 637 (ALT 250 FOR IP): Performed by: NURSE PRACTITIONER

## 2024-11-18 PROCEDURE — 36415 COLL VENOUS BLD VENIPUNCTURE: CPT

## 2024-11-18 PROCEDURE — 80048 BASIC METABOLIC PNL TOTAL CA: CPT

## 2024-11-18 PROCEDURE — 85025 COMPLETE CBC W/AUTO DIFF WBC: CPT

## 2024-11-18 PROCEDURE — 6360000002 HC RX W HCPCS: Performed by: NURSE PRACTITIONER

## 2024-11-18 PROCEDURE — 2580000003 HC RX 258: Performed by: NURSE PRACTITIONER

## 2024-11-18 PROCEDURE — 99223 1ST HOSP IP/OBS HIGH 75: CPT | Performed by: PSYCHIATRY & NEUROLOGY

## 2024-11-18 PROCEDURE — 93010 ELECTROCARDIOGRAM REPORT: CPT | Performed by: INTERNAL MEDICINE

## 2024-11-18 RX ORDER — MIDODRINE HYDROCHLORIDE 5 MG/1
5 TABLET ORAL
Status: DISCONTINUED | OUTPATIENT
Start: 2024-11-18 | End: 2024-11-20 | Stop reason: HOSPADM

## 2024-11-18 RX ADMIN — SUCRALFATE 1 G: 1 TABLET ORAL at 21:14

## 2024-11-18 RX ADMIN — FLUDROCORTISONE ACETATE 0.05 MG: 0.1 TABLET ORAL at 12:05

## 2024-11-18 RX ADMIN — ENOXAPARIN SODIUM 40 MG: 100 INJECTION SUBCUTANEOUS at 08:54

## 2024-11-18 RX ADMIN — CARBIDOPA AND LEVODOPA 1 TABLET: 25; 100 TABLET ORAL at 17:22

## 2024-11-18 RX ADMIN — TICAGRELOR 90 MG: 90 TABLET ORAL at 21:14

## 2024-11-18 RX ADMIN — SUCRALFATE 1 G: 1 TABLET ORAL at 08:54

## 2024-11-18 RX ADMIN — RANOLAZINE 1000 MG: 500 TABLET, EXTENDED RELEASE ORAL at 08:55

## 2024-11-18 RX ADMIN — CARVEDILOL 3.12 MG: 3.12 TABLET, FILM COATED ORAL at 08:54

## 2024-11-18 RX ADMIN — SODIUM CHLORIDE, PRESERVATIVE FREE 10 ML: 5 INJECTION INTRAVENOUS at 08:55

## 2024-11-18 RX ADMIN — ONDANSETRON 4 MG: 2 INJECTION, SOLUTION INTRAMUSCULAR; INTRAVENOUS at 14:56

## 2024-11-18 RX ADMIN — MIDODRINE HYDROCHLORIDE 5 MG: 5 TABLET ORAL at 17:22

## 2024-11-18 RX ADMIN — POTASSIUM CHLORIDE 10 MEQ: 750 CAPSULE, EXTENDED RELEASE ORAL at 08:54

## 2024-11-18 RX ADMIN — ANTI-FUNGAL POWDER MICONAZOLE NITRATE TALC FREE: 1.42 POWDER TOPICAL at 21:17

## 2024-11-18 RX ADMIN — SUCRALFATE 1 G: 1 TABLET ORAL at 17:22

## 2024-11-18 RX ADMIN — ASPIRIN 81 MG: 81 TABLET, COATED ORAL at 08:54

## 2024-11-18 RX ADMIN — RANOLAZINE 1000 MG: 500 TABLET, EXTENDED RELEASE ORAL at 21:14

## 2024-11-18 RX ADMIN — ONDANSETRON 4 MG: 2 INJECTION, SOLUTION INTRAMUSCULAR; INTRAVENOUS at 21:14

## 2024-11-18 RX ADMIN — CARBIDOPA AND LEVODOPA 1 TABLET: 25; 100 TABLET ORAL at 21:14

## 2024-11-18 RX ADMIN — MIRTAZAPINE 7.5 MG: 15 TABLET, FILM COATED ORAL at 21:14

## 2024-11-18 RX ADMIN — LEVOTHYROXINE SODIUM 75 MCG: 0.07 TABLET ORAL at 05:47

## 2024-11-18 RX ADMIN — CARBIDOPA AND LEVODOPA 1 TABLET: 25; 100 TABLET ORAL at 12:05

## 2024-11-18 RX ADMIN — CARVEDILOL 3.12 MG: 3.12 TABLET, FILM COATED ORAL at 21:14

## 2024-11-18 RX ADMIN — ACETAMINOPHEN 650 MG: 325 TABLET ORAL at 14:56

## 2024-11-18 RX ADMIN — ONDANSETRON 4 MG: 2 INJECTION, SOLUTION INTRAMUSCULAR; INTRAVENOUS at 08:55

## 2024-11-18 RX ADMIN — TICAGRELOR 90 MG: 90 TABLET ORAL at 08:54

## 2024-11-18 RX ADMIN — SUCRALFATE 1 G: 1 TABLET ORAL at 12:05

## 2024-11-18 RX ADMIN — SODIUM CHLORIDE: 9 INJECTION, SOLUTION INTRAVENOUS at 01:19

## 2024-11-18 RX ADMIN — PANTOPRAZOLE SODIUM 40 MG: 40 TABLET, DELAYED RELEASE ORAL at 05:47

## 2024-11-18 RX ADMIN — SODIUM CHLORIDE, PRESERVATIVE FREE 10 ML: 5 INJECTION INTRAVENOUS at 21:14

## 2024-11-18 RX ADMIN — CARBIDOPA AND LEVODOPA 1 TABLET: 25; 100 TABLET ORAL at 08:54

## 2024-11-18 RX ADMIN — AMIODARONE HYDROCHLORIDE 200 MG: 200 TABLET ORAL at 08:54

## 2024-11-18 ASSESSMENT — PAIN - FUNCTIONAL ASSESSMENT: PAIN_FUNCTIONAL_ASSESSMENT: ACTIVITIES ARE NOT PREVENTED

## 2024-11-18 ASSESSMENT — PAIN SCALES - GENERAL
PAINLEVEL_OUTOF10: 6
PAINLEVEL_OUTOF10: 5
PAINLEVEL_OUTOF10: 6

## 2024-11-18 ASSESSMENT — PAIN DESCRIPTION - LOCATION: LOCATION: HEAD

## 2024-11-18 ASSESSMENT — PAIN DESCRIPTION - DESCRIPTORS: DESCRIPTORS: ACHING

## 2024-11-18 ASSESSMENT — PAIN DESCRIPTION - ORIENTATION: ORIENTATION: MID

## 2024-11-18 NOTE — CONSULTS
75 yo male with dizziness . He has history of orthostatic hypotension with parkinson's disease on sinemet 25/100 po qid having been followed at [place]. He has coronary artery disease with stenting on aspirin 81 mg po qd and brilinta 90 mg po bid along with atrial fibrillation with watchman procedure . He was hospitalized at [facility] November 6 with generalized weakness left greater tan right . MRI of Head with mild chronic periventricular small vessel disease . He had orthostatic hypotension placed on compression stockings with proamatine 2.5 mg po tid PRN SBP < 100 . He was discharged home doing well for one week . Day of admission he walked bathroom becoming dizzy with family found him on his knees in bathroom . They assisted him to toilet seat where his head slumped forward unresponsive with left arm jerking for few seconds . Blood pressure in ER going from 129/82 laying to 62/32 on standing . Head CT with mild chronic periventricular small vessel ischemia . He was seen by cardiology placing him on proamatine 5 mg po tid along with florinef 1/2 0.5 mg po qd . Significant medications aspirin 81 mg po qd , brilinta 90 mg po bid, sinemet 25/100 po qid, . Testing Cardiac 2 d echo normal LVF EF 55 % . Moderte to severe MR , April 2024 MRA of Head normal , proamatine 5 mg po tid along with florinef 1/2 0.5 mg po qdFebruary 2023 . MRA of neck normal , February 2023 . MRI of Head with mild chronic periventricular small vessel disease . Head CT with mild chronic periventricular small vessel ischemia      Past Medical History:   Diagnosis Date    A-fib ([facility])     PREET (acute kidney injury) ([facility])     Anemia     CAD (coronary artery disease)     Esophageal stricture     Hyperlipidemia     Hypokalemia     Hypomagnesemia     NSTEMI (non-ST elevated myocardial infarction) ([facility])     Orthostatic hypotension     Parkinsons ([facility])     Presence of Watchman left atrial appendage closure device     Prostate CA 
alert continue current dose of Brilinta, ranolazine, nitroglycerin patch  3: History of A-fib, continue current dose of amiodarone and carvedilol 3.125 mg twice daily  4: Parkinson disease continue carbidopa levodopa  5: Hypothyroidism continue current dose of levothyroxine 75 mcg  Continue to check blood pressure for postural hypotension    Do manual blood pressure recording for postural hypotension      Electronically signed by Katty Ladd MD on 11/18/2024 at 6:38 AM

## 2024-11-18 NOTE — PLAN OF CARE
Problem: Discharge Planning  Goal: Discharge to home or other facility with appropriate resources  Outcome: Progressing  Flowsheets (Taken 11/17/2024 0323)  Discharge to home or other facility with appropriate resources:   Identify barriers to discharge with patient and caregiver   Refer to discharge planning if patient needs post-hospital services based on physician order or complex needs related to functional status, cognitive ability or social support system   Identify discharge learning needs (meds, wound care, etc)     Problem: Pain  Goal: Verbalizes/displays adequate comfort level or baseline comfort level  11/18/2024 0325 by Jocy Morgan RN  Outcome: Progressing  Note: Pt medicated with pain medication prn.  Assessed all pain characteristics including level, type, location, frequency, and onset.  Non-pharmacologic interventions offered to pt as well.  Pt states pain is tolerable at this time.        Problem: Safety - Adult  Goal: Free from fall injury  11/18/2024 0325 by Jocy Morgan RN  Outcome: Progressing  Note: No falls noted this shift. Patient ambulates with x1 staff assistance without difficulty.  Bed kept in low position. Safe environment maintained. Bedside table & call light in reach. Uses call light appropriately when needing assistance.        Problem: Skin/Tissue Integrity  Goal: Absence of new skin breakdown  Description: 1.  Monitor for areas of redness and/or skin breakdown  2.  Assess vascular access sites hourly  3.  Every 4-6 hours minimum:  Change oxygen saturation probe site  4.  Every 4-6 hours:  If on nasal continuous positive airway pressure, respiratory therapy assess nares and determine need for appliance change or resting period.  11/18/2024 0325 by Jocy Morgan, RN  Outcome: Progressing

## 2024-11-18 NOTE — PLAN OF CARE
Problem: Nutrition Deficit:  Goal: Optimize nutritional status  Flowsheets (Taken 11/18/2024 2973)  Nutrient intake appropriate for improving, restoring, or maintaining nutritional needs:   Assess nutritional status and recommend course of action   Recommend appropriate diets, oral nutritional supplements, and vitamin/mineral supplements   Order, calculate, and assess calorie counts as needed   Recommend, monitor, and adjust tube feedings and TPN/PPN based on assessed needs  Note: Patient nauseous and not eating well today, will encourage po intake

## 2024-11-19 ENCOUNTER — APPOINTMENT (OUTPATIENT)
Dept: VASCULAR LAB | Age: 74
DRG: 312 | End: 2024-11-19
Attending: PSYCHIATRY & NEUROLOGY
Payer: MEDICARE

## 2024-11-19 LAB
ANION GAP SERPL CALCULATED.3IONS-SCNC: 12 MMOL/L (ref 9–16)
BASOPHILS # BLD: 0.1 K/UL (ref 0–0.2)
BASOPHILS NFR BLD: 1 % (ref 0–2)
BUN SERPL-MCNC: 13 MG/DL (ref 8–23)
CALCIUM SERPL-MCNC: 8.8 MG/DL (ref 8.6–10.4)
CHLORIDE SERPL-SCNC: 105 MMOL/L (ref 98–107)
CO2 SERPL-SCNC: 25 MMOL/L (ref 20–31)
CREAT SERPL-MCNC: 1.2 MG/DL (ref 0.7–1.2)
ECHO BSA: 1.82 M2
EOSINOPHIL # BLD: 0.4 K/UL (ref 0–0.4)
EOSINOPHILS RELATIVE PERCENT: 6 % (ref 0–4)
ERYTHROCYTE [DISTWIDTH] IN BLOOD BY AUTOMATED COUNT: 17.8 % (ref 11.5–14.9)
GFR, ESTIMATED: 63 ML/MIN/1.73M2
GLUCOSE SERPL-MCNC: 93 MG/DL (ref 74–99)
HCT VFR BLD AUTO: 35.9 % (ref 41–53)
HGB BLD-MCNC: 11.8 G/DL (ref 13.5–17.5)
LYMPHOCYTES NFR BLD: 1.7 K/UL (ref 1–4.8)
LYMPHOCYTES RELATIVE PERCENT: 23 % (ref 24–44)
MCH RBC QN AUTO: 31.2 PG (ref 26–34)
MCHC RBC AUTO-ENTMCNC: 32.9 G/DL (ref 31–37)
MCV RBC AUTO: 94.9 FL (ref 80–100)
MONOCYTES NFR BLD: 0.6 K/UL (ref 0.1–1.3)
MONOCYTES NFR BLD: 8 % (ref 1–7)
NEUTROPHILS NFR BLD: 62 % (ref 36–66)
NEUTS SEG NFR BLD: 4.4 K/UL (ref 1.3–9.1)
PLATELET # BLD AUTO: 234 K/UL (ref 150–450)
PMV BLD AUTO: 8.7 FL (ref 6–12)
POTASSIUM SERPL-SCNC: 3.7 MMOL/L (ref 3.7–5.3)
RBC # BLD AUTO: 3.78 M/UL (ref 4.5–5.9)
SODIUM SERPL-SCNC: 142 MMOL/L (ref 136–145)
VAS LEFT CCA DIST EDV: 18 CM/S
VAS LEFT CCA DIST PSV: 84.1 CM/S
VAS LEFT CCA MID EDV: 15.29 CM/S
VAS LEFT CCA MID PSV: 71.39 CM/S
VAS LEFT CCA PROX EDV: 13.1 CM/S
VAS LEFT CCA PROX PSV: 71.4 CM/S
VAS LEFT ECA EDV: 7.92 CM/S
VAS LEFT ECA PSV: 106.5 CM/S
VAS LEFT ICA MID EDV: 22.5 CM/S
VAS LEFT ICA MID PSV: 104.9 CM/S
VAS LEFT ICA PROX EDV: 16 CM/S
VAS LEFT ICA PROX PSV: 91.9 CM/S
VAS LEFT ICA/CCA PSV: 1.47 NO UNITS
VAS LEFT VERTEBRAL EDV: 11.26 CM/S
VAS LEFT VERTEBRAL PSV: 47 CM/S
VAS RIGHT CCA DIST EDV: 25.7 CM/S
VAS RIGHT CCA DIST PSV: 101.6 CM/S
VAS RIGHT CCA MID EDV: 15.99 CM/S
VAS RIGHT CCA MID PSV: 100 CM/S
VAS RIGHT CCA PROX EDV: 17.6 CM/S
VAS RIGHT CCA PROX PSV: 112.9 CM/S
VAS RIGHT ECA EDV: 0 CM/S
VAS RIGHT ECA PSV: 77.4 CM/S
VAS RIGHT ICA DIST EDV: 22.5 CM/S
VAS RIGHT ICA DIST PSV: 75.8 CM/S
VAS RIGHT ICA MID EDV: 33.4 CM/S
VAS RIGHT ICA MID PSV: 128.2 CM/S
VAS RIGHT ICA PROX EDV: 27.3 CM/S
VAS RIGHT ICA PROX PSV: 109.7 CM/S
VAS RIGHT ICA/CCA PSV: 1.3 NO UNITS
VAS RIGHT VERTEBRAL EDV: 9.51 CM/S
VAS RIGHT VERTEBRAL PSV: 39.2 CM/S
WBC OTHER # BLD: 7.3 K/UL (ref 3.5–11)

## 2024-11-19 PROCEDURE — 6360000002 HC RX W HCPCS: Performed by: NURSE PRACTITIONER

## 2024-11-19 PROCEDURE — 36415 COLL VENOUS BLD VENIPUNCTURE: CPT

## 2024-11-19 PROCEDURE — 85025 COMPLETE CBC W/AUTO DIFF WBC: CPT

## 2024-11-19 PROCEDURE — 97530 THERAPEUTIC ACTIVITIES: CPT

## 2024-11-19 PROCEDURE — 6370000000 HC RX 637 (ALT 250 FOR IP): Performed by: INTERNAL MEDICINE

## 2024-11-19 PROCEDURE — 2060000000 HC ICU INTERMEDIATE R&B

## 2024-11-19 PROCEDURE — 6370000000 HC RX 637 (ALT 250 FOR IP): Performed by: NURSE PRACTITIONER

## 2024-11-19 PROCEDURE — 93880 EXTRACRANIAL BILAT STUDY: CPT | Performed by: SURGERY

## 2024-11-19 PROCEDURE — 93880 EXTRACRANIAL BILAT STUDY: CPT

## 2024-11-19 PROCEDURE — 99233 SBSQ HOSP IP/OBS HIGH 50: CPT | Performed by: INTERNAL MEDICINE

## 2024-11-19 PROCEDURE — 80048 BASIC METABOLIC PNL TOTAL CA: CPT

## 2024-11-19 PROCEDURE — 97166 OT EVAL MOD COMPLEX 45 MIN: CPT

## 2024-11-19 PROCEDURE — 2580000003 HC RX 258: Performed by: INTERNAL MEDICINE

## 2024-11-19 PROCEDURE — 2580000003 HC RX 258: Performed by: NURSE PRACTITIONER

## 2024-11-19 PROCEDURE — 99232 SBSQ HOSP IP/OBS MODERATE 35: CPT | Performed by: PSYCHIATRY & NEUROLOGY

## 2024-11-19 PROCEDURE — 97116 GAIT TRAINING THERAPY: CPT

## 2024-11-19 RX ADMIN — SUCRALFATE 1 G: 1 TABLET ORAL at 17:29

## 2024-11-19 RX ADMIN — SUCRALFATE 1 G: 1 TABLET ORAL at 13:07

## 2024-11-19 RX ADMIN — RANOLAZINE 1000 MG: 500 TABLET, EXTENDED RELEASE ORAL at 09:27

## 2024-11-19 RX ADMIN — CARBIDOPA AND LEVODOPA 1 TABLET: 25; 100 TABLET ORAL at 13:07

## 2024-11-19 RX ADMIN — ANTI-FUNGAL POWDER MICONAZOLE NITRATE TALC FREE: 1.42 POWDER TOPICAL at 21:03

## 2024-11-19 RX ADMIN — MIRTAZAPINE 7.5 MG: 15 TABLET, FILM COATED ORAL at 21:01

## 2024-11-19 RX ADMIN — SODIUM CHLORIDE, PRESERVATIVE FREE 10 ML: 5 INJECTION INTRAVENOUS at 09:00

## 2024-11-19 RX ADMIN — TICAGRELOR 90 MG: 90 TABLET ORAL at 09:30

## 2024-11-19 RX ADMIN — MIDODRINE HYDROCHLORIDE 5 MG: 5 TABLET ORAL at 17:29

## 2024-11-19 RX ADMIN — RANOLAZINE 1000 MG: 500 TABLET, EXTENDED RELEASE ORAL at 21:01

## 2024-11-19 RX ADMIN — CARVEDILOL 3.12 MG: 3.12 TABLET, FILM COATED ORAL at 21:01

## 2024-11-19 RX ADMIN — AMIODARONE HYDROCHLORIDE 200 MG: 200 TABLET ORAL at 09:26

## 2024-11-19 RX ADMIN — SODIUM CHLORIDE, PRESERVATIVE FREE 10 ML: 5 INJECTION INTRAVENOUS at 21:01

## 2024-11-19 RX ADMIN — MIDODRINE HYDROCHLORIDE 5 MG: 5 TABLET ORAL at 13:07

## 2024-11-19 RX ADMIN — FLUDROCORTISONE ACETATE 0.05 MG: 0.1 TABLET ORAL at 09:32

## 2024-11-19 RX ADMIN — SUCRALFATE 1 G: 1 TABLET ORAL at 09:28

## 2024-11-19 RX ADMIN — POTASSIUM CHLORIDE 10 MEQ: 750 CAPSULE, EXTENDED RELEASE ORAL at 09:29

## 2024-11-19 RX ADMIN — ENOXAPARIN SODIUM 40 MG: 100 INJECTION SUBCUTANEOUS at 09:30

## 2024-11-19 RX ADMIN — CARVEDILOL 3.12 MG: 3.12 TABLET, FILM COATED ORAL at 09:29

## 2024-11-19 RX ADMIN — ANTI-FUNGAL POWDER MICONAZOLE NITRATE TALC FREE: 1.42 POWDER TOPICAL at 09:33

## 2024-11-19 RX ADMIN — CARBIDOPA AND LEVODOPA 1 TABLET: 25; 100 TABLET ORAL at 17:28

## 2024-11-19 RX ADMIN — LEVOTHYROXINE SODIUM 75 MCG: 0.07 TABLET ORAL at 06:43

## 2024-11-19 RX ADMIN — SUCRALFATE 1 G: 1 TABLET ORAL at 21:01

## 2024-11-19 RX ADMIN — CARBIDOPA AND LEVODOPA 1 TABLET: 25; 100 TABLET ORAL at 21:01

## 2024-11-19 RX ADMIN — PANTOPRAZOLE SODIUM 40 MG: 40 TABLET, DELAYED RELEASE ORAL at 06:43

## 2024-11-19 RX ADMIN — CARBIDOPA AND LEVODOPA 1 TABLET: 25; 100 TABLET ORAL at 09:29

## 2024-11-19 RX ADMIN — SODIUM CHLORIDE: 9 INJECTION, SOLUTION INTRAVENOUS at 21:06

## 2024-11-19 RX ADMIN — TICAGRELOR 90 MG: 90 TABLET ORAL at 21:01

## 2024-11-19 RX ADMIN — ASPIRIN 81 MG: 81 TABLET, COATED ORAL at 09:31

## 2024-11-19 RX ADMIN — MIDODRINE HYDROCHLORIDE 5 MG: 5 TABLET ORAL at 09:27

## 2024-11-19 NOTE — PLAN OF CARE
Problem: Discharge Planning  Goal: Discharge to home or other facility with appropriate resources  Outcome: Progressing  Flowsheets (Taken 11/18/2024 2015)  Discharge to home or other facility with appropriate resources: Identify barriers to discharge with patient and caregiver     Problem: Pain  Goal: Verbalizes/displays adequate comfort level or baseline comfort level  Outcome: Progressing  Flowsheets (Taken 11/19/2024 0407)  Verbalizes/displays adequate comfort level or baseline comfort level:   Encourage patient to monitor pain and request assistance   Assess pain using appropriate pain scale   Administer analgesics based on type and severity of pain and evaluate response   Implement non-pharmacological measures as appropriate and evaluate response     Problem: Safety - Adult  Goal: Free from fall injury  Outcome: Progressing     Problem: ABCDS Injury Assessment  Goal: Absence of physical injury  Outcome: Progressing     Problem: Skin/Tissue Integrity  Goal: Absence of new skin breakdown  Description: 1.  Monitor for areas of redness and/or skin breakdown  2.  Assess vascular access sites hourly  3.  Every 4-6 hours minimum:  Change oxygen saturation probe site  4.  Every 4-6 hours:  If on nasal continuous positive airway pressure, respiratory therapy assess nares and determine need for appliance change or resting period.  Outcome: Progressing     Problem: Nutrition Deficit:  Goal: Optimize nutritional status  11/19/2024 0549 by Christie Wiley, RN  Outcome: Progressing  Nutrient intake appropriate for improving, restoring, or maintaining nutritional needs:   Assess nutritional status and recommend course of action   Recommend appropriate diets, oral nutritional supplements, and vitamin/mineral supplements   Order, calculate, and assess calorie counts as needed   Recommend, monitor, and adjust tube feedings and TPN/PPN based on assessed needs  Note: Patient nauseous and not eating well today, will encourage po

## 2024-11-19 NOTE — PLAN OF CARE
Problem: Discharge Planning  Goal: Discharge to home or other facility with appropriate resources  11/19/2024 1832 by Martha Du RN  Outcome: Progressing  11/19/2024 0549 by Christie Wiley RN  Outcome: Progressing  Flowsheets (Taken 11/18/2024 2015)  Discharge to home or other facility with appropriate resources: Identify barriers to discharge with patient and caregiver     Problem: Pain  Goal: Verbalizes/displays adequate comfort level or baseline comfort level  11/19/2024 1832 by Martha Du RN  Outcome: Progressing  11/19/2024 0549 by Christie Wiley RN  Outcome: Progressing  Flowsheets (Taken 11/19/2024 0407)  Verbalizes/displays adequate comfort level or baseline comfort level:   Encourage patient to monitor pain and request assistance   Assess pain using appropriate pain scale   Administer analgesics based on type and severity of pain and evaluate response   Implement non-pharmacological measures as appropriate and evaluate response

## 2024-11-20 VITALS
HEIGHT: 72 IN | HEART RATE: 70 BPM | WEIGHT: 146.61 LBS | DIASTOLIC BLOOD PRESSURE: 82 MMHG | RESPIRATION RATE: 18 BRPM | OXYGEN SATURATION: 98 % | SYSTOLIC BLOOD PRESSURE: 160 MMHG | TEMPERATURE: 97.7 F | BODY MASS INDEX: 19.86 KG/M2

## 2024-11-20 PROCEDURE — 6370000000 HC RX 637 (ALT 250 FOR IP): Performed by: INTERNAL MEDICINE

## 2024-11-20 PROCEDURE — 99239 HOSP IP/OBS DSCHRG MGMT >30: CPT | Performed by: INTERNAL MEDICINE

## 2024-11-20 PROCEDURE — 6370000000 HC RX 637 (ALT 250 FOR IP): Performed by: NURSE PRACTITIONER

## 2024-11-20 PROCEDURE — 99232 SBSQ HOSP IP/OBS MODERATE 35: CPT | Performed by: PSYCHIATRY & NEUROLOGY

## 2024-11-20 PROCEDURE — 6360000002 HC RX W HCPCS: Performed by: NURSE PRACTITIONER

## 2024-11-20 RX ORDER — AMIODARONE HYDROCHLORIDE 200 MG/1
200 TABLET ORAL DAILY
Qty: 30 TABLET | Refills: 0 | Status: SHIPPED | OUTPATIENT
Start: 2024-11-20

## 2024-11-20 RX ORDER — FLUDROCORTISONE ACETATE 0.1 MG/1
0.05 TABLET ORAL DAILY
Qty: 15 TABLET | Refills: 0 | Status: SHIPPED | OUTPATIENT
Start: 2024-11-21 | End: 2024-12-21

## 2024-11-20 RX ORDER — PANTOPRAZOLE SODIUM 40 MG/1
40 TABLET, DELAYED RELEASE ORAL
Qty: 90 TABLET | Refills: 1 | Status: SHIPPED | OUTPATIENT
Start: 2024-11-20

## 2024-11-20 RX ORDER — DROXIDOPA 100 MG/1
100 CAPSULE ORAL 3 TIMES DAILY
Qty: 90 CAPSULE | Refills: 0 | Status: SHIPPED | OUTPATIENT
Start: 2024-11-20

## 2024-11-20 RX ORDER — MIDODRINE HYDROCHLORIDE 2.5 MG/1
5 TABLET ORAL 3 TIMES DAILY
Qty: 90 TABLET | Refills: 3 | Status: SHIPPED | OUTPATIENT
Start: 2024-11-20

## 2024-11-20 RX ADMIN — LEVOTHYROXINE SODIUM 75 MCG: 0.07 TABLET ORAL at 06:15

## 2024-11-20 RX ADMIN — CARBIDOPA AND LEVODOPA 1 TABLET: 25; 100 TABLET ORAL at 09:35

## 2024-11-20 RX ADMIN — RANOLAZINE 1000 MG: 500 TABLET, EXTENDED RELEASE ORAL at 09:36

## 2024-11-20 RX ADMIN — POTASSIUM CHLORIDE 10 MEQ: 750 CAPSULE, EXTENDED RELEASE ORAL at 09:36

## 2024-11-20 RX ADMIN — AMIODARONE HYDROCHLORIDE 200 MG: 200 TABLET ORAL at 09:36

## 2024-11-20 RX ADMIN — MIDODRINE HYDROCHLORIDE 5 MG: 5 TABLET ORAL at 13:30

## 2024-11-20 RX ADMIN — ENOXAPARIN SODIUM 40 MG: 100 INJECTION SUBCUTANEOUS at 09:36

## 2024-11-20 RX ADMIN — CARVEDILOL 3.12 MG: 3.12 TABLET, FILM COATED ORAL at 09:36

## 2024-11-20 RX ADMIN — CARBIDOPA AND LEVODOPA 1 TABLET: 25; 100 TABLET ORAL at 13:30

## 2024-11-20 RX ADMIN — SUCRALFATE 1 G: 1 TABLET ORAL at 09:36

## 2024-11-20 RX ADMIN — ASPIRIN 81 MG: 81 TABLET, COATED ORAL at 09:35

## 2024-11-20 RX ADMIN — PANTOPRAZOLE SODIUM 40 MG: 40 TABLET, DELAYED RELEASE ORAL at 06:15

## 2024-11-20 RX ADMIN — ACETAMINOPHEN 650 MG: 325 TABLET ORAL at 10:44

## 2024-11-20 RX ADMIN — FLUDROCORTISONE ACETATE 0.05 MG: 0.1 TABLET ORAL at 09:40

## 2024-11-20 RX ADMIN — TICAGRELOR 90 MG: 90 TABLET ORAL at 09:35

## 2024-11-20 RX ADMIN — SUCRALFATE 1 G: 1 TABLET ORAL at 13:30

## 2024-11-20 ASSESSMENT — PAIN DESCRIPTION - DESCRIPTORS: DESCRIPTORS: ACHING

## 2024-11-20 ASSESSMENT — PAIN DESCRIPTION - LOCATION: LOCATION: HEAD

## 2024-11-20 ASSESSMENT — PAIN SCALES - GENERAL: PAINLEVEL_OUTOF10: 0

## 2024-11-20 NOTE — DISCHARGE INSTR - DIET

## 2024-11-20 NOTE — DISCHARGE SUMMARY
IN-PATIENT SERVICE   [facility] Medicine    Discharge Summary     Patient ID: Rowdy Marie  :  1950   MRN: [id number]     ACCOUNT:  [id number]   Patient's PCP: Yandy Jones MD  Admit Date: 2024   Discharge Date: 2024    Length of Stay: 4  Code Status:  Full Code  Admitting Physician: Aracely Pierre MD  Discharge Physician: Aracely Pierre MD     Active Discharge Diagnoses:     Primary Problem  Orthostatic hypotension      Hospital Problems  Active Hospital Problems    Diagnosis Date Noted    Convulsive syncope [R55] 2024    Orthostatic hypotension [I95.1] 2024       Admission Condition:  fair     Discharged Condition: fair    Hospital Stay:     Hospital Course:  Rowdy Marie is a 74 y.o. male who was admitted for the management of Orthostatic hypotension , presented to ER with Dizziness (/), Fall, Tremors, and Hip Pain    he patient is a 74 y.o.  Non- / non  male who presents with Dizziness (/), Fall, Tremors, and Hip Pain   and he is admitted to the hospital for the management of    Patient's medical history significant for chronic A-fib, multivessel CAD-s/p CABG and angioplasty with stent, GI bleed, prostate cancer, hyperlipidemia, and severe malnutrition.     According to patient, he was discharged from this facility approximately 10 days ago after being worked up for strokelike symptoms.  States that he has been having dizziness with standing for the past 3 to 4 months, but reports that he was doing good last week after discharge.  Today, he had been lying down for a rest and got up to walk to the bathroom when he became dizzy and fell, landing across the commode.  Patient reports that his daughter-in-law (with whom he lives) stated that he was confused and unable to talk immediately after the incident occurred.  Patient reports history of Parkinson's disease and also states that sometimes he begins shaking and his legs go out from

## 2024-11-20 NOTE — CARE COORDINATION
[facility] Encounter Date/Time: 2024 1441    Hospital Account: [id number]    MRN: [id number]    Patient: Rowdy Marie    Contact Serial #: [id number]      ENCOUNTER          Patient Class: I Private Enc?  No Unit RM BD: CZ PROG    Hospital Service: MED   Encounter DX: Orthostatic hypotension *   ADM Provider: Aracely Pierre MD   Procedure:     ATT Provider: Arcaely Pierre MD   REF Provider:        Admission DX: Orthostatic hypotension and DX codes: I95.1      PATIENT             Name: Rowdy Marie : 1950 (74 yrs)   Address: [address] [place] [address]  Sex: Male   City: [place] [address]         Marital Status:    Employer: RETIRED         Jainism: Latter-day   Primary Care Provider: Yandy Jones MD         Primary Phone: [phone]   EMERGENCY CONTACT   Name Home Phone Work Phone Mobile Phone Relationship Lgl Grd   FATMATA MARIE [phone] Daughter-in*     MINI MARIE [phone] Child No         GUARANTOR            Guarantor: Rowdy Marie     : 1950   Address: [address] [place] [address]  [place]: [place] [address]     Relation to Patient: Self       Home Phone: [phone]   Guarantor ID: [id number]       Work Phone: [phone]   Guarantor Employer: RETIRED         Status: RETIRED      COVERAGE        PRIMARY INSURANCE   Payor: [facility] MEDICARE Plan: THIERRY CORDOBA*   Payor Address: [address] [address]       Group Number: [id number] Insurance Type: INDEMNITY   Subscriber Name: ROWDY MARIE Subscriber : 1950   Subscriber ID: [id number] Pat. Rel. to Sub: Self   SECONDARY INSURANCE   Payor:   Plan:     Payor Address:  ,           Group Number:   Insurance Type:     Subscriber Name:   Subscriber :     Subscriber ID:   Pat. Rel. to Sub:          CSN: [id number]      [id number]        CSN                                    Req/Control # [Problem retrieving Specimen ID]         
Case Management Assessment  Initial Evaluation    Date/Time of Evaluation: 11/17/2024 8:25 AM  Assessment Completed by: Sabina Lucas RN    If patient is discharged prior to next notation, then this note serves as note for discharge by case management.    Patient Name: Rowdy Marie                   YOB: 1950  Diagnosis: Orthostatic hypotension [I95.1]                   Date / Time: 11/16/2024  2:41 PM    Patient Admission Status: Inpatient   Readmission Risk (Low < 19, Mod (19-27), High > 27): Readmission Risk Score: 34.2    Current PCP: Yandy Jones MD  PCP verified by CM? Yes    Chart Reviewed: Yes      History Provided by: Patient, Medical Record  Patient Orientation: Alert and Oriented    Patient Cognition: Alert    Hospitalization in the last 30 days (Readmission):  Yes    If yes, Readmission Assessment in  Navigator will be completed.      Readmission Assessment  Number of Days since last admission?: 8-30 days  Previous Disposition: Home with Home Health  Who is being Interviewed: Patient  What was the patient's/caregiver's perception as to why they think they needed to return back to the hospital?: (S) Other (Comment) (\"I was feeling shaky and fell\")  Did you visit your Primary Care Physician after you left the hospital, before you returned this time?: No  Why weren't you able to visit your PCP?: (S) Other (Comment) (appt is on 12/27, soonest I could get in)  Did you see a specialist, such as Cardiac, Pulmonary, Orthopedic Physician, etc. after you left the hospital?: No  Who advised the patient to return to the hospital?: Self-referral  Does the patient report anything that got in the way of taking their medications?: No  In our efforts to provide the best possible care to you and others like you, can you think of anything that we could have done to help you after you left the hospital the first time, so that you might not have needed to return so soon?: Other (Comment) (no) 
DISCHARGE PLANNING NOTE:    LSW following for potential SNF placement. Patient states he is feeling much better today and is hopeful to discharge home, however may still be open to SNF depending on progress. Initial referral sent to [facility], no beds currently available. [facility]. Able to accept per Bre in admissions, facility will submit for authorization today.   
IMM letter provided to patient.  Patient offered four hours to make informed decision regarding appeal process; patient agreeable to discharge.      Patient and daughter in law updated on medication/cost. They state unable to get refill on protonix. Writer sent a message to Dr Pierre. Electronically signed by Sabina Lucas RN on 11/20/2024 at 3:40 PM   
ONGOING DISCHARGE PLAN:    Patient is alert and oriented x4.    Spoke with patient regarding discharge plan and patient confirms that plan is still agreeable to [facility]. Will follow up to see if bed available.     C/O nausea today    +Orthostatics  Silvano added    PT/OT    Will continue to follow for additional discharge needs.    If patient is discharged prior to next notation, then this note serves as note for discharge by case management.    Electronically signed by Sabina Lucas RN on 11/18/2024 at 9:14 AM   
ONGOING DISCHARGE PLAN:    Patient is alert and oriented x4.    Spoke with patient regarding discharge plan and patient confirms that plan is still home with [facility]. Denies need for SNF.    Cardiology consult  +orthostatics   On florni    Orders for Jobst Stockings 20-30 mmHg thigh high bilateral  Order faxed to [facility] in [place]     PT/OT    Writer spoke with Marissa at [facility] in [place]. Droxidopa requires Prior Auth. Will submit. Cost is greater than $3000 without insurance.    Writer submitted medication via Cover My Meds. Waiting on final approval and cost.     Prior Auth Approved. Waiting on final cost.     Has appt with Dr Jones on 12/27.  Will follow up with Dr Ladd in 2-4 weeks.         Will continue to follow for additional discharge needs.    If patient is discharged prior to next notation, then this note serves as note for discharge by case management.    Electronically signed by Sabina Lucas RN on 11/20/2024 at 12:00 PM   
Ramila alex/ [facility] notified of discharge.    Will fax orders once completed    
Rose Marie MORAN from [place] state they do not have any beds available. Will follow for another choice.Electronically signed by Sabina Lucas RN on 11/18/2024 at 2:45 PM   
Writer called Denae at [facility] to check on referral sent. Left voice mail. Electronically signed by Sabina Lucas RN on 11/18/2024 at 11:40 AM   
Writer spoke with [facility] and they will come to the hospital to fit stockings.    Writer spoke with Amalia at [facility]. Cost of the droxidopa is $25.46. [facility] had to order the medication in and will likely be available tomorrow. Electronically signed by Sabina Lucas RN on 11/20/2024 at 3:04 PM       
capsule  Commonly known as: NORTHERA  Take 1 capsule by mouth 3 times daily   fludrocortisone 0.1 MG tablet  Commonly known as: FLORINEF  Take 0.5 tablets by mouth daily  Start taking on: November 21, 2024     CHANGE how you take these medications    CHANGE how you take these medications  midodrine 2.5 MG tablet  Commonly known as: PROAMATINE  Take 2 tablets by mouth 3 times daily  What changed:  how much to take  when to take this  reasons to take this   nystatin 378372 UNIT/GM powder  Commonly known as: MYCOSTATIN  Apply 3 times daily.  What changed: additional instructions     CONTINUE taking these medications    CONTINUE taking these medications  amiodarone 200 MG tablet  Commonly known as: CORDARONE  TAKE 1 TABLET BY MOUTH DAILY   aspirin 81 MG EC tablet  Take 1 tablet by mouth daily   carbidopa-levodopa  MG per tablet  Commonly known as: SINEMET  Take 1 tablet by mouth 4 times daily   carvedilol 3.125 MG tablet  Commonly known as: Coreg  Take 1 tablet by mouth 2 times daily   coenzyme Q10 100 MG Caps capsule  Take 1 capsule by mouth daily   Compression Stockings Misc  by Does not apply route   Compression Stockings Misc  by Does not apply route   docusate sodium 50 MG capsule  Commonly known as: COLACE  Take 1 capsule by mouth at bedtime   esomeprazole 20 MG delayed release capsule  Commonly known as: NexIUM  Take 1 capsule by mouth every morning (before breakfast)   ferrous sulfate 325 (65 Fe) MG tablet  Commonly known as: IRON 325  Take 1 tablet by mouth 3 times daily (with meals)   levothyroxine 75 MCG tablet  Commonly known as: SYNTHROID  Take 1 tablet by mouth Daily   lidocaine 4 % external patch  Place 1 patch onto the skin daily   meclizine 12.5 MG tablet  Commonly known as: ANTIVERT  Take 1 tablet by mouth 3 times daily as needed for Dizziness   mirtazapine 7.5 MG tablet  Commonly known as: REMERON  Take 1 tablet by mouth nightly   nitroGLYCERIN 0.2 MG/HR  Commonly known as: NITRODUR  Place 1

## 2024-11-20 NOTE — PROGRESS NOTES
Physical Therapy Cancel Note      DATE: 2024    NAME: Rowdy Marie  MRN: [id number]   : 1950      Patient not seen this date for Physical Therapy due to:    Patient Declined: Attempt @ 0920, Pt politely declining tx d/t feeling too tired and \"wore out\" from lack of sleep. Pt agreeable to attempt later as time permits. STAS Mondragon notified of refusal.       Electronically signed by Tammy Sepulveda PTA on 2024 at 9:52 AM    
    [facility]   IN-PATIENT SERVICE   [facility]    HISTORY AND PHYSICAL EXAMINATION            Date:   11/18/2024  Patient name:  Rowdy Marie  Date of admission:  11/16/2024  2:41 PM  MRN:   [id number]  Account:  [id number]  YOB: 1950  PCP:    Yandy Jones MD  Room:   [facility]  Code Status:    Full Code    Chief Complaint:     Chief Complaint   Patient presents with    Dizziness           Fall    Tremors    Hip Pain       History Obtained From:     patient    History of Present Illness:     The patient is a 74 y.o.  Non- / non  male who presents with Dizziness (/), Fall, Tremors, and Hip Pain   and he is admitted to the hospital for the management of    Patient's medical history significant for chronic A-fib, multivessel CAD-s/p CABG and angioplasty with stent, GI bleed, prostate cancer, hyperlipidemia, and severe malnutrition.     According to patient, he was discharged from this facility approximately 10 days ago after being worked up for strokelike symptoms.  States that he has been having dizziness with standing for the past 3 to 4 months, but reports that he was doing good last week after discharge.  Today, he had been lying down for a rest and got up to walk to the bathroom when he became dizzy and fell, landing across the commode.  Patient reports that his daughter-in-law (with whom he lives) stated that he was confused and unable to talk immediately after the incident occurred.  Patient reports history of Parkinson's disease and also states that sometimes he begins shaking and his legs go out from underneath him.  In ED, patient was noted to have significant orthostatic BP changes, going from 129/82 when supine to 62/23 when sitting.  Patient received 1 L normal saline IV fluids in ED.  Upon arrival to the unit, BP went from 144/75 when supine, to 124/79 when sitting, and 116/66 when standing.  Patient's tongue and oral mucosa noted to be dry.  
    [facility]   IN-PATIENT SERVICE   [facility]    HISTORY AND PHYSICAL EXAMINATION            Date:   11/20/2024  Patient name:  Rowdy Marie  Date of admission:  11/16/2024  2:41 PM  MRN:   [id number]  Account:  [id number]  YOB: 1950  PCP:    Yandy Jones MD  Room:   [facility]  Code Status:    Full Code    Chief Complaint:     Chief Complaint   Patient presents with    Dizziness           Fall    Tremors    Hip Pain       History Obtained From:     patient    History of Present Illness:     The patient is a 74 y.o.  Non- / non  male who presents with Dizziness (/), Fall, Tremors, and Hip Pain   and he is admitted to the hospital for the management of    Patient's medical history significant for chronic A-fib, multivessel CAD-s/p CABG and angioplasty with stent, GI bleed, prostate cancer, hyperlipidemia, and severe malnutrition.     According to patient, he was discharged from this facility approximately 10 days ago after being worked up for strokelike symptoms.  States that he has been having dizziness with standing for the past 3 to 4 months, but reports that he was doing good last week after discharge.  Today, he had been lying down for a rest and got up to walk to the bathroom when he became dizzy and fell, landing across the commode.  Patient reports that his daughter-in-law (with whom he lives) stated that he was confused and unable to talk immediately after the incident occurred.  Patient reports history of Parkinson's disease and also states that sometimes he begins shaking and his legs go out from underneath him.  In ED, patient was noted to have significant orthostatic BP changes, going from 129/82 when supine to 62/23 when sitting.  Patient received 1 L normal saline IV fluids in ED.  Upon arrival to the unit, BP went from 144/75 when supine, to 124/79 when sitting, and 116/66 when standing.  Patient's tongue and oral mucosa noted to be dry.  
Active problem Convulsive syncope . Orthostatic hypotension . Parkinson's disease . The condition is blood pressure supine 139/64 , sitting 124/85 , standing 108/74. He walked 15 feet with contact guard assist in PT  . He is on proamatine 5 mg po tid along with florinef 1/2 0.5 mg po qd per cardiology . Blood pressure 114/69. He reports only mild ligheadedness on standing today . He is alert and oriented with nonfocal exam . Carotid US < 50 % stenosis bilaterally . 75 yo male with dizziness . He has history of orthostatic hypotension with parkinson's disease on sinemet 25/100 po qid having been followed at [facility]. He has coronary artery disease with stenting on aspirin 81 mg po qd and brilinta 90 mg po bid along with atrial fibrillation with watchman procedure . He was hospitalized at [facility] November 6 with generalized weakness left greater than right . MRI of Head with mild chronic periventricular small vessel disease . He had orthostatic hypotension placed on compression stockings with proamatine 2.5 mg po tid PRN SBP < 100 . He was discharged home doing well for one week . Day of admission he walked bathroom becoming dizzy with family finding him on his knees in bathroom . They assisted him to toilet seat where his head slumped forward unresponsive with left arm jerking for few seconds . Blood pressure in ER going from 129/82 laying to 62/32 on standing . Head CT with mild chronic periventricular small vessel ischemia . He was seen by cardiology placing him on proamatine 5 mg po tid along with florinef 1/2 0.5 mg po qd . Significant medications aspirin 81 mg po qd , brilinta 90 mg po bid, sinemet 25/100 po qid, . Testing Cardiac 2 d echo normal LVF EF 55 % . Moderte to severe MR , April 2024 MRA of Head normal , proamatine 5 mg po tid along with florinef 1/2 0.5 mg po qdFebruary 2023 . MRA of neck normal , February 2023 . MRI of Head with mild chronic periventricular small vessel 
Comprehensive Nutrition Assessment    Type and Reason for Visit:  Positive nutrition screen, Initial (weight loss, poor appetite poor intake)    Nutrition Recommendations/Plan:   Continue current diet.  Start Ensure plus 3x daily with meals.   Monitor po intakes, weight, and labs.      Malnutrition Assessment:  Malnutrition Status:  Moderate malnutrition (11/17/24 1430)    Context:  Acute Illness     Findings of the 6 clinical characteristics of malnutrition:  Energy Intake:  50% or less of estimated energy requirements for 5 or more days  Weight Loss:  Greater than 7.5% over 3 months     Body Fat Loss:  Mild body fat loss Buccal region, Orbital   Muscle Mass Loss:  Mild muscle mass loss Temples (temporalis)  Fluid Accumulation:  No fluid accumulation     Strength:  Not Performed    Nutrition Assessment:    Patient admitted with orthostatic hypertension, had a recent fall with severe hip pain. Has a PMhx of A-Fib, CAD, CABG, Parkinson's, and stroke. At times of visit patient laying in bed. Pt. reports loss of appetite with weight loss for 1-2 weeks. Per weight hx on file noted 7.3 kg (10%) weight loss in last 3 months. Observed breakfast tray on counter only few bites eaten. Patient reports no chewing or swallowing problem, no GI issues and agreed to Ensure Plus 3x daily with meals.    Nutrition Related Findings:    No edema. Bowel sounds active. Labs and meds reviewed Wound Type: None       Current Nutrition Intake & Therapies:    Average Meal Intake: 1-25%, 26-50%  Average Supplements Intake: None Ordered  ADULT DIET; Regular    Anthropometric Measures:  Height: 182.9 cm (6')  Ideal Body Weight (IBW): 178 lbs (81 kg)    Admission Body Weight: 65.3 kg (143 lb 15.4 oz)  Current Body Weight: 65.3 kg (143 lb 15.4 oz), 80.9 % IBW. Weight Source: Bed scale  Current BMI (kg/m2): 19.5  Usual Body Weight: 72.6 kg (160 lb 0.9 oz) (September 2024)     % Weight Change (Calculated): -10.1  Weight Adjustment For: No 
Date:   11/20/2024  Patient name: Rowdy Marie  Date of admission:  11/16/2024  2:41 PM  MRN:   [id number]  YOB: 1950  PCP: Yandy Jones MD    Reason for Admission: Orthostatic hypotension [I95.1]    Cardiology consult: Severe postural hypotension ,multivessel coronary artery disease, history of A-fib, CHF with preserved ejection fraction        Referring physician Dr. Aracely Pierre     Impression  Admission 11/16/2024 with dizziness, falls, severe postural hypotension, systolic blood pressure dropping to 60 mmHg on standing     Paroxysmal atrial fibrillation Watchman procedure 11/14/2023, current rhythm sinus rhythm  Ejection fraction 60 to 65%  Coronary artery disease, CABG times 4/2014, stent placement times 5 December 2022  LIMA to LAD, SVG to OM 3, ramus, RCA patent grafts cardiac cath December 2019  History of stent placement  Hypothyroidism  Hyperlipidemia  History of prostate cancer  History of anemia  History of gastritis, esophageal stricture  Parkinson disease on Sinemet  Back pain, back surgery  Episodes of dizziness, combination of previous C-spine surgery, postural hypotension/autonomic dysfunction patient has Parkinson disease on medication, dizziness aggravates by head and neck movements  Normal B12 1970, serum iron 57, iron saturation 21, folate more than 20  MRA neck and head without contrast 2/9/2023  No evidence of large vessel vascular occlusion or intracranial aneurysm within the limits of MR angiographic technique  No hemodynamically significant stenosis or occlusion in the visualized cervical portion of the carotid or vertebral arteries     Cardiac cath 5/22/2024 right femoral artery access  Patent LIMA to LAD distal LAD disease not amenable for PCI, patent SVG to diagonal with a patent stent, patent SVG to RPDA, 80% stenosis of SVG to OM and reduced to 0% BRENDA by Dr. Love    History of present illness     73-year-old male who lives with his son and daughter-in-law and 
Dr. Borden ([facility]) was sent a perfectserve message regarding new consult 10:02 this morning 11/18/2024.  
Dr. Ladd added to pt's list for consult.   
Notified dr powell of new consult via [employer]  
Occupational Therapy  [facility]  Occupational Therapy Not Seen    DATE: 2024    NAME: Rowdy Marie  MRN: [id number]   : 1950    Patient not seen this date for Occupational Therapy due to:      [] Cancel by RN or physician due to:    [] Hemodialysis    [] Critical Lab Value Level     [] Blood transfusion in progress    [] Acute or unstable cardiovascular status   _MAP < 55 or more than >115  _HR < 40 or > 130    [] Acute or unstable pulmonary status   -FiO2 > 60%   _RR < 5 or >40    _O2 sats < 85%    [] Strict Bedrest    [] Off Unit for surgery or procedure    [] Off Unit for testing       [] Pending imaging to R/O fracture    [x] Refusal by Patient (Pt declined OT reporting he has a bad headache right now and does not want to participate. Will continue to follow).     [] Other      [] OT being discontinued at this time. Patient independent. No further needs.     [] OT being discontinued at this time as the patient has been transferred to hospice care. No further needs.      CARI CAMPBELL   
Patient arrived to the unit via stretcher and assisted to assigned bed. All assessment completed. Education to bed safety and exit done with orientation to the use of call light. Bed side table with patient needs are within reach. Bed alarm activated and bedside rail up x2.   
Patient educated on discharge instructions which include: medication schedule, reasons for new medications and some side effects and need to follow-up. Patient verbalizes understanding of discharge and states readiness for discharge.  All belongings were gathered by patient and in patient's possession.  No distress noted at this time.     Current vital signs:  BP (!) 160/82   Pulse 70   Temp 97.7 °F (36.5 °C) (Oral)   Resp 18   Ht 1.829 m (6')   Wt 66.5 kg (146 lb 9.7 oz)   SpO2 98%   BMI 19.88 kg/m²                MEWS Score: 1      
Patient needs script for protonix and florinef when he is discharged  
Physical Therapy        Physical Therapy Cancel Note      DATE: 2024    NAME: Rowdy Marie  MRN: [id number]   : 1950      Patient not seen this date for Physical Therapy due to:    Patient Declined: Attempted to see pt at 1042. Pt reporting feeling nauseous, laying in bed with basin at this time and declines therapy. \"I'm sick to my stomach.\" Pt agreeable to recheck in PM if feeling better. Will reattempt in PM as time permits.    2nd attempt made at 1422. Pt continues to feel symptomatic with nausea and dizziness. Declines therapy at this time.       Electronically signed by Makenna Adorno PTA on 2024 at 12:06 PM      
Physical Therapy  Facility/Department: [facility] PROGRESSIVE CARE  Daily Treatment Note  NAME: Rowdy Marie  : 1950  MRN: [id number]    Date of Service: 2024    Discharge Recommendations:  Therapy recommended at discharge, Patient would benefit from continued therapy after discharge        Patient Diagnosis(es): The primary encounter diagnosis was Orthostatic hypotension. Diagnoses of Convulsive syncope, Facial droop, and Atrial fibrillation with rapid ventricular response (HCC) were also pertinent to this visit.    Assessment  Activity Tolerance: Treatment limited secondary to medical complications (+ orthostatics, dizziness)    Plan  Physical Therapy Plan  General Plan: 5-7 times per week  Specific Instructions for Next Treatment: progress with transfers and gait as tolerated  Current Treatment Recommendations: Strengthening;Balance training;Functional mobility training;Transfer training;Therapeutic activities;Patient/Caregiver education & training;Safety education & training;Home exercise program;Stair training;Gait training    Restrictions  Restrictions/Precautions  Restrictions/Precautions: Fall Risk, Bed Alarm  Implants present? : Metal implants (back surgeries, CABG)     Subjective   Subjective  Subjective: Pt laying in bed, reports feeling \"a little better\" compared to yesterday. Continues to feel dizzy but improved. Agreeable to work with therapy. Co-tx with KELLEN Rodriguez.  Pain: 7/10 L hip  Orientation  Overall Orientation Status: Within Functional Limits  Cognition  Overall Cognitive Status: WFL    Objective  Vitals     Bed Mobility Training  Bed Mobility Training: Yes  Overall Level of Assistance: Stand-by assistance  Interventions: Verbal cues;Safety awareness training  Rolling: Stand-by assistance  Supine to Sit: Stand-by assistance (Reports slight increase in dizziness with sitting EOB. Does not subside)  Sit to Supine: Stand-by assistance  Scooting: Stand-by assistance  Balance  Sitting: 
Physical Therapy  Facility/Department: [facility] PROGRESSIVE CARE  Physical Therapy Initial Assessment    Name: Rowdy Marie  : 1950  MRN: [id number]  Date of Service: 2024  HX : Synciope and near fall at home, c/o L hip pain  Discharge Recommendations:  Therapy recommended at discharge, Patient would benefit from continued therapy after discharge          Patient Diagnosis(es): The encounter diagnosis was Orthostatic hypotension.  Past Medical History:  has a past medical history of A-fib ([facility]), PREET (acute kidney injury) ([facility]), Anemia, CAD (coronary artery disease), Esophageal stricture, Hyperlipidemia, Hypokalemia, Hypomagnesemia, NSTEMI (non-ST elevated myocardial infarction) ([facility]), Orthostatic hypotension, Parkinsons ([facility]), Presence of Watchman left atrial appendage closure device, and Prostate CA ([facility]).  Past Surgical History:  has a past surgical history that includes Coronary artery bypass graft; Cardiac catheterization; Coronary angioplasty with stent; Diagnostic Cardiac Cath Lab Procedure (2024); Cardiac procedure (N/A, 2024); Cardiac procedure (N/A, 2024); Cardiac procedure (N/A, 2024); and Cardiac procedure (N/A, 2024).    Assessment  Assessment: 73 y/o male adm with syncope, unable to fully mobilize today due to continued (+) orthostatics. Wll continue to follow while here to progress with transfers, gait and stairs for return to OF with assist  Treatment Diagnosis: impaired mobility s/p orthostatic hypotension  Specific Instructions for Next Treatment: progress with transfers and gait as tolerated  Therapy Prognosis: Fair  Decision Making: Medium Complexity  History: recent hospitalization  Clinical Presentation:  (weakness, dizziness)  Barriers to Learning: decreased activity tolerance  Requires PT Follow-Up: Yes  Activity Tolerance  Activity Tolerance Comments: pt able to tolerate standing for BP measurement then unable to perform functional mobility  Orthostatics: 
Spiritual Health History and Assessment/Progress Note  [facility]    (P) Grief, Loss, and Adjustments,  , (P) Adjustment to illness,      Name: Rowdy Marie MRN: [id number]    Age: 74 y.o.     Sex: male   Language: English   Yarsanism: Gnosticist   Orthostatic hypotension     Date: 11/17/2024            Total Time Calculated: (P) 4 min          Writer responded to consult to see patient for support; visit with patient and his daughter-in-law; patient and DIL anxious and worried about patient's medical circumstances and the medical events of yesterday; patient having a hard time keeping his eyes open; daughter tearful; welcomed prayer        Spiritual Assessment began in [facility] PROGRESSIVE CARE        Referral/Consult From: (P) Nurse   Encounter Overview/Reason: (P) Grief, Loss, and Adjustments  Service Provided For: (P) Patient and family together    Tiffany, Belief, Meaning:   Patient has beliefs or practices that help with coping during difficult times  Family/Friends have beliefs or practices that help with coping during difficult times      Importance and Influence:  Patient has no beliefs influential to healthcare decision-making identified during this visit  Family/Friends have no beliefs influential to healthcare decision-making identified during this visit    Community:  Patient feels well-supported. Support system includes: Children  Family/Friends Other: unknown    Assessment and Plan of Care:     Patient Interventions include: Facilitated expression of thoughts and feelings and Affirmed coping skills/support systems  Family/Friends Interventions include: Facilitated expression of thoughts and feelings and Affirmed coping skills/support systems    Patient Plan of Care: Spiritual Care available upon further referral  Family/Friends Plan of Care: Spiritual Care available upon further referral    Electronically signed by Chaplain Kathi on 11/17/2024 at 1:42 PM   
[facility]   OCCUPATIONAL THERAPY MISSED TREATMENT NOTE   INPATIENT   Date: 24  Patient Name: Rowdy Marie       Room:   MRN: [id number]   Account #: [id number]    : 1950  (74 y.o.)  Gender: male                 REASON FOR MISSED TREATMENT:  Patient declined   -    OT/PT attempting session this date with PCA/nursing reporting patient reporting increased dizziness/nausea with nursing wanting to be present for orthostatics. Upon arrival, patient supine in bed with patient declining session at this time due to not feeling well. OT will continue to follow to attempt evaluation 1042 am. Second attempt at 14:27 with patient reporting just physican just completing orthostatic measurements with decreasing when standing and declining engagement in therapy this date due to feeling unwell and dizziness. OT will continue to follow and attempt evaluation.             Electronically signed by FATIMAH Diamond on 24 at 12:10 PM EST   
Writer agrees with STAS Saenz charting  
Limits  Cognition  Overall Cognitive Status: WFL   Sensation  Overall Sensation Status: Impaired (L hand and LLE)  Vision  Vision: Impaired  Vision Exceptions: Wears glasses at all times  Hearing  Hearing: Within functional limits       UE Function  LUE AROM (degrees)  LUE AROM : WFL  Left Hand AROM (degrees)  Left Hand AROM: WFL  Tone LUE  LUE Tone: Normotonic  LUE Strength  Gross LUE Strength: Exceptions to WFL  L Hand General: 4-/5  LUE Strength Comment: Grossly 4-/5    RUE AROM (degrees)  RUE AROM : WFL  Right Hand AROM (degrees)  Right Hand AROM: WFL  Tone RUE  RUE Tone: Normotonic  RUE Strength  Gross RUE Strength: Exceptions to WFL  R Hand General: 4-/5  RUE Strength Comment: Grossly 4-/5    Fine Motor Skills/Coordination  Coordination  Movements Are Fluid And Coordinated: No  Coordination and Movement Description: Fine motor impairments, Decreased speed, Decreased accuracy, Right UE, Left UE              Bed Mobility  Bed mobility  Supine to Sit: Stand by assistance  Sit to Supine: Stand by assistance  Bed Mobility Comments: HOB elevated with use of rails. close SBA for all bed mobility for continual complaints of dizziness    Balance  Balance  Sitting Balance: Stand by assistance  Standing Balance: Contact guard assistance  Standing Balance  Time: 2-3 mins  Activity: functional mobility/transfers  Comment: Fair-good standing balance with RW    Transfers  Transfers  Sit to stand: Contact guard assistance  Stand to sit: Contact guard assistance  Transfer Comments: CGA for transfers. Good BUE placement and overall safety awareness. Increased time due to dizziness and weakness    Functional Mobility  Functional - Mobility Device: Rolling Walker  Activity: Other (to doorway and back)  Assist Level: Contact guard assistance  Functional Mobility Comments: Increased time with use of RW. Mild forward hunched posture. Decreased endurance and tolerance. Complaints of increased dizziness throughout mobility but no LOB 
mirtazapine (REMERON) tablet 7.5 mg  7.5 mg Oral Nightly Mavis Mathur APRN - CNP   7.5 mg at 11/18/24 2114    miconazole (MICOTIN) 2 % powder   Topical BID Mavis Mathur APRN - CNP   Given at 11/19/24 0933    pantoprazole (PROTONIX) tablet 40 mg  40 mg Oral QAM AC Mavis Mathur APRN - CNP   40 mg at 11/19/24 0643    ranolazine (RANEXA) extended release tablet 1,000 mg  1,000 mg Oral BID Mavis Mathur APRN - CNP   1,000 mg at 11/19/24 0927    sucralfate (CARAFATE) tablet 1 g  1 g Oral 4x Daily Mavis Mathur APRN - CNP   1 g at 11/19/24 1307    ticagrelor (BRILINTA) tablet 90 mg  90 mg Oral BID Mavis Mathur APRN - CNP   90 mg at 11/19/24 0930    traMADol (ULTRAM) tablet 50 mg  50 mg Oral Q8H PRN Mavis Mathur APRN - CNP   50 mg at 11/17/24 2114    sodium chloride flush 0.9 % injection 5-40 mL  5-40 mL IntraVENous 2 times per day Mavis Mathur APRN - CNP   10 mL at 11/19/24 0900    sodium chloride flush 0.9 % injection 10 mL  10 mL IntraVENous PRN Mavis Mathur APRN - CNP        0.9 % sodium chloride infusion   IntraVENous PRN Mavis Mathur APRN - CNP        potassium chloride (KLOR-CON M) extended release tablet 40 mEq  40 mEq Oral PRN Mavis Mathur APRN - CNP        Or    potassium bicarb-citric acid (EFFER-K) effervescent tablet 40 mEq  40 mEq Oral PRN Mavis Mathur APRN - CNP        magnesium sulfate 2000 mg in water 50 mL IVPB  2,000 mg IntraVENous PRN Mavis Mathur APRN - CNP        enoxaparin (LOVENOX) injection 40 mg  40 mg SubCUTAneous Daily Mavis Mathur APRN - CNP   40 mg at 11/19/24 0930    ondansetron (ZOFRAN-ODT) disintegrating tablet 4 mg  4 mg Oral Q8H PRN Mavis Mathur APRN - CNP        Or    ondansetron (ZOFRAN) injection 4 mg  4 mg IntraVENous Q6H PRN Mavis Mathur APRN - CNP   4 mg at 11/18/24 2114    melatonin tablet 3 mg  3 mg Oral Nightly PRN Mavis Mathur APRN - CNP   3 mg at 11/17/24 2115    polyethylene glycol (GLYCOLAX) packet 17 g  17 g Oral Daily PRN Mavis Mathur, APRN - 
Sensitivity 15 0 - 22 ng/L   Urinalysis with Reflex to Culture    Collection Time: 11/17/24  2:54 AM    Specimen: Urine   Result Value Ref Range    Color, UA PENDING Yellow    Turbidity UA PENDING Clear    Glucose, Ur PENDING NEGATIVE mg/dL    Bilirubin, Urine PENDING NEGATIVE    Ketones, Urine PENDING NEGATIVE mg/dL    Specific Gravity, UA PENDING     Urine Hgb PENDING NEGATIVE    pH, Urine PENDING     Protein, UA PENDING NEGATIVE mg/dL    Urobilinogen, Urine PENDING 0.0 - 1.0 EU/dL    Nitrite, Urine PENDING NEGATIVE    Leukocyte Esterase, Urine PENDING NEGATIVE    Comment PENDING        Imaging/Diagnostics:  XR HIP 2-3 VW W PELVIS LEFT    Result Date: 11/16/2024  1. No fracture or dislocation. 2. Radiation seeds overlie the prostate.         Patient status inpatient in the Progressive Unit/Step down    Hospital Problems             Last Modified POA    * (Principal) Orthostatic hypotension 11/16/2024 Yes         Disposition 3 days      Consultations:   IP CONSULT TO SPIRITUAL SERVICES    Patient is admitted as inpatient status because of co-morbidities listed above, severity of signs and symptoms as outlined, requirement for current medical therapies and most importantly because of direct risk to patient if care not provided in a hospital setting.  Expected length of stay > 48 hours.    Mavis Mathur, APRN - CNP  11/17/2024  3:05 AM     Please note that this chart was generated using voice recognition Dragon dictation software.  Although every effort was made to ensure the accuracy of this automated transcription, some errors in transcription [place] [address].   Phone [phone]            
orthostatic, though states that he feels better than yesterday  Will order compression stockings as well, started on low-dose Florinef continues to be on IV fluids, will decrease  Seen by neurology carotid ultrasound ordered currently pending next  Will need placement at the time of discharge  Labs reviewed satisfactory  Consultations:   IP CONSULT TO SPIRITUAL SERVICES  IP CONSULT TO SOCIAL WORK  IP CONSULT TO CARDIOLOGY  IP CONSULT TO NEUROLOGY     Patient is admitted as inpatient status because of co-morbidities listed above, severity of signs and symptoms as outlined, requirement for current medical therapies and most importantly because of direct risk to patient if care not provided in a hospital setting.    Aracely Pierre MD  11/19/2024  8:23 AM    Copy sent to Yandy Haddad MD    Please note that this chart was generated using voice recognition Dragon dictation software.  Although every effort was made to ensure the accuracy of this automated transcription, some errors in transcription may have occurred.

## 2024-11-20 NOTE — PLAN OF CARE
Problem: Discharge Planning  Goal: Discharge to home or other facility with appropriate resources  11/20/2024 0204 by Christie Wiley RN  Outcome: Progressing  Flowsheets (Taken 11/19/2024 2030)  Discharge to home or other facility with appropriate resources: Identify barriers to discharge with patient and caregiver     Problem: Pain  Goal: Verbalizes/displays adequate comfort level or baseline comfort level  11/20/2024 0204 by Christie Wiley RN  Outcome: Progressing  Flowsheets (Taken 11/20/2024 0015)  Verbalizes/displays adequate comfort level or baseline comfort level:   Encourage patient to monitor pain and request assistance   Assess pain using appropriate pain scale   Administer analgesics based on type and severity of pain and evaluate response   Implement non-pharmacological measures as appropriate and evaluate response     Problem: Safety - Adult  Goal: Free from fall injury  11/20/2024 0204 by Christie Wiley RN  Outcome: Progressing     Problem: ABCDS Injury Assessment  Goal: Absence of physical injury  11/20/2024 0204 by Christie Wiley RN  Outcome: Progressing     Problem: Skin/Tissue Integrity  Goal: Absence of new skin breakdown  Description: 1.  Monitor for areas of redness and/or skin breakdown  2.  Assess vascular access sites hourly  3.  Every 4-6 hours minimum:  Change oxygen saturation probe site  4.  Every 4-6 hours:  If on nasal continuous positive airway pressure, respiratory therapy assess nares and determine need for appliance change or resting period.  11/20/2024 0204 by Christie Wiley RN  Outcome: Progressing     Problem: Nutrition Deficit:  Goal: Optimize nutritional status  11/20/2024 0204 by Christie Wiley RN  Outcome: Progressing

## 2024-11-20 NOTE — PLAN OF CARE
Problem: Discharge Planning  Goal: Discharge to home or other facility with appropriate resources  11/20/2024 1341 by Alanna French RN  Outcome: Progressing     Problem: Pain  Goal: Verbalizes/displays adequate comfort level or baseline comfort level  11/20/2024 1341 by Alanna French RN  Outcome: Progressing     Problem: Safety - Adult  Goal: Free from fall injury  11/20/2024 1341 by Alanna French RN  Outcome: Progressing     Problem: ABCDS Injury Assessment  Goal: Absence of physical injury  11/20/2024 1341 by Alanna French, RN  Outcome: Progressing

## 2024-11-21 ENCOUNTER — TELEPHONE (OUTPATIENT)
Dept: INTERNAL MEDICINE CLINIC | Age: 74
End: 2024-11-21

## 2024-11-21 NOTE — TELEPHONE ENCOUNTER
Care Transitions Initial Follow Up Call    Outreach made within 2 business days of discharge: Yes    Patient: Rowdy Marie Patient : 1950   MRN: [id number]  Reason for Admission: hypotension   Discharge Date: 24       Spoke with: self    Discharge department/facility: home    TCM Interactive Patient Contact:  Was patient able to fill all prescriptions: Yes  Was patient instructed to bring all medications to the follow-up visit: Yes  Is patient taking all medications as directed in the discharge summary? Yes  Does patient understand their discharge instructions: Yes  Does patient have questions or concerns that need addressed prior to 7-14 day follow up office visit: no    Additional needs identified to be addressed with provider  No needs identified             Scheduled out of 2 week range d/t transportation    Follow Up  Future Appointments   Date Time Provider Department Center   2024 10:00 AM Yandy Jones MD [facility] ECC DEP       Diane Asher MA

## 2024-12-02 DIAGNOSIS — E53.8 VITAMIN B12 DEFICIENCY: ICD-10-CM

## 2024-12-03 RX ORDER — FLUDROCORTISONE ACETATE 0.1 MG/1
TABLET ORAL DAILY
Qty: 15 TABLET | Refills: 0 | OUTPATIENT
Start: 2024-12-03

## 2024-12-03 RX ORDER — LANOLIN ALCOHOL/MO/W.PET/CERES
1000 CREAM (GRAM) TOPICAL DAILY
Qty: 30 TABLET | Refills: 3 | Status: SHIPPED | OUTPATIENT
Start: 2024-12-03

## 2024-12-04 ENCOUNTER — PATIENT MESSAGE (OUTPATIENT)
Dept: INTERNAL MEDICINE CLINIC | Age: 74
End: 2024-12-04

## 2024-12-04 RX ORDER — FLUDROCORTISONE ACETATE 0.1 MG/1
0.05 TABLET ORAL DAILY
Qty: 30 TABLET | Refills: 2 | Status: SHIPPED | OUTPATIENT
Start: 2024-12-04 | End: 2025-06-02

## 2024-12-16 DIAGNOSIS — E61.1 IRON DEFICIENCY: ICD-10-CM

## 2024-12-17 ENCOUNTER — OFFICE VISIT (OUTPATIENT)
Dept: INTERNAL MEDICINE CLINIC | Age: 74
End: 2024-12-17
Payer: MEDICARE

## 2024-12-17 VITALS
OXYGEN SATURATION: 100 % | HEART RATE: 65 BPM | WEIGHT: 156 LBS | SYSTOLIC BLOOD PRESSURE: 134 MMHG | HEIGHT: 72 IN | BODY MASS INDEX: 21.13 KG/M2 | DIASTOLIC BLOOD PRESSURE: 78 MMHG

## 2024-12-17 DIAGNOSIS — E61.1 IRON DEFICIENCY: ICD-10-CM

## 2024-12-17 DIAGNOSIS — K29.70 GASTRITIS WITHOUT BLEEDING, UNSPECIFIED CHRONICITY, UNSPECIFIED GASTRITIS TYPE: ICD-10-CM

## 2024-12-17 DIAGNOSIS — Z95.1 S/P CABG (CORONARY ARTERY BYPASS GRAFT): ICD-10-CM

## 2024-12-17 DIAGNOSIS — I95.1 ORTHOSTATIC HYPOTENSION: ICD-10-CM

## 2024-12-17 DIAGNOSIS — I48.20 CHRONIC ATRIAL FIBRILLATION (HCC): ICD-10-CM

## 2024-12-17 DIAGNOSIS — R63.4 UNEXPLAINED WEIGHT LOSS: ICD-10-CM

## 2024-12-17 DIAGNOSIS — R11.0 NAUSEA: ICD-10-CM

## 2024-12-17 PROCEDURE — 99214 OFFICE O/P EST MOD 30 MIN: CPT | Performed by: INTERNAL MEDICINE

## 2024-12-17 PROCEDURE — 1159F MED LIST DOCD IN RCRD: CPT | Performed by: INTERNAL MEDICINE

## 2024-12-17 PROCEDURE — 1123F ACP DISCUSS/DSCN MKR DOCD: CPT | Performed by: INTERNAL MEDICINE

## 2024-12-17 RX ORDER — DROXIDOPA 100 MG/1
100 CAPSULE ORAL 3 TIMES DAILY
Qty: 90 CAPSULE | Refills: 0 | Status: SHIPPED | OUTPATIENT
Start: 2024-12-17 | End: 2024-12-17 | Stop reason: SDUPTHER

## 2024-12-17 RX ORDER — MIRTAZAPINE 7.5 MG/1
7.5 TABLET, FILM COATED ORAL NIGHTLY
Qty: 30 TABLET | Refills: 5 | Status: SHIPPED | OUTPATIENT
Start: 2024-12-17

## 2024-12-17 RX ORDER — FERROUS SULFATE 325(65) MG
1 TABLET ORAL
Qty: 180 TABLET | Refills: 1 | Status: SHIPPED | OUTPATIENT
Start: 2024-12-17 | End: 2024-12-17 | Stop reason: SDUPTHER

## 2024-12-17 RX ORDER — FERROUS SULFATE 325(65) MG
1 TABLET ORAL
Qty: 180 TABLET | Refills: 1 | Status: SHIPPED | OUTPATIENT
Start: 2024-12-17

## 2024-12-17 RX ORDER — AMIODARONE HYDROCHLORIDE 200 MG/1
200 TABLET ORAL DAILY
Qty: 30 TABLET | Refills: 0 | Status: SHIPPED | OUTPATIENT
Start: 2024-12-17 | End: 2024-12-17 | Stop reason: SDUPTHER

## 2024-12-17 RX ORDER — MIDODRINE HYDROCHLORIDE 2.5 MG/1
5 TABLET ORAL 3 TIMES DAILY
Qty: 90 TABLET | Refills: 3 | Status: SHIPPED | OUTPATIENT
Start: 2024-12-17

## 2024-12-17 RX ORDER — DROXIDOPA 100 MG/1
100 CAPSULE ORAL 3 TIMES DAILY
Qty: 90 CAPSULE | Refills: 0 | Status: SHIPPED | OUTPATIENT
Start: 2024-12-17

## 2024-12-17 RX ORDER — MECLIZINE HCL 12.5 MG 12.5 MG/1
12.5 TABLET ORAL 3 TIMES DAILY PRN
Qty: 30 TABLET | Refills: 0 | Status: SHIPPED | OUTPATIENT
Start: 2024-12-17

## 2024-12-17 RX ORDER — PANTOPRAZOLE SODIUM 40 MG/1
40 TABLET, DELAYED RELEASE ORAL
Qty: 90 TABLET | Refills: 1 | Status: SHIPPED | OUTPATIENT
Start: 2024-12-17

## 2024-12-17 RX ORDER — POTASSIUM CHLORIDE 750 MG/1
10 TABLET, EXTENDED RELEASE ORAL EVERY MORNING
Qty: 60 TABLET | Refills: 0 | Status: SHIPPED | OUTPATIENT
Start: 2024-12-17

## 2024-12-17 RX ORDER — CARVEDILOL 3.12 MG/1
3.12 TABLET ORAL 2 TIMES DAILY
Qty: 180 TABLET | Refills: 0 | Status: SHIPPED | OUTPATIENT
Start: 2024-12-17

## 2024-12-17 RX ORDER — AMIODARONE HYDROCHLORIDE 200 MG/1
200 TABLET ORAL DAILY
Qty: 30 TABLET | Refills: 0 | Status: SHIPPED | OUTPATIENT
Start: 2024-12-17

## 2024-12-17 NOTE — PROGRESS NOTES
tablet Take 2 tablets by mouth 3 times daily 90 tablet 3    pantoprazole (PROTONIX) 40 MG tablet Take 1 tablet by mouth every morning (before breakfast) 90 tablet 1    ranolazine (RANEXA) 1000 MG extended release tablet Take 1 tablet by mouth 2 times daily 60 tablet 3    potassium chloride (KLOR-CON) 10 MEQ extended release tablet Take 1 tablet by mouth every morning 60 tablet 0    ticagrelor (BRILINTA) 90 MG TABS tablet Take 1 tablet by mouth 2 times daily 180 tablet 0    meclizine (ANTIVERT) 12.5 MG tablet Take 1 tablet by mouth 3 times daily as needed for Dizziness      sucralfate (CARAFATE) 1 GM tablet Take 1 tablet by mouth 4 times daily 120 tablet 3    carvedilol (COREG) 3.125 MG tablet Take 1 tablet by mouth 2 times daily 180 tablet 0    carbidopa-levodopa (SINEMET)  MG per tablet Take 1 tablet by mouth 4 times daily 120 tablet 2    nystatin (MYCOSTATIN) 117899 UNIT/GM powder Apply 3 times daily. (Patient taking differently: Apply 3 times daily to groin) 60 g 0    mirtazapine (REMERON) 7.5 MG tablet Take 1 tablet by mouth nightly 30 tablet 5    lidocaine 4 % external patch Place 1 patch onto the skin daily (Patient taking differently: Place 1 patch onto the skin daily Lower back) 30 each 0    aspirin 81 MG EC tablet Take 1 tablet by mouth daily      docusate sodium (COLACE) 50 MG capsule Take 1 capsule by mouth at bedtime      traMADol (ULTRAM) 50 MG tablet Take 1 tablet by mouth every 8 hours as needed.      coenzyme Q10 100 MG CAPS capsule Take 1 capsule by mouth daily      levothyroxine (SYNTHROID) 75 MCG tablet Take 1 tablet by mouth Daily      Compression Stockings MISC by Does not apply route (Patient not taking: Reported on 12/17/2024) 1 each 0    Compression Stockings MISC by Does not apply route (Patient not taking: Reported on 12/17/2024) 1 each 0    nitroGLYCERIN (NITRODUR) 0.2 MG/HR Place 1 patch onto the skin daily (Patient not taking: Reported on 12/17/2024) 30 patch 3     No current

## 2025-01-06 RX ORDER — LEVOTHYROXINE SODIUM 75 UG/1
75 TABLET ORAL DAILY
Qty: 90 TABLET | Refills: 3 | Status: SHIPPED | OUTPATIENT
Start: 2025-01-06

## 2025-01-16 RX ORDER — POTASSIUM CHLORIDE 750 MG/1
10 TABLET, EXTENDED RELEASE ORAL EVERY MORNING
Qty: 90 TABLET | Refills: 0 | Status: SHIPPED | OUTPATIENT
Start: 2025-01-16

## 2025-02-06 DIAGNOSIS — Z95.1 S/P CABG (CORONARY ARTERY BYPASS GRAFT): ICD-10-CM

## 2025-02-06 DIAGNOSIS — I48.20 CHRONIC ATRIAL FIBRILLATION (HCC): ICD-10-CM

## 2025-02-06 DIAGNOSIS — I95.1 ORTHOSTATIC HYPOTENSION: ICD-10-CM

## 2025-02-07 RX ORDER — CARVEDILOL 3.12 MG/1
3.12 TABLET ORAL 2 TIMES DAILY
Qty: 180 TABLET | Refills: 0 | Status: SHIPPED | OUTPATIENT
Start: 2025-02-07

## 2025-02-11 RX ORDER — DROXIDOPA 100 MG/1
100 CAPSULE ORAL 3 TIMES DAILY
Qty: 90 CAPSULE | Refills: 0 | Status: SHIPPED | OUTPATIENT
Start: 2025-02-11

## 2025-02-18 ENCOUNTER — OFFICE VISIT (OUTPATIENT)
Dept: INTERNAL MEDICINE CLINIC | Age: 75
End: 2025-02-18
Payer: MEDICARE

## 2025-02-18 VITALS
HEART RATE: 76 BPM | DIASTOLIC BLOOD PRESSURE: 72 MMHG | OXYGEN SATURATION: 98 % | BODY MASS INDEX: 21.13 KG/M2 | WEIGHT: 156 LBS | SYSTOLIC BLOOD PRESSURE: 122 MMHG | HEIGHT: 72 IN

## 2025-02-18 DIAGNOSIS — K29.70 GASTRITIS WITHOUT BLEEDING, UNSPECIFIED CHRONICITY, UNSPECIFIED GASTRITIS TYPE: ICD-10-CM

## 2025-02-18 DIAGNOSIS — I95.1 ORTHOSTATIC HYPOTENSION: ICD-10-CM

## 2025-02-18 DIAGNOSIS — C61 PROSTATE CANCER (HCC): ICD-10-CM

## 2025-02-18 DIAGNOSIS — G20.A1 PARKINSON'S DISEASE WITHOUT DYSKINESIA, UNSPECIFIED WHETHER MANIFESTATIONS FLUCTUATE (HCC): ICD-10-CM

## 2025-02-18 DIAGNOSIS — I48.20 CHRONIC ATRIAL FIBRILLATION (HCC): ICD-10-CM

## 2025-02-18 DIAGNOSIS — E03.9 HYPOTHYROIDISM, UNSPECIFIED TYPE: Primary | ICD-10-CM

## 2025-02-18 PROCEDURE — 99214 OFFICE O/P EST MOD 30 MIN: CPT | Performed by: INTERNAL MEDICINE

## 2025-02-18 PROCEDURE — 1159F MED LIST DOCD IN RCRD: CPT | Performed by: INTERNAL MEDICINE

## 2025-02-18 PROCEDURE — 1123F ACP DISCUSS/DSCN MKR DOCD: CPT | Performed by: INTERNAL MEDICINE

## 2025-02-18 RX ORDER — MIDODRINE HYDROCHLORIDE 5 MG/1
10 TABLET ORAL 3 TIMES DAILY
Qty: 60 TABLET | Refills: 3 | Status: SHIPPED | OUTPATIENT
Start: 2025-02-18

## 2025-02-18 ASSESSMENT — PATIENT HEALTH QUESTIONNAIRE - PHQ9
SUM OF ALL RESPONSES TO PHQ QUESTIONS 1-9: 0
2. FEELING DOWN, DEPRESSED OR HOPELESS: NOT AT ALL
1. LITTLE INTEREST OR PLEASURE IN DOING THINGS: NOT AT ALL
SUM OF ALL RESPONSES TO PHQ QUESTIONS 1-9: 0
SUM OF ALL RESPONSES TO PHQ9 QUESTIONS 1 & 2: 0
SUM OF ALL RESPONSES TO PHQ QUESTIONS 1-9: 0
SUM OF ALL RESPONSES TO PHQ QUESTIONS 1-9: 0

## 2025-02-18 NOTE — PROGRESS NOTES
\"Have you been to the ER, urgent care clinic since your last visit?  Hospitalized since your last visit?\"    [facility] 11/16-11/20 orthostatic hypotension    “Have you seen or consulted any other health care providers outside our system since your last visit?”    NO               SUBJECTIVE:  Rowdy Marie is a 75 y.o. male patient who  comes for complaints of   Chief Complaint   Patient presents with    Established New Doctor       Recent  hosp admission 11/16-11/20 for orthostatic hypotension  Was dehydrated  Improved now  PMH is significant for previous stroke, A-fib, CAD s/p CABG, hyperlipidemia, mitral regurgitation, prostate cancer, Parkinson's disease. He is on amiodarone, aspirin and ticagrelor, levothyroxine.       Reports partial compliance with compression stockings and abdominal binder  Fludrocortisone dose was reduced in ? Aug- currently taking 0.05mg daily.  Pt wourl like to continue current dose  On midodrine 5mg tid   Not Takig droxidopa expensive  Patient states that he feels well overall, denies any chest pain shortness of breath, advised to check blood pressure daily  Voiced understanding  Patient has appointment with cardiology as well        Droxiptoa   Expensive     REVIEW OF SYSTEMS (except Subjective (HPI))  GENERAL: No fevers / chills  RESPIRATORY: Negative for cough, wheezing or shortness of breath  CARDIOVASCULAR: Negative for chest pain or palpitations.  GI: no nausea, vomiting, or diarrhea  NEURO: No history of headaches    Past Medical History:   Diagnosis Date    A-fib (HCC)     PREET (acute kidney injury) ([facility])     Anemia     CAD (coronary artery disease)     Esophageal stricture     Hyperlipidemia     Hypokalemia     Hypomagnesemia     NSTEMI (non-ST elevated myocardial infarction) ([facility])     Orthostatic hypotension     Parkinsons ([facility])     Presence of Watchman left atrial appendage closure device     Prostate CA ([facility])        SOCIAL HISTORY:  Social History     Socioeconomic History

## 2025-02-19 DIAGNOSIS — K29.70 GASTRITIS WITHOUT BLEEDING, UNSPECIFIED CHRONICITY, UNSPECIFIED GASTRITIS TYPE: ICD-10-CM

## 2025-02-19 DIAGNOSIS — R11.0 NAUSEA: ICD-10-CM

## 2025-02-19 RX ORDER — SUCRALFATE 1 G/1
1 TABLET ORAL 4 TIMES DAILY
Qty: 120 TABLET | Refills: 3 | Status: SHIPPED | OUTPATIENT
Start: 2025-02-19

## 2025-02-19 RX ORDER — AMIODARONE HYDROCHLORIDE 200 MG/1
200 TABLET ORAL DAILY
Qty: 30 TABLET | Refills: 0 | Status: SHIPPED | OUTPATIENT
Start: 2025-02-19

## 2025-02-26 ENCOUNTER — HOSPITAL ENCOUNTER (OUTPATIENT)
Age: 75
Setting detail: SPECIMEN
Discharge: HOME | End: 2025-02-26
Payer: MEDICARE

## 2025-02-26 DIAGNOSIS — E03.9 HYPOTHYROIDISM, UNSPECIFIED TYPE: ICD-10-CM

## 2025-02-26 LAB
T4 FREE SERPL-MCNC: 1.6 NG/DL (ref 0.9–1.7)
TSH SERPL DL<=0.05 MIU/L-ACNC: 5.52 UIU/ML (ref 0.27–4.2)

## 2025-02-26 PROCEDURE — 36415 COLL VENOUS BLD VENIPUNCTURE: CPT

## 2025-02-26 PROCEDURE — 84439 ASSAY OF FREE THYROXINE: CPT

## 2025-02-26 PROCEDURE — 84443 ASSAY THYROID STIM HORMONE: CPT

## 2025-03-03 ENCOUNTER — TELEPHONE (OUTPATIENT)
Dept: INTERNAL MEDICINE CLINIC | Age: 75
End: 2025-03-03

## 2025-03-03 NOTE — TELEPHONE ENCOUNTER
Patient called for results of recent testing.   Please review all labs and/or testing ordered.     Labs    Please advise

## 2025-03-04 NOTE — TELEPHONE ENCOUNTER
Patient informed of his lab results and was instructed to continue his current dose of Levothyroxine and will retest his TSH in 2 months. Patient acknowledged understanding.

## 2025-03-11 ENCOUNTER — PATIENT MESSAGE (OUTPATIENT)
Dept: INTERNAL MEDICINE CLINIC | Age: 75
End: 2025-03-11

## 2025-03-11 NOTE — TELEPHONE ENCOUNTER
Patient currently taking Brilinta 90mg BID and medication is too expensive for patient. Is there something similar we can try?

## 2025-03-21 RX ORDER — AMIODARONE HYDROCHLORIDE 200 MG/1
200 TABLET ORAL DAILY
Qty: 30 TABLET | Refills: 0 | Status: SHIPPED | OUTPATIENT
Start: 2025-03-21

## 2025-04-01 DIAGNOSIS — G20.A1 PARKINSON'S DISEASE WITHOUT DYSKINESIA OR FLUCTUATING MANIFESTATIONS (HCC): ICD-10-CM

## 2025-04-01 RX ORDER — RANOLAZINE 1000 MG/1
1000 TABLET, EXTENDED RELEASE ORAL 2 TIMES DAILY
Qty: 60 TABLET | Refills: 3 | Status: SHIPPED | OUTPATIENT
Start: 2025-04-01

## 2025-04-01 RX ORDER — CARBIDOPA AND LEVODOPA 25; 100 MG/1; MG/1
1 TABLET ORAL 4 TIMES DAILY
Qty: 120 TABLET | Refills: 2 | Status: SHIPPED | OUTPATIENT
Start: 2025-04-01

## 2025-04-08 DIAGNOSIS — E53.8 VITAMIN B12 DEFICIENCY: ICD-10-CM

## 2025-04-08 RX ORDER — LANOLIN ALCOHOL/MO/W.PET/CERES
1000 CREAM (GRAM) TOPICAL DAILY
Qty: 30 TABLET | Refills: 3 | Status: SHIPPED | OUTPATIENT
Start: 2025-04-08

## 2025-04-21 RX ORDER — POTASSIUM CHLORIDE 750 MG/1
10 TABLET, EXTENDED RELEASE ORAL EVERY MORNING
Qty: 90 TABLET | Refills: 0 | Status: SHIPPED | OUTPATIENT
Start: 2025-04-21

## 2025-04-24 RX ORDER — AMIODARONE HYDROCHLORIDE 200 MG/1
200 TABLET ORAL DAILY
Qty: 30 TABLET | Refills: 0 | Status: SHIPPED | OUTPATIENT
Start: 2025-04-24

## 2025-04-30 ENCOUNTER — APPOINTMENT (OUTPATIENT)
Dept: CT IMAGING | Age: 75
DRG: 313 | End: 2025-04-30
Payer: MEDICARE

## 2025-04-30 ENCOUNTER — APPOINTMENT (OUTPATIENT)
Dept: GENERAL RADIOLOGY | Age: 75
DRG: 313 | End: 2025-04-30
Payer: MEDICARE

## 2025-04-30 ENCOUNTER — HOSPITAL ENCOUNTER (INPATIENT)
Age: 75
LOS: 4 days | Discharge: HOME | DRG: 313 | End: 2025-05-04
Attending: EMERGENCY MEDICINE
Payer: MEDICARE

## 2025-04-30 DIAGNOSIS — R07.9 CHEST PAIN, UNSPECIFIED TYPE: Primary | ICD-10-CM

## 2025-04-30 LAB
ALBUMIN SERPL-MCNC: 4.4 G/DL (ref 3.5–5.2)
ALP SERPL-CCNC: 79 U/L (ref 40–129)
ALT SERPL-CCNC: 11 U/L (ref 10–50)
ANION GAP SERPL CALCULATED.3IONS-SCNC: 11 MMOL/L (ref 9–16)
AST SERPL-CCNC: 35 U/L (ref 10–50)
BACTERIA URNS QL MICRO: ABNORMAL
BASOPHILS # BLD: 0.1 K/UL (ref 0–0.2)
BASOPHILS NFR BLD: 1 % (ref 0–2)
BILIRUB SERPL-MCNC: 0.3 MG/DL (ref 0–1.2)
BILIRUB UR QL STRIP: NEGATIVE
BUN SERPL-MCNC: 20 MG/DL (ref 8–23)
CALCIUM SERPL-MCNC: 9.2 MG/DL (ref 8.6–10.4)
CASTS #/AREA URNS LPF: ABNORMAL /LPF
CHLORIDE SERPL-SCNC: 103 MMOL/L (ref 98–107)
CLARITY UR: CLEAR
CO2 SERPL-SCNC: 26 MMOL/L (ref 20–31)
COLOR UR: ABNORMAL
CREAT SERPL-MCNC: 1.3 MG/DL (ref 0.7–1.2)
EOSINOPHIL # BLD: 0.4 K/UL (ref 0–0.4)
EOSINOPHILS RELATIVE PERCENT: 5 % (ref 0–4)
EPI CELLS #/AREA URNS HPF: ABNORMAL /HPF
ERYTHROCYTE [DISTWIDTH] IN BLOOD BY AUTOMATED COUNT: 13.5 % (ref 11.5–14.9)
GFR, ESTIMATED: 57 ML/MIN/1.73M2
GLUCOSE SERPL-MCNC: 96 MG/DL (ref 74–99)
GLUCOSE UR STRIP-MCNC: NEGATIVE MG/DL
HCT VFR BLD AUTO: 38.8 % (ref 41–53)
HGB BLD-MCNC: 12.9 G/DL (ref 13.5–17.5)
HGB UR QL STRIP.AUTO: NEGATIVE
KETONES UR STRIP-MCNC: ABNORMAL MG/DL
LEUKOCYTE ESTERASE UR QL STRIP: ABNORMAL
LYMPHOCYTES NFR BLD: 1.3 K/UL (ref 1–4.8)
LYMPHOCYTES RELATIVE PERCENT: 19 % (ref 24–44)
MCH RBC QN AUTO: 31.8 PG (ref 26–34)
MCHC RBC AUTO-ENTMCNC: 33.2 G/DL (ref 31–37)
MCV RBC AUTO: 95.7 FL (ref 80–100)
MONOCYTES NFR BLD: 0.5 K/UL (ref 0.1–1.3)
MONOCYTES NFR BLD: 7 % (ref 1–7)
NEUTROPHILS NFR BLD: 68 % (ref 36–66)
NEUTS SEG NFR BLD: 4.9 K/UL (ref 1.3–9.1)
NITRITE UR QL STRIP: NEGATIVE
PH UR STRIP: 6 [PH] (ref 5–8)
PLATELET # BLD AUTO: 251 K/UL (ref 150–450)
PMV BLD AUTO: 9.2 FL (ref 6–12)
POTASSIUM SERPL-SCNC: 4.2 MMOL/L (ref 3.7–5.3)
PROT SERPL-MCNC: 7.1 G/DL (ref 6.6–8.7)
PROT UR STRIP-MCNC: ABNORMAL MG/DL
RBC # BLD AUTO: 4.05 M/UL (ref 4.5–5.9)
RBC #/AREA URNS HPF: ABNORMAL /HPF
SODIUM SERPL-SCNC: 140 MMOL/L (ref 136–145)
SP GR UR STRIP: 1.04 (ref 1–1.03)
TROPONIN I SERPL HS-MCNC: 18 NG/L (ref 0–22)
TROPONIN I SERPL HS-MCNC: 23 NG/L (ref 0–22)
UROBILINOGEN UR STRIP-ACNC: NORMAL EU/DL (ref 0–1)
WBC #/AREA URNS HPF: ABNORMAL /HPF
WBC OTHER # BLD: 7.1 K/UL (ref 3.5–11)

## 2025-04-30 PROCEDURE — 2500000003 HC RX 250 WO HCPCS

## 2025-04-30 PROCEDURE — 36415 COLL VENOUS BLD VENIPUNCTURE: CPT

## 2025-04-30 PROCEDURE — 71045 X-RAY EXAM CHEST 1 VIEW: CPT

## 2025-04-30 PROCEDURE — 6360000004 HC RX CONTRAST MEDICATION

## 2025-04-30 PROCEDURE — 6370000000 HC RX 637 (ALT 250 FOR IP)

## 2025-04-30 PROCEDURE — 85025 COMPLETE CBC W/AUTO DIFF WBC: CPT

## 2025-04-30 PROCEDURE — 2060000000 HC ICU INTERMEDIATE R&B

## 2025-04-30 PROCEDURE — 84484 ASSAY OF TROPONIN QUANT: CPT

## 2025-04-30 PROCEDURE — 70450 CT HEAD/BRAIN W/O DYE: CPT

## 2025-04-30 PROCEDURE — 93005 ELECTROCARDIOGRAM TRACING: CPT

## 2025-04-30 PROCEDURE — 80053 COMPREHEN METABOLIC PANEL: CPT

## 2025-04-30 PROCEDURE — 70496 CT ANGIOGRAPHY HEAD: CPT

## 2025-04-30 PROCEDURE — 81001 URINALYSIS AUTO W/SCOPE: CPT

## 2025-04-30 PROCEDURE — 2580000003 HC RX 258

## 2025-04-30 PROCEDURE — 99285 EMERGENCY DEPT VISIT HI MDM: CPT

## 2025-04-30 RX ORDER — MIRTAZAPINE 15 MG/1
7.5 TABLET, FILM COATED ORAL NIGHTLY
Status: DISCONTINUED | OUTPATIENT
Start: 2025-05-01 | End: 2025-05-04 | Stop reason: HOSPADM

## 2025-04-30 RX ORDER — FERROUS SULFATE 325(65) MG
325 TABLET ORAL
Status: DISCONTINUED | OUTPATIENT
Start: 2025-05-01 | End: 2025-05-04 | Stop reason: HOSPADM

## 2025-04-30 RX ORDER — MIDODRINE HYDROCHLORIDE 10 MG/1
10 TABLET ORAL 3 TIMES DAILY
Status: DISCONTINUED | OUTPATIENT
Start: 2025-05-01 | End: 2025-04-30

## 2025-04-30 RX ORDER — MIDODRINE HYDROCHLORIDE 10 MG/1
10 TABLET ORAL
Status: DISCONTINUED | OUTPATIENT
Start: 2025-05-01 | End: 2025-05-04 | Stop reason: HOSPADM

## 2025-04-30 RX ORDER — POLYETHYLENE GLYCOL 3350 17 G/17G
17 POWDER, FOR SOLUTION ORAL DAILY PRN
Status: DISCONTINUED | OUTPATIENT
Start: 2025-04-30 | End: 2025-05-04 | Stop reason: HOSPADM

## 2025-04-30 RX ORDER — MECLIZINE HYDROCHLORIDE 25 MG/1
12.5 TABLET ORAL 3 TIMES DAILY PRN
Status: DISCONTINUED | OUTPATIENT
Start: 2025-04-30 | End: 2025-05-04 | Stop reason: HOSPADM

## 2025-04-30 RX ORDER — ASPIRIN 81 MG/1
81 TABLET, CHEWABLE ORAL DAILY
Status: DISCONTINUED | OUTPATIENT
Start: 2025-05-01 | End: 2025-05-04

## 2025-04-30 RX ORDER — SODIUM CHLORIDE 0.9 % (FLUSH) 0.9 %
5-40 SYRINGE (ML) INJECTION PRN
Status: DISCONTINUED | OUTPATIENT
Start: 2025-04-30 | End: 2025-05-04 | Stop reason: HOSPADM

## 2025-04-30 RX ORDER — ACETAMINOPHEN 325 MG/1
650 TABLET ORAL EVERY 6 HOURS PRN
Status: DISCONTINUED | OUTPATIENT
Start: 2025-04-30 | End: 2025-05-04 | Stop reason: HOSPADM

## 2025-04-30 RX ORDER — ATORVASTATIN CALCIUM 40 MG/1
40 TABLET, FILM COATED ORAL DAILY
COMMUNITY

## 2025-04-30 RX ORDER — IOPAMIDOL 755 MG/ML
75 INJECTION, SOLUTION INTRAVASCULAR
Status: COMPLETED | OUTPATIENT
Start: 2025-04-30 | End: 2025-04-30

## 2025-04-30 RX ORDER — MECLIZINE HYDROCHLORIDE 25 MG/1
12.5 TABLET ORAL ONCE
Status: COMPLETED | OUTPATIENT
Start: 2025-04-30 | End: 2025-04-30

## 2025-04-30 RX ORDER — HYDRALAZINE HYDROCHLORIDE 20 MG/ML
5 INJECTION INTRAMUSCULAR; INTRAVENOUS EVERY 6 HOURS PRN
Status: DISCONTINUED | OUTPATIENT
Start: 2025-04-30 | End: 2025-05-04 | Stop reason: HOSPADM

## 2025-04-30 RX ORDER — DROXIDOPA 100 MG/1
100 CAPSULE ORAL 3 TIMES DAILY
Status: DISCONTINUED | OUTPATIENT
Start: 2025-05-01 | End: 2025-05-02

## 2025-04-30 RX ORDER — SODIUM CHLORIDE 0.9 % (FLUSH) 0.9 %
10 SYRINGE (ML) INJECTION PRN
Status: DISCONTINUED | OUTPATIENT
Start: 2025-04-30 | End: 2025-05-04 | Stop reason: HOSPADM

## 2025-04-30 RX ORDER — POTASSIUM CHLORIDE 7.45 MG/ML
10 INJECTION INTRAVENOUS PRN
Status: DISCONTINUED | OUTPATIENT
Start: 2025-04-30 | End: 2025-05-04 | Stop reason: HOSPADM

## 2025-04-30 RX ORDER — SODIUM CHLORIDE 9 MG/ML
INJECTION, SOLUTION INTRAVENOUS PRN
Status: DISCONTINUED | OUTPATIENT
Start: 2025-04-30 | End: 2025-05-04 | Stop reason: HOSPADM

## 2025-04-30 RX ORDER — RANOLAZINE 500 MG/1
1000 TABLET, EXTENDED RELEASE ORAL 2 TIMES DAILY
Status: DISCONTINUED | OUTPATIENT
Start: 2025-05-01 | End: 2025-05-04 | Stop reason: HOSPADM

## 2025-04-30 RX ORDER — ONDANSETRON 2 MG/ML
4 INJECTION INTRAMUSCULAR; INTRAVENOUS EVERY 6 HOURS PRN
Status: DISCONTINUED | OUTPATIENT
Start: 2025-04-30 | End: 2025-05-04 | Stop reason: HOSPADM

## 2025-04-30 RX ORDER — LEVOTHYROXINE SODIUM 75 UG/1
75 TABLET ORAL DAILY
Status: DISCONTINUED | OUTPATIENT
Start: 2025-05-01 | End: 2025-05-04 | Stop reason: HOSPADM

## 2025-04-30 RX ORDER — MAGNESIUM SULFATE HEPTAHYDRATE 40 MG/ML
2000 INJECTION, SOLUTION INTRAVENOUS PRN
Status: DISCONTINUED | OUTPATIENT
Start: 2025-04-30 | End: 2025-05-04 | Stop reason: HOSPADM

## 2025-04-30 RX ORDER — ACETAMINOPHEN 650 MG/1
650 SUPPOSITORY RECTAL EVERY 6 HOURS PRN
Status: DISCONTINUED | OUTPATIENT
Start: 2025-04-30 | End: 2025-05-04 | Stop reason: HOSPADM

## 2025-04-30 RX ORDER — TRAMADOL HYDROCHLORIDE 50 MG/1
50 TABLET ORAL ONCE
Status: COMPLETED | OUTPATIENT
Start: 2025-04-30 | End: 2025-04-30

## 2025-04-30 RX ORDER — FLUDROCORTISONE ACETATE 0.1 MG/1
0.05 TABLET ORAL DAILY
Status: DISCONTINUED | OUTPATIENT
Start: 2025-05-01 | End: 2025-05-04 | Stop reason: HOSPADM

## 2025-04-30 RX ORDER — AMIODARONE HYDROCHLORIDE 200 MG/1
200 TABLET ORAL DAILY
Status: DISCONTINUED | OUTPATIENT
Start: 2025-05-01 | End: 2025-05-04 | Stop reason: HOSPADM

## 2025-04-30 RX ORDER — TRAMADOL HYDROCHLORIDE 50 MG/1
50 TABLET ORAL EVERY 8 HOURS PRN
Status: DISCONTINUED | OUTPATIENT
Start: 2025-04-30 | End: 2025-05-04 | Stop reason: HOSPADM

## 2025-04-30 RX ORDER — ENOXAPARIN SODIUM 100 MG/ML
40 INJECTION SUBCUTANEOUS DAILY
Status: DISCONTINUED | OUTPATIENT
Start: 2025-05-01 | End: 2025-05-04 | Stop reason: HOSPADM

## 2025-04-30 RX ORDER — PANTOPRAZOLE SODIUM 40 MG/1
40 TABLET, DELAYED RELEASE ORAL
Status: DISCONTINUED | OUTPATIENT
Start: 2025-05-01 | End: 2025-05-04 | Stop reason: HOSPADM

## 2025-04-30 RX ORDER — CLOPIDOGREL BISULFATE 75 MG/1
75 TABLET ORAL DAILY
COMMUNITY

## 2025-04-30 RX ORDER — 0.9 % SODIUM CHLORIDE 0.9 %
100 INTRAVENOUS SOLUTION INTRAVENOUS ONCE
Status: COMPLETED | OUTPATIENT
Start: 2025-04-30 | End: 2025-04-30

## 2025-04-30 RX ORDER — POTASSIUM CHLORIDE 1500 MG/1
40 TABLET, EXTENDED RELEASE ORAL PRN
Status: DISCONTINUED | OUTPATIENT
Start: 2025-04-30 | End: 2025-05-04 | Stop reason: HOSPADM

## 2025-04-30 RX ORDER — ATORVASTATIN CALCIUM 40 MG/1
40 TABLET, FILM COATED ORAL DAILY
Status: DISCONTINUED | OUTPATIENT
Start: 2025-05-01 | End: 2025-05-04 | Stop reason: HOSPADM

## 2025-04-30 RX ORDER — CLOPIDOGREL BISULFATE 75 MG/1
75 TABLET ORAL DAILY
Status: DISCONTINUED | OUTPATIENT
Start: 2025-05-01 | End: 2025-05-04 | Stop reason: HOSPADM

## 2025-04-30 RX ORDER — SUCRALFATE 1 G/1
1 TABLET ORAL 4 TIMES DAILY
Status: DISCONTINUED | OUTPATIENT
Start: 2025-05-01 | End: 2025-05-04 | Stop reason: HOSPADM

## 2025-04-30 RX ORDER — CARBIDOPA AND LEVODOPA 25; 100 MG/1; MG/1
1 TABLET ORAL 4 TIMES DAILY
Status: DISCONTINUED | OUTPATIENT
Start: 2025-05-01 | End: 2025-05-04 | Stop reason: HOSPADM

## 2025-04-30 RX ORDER — ONDANSETRON 4 MG/1
4 TABLET, ORALLY DISINTEGRATING ORAL EVERY 8 HOURS PRN
Status: DISCONTINUED | OUTPATIENT
Start: 2025-04-30 | End: 2025-05-04 | Stop reason: HOSPADM

## 2025-04-30 RX ADMIN — TRAMADOL HYDROCHLORIDE 50 MG: 50 TABLET, COATED ORAL at 21:11

## 2025-04-30 RX ADMIN — MECLIZINE HYDROCHLORIDE 12.5 MG: 25 TABLET ORAL at 21:11

## 2025-04-30 RX ADMIN — SODIUM CHLORIDE 100 ML: 9 INJECTION, SOLUTION INTRAVENOUS at 19:35

## 2025-04-30 RX ADMIN — SODIUM CHLORIDE, PRESERVATIVE FREE 10 ML: 5 INJECTION INTRAVENOUS at 19:35

## 2025-04-30 RX ADMIN — IOPAMIDOL 75 ML: 755 INJECTION, SOLUTION INTRAVENOUS at 19:35

## 2025-04-30 ASSESSMENT — PAIN DESCRIPTION - LOCATION: LOCATION: NECK

## 2025-04-30 ASSESSMENT — PAIN SCALES - GENERAL
PAINLEVEL_OUTOF10: 8
PAINLEVEL_OUTOF10: 5

## 2025-04-30 ASSESSMENT — HEART SCORE
ECG: NORMAL
ECG: NORMAL

## 2025-04-30 ASSESSMENT — PAIN DESCRIPTION - DESCRIPTORS: DESCRIPTORS: DISCOMFORT

## 2025-04-30 ASSESSMENT — PAIN - FUNCTIONAL ASSESSMENT: PAIN_FUNCTIONAL_ASSESSMENT: NONE - DENIES PAIN

## 2025-04-30 ASSESSMENT — PAIN DESCRIPTION - ORIENTATION: ORIENTATION: POSTERIOR

## 2025-04-30 NOTE — ED PROVIDER NOTES
[facility]  eMERGENCY dEPARTMENT eNCOUnter   Attending Attestation     Pt Name: Rowdy Marie  MRN: [id number]  Birthdate 1950  Date of evaluation: 25       Rowdy Marie is a 75 y.o. male who presents with Chest Pain      History:   75-year-old male presenting to the ER complaining of chest pain.    Exam: Vitals:   Vitals:    25 2111 25 2123 25 2330 25 2345   BP:   (!) 181/82    Pulse:  59 63    Resp: 16  16    Temp:   97.5 °F (36.4 °C)    TempSrc:   Oral    SpO2:  95% 98%    Weight:    70.8 kg (156 lb)   Height:    1.803 m (5' 11\")     Cardiac workup in the ER did not display positive signs of acute cardiac ischemia.  EKG was reviewed and interpreted by myself, the ED physician.  Patient has a moderate score on the heart score scale and was admitted after speaking with the admitting team.  Patient understands and agrees with the plan.    History: 0  EC  Patient Age: 1  *Risk factors for Atherosclerotic disease: Hypertension; Coronary Artery Disease; Hypercholesterolemia  Risk Factors: 2  Troponin: 1  Heart Score Total: 5        I performed a history and physical examination of the patient and discussed management with the resident. I reviewed the resident’s note and agree with the documented findings and plan of care. Any areas of disagreement are noted on the chart. I was personally present for the key portions of any procedures. I have documented in the chart those procedures where I was not present during the key portions. I have personally reviewed all images and agree with the resident's interpretation. I have reviewed the emergency nurses triage note. I agree with the chief complaint, past medical history, past surgical history, allergies, medications, social and family history as documented unless otherwise noted below. Documentation of the HPI, Physical Exam and Medical Decision Making performed by medical students or scribes is based on my personal

## 2025-04-30 NOTE — ED PROVIDER NOTES
[facility] EMERGENCY DEPARTMENT  Emergency Department Encounter  Emergency Medicine Resident     Pt Name:Rowdy Marie  MRN: [id number]  Birthdate 1950  Date of evaluation: 4/30/25  PCP:  Aracely Pierre MD  Note Started: 6:12 PM EDT      CHIEF COMPLAINT       Chief Complaint   Patient presents with    Chest Pain       HISTORY OF PRESENT ILLNESS  (Location/Symptom, Timing/Onset, Context/Setting, Quality, Duration, Modifying Factors, Severity.)      Rowdy Marie is a 75 y.o. male brought in by his daughter for evaluation of lightheadedness.  Patient reports feeling like he is off balance and going to pass out, significantly worse over the last week or so.  He has almost fallen several times, he has been able to catch himself on the railing that his family put in his room so he has not fallen to the ground or hit his head at any point.  No loss of consciousness.  He complains of several days of progressively worsening heartburn, decreased p.o. intake, lack of appetite.  Yesterday he developed central chest pain as well as several episodes of vomiting.  No fever, chills, shortness of breath, urinary symptoms, constipation or diarrhea.    PAST MEDICAL / SURGICAL / SOCIAL / FAMILY HISTORY      has a past medical history of A-fib (HCC), PREET (acute kidney injury), Anemia, CAD (coronary artery disease), Esophageal stricture, Hyperlipidemia, Hypokalemia, Hypomagnesemia, NSTEMI (non-ST elevated myocardial infarction) ([facility]), Orthostatic hypotension, Parkinsons ([facility]), Presence of Watchman left atrial appendage closure device, and Prostate CA ([facility]).     has a past surgical history that includes Coronary artery bypass graft; Cardiac catheterization; Coronary angioplasty with stent; Diagnostic Cardiac Cath Lab Procedure (04/11/2024); Cardiac procedure (N/A, 4/11/2024); Cardiac procedure (N/A, 4/11/2024); Cardiac procedure (N/A, 5/22/2024); and Cardiac procedure (N/A, 5/22/2024).    Social History     Socioeconomic  MD Aracely   potassium chloride (KLOR-CON) 10 MEQ extended release tablet TAKE 1 TABLET BY MOUTH EVERY MORNING 4/21/25   Aracely Pierre MD   vitamin B-12 (CYANOCOBALAMIN) 1000 MCG tablet Take 1 tablet by mouth daily 4/8/25   Aracely Pierre MD   ranolazine (RANEXA) 1000 MG extended release tablet Take 1 tablet by mouth 2 times daily 4/1/25   Aracely Pierre MD   carbidopa-levodopa (SINEMET)  MG per tablet Take 1 tablet by mouth 4 times daily 4/1/25   Aracely Pierre MD   sucralfate (CARAFATE) 1 GM tablet TAKE 1 TABLET BY MOUTH FOUR TIMES DAILY 2/19/25   Aracely Pierre MD   midodrine (PROAMATINE) 5 MG tablet Take 2 tablets by mouth 3 times daily 2/18/25   Aracely Pierre MD   droxidopa (NORTHERA) 100 MG CAPS capsule Take 1 capsule by mouth 3 times daily 2/11/25   Aracely Pierre MD   carvedilol (COREG) 3.125 MG tablet TAKE 1 TABLET BY MOUTH TWICE DAILY 2/7/25   Aracely Pierre MD   levothyroxine (SYNTHROID) 75 MCG tablet TAKE 1 TABLET BY MOUTH EVERY DAY 1/6/25   Aracely Pierre MD   ferrous sulfate (FEROSUL) 325 (65 Fe) MG tablet Take 1 tablet by mouth 3 times daily (with meals) 12/17/24   Yandy Jones MD   mirtazapine (REMERON) 7.5 MG tablet Take 1 tablet by mouth nightly 12/17/24   Yandy Jones MD   pantoprazole (PROTONIX) 40 MG tablet Take 1 tablet by mouth every morning (before breakfast) 12/17/24   Yandy Jones MD   meclizine (ANTIVERT) 12.5 MG tablet Take 1 tablet by mouth 3 times daily as needed for Dizziness 12/17/24   Yandy Jones MD   fludrocortisone (FLORINEF) 0.1 MG tablet Take 0.5 tablets by mouth daily 12/4/24 6/2/25  Yandy Jones MD   ticagrelor (BRILINTA) 90 MG TABS tablet Take 1 tablet by mouth 2 times daily  Patient not taking: Reported on 2/18/2025 11/15/24 2/13/25  Yandy Jones MD   nystatin (MYCOSTATIN) 593060 UNIT/GM powder Apply 3 times daily.  Patient taking differently: Apply 3 times daily to groin 8/9/24   Yandy Jonse MD   Compression Stockings MISC by Does not

## 2025-05-01 ENCOUNTER — HOSPITAL ENCOUNTER (INPATIENT)
Age: 75
Discharge: HOME | DRG: 313 | End: 2025-05-03
Payer: MEDICARE

## 2025-05-01 ENCOUNTER — TELEPHONE (OUTPATIENT)
Dept: INTERNAL MEDICINE CLINIC | Age: 75
End: 2025-05-01

## 2025-05-01 LAB
ANION GAP SERPL CALCULATED.3IONS-SCNC: 12 MMOL/L (ref 9–16)
BUN SERPL-MCNC: 18 MG/DL (ref 8–23)
CALCIUM SERPL-MCNC: 8.7 MG/DL (ref 8.6–10.4)
CHLORIDE SERPL-SCNC: 104 MMOL/L (ref 98–107)
CO2 SERPL-SCNC: 22 MMOL/L (ref 20–31)
CREAT SERPL-MCNC: 1.1 MG/DL (ref 0.7–1.2)
ECHO AO ROOT DIAM: 3 CM
ECHO AO ROOT INDEX: 1.58 CM/M2
ECHO AV AREA PEAK VELOCITY: 5.5 CM2
ECHO AV AREA VTI: 5 CM2
ECHO AV AREA/BSA PEAK VELOCITY: 2.9 CM2/M2
ECHO AV AREA/BSA VTI: 2.6 CM2/M2
ECHO AV MEAN GRADIENT: 3 MMHG
ECHO AV MEAN VELOCITY: 0.9 M/S
ECHO AV PEAK GRADIENT: 6 MMHG
ECHO AV PEAK VELOCITY: 1.2 M/S
ECHO AV VELOCITY RATIO: 0.67
ECHO AV VTI: 29 CM
ECHO BSA: 1.88 M2
ECHO EST RA PRESSURE: 3 MMHG
ECHO LA AREA 2C: 21.7 CM2
ECHO LA AREA 4C: 15.9 CM2
ECHO LA DIAMETER INDEX: 2.37 CM/M2
ECHO LA DIAMETER: 4.5 CM
ECHO LA MAJOR AXIS: 5.3 CM
ECHO LA MINOR AXIS: 5.9 CM
ECHO LA TO AORTIC ROOT RATIO: 1.5
ECHO LA VOL BP: 54 ML (ref 18–58)
ECHO LA VOL MOD A2C: 66 ML (ref 18–58)
ECHO LA VOL MOD A4C: 39 ML (ref 18–58)
ECHO LA VOL/BSA BIPLANE: 28 ML/M2 (ref 16–34)
ECHO LA VOLUME INDEX MOD A2C: 35 ML/M2 (ref 16–34)
ECHO LA VOLUME INDEX MOD A4C: 21 ML/M2 (ref 16–34)
ECHO LV E' LATERAL VELOCITY: 8.05 CM/S
ECHO LV E' SEPTAL VELOCITY: 5.98 CM/S
ECHO LV EF PHYSICIAN: 50 %
ECHO LV FRACTIONAL SHORTENING: 20 % (ref 28–44)
ECHO LV INTERNAL DIMENSION DIASTOLE INDEX: 2.63 CM/M2
ECHO LV INTERNAL DIMENSION DIASTOLIC: 5 CM (ref 4.2–5.9)
ECHO LV INTERNAL DIMENSION SYSTOLIC INDEX: 2.11 CM/M2
ECHO LV INTERNAL DIMENSION SYSTOLIC: 4 CM
ECHO LV IVSD: 0.8 CM (ref 0.6–1)
ECHO LV MASS 2D: 135.8 G (ref 88–224)
ECHO LV MASS INDEX 2D: 71.5 G/M2 (ref 49–115)
ECHO LV POSTERIOR WALL DIASTOLIC: 0.8 CM (ref 0.6–1)
ECHO LV RELATIVE WALL THICKNESS RATIO: 0.32
ECHO LVOT AREA: 8 CM2
ECHO LVOT AV VTI INDEX: 0.62
ECHO LVOT DIAM: 3.2 CM
ECHO LVOT MEAN GRADIENT: 1 MMHG
ECHO LVOT PEAK GRADIENT: 3 MMHG
ECHO LVOT PEAK VELOCITY: 0.8 M/S
ECHO LVOT STROKE VOLUME INDEX: 76.2 ML/M2
ECHO LVOT SV: 144.7 ML
ECHO LVOT VTI: 18 CM
ECHO MV A VELOCITY: 0.78 M/S
ECHO MV E DECELERATION TIME (DT): 160 MS
ECHO MV E VELOCITY: 0.58 M/S
ECHO MV E/A RATIO: 0.74
ECHO MV E/E' LATERAL: 7.2
ECHO MV E/E' RATIO (AVERAGED): 8.45
ECHO MV E/E' SEPTAL: 9.7
ECHO PV MAX VELOCITY: 0.9 M/S
ECHO PV PEAK GRADIENT: 3 MMHG
ECHO RA AREA 4C: 12.1 CM2
ECHO RA END SYSTOLIC VOLUME APICAL 4 CHAMBER INDEX BSA: 12 ML/M2
ECHO RA VOLUME: 23 ML
ECHO RIGHT VENTRICULAR SYSTOLIC PRESSURE (RVSP): 21 MMHG
ECHO RV FREE WALL PEAK S': 8.6 CM/S
ECHO RV INTERNAL DIMENSION: 2.8 CM
ECHO RV TAPSE: 1.1 CM (ref 1.7–?)
ECHO TV REGURGITANT MAX VELOCITY: 2.14 M/S
ECHO TV REGURGITANT PEAK GRADIENT: 18 MMHG
EKG ATRIAL RATE: 64 BPM
EKG P AXIS: 63 DEGREES
EKG P-R INTERVAL: 172 MS
EKG Q-T INTERVAL: 446 MS
EKG QRS DURATION: 104 MS
EKG QTC CALCULATION (BAZETT): 460 MS
EKG R AXIS: 52 DEGREES
EKG T AXIS: 30 DEGREES
EKG VENTRICULAR RATE: 64 BPM
ERYTHROCYTE [DISTWIDTH] IN BLOOD BY AUTOMATED COUNT: 13.6 % (ref 11.5–14.9)
GFR, ESTIMATED: 70 ML/MIN/1.73M2
GLUCOSE SERPL-MCNC: 101 MG/DL (ref 74–99)
HCT VFR BLD AUTO: 35.5 % (ref 41–53)
HGB BLD-MCNC: 11.9 G/DL (ref 13.5–17.5)
MAGNESIUM SERPL-MCNC: 1.9 MG/DL (ref 1.6–2.4)
MCH RBC QN AUTO: 32.1 PG (ref 26–34)
MCHC RBC AUTO-ENTMCNC: 33.6 G/DL (ref 31–37)
MCV RBC AUTO: 95.7 FL (ref 80–100)
PLATELET # BLD AUTO: 215 K/UL (ref 150–450)
PMV BLD AUTO: 8.9 FL (ref 6–12)
POTASSIUM SERPL-SCNC: 3.4 MMOL/L (ref 3.7–5.3)
RBC # BLD AUTO: 3.7 M/UL (ref 4.5–5.9)
SODIUM SERPL-SCNC: 138 MMOL/L (ref 136–145)
WBC OTHER # BLD: 8.8 K/UL (ref 3.5–11)

## 2025-05-01 PROCEDURE — 93010 ELECTROCARDIOGRAM REPORT: CPT | Performed by: INTERNAL MEDICINE

## 2025-05-01 PROCEDURE — 2500000003 HC RX 250 WO HCPCS

## 2025-05-01 PROCEDURE — 93306 TTE W/DOPPLER COMPLETE: CPT | Performed by: INTERNAL MEDICINE

## 2025-05-01 PROCEDURE — 83735 ASSAY OF MAGNESIUM: CPT

## 2025-05-01 PROCEDURE — 99223 1ST HOSP IP/OBS HIGH 75: CPT

## 2025-05-01 PROCEDURE — 36415 COLL VENOUS BLD VENIPUNCTURE: CPT

## 2025-05-01 PROCEDURE — 2580000003 HC RX 258: Performed by: INTERNAL MEDICINE

## 2025-05-01 PROCEDURE — 6360000002 HC RX W HCPCS

## 2025-05-01 PROCEDURE — 6370000000 HC RX 637 (ALT 250 FOR IP)

## 2025-05-01 PROCEDURE — 2060000000 HC ICU INTERMEDIATE R&B

## 2025-05-01 PROCEDURE — 93306 TTE W/DOPPLER COMPLETE: CPT

## 2025-05-01 PROCEDURE — 80048 BASIC METABOLIC PNL TOTAL CA: CPT

## 2025-05-01 PROCEDURE — 85027 COMPLETE CBC AUTOMATED: CPT

## 2025-05-01 RX ORDER — SODIUM CHLORIDE 9 MG/ML
INJECTION, SOLUTION INTRAVENOUS CONTINUOUS
Status: DISCONTINUED | OUTPATIENT
Start: 2025-05-01 | End: 2025-05-02

## 2025-05-01 RX ADMIN — SODIUM CHLORIDE, PRESERVATIVE FREE 10 ML: 5 INJECTION INTRAVENOUS at 00:05

## 2025-05-01 RX ADMIN — MICONAZOLE NITRATE: 20 POWDER TOPICAL at 00:10

## 2025-05-01 RX ADMIN — ATORVASTATIN CALCIUM 40 MG: 40 TABLET, FILM COATED ORAL at 10:06

## 2025-05-01 RX ADMIN — MICONAZOLE NITRATE: 20 POWDER TOPICAL at 21:38

## 2025-05-01 RX ADMIN — TRAMADOL HYDROCHLORIDE 50 MG: 50 TABLET, COATED ORAL at 05:21

## 2025-05-01 RX ADMIN — SUCRALFATE 1 G: 1 TABLET ORAL at 17:42

## 2025-05-01 RX ADMIN — MECLIZINE HYDROCHLORIDE 12.5 MG: 25 TABLET ORAL at 05:22

## 2025-05-01 RX ADMIN — SUCRALFATE 1 G: 1 TABLET ORAL at 10:06

## 2025-05-01 RX ADMIN — CARBIDOPA AND LEVODOPA 1 TABLET: 25; 100 TABLET ORAL at 21:35

## 2025-05-01 RX ADMIN — Medication 325 MG: at 17:42

## 2025-05-01 RX ADMIN — RANOLAZINE 1000 MG: 500 TABLET, EXTENDED RELEASE ORAL at 00:05

## 2025-05-01 RX ADMIN — MIDODRINE HYDROCHLORIDE 10 MG: 10 TABLET ORAL at 17:42

## 2025-05-01 RX ADMIN — MIRTAZAPINE 7.5 MG: 15 TABLET, FILM COATED ORAL at 21:35

## 2025-05-01 RX ADMIN — DROXIDOPA 100 MG: 100 CAPSULE ORAL at 15:03

## 2025-05-01 RX ADMIN — DROXIDOPA 100 MG: 100 CAPSULE ORAL at 00:10

## 2025-05-01 RX ADMIN — CARBIDOPA AND LEVODOPA 1 TABLET: 25; 100 TABLET ORAL at 00:05

## 2025-05-01 RX ADMIN — CARBIDOPA AND LEVODOPA 1 TABLET: 25; 100 TABLET ORAL at 17:42

## 2025-05-01 RX ADMIN — FLUDROCORTISONE ACETATE 0.05 MG: 0.1 TABLET ORAL at 10:17

## 2025-05-01 RX ADMIN — SUCRALFATE 1 G: 1 TABLET ORAL at 21:35

## 2025-05-01 RX ADMIN — CLOPIDOGREL BISULFATE 75 MG: 75 TABLET, FILM COATED ORAL at 10:07

## 2025-05-01 RX ADMIN — MICONAZOLE NITRATE: 20 POWDER TOPICAL at 10:19

## 2025-05-01 RX ADMIN — RANOLAZINE 1000 MG: 500 TABLET, EXTENDED RELEASE ORAL at 21:36

## 2025-05-01 RX ADMIN — RANOLAZINE 1000 MG: 500 TABLET, EXTENDED RELEASE ORAL at 10:07

## 2025-05-01 RX ADMIN — ASPIRIN 81 MG: 81 TABLET, CHEWABLE ORAL at 10:08

## 2025-05-01 RX ADMIN — MECLIZINE HYDROCHLORIDE 12.5 MG: 25 TABLET ORAL at 21:35

## 2025-05-01 RX ADMIN — CARBIDOPA AND LEVODOPA 1 TABLET: 25; 100 TABLET ORAL at 10:07

## 2025-05-01 RX ADMIN — MIDODRINE HYDROCHLORIDE 10 MG: 10 TABLET ORAL at 13:00

## 2025-05-01 RX ADMIN — SODIUM CHLORIDE: 0.9 INJECTION, SOLUTION INTRAVENOUS at 12:37

## 2025-05-01 RX ADMIN — DROXIDOPA 100 MG: 100 CAPSULE ORAL at 10:18

## 2025-05-01 RX ADMIN — ENOXAPARIN SODIUM 40 MG: 100 INJECTION SUBCUTANEOUS at 10:20

## 2025-05-01 RX ADMIN — POTASSIUM CHLORIDE 40 MEQ: 1500 TABLET, EXTENDED RELEASE ORAL at 10:11

## 2025-05-01 RX ADMIN — Medication 325 MG: at 10:08

## 2025-05-01 RX ADMIN — HYDRALAZINE HYDROCHLORIDE 5 MG: 20 INJECTION INTRAMUSCULAR; INTRAVENOUS at 00:05

## 2025-05-01 RX ADMIN — PANTOPRAZOLE SODIUM 40 MG: 40 TABLET, DELAYED RELEASE ORAL at 05:22

## 2025-05-01 RX ADMIN — DROXIDOPA 100 MG: 100 CAPSULE ORAL at 21:36

## 2025-05-01 RX ADMIN — AMIODARONE HYDROCHLORIDE 200 MG: 200 TABLET ORAL at 10:07

## 2025-05-01 RX ADMIN — LEVOTHYROXINE SODIUM 75 MCG: 0.07 TABLET ORAL at 05:22

## 2025-05-01 ASSESSMENT — PAIN DESCRIPTION - PAIN TYPE: TYPE: ACUTE PAIN;CHRONIC PAIN

## 2025-05-01 ASSESSMENT — PAIN DESCRIPTION - DESCRIPTORS: DESCRIPTORS: ACHING

## 2025-05-01 ASSESSMENT — PAIN - FUNCTIONAL ASSESSMENT: PAIN_FUNCTIONAL_ASSESSMENT: ACTIVITIES ARE NOT PREVENTED

## 2025-05-01 ASSESSMENT — PAIN DESCRIPTION - ONSET: ONSET: ON-GOING

## 2025-05-01 ASSESSMENT — PAIN DESCRIPTION - LOCATION: LOCATION: CHEST;HEAD;BACK

## 2025-05-01 ASSESSMENT — PAIN SCALES - GENERAL: PAINLEVEL_OUTOF10: 7

## 2025-05-01 ASSESSMENT — PAIN DESCRIPTION - FREQUENCY: FREQUENCY: CONTINUOUS

## 2025-05-01 NOTE — ACP (ADVANCE CARE PLANNING)
Advance Care Planning     Advance Care Planning Activator (Inpatient)  Conversation Note      Date of ACP Conversation: 5/1/2025     Conversation Conducted with: Patient with Decision Making Capacity    ACP Activator: Janice Rolle RN        Health Care Decision Maker:     Current Designated Health Care Decision Maker:     Primary Decision Maker: Kye Marie - Daughter-in-Law - [phone]    Secondary Decision Maker: Elliott Marie - Child - [phone]    Supplemental (Other) Decision Maker: Patrick Marie - Child - [phone]        Care Preferences    Ventilation:  \"If you were in your present state of health and suddenly became very ill and were unable to breathe on your own, what would your preference be about the use of a ventilator (breathing machine) if it were available to you?\"      Would the patient desire the use of ventilator (breathing machine)?: yes    \"If your health worsens and it becomes clear that your chance of recovery is unlikely, what would your preference be about the use of a ventilator (breathing machine) if it were available to you?\"     Would the patient desire the use of ventilator (breathing machine)?: Yes      Resuscitation  \"CPR works best to restart the heart when there is a sudden event, like a heart attack, in someone who is otherwise healthy. Unfortunately, CPR does not typically restart the heart for people who have serious health conditions or who are very sick.\"    \"In the event your heart stopped as a result of an underlying serious health condition, would you want attempts to be made to restart your heart (answer \"yes\" for attempt to resuscitate) or would you prefer a natural death (answer \"no\" for do not attempt to resuscitate)?\" yes       [] Yes   [] No   Educated Patient / Decision Maker regarding differences between Advance Directives and portable DNR orders.    Length of ACP Conversation in minutes:      Conversation Outcomes:  ACP discussion completed    Follow-up

## 2025-05-01 NOTE — PROGRESS NOTES
05/01/25 1618   Encounter Summary   Encounter Overview/Reason Initial Encounter   Referral/Consult From Rounding   Complexity of Encounter Low   Spiritual/Emotional needs   Type Spiritual Support   Assessment/Intervention/Outcome   Assessment Unable to assess  (sleeping)   Intervention Prayer (assurance of)/[facility]

## 2025-05-01 NOTE — ED NOTES
Situation  Name: Rowdy Marie  Admitting: Dx Chest pain [R07.9]  Isolation Precautions No active isolations  Code Status: Full Code  Alerts: N/A  Where is the patient from? Home  HPI: Rowdy Marie is a 75 y.o. male brought in by his daughter for evaluation of lightheadedness.  Patient reports feeling like he is off balance and going to pass out, significantly worse over the last week or so.  He has almost fallen several times, he has been able to catch himself on the railing that his family put in his room so he has not fallen to the ground or hit his head at any point.  No loss of consciousness.  He complains of several days of progressively worsening heartburn, decreased p.o. intake, lack of appetite.  Yesterday he developed central chest pain as well as several episodes of vomiting.     Background  PMH:   Past Medical History:   Diagnosis Date    A-fib ([facility])     PREET (acute kidney injury)     Anemia     CAD (coronary artery disease)     Esophageal stricture     Hyperlipidemia     Hypokalemia     Hypomagnesemia     NSTEMI (non-ST elevated myocardial infarction) ([facility])     Orthostatic hypotension     Parkinsons ([facility])     Presence of Watchman left atrial appendage closure device     Prostate CA ([facility])      Allergies:   Allergies   Allergen Reactions    Latex     Atorvastatin     Fluvastatin     Imdur [Isosorbide Nitrate]     Isosorbide Headaches     Patient denies having any allergy to this medication    Methadone     Nalbuphine     Oxycodone     Penicillins     Rosuvastatin     Statins     Zetia [Ezetimibe]      Diet: No diet orders on file  Activity:   Ambulation status: Cane  Precautions: fall  Flu & Covid:  NA  Medications Administered         iopamidol (ISOVUE-370) 76 % injection 75 mL Admin Date  04/30/2025 Action  Given Dose  75 mL Rate   Route  IntraVENous Documented By  Esther English        meclizine (ANTIVERT) tablet 12.5 mg Admin Date  04/30/2025 Action  Given Dose  12.5 mg Rate   Route  Oral  Results   Component Value Date/Time    TROPHS 18 04/30/2025 08:18 PM    TROPHS 23 04/30/2025 06:55 PM    MYOGLOBIN 61 10/04/2024 03:07 AM     Coags:   Lab Results   Component Value Date/Time    PROTIME 14.5 10/03/2024 03:16 PM    INR 1.1 10/03/2024 03:16 PM    APTT 38.7 08/23/2024 03:55 AM    ANTIXAUHEP 0.44 05/22/2024 09:25 AM     BNP:   Lab Results   Component Value Date/Time    PROBNP 3,481 08/23/2024 04:56 AM     Lactic Acid:No results found for: \"LACTATE\"  Radiology results:   CT HEAD WO CONTRAST   Final Result   1. No acute intracranial abnormality.            CTA HEAD NECK W CONTRAST   Final Result   No large vessel occlusion in the head or neck.         XR CHEST PORTABLE   Final Result   Stable chronic changes with no acute abnormality seen.           Wounds      Recommendation  Outstanding testing: trop 23-18  Family notified: Yes  Contact information: see contact info  Decision Maker: Self  Consulting MD: None    Balbir Ramirez RN

## 2025-05-01 NOTE — CONSULTS
Date:   5/1/2025  Patient name: Rowdy Marie  Date of admission:  4/30/2025  6:12 PM  MRN:   [id number]  YOB: 1950  PCP: Aracely Pierre MD    Reason for Admission: Chest pain [R07.9]  Chest pain, unspecified type [R07.9]    Cardiology consult: Severe postural hypotension ,multivessel coronary artery disease, history of A-fib, CHF with preserved ejection fraction        Referring physician: Dr Arellano Le      Impression    Admission 4/30/2025 increasing lightheadedness poor balance near falls, chest pain like burning, nausea and vomiting, high-sensitivity troponin 18, no new ECG abnormalities  CT head and CTA head and neck unremarkable 4/30/2025  Severe postural hypotension supine blood pressure 120/80 patient dropped below 80 mmHg on standing  Paroxysmal atrial fibrillation Watchman procedure 11/14/2023, current rhythm sinus rhythm  Ejection fraction 45 to 50% % 2D echo 5/1/2025, previous echo 4/23/2024 ejection fraction 50 to 55%  Coronary artery disease, CABG times 4/2014, stent placement times 5 December 2022, stent placement SVG to OM 5/22/2024  CAREY to LAD, SVG to OM 3, ramus, RCA patent grafts cardiac cath December 2019    Hypothyroidism  Hyperlipidemia  History of prostate cancer  History of anemia  History of gastritis, esophageal stricture  Parkinson disease on Sinemet  Back pain, back surgery  Episodes of dizziness, combination of previous C-spine surgery, postural hypotension/autonomic dysfunction patient has Parkinson disease on medication, dizziness aggravates by head and neck movements  History of tinnitus/ringing in the ears for years  Normal B12 1970, serum iron 57, iron saturation 21, folate more than 20  MRA neck and head without contrast 2/9/2023  No evidence of large vessel vascular occlusion or intracranial aneurysm within the limits of MR angiographic technique  No hemodynamically significant stenosis or occlusion in the visualized cervical portion of the carotid or  stenosis of the intracranial internal carotid, anterior cerebral or middle cerebral, basilar or posterior cerebral arteries no aneurysm  No aortic arch dissection no stenosis normal vertebral arteries        CT head 11/17/2024  No acute intracranial abnormality  X-ray hips 11/16/2024  No fracture or dislocation  Radiation seeds overlie the prostate    Medications:   Scheduled Meds:   aspirin  81 mg Oral Daily    enoxaparin  40 mg SubCUTAneous Daily    amiodarone  200 mg Oral Daily    atorvastatin  40 mg Oral Daily    carbidopa-levodopa  1 tablet Oral 4x Daily    clopidogrel  75 mg Oral Daily    droxidopa  100 mg Oral TID    ferrous sulfate  325 mg Oral TID WC    fludrocortisone  0.05 mg Oral Daily    levothyroxine  75 mcg Oral Daily    mirtazapine  7.5 mg Oral Nightly    miconazole   Topical BID    pantoprazole  40 mg Oral QAM AC    ranolazine  1,000 mg Oral BID    sucralfate  1 g Oral 4x daily    midodrine  10 mg Oral TID WC     Continuous Infusions:   sodium chloride       CBC:   Recent Labs     04/30/25 1855 05/01/25  0558   WBC 7.1 8.8   HGB 12.9* 11.9*    215     BMP:    Recent Labs     04/30/25 1855 05/01/25  0558    138   K 4.2 3.4*    104   CO2 26 22   BUN 20 18   CREATININE 1.3* 1.1   GLUCOSE 96 101*     Hepatic:   Recent Labs     04/30/25 1855   AST 35   ALT 11   BILITOT 0.3   ALKPHOS 79     Troponin: No results for input(s): \"TROPONINI\" in the last 72 hours.  BNP: No results for input(s): \"BNP\" in the last 72 hours.  Lipids: No results for input(s): \"CHOL\", \"HDL\" in the last 72 hours.    Invalid input(s): \"LDLCALCU\"  INR: No results for input(s): \"INR\" in the last 72 hours.    Objective:   Vitals: BP (!) 158/76   Pulse 61   Temp 98.1 °F (36.7 °C) (Oral)   Resp 15   Ht 1.803 m (5' 11\")   Wt 70.8 kg (156 lb)   SpO2 98%   BMI 21.76 kg/m²   General appearance: alert and cooperative with exam  HEENT: Head: Normal, normocephalic, atraumatic.  Neck: no JVD and supple, symmetrical,

## 2025-05-01 NOTE — H&P
[facility] Internal Medicine   Ko Elias MD; MD Aracely Walter MD, MD , Cherelle Lujan MD    [facility] Internal Medicine   IN-PATIENT SERVICE   [facility]    HISTORY AND PHYSICAL EXAMINATION            Date:   5/1/2025  Patient name:  Rowdy Marie  Date of admission:  4/30/2025  6:12 PM  MRN:   [id number]  Account:  [id number]  YOB: 1950  PCP:    Aracely Pierre MD  Room:   2092/2092-01  Code Status:    Full Code    Chief Complaint:     Chief Complaint   Patient presents with    Chest Pain       History Obtained From:     patient, electronic medical record    History of Present Illness:     Rowdy Marie is a 75 y.o. Non- / non  male who presents with Chest Pain   and is admitted to the hospital for the management of Chest pain.    Rowdy Marie is a 75 y.o. Non- / non  male who presents with Chest Pain   and is admitted to the hospital for the management of Chest pain.     Patient arrives to the ED accompanied by his daughter for evaluation of lightheadedness symptoms accompanied with chest pain.  Patient has a significant medical history of atrial fibrillation, PREET, CKD, HLD, and Parkinson's.     Patient states that over the last week he feels as though in the last week he has felt off balance and near passing out. Patient has not had any complete falls . Patient endorses a decrease in appetite, as well at heartburn that is persistent for the last three days. Patient also has centralized chest pain that started on 4/29.      At the time of assessment patient denies any fever, chills, or shortness of breath. The patient feels fatigued. He states that his chest pain is right in the center of his chest, and it is of a throbbing nature.      Patient's troponins are elevated but downtrending at 23, 18.  Patient's creatinine is 1.3 with historical value being 1.2.  Patient does not have

## 2025-05-01 NOTE — PROGRESS NOTES
[facility] Internal Medicine  Aamir Caballero MD; Fransisco Cee MD; oK Elias MD; MD Yandy Walter MD; Larissa Oro MD  [facility] Internal Medicine   IN-PATIENT SERVICE  [facility]                 Date:   4/30/2025  Patientname:  Rowdy Marie  Date of admission:  4/30/2025  6:12 PM  MRN:   [id number]  Account:  [id number]  YOB: 1950  PCP:    Aracely Pierre MD  Room:   04/04  Code Status:    Full Code      Chief Complaint:     Chief Complaint   Patient presents with    Chest Pain     History of Present Illness:     Rowdy Marie is a 75 y.o. Non- / non  male who presents with Chest Pain   and is admitted to the hospital for the management of Chest pain.    Patient arrives to the ED accompanied by his daughter for evaluation of lightheadedness symptoms accompanied with chest pain.  Patient has a significant medical history of atrial fibrillation, PREET, CKD, HLD, and Parkinson's.    Patient states that over the last week he feels as though in the last week he has felt off balance and near passing out. Patient has not had any complete falls . Patient endorses a decrease in appetite, as well at heartburn that is persistent for the last three days. Patient also has centralized chest pain that started on 4/29.     At the time of assessment patient denies any fever, chills, or shortness of breath. The patient feels fatigued. He states that his chest pain is right in the center of his chest, and it is of a throbbing nature.     Patient's troponins are elevated but downtrending at 23, 18.  Patient's creatinine is 1.3 with historical value being 1.2.  Patient does not have any significant electrolyte abnormalities, and there is no evidence of leukocytosis.    Patient has no established history with a cardiologist,, and patient underwent a heart cath in May 2024.  Plan to bring patient to medical unit for monitoring of symptoms, and  evaluation with cardiology in the a.m.  Orders placed for echocardiogram.  Anticipation of possible stress test, patient made n.p.o. at midnight.    Past Medical History:     Past Medical History:   Diagnosis Date    A-fib ([facility])     PREET (acute kidney injury)     Anemia     CAD (coronary artery disease)     Esophageal stricture     Hyperlipidemia     Hypokalemia     Hypomagnesemia     NSTEMI (non-ST elevated myocardial infarction) (Formerly [facility])     Orthostatic hypotension     Parkinsons ([facility]ly [facility])     Presence of Watchman left atrial appendage closure device     Prostate CA ([facility]ly [facility])         Past Surgical History:     Past Surgical History:   Procedure Laterality Date    CARDIAC CATHETERIZATION      CARDIAC PROCEDURE N/A 4/11/2024    sunny / Left heart cath / coronary angiography / op scmh performed by Andree Paulino MD at [facility] CARDIAC CATH LAB    CARDIAC PROCEDURE N/A 4/11/2024    Percutaneous coronary intervention performed by Andree Paulino MD at [facility] CARDIAC CATH LAB    CARDIAC PROCEDURE N/A 5/22/2024    taleb / Left heart cath / coronary angiography / op scmh performed by Antonio Love MD at [facility] CARDIAC CATH LAB    CARDIAC PROCEDURE N/A 5/22/2024    Percutaneous coronary intervention performed by Antonio Love MD at [facility] CARDIAC CATH LAB    CORONARY ANGIOPLASTY WITH STENT PLACEMENT      8 stents prior to CABG per patient, most recent 12/2023    CORONARY ARTERY BYPASS GRAFT      2014    DIAGNOSTIC CARDIAC CATH LAB PROCEDURE  04/11/2024        Medications Prior to Admission:     Prior to Admission medications    Medication Sig Start Date End Date Taking? Authorizing Provider   amiodarone (CORDARONE) 200 MG tablet TAKE 1 TABLET BY MOUTH DAILY 4/24/25   Aracely Pierre MD   potassium chloride (KLOR-CON) 10 MEQ extended release tablet TAKE 1 TABLET BY MOUTH EVERY MORNING 4/21/25   Aracely Pierre MD   vitamin B-12 (CYANOCOBALAMIN) 1000 MCG tablet Take 1 tablet by mouth daily 4/8/25   Aracely Pierre MD   ranolazine (RANEXA)

## 2025-05-01 NOTE — PROGRESS NOTES
Patient had episode of SB 39, Dr. Ladd notified. Patient now SR in the 60's. Will continue to monitor.

## 2025-05-01 NOTE — CARE COORDINATION
Case Management Assessment  Initial Evaluation    Date/Time of Evaluation: 5/1/2025 9:54 AM  Assessment Completed by: Janice Rolle RN    If patient is discharged prior to next notation, then this note serves as note for discharge by case management.    Patient Name: Rowdy Marie                   YOB: 1950  Diagnosis: Chest pain [R07.9]  Chest pain, unspecified type [R07.9]                   Date / Time: 4/30/2025  6:12 PM    Patient Admission Status: Inpatient   Readmission Risk (Low < 19, Mod (19-27), High > 27): Readmission Risk Score: 20.2    Current PCP: Aracely Pierre MD  PCP verified by CM? Yes    Chart Reviewed: Yes      History Provided by: Patient  Patient Orientation: Alert and Oriented    Patient Cognition: Alert    Hospitalization in the last 30 days (Readmission):  No    If yes, Readmission Assessment in CM Navigator will be completed.    Advance Directives:      Code Status: Full Code   Patient's Primary Decision Maker is: Named in Scanned ACP Document    Primary Decision Maker: FrankKye - Daughter-in-Law - [phone]    Secondary Decision Maker: Elliott Marie - Child - [phone]    Supplemental (Other) Decision Maker: Patrick Marie - Child - [phone]    Discharge Planning:    Patient lives with: Family Members, Children Type of Home: House  Primary Care Giver: Self  Patient Support Systems include: Family Members, Children   Current Financial resources: Medicare  Current community resources: None  Current services prior to admission: Durable Medical Equipment            Current DME: Cane, Home Aerosol, Walker, Oxygen Therapy (Comment), Shower Chair, Other (Comment) (GB, BP Cuff, Scale, Pulse OX, O2- Wears 2LNC, PRN- [employer], Concentrator/Port. Compression Sox.)            Type of Home Care services:  None    ADLS  Prior functional level: Independent in ADLs/IADLs, Assistance with the following:, Cooking, Housework, Shopping  Current functional level: Independent in

## 2025-05-02 LAB
ANION GAP SERPL CALCULATED.3IONS-SCNC: 11 MMOL/L (ref 9–16)
BNP SERPL-MCNC: 829 PG/ML (ref 0–300)
BUN SERPL-MCNC: 16 MG/DL (ref 8–23)
CALCIUM SERPL-MCNC: 8.5 MG/DL (ref 8.6–10.4)
CHLORIDE SERPL-SCNC: 107 MMOL/L (ref 98–107)
CO2 SERPL-SCNC: 22 MMOL/L (ref 20–31)
CREAT SERPL-MCNC: 1.1 MG/DL (ref 0.7–1.2)
ERYTHROCYTE [DISTWIDTH] IN BLOOD BY AUTOMATED COUNT: 13.6 % (ref 11.5–14.9)
GFR, ESTIMATED: 70 ML/MIN/1.73M2
GLUCOSE SERPL-MCNC: 90 MG/DL (ref 74–99)
HCT VFR BLD AUTO: 33.7 % (ref 41–53)
HGB BLD-MCNC: 11.4 G/DL (ref 13.5–17.5)
MCH RBC QN AUTO: 32.7 PG (ref 26–34)
MCHC RBC AUTO-ENTMCNC: 33.8 G/DL (ref 31–37)
MCV RBC AUTO: 96.6 FL (ref 80–100)
PLATELET # BLD AUTO: 204 K/UL (ref 150–450)
PMV BLD AUTO: 8.9 FL (ref 6–12)
POTASSIUM SERPL-SCNC: 3.9 MMOL/L (ref 3.7–5.3)
RBC # BLD AUTO: 3.49 M/UL (ref 4.5–5.9)
SODIUM SERPL-SCNC: 140 MMOL/L (ref 136–145)
WBC OTHER # BLD: 6.7 K/UL (ref 3.5–11)

## 2025-05-02 PROCEDURE — 6360000002 HC RX W HCPCS

## 2025-05-02 PROCEDURE — 6370000000 HC RX 637 (ALT 250 FOR IP)

## 2025-05-02 PROCEDURE — 80048 BASIC METABOLIC PNL TOTAL CA: CPT

## 2025-05-02 PROCEDURE — 2580000003 HC RX 258: Performed by: INTERNAL MEDICINE

## 2025-05-02 PROCEDURE — 2060000000 HC ICU INTERMEDIATE R&B

## 2025-05-02 PROCEDURE — 85027 COMPLETE CBC AUTOMATED: CPT

## 2025-05-02 PROCEDURE — 83880 ASSAY OF NATRIURETIC PEPTIDE: CPT

## 2025-05-02 PROCEDURE — 6370000000 HC RX 637 (ALT 250 FOR IP): Performed by: INTERNAL MEDICINE

## 2025-05-02 PROCEDURE — 99233 SBSQ HOSP IP/OBS HIGH 50: CPT | Performed by: INTERNAL MEDICINE

## 2025-05-02 PROCEDURE — 36415 COLL VENOUS BLD VENIPUNCTURE: CPT

## 2025-05-02 RX ORDER — DROXIDOPA 100 MG/1
200 CAPSULE ORAL 3 TIMES DAILY
Status: DISCONTINUED | OUTPATIENT
Start: 2025-05-02 | End: 2025-05-04 | Stop reason: HOSPADM

## 2025-05-02 RX ADMIN — ACETAMINOPHEN 650 MG: 325 TABLET ORAL at 00:14

## 2025-05-02 RX ADMIN — MIDODRINE HYDROCHLORIDE 10 MG: 10 TABLET ORAL at 09:34

## 2025-05-02 RX ADMIN — RANOLAZINE 1000 MG: 500 TABLET, EXTENDED RELEASE ORAL at 21:42

## 2025-05-02 RX ADMIN — SUCRALFATE 1 G: 1 TABLET ORAL at 09:34

## 2025-05-02 RX ADMIN — ATORVASTATIN CALCIUM 40 MG: 40 TABLET, FILM COATED ORAL at 09:35

## 2025-05-02 RX ADMIN — DROXIDOPA 200 MG: 100 CAPSULE ORAL at 21:42

## 2025-05-02 RX ADMIN — RANOLAZINE 1000 MG: 500 TABLET, EXTENDED RELEASE ORAL at 09:35

## 2025-05-02 RX ADMIN — Medication 325 MG: at 17:33

## 2025-05-02 RX ADMIN — MIRTAZAPINE 7.5 MG: 15 TABLET, FILM COATED ORAL at 21:42

## 2025-05-02 RX ADMIN — MICONAZOLE NITRATE: 20 POWDER TOPICAL at 09:43

## 2025-05-02 RX ADMIN — ACETAMINOPHEN 650 MG: 325 TABLET ORAL at 10:24

## 2025-05-02 RX ADMIN — MECLIZINE HYDROCHLORIDE 12.5 MG: 25 TABLET ORAL at 09:34

## 2025-05-02 RX ADMIN — AMIODARONE HYDROCHLORIDE 200 MG: 200 TABLET ORAL at 09:35

## 2025-05-02 RX ADMIN — SUCRALFATE 1 G: 1 TABLET ORAL at 17:33

## 2025-05-02 RX ADMIN — SODIUM CHLORIDE: 0.9 INJECTION, SOLUTION INTRAVENOUS at 00:12

## 2025-05-02 RX ADMIN — MICONAZOLE NITRATE: 20 POWDER TOPICAL at 21:45

## 2025-05-02 RX ADMIN — CARBIDOPA AND LEVODOPA 1 TABLET: 25; 100 TABLET ORAL at 13:25

## 2025-05-02 RX ADMIN — FLUDROCORTISONE ACETATE 0.05 MG: 0.1 TABLET ORAL at 09:38

## 2025-05-02 RX ADMIN — SUCRALFATE 1 G: 1 TABLET ORAL at 13:25

## 2025-05-02 RX ADMIN — LEVOTHYROXINE SODIUM 75 MCG: 0.07 TABLET ORAL at 05:50

## 2025-05-02 RX ADMIN — CARBIDOPA AND LEVODOPA 1 TABLET: 25; 100 TABLET ORAL at 09:35

## 2025-05-02 RX ADMIN — ENOXAPARIN SODIUM 40 MG: 100 INJECTION SUBCUTANEOUS at 09:37

## 2025-05-02 RX ADMIN — CARBIDOPA AND LEVODOPA 1 TABLET: 25; 100 TABLET ORAL at 17:33

## 2025-05-02 RX ADMIN — SUCRALFATE 1 G: 1 TABLET ORAL at 21:41

## 2025-05-02 RX ADMIN — PANTOPRAZOLE SODIUM 40 MG: 40 TABLET, DELAYED RELEASE ORAL at 05:50

## 2025-05-02 RX ADMIN — Medication 325 MG: at 09:35

## 2025-05-02 RX ADMIN — CARBIDOPA AND LEVODOPA 1 TABLET: 25; 100 TABLET ORAL at 21:42

## 2025-05-02 RX ADMIN — DROXIDOPA 100 MG: 100 CAPSULE ORAL at 09:35

## 2025-05-02 RX ADMIN — MIDODRINE HYDROCHLORIDE 10 MG: 10 TABLET ORAL at 13:25

## 2025-05-02 RX ADMIN — DROXIDOPA 100 MG: 100 CAPSULE ORAL at 13:25

## 2025-05-02 RX ADMIN — ASPIRIN 81 MG: 81 TABLET, CHEWABLE ORAL at 09:35

## 2025-05-02 RX ADMIN — Medication 325 MG: at 13:25

## 2025-05-02 RX ADMIN — CLOPIDOGREL BISULFATE 75 MG: 75 TABLET, FILM COATED ORAL at 09:37

## 2025-05-02 ASSESSMENT — PAIN DESCRIPTION - ORIENTATION
ORIENTATION: MID
ORIENTATION: MID

## 2025-05-02 ASSESSMENT — PAIN SCALES - GENERAL
PAINLEVEL_OUTOF10: 8
PAINLEVEL_OUTOF10: 6

## 2025-05-02 ASSESSMENT — PAIN DESCRIPTION - DESCRIPTORS
DESCRIPTORS: ACHING
DESCRIPTORS: ACHING;CRAMPING
DESCRIPTORS: THROBBING;POUNDING;PRESSURE

## 2025-05-02 ASSESSMENT — PAIN SCALES - WONG BAKER
WONGBAKER_NUMERICALRESPONSE: NO HURT
WONGBAKER_NUMERICALRESPONSE: NO HURT

## 2025-05-02 ASSESSMENT — PAIN DESCRIPTION - LOCATION
LOCATION: HEAD
LOCATION: HEAD;BACK
LOCATION: HEAD

## 2025-05-02 ASSESSMENT — PAIN - FUNCTIONAL ASSESSMENT: PAIN_FUNCTIONAL_ASSESSMENT: PREVENTS OR INTERFERES SOME ACTIVE ACTIVITIES AND ADLS

## 2025-05-02 ASSESSMENT — PAIN DESCRIPTION - ONSET: ONSET: ON-GOING

## 2025-05-02 ASSESSMENT — PAIN DESCRIPTION - FREQUENCY: FREQUENCY: CONTINUOUS

## 2025-05-02 NOTE — DISCHARGE INSTR - COC
Continuity of Care Form    Patient Name: Rowdy Marie   :  1950  MRN:  [id number]    Admit date:  2025  Discharge date:  5/3/25      Code Status Order: Full Code   Advance Directives:     Admitting Physician:  Eileen Lujan MD  PCP: Aracely Pierre MD    Discharging Nurse: STAS Díaz  Discharging Hospital Unit/Room#:   Discharging Unit Phone Number: [phone]    Emergency Contact:   Extended Emergency Contact Information  Primary Emergency Contact: MarieKye  Address: [address] [place] [address] [place] [address]  Home Phone: [phone]  Work Phone: [phone]  Mobile Phone: [phone]  Relation: Daughter-in-Law   needed? No  Secondary Emergency Contact: FrankElliott  Address: [address] [place] [address] [place] [address]  Home Phone: [phone]  Mobile Phone: [phone]  Relation: Child   needed? No    Past Surgical History:  Past Surgical History:   Procedure Laterality Date    CARDIAC CATHETERIZATION      CARDIAC PROCEDURE N/A 2024    sunny / Left heart cath / coronary angiography / op scmh performed by Andree Paulino MD at [facility] CARDIAC CATH LAB    CARDIAC PROCEDURE N/A 2024    Percutaneous coronary intervention performed by Andree Paulino MD at [facility] CARDIAC CATH LAB    CARDIAC PROCEDURE N/A 2024    taleb / Left heart cath / coronary angiography / op scmh performed by Antonio Love MD at [facility] CARDIAC CATH LAB    CARDIAC PROCEDURE N/A 2024    Percutaneous coronary intervention performed by Antonio Love MD at [facility] CARDIAC CATH LAB    CORONARY ANGIOPLASTY WITH STENT PLACEMENT      8 stents prior to CABG per patient, most recent 2023    CORONARY ARTERY BYPASS GRAFT      2014    DIAGNOSTIC CARDIAC CATH LAB PROCEDURE  2024       Immunization History:   Immunization History   Administered Date(s) Administered    COVID-19, J&J, (age 18y+), IM, 0.5 mL 2021, 2022    COVID-19, US Vaccine, Vaccine  Infection Onset Added Last Indicated Last Indicated By Resolved Resolved By    Rhinovirus 10/03/24 10/03/24 10/03/24 Respiratory Panel, Molecular, with COVID-19 (Restricted: peds pts or suitable admitted adults) 10/13/24 history Infection                          Nurse Assessment:  Last Vital Signs: BP (!) 162/87   Pulse 65   Temp 97.7 °F (36.5 °C) (Oral)   Resp 16   Ht 1.803 m (5' 11\")   Wt 69.8 kg (153 lb 14.1 oz)   SpO2 99%   BMI 21.46 kg/m²     Last documented pain score (0-10 scale): Pain Level: 8  Last Weight:   Wt Readings from Last 1 Encounters:   25 69.8 kg (153 lb 14.1 oz)     Mental Status:  oriented and alert    IV Access:  - None    Nursing Mobility/ADLs:  Walking   Assisted- Weakness/ Cane  Transfer  Independent  Bathing  Independent  Dressing  Independent  Toileting  Independent  Feeding  Independent  Med Admin  Independent  Med Delivery   whole    Wound Care Documentation and Therapy:        Elimination:  Continence:   Bowel: Yes  Bladder: Yes  Urinary Catheter: None   Colostomy/Ileostomy/Ileal Conduit: No       Date of Last BM: 25      Intake/Output Summary (Last 24 hours) at 2025 0952  Last data filed at 2025 0713  Gross per 24 hour   Intake 1699.86 ml   Output --   Net 1699.86 ml     I/O last 3 completed shifts:  In: 1104.8 [I.V.:1104.8]  Out: 150 [Urine:150]    Safety Concerns:     History of Falls (last 30 days) and At Risk for Falls    Impairments/Disabilities:      None    Nutrition Therapy:  Current Nutrition Therapy:   - Oral Diet:  General    Routes of Feeding: Oral  Liquids: No Restrictions  Daily Fluid Restriction: no  Last Modified Barium Swallow with Video (Video Swallowing Test): not done    Treatments at the Time of Hospital Discharge:   Respiratory Treatments: N/A  Oxygen Therapy:  is not on home oxygen therapy.  Ventilator:    - No ventilator support    Rehab Therapies: Physical Therapy and Occupational Therapy  Weight Bearing Status/Restrictions: No

## 2025-05-02 NOTE — FLOWSHEET NOTE
05/02/25 0027   Treatment Team Notification   Reason for Communication Evaluate   Name of Team Member Notified Latisha Adrian   Treatment Team Role Advanced Practice Nurse   Method of Communication Secure Message   Response Waiting for response   Notification Time 0027       NP notified that Dr. Cabrera note indicated only 1L of fluids over 12 hours. Per NP okay to continue fluids throughout night and BNP will be checked in the morning. Patient lungs are clear and no signs of edema.

## 2025-05-02 NOTE — PLAN OF CARE
Problem: Discharge Planning  Goal: Discharge to home or other facility with appropriate resources  5/2/2025 0720 by Citlalli Brito RN  Outcome: Progressing  Flowsheets (Taken 5/2/2025 0720)  Discharge to home or other facility with appropriate resources:   Identify barriers to discharge with patient and caregiver   Arrange for needed discharge resources and transportation as appropriate   Identify discharge learning needs (meds, wound care, etc)   Refer to discharge planning if patient needs post-hospital services based on physician order or complex needs related to functional status, cognitive ability or social support system     Problem: Safety - Adult  Goal: Free from fall injury  5/2/2025 0720 by Citlalli Brito RN  Outcome: Progressing  Flowsheets (Taken 5/2/2025 0720)  Free From Fall Injury: Instruct family/caregiver on patient safety     Problem: ABCDS Injury Assessment  Goal: Absence of physical injury  5/2/2025 0720 by Citlalli Brito RN  Outcome: Progressing  Flowsheets (Taken 5/2/2025 0720)  Absence of Physical Injury: Implement safety measures based on patient assessment     Problem: Pain  Goal: Verbalizes/displays adequate comfort level or baseline comfort level  5/2/2025 0720 by Citlalli Brito RN  Outcome: Progressing  Flowsheets (Taken 5/2/2025 0720)  Verbalizes/displays adequate comfort level or baseline comfort level:   Assess pain using appropriate pain scale   Encourage patient to monitor pain and request assistance   Administer analgesics based on type and severity of pain and evaluate response   Implement non-pharmacological measures as appropriate and evaluate response   Consider cultural and social influences on pain and pain management

## 2025-05-02 NOTE — PROGRESS NOTES
Date:   5/2/2025  Patient name: Rowdy Marie  Date of admission:  4/30/2025  6:12 PM  MRN:   [id number]  YOB: 1950  PCP: Aracely Pierre MD    Reason for Admission: Chest pain [R07.9]  Chest pain, unspecified type [R07.9]    Cardiology consult: Severe postural hypotension ,multivessel coronary artery disease, history of A-fib, CHF with preserved ejection fraction        Referring physician: Dr Arellano Le        Impression     Admission 4/30/2025 increasing lightheadedness poor balance near falls, chest pain like burning, nausea and vomiting, high-sensitivity troponin 18, no new ECG abnormalities  CT head and CTA head and neck unremarkable 4/30/2025  Severe postural hypotension supine blood pressure 120/80 patient dropped below 80 mmHg on standing  Etiology of his 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 and he also has severe postural hypotension he has chronic tinnitus    Paroxysmal atrial fibrillation Watchman procedure 11/14/2023, current rhythm sinus rhythm  Ejection fraction 45 to 50% % 2D echo 5/1/2025, previous echo 4/23/2024 ejection fraction 50 to 55%  Moderate mitral regurgitation  Normal right ventricular systolic pressure 21 mmHg 2D echo 5/1/2020  Coronary artery disease, CABG times 4/2014, stent placement times 5 December 2022, stent placement SVG to OM 5/22/2024  CAREY to LAD, SVG to OM 3, ramus, RCA patent grafts cardiac cath December 2019     Hypothyroidism  Hyperlipidemia  History of prostate cancer  History of anemia  History of gastritis, esophageal stricture  Parkinson disease on Sinemet  Back pain, back surgery  Episodes of dizziness, combination of previous C-spine surgery, postural hypotension/autonomic dysfunction patient has Parkinson disease on medication, dizziness aggravates by head and neck movements  History of tinnitus/ringing in the ears for years  Normal B12 1970, serum iron 57, iron saturation 21, folate more than 20  MRA neck and head without contrast 2/9/2023  No  ejection fraction 45 to 50%     Ischemic cardiomyopathy  BRENDA SVG to OM 5/22/2024 by  at [facility]  Multivessel CAD, cardiac cath 4/11/2024 patent LIMA to LAD distal LAD disease, patent SVG to OM 2 and patent stents, patent SVG to RPDA, stent placement in the SVG to diagonal 1  History of CABG x 4  in 2014, multiple stent placement, normal EF  History of exertional shortness of breath      Normal LV systolic function ejection fraction more than 50-55% 2D echo 4/23/2024, repeat echo 5/1/2025 ejection fraction 45 to 50%  History of paroxysmal A-fib, status post Watchman procedure  History of GI bleeding  Parkinson disease  Episodes of dizziness combination of previous C-spine surgery, Parkinson disease medications, postural hypotension/autonomic dysfunction    Patient Active Problem List:     Hypotension     Mild anemia     Chronic atrial fibrillation (HCC)     Coronary artery disease involving coronary bypass graft of native heart without angina pectoris     S/P CABG (coronary artery bypass graft)     GI bleed     Severe malnutrition     Pharyngoesophageal dysphagia     Parkinson's disease without dyskinesia or fluctuating manifestations ([facility])     Acute intractable headache     H/O neck surgery     Chest pain     NSTEMI (non-ST elevated myocardial infarction) ([facility])     S/P angioplasty with stent     CAD, multiple vessel     CAP (community acquired pneumonia)     Angina pectoris     Hyperlipidemia     Atrial fibrillation with rapid ventricular response ([facility])     Rhinovirus infection     History of prostate cancer     CKD (chronic kidney disease)     Mitral regurgitation     Facial droop     Orthostatic hypotension     Convulsive syncope     Prostate cancer ([facility])      Plan of Treatment:   Medications reviewed  1: Severe orthostatic hypotension started on fludrocortisone 0.05 mg, midodrine 10 mg 3 times  Northera 200 mg 3 times a day  Patient is also on ranolazine and levodopa carbidopa both can also cause

## 2025-05-02 NOTE — CARE COORDINATION
Case Management   Daily Progress Note       Patient Name: Rowdy Marie                   YOB: 1950  Diagnosis: Chest pain [R07.9]  Chest pain, unspecified type [R07.9]                       GMLOS: 1.7 days  Length of Stay: 2  days    Readmission Risk (Low < 19, Mod (19-27), High > 27): Readmission Risk Score: 21.3      Patient is alert and oriented.    Spoke with patient, and Current Transitional Plan is:    [] Home Independently    [x] Home with  - [facility].    [] Skilled Nursing Facility    [] Acute Rehabilitation    [] Long Term Acute Care (LTAC)    [] Other:     Plan for the Stay (Medical Management) : Echo with EF 50%.    Workflow Continuation (Additional Notes) : Pt continues to c/o dizziness, but states it has improved slightly. Cardiology following.    Electronically signed by Esther Blake RN on 5/2/2025 at 9:47 AM

## 2025-05-02 NOTE — PROGRESS NOTES
[facility] Internal Medicine   Ko Elias MD; MD Aracely Walter MD, MD , Cherelle Lujan MD    [facility] Internal Medicine   IN-PATIENT SERVICE   [facility]    Progress note            Date:   5/2/2025  Patient name:  Rowdy Marie  Date of admission:  4/30/2025  6:12 PM  MRN:   [id number]  Account:  [id number]  YOB: 1950  PCP:    Aracely Pierre MD  Room:   2092/2092-01  Code Status:    Full Code    Chief Complaint:     Chief Complaint   Patient presents with    Chest Pain       History Obtained From:     patient, electronic medical record    History of Present Illness:     Rowdy Marie is a 75 y.o. Non- / non  male who presents with Chest Pain   and is admitted to the hospital for the management of Chest pain.    Rowdy Marie is a 75 y.o. Non- / non  male who presents with Chest Pain   and is admitted to the hospital for the management of Chest pain.     Patient arrives to the ED accompanied by his daughter for evaluation of lightheadedness symptoms accompanied with chest pain.  Patient has a significant medical history of atrial fibrillation, PREET, CKD, HLD, and Parkinson's.     Patient states that over the last week he feels as though in the last week he has felt off balance and near passing out. Patient has not had any complete falls . Patient endorses a decrease in appetite, as well at heartburn that is persistent for the last three days. Patient also has centralized chest pain that started on 4/29.      At the time of assessment patient denies any fever, chills, or shortness of breath. The patient feels fatigued. He states that his chest pain is right in the center of his chest, and it is of a throbbing nature.      Patient's troponins are elevated but downtrending at 23, 18.  Patient's creatinine is 1.3 with historical value being 1.2.  Patient does not have any significant

## 2025-05-03 LAB
ANION GAP SERPL CALCULATED.3IONS-SCNC: 10 MMOL/L (ref 9–16)
BUN SERPL-MCNC: 13 MG/DL (ref 8–23)
CALCIUM SERPL-MCNC: 9 MG/DL (ref 8.6–10.4)
CHLORIDE SERPL-SCNC: 106 MMOL/L (ref 98–107)
CO2 SERPL-SCNC: 26 MMOL/L (ref 20–31)
CREAT SERPL-MCNC: 1.1 MG/DL (ref 0.7–1.2)
ERYTHROCYTE [DISTWIDTH] IN BLOOD BY AUTOMATED COUNT: 13.9 % (ref 11.5–14.9)
GFR, ESTIMATED: 70 ML/MIN/1.73M2
GLUCOSE SERPL-MCNC: 95 MG/DL (ref 74–99)
HCT VFR BLD AUTO: 36.1 % (ref 41–53)
HGB BLD-MCNC: 12.1 G/DL (ref 13.5–17.5)
MCH RBC QN AUTO: 32.3 PG (ref 26–34)
MCHC RBC AUTO-ENTMCNC: 33.6 G/DL (ref 31–37)
MCV RBC AUTO: 96 FL (ref 80–100)
PLATELET # BLD AUTO: 259 K/UL (ref 150–450)
PMV BLD AUTO: 9.3 FL (ref 6–12)
POTASSIUM SERPL-SCNC: 3.8 MMOL/L (ref 3.7–5.3)
RBC # BLD AUTO: 3.76 M/UL (ref 4.5–5.9)
SODIUM SERPL-SCNC: 142 MMOL/L (ref 136–145)
WBC OTHER # BLD: 9.7 K/UL (ref 3.5–11)

## 2025-05-03 PROCEDURE — 36415 COLL VENOUS BLD VENIPUNCTURE: CPT

## 2025-05-03 PROCEDURE — 80048 BASIC METABOLIC PNL TOTAL CA: CPT

## 2025-05-03 PROCEDURE — 6370000000 HC RX 637 (ALT 250 FOR IP): Performed by: INTERNAL MEDICINE

## 2025-05-03 PROCEDURE — 6360000002 HC RX W HCPCS

## 2025-05-03 PROCEDURE — 99233 SBSQ HOSP IP/OBS HIGH 50: CPT | Performed by: INTERNAL MEDICINE

## 2025-05-03 PROCEDURE — 6370000000 HC RX 637 (ALT 250 FOR IP)

## 2025-05-03 PROCEDURE — 2060000000 HC ICU INTERMEDIATE R&B

## 2025-05-03 PROCEDURE — 85027 COMPLETE CBC AUTOMATED: CPT

## 2025-05-03 RX ORDER — DROXIDOPA 200 MG/1
200 CAPSULE ORAL 3 TIMES DAILY
Qty: 90 CAPSULE | Refills: 3 | Status: SHIPPED | OUTPATIENT
Start: 2025-05-03

## 2025-05-03 RX ORDER — ASPIRIN 81 MG/1
81 TABLET, CHEWABLE ORAL DAILY
Qty: 30 TABLET | Refills: 3 | Status: SHIPPED | OUTPATIENT
Start: 2025-05-04

## 2025-05-03 RX ADMIN — Medication 325 MG: at 08:28

## 2025-05-03 RX ADMIN — CARBIDOPA AND LEVODOPA 1 TABLET: 25; 100 TABLET ORAL at 11:44

## 2025-05-03 RX ADMIN — CARBIDOPA AND LEVODOPA 1 TABLET: 25; 100 TABLET ORAL at 08:28

## 2025-05-03 RX ADMIN — CLOPIDOGREL BISULFATE 75 MG: 75 TABLET, FILM COATED ORAL at 08:28

## 2025-05-03 RX ADMIN — MIRTAZAPINE 7.5 MG: 15 TABLET, FILM COATED ORAL at 21:14

## 2025-05-03 RX ADMIN — SUCRALFATE 1 G: 1 TABLET ORAL at 08:28

## 2025-05-03 RX ADMIN — MIDODRINE HYDROCHLORIDE 10 MG: 10 TABLET ORAL at 11:44

## 2025-05-03 RX ADMIN — SUCRALFATE 1 G: 1 TABLET ORAL at 11:44

## 2025-05-03 RX ADMIN — RANOLAZINE 1000 MG: 500 TABLET, EXTENDED RELEASE ORAL at 21:14

## 2025-05-03 RX ADMIN — DROXIDOPA 200 MG: 100 CAPSULE ORAL at 14:36

## 2025-05-03 RX ADMIN — SUCRALFATE 1 G: 1 TABLET ORAL at 16:39

## 2025-05-03 RX ADMIN — Medication 325 MG: at 16:39

## 2025-05-03 RX ADMIN — MICONAZOLE NITRATE: 20 POWDER TOPICAL at 21:14

## 2025-05-03 RX ADMIN — PANTOPRAZOLE SODIUM 40 MG: 40 TABLET, DELAYED RELEASE ORAL at 05:44

## 2025-05-03 RX ADMIN — LEVOTHYROXINE SODIUM 75 MCG: 0.07 TABLET ORAL at 05:44

## 2025-05-03 RX ADMIN — MIDODRINE HYDROCHLORIDE 10 MG: 10 TABLET ORAL at 16:39

## 2025-05-03 RX ADMIN — ACETAMINOPHEN 650 MG: 325 TABLET ORAL at 23:35

## 2025-05-03 RX ADMIN — CARBIDOPA AND LEVODOPA 1 TABLET: 25; 100 TABLET ORAL at 16:39

## 2025-05-03 RX ADMIN — CARBIDOPA AND LEVODOPA 1 TABLET: 25; 100 TABLET ORAL at 21:14

## 2025-05-03 RX ADMIN — ENOXAPARIN SODIUM 40 MG: 100 INJECTION SUBCUTANEOUS at 08:28

## 2025-05-03 RX ADMIN — MICONAZOLE NITRATE: 20 POWDER TOPICAL at 08:29

## 2025-05-03 RX ADMIN — RANOLAZINE 1000 MG: 500 TABLET, EXTENDED RELEASE ORAL at 08:28

## 2025-05-03 RX ADMIN — AMIODARONE HYDROCHLORIDE 200 MG: 200 TABLET ORAL at 08:28

## 2025-05-03 RX ADMIN — MIDODRINE HYDROCHLORIDE 10 MG: 10 TABLET ORAL at 08:29

## 2025-05-03 RX ADMIN — FLUDROCORTISONE ACETATE 0.05 MG: 0.1 TABLET ORAL at 11:46

## 2025-05-03 RX ADMIN — ASPIRIN 81 MG: 81 TABLET, CHEWABLE ORAL at 08:28

## 2025-05-03 RX ADMIN — DROXIDOPA 200 MG: 100 CAPSULE ORAL at 08:30

## 2025-05-03 RX ADMIN — SUCRALFATE 1 G: 1 TABLET ORAL at 21:14

## 2025-05-03 RX ADMIN — ATORVASTATIN CALCIUM 40 MG: 40 TABLET, FILM COATED ORAL at 08:28

## 2025-05-03 RX ADMIN — DROXIDOPA 200 MG: 100 CAPSULE ORAL at 21:15

## 2025-05-03 RX ADMIN — Medication 325 MG: at 11:44

## 2025-05-03 ASSESSMENT — PAIN SCALES - GENERAL
PAINLEVEL_OUTOF10: 4
PAINLEVEL_OUTOF10: 3
PAINLEVEL_OUTOF10: 3

## 2025-05-03 ASSESSMENT — PAIN DESCRIPTION - LOCATION
LOCATION: HEAD
LOCATION: BACK

## 2025-05-03 ASSESSMENT — PAIN DESCRIPTION - DESCRIPTORS
DESCRIPTORS: ACHING
DESCRIPTORS: ACHING

## 2025-05-03 ASSESSMENT — PAIN DESCRIPTION - ORIENTATION: ORIENTATION: LOWER;MID

## 2025-05-03 NOTE — PROGRESS NOTES
[facility] Internal Medicine   Ko Elias MD; MD Aracely Walter MD, MD , Cherelle Lujan MD    [facility] Internal Medicine   IN-PATIENT SERVICE   [facility]    Progress note            Date:   5/3/2025  Patient name:  Rowdy Marie  Date of admission:  4/30/2025  6:12 PM  MRN:   [id number]  Account:  [id number]  YOB: 1950  PCP:    Aracely Pierre MD  Room:   2092/2092-01  Code Status:    Full Code    Chief Complaint:     Chief Complaint   Patient presents with    Chest Pain       History Obtained From:     patient, electronic medical record    History of Present Illness:     Rowdy Marie is a 75 y.o. Non- / non  male who presents with Chest Pain   and is admitted to the hospital for the management of Chest pain.    Rowdy Marie is a 75 y.o. Non- / non  male who presents with Chest Pain   and is admitted to the hospital for the management of Chest pain.     Patient arrives to the ED accompanied by his daughter for evaluation of lightheadedness symptoms accompanied with chest pain.  Patient has a significant medical history of atrial fibrillation, PREET, CKD, HLD, and Parkinson's.     Patient states that over the last week he feels as though in the last week he has felt off balance and near passing out. Patient has not had any complete falls . Patient endorses a decrease in appetite, as well at heartburn that is persistent for the last three days. Patient also has centralized chest pain that started on 4/29.      At the time of assessment patient denies any fever, chills, or shortness of breath. The patient feels fatigued. He states that his chest pain is right in the center of his chest, and it is of a throbbing nature.      Patient's troponins are elevated but downtrending at 23, 18.  Patient's creatinine is 1.3 with historical value being 1.2.  Patient does not have any significant

## 2025-05-03 NOTE — PLAN OF CARE
Problem: Discharge Planning  Goal: Discharge to home or other facility with appropriate resources  5/3/2025 1521 by Bre Sahu, RN  Outcome: Progressing     Problem: Safety - Adult  Goal: Free from fall injury  5/3/2025 1521 by Bre Sahu RN  Outcome: Progressing     Problem: ABCDS Injury Assessment  Goal: Absence of physical injury  5/3/2025 1521 by Bre Sahu, RN  Outcome: Progressing     Problem: Pain  Goal: Verbalizes/displays adequate comfort level or baseline comfort level  5/3/2025 1521 by Bre Sahu, RN  Outcome: Progressing

## 2025-05-03 NOTE — CARE COORDINATION
Continuity of Care Form    Patient Name: Rowdy Marie   :  1950  MRN:  [id number]    Admit date:  2025  Discharge date:  5/3/25      Code Status Order: Full Code   Advance Directives:     Admitting Physician:  Eileen Lujan MD  PCP: Aracely Pierre MD    Discharging Nurse: STAS Díaz  Discharging Hospital Unit/Room#:   Discharging Unit Phone Number: [phone]    Emergency Contact:   Extended Emergency Contact Information  Primary Emergency Contact: MarieKye  Address: [address] [place] [address] [place] [address]  Home Phone: [phone]  Work Phone: [phone]  Mobile Phone: [phone]  Relation: Daughter-in-Law   needed? No  Secondary Emergency Contact: FrankElliott  Address: [address] [place] [address] [place] [address]  Home Phone: [phone]  Mobile Phone: [phone]  Relation: Child   needed? No    Past Surgical History:  Past Surgical History:   Procedure Laterality Date    CARDIAC CATHETERIZATION      CARDIAC PROCEDURE N/A 2024    sunny / Left heart cath / coronary angiography / op scmh performed by Andree Paulino MD at [facility] CARDIAC CATH LAB    CARDIAC PROCEDURE N/A 2024    Percutaneous coronary intervention performed by Andree Paulino MD at [facility] CARDIAC CATH LAB    CARDIAC PROCEDURE N/A 2024    taleb / Left heart cath / coronary angiography / op scmh performed by Antonio Love MD at [facility] CARDIAC CATH LAB    CARDIAC PROCEDURE N/A 2024    Percutaneous coronary intervention performed by Antonio Love MD at [facility] CARDIAC CATH LAB    CORONARY ANGIOPLASTY WITH STENT PLACEMENT      8 stents prior to CABG per patient, most recent 2023    CORONARY ARTERY BYPASS GRAFT      2014    DIAGNOSTIC CARDIAC CATH LAB PROCEDURE  2024       Immunization History:   Immunization History   Administered Date(s) Administered    COVID-19, J&J, (age 18y+), IM, 0.5 mL 2021, 2022    COVID-19, US Vaccine, Vaccine  as: MYCOSTATIN  Apply 3 times daily.  What changed: additional instructions     CONTINUE taking these medications    CONTINUE taking these medications  amiodarone 200 MG tablet  Commonly known as: CORDARONE  TAKE 1 TABLET BY MOUTH DAILY   atorvastatin 40 MG tablet  Commonly known as: LIPITOR  Take 1 tablet by mouth daily   carbidopa-levodopa  MG per tablet  Commonly known as: SINEMET  Take 1 tablet by mouth 4 times daily   clopidogrel 75 MG tablet  Commonly known as: PLAVIX  Take 1 tablet by mouth daily   ferrous sulfate 325 (65 Fe) MG tablet  Commonly known as: FeroSul  Take 1 tablet by mouth 3 times daily (with meals)   fludrocortisone 0.1 MG tablet  Commonly known as: FLORINEF  Take 0.5 tablets by mouth daily   levothyroxine 75 MCG tablet  Commonly known as: SYNTHROID  TAKE 1 TABLET BY MOUTH EVERY DAY   meclizine 12.5 MG tablet  Commonly known as: ANTIVERT  Take 1 tablet by mouth 3 times daily as needed for Dizziness   midodrine 5 MG tablet  Commonly known as: PROAMATINE  Take 2 tablets by mouth 3 times daily   mirtazapine 7.5 MG tablet  Commonly known as: REMERON  Take 1 tablet by mouth nightly   pantoprazole 40 MG tablet  Commonly known as: PROTONIX  Take 1 tablet by mouth every morning (before breakfast)   potassium chloride 10 MEQ extended release tablet  Commonly known as: KLOR-CON  TAKE 1 TABLET BY MOUTH EVERY MORNING   ranolazine 1000 MG extended release tablet  Commonly known as: RANEXA  Take 1 tablet by mouth 2 times daily   sucralfate 1 GM tablet  Commonly known as: CARAFATE  TAKE 1 TABLET BY MOUTH FOUR TIMES DAILY   traMADol 50 MG tablet  Commonly known as: ULTRAM  Take 1 tablet by mouth every 8 hours as needed.   vitamin B-12 1000 MCG tablet  Commonly known as: CYANOCOBALAMIN  Take 1 tablet by mouth daily     STOP taking these medications    STOP taking these medications  aspirin 81 MG EC tablet  Replaced by: aspirin 81 MG chewable tablet   carvedilol 3.125 MG tablet  Commonly known as: COREG

## 2025-05-03 NOTE — CARE COORDINATION
Case Management   Daily Progress Note       Patient Name: Rowdy Marie                   YOB: 1950  Diagnosis: Chest pain [R07.9]  Chest pain, unspecified type [R07.9]                       GMLOS: 1.7 days  Length of Stay: 3  days    Readmission Risk (Low < 19, Mod (19-27), High > 27): Readmission Risk Score: 20.5      Patient is alert and oriented.    Spoke with patient, and Current Transitional Plan is:    [] Home Independently    [x] Home with , [facility]    [] Skilled Nursing Facility    [] Acute Rehabilitation    [] Long Term Acute Care (LTAC)    [] Other:     Plan for the Stay (Medical Management) : Cardiac Consult. Echo EF 50%     Workflow Continuation (Additional Notes) : Discharge order in. Will fax DAVID and discharge med list once completed by bedside nurse and signed by physician.    Electronically signed by Esther Nix RN on 5/3/2025 at 10:45 AM

## 2025-05-03 NOTE — PLAN OF CARE
Problem: Discharge Planning  Goal: Discharge to home or other facility with appropriate resources  Outcome: Progressing  Flowsheets (Taken 5/2/2025 0720 by Citlalli Brito, RN)  Discharge to home or other facility with appropriate resources:   Identify barriers to discharge with patient and caregiver   Arrange for needed discharge resources and transportation as appropriate   Identify discharge learning needs (meds, wound care, etc)   Refer to discharge planning if patient needs post-hospital services based on physician order or complex needs related to functional status, cognitive ability or social support system     Problem: Safety - Adult  Goal: Free from fall injury  Outcome: Progressing  Flowsheets (Taken 5/2/2025 0720 by Citlalli Brito, RN)  Free From Fall Injury: Instruct family/caregiver on patient safety  Note: No falls noted this shift. Patient ambulates with x1 staff assistance without difficulty.  Bed kept in low position. Safe environment maintained. Bedside table & call light in reach. Uses call light appropriately when needing assistance.        Problem: Pain  Goal: Verbalizes/displays adequate comfort level or baseline comfort level  Outcome: Progressing

## 2025-05-04 VITALS
HEIGHT: 71 IN | RESPIRATION RATE: 16 BRPM | SYSTOLIC BLOOD PRESSURE: 134 MMHG | OXYGEN SATURATION: 97 % | DIASTOLIC BLOOD PRESSURE: 80 MMHG | TEMPERATURE: 98.2 F | WEIGHT: 153.22 LBS | HEART RATE: 71 BPM | BODY MASS INDEX: 21.45 KG/M2

## 2025-05-04 PROCEDURE — 6360000002 HC RX W HCPCS

## 2025-05-04 PROCEDURE — 99239 HOSP IP/OBS DSCHRG MGMT >30: CPT | Performed by: INTERNAL MEDICINE

## 2025-05-04 PROCEDURE — 6370000000 HC RX 637 (ALT 250 FOR IP)

## 2025-05-04 PROCEDURE — 6370000000 HC RX 637 (ALT 250 FOR IP): Performed by: INTERNAL MEDICINE

## 2025-05-04 RX ORDER — ASPIRIN 81 MG/1
81 TABLET ORAL DAILY
Status: DISCONTINUED | OUTPATIENT
Start: 2025-05-05 | End: 2025-05-04 | Stop reason: HOSPADM

## 2025-05-04 RX ADMIN — CARBIDOPA AND LEVODOPA 1 TABLET: 25; 100 TABLET ORAL at 08:49

## 2025-05-04 RX ADMIN — AMIODARONE HYDROCHLORIDE 200 MG: 200 TABLET ORAL at 08:49

## 2025-05-04 RX ADMIN — SUCRALFATE 1 G: 1 TABLET ORAL at 12:01

## 2025-05-04 RX ADMIN — Medication 325 MG: at 08:49

## 2025-05-04 RX ADMIN — ENOXAPARIN SODIUM 40 MG: 100 INJECTION SUBCUTANEOUS at 08:49

## 2025-05-04 RX ADMIN — ATORVASTATIN CALCIUM 40 MG: 40 TABLET, FILM COATED ORAL at 08:49

## 2025-05-04 RX ADMIN — FLUDROCORTISONE ACETATE 0.05 MG: 0.1 TABLET ORAL at 12:01

## 2025-05-04 RX ADMIN — LEVOTHYROXINE SODIUM 75 MCG: 0.07 TABLET ORAL at 06:49

## 2025-05-04 RX ADMIN — MICONAZOLE NITRATE: 20 POWDER TOPICAL at 08:50

## 2025-05-04 RX ADMIN — CLOPIDOGREL BISULFATE 75 MG: 75 TABLET, FILM COATED ORAL at 08:49

## 2025-05-04 RX ADMIN — RANOLAZINE 1000 MG: 500 TABLET, EXTENDED RELEASE ORAL at 08:49

## 2025-05-04 RX ADMIN — ASPIRIN 81 MG: 81 TABLET, CHEWABLE ORAL at 08:49

## 2025-05-04 RX ADMIN — CARBIDOPA AND LEVODOPA 1 TABLET: 25; 100 TABLET ORAL at 12:01

## 2025-05-04 RX ADMIN — DROXIDOPA 200 MG: 100 CAPSULE ORAL at 08:50

## 2025-05-04 RX ADMIN — SUCRALFATE 1 G: 1 TABLET ORAL at 08:49

## 2025-05-04 RX ADMIN — PANTOPRAZOLE SODIUM 40 MG: 40 TABLET, DELAYED RELEASE ORAL at 06:49

## 2025-05-04 RX ADMIN — Medication 325 MG: at 12:01

## 2025-05-04 ASSESSMENT — PAIN SCALES - GENERAL: PAINLEVEL_OUTOF10: 0

## 2025-05-04 NOTE — CARE COORDINATION
Case Management   Daily Progress Note       Patient Name: Rowdy Marie                   YOB: 1950  Diagnosis: Chest pain [R07.9]  Chest pain, unspecified type [R07.9]                       GMLOS: 1.7 days  Length of Stay: 4  days    Readmission Risk (Low < 19, Mod (19-27), High > 27): Readmission Risk Score: 19.7      Patient is alert and oriented.    Spoke with patient, and Current Transitional Plan is:    [] Home Independently    [x] Home with [facility]    [] Skilled Nursing Facility    [] Acute Rehabilitation    [] Long Term Acute Care (LTAC)    [] Other:     Plan for the Stay (Medical Management) :    Cardiac Consult. Echo EF 50%. Hypotension. Orthostatic Vitals ordered.     Workflow Continuation (Additional Notes) : Discharge order in.     Electronically signed by Esther Nix RN on 5/4/2025 at 12:29 PM

## 2025-05-04 NOTE — PROGRESS NOTES
Date:   5/4/2025  Patient name: Rowdy Marie  Date of admission:  4/30/2025  6:12 PM  MRN:   [id number]  YOB: 1950  PCP: Aracely Pierre MD    Reason for Admission: Chest pain [R07.9]  Chest pain, unspecified type [R07.9]    Cardiology follow-up: Severe postural hypotension ,multivessel coronary artery disease, history of A-fib, CHF with preserved ejection fraction        Referring physician: Dr Arellano Le        Impression     Admission 4/30/2025 increasing lightheadedness poor balance near falls, chest pain like burning, nausea and vomiting, high-sensitivity troponin 18, no new ECG abnormalities  CT head and CTA head and neck unremarkable 4/30/2025  Severe postural hypotension supine blood pressure 120/80 patient dropped below 80 mmHg on standing  Etiology of his dizziness while resting on bed and changing position due to inner ear disease, he has chronic tinnitus     Paroxysmal atrial fibrillation Watchman procedure 11/14/2023, current rhythm sinus rhythm  Ejection fraction 45 to 50% % 2D echo 5/1/2025, previous echo 4/23/2024 ejection fraction 50 to 55%  Moderate mitral regurgitation  Normal right ventricular systolic pressure 21 mmHg 2D echo 5/1/2020  Coronary artery disease, CABG times 4/2014, stent placement times 5 December 2022, stent placement SVG to OM 5/22/2024  CAREY to LAD, SVG to OM 3, ramus, RCA patent grafts cardiac cath December 2019     Hypothyroidism  Hyperlipidemia  History of prostate cancer  History of anemia  History of gastritis, esophageal stricture  Parkinson disease on Sinemet  Back pain, back surgery  Episodes of dizziness, combination of previous C-spine surgery, postural hypotension/autonomic dysfunction patient has Parkinson disease on medication, dizziness aggravates by head and neck movements  History of tinnitus/ringing in the ears for years  Normal B12 1970, serum iron 57, iron saturation 21, folate more than 20  MRA neck and head without contrast 2/9/2023  No  4/23/2024  Ejection fraction 55% normal LV wall thickness and wall motion  Moderate to severe mitral regurgitation  Normal RV size and function  IVC not well-visualized     CT head without contrast, CT head and neck with contrast 4/30/2025  No acute intracranial abnormality  No large vessel occlusion in the head or neck  No significant stenosis of the intracranial internal carotid, anterior cerebral or middle cerebral, basilar or posterior cerebral arteries no aneurysm  No aortic arch dissection no stenosis normal vertebral arteries    CT head 11/17/2024  No acute intracranial abnormality  X-ray hips 11/16/2024  No fracture or dislocation  Radiation seeds overlie the prostate    Medications:   Scheduled Meds:   droxidopa  200 mg Oral TID    aspirin  81 mg Oral Daily    enoxaparin  40 mg SubCUTAneous Daily    amiodarone  200 mg Oral Daily    atorvastatin  40 mg Oral Daily    carbidopa-levodopa  1 tablet Oral 4x Daily    clopidogrel  75 mg Oral Daily    ferrous sulfate  325 mg Oral TID WC    fludrocortisone  0.05 mg Oral Daily    levothyroxine  75 mcg Oral Daily    mirtazapine  7.5 mg Oral Nightly    miconazole   Topical BID    pantoprazole  40 mg Oral QAM AC    ranolazine  1,000 mg Oral BID    sucralfate  1 g Oral 4x daily    midodrine  10 mg Oral TID WC     Continuous Infusions:   sodium chloride       CBC:   Recent Labs     05/02/25  0545 05/03/25  0440   WBC 6.7 9.7   HGB 11.4* 12.1*    259     BMP:    Recent Labs     05/02/25  0545 05/03/25  0440    142   K 3.9 3.8    106   CO2 22 26   BUN 16 13   CREATININE 1.1 1.1   GLUCOSE 90 95     Hepatic: No results for input(s): \"AST\", \"ALT\", \"BILITOT\", \"ALKPHOS\" in the last 72 hours.    Invalid input(s): \"ALB\"  Troponin: No results for input(s): \"TROPONINI\" in the last 72 hours.  BNP: No results for input(s): \"BNP\" in the last 72 hours.  Lipids: No results for input(s): \"CHOL\", \"HDL\" in the last 72 hours.    Invalid input(s): \"LDLCALCU\"  INR: No results

## 2025-05-04 NOTE — DISCHARGE SUMMARY
IN-PATIENT SERVICE   [facility] Internal Medicine    Discharge Summary     Patient ID: Rowdy Marie  :  1950   MRN: [id number]     ACCOUNT:  [id number]   Patient's PCP: Aracely Pierre MD  Admit Date: 2025   Discharge Date: 2025    Length of Stay: 4  Code Status:  Full Code  Admitting Physician: Eileen Lujan MD  Discharge Physician: Aracely Pierre MD     Active Discharge Diagnoses:     Primary Problem  Chest pain      Hospital Problems  Active Hospital Problems    Diagnosis Date Noted    Chest pain [R07.9] 04/10/2024       Admission Condition:  fair     Discharged Condition: fair    Hospital Stay:     Hospital Course:  Rowdy Marie is a 75 y.o. male who was admitted for the management of Chest pain , presented to ER with Chest Pain    who presents with Chest Pain   and is admitted to the hospital for the management of Chest pain.     Patient arrives to the ED accompanied by his daughter for evaluation of lightheadedness symptoms accompanied with chest pain.  Patient has a significant medical history of atrial fibrillation, PREET, CKD, HLD, and Parkinson's.     Patient states that over the last week he feels as though in the last week he has felt off balance and near passing out. Patient has not had any complete falls . Patient endorses a decrease in appetite, as well at heartburn that is persistent for the last three days. Patient also has centralized chest pain that started on .      At the time of assessment patient denies any fever, chills, or shortness of breath. The patient feels fatigued. He states that his chest pain is right in the center of his chest, and it is of a throbbing nature.      Patient's troponins are elevated but downtrending at 23, 18.  Patient's creatinine is 1.3 with historical value being 1.2.  Patient does not have any significant electrolyte abnormalities, and there is no evidence of leukocytosis.     Patient has no established history with a

## 2025-05-04 NOTE — PROGRESS NOTES
Date:   5/3/2025  Patient name: Rowdy Marie  Date of admission:  4/30/2025  6:12 PM  MRN:   [id number]  YOB: 1950  PCP: Aracely Pierre MD    Reason for Admission: Chest pain [R07.9]  Chest pain, unspecified type [R07.9]    Cardiology consult: Severe postural hypotension ,multivessel coronary artery disease, history of A-fib, CHF with preserved ejection fraction          Referring physician: Dr Arellano Le        Impression     Admission 4/30/2025 increasing lightheadedness poor balance near falls, chest pain like burning, nausea and vomiting, high-sensitivity troponin 18, no new ECG abnormalities  CT head and CTA head and neck unremarkable 4/30/2025  Severe postural hypotension supine blood pressure 120/80 patient dropped below 80 mmHg on standing  Etiology of his dizziness while resting on bed and changing position due to inner ear disease, he has chronic tinnitus    Paroxysmal atrial fibrillation Watchman procedure 11/14/2023, current rhythm sinus rhythm  Ejection fraction 45 to 50% % 2D echo 5/1/2025, previous echo 4/23/2024 ejection fraction 50 to 55%  Moderate mitral regurgitation  Normal right ventricular systolic pressure 21 mmHg 2D echo 5/1/2020  Coronary artery disease, CABG times 4/2014, stent placement times 5 December 2022, stent placement SVG to OM 5/22/2024  CAREY to LAD, SVG to OM 3, ramus, RCA patent grafts cardiac cath December 2019    Hypothyroidism  Hyperlipidemia  History of prostate cancer  History of anemia  History of gastritis, esophageal stricture  Parkinson disease on Sinemet  Back pain, back surgery  Episodes of dizziness, combination of previous C-spine surgery, postural hypotension/autonomic dysfunction patient has Parkinson disease on medication, dizziness aggravates by head and neck movements  History of tinnitus/ringing in the ears for years  Normal B12 1970, serum iron 57, iron saturation 21, folate more than 20  MRA neck and head without contrast 2/9/2023  No

## 2025-05-05 ENCOUNTER — CARE COORDINATION (OUTPATIENT)
Dept: CARE COORDINATION | Age: 75
End: 2025-05-05

## 2025-05-05 DIAGNOSIS — R07.9 CHEST PAIN, UNSPECIFIED TYPE: Primary | ICD-10-CM

## 2025-05-05 PROCEDURE — 1111F DSCHRG MED/CURRENT MED MERGE: CPT | Performed by: INTERNAL MEDICINE

## 2025-05-05 RX ORDER — ESOMEPRAZOLE MAGNESIUM 20 MG/1
20 GRANULE, DELAYED RELEASE ORAL DAILY
COMMUNITY

## 2025-05-05 NOTE — CARE COORDINATION
No  Have you scheduled your follow up appointment?: No  Do you feel like you have everything you need to keep you well at home?: Yes  Care Transitions Interventions     Other Services: Completed (Comment: home care)             Follow Up Appointment:   Patient does not have a follow up appointment scheduled at time of call.  Chart routed to PCP    Future Appointments         Provider Specialty Dept Phone    5/23/2025 9:30 AM Aracely Pierre MD Internal Medicine [phone]            Care Transition Nurse provided contact information.  Plan for follow-up call in 6-10 days based on severity of symptoms and risk factors.  Plan for next call: symptom management-how are BP readings? Any syncope, chest pain, other cardiac or neuro symptoms? PCP HFU scheduled? Verify cardiology appt date. Discuss RPM      Tracy Cordon RN

## 2025-05-12 ENCOUNTER — CARE COORDINATION (OUTPATIENT)
Dept: CARE COORDINATION | Age: 75
End: 2025-05-12

## 2025-05-12 NOTE — CARE COORDINATION
Care Transitions Note    Follow Up Call     Attempted to reach patient for transitions of care follow up.  Unable to reach patient.      Outreach Attempts:   Unable to leave message.     Care Summary Note: First attempt follow up. MB full, unable to leave     Follow Up Appointment:   Future Appointments         Provider Specialty Dept Phone    5/23/2025 9:30 AM Aracely Pierre MD Internal Medicine [phone]            Plan for follow-up call in 2-5 days based on severity of symptoms and risk factors. Plan for next call: symptom management-how are BP readings? Any syncope, chest pain, other cardiac or neuro symptoms? PCP HFU scheduled? Verify cardiology appt date. Discuss RPM      Tracy Cordon, RN

## 2025-05-13 ENCOUNTER — CARE COORDINATION (OUTPATIENT)
Dept: CARE COORDINATION | Age: 75
End: 2025-05-13

## 2025-05-13 NOTE — CARE COORDINATION
Care Transitions Note    Follow Up Call     Attempted to reach patient for transitions of care follow up.  Unable to reach patient.      Outreach Attempts:   Multiple attempts to contact patient at phone numbers on file.   Unable to leave message.     Care Summary Note: Second attempt follow up, MB full, unable to leave VM. PCP has contact patient for HFU, he declined sooner appt and will be seen for AWV 5/23. Closing for OLIVERIO    Follow Up Appointment:   Future Appointments         Provider Specialty Dept Phone    5/23/2025 9:30 AM Aracely Pierre MD Internal Medicine [phone]            No further follow-up call indicated based on severity of symptoms and risk factors. Plan for next call:  REJI Cordon RN

## 2025-05-22 ENCOUNTER — TELEPHONE (OUTPATIENT)
Dept: INTERNAL MEDICINE CLINIC | Age: 75
End: 2025-05-22

## 2025-05-22 NOTE — TELEPHONE ENCOUNTER
Spoke with patient, denied any SOB or Chest pain, he has not been able to get up since fall yesterday. Complains of pain. The tremors from his Parkinson's has gotten so bad to the point he can no longer ambulate.   I confirmed with the patient that he does not have any one available to take him to the ER. He asked that I call 911 for ambulance transfer to [place].   I made call and they instructed me to call [facility]'s non emergency number. Beth was unable to provide phone number. Researched number and received [phone] which was not a number in service.   Called patient back and asked him to call 911 for himself to have local EMS transport to [place] if possible. Patient agreed and will call.

## 2025-05-22 NOTE — TELEPHONE ENCOUNTER
Fifi from [facility] is calling to report a fall that occurred both yesterday on 05/21 and over the past Saturday 05/17. Patient hit his left shoulder and left side of his face, no bruising, pain score 10/10.    Fifi is also reporting that the patient's exacerbation is getting worse due to patient's parkinson's disease. Patient does not have a neurologist at the moment. Writer offered referral, Fifi stated that the patient is too physically weak to get out of bed to attend any appointments at this time due to current health, please advise.

## 2025-05-27 RX ORDER — AMIODARONE HYDROCHLORIDE 200 MG/1
200 TABLET ORAL DAILY
Qty: 30 TABLET | Refills: 0 | Status: ON HOLD | OUTPATIENT
Start: 2025-05-27

## 2025-05-31 ENCOUNTER — HOSPITAL ENCOUNTER (INPATIENT)
Age: 75
LOS: 3 days | Discharge: HOME | DRG: 281 | End: 2025-06-03
Attending: STUDENT IN AN ORGANIZED HEALTH CARE EDUCATION/TRAINING PROGRAM | Admitting: STUDENT IN AN ORGANIZED HEALTH CARE EDUCATION/TRAINING PROGRAM
Payer: MEDICARE

## 2025-05-31 DIAGNOSIS — I21.4 NSTEMI (NON-ST ELEVATED MYOCARDIAL INFARCTION) (HCC): ICD-10-CM

## 2025-05-31 PROBLEM — Z95.818 PRESENCE OF WATCHMAN LEFT ATRIAL APPENDAGE CLOSURE DEVICE: Status: ACTIVE | Noted: 2025-05-31

## 2025-05-31 PROBLEM — E03.9 HYPOTHYROID: Status: ACTIVE | Noted: 2025-05-31

## 2025-05-31 PROBLEM — G20.A1 PARKINSON'S DISEASE (HCC): Status: ACTIVE | Noted: 2025-05-31

## 2025-05-31 LAB
TROPONIN I SERPL HS-MCNC: 45 NG/L (ref 0–22)
TROPONIN I SERPL HS-MCNC: 45 NG/L (ref 0–22)

## 2025-05-31 PROCEDURE — 6370000000 HC RX 637 (ALT 250 FOR IP): Performed by: NURSE PRACTITIONER

## 2025-05-31 PROCEDURE — 2500000003 HC RX 250 WO HCPCS: Performed by: NURSE PRACTITIONER

## 2025-05-31 PROCEDURE — 6370000000 HC RX 637 (ALT 250 FOR IP)

## 2025-05-31 PROCEDURE — 6360000002 HC RX W HCPCS: Performed by: NURSE PRACTITIONER

## 2025-05-31 PROCEDURE — 94761 N-INVAS EAR/PLS OXIMETRY MLT: CPT

## 2025-05-31 PROCEDURE — 6370000000 HC RX 637 (ALT 250 FOR IP): Performed by: STUDENT IN AN ORGANIZED HEALTH CARE EDUCATION/TRAINING PROGRAM

## 2025-05-31 PROCEDURE — 99223 1ST HOSP IP/OBS HIGH 75: CPT | Performed by: STUDENT IN AN ORGANIZED HEALTH CARE EDUCATION/TRAINING PROGRAM

## 2025-05-31 PROCEDURE — 84484 ASSAY OF TROPONIN QUANT: CPT

## 2025-05-31 PROCEDURE — 36415 COLL VENOUS BLD VENIPUNCTURE: CPT

## 2025-05-31 PROCEDURE — 99222 1ST HOSP IP/OBS MODERATE 55: CPT | Performed by: NURSE PRACTITIONER

## 2025-05-31 PROCEDURE — 2060000000 HC ICU INTERMEDIATE R&B

## 2025-05-31 RX ORDER — MIDODRINE HYDROCHLORIDE 5 MG/1
5 TABLET ORAL 3 TIMES DAILY
Status: DISCONTINUED | OUTPATIENT
Start: 2025-05-31 | End: 2025-05-31

## 2025-05-31 RX ORDER — ATORVASTATIN CALCIUM 40 MG/1
40 TABLET, FILM COATED ORAL DAILY
Status: DISCONTINUED | OUTPATIENT
Start: 2025-05-31 | End: 2025-06-03 | Stop reason: HOSPADM

## 2025-05-31 RX ORDER — MULTIVITAMIN WITH IRON
1000 TABLET ORAL DAILY
Status: DISCONTINUED | OUTPATIENT
Start: 2025-05-31 | End: 2025-06-03 | Stop reason: HOSPADM

## 2025-05-31 RX ORDER — MIRTAZAPINE 15 MG/1
7.5 TABLET, FILM COATED ORAL NIGHTLY
Status: DISCONTINUED | OUTPATIENT
Start: 2025-05-31 | End: 2025-06-03 | Stop reason: HOSPADM

## 2025-05-31 RX ORDER — POTASSIUM CHLORIDE 7.45 MG/ML
10 INJECTION INTRAVENOUS PRN
Status: DISCONTINUED | OUTPATIENT
Start: 2025-05-31 | End: 2025-06-03 | Stop reason: HOSPADM

## 2025-05-31 RX ORDER — RANOLAZINE 500 MG/1
1000 TABLET, EXTENDED RELEASE ORAL 2 TIMES DAILY
Status: DISCONTINUED | OUTPATIENT
Start: 2025-05-31 | End: 2025-06-03 | Stop reason: HOSPADM

## 2025-05-31 RX ORDER — ACETAMINOPHEN 325 MG/1
650 TABLET ORAL EVERY 6 HOURS PRN
Status: DISCONTINUED | OUTPATIENT
Start: 2025-05-31 | End: 2025-06-03 | Stop reason: HOSPADM

## 2025-05-31 RX ORDER — ENOXAPARIN SODIUM 100 MG/ML
1 INJECTION SUBCUTANEOUS 2 TIMES DAILY
Status: DISCONTINUED | OUTPATIENT
Start: 2025-05-31 | End: 2025-06-03 | Stop reason: HOSPADM

## 2025-05-31 RX ORDER — ASPIRIN 81 MG/1
81 TABLET, CHEWABLE ORAL DAILY
Status: DISCONTINUED | OUTPATIENT
Start: 2025-06-01 | End: 2025-05-31

## 2025-05-31 RX ORDER — ONDANSETRON 4 MG/1
4 TABLET, ORALLY DISINTEGRATING ORAL EVERY 8 HOURS PRN
Status: DISCONTINUED | OUTPATIENT
Start: 2025-05-31 | End: 2025-06-03 | Stop reason: HOSPADM

## 2025-05-31 RX ORDER — MIDODRINE HYDROCHLORIDE 5 MG/1
5 TABLET ORAL 3 TIMES DAILY PRN
Status: DISCONTINUED | OUTPATIENT
Start: 2025-05-31 | End: 2025-06-03 | Stop reason: HOSPADM

## 2025-05-31 RX ORDER — POTASSIUM CHLORIDE 750 MG/1
10 CAPSULE, EXTENDED RELEASE ORAL EVERY MORNING
Status: DISCONTINUED | OUTPATIENT
Start: 2025-05-31 | End: 2025-06-03 | Stop reason: HOSPADM

## 2025-05-31 RX ORDER — CARBIDOPA AND LEVODOPA 25; 100 MG/1; MG/1
1 TABLET ORAL 4 TIMES DAILY
Status: DISCONTINUED | OUTPATIENT
Start: 2025-05-31 | End: 2025-06-03 | Stop reason: HOSPADM

## 2025-05-31 RX ORDER — MAGNESIUM SULFATE 1 G/100ML
1000 INJECTION INTRAVENOUS PRN
Status: DISCONTINUED | OUTPATIENT
Start: 2025-05-31 | End: 2025-06-03 | Stop reason: HOSPADM

## 2025-05-31 RX ORDER — MECLIZINE HCL 12.5 MG 12.5 MG/1
12.5 TABLET ORAL 3 TIMES DAILY PRN
Status: DISCONTINUED | OUTPATIENT
Start: 2025-05-31 | End: 2025-06-03 | Stop reason: HOSPADM

## 2025-05-31 RX ORDER — SODIUM CHLORIDE 9 MG/ML
INJECTION, SOLUTION INTRAVENOUS PRN
Status: DISCONTINUED | OUTPATIENT
Start: 2025-05-31 | End: 2025-06-03 | Stop reason: HOSPADM

## 2025-05-31 RX ORDER — ACETAMINOPHEN 650 MG/1
650 SUPPOSITORY RECTAL EVERY 6 HOURS PRN
Status: DISCONTINUED | OUTPATIENT
Start: 2025-05-31 | End: 2025-06-03 | Stop reason: HOSPADM

## 2025-05-31 RX ORDER — SUCRALFATE 1 G/1
1 TABLET ORAL 4 TIMES DAILY
Status: DISCONTINUED | OUTPATIENT
Start: 2025-05-31 | End: 2025-06-03 | Stop reason: HOSPADM

## 2025-05-31 RX ORDER — SODIUM CHLORIDE 0.9 % (FLUSH) 0.9 %
10 SYRINGE (ML) INJECTION PRN
Status: DISCONTINUED | OUTPATIENT
Start: 2025-05-31 | End: 2025-06-03 | Stop reason: HOSPADM

## 2025-05-31 RX ORDER — CLOPIDOGREL BISULFATE 75 MG/1
75 TABLET ORAL DAILY
Status: DISCONTINUED | OUTPATIENT
Start: 2025-05-31 | End: 2025-06-03 | Stop reason: HOSPADM

## 2025-05-31 RX ORDER — POTASSIUM CHLORIDE 1500 MG/1
40 TABLET, EXTENDED RELEASE ORAL PRN
Status: DISCONTINUED | OUTPATIENT
Start: 2025-05-31 | End: 2025-06-03 | Stop reason: HOSPADM

## 2025-05-31 RX ORDER — LEVOTHYROXINE SODIUM 75 UG/1
75 TABLET ORAL DAILY
Status: DISCONTINUED | OUTPATIENT
Start: 2025-05-31 | End: 2025-06-03 | Stop reason: HOSPADM

## 2025-05-31 RX ORDER — LABETALOL HYDROCHLORIDE 5 MG/ML
10 INJECTION, SOLUTION INTRAVENOUS EVERY 4 HOURS PRN
Status: DISCONTINUED | OUTPATIENT
Start: 2025-05-31 | End: 2025-06-03 | Stop reason: HOSPADM

## 2025-05-31 RX ORDER — SODIUM CHLORIDE 0.9 % (FLUSH) 0.9 %
5-40 SYRINGE (ML) INJECTION EVERY 12 HOURS SCHEDULED
Status: DISCONTINUED | OUTPATIENT
Start: 2025-05-31 | End: 2025-06-03 | Stop reason: HOSPADM

## 2025-05-31 RX ORDER — ASPIRIN 81 MG/1
81 TABLET, CHEWABLE ORAL DAILY
Status: DISCONTINUED | OUTPATIENT
Start: 2025-05-31 | End: 2025-06-03 | Stop reason: HOSPADM

## 2025-05-31 RX ORDER — DROXIDOPA 200 MG/1
200 CAPSULE ORAL 3 TIMES DAILY
Status: DISCONTINUED | OUTPATIENT
Start: 2025-05-31 | End: 2025-06-03 | Stop reason: HOSPADM

## 2025-05-31 RX ORDER — FERROUS SULFATE 325(65) MG
325 TABLET, DELAYED RELEASE (ENTERIC COATED) ORAL
Status: DISCONTINUED | OUTPATIENT
Start: 2025-05-31 | End: 2025-06-03 | Stop reason: HOSPADM

## 2025-05-31 RX ORDER — FLUDROCORTISONE ACETATE 0.1 MG/1
0.05 TABLET ORAL DAILY
Status: DISCONTINUED | OUTPATIENT
Start: 2025-05-31 | End: 2025-06-03 | Stop reason: HOSPADM

## 2025-05-31 RX ORDER — PANTOPRAZOLE SODIUM 20 MG/1
20 TABLET, DELAYED RELEASE ORAL DAILY
Status: DISCONTINUED | OUTPATIENT
Start: 2025-05-31 | End: 2025-06-03 | Stop reason: HOSPADM

## 2025-05-31 RX ORDER — AMIODARONE HYDROCHLORIDE 200 MG/1
200 TABLET ORAL DAILY
Status: DISCONTINUED | OUTPATIENT
Start: 2025-05-31 | End: 2025-06-03 | Stop reason: HOSPADM

## 2025-05-31 RX ORDER — ONDANSETRON 2 MG/ML
4 INJECTION INTRAMUSCULAR; INTRAVENOUS EVERY 6 HOURS PRN
Status: DISCONTINUED | OUTPATIENT
Start: 2025-05-31 | End: 2025-06-03 | Stop reason: HOSPADM

## 2025-05-31 RX ORDER — PANTOPRAZOLE SODIUM 40 MG/1
40 TABLET, DELAYED RELEASE ORAL
Status: DISCONTINUED | OUTPATIENT
Start: 2025-06-01 | End: 2025-05-31 | Stop reason: ALTCHOICE

## 2025-05-31 RX ORDER — LISINOPRIL 20 MG/1
20 TABLET ORAL DAILY
Status: DISCONTINUED | OUTPATIENT
Start: 2025-05-31 | End: 2025-05-31

## 2025-05-31 RX ORDER — TRAMADOL HYDROCHLORIDE 50 MG/1
50 TABLET ORAL EVERY 8 HOURS PRN
Status: DISCONTINUED | OUTPATIENT
Start: 2025-05-31 | End: 2025-06-03 | Stop reason: HOSPADM

## 2025-05-31 RX ADMIN — CARBIDOPA AND LEVODOPA 1 TABLET: 25; 100 TABLET ORAL at 12:25

## 2025-05-31 RX ADMIN — FERROUS SULFATE TAB EC 325 MG (65 MG FE EQUIVALENT) 325 MG: 325 (65 FE) TABLET DELAYED RESPONSE at 18:03

## 2025-05-31 RX ADMIN — SODIUM CHLORIDE, PRESERVATIVE FREE 10 ML: 5 INJECTION INTRAVENOUS at 07:57

## 2025-05-31 RX ADMIN — ATORVASTATIN CALCIUM 40 MG: 40 TABLET, FILM COATED ORAL at 12:25

## 2025-05-31 RX ADMIN — POTASSIUM CHLORIDE 10 MEQ: 750 CAPSULE, EXTENDED RELEASE ORAL at 12:25

## 2025-05-31 RX ADMIN — SODIUM CHLORIDE, PRESERVATIVE FREE 10 ML: 5 INJECTION INTRAVENOUS at 20:21

## 2025-05-31 RX ADMIN — RANOLAZINE 1000 MG: 500 TABLET, FILM COATED, EXTENDED RELEASE ORAL at 12:32

## 2025-05-31 RX ADMIN — MIRTAZAPINE 7.5 MG: 15 TABLET, FILM COATED ORAL at 20:20

## 2025-05-31 RX ADMIN — MICONAZOLE NITRATE: 20 POWDER TOPICAL at 20:21

## 2025-05-31 RX ADMIN — FERROUS SULFATE TAB EC 325 MG (65 MG FE EQUIVALENT) 325 MG: 325 (65 FE) TABLET DELAYED RESPONSE at 12:25

## 2025-05-31 RX ADMIN — MICONAZOLE NITRATE: 20 POWDER TOPICAL at 12:34

## 2025-05-31 RX ADMIN — LEVOTHYROXINE SODIUM 75 MCG: 0.07 TABLET ORAL at 12:25

## 2025-05-31 RX ADMIN — ACETAMINOPHEN 650 MG: 325 TABLET ORAL at 08:03

## 2025-05-31 RX ADMIN — PANTOPRAZOLE SODIUM 20 MG: 20 TABLET, DELAYED RELEASE ORAL at 12:25

## 2025-05-31 RX ADMIN — AMIODARONE HYDROCHLORIDE 200 MG: 200 TABLET ORAL at 12:25

## 2025-05-31 RX ADMIN — LISINOPRIL 20 MG: 20 TABLET ORAL at 07:56

## 2025-05-31 RX ADMIN — CLOPIDOGREL BISULFATE 75 MG: 75 TABLET, FILM COATED ORAL at 12:21

## 2025-05-31 RX ADMIN — RANOLAZINE 1000 MG: 500 TABLET, FILM COATED, EXTENDED RELEASE ORAL at 20:20

## 2025-05-31 RX ADMIN — CYANOCOBALAMIN TAB 500 MCG 1000 MCG: 500 TAB at 12:26

## 2025-05-31 RX ADMIN — SUCRALFATE 1 G: 1 TABLET ORAL at 19:17

## 2025-05-31 RX ADMIN — CARBIDOPA AND LEVODOPA 1 TABLET: 25; 100 TABLET ORAL at 20:21

## 2025-05-31 RX ADMIN — ASPIRIN 81 MG: 81 TABLET, CHEWABLE ORAL at 12:25

## 2025-05-31 RX ADMIN — FLUDROCORTISONE ACETATE 0.05 MG: 0.1 TABLET ORAL at 12:25

## 2025-05-31 RX ADMIN — CARBIDOPA AND LEVODOPA 1 TABLET: 25; 100 TABLET ORAL at 18:03

## 2025-05-31 RX ADMIN — MIDODRINE HYDROCHLORIDE 5 MG: 5 TABLET ORAL at 20:20

## 2025-05-31 RX ADMIN — ENOXAPARIN SODIUM 70 MG: 100 INJECTION SUBCUTANEOUS at 07:56

## 2025-05-31 RX ADMIN — SUCRALFATE 1 G: 1 TABLET ORAL at 20:21

## 2025-05-31 RX ADMIN — ENOXAPARIN SODIUM 70 MG: 100 INJECTION SUBCUTANEOUS at 20:20

## 2025-05-31 ASSESSMENT — PAIN SCALES - GENERAL
PAINLEVEL_OUTOF10: 2
PAINLEVEL_OUTOF10: 1
PAINLEVEL_OUTOF10: 0
PAINLEVEL_OUTOF10: 6
PAINLEVEL_OUTOF10: 0
PAINLEVEL_OUTOF10: 0

## 2025-05-31 ASSESSMENT — PAIN DESCRIPTION - LOCATION: LOCATION: HEAD

## 2025-05-31 NOTE — CARE COORDINATION
05/31/25 0937   Readmission Assessment   Number of Days since last admission? 8-30 days   Previous Disposition Home with Home Health   Who is being Interviewed Patient   What was the patient's/caregiver's perception as to why they think they needed to return back to the hospital? Other (Comment)  (Chest pain)   Did you visit your Primary Care Physician after you left the hospital, before you returned this time? No   Why weren't you able to visit your PCP? Did not have an appointment   Did you see a specialist, such as Cardiac, Pulmonary, Orthopedic Physician, etc. after you left the hospital? No   Who advised the patient to return to the hospital? Skilled Unit   Does the patient report anything that got in the way of taking their medications? No   In our efforts to provide the best possible care to you and others like you, can you think of anything that we could have done to help you after you left the hospital the first time, so that you might not have needed to return so soon? Other (Comment)  (Did not address his UTI)

## 2025-05-31 NOTE — PLAN OF CARE
Problem: Discharge Planning  Goal: Discharge to home or other facility with appropriate resources  Outcome: Progressing  Flowsheets  Taken 5/31/2025 0500  Discharge to home or other facility with appropriate resources: Identify barriers to discharge with patient and caregiver  Taken 5/31/2025 0457  Discharge to home or other facility with appropriate resources:   Identify barriers to discharge with patient and caregiver   Identify discharge learning needs (meds, wound care, etc)     Problem: Pain  Goal: Verbalizes/displays adequate comfort level or baseline comfort level  Outcome: Progressing     Problem: Safety - Adult  Goal: Free from fall injury  Outcome: Progressing

## 2025-05-31 NOTE — CONSULTS
[facility]    Consult / H&P               Today's Date: 5/31/2025  Patient Name: Rowdy Marie  Date of admission: 5/31/2025  4:37 AM  Patient's age: 75 y.o., 1950  Admission Dx: Acute coronary syndrome (HCC) [I24.9]    Requesting Physician: Berny Christian MD    Cardiac Evaluation Reason: Chest pain    History Obtained From: patient and chart review     History of Present Illness:      This patient 75 y.o. years old with PMH as below presented to the hospital with chest pain.  Patient lives at a nursing facility at around 10 PM he started having sharp left-sided chest pain radiating to the left arm, EMS was called he was given a full dose of aspirin.  He did not receive any nitro because of history of headaches.  His episode lasted approximately 30 to 45 minutes.  Currently his pain is 1 out of 10.  He previously has history of MIs, and he related his symptoms to those episodes.    Patient was recently admitted in another facility where he was treated for severe postural hypotension and UTI.  Patient was discharged on droxidopa 200 3 times daily, midodrine 10 3 times daily and Florinef 0.05.  On arrival patient was hypertensive, and remain hypertensive in the hospital.    EKG is without any ischemic changes.  Troponins 45-45.    Past Medical History:   has a past medical history of A-fib ([facility]), PREET (acute kidney injury), Anemia, CAD (coronary artery disease), Esophageal stricture, Hyperlipidemia, Hypokalemia, Hypomagnesemia, NSTEMI (non-ST elevated myocardial infarction) ([facility]), Orthostatic hypotension, Parkinsons ([facility]), Presence of Watchman left atrial appendage closure device, and Prostate CA ([facility]).    Past Surgical History:   has a past surgical history that includes Coronary artery bypass graft; Cardiac catheterization; Coronary angioplasty with stent; Diagnostic Cardiac Cath Lab Procedure (04/11/2024); Cardiac procedure (N/A, 4/11/2024); Cardiac procedure (N/A, 4/11/2024);

## 2025-05-31 NOTE — H&P
[facility]  Office: [phone]  Dillon Fraire DO, Gil Diaz, DO, Rodney Mccarthy DO, Danny Rosa DO, Elmo Whaley MD, Anum Salomon MD, Ivan Barclay MD, Kell Nix MD,  Erik Nevarez MD, Makenna Pabon MD, Edelmira Fernandez MD,  Rehana Pizano DO, Delisa Wu MD, Tyrone Wakefield MD, Theo Fraire DO, Venus Meza MD,  Tristen Stevens DO, Maria T Mccartney MD, Viry Hylton MD, Geno Estrada MD,  Basim Lester MD, Willi Echevarria MD, Ana Greco MD, Berny Christian MD, Varinder Avilez MD, Estuardo Álvarez MD, Patrick Engel, DO, Yandy Jones MD, Seth Zarate DO, Wellington Erazo MD, Rehana Almanza MD, Mohsin Reza, MD, Chaz Quinones MD, Shirley Waterhouse, CNP,  Zoya Drake, CNP, Patrick Angulo, CNP,  Brittany Slater, KOMAL, Rosalie King, CNP, Michelle Rose, CNP, Latasha Diaz, CNP, Yun López, CNP, Lucero Guerrero, PA-C, Dahiana Meza, CNP, Rajni Sauer, CNP,  Ryanne Wolfe, CNP, Arabella Patel, CNP, Rd Ruby, PA-C, Marleen Penny, CNP,  Maryjane Andrade, CNS, Kira Rowley, CNP, Margarita Infante, CNP,   Maddie Mckeon, CNP         [facility]   IN-PATIENT SERVICE   [facility]    HISTORY AND PHYSICAL EXAMINATION            Date:   5/31/2025  Patient name:  Rowdy Marie  Date of admission:  5/31/2025  4:37 AM  MRN:   [id number]  Account:  [id number]  YOB: 1950  PCP:    Aracely Pierre MD  Room:   2005/2005-01  Code Status:    Full Code    Chief Complaint:     Chest pain    History Obtained From:     patient, family member - daughter-in-law, electronic medical record    History of Present Illness:     Rowdy Marie is a 75 y.o. Non- / non  male who presents with No chief complaint on file.   and is admitted to the hospital for the management of Chest pain.    Rowdy Marie is a pleasant, cooperative 75-year-old male who presented to outlying facility with complaints of sharp midsternal chest pain that radiated

## 2025-05-31 NOTE — CARE COORDINATION
Case Management Assessment  Initial Evaluation    Date/Time of Evaluation: 5/31/2025 9:49 AM  Assessment Completed by: SHIVANI KRISHNA RN    If patient is discharged prior to next notation, then this note serves as note for discharge by case management.    Patient Name: Rowdy Marie                   YOB: 1950  Diagnosis: Acute coronary syndrome (HCC) [I24.9]                   Date / Time: 5/31/2025  4:37 AM    Patient Admission Status: Inpatient   Readmission Risk (Low < 19, Mod (19-27), High > 27): Readmission Risk Score: 21.2    Current PCP: Aracely Pierre MD  PCP verified by CM? (P) Yes    Chart Reviewed: Yes      History Provided by: Patient  Patient Orientation: Alert and Oriented    Patient Cognition: Alert    Hospitalization in the last 30 days (Readmission):  Yes    If yes, Readmission Assessment in CM Navigator will be completed.    Advance Directives:      Code Status: Full Code   Patient's Primary Decision Maker is: (P) Legal Next of Kin    Primary Decision Maker: Kye Marie - Daughter-in-Law - [phone]    Secondary Decision Maker: Crystal Mariee - Child - [phone]    Supplemental (Other) Decision Maker: FrankVipulPatrick - Child - [phone]    Discharge Planning:    Patient lives with: (P) Children Type of Home: (P) Skilled Nursing Facility  Primary Care Giver: Self  Patient Support Systems include: (P) Children, Family Members   Current Financial resources: (P) Medicare  Current community resources:    Current services prior to admission: (P) Durable Medical Equipment, Skilled Nursing Facility, Oxygen Therapy            Current DME: (P) Walker, Cane, Other (Comment), Shower Chair, Oxygen Therapy (Comment) (Scooter, O2 supplier - [employer])            Type of Home Care services:  (P) None    ADLS  Prior functional level: (P) Assistance with the following:, Cooking, Housework, Shopping  Current functional level: (P) Other (see comment) (Await PT/OT evals)    PT AM-PAC:   /24  OT

## 2025-06-01 LAB
ANION GAP SERPL CALCULATED.3IONS-SCNC: 12 MMOL/L (ref 9–16)
BUN SERPL-MCNC: 18 MG/DL (ref 8–23)
CALCIUM SERPL-MCNC: 8.4 MG/DL (ref 8.6–10.4)
CHLORIDE SERPL-SCNC: 105 MMOL/L (ref 98–107)
CHOLEST SERPL-MCNC: 166 MG/DL (ref 0–199)
CHOLESTEROL/HDL RATIO: 3.9
CO2 SERPL-SCNC: 21 MMOL/L (ref 20–31)
CREAT SERPL-MCNC: 1.2 MG/DL (ref 0.7–1.2)
ERYTHROCYTE [DISTWIDTH] IN BLOOD BY AUTOMATED COUNT: 14.4 % (ref 11.8–14.4)
GFR, ESTIMATED: 63 ML/MIN/1.73M2
GLUCOSE SERPL-MCNC: 87 MG/DL (ref 74–99)
HCT VFR BLD AUTO: 35.6 % (ref 40.7–50.3)
HDLC SERPL-MCNC: 43 MG/DL
HGB BLD-MCNC: 10.5 G/DL (ref 13–17)
INR PPP: 1.1
LDLC SERPL CALC-MCNC: 87 MG/DL (ref 0–100)
MAGNESIUM SERPL-MCNC: 1.9 MG/DL (ref 1.6–2.4)
MCH RBC QN AUTO: 32 PG (ref 25.2–33.5)
MCHC RBC AUTO-ENTMCNC: 29.5 G/DL (ref 28.4–34.8)
MCV RBC AUTO: 108.5 FL (ref 82.6–102.9)
NRBC BLD-RTO: 0 PER 100 WBC
PLATELET # BLD AUTO: 271 K/UL (ref 138–453)
PMV BLD AUTO: 10.8 FL (ref 8.1–13.5)
POTASSIUM SERPL-SCNC: 4.1 MMOL/L (ref 3.7–5.3)
PROTHROMBIN TIME: 14.6 SEC (ref 11.7–14.9)
RBC # BLD AUTO: 3.28 M/UL (ref 4.21–5.77)
SODIUM SERPL-SCNC: 138 MMOL/L (ref 136–145)
TRIGL SERPL-MCNC: 179 MG/DL
VLDLC SERPL CALC-MCNC: 36 MG/DL (ref 1–30)
WBC OTHER # BLD: 8.6 K/UL (ref 3.5–11.3)

## 2025-06-01 PROCEDURE — 6370000000 HC RX 637 (ALT 250 FOR IP): Performed by: NURSE PRACTITIONER

## 2025-06-01 PROCEDURE — 2500000003 HC RX 250 WO HCPCS: Performed by: NURSE PRACTITIONER

## 2025-06-01 PROCEDURE — 80061 LIPID PANEL: CPT

## 2025-06-01 PROCEDURE — 36415 COLL VENOUS BLD VENIPUNCTURE: CPT

## 2025-06-01 PROCEDURE — 6360000002 HC RX W HCPCS: Performed by: NURSE PRACTITIONER

## 2025-06-01 PROCEDURE — 99232 SBSQ HOSP IP/OBS MODERATE 35: CPT | Performed by: NURSE PRACTITIONER

## 2025-06-01 PROCEDURE — 94761 N-INVAS EAR/PLS OXIMETRY MLT: CPT

## 2025-06-01 PROCEDURE — 85027 COMPLETE CBC AUTOMATED: CPT

## 2025-06-01 PROCEDURE — 2060000000 HC ICU INTERMEDIATE R&B

## 2025-06-01 PROCEDURE — 83735 ASSAY OF MAGNESIUM: CPT

## 2025-06-01 PROCEDURE — 85610 PROTHROMBIN TIME: CPT

## 2025-06-01 PROCEDURE — 80048 BASIC METABOLIC PNL TOTAL CA: CPT

## 2025-06-01 PROCEDURE — 99233 SBSQ HOSP IP/OBS HIGH 50: CPT | Performed by: NURSE PRACTITIONER

## 2025-06-01 RX ORDER — MORPHINE SULFATE 2 MG/ML
2 INJECTION, SOLUTION INTRAMUSCULAR; INTRAVENOUS ONCE
Status: COMPLETED | OUTPATIENT
Start: 2025-06-01 | End: 2025-06-01

## 2025-06-01 RX ORDER — SODIUM CHLORIDE 9 MG/ML
INJECTION, SOLUTION INTRAVENOUS CONTINUOUS
Status: DISCONTINUED | OUTPATIENT
Start: 2025-06-02 | End: 2025-06-03

## 2025-06-01 RX ADMIN — SUCRALFATE 1 G: 1 TABLET ORAL at 12:58

## 2025-06-01 RX ADMIN — MORPHINE SULFATE 2 MG: 2 INJECTION, SOLUTION INTRAMUSCULAR; INTRAVENOUS at 16:27

## 2025-06-01 RX ADMIN — FERROUS SULFATE TAB EC 325 MG (65 MG FE EQUIVALENT) 325 MG: 325 (65 FE) TABLET DELAYED RESPONSE at 17:49

## 2025-06-01 RX ADMIN — ACETAMINOPHEN 650 MG: 325 TABLET ORAL at 00:50

## 2025-06-01 RX ADMIN — ASPIRIN 81 MG: 81 TABLET, CHEWABLE ORAL at 09:28

## 2025-06-01 RX ADMIN — SUCRALFATE 1 G: 1 TABLET ORAL at 20:13

## 2025-06-01 RX ADMIN — MICONAZOLE NITRATE: 20 POWDER TOPICAL at 09:30

## 2025-06-01 RX ADMIN — MIRTAZAPINE 7.5 MG: 15 TABLET, FILM COATED ORAL at 20:13

## 2025-06-01 RX ADMIN — POTASSIUM CHLORIDE 10 MEQ: 750 CAPSULE, EXTENDED RELEASE ORAL at 09:34

## 2025-06-01 RX ADMIN — LEVOTHYROXINE SODIUM 75 MCG: 0.07 TABLET ORAL at 09:29

## 2025-06-01 RX ADMIN — FERROUS SULFATE TAB EC 325 MG (65 MG FE EQUIVALENT) 325 MG: 325 (65 FE) TABLET DELAYED RESPONSE at 09:28

## 2025-06-01 RX ADMIN — ACETAMINOPHEN 650 MG: 325 TABLET ORAL at 20:13

## 2025-06-01 RX ADMIN — PANTOPRAZOLE SODIUM 20 MG: 20 TABLET, DELAYED RELEASE ORAL at 09:34

## 2025-06-01 RX ADMIN — ENOXAPARIN SODIUM 70 MG: 100 INJECTION SUBCUTANEOUS at 20:15

## 2025-06-01 RX ADMIN — FLUDROCORTISONE ACETATE 0.05 MG: 0.1 TABLET ORAL at 09:34

## 2025-06-01 RX ADMIN — CARBIDOPA AND LEVODOPA 1 TABLET: 25; 100 TABLET ORAL at 17:49

## 2025-06-01 RX ADMIN — CARBIDOPA AND LEVODOPA 1 TABLET: 25; 100 TABLET ORAL at 12:58

## 2025-06-01 RX ADMIN — SODIUM CHLORIDE, PRESERVATIVE FREE 10 ML: 5 INJECTION INTRAVENOUS at 20:16

## 2025-06-01 RX ADMIN — FERROUS SULFATE TAB EC 325 MG (65 MG FE EQUIVALENT) 325 MG: 325 (65 FE) TABLET DELAYED RESPONSE at 12:58

## 2025-06-01 RX ADMIN — MICONAZOLE NITRATE: 20 POWDER TOPICAL at 20:15

## 2025-06-01 RX ADMIN — ATORVASTATIN CALCIUM 40 MG: 40 TABLET, FILM COATED ORAL at 09:28

## 2025-06-01 RX ADMIN — RANOLAZINE 1000 MG: 500 TABLET, FILM COATED, EXTENDED RELEASE ORAL at 20:12

## 2025-06-01 RX ADMIN — AMIODARONE HYDROCHLORIDE 200 MG: 200 TABLET ORAL at 09:28

## 2025-06-01 RX ADMIN — SUCRALFATE 1 G: 1 TABLET ORAL at 09:29

## 2025-06-01 RX ADMIN — ENOXAPARIN SODIUM 70 MG: 100 INJECTION SUBCUTANEOUS at 09:29

## 2025-06-01 RX ADMIN — SODIUM CHLORIDE, PRESERVATIVE FREE 10 ML: 5 INJECTION INTRAVENOUS at 09:29

## 2025-06-01 RX ADMIN — SUCRALFATE 1 G: 1 TABLET ORAL at 17:49

## 2025-06-01 RX ADMIN — RANOLAZINE 1000 MG: 500 TABLET, FILM COATED, EXTENDED RELEASE ORAL at 09:29

## 2025-06-01 RX ADMIN — CYANOCOBALAMIN TAB 500 MCG 1000 MCG: 500 TAB at 09:29

## 2025-06-01 RX ADMIN — CARBIDOPA AND LEVODOPA 1 TABLET: 25; 100 TABLET ORAL at 20:15

## 2025-06-01 RX ADMIN — TRAMADOL HYDROCHLORIDE 50 MG: 50 TABLET, COATED ORAL at 13:07

## 2025-06-01 RX ADMIN — ACETAMINOPHEN 650 MG: 325 TABLET ORAL at 09:28

## 2025-06-01 RX ADMIN — CLOPIDOGREL BISULFATE 75 MG: 75 TABLET, FILM COATED ORAL at 09:28

## 2025-06-01 RX ADMIN — CARBIDOPA AND LEVODOPA 1 TABLET: 25; 100 TABLET ORAL at 09:34

## 2025-06-01 ASSESSMENT — PAIN SCALES - WONG BAKER
WONGBAKER_NUMERICALRESPONSE: NO HURT
WONGBAKER_NUMERICALRESPONSE: NO HURT

## 2025-06-01 ASSESSMENT — PAIN SCALES - GENERAL
PAINLEVEL_OUTOF10: 5
PAINLEVEL_OUTOF10: 3
PAINLEVEL_OUTOF10: 2

## 2025-06-01 ASSESSMENT — PAIN DESCRIPTION - LOCATION
LOCATION: HEAD

## 2025-06-02 LAB — ECHO BSA: 1.85 M2

## 2025-06-02 PROCEDURE — 2580000003 HC RX 258: Performed by: NURSE PRACTITIONER

## 2025-06-02 PROCEDURE — 2709999900 HC NON-CHARGEABLE SUPPLY: Performed by: STUDENT IN AN ORGANIZED HEALTH CARE EDUCATION/TRAINING PROGRAM

## 2025-06-02 PROCEDURE — C1894 INTRO/SHEATH, NON-LASER: HCPCS | Performed by: STUDENT IN AN ORGANIZED HEALTH CARE EDUCATION/TRAINING PROGRAM

## 2025-06-02 PROCEDURE — 6360000002 HC RX W HCPCS: Performed by: STUDENT IN AN ORGANIZED HEALTH CARE EDUCATION/TRAINING PROGRAM

## 2025-06-02 PROCEDURE — 6360000002 HC RX W HCPCS

## 2025-06-02 PROCEDURE — 99232 SBSQ HOSP IP/OBS MODERATE 35: CPT | Performed by: STUDENT IN AN ORGANIZED HEALTH CARE EDUCATION/TRAINING PROGRAM

## 2025-06-02 PROCEDURE — 76937 US GUIDE VASCULAR ACCESS: CPT | Performed by: STUDENT IN AN ORGANIZED HEALTH CARE EDUCATION/TRAINING PROGRAM

## 2025-06-02 PROCEDURE — 99151 MOD SED SAME PHYS/QHP <5 YRS: CPT | Performed by: STUDENT IN AN ORGANIZED HEALTH CARE EDUCATION/TRAINING PROGRAM

## 2025-06-02 PROCEDURE — 2060000000 HC ICU INTERMEDIATE R&B

## 2025-06-02 PROCEDURE — 6360000004 HC RX CONTRAST MEDICATION: Performed by: STUDENT IN AN ORGANIZED HEALTH CARE EDUCATION/TRAINING PROGRAM

## 2025-06-02 PROCEDURE — C1769 GUIDE WIRE: HCPCS | Performed by: STUDENT IN AN ORGANIZED HEALTH CARE EDUCATION/TRAINING PROGRAM

## 2025-06-02 PROCEDURE — C1760 CLOSURE DEV, VASC: HCPCS | Performed by: STUDENT IN AN ORGANIZED HEALTH CARE EDUCATION/TRAINING PROGRAM

## 2025-06-02 PROCEDURE — 99152 MOD SED SAME PHYS/QHP 5/>YRS: CPT | Performed by: STUDENT IN AN ORGANIZED HEALTH CARE EDUCATION/TRAINING PROGRAM

## 2025-06-02 PROCEDURE — B2131ZZ FLUOROSCOPY OF MULTIPLE CORONARY ARTERY BYPASS GRAFTS USING LOW OSMOLAR CONTRAST: ICD-10-PCS | Performed by: STUDENT IN AN ORGANIZED HEALTH CARE EDUCATION/TRAINING PROGRAM

## 2025-06-02 PROCEDURE — 93455 CORONARY ART/GRFT ANGIO S&I: CPT | Performed by: STUDENT IN AN ORGANIZED HEALTH CARE EDUCATION/TRAINING PROGRAM

## 2025-06-02 PROCEDURE — 6370000000 HC RX 637 (ALT 250 FOR IP): Performed by: NURSE PRACTITIONER

## 2025-06-02 PROCEDURE — B2101ZZ FLUOROSCOPY OF SINGLE CORONARY ARTERY USING LOW OSMOLAR CONTRAST: ICD-10-PCS | Performed by: STUDENT IN AN ORGANIZED HEALTH CARE EDUCATION/TRAINING PROGRAM

## 2025-06-02 PROCEDURE — 93459 L HRT ART/GRFT ANGIO: CPT | Performed by: STUDENT IN AN ORGANIZED HEALTH CARE EDUCATION/TRAINING PROGRAM

## 2025-06-02 PROCEDURE — 2500000003 HC RX 250 WO HCPCS: Performed by: NURSE PRACTITIONER

## 2025-06-02 PROCEDURE — 4A023N7 MEASUREMENT OF CARDIAC SAMPLING AND PRESSURE, LEFT HEART, PERCUTANEOUS APPROACH: ICD-10-PCS | Performed by: STUDENT IN AN ORGANIZED HEALTH CARE EDUCATION/TRAINING PROGRAM

## 2025-06-02 PROCEDURE — B2151ZZ FLUOROSCOPY OF LEFT HEART USING LOW OSMOLAR CONTRAST: ICD-10-PCS | Performed by: STUDENT IN AN ORGANIZED HEALTH CARE EDUCATION/TRAINING PROGRAM

## 2025-06-02 PROCEDURE — 93005 ELECTROCARDIOGRAM TRACING: CPT | Performed by: STUDENT IN AN ORGANIZED HEALTH CARE EDUCATION/TRAINING PROGRAM

## 2025-06-02 PROCEDURE — 6360000002 HC RX W HCPCS: Performed by: NURSE PRACTITIONER

## 2025-06-02 PROCEDURE — C1892 INTRO/SHEATH,FIXED,PEEL-AWAY: HCPCS | Performed by: STUDENT IN AN ORGANIZED HEALTH CARE EDUCATION/TRAINING PROGRAM

## 2025-06-02 RX ORDER — MIDAZOLAM HYDROCHLORIDE 1 MG/ML
INJECTION, SOLUTION INTRAMUSCULAR; INTRAVENOUS PRN
Status: DISCONTINUED | OUTPATIENT
Start: 2025-06-02 | End: 2025-06-02 | Stop reason: HOSPADM

## 2025-06-02 RX ORDER — FENTANYL CITRATE 50 UG/ML
INJECTION, SOLUTION INTRAMUSCULAR; INTRAVENOUS PRN
Status: DISCONTINUED | OUTPATIENT
Start: 2025-06-02 | End: 2025-06-02 | Stop reason: HOSPADM

## 2025-06-02 RX ORDER — IOPAMIDOL 755 MG/ML
INJECTION, SOLUTION INTRAVASCULAR PRN
Status: DISCONTINUED | OUTPATIENT
Start: 2025-06-02 | End: 2025-06-02 | Stop reason: HOSPADM

## 2025-06-02 RX ORDER — MORPHINE SULFATE 2 MG/ML
2 INJECTION, SOLUTION INTRAMUSCULAR; INTRAVENOUS EVERY 4 HOURS PRN
Status: DISCONTINUED | OUTPATIENT
Start: 2025-06-02 | End: 2025-06-03 | Stop reason: HOSPADM

## 2025-06-02 RX ORDER — MORPHINE SULFATE 2 MG/ML
2 INJECTION, SOLUTION INTRAMUSCULAR; INTRAVENOUS ONCE
Status: DISCONTINUED | OUTPATIENT
Start: 2025-06-02 | End: 2025-06-03 | Stop reason: HOSPADM

## 2025-06-02 RX ADMIN — FERROUS SULFATE TAB EC 325 MG (65 MG FE EQUIVALENT) 325 MG: 325 (65 FE) TABLET DELAYED RESPONSE at 09:58

## 2025-06-02 RX ADMIN — PANTOPRAZOLE SODIUM 20 MG: 20 TABLET, DELAYED RELEASE ORAL at 10:00

## 2025-06-02 RX ADMIN — MICONAZOLE NITRATE: 20 POWDER TOPICAL at 13:10

## 2025-06-02 RX ADMIN — CARBIDOPA AND LEVODOPA 1 TABLET: 25; 100 TABLET ORAL at 15:29

## 2025-06-02 RX ADMIN — MORPHINE SULFATE 2 MG: 2 INJECTION, SOLUTION INTRAMUSCULAR; INTRAVENOUS at 02:56

## 2025-06-02 RX ADMIN — MORPHINE SULFATE 2 MG: 2 INJECTION, SOLUTION INTRAMUSCULAR; INTRAVENOUS at 15:42

## 2025-06-02 RX ADMIN — SODIUM CHLORIDE, PRESERVATIVE FREE 10 ML: 5 INJECTION INTRAVENOUS at 13:13

## 2025-06-02 RX ADMIN — TRAMADOL HYDROCHLORIDE 50 MG: 50 TABLET, COATED ORAL at 00:21

## 2025-06-02 RX ADMIN — FERROUS SULFATE TAB EC 325 MG (65 MG FE EQUIVALENT) 325 MG: 325 (65 FE) TABLET DELAYED RESPONSE at 20:38

## 2025-06-02 RX ADMIN — SUCRALFATE 1 G: 1 TABLET ORAL at 20:38

## 2025-06-02 RX ADMIN — MICONAZOLE NITRATE: 20 POWDER TOPICAL at 20:51

## 2025-06-02 RX ADMIN — Medication 10 MG: at 13:10

## 2025-06-02 RX ADMIN — CYANOCOBALAMIN TAB 500 MCG 1000 MCG: 500 TAB at 09:58

## 2025-06-02 RX ADMIN — SODIUM CHLORIDE: 0.9 INJECTION, SOLUTION INTRAVENOUS at 00:18

## 2025-06-02 RX ADMIN — MORPHINE SULFATE 2 MG: 2 INJECTION, SOLUTION INTRAMUSCULAR; INTRAVENOUS at 04:20

## 2025-06-02 RX ADMIN — ONDANSETRON 4 MG: 2 INJECTION, SOLUTION INTRAMUSCULAR; INTRAVENOUS at 15:33

## 2025-06-02 RX ADMIN — CARBIDOPA AND LEVODOPA 1 TABLET: 25; 100 TABLET ORAL at 20:38

## 2025-06-02 RX ADMIN — RANOLAZINE 1000 MG: 500 TABLET, FILM COATED, EXTENDED RELEASE ORAL at 09:57

## 2025-06-02 RX ADMIN — ASPIRIN 81 MG: 81 TABLET, CHEWABLE ORAL at 09:58

## 2025-06-02 RX ADMIN — LEVOTHYROXINE SODIUM 75 MCG: 0.07 TABLET ORAL at 09:58

## 2025-06-02 RX ADMIN — ENOXAPARIN SODIUM 70 MG: 100 INJECTION SUBCUTANEOUS at 20:38

## 2025-06-02 RX ADMIN — MIRTAZAPINE 7.5 MG: 15 TABLET, FILM COATED ORAL at 20:38

## 2025-06-02 RX ADMIN — TRAMADOL HYDROCHLORIDE 50 MG: 50 TABLET, COATED ORAL at 15:29

## 2025-06-02 RX ADMIN — SUCRALFATE 1 G: 1 TABLET ORAL at 09:58

## 2025-06-02 RX ADMIN — SODIUM CHLORIDE, PRESERVATIVE FREE 10 ML: 5 INJECTION INTRAVENOUS at 20:39

## 2025-06-02 RX ADMIN — RANOLAZINE 1000 MG: 500 TABLET, FILM COATED, EXTENDED RELEASE ORAL at 20:38

## 2025-06-02 RX ADMIN — FERROUS SULFATE TAB EC 325 MG (65 MG FE EQUIVALENT) 325 MG: 325 (65 FE) TABLET DELAYED RESPONSE at 13:10

## 2025-06-02 RX ADMIN — AMIODARONE HYDROCHLORIDE 200 MG: 200 TABLET ORAL at 09:58

## 2025-06-02 RX ADMIN — ACETAMINOPHEN 650 MG: 325 TABLET ORAL at 13:10

## 2025-06-02 RX ADMIN — CARBIDOPA AND LEVODOPA 1 TABLET: 25; 100 TABLET ORAL at 10:00

## 2025-06-02 RX ADMIN — CARBIDOPA AND LEVODOPA 1 TABLET: 25; 100 TABLET ORAL at 13:17

## 2025-06-02 RX ADMIN — FLUDROCORTISONE ACETATE 0.05 MG: 0.1 TABLET ORAL at 10:00

## 2025-06-02 RX ADMIN — SUCRALFATE 1 G: 1 TABLET ORAL at 13:10

## 2025-06-02 RX ADMIN — ATORVASTATIN CALCIUM 40 MG: 40 TABLET, FILM COATED ORAL at 09:58

## 2025-06-02 RX ADMIN — CLOPIDOGREL BISULFATE 75 MG: 75 TABLET, FILM COATED ORAL at 09:58

## 2025-06-02 RX ADMIN — POTASSIUM CHLORIDE 10 MEQ: 750 CAPSULE, EXTENDED RELEASE ORAL at 09:59

## 2025-06-02 ASSESSMENT — PAIN DESCRIPTION - ORIENTATION
ORIENTATION: LEFT
ORIENTATION: MID

## 2025-06-02 ASSESSMENT — PAIN SCALES - GENERAL
PAINLEVEL_OUTOF10: 8
PAINLEVEL_OUTOF10: 10
PAINLEVEL_OUTOF10: 9
PAINLEVEL_OUTOF10: 3
PAINLEVEL_OUTOF10: 10
PAINLEVEL_OUTOF10: 7
PAINLEVEL_OUTOF10: 8
PAINLEVEL_OUTOF10: 6
PAINLEVEL_OUTOF10: 5

## 2025-06-02 ASSESSMENT — PAIN DESCRIPTION - DESCRIPTORS
DESCRIPTORS: ACHING
DESCRIPTORS: DISCOMFORT

## 2025-06-02 ASSESSMENT — PAIN DESCRIPTION - LOCATION
LOCATION: HEAD
LOCATION: HEAD
LOCATION: CHEST
LOCATION: CHEST
LOCATION: HEAD

## 2025-06-02 ASSESSMENT — ENCOUNTER SYMPTOMS
GASTROINTESTINAL NEGATIVE: 1
RESPIRATORY NEGATIVE: 1

## 2025-06-02 NOTE — CARE COORDINATION
Case Management   Daily Progress Note       Patient Name: Rowdy Marie                   YOB: 1950  Diagnosis: Acute coronary syndrome (HCC) [I24.9]  Chest pain [R07.9]                       GMLOS: 1.7 days  Length of Stay: 2  days    Anticipated Discharge Date: To be determined    Readmission Risk (Low < 19, Mod (19-27), High > 27): Readmission Risk Score: 25.1        Current Transitional Plan    [] Home Independently    [] Home with HC    [x] Skilled Nursing Facility    [] Acute Rehabilitation    [] Long Term Acute Care (LTAC)    [] Other:     Plan for the Stay (Medical Management) : awaiting cardiac cath      Workflow Continuation (Additional Notes) : spoke to Denae from [facility]. She will review referral. Patient will need a precert if they are able to accept patient back        Fifi Malone RN  June 2, 2025

## 2025-06-03 VITALS
RESPIRATION RATE: 21 BRPM | HEIGHT: 71 IN | SYSTOLIC BLOOD PRESSURE: 142 MMHG | BODY MASS INDEX: 21.27 KG/M2 | HEART RATE: 60 BPM | WEIGHT: 151.9 LBS | OXYGEN SATURATION: 99 % | TEMPERATURE: 97.7 F | DIASTOLIC BLOOD PRESSURE: 59 MMHG

## 2025-06-03 LAB
ANION GAP SERPL CALCULATED.3IONS-SCNC: 13 MMOL/L (ref 9–16)
BUN SERPL-MCNC: 13 MG/DL (ref 8–23)
CALCIUM SERPL-MCNC: 8.9 MG/DL (ref 8.6–10.4)
CHLORIDE SERPL-SCNC: 103 MMOL/L (ref 98–107)
CO2 SERPL-SCNC: 23 MMOL/L (ref 20–31)
CREAT SERPL-MCNC: 1.2 MG/DL (ref 0.7–1.2)
GFR, ESTIMATED: 63 ML/MIN/1.73M2
GLUCOSE SERPL-MCNC: 100 MG/DL (ref 74–99)
POTASSIUM SERPL-SCNC: 4 MMOL/L (ref 3.7–5.3)
SODIUM SERPL-SCNC: 139 MMOL/L (ref 136–145)

## 2025-06-03 PROCEDURE — 97530 THERAPEUTIC ACTIVITIES: CPT

## 2025-06-03 PROCEDURE — 94761 N-INVAS EAR/PLS OXIMETRY MLT: CPT

## 2025-06-03 PROCEDURE — 97162 PT EVAL MOD COMPLEX 30 MIN: CPT

## 2025-06-03 PROCEDURE — 6360000002 HC RX W HCPCS

## 2025-06-03 PROCEDURE — 97166 OT EVAL MOD COMPLEX 45 MIN: CPT

## 2025-06-03 PROCEDURE — 2580000003 HC RX 258: Performed by: NURSE PRACTITIONER

## 2025-06-03 PROCEDURE — 6360000002 HC RX W HCPCS: Performed by: NURSE PRACTITIONER

## 2025-06-03 PROCEDURE — 97535 SELF CARE MNGMENT TRAINING: CPT

## 2025-06-03 PROCEDURE — 36415 COLL VENOUS BLD VENIPUNCTURE: CPT

## 2025-06-03 PROCEDURE — 99239 HOSP IP/OBS DSCHRG MGMT >30: CPT | Performed by: HOSPITALIST

## 2025-06-03 PROCEDURE — 80048 BASIC METABOLIC PNL TOTAL CA: CPT

## 2025-06-03 PROCEDURE — 99233 SBSQ HOSP IP/OBS HIGH 50: CPT | Performed by: NURSE PRACTITIONER

## 2025-06-03 PROCEDURE — 6370000000 HC RX 637 (ALT 250 FOR IP): Performed by: NURSE PRACTITIONER

## 2025-06-03 RX ORDER — FLUDROCORTISONE ACETATE 0.1 MG/1
0.05 TABLET ORAL DAILY
Qty: 30 TABLET | Refills: 2 | Status: SHIPPED | OUTPATIENT
Start: 2025-06-03 | End: 2025-11-30

## 2025-06-03 RX ADMIN — CARBIDOPA AND LEVODOPA 1 TABLET: 25; 100 TABLET ORAL at 16:50

## 2025-06-03 RX ADMIN — FERROUS SULFATE TAB EC 325 MG (65 MG FE EQUIVALENT) 325 MG: 325 (65 FE) TABLET DELAYED RESPONSE at 12:26

## 2025-06-03 RX ADMIN — ATORVASTATIN CALCIUM 40 MG: 40 TABLET, FILM COATED ORAL at 08:54

## 2025-06-03 RX ADMIN — LEVOTHYROXINE SODIUM 75 MCG: 0.07 TABLET ORAL at 08:54

## 2025-06-03 RX ADMIN — FLUDROCORTISONE ACETATE 0.05 MG: 0.1 TABLET ORAL at 08:56

## 2025-06-03 RX ADMIN — SODIUM CHLORIDE: 0.9 INJECTION, SOLUTION INTRAVENOUS at 08:53

## 2025-06-03 RX ADMIN — ASPIRIN 81 MG: 81 TABLET, CHEWABLE ORAL at 08:54

## 2025-06-03 RX ADMIN — MICONAZOLE NITRATE: 20 POWDER TOPICAL at 09:01

## 2025-06-03 RX ADMIN — SUCRALFATE 1 G: 1 TABLET ORAL at 08:54

## 2025-06-03 RX ADMIN — MORPHINE SULFATE 2 MG: 2 INJECTION, SOLUTION INTRAMUSCULAR; INTRAVENOUS at 11:11

## 2025-06-03 RX ADMIN — AMIODARONE HYDROCHLORIDE 200 MG: 200 TABLET ORAL at 08:54

## 2025-06-03 RX ADMIN — SUCRALFATE 1 G: 1 TABLET ORAL at 12:26

## 2025-06-03 RX ADMIN — FERROUS SULFATE TAB EC 325 MG (65 MG FE EQUIVALENT) 325 MG: 325 (65 FE) TABLET DELAYED RESPONSE at 16:50

## 2025-06-03 RX ADMIN — RANOLAZINE 1000 MG: 500 TABLET, FILM COATED, EXTENDED RELEASE ORAL at 08:54

## 2025-06-03 RX ADMIN — CLOPIDOGREL BISULFATE 75 MG: 75 TABLET, FILM COATED ORAL at 08:54

## 2025-06-03 RX ADMIN — CARBIDOPA AND LEVODOPA 1 TABLET: 25; 100 TABLET ORAL at 08:56

## 2025-06-03 RX ADMIN — PANTOPRAZOLE SODIUM 20 MG: 20 TABLET, DELAYED RELEASE ORAL at 08:57

## 2025-06-03 RX ADMIN — CYANOCOBALAMIN TAB 500 MCG 1000 MCG: 500 TAB at 08:54

## 2025-06-03 RX ADMIN — POTASSIUM CHLORIDE 10 MEQ: 750 CAPSULE, EXTENDED RELEASE ORAL at 08:56

## 2025-06-03 RX ADMIN — SUCRALFATE 1 G: 1 TABLET ORAL at 16:50

## 2025-06-03 RX ADMIN — ENOXAPARIN SODIUM 70 MG: 100 INJECTION SUBCUTANEOUS at 08:55

## 2025-06-03 RX ADMIN — CARBIDOPA AND LEVODOPA 1 TABLET: 25; 100 TABLET ORAL at 12:27

## 2025-06-03 RX ADMIN — FERROUS SULFATE TAB EC 325 MG (65 MG FE EQUIVALENT) 325 MG: 325 (65 FE) TABLET DELAYED RESPONSE at 08:54

## 2025-06-03 ASSESSMENT — PAIN SCALES - GENERAL
PAINLEVEL_OUTOF10: 10
PAINLEVEL_OUTOF10: 4

## 2025-06-03 ASSESSMENT — PAIN DESCRIPTION - LOCATION: LOCATION: HEAD

## 2025-06-03 ASSESSMENT — PAIN SCALES - WONG BAKER: WONGBAKER_NUMERICALRESPONSE: NO HURT

## 2025-06-03 NOTE — PLAN OF CARE
Problem: Discharge Planning  Goal: Discharge to home or other facility with appropriate resources  6/3/2025 0910 by Vaughn Grant RN  Outcome: Progressing  6/2/2025 2320 by Garima Garcia RN  Outcome: Progressing     Problem: Pain  Goal: Verbalizes/displays adequate comfort level or baseline comfort level  6/3/2025 0910 by Vaughn Grant RN  Outcome: Progressing  6/2/2025 2320 by Garima Garcia RN  Outcome: Progressing     Problem: Safety - Adult  Goal: Free from fall injury  6/3/2025 0910 by Vaughn Grant RN  Outcome: Progressing  6/2/2025 2320 by Garima Garcia RN  Outcome: Progressing

## 2025-06-03 NOTE — PROGRESS NOTES
[facility]  Progress Note                   Date:   6/1/2025  Patient name: Rowdy Marie  Date of admission:  5/31/2025  4:37 AM  MRN:   [id number]  YOB: 1950  PCP: Aracely Pierre MD    Reason for Admission: Acute coronary syndrome (HCC) [I24.9]  Chest pain [R07.9]    Subjective:       Clinical Changes /Abnormalities: Seen & examined in room. No acute CV issues/cocnerns overnight. Vitals, & tele reviewed. AM labs pending. Trop flat 45 x 2    Review of Systems    Medications:   Scheduled Meds:   sodium chloride flush  5-40 mL IntraVENous 2 times per day    enoxaparin  1 mg/kg SubCUTAneous BID    aspirin  81 mg Oral Daily    amiodarone  200 mg Oral Daily    atorvastatin  40 mg Oral Daily    carbidopa-levodopa  1 tablet Oral 4x Daily    clopidogrel  75 mg Oral Daily    [Held by provider] droxidopa  200 mg Oral TID    pantoprazole  20 mg Oral Daily    ferrous sulfate  325 mg Oral TID WC    fludrocortisone  0.05 mg Oral Daily    levothyroxine  75 mcg Oral Daily    mirtazapine  7.5 mg Oral Nightly    miconazole   Topical BID    potassium chloride  10 mEq Oral QAM    ranolazine  1,000 mg Oral BID    sucralfate  1 g Oral 4x daily    vitamin B-12  1,000 mcg Oral Daily     Continuous Infusions:   sodium chloride       CBC: No results for input(s): \"WBC\", \"HGB\", \"PLT\" in the last 72 hours.  BMP:  No results for input(s): \"NA\", \"K\", \"CL\", \"CO2\", \"BUN\", \"CREATININE\", \"GLUCOSE\" in the last 72 hours.  Hepatic:No results for input(s): \"AST\", \"ALT\", \"BILITOT\", \"ALKPHOS\" in the last 72 hours.    Invalid input(s): \"ALB\"  Troponin:   Recent Labs     05/31/25  0632 05/31/25  1249   TROPHS 45* 45*     BNP: No results for input(s): \"BNP\" in the last 72 hours.  Lipids: No results for input(s): \"CHOL\", \"HDL\" in the last 72 hours.    Invalid input(s): \"LDLCALCU\"  INR: No results for input(s): \"INR\" in the last 72 hours.    Objective:   Vitals: BP (!) 98/59   Pulse 63   Temp 98.3 °F (36.8 °C) (Oral)   
  [facility]  Progress Note                   Date:   6/3/2025  Patient name: Rowdy Marie  Date of admission:  5/31/2025  4:37 AM  MRN:   [id number]  YOB: 1950  PCP: Aracely Pierre MD    Reason for Admission: Acute coronary syndrome (HCC) [I24.9]  Chest pain [R07.9]    Subjective:       Clinical Changes /Abnormalities: Seen & examined in room. No acute CV issues/cocnerns overnight. Vitals, & tele reviewed. Cath yesterday as below. Right femoral site CDI    Review of Systems    Medications:   Scheduled Meds:   morphine  2 mg IntraVENous Once    sodium chloride flush  5-40 mL IntraVENous 2 times per day    enoxaparin  1 mg/kg SubCUTAneous BID    aspirin  81 mg Oral Daily    amiodarone  200 mg Oral Daily    atorvastatin  40 mg Oral Daily    carbidopa-levodopa  1 tablet Oral 4x Daily    clopidogrel  75 mg Oral Daily    [Held by provider] droxidopa  200 mg Oral TID    pantoprazole  20 mg Oral Daily    ferrous sulfate  325 mg Oral TID WC    fludrocortisone  0.05 mg Oral Daily    levothyroxine  75 mcg Oral Daily    mirtazapine  7.5 mg Oral Nightly    miconazole   Topical BID    potassium chloride  10 mEq Oral QAM    ranolazine  1,000 mg Oral BID    sucralfate  1 g Oral 4x daily    vitamin B-12  1,000 mcg Oral Daily     Continuous Infusions:   sodium chloride 75 mL/hr at 06/03/25 0040    sodium chloride       CBC:   Recent Labs     06/01/25  0744   WBC 8.6   HGB 10.5*        BMP:    Recent Labs     06/01/25  0744      K 4.1      CO2 21   BUN 18   CREATININE 1.2   GLUCOSE 87     Hepatic:No results for input(s): \"AST\", \"ALT\", \"BILITOT\", \"ALKPHOS\" in the last 72 hours.    Invalid input(s): \"ALB\"  Troponin:   Recent Labs     05/31/25  1249   TROPHS 45*     BNP: No results for input(s): \"BNP\" in the last 72 hours.  Lipids:   Recent Labs     06/01/25  0744   CHOL 166   HDL 43     INR:   Recent Labs     06/01/25  0744   INR 1.1       Objective:   Vitals: BP (!) 158/66   Pulse 
1538 RN messaged internal med because patient was complaining of 10/10 headache and reports a history of migraines. Tylenol was given with no relief. Tramadol was then given and the patient vomited. Blood pressure was elevated, but patient was not due for prn labetalol. Zofran given. RN asked if provider wanted IV morphine given or something else. RN advised to give morphine.     
[facility]  Office: [phone]  Dillon Fraire DO, Gil Diaz, DO, Rodney Mccarthy DO, Danny Rosa, DO, Elmo Whaley MD, Anum Salomon MD, Ivan Barclay MD, Kell Nix MD,  Erik Nevarez MD, Makenna Pabon MD, Edelmira Fernandez MD,  Rehana Pizano DO, Delisa Wu MD, Tyrone Wakefield MD, Theo Fraire DO, Venus Meza MD,  Tristen Stevens DO, Maria T Mccartney MD, Viry Hylton MD, Geno Estrada MD,  Basim Lester MD, Willi Echevarria MD, Ana Greco MD, Berny Christian MD, Varinder Avilez MD, Estuardo Álvarez MD, Patrick Engel, DO, Yandy Jones MD, Seth Zarate DO, Wellington Erazo MD, Rehana Almanza MD, Mohsin Reza, MD, Chaz Quinones MD, Shirley Waterhouse, CNP,  Zoya Drake, CNP, Patrick Angulo, CNP,  Brittany Slater, DNP, Rosalie King, CNP, Michelle Rose, CNP, Latasha Diaz, CNP, Yun López, CNP, Lucero Guerrero, PA-C, Dahiana Meza, CNP, Rajni Sauer, CNP,  Ryanne Wolfe, CNP, Arabella Patel, CNP, Rd Ruby, PA-C, Marleen Penny, CNP,  Maryjane Andrade, CNS, Kira Rowley, CNP, Margarita Infante, CNP,   Maddie Mckeon, CNP         [facility]   IN-PATIENT SERVICE   [facility]    Progress Note    6/1/2025    8:28 AM    Name:   Rowdy Marie  MRN:     [id number]     Acct:      [id number]   Room:   2005/2005-01  IP Day:  1  Admit Date:  5/31/2025  4:37 AM    PCP:   Aracely Pierre MD  Code Status:  Full Code    Subjective:     C/C: Chest pain    Interval History Status: improved.     Seen and examined at bedside.  No complaints.  No apparent distress.  Reports he has intermittent left chest pain radiating to axilla as on admission.  Pain has been very intermittent and self-limiting.  Plans for [facility] on 6/2/2025.    Vital signs reviewed.  Stable.  Afebrile.  On room air oxygenation, satting well.  Reports he is tolerating regular diet.  No nausea, no shortness of breath.    Available labs reviewed.  Hemoglobin 10.5 which is down 2 g in the last 
Occupational Therapy  Occupational Therapy Initial Evaluation  Facility/Department: [facility] CAR 2- STEPDOWN   Patient Name: Rowdy Marie        MRN: [id number]    : 1950    Date of Service: 6/3/2025    \"Rowdy Marie is a 75 y.o. Non- / non  male who presents with No chief complaint on file.and is admitted to the hospital for the management of Chest pain.\"    Past Medical History:  has a past medical history of A-fib (HCC), PREET (acute kidney injury), Anemia, CAD (coronary artery disease), Esophageal stricture, Hyperlipidemia, Hypokalemia, Hypomagnesemia, NSTEMI (non-ST elevated myocardial infarction) ([facility]), Orthostatic hypotension, Parkinsons ([facility]), Presence of Watchman left atrial appendage closure device, and Prostate CA ([facility]).  Past Surgical History:  has a past surgical history that includes Coronary artery bypass graft; Cardiac catheterization; Coronary angioplasty with stent; Diagnostic Cardiac Cath Lab Procedure (2024); Cardiac procedure (N/A, 2024); Cardiac procedure (N/A, 2024); Cardiac procedure (N/A, 2024); Cardiac procedure (N/A, 2024); and Cardiac procedure (N/A, 2025).    Discharge Recommendations  Discharge Recommendations: Patient would benefit from continued therapy after discharge  OT Equipment Recommendations  Equipment Needed: No    Assessment  Performance deficits / Impairments: Decreased functional mobility ;Decreased balance;Decreased coordination;Decreased ADL status;Decreased ROM;Decreased endurance;Decreased high-level IADLs;Decreased strength;Decreased sensation  Assessment: Pt agreed to occupational therapy evaluation with education provided on purpose and role of occupational therapy services in the acute care setting. OT facilitated assessing functional mobility and ADL performance to pt's tolerance this visit. Pt exhibited requiring SBA for bed mobility, minAx1 for functional sit<>stand transfers and CGA for functional mobility. 
Patient arrived from [facility] in no distress. Cardiac monitor hooked up. Patient educated on call light. Primary perfect served for admitting orders. Patient not sure what medications he takes on daily basis but states that daughter in law will be up to visit today and will be able to provide that information when she arrives.   
Physical Therapy  Facility/Department: [facility] CAR 2- STEPDOWN   Physical Therapy Initial Evaluation    Patient Name: Rowdy Marie        MRN: [id number]    : 1950    Date of Service: 6/3/2025    No chief complaint on file.    Rowdy Marie is a 75 y.o. Non- / non  male who presents with No chief complaint on file. and is admitted to the hospital for the management of Chest pain.    Past Medical History:  has a past medical history of A-fib (HCC), PREET (acute kidney injury), Anemia, CAD (coronary artery disease), Esophageal stricture, Hyperlipidemia, Hypokalemia, Hypomagnesemia, NSTEMI (non-ST elevated myocardial infarction) ([facility]), Orthostatic hypotension, Parkinsons ([facility]), Presence of Watchman left atrial appendage closure device, and Prostate CA ([facility]).  Past Surgical History:  has a past surgical history that includes Coronary artery bypass graft; Cardiac catheterization; Coronary angioplasty with stent; Diagnostic Cardiac Cath Lab Procedure (2024); Cardiac procedure (N/A, 2024); Cardiac procedure (N/A, 2024); Cardiac procedure (N/A, 2024); Cardiac procedure (N/A, 2024); and Cardiac procedure (N/A, 2025).    Discharge Recommendations   Further therapy recommended at discharge.    PT Equipment Recommendations  Equipment Needed: No (pt reports owning RW)    Assessment  Body Structures, Functions, Activity Limitations Requiring Skilled Therapeutic Intervention: Decreased functional mobility , Decreased ADL status, Decreased body mechanics, Decreased strength, Decreased coordination, Decreased high-level IADLs, Decreased balance, Decreased endurance, Decreased posture, Increased pain  Assessment: Pt ambulates 75 ft w/ RW and CGA. Pt requires min A for sit to stand with RW. Pt is a fall risk d/t decreased balance and endurance, benefitting from 24 hr support for safety with functional mobility at this time. At baseline, pt ambulates independently with QC v RW. pt would 
Pt IV removed. Discharge papers given. All questions answered. Pt daughter in law will transport home  
midodrine  History of depression-anxiety continue mirtazapine  GERD continue PPI  Dyslipidemia continue statin  PT-OT    Berny Christian MD  6/2/2025  8:17 AM

## 2025-06-03 NOTE — PLAN OF CARE
Problem: Discharge Planning  Goal: Discharge to home or other facility with appropriate resources  6/3/2025 1312 by Vaughn Grant RN  Outcome: Completed  6/3/2025 0910 by Vaughn Grant RN  Outcome: Progressing  6/2/2025 2320 by Garima Garcia RN  Outcome: Progressing     Problem: Pain  Goal: Verbalizes/displays adequate comfort level or baseline comfort level  6/3/2025 1312 by Vaughn Grant RN  Outcome: Completed  6/3/2025 0910 by Vaughn Grant RN  Outcome: Progressing  6/2/2025 2320 by Garima Garcia RN  Outcome: Progressing     Problem: Safety - Adult  Goal: Free from fall injury  6/3/2025 1312 by Vaughn Grant RN  Outcome: Completed  6/3/2025 0910 by Vaughn Grant RN  Outcome: Progressing  6/2/2025 2320 by Garima Garcia RN  Outcome: Progressing

## 2025-06-03 NOTE — CARE COORDINATION
Case Management   Daily Progress Note       Patient Name: Rowdy Marie                   YOB: 1950  Diagnosis: Acute coronary syndrome (HCC) [I24.9]  Chest pain [R07.9]                       GMLOS: 2.4 days  Length of Stay: 3  days    Anticipated Discharge Date: Ready for discharge    Readmission Risk (Low < 19, Mod (19-27), High > 27): Readmission Risk Score: 25.4        Current Transitional Plan    [] Home Independently    [x] Home with HC    [] Skilled Nursing Facility    [] Acute Rehabilitation    [] Long Term Acute Care (LTAC)    [] Other:     Plan for the Stay (Medical Management) : n/a      Workflow Continuation (Additional Notes) : after working with therapy, patient prefers to go home instead of [facility]. He is requesting [facility] for home care. He denies other needs and has transportation home. Referral sent to [facility]. Updated Denae from [facility]. Received message from Loretta from [facility]. They are able to resume care. Notified her that patient is discharging today        Fifi Malone RN  Luisana 3, 2025

## 2025-06-03 NOTE — DISCHARGE INSTR - COC
Continuity of Care Form    Patient Name: Rowdy Marie   :  1950  MRN:  [id number]    Admit date:  2025  Discharge date:  6/3/2025    Code Status Order: Full Code   Advance Directives:     Admitting Physician:  No admitting provider for patient encounter.  PCP: Aracely Pierre MD    [facility]charging [facility]   Discharging Unit Phone Number: [phone]    Emergency Contact:   Extended Emergency Contact Information  Primary Emergency Contact: Kye Marie  Address: [address] [place] [address] [place] [address]  Home Phone: [phone]  Work Phone: [phone]  Mobile Phone: [phone]  Relation: Daughter-in-Law   needed? No  Secondary Emergency Contact: Elliott Marie  Address: [address] [place] [address] [place] [address]  Home Phone: [phone]  Mobile Phone: [phone]  Relation: Child   needed? No    Past Surgical History:  Past Surgical History:   Procedure Laterality Date    CARDIAC CATHETERIZATION      CARDIAC PROCEDURE N/A 2024    sunny / Left heart cath / coronary angiography / op scmh performed by Andree Paulino MD at [facility] CARDIAC CATH LAB    CARDIAC PROCEDURE N/A 2024    Percutaneous coronary intervention performed by Andree Paulino MD at [facility] CARDIAC CATH LAB    CARDIAC PROCEDURE N/A 2024    taleb / Left heart cath / coronary angiography / op scmh performed by Antonio Love MD at [facility] CARDIAC CATH LAB    CARDIAC PROCEDURE N/A 2024    Percutaneous coronary intervention performed by Antonio Love MD at [facility] CARDIAC CATH LAB    CARDIAC PROCEDURE N/A 2025    ali / Left heart cath / coronary angiography / rm 2005 performed by Mj Faith MD at [facility] CARDIAC CATH LAB    CORONARY ANGIOPLASTY WITH STENT PLACEMENT      8 stents prior to CABG per patient, most recent 2023    CORONARY ARTERY BYPASS GRAFT          DIAGNOSTIC CARDIAC CATH LAB PROCEDURE  2024       Immunization

## 2025-06-03 NOTE — DISCHARGE SUMMARY
mouth 3 times daily     esomeprazole Magnesium 20 MG Pack  Commonly known as: NEXIUM     ferrous sulfate 325 (65 Fe) MG tablet  Commonly known as: FeroSul  Take 1 tablet by mouth 3 times daily (with meals)     fludrocortisone 0.1 MG tablet  Commonly known as: FLORINEF  Take 0.5 tablets by mouth daily     levothyroxine 75 MCG tablet  Commonly known as: SYNTHROID  TAKE 1 TABLET BY MOUTH EVERY DAY     meclizine 12.5 MG tablet  Commonly known as: ANTIVERT  Take 1 tablet by mouth 3 times daily as needed for Dizziness     midodrine 5 MG tablet  Commonly known as: PROAMATINE  Take 2 tablets by mouth 3 times daily     mirtazapine 7.5 MG tablet  Commonly known as: REMERON  Take 1 tablet by mouth nightly     nystatin 221000 UNIT/GM powder  Commonly known as: MYCOSTATIN  Apply 3 times daily.     pantoprazole 40 MG tablet  Commonly known as: PROTONIX  Take 1 tablet by mouth every morning (before breakfast)     potassium chloride 10 MEQ extended release tablet  Commonly known as: KLOR-CON  TAKE 1 TABLET BY MOUTH EVERY MORNING     ranolazine 1000 MG extended release tablet  Commonly known as: RANEXA  Take 1 tablet by mouth 2 times daily     sucralfate 1 GM tablet  Commonly known as: CARAFATE  TAKE 1 TABLET BY MOUTH FOUR TIMES DAILY     traMADol 50 MG tablet  Commonly known as: ULTRAM     vitamin B-12 1000 MCG tablet  Commonly known as: CYANOCOBALAMIN  Take 1 tablet by mouth daily               Where to Get Your Medications        These medications were sent to [id number] - [place], [place] - [address] [phone] - F [phone]  [address] [id number]      Phone: [phone]   fludrocortisone 0.1 MG tablet         No discharge procedures on file.    Time Spent on discharge is  35 mins in patient examination, evaluation, counseling as well as medication reconciliation, prescriptions for required medications, discharge plan and follow up.    Electronically signed by   Chaz Quinones

## 2025-06-04 ENCOUNTER — CARE COORDINATION (OUTPATIENT)
Dept: CARE COORDINATION | Age: 75
End: 2025-06-04

## 2025-06-04 DIAGNOSIS — R07.9 CHEST PAIN, UNSPECIFIED TYPE: Primary | ICD-10-CM

## 2025-06-04 LAB
EKG ATRIAL RATE: 64 BPM
EKG P AXIS: 68 DEGREES
EKG P-R INTERVAL: 172 MS
EKG Q-T INTERVAL: 482 MS
EKG QRS DURATION: 104 MS
EKG QTC CALCULATION (BAZETT): 497 MS
EKG R AXIS: 54 DEGREES
EKG T AXIS: 55 DEGREES
EKG VENTRICULAR RATE: 64 BPM

## 2025-06-04 PROCEDURE — 1111F DSCHRG MED/CURRENT MED MERGE: CPT | Performed by: INTERNAL MEDICINE

## 2025-06-04 NOTE — CARE COORDINATION
scheduled your follow up appointment?: Yes  How are you going to get to your appointment?: Car - family or friend to transport  Do you feel like you have everything you need to keep you well at home?: Yes  Care Transitions Interventions     Other Services: Completed (Comment: home care)             Follow Up Appointment:   Discussed follow up appointments. Patient has hospital follow up appointment scheduled within 14 days of discharge.   Future Appointments         Provider Specialty Dept Phone    6/16/2025 9:15 AM Mj Faith MD Cardiology [phone]            Care Transition Nurse provided contact information.  Plan for follow-up call in 6-10 days based on severity of symptoms and risk factors.  Plan for next call:  any chest pains this week? How are blood pressures? Any worsening dyspnea?       Briseyda Main RN

## 2025-06-06 DIAGNOSIS — R11.0 NAUSEA: ICD-10-CM

## 2025-06-06 DIAGNOSIS — K29.70 GASTRITIS WITHOUT BLEEDING, UNSPECIFIED CHRONICITY, UNSPECIFIED GASTRITIS TYPE: ICD-10-CM

## 2025-06-06 RX ORDER — SUCRALFATE 1 G/1
1 TABLET ORAL 4 TIMES DAILY
Qty: 120 TABLET | Refills: 3 | Status: SHIPPED | OUTPATIENT
Start: 2025-06-06

## 2025-06-11 ENCOUNTER — CARE COORDINATION (OUTPATIENT)
Dept: CARE COORDINATION | Age: 75
End: 2025-06-11

## 2025-06-11 NOTE — CARE COORDINATION
Care Transitions Note    Follow Up Call     Patient Current Location:  Home: [address] [place] [address]    Care Transition Nurse contacted the family, Kye COSME  by telephone. Verified name and  as identifiers.    Additional needs identified to be addressed with provider   No needs identified                 Method of communication with provider: none.    Care Summary Note: TC to patient for transitional follow up, when asking to speak to Mr. Marie, patient said he was over the road (son is ).  TC placed to his mobile number listed which was his DIL.  Spoke with Kye who patient lives with her and his son and grandson.  Patient was taking nap per DIL, may have been confused as to who was calling.  Explained my role to BA, expressed I spoke with patient last week and was following up this week to see how he was feeling.  She did say he has some URIARTE yet, did go out with his son in the truck the other day but did not feel like going today.  He has some intermittent dizziness from time to time per DIL and blood pressures have been up and down still but not having to give medication to raise this week. He did have referral sent last week to [facility], patient to have chronic total PCI management vs redo of CABG.  They are currently waiting on insurance approval for him to go there for the procedure. If not covered, the plan was for the new cardiologist that is here locally to do procedure at [facility], was wanting this to happen by July.  Patient does have appointment to see Dr. Faith this coming Monday.    Patient has [facility] that is following, the voiced no needs or issues today, awaiting to hear if insurance will approve to go to [facility].     Plan of care updates since last contact:  None       Advance Care Planning:   Does patient have an Advance Directive: reviewed during previous call, see note. .    Medication Review:  Full medication review completed during previous

## 2025-06-12 ENCOUNTER — OFFICE VISIT (OUTPATIENT)
Dept: INTERNAL MEDICINE CLINIC | Age: 75
End: 2025-06-12

## 2025-06-12 VITALS
SYSTOLIC BLOOD PRESSURE: 120 MMHG | OXYGEN SATURATION: 98 % | HEART RATE: 61 BPM | BODY MASS INDEX: 21.62 KG/M2 | WEIGHT: 155 LBS | DIASTOLIC BLOOD PRESSURE: 78 MMHG

## 2025-06-12 DIAGNOSIS — G20.A1 PARKINSON'S DISEASE WITHOUT DYSKINESIA OR FLUCTUATING MANIFESTATIONS (HCC): ICD-10-CM

## 2025-06-12 DIAGNOSIS — E53.8 VITAMIN B12 DEFICIENCY: ICD-10-CM

## 2025-06-12 DIAGNOSIS — R63.4 UNEXPLAINED WEIGHT LOSS: ICD-10-CM

## 2025-06-12 DIAGNOSIS — Z09 HOSPITAL DISCHARGE FOLLOW-UP: Primary | ICD-10-CM

## 2025-06-12 DIAGNOSIS — R11.0 NAUSEA: ICD-10-CM

## 2025-06-12 DIAGNOSIS — K29.70 GASTRITIS WITHOUT BLEEDING, UNSPECIFIED CHRONICITY, UNSPECIFIED GASTRITIS TYPE: ICD-10-CM

## 2025-06-12 DIAGNOSIS — E61.1 IRON DEFICIENCY: ICD-10-CM

## 2025-06-12 RX ORDER — ASPIRIN 81 MG/1
81 TABLET, CHEWABLE ORAL DAILY
Qty: 30 TABLET | Refills: 3 | Status: SHIPPED | OUTPATIENT
Start: 2025-06-12

## 2025-06-12 RX ORDER — CARBIDOPA AND LEVODOPA 25; 100 MG/1; MG/1
1 TABLET ORAL 4 TIMES DAILY
Qty: 120 TABLET | Refills: 2 | Status: SHIPPED | OUTPATIENT
Start: 2025-06-12

## 2025-06-12 RX ORDER — AMIODARONE HYDROCHLORIDE 200 MG/1
200 TABLET ORAL DAILY
Qty: 90 TABLET | Refills: 0 | Status: SHIPPED | OUTPATIENT
Start: 2025-06-12

## 2025-06-12 RX ORDER — TICAGRELOR 90 MG/1
90 TABLET, FILM COATED ORAL 2 TIMES DAILY
COMMUNITY
End: 2025-06-12 | Stop reason: ALTCHOICE

## 2025-06-12 RX ORDER — RANOLAZINE 1000 MG/1
1000 TABLET, EXTENDED RELEASE ORAL 2 TIMES DAILY
Qty: 60 TABLET | Refills: 3 | Status: SHIPPED | OUTPATIENT
Start: 2025-06-12

## 2025-06-12 RX ORDER — PANTOPRAZOLE SODIUM 40 MG/1
40 TABLET, DELAYED RELEASE ORAL
Qty: 90 TABLET | Refills: 1 | Status: SHIPPED | OUTPATIENT
Start: 2025-06-12

## 2025-06-12 RX ORDER — LEVOTHYROXINE SODIUM 75 UG/1
75 TABLET ORAL DAILY
Qty: 90 TABLET | Refills: 3 | Status: SHIPPED | OUTPATIENT
Start: 2025-06-12

## 2025-06-12 RX ORDER — MIRTAZAPINE 7.5 MG/1
7.5 TABLET, FILM COATED ORAL NIGHTLY
Qty: 30 TABLET | Refills: 5 | Status: SHIPPED | OUTPATIENT
Start: 2025-06-12

## 2025-06-12 RX ORDER — POTASSIUM CHLORIDE 750 MG/1
10 TABLET, EXTENDED RELEASE ORAL EVERY MORNING
Qty: 90 TABLET | Refills: 0 | Status: SHIPPED | OUTPATIENT
Start: 2025-06-12

## 2025-06-12 RX ORDER — LANOLIN ALCOHOL/MO/W.PET/CERES
1000 CREAM (GRAM) TOPICAL DAILY
Qty: 30 TABLET | Refills: 3 | Status: SHIPPED | OUTPATIENT
Start: 2025-06-12

## 2025-06-12 RX ORDER — SUCRALFATE 1 G/1
1 TABLET ORAL 4 TIMES DAILY
Qty: 120 TABLET | Refills: 3 | Status: SHIPPED | OUTPATIENT
Start: 2025-06-12

## 2025-06-12 RX ORDER — DROXIDOPA 200 MG/1
200 CAPSULE ORAL 3 TIMES DAILY
Qty: 90 CAPSULE | Refills: 3 | Status: SHIPPED | OUTPATIENT
Start: 2025-06-12

## 2025-06-12 RX ORDER — FERROUS SULFATE 325(65) MG
1 TABLET ORAL
Qty: 180 TABLET | Refills: 1 | Status: SHIPPED | OUTPATIENT
Start: 2025-06-12

## 2025-06-12 RX ORDER — CARVEDILOL 3.12 MG/1
TABLET ORAL
COMMUNITY

## 2025-06-12 RX ORDER — NITROGLYCERIN 40 MG/1
PATCH TRANSDERMAL
COMMUNITY
Start: 2024-07-17

## 2025-06-12 NOTE — PROGRESS NOTES
Have you been to the ER, urgent care clinic since your last visit?  Hospitalized since your last visit?   YES    Have you seen or consulted any other health care providers outside our system since your last visit?   YES           
1 tablet by mouth 3 times daily (with meals) 180 tablet 1    mirtazapine (REMERON) 7.5 MG tablet Take 1 tablet by mouth nightly 30 tablet 5    pantoprazole (PROTONIX) 40 MG tablet Take 1 tablet by mouth every morning (before breakfast) 90 tablet 1    meclizine (ANTIVERT) 12.5 MG tablet Take 1 tablet by mouth 3 times daily as needed for Dizziness 30 tablet 0    nystatin (MYCOSTATIN) 275802 UNIT/GM powder Apply 3 times daily. 60 g 0    traMADol (ULTRAM) 50 MG tablet Take 1 tablet by mouth every 8 hours as needed.          Medications patient taking as of now reconciled against medications ordered at time of hospital discharge: Yes    A comprehensive review of systems was negative except for what was noted in the HPI.    Objective:    /78   Pulse 61   Wt 70.3 kg (155 lb)   SpO2 98%   BMI 21.62 kg/m²   General Appearance: alert and oriented to person, place and time, well-developed and well-nourished, in no acute distress  Pulmonary/Chest: clear to auscultation bilaterally- no wheezes, rales or rhonchi, normal air movement, no respiratory distress  Cardiovascular: normal rate, normal S1 and S2, no gallops, intact distal pulses, and no carotid bruits  Abdomen: soft, non-tender, non-distended, normal bowel sounds, no masses or organomegaly  Musculoskeletal: normal range of motion, no joint swelling, deformity or tenderness      An electronic signature was used to authenticate this note.  --Bridget Mane MD

## 2025-06-16 PROBLEM — G20.A2: Status: ACTIVE | Noted: 2025-06-16

## 2025-06-17 ENCOUNTER — PATIENT MESSAGE (OUTPATIENT)
Dept: INTERNAL MEDICINE CLINIC | Age: 75
End: 2025-06-17

## 2025-06-18 ENCOUNTER — CARE COORDINATION (OUTPATIENT)
Dept: CARE COORDINATION | Age: 75
End: 2025-06-18

## 2025-06-18 NOTE — CARE COORDINATION
Care Transitions Note    Follow Up Call     Attempted to reach patient for transitions of care follow up.  Unable to reach patient.      Outreach Attempts:   Multiple attempts to contact patient at phone numbers on file.     Care Summary Note: Patient being followed after admission for chest pain, has extensive disease and in need of surgery.  He was referred to [facility] and last outreach was waiting on them to call once insurance had approved.  He has appointment at the end of June but local cardiologist did not feel this could wait and plans on doing surgical intervention at [facility].  Cardiology notes reviewed, patient is having more chest pains, and still having issues with dyspnea.  Notes indicate he is scheduled for surgery on 6/23.  Will attempt to reach again on Friday.     Follow Up Appointment:   Future Appointments         Provider Specialty Dept Phone    9/15/2025 8:15 AM Aracely Pierre MD Internal Medicine [phone]    12/15/2025 10:00 AM Mj Faith MD Cardiology [phone]            Plan for follow-up call in 2-5 days based on severity of symptoms and risk factors. Plan for next call: Is [facility] set for Monday? Any needs before weekend?     Briseyda Mani RN

## 2025-06-20 ENCOUNTER — CARE COORDINATION (OUTPATIENT)
Dept: CARE COORDINATION | Age: 75
End: 2025-06-20

## 2025-06-20 NOTE — CARE COORDINATION
Care Transitions Note    Follow Up Call     Attempted to reach patient for transitions of care follow up.  Unable to reach patient.      Outreach Attempts:   Multiple attempts to contact patient at phone numbers on file.   Unable to leave message.     Care Summary Note: Patient being followed after admission for chest pain, underwent heart cath which showed multi initial CAD.  Patient was referred to [facility] for evaluation of chronic total PCI management vs CABG redo however they could not get him in right away.  He is scheduled for surgery with Dr. Faith on Monday.  Unable to reach patient again today, unable to leave message as vm box not set up.  Plans are for surgery on Monday, will resolve episode at this time.     Follow Up Appointment:   Future Appointments         Provider Specialty Dept Phone    9/15/2025 8:15 AM Aracely Pierre MD Internal Medicine [phone]    12/15/2025 10:00 AM Mj Faith MD Cardiology [phone]            No further follow-up call indicated based on severity of symptoms and risk factors. Plan for next call: Episode resolved, patient planned for surgery on Monday 6/23.     Briseyda Main, STAS

## 2025-06-24 ENCOUNTER — TELEPHONE (OUTPATIENT)
Dept: INTERNAL MEDICINE CLINIC | Age: 75
End: 2025-06-24

## 2025-06-25 DIAGNOSIS — I25.10 CAD, MULTIPLE VESSEL: Primary | ICD-10-CM

## 2025-06-25 RX ORDER — TRAMADOL HYDROCHLORIDE 50 MG/1
50 TABLET ORAL EVERY 8 HOURS PRN
Qty: 9 TABLET | Refills: 0 | Status: SHIPPED | OUTPATIENT
Start: 2025-06-25 | End: 2025-06-28

## 2025-07-11 ENCOUNTER — HOSPITAL ENCOUNTER (INPATIENT)
Age: 75
LOS: 8 days | Discharge: HOME HEALTH | End: 2025-07-19
Attending: EMERGENCY MEDICINE | Admitting: STUDENT IN AN ORGANIZED HEALTH CARE EDUCATION/TRAINING PROGRAM
Payer: MEDICARE

## 2025-07-11 ENCOUNTER — APPOINTMENT (OUTPATIENT)
Dept: GENERAL RADIOLOGY | Age: 75
End: 2025-07-11
Payer: MEDICARE

## 2025-07-11 DIAGNOSIS — R07.9 CHEST PAIN, UNSPECIFIED TYPE: ICD-10-CM

## 2025-07-11 DIAGNOSIS — G43.019 INTRACTABLE MIGRAINE WITHOUT AURA AND WITHOUT STATUS MIGRAINOSUS: ICD-10-CM

## 2025-07-11 DIAGNOSIS — I25.119 CORONARY ARTERY DISEASE WITH ANGINA PECTORIS, UNSPECIFIED VESSEL OR LESION TYPE, UNSPECIFIED WHETHER NATIVE OR TRANSPLANTED HEART: ICD-10-CM

## 2025-07-11 DIAGNOSIS — E61.1 IRON DEFICIENCY: ICD-10-CM

## 2025-07-11 DIAGNOSIS — I21.4 NSTEMI (NON-ST ELEVATED MYOCARDIAL INFARCTION) (HCC): Primary | ICD-10-CM

## 2025-07-11 LAB
ALBUMIN SERPL-MCNC: 4.4 G/DL (ref 3.5–5.2)
ALBUMIN/GLOB SERPL: 1.6 {RATIO} (ref 1–2.5)
ALP SERPL-CCNC: 85 U/L (ref 40–129)
ALT SERPL-CCNC: 20 U/L (ref 10–50)
ANION GAP SERPL CALCULATED.3IONS-SCNC: 14 MMOL/L (ref 9–16)
ANTI-XA UNFRAC HEPARIN: <0.1 IU/L
AST SERPL-CCNC: 28 U/L (ref 10–50)
BASOPHILS # BLD: 0.11 K/UL (ref 0–0.2)
BASOPHILS NFR BLD: 1 % (ref 0–2)
BILIRUB SERPL-MCNC: 0.3 MG/DL (ref 0–1.2)
BUN SERPL-MCNC: 20 MG/DL (ref 8–23)
CALCIUM SERPL-MCNC: 9.1 MG/DL (ref 8.6–10.4)
CHLORIDE SERPL-SCNC: 99 MMOL/L (ref 98–107)
CO2 SERPL-SCNC: 25 MMOL/L (ref 20–31)
CREAT SERPL-MCNC: 1.4 MG/DL (ref 0.7–1.2)
EOSINOPHIL # BLD: 0.48 K/UL (ref 0–0.44)
EOSINOPHILS RELATIVE PERCENT: 6 % (ref 1–4)
ERYTHROCYTE [DISTWIDTH] IN BLOOD BY AUTOMATED COUNT: 14.5 % (ref 11.8–14.4)
GFR, ESTIMATED: 52 ML/MIN/1.73M2
GLUCOSE SERPL-MCNC: 103 MG/DL (ref 74–99)
HCT VFR BLD AUTO: 36.2 % (ref 40.7–50.3)
HGB BLD-MCNC: 11.2 G/DL (ref 13–17)
IMM GRANULOCYTES # BLD AUTO: 0.08 K/UL (ref 0–0.3)
IMM GRANULOCYTES NFR BLD: 1 %
INR PPP: 1
LYMPHOCYTES NFR BLD: 1.21 K/UL (ref 1.1–3.7)
LYMPHOCYTES RELATIVE PERCENT: 14 % (ref 24–43)
MCH RBC QN AUTO: 31.7 PG (ref 25.2–33.5)
MCHC RBC AUTO-ENTMCNC: 30.9 G/DL (ref 28.4–34.8)
MCV RBC AUTO: 102.5 FL (ref 82.6–102.9)
MONOCYTES NFR BLD: 0.73 K/UL (ref 0.1–1.2)
MONOCYTES NFR BLD: 9 % (ref 3–12)
NEUTROPHILS NFR BLD: 69 % (ref 36–65)
NEUTS SEG NFR BLD: 5.77 K/UL (ref 1.5–8.1)
NRBC BLD-RTO: 0 PER 100 WBC
PARTIAL THROMBOPLASTIN TIME: 36.2 SEC (ref 23–36.5)
PLATELET # BLD AUTO: 312 K/UL (ref 138–453)
PMV BLD AUTO: 10.6 FL (ref 8.1–13.5)
POTASSIUM SERPL-SCNC: 4 MMOL/L (ref 3.7–5.3)
PROT SERPL-MCNC: 7.1 G/DL (ref 6.6–8.7)
PROTHROMBIN TIME: 13.6 SEC (ref 11.7–14.9)
RBC # BLD AUTO: 3.53 M/UL (ref 4.21–5.77)
RBC # BLD: ABNORMAL 10*6/UL
SODIUM SERPL-SCNC: 138 MMOL/L (ref 136–145)
TROPONIN I SERPL HS-MCNC: 225 NG/L (ref 0–22)
TROPONIN I SERPL HS-MCNC: 235 NG/L (ref 0–22)
WBC OTHER # BLD: 8.4 K/UL (ref 3.5–11.3)

## 2025-07-11 PROCEDURE — 71045 X-RAY EXAM CHEST 1 VIEW: CPT

## 2025-07-11 PROCEDURE — 85520 HEPARIN ASSAY: CPT

## 2025-07-11 PROCEDURE — 6360000002 HC RX W HCPCS

## 2025-07-11 PROCEDURE — 85610 PROTHROMBIN TIME: CPT

## 2025-07-11 PROCEDURE — 96374 THER/PROPH/DIAG INJ IV PUSH: CPT

## 2025-07-11 PROCEDURE — 80053 COMPREHEN METABOLIC PANEL: CPT

## 2025-07-11 PROCEDURE — 84484 ASSAY OF TROPONIN QUANT: CPT

## 2025-07-11 PROCEDURE — 2060000000 HC ICU INTERMEDIATE R&B

## 2025-07-11 PROCEDURE — 99285 EMERGENCY DEPT VISIT HI MDM: CPT

## 2025-07-11 PROCEDURE — 93005 ELECTROCARDIOGRAM TRACING: CPT

## 2025-07-11 PROCEDURE — 85025 COMPLETE CBC W/AUTO DIFF WBC: CPT

## 2025-07-11 PROCEDURE — 85730 THROMBOPLASTIN TIME PARTIAL: CPT

## 2025-07-11 RX ORDER — CLOPIDOGREL BISULFATE 75 MG/1
75 TABLET ORAL DAILY
Status: DISCONTINUED | OUTPATIENT
Start: 2025-07-12 | End: 2025-07-19 | Stop reason: HOSPADM

## 2025-07-11 RX ORDER — SODIUM CHLORIDE 0.9 % (FLUSH) 0.9 %
5-40 SYRINGE (ML) INJECTION EVERY 12 HOURS SCHEDULED
Status: DISCONTINUED | OUTPATIENT
Start: 2025-07-11 | End: 2025-07-19 | Stop reason: HOSPADM

## 2025-07-11 RX ORDER — RANOLAZINE 500 MG/1
1000 TABLET, EXTENDED RELEASE ORAL ONCE
Status: COMPLETED | OUTPATIENT
Start: 2025-07-11 | End: 2025-07-12

## 2025-07-11 RX ORDER — ACETAMINOPHEN 650 MG/1
650 SUPPOSITORY RECTAL EVERY 6 HOURS PRN
Status: DISCONTINUED | OUTPATIENT
Start: 2025-07-11 | End: 2025-07-19 | Stop reason: HOSPADM

## 2025-07-11 RX ORDER — MAGNESIUM SULFATE 1 G/100ML
1000 INJECTION INTRAVENOUS PRN
Status: DISCONTINUED | OUTPATIENT
Start: 2025-07-11 | End: 2025-07-19 | Stop reason: HOSPADM

## 2025-07-11 RX ORDER — ASPIRIN 81 MG/1
81 TABLET, CHEWABLE ORAL DAILY
Status: DISCONTINUED | OUTPATIENT
Start: 2025-07-12 | End: 2025-07-19 | Stop reason: HOSPADM

## 2025-07-11 RX ORDER — ONDANSETRON 4 MG/1
4 TABLET, ORALLY DISINTEGRATING ORAL EVERY 8 HOURS PRN
Status: DISCONTINUED | OUTPATIENT
Start: 2025-07-11 | End: 2025-07-19 | Stop reason: HOSPADM

## 2025-07-11 RX ORDER — ACETAMINOPHEN 325 MG/1
650 TABLET ORAL EVERY 6 HOURS PRN
Status: DISCONTINUED | OUTPATIENT
Start: 2025-07-11 | End: 2025-07-19 | Stop reason: HOSPADM

## 2025-07-11 RX ORDER — SODIUM CHLORIDE 0.9 % (FLUSH) 0.9 %
10 SYRINGE (ML) INJECTION PRN
Status: DISCONTINUED | OUTPATIENT
Start: 2025-07-11 | End: 2025-07-19 | Stop reason: HOSPADM

## 2025-07-11 RX ORDER — POTASSIUM CHLORIDE 7.45 MG/ML
10 INJECTION INTRAVENOUS PRN
Status: DISCONTINUED | OUTPATIENT
Start: 2025-07-11 | End: 2025-07-19 | Stop reason: HOSPADM

## 2025-07-11 RX ORDER — MIRTAZAPINE 15 MG/1
7.5 TABLET, FILM COATED ORAL ONCE
Status: COMPLETED | OUTPATIENT
Start: 2025-07-11 | End: 2025-07-12

## 2025-07-11 RX ORDER — SODIUM CHLORIDE 9 MG/ML
INJECTION, SOLUTION INTRAVENOUS PRN
Status: DISCONTINUED | OUTPATIENT
Start: 2025-07-11 | End: 2025-07-19 | Stop reason: HOSPADM

## 2025-07-11 RX ORDER — HEPARIN SODIUM 10000 [USP'U]/100ML
5-30 INJECTION, SOLUTION INTRAVENOUS CONTINUOUS
Status: DISCONTINUED | OUTPATIENT
Start: 2025-07-11 | End: 2025-07-14

## 2025-07-11 RX ORDER — HEPARIN SODIUM 1000 [USP'U]/ML
2000 INJECTION, SOLUTION INTRAVENOUS; SUBCUTANEOUS PRN
Status: DISCONTINUED | OUTPATIENT
Start: 2025-07-11 | End: 2025-07-14

## 2025-07-11 RX ORDER — FENTANYL CITRATE 50 UG/ML
50 INJECTION, SOLUTION INTRAMUSCULAR; INTRAVENOUS ONCE
Status: COMPLETED | OUTPATIENT
Start: 2025-07-11 | End: 2025-07-11

## 2025-07-11 RX ORDER — POTASSIUM CHLORIDE 1500 MG/1
40 TABLET, EXTENDED RELEASE ORAL PRN
Status: DISCONTINUED | OUTPATIENT
Start: 2025-07-11 | End: 2025-07-19 | Stop reason: HOSPADM

## 2025-07-11 RX ORDER — HEPARIN SODIUM 1000 [USP'U]/ML
4000 INJECTION, SOLUTION INTRAVENOUS; SUBCUTANEOUS ONCE
Status: COMPLETED | OUTPATIENT
Start: 2025-07-11 | End: 2025-07-11

## 2025-07-11 RX ORDER — ONDANSETRON 2 MG/ML
4 INJECTION INTRAMUSCULAR; INTRAVENOUS EVERY 6 HOURS PRN
Status: DISCONTINUED | OUTPATIENT
Start: 2025-07-11 | End: 2025-07-19 | Stop reason: HOSPADM

## 2025-07-11 RX ORDER — NITROGLYCERIN 0.4 MG/1
0.4 TABLET SUBLINGUAL EVERY 5 MIN PRN
Status: DISCONTINUED | OUTPATIENT
Start: 2025-07-11 | End: 2025-07-19 | Stop reason: HOSPADM

## 2025-07-11 RX ORDER — HEPARIN SODIUM 1000 [USP'U]/ML
4000 INJECTION, SOLUTION INTRAVENOUS; SUBCUTANEOUS PRN
Status: DISCONTINUED | OUTPATIENT
Start: 2025-07-11 | End: 2025-07-14

## 2025-07-11 RX ADMIN — FENTANYL CITRATE 50 MCG: 50 INJECTION INTRAMUSCULAR; INTRAVENOUS at 22:15

## 2025-07-11 RX ADMIN — HEPARIN SODIUM 4000 UNITS: 1000 INJECTION, SOLUTION INTRAVENOUS; SUBCUTANEOUS at 22:11

## 2025-07-11 RX ADMIN — HEPARIN SODIUM 12 UNITS/KG/HR: 10000 INJECTION, SOLUTION INTRAVENOUS at 22:12

## 2025-07-12 ENCOUNTER — APPOINTMENT (OUTPATIENT)
Age: 75
End: 2025-07-12
Payer: MEDICARE

## 2025-07-12 PROBLEM — I25.112 CORONARY ARTERY DISEASE INVOLVING NATIVE CORONARY ARTERY OF NATIVE HEART WITH REFRACTORY ANGINA PECTORIS: Status: ACTIVE | Noted: 2024-04-11

## 2025-07-12 PROBLEM — R94.31 PROLONGED QT INTERVAL: Status: ACTIVE | Noted: 2025-07-12

## 2025-07-12 LAB
ANION GAP SERPL CALCULATED.3IONS-SCNC: 11 MMOL/L (ref 9–16)
ANTI-XA UNFRAC HEPARIN: 0.32 IU/L
ANTI-XA UNFRAC HEPARIN: 0.5 IU/L
BUN SERPL-MCNC: 20 MG/DL (ref 8–23)
CALCIUM SERPL-MCNC: 8.4 MG/DL (ref 8.6–10.4)
CHLORIDE SERPL-SCNC: 101 MMOL/L (ref 98–107)
CHOLEST SERPL-MCNC: 147 MG/DL (ref 0–199)
CHOLESTEROL/HDL RATIO: 2.4
CO2 SERPL-SCNC: 25 MMOL/L (ref 20–31)
CREAT SERPL-MCNC: 1.2 MG/DL (ref 0.7–1.2)
ECHO BSA: 1.85 M2
ECHO EST RA PRESSURE: 3 MMHG
ECHO LV EDV A2C: 82 ML
ECHO LV EDV A4C: 106 ML
ECHO LV EDV INDEX A4C: 57 ML/M2
ECHO LV EDV NDEX A2C: 44 ML/M2
ECHO LV EF PHYSICIAN: 55 %
ECHO LV EJECTION FRACTION A2C: 61 %
ECHO LV EJECTION FRACTION A4C: 43 %
ECHO LV EJECTION FRACTION BIPLANE: 55 % (ref 55–100)
ECHO LV ESV A2C: 32 ML
ECHO LV ESV A4C: 60 ML
ECHO LV ESV INDEX A2C: 17 ML/M2
ECHO LV ESV INDEX A4C: 32 ML/M2
EKG ATRIAL RATE: 66 BPM
EKG P AXIS: 54 DEGREES
EKG P-R INTERVAL: 174 MS
EKG Q-T INTERVAL: 488 MS
EKG QRS DURATION: 162 MS
EKG QTC CALCULATION (BAZETT): 511 MS
EKG R AXIS: -36 DEGREES
EKG T AXIS: 70 DEGREES
EKG VENTRICULAR RATE: 66 BPM
ERYTHROCYTE [DISTWIDTH] IN BLOOD BY AUTOMATED COUNT: 14.3 % (ref 11.8–14.4)
GFR, ESTIMATED: 63 ML/MIN/1.73M2
GLUCOSE SERPL-MCNC: 100 MG/DL (ref 74–99)
HCT VFR BLD AUTO: 31.4 % (ref 40.7–50.3)
HDLC SERPL-MCNC: 61 MG/DL
HGB BLD-MCNC: 10 G/DL (ref 13–17)
LDLC SERPL CALC-MCNC: 69 MG/DL (ref 0–100)
MAGNESIUM SERPL-MCNC: 1.9 MG/DL (ref 1.6–2.4)
MCH RBC QN AUTO: 32.1 PG (ref 25.2–33.5)
MCHC RBC AUTO-ENTMCNC: 31.8 G/DL (ref 28.4–34.8)
MCV RBC AUTO: 100.6 FL (ref 82.6–102.9)
NRBC BLD-RTO: 0 PER 100 WBC
PLATELET # BLD AUTO: 276 K/UL (ref 138–453)
PMV BLD AUTO: 10.9 FL (ref 8.1–13.5)
POTASSIUM SERPL-SCNC: 3.9 MMOL/L (ref 3.7–5.3)
RBC # BLD AUTO: 3.12 M/UL (ref 4.21–5.77)
SODIUM SERPL-SCNC: 137 MMOL/L (ref 136–145)
T4 FREE SERPL-MCNC: 1.4 NG/DL (ref 0.92–1.68)
TRIGL SERPL-MCNC: 83 MG/DL
TSH SERPL DL<=0.05 MIU/L-ACNC: 6.15 UIU/ML (ref 0.27–4.2)
VLDLC SERPL CALC-MCNC: 17 MG/DL (ref 1–30)
WBC OTHER # BLD: 7.8 K/UL (ref 3.5–11.3)

## 2025-07-12 PROCEDURE — 84443 ASSAY THYROID STIM HORMONE: CPT

## 2025-07-12 PROCEDURE — 93308 TTE F-UP OR LMTD: CPT

## 2025-07-12 PROCEDURE — 85520 HEPARIN ASSAY: CPT

## 2025-07-12 PROCEDURE — 2060000000 HC ICU INTERMEDIATE R&B

## 2025-07-12 PROCEDURE — 6370000000 HC RX 637 (ALT 250 FOR IP)

## 2025-07-12 PROCEDURE — 84439 ASSAY OF FREE THYROXINE: CPT

## 2025-07-12 PROCEDURE — 99222 1ST HOSP IP/OBS MODERATE 55: CPT | Performed by: INTERNAL MEDICINE

## 2025-07-12 PROCEDURE — 83735 ASSAY OF MAGNESIUM: CPT

## 2025-07-12 PROCEDURE — 6360000002 HC RX W HCPCS

## 2025-07-12 PROCEDURE — 6370000000 HC RX 637 (ALT 250 FOR IP): Performed by: NURSE PRACTITIONER

## 2025-07-12 PROCEDURE — 6370000000 HC RX 637 (ALT 250 FOR IP): Performed by: INTERNAL MEDICINE

## 2025-07-12 PROCEDURE — 93010 ELECTROCARDIOGRAM REPORT: CPT | Performed by: INTERNAL MEDICINE

## 2025-07-12 PROCEDURE — 2500000003 HC RX 250 WO HCPCS: Performed by: NURSE PRACTITIONER

## 2025-07-12 PROCEDURE — 2T015 HOSPITALIST 2ND TOUCH: CPT | Performed by: FAMILY MEDICINE

## 2025-07-12 PROCEDURE — 80061 LIPID PANEL: CPT

## 2025-07-12 PROCEDURE — 99223 1ST HOSP IP/OBS HIGH 75: CPT | Performed by: INTERNAL MEDICINE

## 2025-07-12 PROCEDURE — 93308 TTE F-UP OR LMTD: CPT | Performed by: INTERNAL MEDICINE

## 2025-07-12 PROCEDURE — 80048 BASIC METABOLIC PNL TOTAL CA: CPT

## 2025-07-12 PROCEDURE — 85027 COMPLETE CBC AUTOMATED: CPT

## 2025-07-12 RX ORDER — DROXIDOPA 200 MG/1
200 CAPSULE ORAL 3 TIMES DAILY
Status: DISCONTINUED | OUTPATIENT
Start: 2025-07-12 | End: 2025-07-19 | Stop reason: HOSPADM

## 2025-07-12 RX ORDER — MECLIZINE HCL 12.5 MG 12.5 MG/1
12.5 TABLET ORAL 3 TIMES DAILY PRN
Status: DISCONTINUED | OUTPATIENT
Start: 2025-07-12 | End: 2025-07-19 | Stop reason: HOSPADM

## 2025-07-12 RX ORDER — MIDODRINE HYDROCHLORIDE 10 MG/1
10 TABLET ORAL 3 TIMES DAILY
Status: DISCONTINUED | OUTPATIENT
Start: 2025-07-12 | End: 2025-07-19 | Stop reason: HOSPADM

## 2025-07-12 RX ORDER — NITROGLYCERIN 20 MG/100ML
5-200 INJECTION INTRAVENOUS CONTINUOUS
Status: DISCONTINUED | OUTPATIENT
Start: 2025-07-12 | End: 2025-07-14

## 2025-07-12 RX ORDER — SUCRALFATE 1 G/1
1 TABLET ORAL 4 TIMES DAILY
Status: DISCONTINUED | OUTPATIENT
Start: 2025-07-12 | End: 2025-07-19 | Stop reason: HOSPADM

## 2025-07-12 RX ORDER — FLUDROCORTISONE ACETATE 0.1 MG/1
0.05 TABLET ORAL DAILY
Status: DISCONTINUED | OUTPATIENT
Start: 2025-07-12 | End: 2025-07-19 | Stop reason: HOSPADM

## 2025-07-12 RX ORDER — AMIODARONE HYDROCHLORIDE 200 MG/1
200 TABLET ORAL DAILY
Status: DISCONTINUED | OUTPATIENT
Start: 2025-07-12 | End: 2025-07-19 | Stop reason: HOSPADM

## 2025-07-12 RX ORDER — PANTOPRAZOLE SODIUM 40 MG/1
40 TABLET, DELAYED RELEASE ORAL
Status: DISCONTINUED | OUTPATIENT
Start: 2025-07-12 | End: 2025-07-13

## 2025-07-12 RX ORDER — CARBIDOPA AND LEVODOPA 25; 100 MG/1; MG/1
1 TABLET ORAL 4 TIMES DAILY
Status: DISCONTINUED | OUTPATIENT
Start: 2025-07-12 | End: 2025-07-19 | Stop reason: HOSPADM

## 2025-07-12 RX ORDER — POTASSIUM CHLORIDE 750 MG/1
10 CAPSULE, EXTENDED RELEASE ORAL EVERY MORNING
Status: DISCONTINUED | OUTPATIENT
Start: 2025-07-12 | End: 2025-07-19 | Stop reason: HOSPADM

## 2025-07-12 RX ORDER — FERROUS SULFATE 325(65) MG
325 TABLET, DELAYED RELEASE (ENTERIC COATED) ORAL
Status: DISCONTINUED | OUTPATIENT
Start: 2025-07-12 | End: 2025-07-19 | Stop reason: HOSPADM

## 2025-07-12 RX ORDER — LEVOTHYROXINE SODIUM 75 UG/1
75 TABLET ORAL DAILY
Status: DISCONTINUED | OUTPATIENT
Start: 2025-07-12 | End: 2025-07-19 | Stop reason: HOSPADM

## 2025-07-12 RX ORDER — CARVEDILOL 3.12 MG/1
3.12 TABLET ORAL 2 TIMES DAILY WITH MEALS
Status: DISCONTINUED | OUTPATIENT
Start: 2025-07-12 | End: 2025-07-19 | Stop reason: HOSPADM

## 2025-07-12 RX ORDER — NITROGLYCERIN 40 MG/1
1 PATCH TRANSDERMAL DAILY
Status: DISCONTINUED | OUTPATIENT
Start: 2025-07-12 | End: 2025-07-17

## 2025-07-12 RX ORDER — ATORVASTATIN CALCIUM 40 MG/1
40 TABLET, FILM COATED ORAL DAILY
Status: DISCONTINUED | OUTPATIENT
Start: 2025-07-12 | End: 2025-07-19 | Stop reason: HOSPADM

## 2025-07-12 RX ORDER — MULTIVITAMIN WITH IRON
1000 TABLET ORAL DAILY
Status: DISCONTINUED | OUTPATIENT
Start: 2025-07-12 | End: 2025-07-19 | Stop reason: HOSPADM

## 2025-07-12 RX ADMIN — ACETAMINOPHEN 650 MG: 325 TABLET ORAL at 16:23

## 2025-07-12 RX ADMIN — SUCRALFATE 1 G: 1 TABLET ORAL at 20:31

## 2025-07-12 RX ADMIN — NITROGLYCERIN 5 MCG/MIN: 20 INJECTION INTRAVENOUS at 11:08

## 2025-07-12 RX ADMIN — ATORVASTATIN CALCIUM 40 MG: 40 TABLET, FILM COATED ORAL at 08:15

## 2025-07-12 RX ADMIN — CLOPIDOGREL BISULFATE 75 MG: 75 TABLET, FILM COATED ORAL at 08:15

## 2025-07-12 RX ADMIN — ASPIRIN 81 MG: 81 TABLET, CHEWABLE ORAL at 08:15

## 2025-07-12 RX ADMIN — CARBIDOPA AND LEVODOPA 1 TABLET: 25; 100 TABLET ORAL at 20:31

## 2025-07-12 RX ADMIN — CARVEDILOL 3.12 MG: 3.12 TABLET, FILM COATED ORAL at 08:28

## 2025-07-12 RX ADMIN — MIRTAZAPINE 7.5 MG: 15 TABLET, FILM COATED ORAL at 00:39

## 2025-07-12 RX ADMIN — CARBIDOPA AND LEVODOPA 1 TABLET: 25; 100 TABLET ORAL at 13:48

## 2025-07-12 RX ADMIN — FLUDROCORTISONE ACETATE 0.05 MG: 0.1 TABLET ORAL at 08:28

## 2025-07-12 RX ADMIN — AMIODARONE HYDROCHLORIDE 200 MG: 200 TABLET ORAL at 08:28

## 2025-07-12 RX ADMIN — CYANOCOBALAMIN TAB 500 MCG 1000 MCG: 500 TAB at 08:28

## 2025-07-12 RX ADMIN — CARBIDOPA AND LEVODOPA 1 TABLET: 25; 100 TABLET ORAL at 18:24

## 2025-07-12 RX ADMIN — SUCRALFATE 1 G: 1 TABLET ORAL at 08:15

## 2025-07-12 RX ADMIN — POTASSIUM CHLORIDE 10 MEQ: 750 CAPSULE, EXTENDED RELEASE ORAL at 08:15

## 2025-07-12 RX ADMIN — RANOLAZINE 1000 MG: 500 TABLET, FILM COATED, EXTENDED RELEASE ORAL at 00:39

## 2025-07-12 RX ADMIN — PANTOPRAZOLE SODIUM 40 MG: 40 TABLET, DELAYED RELEASE ORAL at 08:15

## 2025-07-12 RX ADMIN — SODIUM CHLORIDE, PRESERVATIVE FREE 10 ML: 5 INJECTION INTRAVENOUS at 20:31

## 2025-07-12 RX ADMIN — CARBIDOPA AND LEVODOPA 1 TABLET: 25; 100 TABLET ORAL at 08:28

## 2025-07-12 RX ADMIN — MIDODRINE HYDROCHLORIDE 10 MG: 10 TABLET ORAL at 08:15

## 2025-07-12 RX ADMIN — CARVEDILOL 3.12 MG: 3.12 TABLET, FILM COATED ORAL at 16:18

## 2025-07-12 RX ADMIN — SUCRALFATE 1 G: 1 TABLET ORAL at 16:18

## 2025-07-12 RX ADMIN — LEVOTHYROXINE SODIUM 75 MCG: 0.07 TABLET ORAL at 08:28

## 2025-07-12 ASSESSMENT — PAIN DESCRIPTION - LOCATION: LOCATION: CHEST;BACK

## 2025-07-12 ASSESSMENT — PAIN SCALES - GENERAL
PAINLEVEL_OUTOF10: 4
PAINLEVEL_OUTOF10: 8
PAINLEVEL_OUTOF10: 0

## 2025-07-12 NOTE — ED PROVIDER NOTES
[facility]     Emergency Department     Faculty Attestation    I performed a history and physical examination of the patient and discussed management with the resident. I reviewed the resident’s note and agree with the documented findings including all diagnostic interpretations and plan of care. Any areas of disagreement are noted on the chart. I was personally present for the key portions of any procedures. I have documented in the chart those procedures where I was not present during the key portions. I have reviewed the emergency nurses triage note. I agree with the chief complaint, past medical history, past surgical history, allergies, medications, social and family history as documented unless otherwise noted below. Documentation of the HPI, Physical Exam and Medical Decision Making performed by carlos is based on my personal performance of the HPI, PE and MDM. For Physician Assistant/ Nurse Practitioner cases/documentation I have personally evaluated this patient and have completed at least one if not all key elements of the E/M (history, physical exam, and MDM). Additional findings are as noted.    Primary Care Physician: Aracely Pierre MD    Note Started: 11:32 PM EDT     VITAL SIGNS:   height is 1.803 m (5' 11\") and weight is 68 kg (150 lb). His oral temperature is 98.1 °F (36.7 °C). His blood pressure is 141/71 (abnormal) and his pulse is 66. His respiration is 17 and oxygen saturation is 97%.      Medical Decision Making  Chest pain.  Noticed when he woke up in the morning worsening recently.  3 weeks ago had 3 stents placed.  Prior history of bypass surgery remotely.  On exam uncomfortable but not acutely distressed cardiac regular rate and rhythm no murmurs rubs gallops pulmonary clear bilaterally abdomen soft nontender nondistended calves nontender nonswollen.  EKG showing new left bundle branch block from 1 month ago with concerning QRS/ST

## 2025-07-12 NOTE — ED PROVIDER NOTES
Highest education level: Not on file   Occupational History    Not on file   Tobacco Use    Smoking status: Never    Smokeless tobacco: Never   Vaping Use    Vaping status: Never Used   Substance and Sexual Activity    Alcohol use: Never    Drug use: Never    Sexual activity: Defer   Other Topics Concern    Not on file   Social History Narrative    Not on file     Social Drivers of Health     Financial Resource Strain: Low Risk  (3/13/2024)    Overall Financial Resource Strain (CARDIA)     Difficulty of Paying Living Expenses: Not hard at all   Food Insecurity: No Food Insecurity (5/31/2025)    Hunger Vital Sign     Worried About Running Out of Food in the Last Year: Never true     Ran Out of Food in the Last Year: Never true   Transportation Needs: No Transportation Needs (5/31/2025)    PRAPARE - Transportation     Lack of Transportation (Medical): No     Lack of Transportation (Non-Medical): No   Recent Concern: Transportation Needs - Unmet Transportation Needs (4/30/2025)    PRAPARE - Transportation     Lack of Transportation (Medical): Yes     Lack of Transportation (Non-Medical): Yes   Physical Activity: Insufficiently Active (3/13/2024)    Exercise Vital Sign     Days of Exercise per Week: 3 days     Minutes of Exercise per Session: 20 min   Stress: Not on file   Social Connections: Not on file   Intimate Partner Violence: Not on file   Housing Stability: Low Risk  (5/31/2025)    Housing Stability Vital Sign     Unable to Pay for Housing in the Last Year: No     Number of Times Moved in the Last Year: 1     Homeless in the Last Year: No   Recent Concern: Housing Stability - High Risk (4/30/2025)    Housing Stability Vital Sign     Unable to Pay for Housing in the Last Year: Yes     Number of Times Moved in the Last Year: 1     Homeless in the Last Year: No       No family history on file.    Allergies:  Latex, Atorvastatin, Fluvastatin, Imdur [isosorbide nitrate], Isosorbide, Methadone, Nalbuphine, Oxycodone,    atorvastatin (LIPITOR) 40 MG tablet Take 1 tablet by mouth daily   Yes ProviderBailey MD   clopidogrel (PLAVIX) 75 MG tablet Take 1 tablet by mouth daily   Yes Bailey Umanzor MD   midodrine (PROAMATINE) 5 MG tablet Take 2 tablets by mouth 3 times daily 2/18/25  Yes Aracely Pierre MD   meclizine (ANTIVERT) 12.5 MG tablet Take 1 tablet by mouth 3 times daily as needed for Dizziness 12/17/24  Yes Yandy Jones MD   nystatin (MYCOSTATIN) 430557 UNIT/GM powder Apply 3 times daily. 8/9/24  Yes Yandy Jones MD     REVIEW OF SYSTEMS       Review of Systems  All other systems negative unless otherwise stated above    PHYSICAL EXAM      INITIAL VITALS:   BP (!) 141/71   Pulse 66   Temp 98.1 °F (36.7 °C) (Oral)   Resp 17   Ht 1.803 m (5' 11\")   Wt 68 kg (150 lb)   SpO2 97%   BMI 20.92 kg/m²     Physical Exam  Vitals and nursing note reviewed.   Constitutional:       General: He is not in acute distress.     Appearance: Normal appearance. He is not ill-appearing.   Eyes:      Extraocular Movements: Extraocular movements intact.      Pupils: Pupils are equal, round, and reactive to light.   Cardiovascular:      Rate and Rhythm: Normal rate and regular rhythm.   Pulmonary:      Effort: Pulmonary effort is normal. No respiratory distress.      Breath sounds: Normal breath sounds.   Abdominal:      Palpations: Abdomen is soft.      Tenderness: There is no abdominal tenderness. There is no guarding or rebound.   Musculoskeletal:      Right lower leg: No edema.      Left lower leg: No edema.   Skin:     General: Skin is warm and dry.   Neurological:      General: No focal deficit present.      Mental Status: He is alert and oriented to person, place, and time.       DDX/DIAGNOSTIC RESULTS / EMERGENCY DEPARTMENT COURSE / MDM     Medical Decision Making  History and exam as above.  Upon arrival patient had a new left bundle branch block when compared to previous.  Sent to interventional cardiologist, kush

## 2025-07-12 NOTE — CONSULTS
[facility]    Consult / H&P               Today's Date: 7/12/2025  Patient Name: Rowdy Marie  Date of admission: 7/11/2025  8:09 PM  Patient's age: 75 y.o., 1950  Admission Dx: NSTEMI (non-ST elevated myocardial infarction) ([facility]) [I21.4]    Requesting Physician: No admitting provider for patient encounter.    Cardiac Evaluation Reason:  \"nstemi\"    History Obtained From: patient and chart review     History of Present Illness:    This patient 75 y.o. years old with past medical history given below.   Patient with chronic GERD, prior esophageal stricture, multi vessel CAD s/p CABG 2014/ prior stents w/ recent LHC 05/22/2024 w/ PCI w/ BRENDA to SVG to 2nd marginal graft, prior LHC w/ PCI w/ BRENDA to ostial and mid SVG-diag on 04/11/2024, Pafib s/p watchman 11/2023 (w/ history of prior GIB w/ PUD), HFpEF w/ MR (echo 04/2024 moderate-severe MR, EF 55%), CKD3, Parkinson's w/ history of orthostatic hypotension symptomatic w/ dizziness who presents with Chest Pain.    Patient reports chest pain is ongoing but increased to a 10 out of 10 last night and would not remit so he came to the emergency room.  Over 4 July the patient got an infection and has not felt well since.  He notes that he has extensive coronary artery disease and needs to have a catheterization of heart done along and notes his cardiologist had he needs to be sent to [facility] for small vessel occlusion.  The patient endorses taking all of his medications for his heart as prescribed and not missing the dose.    The patient endorses pressure in his chest that is 8 out of 10, shortness of breath only on exertion and when he walks upstairs, nausea and vomiting last night, numbness and tingling in his lower left limb due to 5 lower back surgeries.  Patient denies headaches, new changes in vision (blind in left eye), changes in hearing, abdominal pain, or new global muscle weakness(due to having Parkinson's for the last 3  years).      Past Medical History:   has a past medical history of A-fib ([facility]), PREET (acute kidney injury), Anemia, CAD (coronary artery disease), Esophageal stricture, Hyperlipidemia, Hypokalemia, Hypomagnesemia, NSTEMI (non-ST elevated myocardial infarction) ([facility]), Orthostatic hypotension, Parkinsons ([facility]), Presence of Watchman left atrial appendage closure device, and Prostate CA ([facility]).    Past Surgical History:   has a past surgical history that includes Coronary artery bypass graft; Cardiac catheterization; Coronary angioplasty with stent; Diagnostic Cardiac Cath Lab Procedure (04/11/2024); Cardiac procedure (N/A, 4/11/2024); Cardiac procedure (N/A, 4/11/2024); Cardiac procedure (N/A, 5/22/2024); Cardiac procedure (N/A, 5/22/2024); and Cardiac procedure (N/A, 6/2/2025).     Home Medications:    Prior to Admission medications    Medication Sig Start Date End Date Taking? Authorizing Provider   carvedilol (COREG) 3.125 MG tablet    Yes Bailey Umanzor MD   nitroGLYCERIN (NITRODUR) 0.2 MG/HR  7/17/24  Yes Bailey Umanzor MD   amiodarone (CORDARONE) 200 MG tablet Take 1 tablet by mouth daily 6/12/25  Yes Aracely Pierre MD   aspirin 81 MG chewable tablet Take 1 tablet by mouth daily 6/12/25  Yes Aracely Pierre MD   vitamin B-12 (CYANOCOBALAMIN) 1000 MCG tablet Take 1 tablet by mouth daily 6/12/25  Yes Aracely Pierre MD   sucralfate (CARAFATE) 1 GM tablet Take 1 tablet by mouth in the morning, at noon, in the evening, and at bedtime 6/12/25  Yes Aracely Pierre MD   ranolazine (RANEXA) 1000 MG extended release tablet Take 1 tablet by mouth 2 times daily 6/12/25  Yes Aracely Pierre MD   potassium chloride (KLOR-CON) 10 MEQ extended release tablet Take 1 tablet by mouth every morning 6/12/25  Yes Aracely Pierre MD   pantoprazole (PROTONIX) 40 MG tablet Take 1 tablet by mouth every morning (before breakfast) 6/12/25  Yes Aracely Pierre MD   mirtazapine (REMERON) 7.5 MG tablet Take 1 tablet by mouth nightly

## 2025-07-12 NOTE — ED NOTES
ED to inpatient nurses report      Chief Complaint:  Chief Complaint   Patient presents with    Chest Pain     Present to ED from: Home     MOA:     LOC: alert and orientated to name, place, date  Mobility: Independent  Oxygen Baseline: Room Air     Current needs required: Room Air   Pending ED orders: None   Present condition: Patient is on heparin gtt, patient denies any current chest pain     Why did the patient come to the ED? Chest pain   What is the plan? Admit for continued cardiac workup   Any procedures or intervention occur? XR, EKG, labs   Any safety concerns??    Mental Status:  Level of Consciousness: Alert (0)    Psych Assessment:   Psychosocial  Psychosocial (WDL): Within Defined Limits  Vital signs   Vitals:    07/11/25 2145 07/11/25 2200 07/11/25 2215 07/11/25 2230   BP: 139/69 136/71 (!) 144/71 (!) 141/71   Pulse: 66 67 67 66   Resp: 13 16 12 17   Temp:       TempSrc:       SpO2: 99% 97% 97% 97%   Weight:       Height:            Vitals:  Patient Vitals for the past 24 hrs:   BP Temp Temp src Pulse Resp SpO2 Height Weight   07/11/25 2230 (!) 141/71 -- -- 66 17 97 % -- --   07/11/25 2215 (!) 144/71 -- -- 67 12 97 % -- --   07/11/25 2200 136/71 -- -- 67 16 97 % -- --   07/11/25 2145 139/69 -- -- 66 13 99 % -- --   07/11/25 2130 137/62 -- -- 66 14 98 % -- --   07/11/25 2115 126/69 -- -- 66 12 96 % -- --   07/11/25 2100 125/61 -- -- 66 20 94 % -- --   07/11/25 2045 (!) 122/59 -- -- 65 18 94 % -- --   07/11/25 2030 128/65 -- -- 66 15 96 % -- --   07/11/25 2026 124/71 -- -- 67 14 98 % 1.803 m (5' 11\") 68 kg (150 lb)   07/11/25 2016 -- -- -- -- -- 96 % -- --   07/11/25 2015 131/64 -- -- 69 16 -- -- --   07/11/25 2013 121/82 98.1 °F (36.7 °C) Oral 69 15 -- -- --      Visit Vitals  BP (!) 141/71   Pulse 66   Temp 98.1 °F (36.7 °C) (Oral)   Resp 17   Ht 1.803 m (5' 11\")   Wt 68 kg (150 lb)   SpO2 97%   BMI 20.92 kg/m²        LDAs:   Peripheral IV 07/11/25 Left Antecubital (Active)   Site Assessment Clean, 
PT to ED via EMS for left-sided chest pain that started earlier today along with some nausea and vomiting. Pt has extensive cardiac hx with quadruple bypass in 2019, and recent stents placed. Pt states he has another occlusion that he was supposed to have more stents placed whenever they are able to get him scheduled for one. PT denies SOB or any other pain. EMS administered 324 ASA and 1 nitro tab. No relief from medication. Pt is alert and oriented x4, RR even and nonlabored. EKG obtained and reviewed by attending.   
Patient is resting on cot, no acute distress noted. Patient is AOx4. Bed is in lowest position with call light with in reach. Patient denies any further needs at this time and will continue with plan of care.     
Pt denies Chest pain nitro stopped   
Pt to Echo  
The following labs were labeled with appropriate pt sticker and tubed to lab:     [x] Blue     [x] Lavender   [] on ice  [x] Green/yellow  [x] Green/black [] on ice  [] Pak  [] on ice  [x] Yellow  [x] Red  [] Pink  [] Type/ Screen  [] ABG  [] VBG    [] COVID-19 swab    [] Rapid  [] PCR  [] Flu swab  [] Peds Viral Panel     [] Urine Sample  [] Fecal Sample  [] Pelvic Cultures  [] Blood Cultures  [] X 2  [] STREP Cultures  [] Wound Cultures    
mcg    nitroGLYCERIN 200 mcg/mL in dextrose 5%     Titrate Infusion?:   Yes     Initial Infusion Dose::   5 mcg/min     Goal of Therapy is::   Chest pain symptom relief     Contact Provider if::   SBP less than 90 mmHg       SURGICAL HISTORY       Past Surgical History:   Procedure Laterality Date    CARDIAC CATHETERIZATION      CARDIAC PROCEDURE N/A 4/11/2024    sunny / Left heart cath / coronary angiography / op scmh performed by Andree Paulino MD at [facility] CARDIAC CATH LAB    CARDIAC PROCEDURE N/A 4/11/2024    Percutaneous coronary intervention performed by Andree Paulino MD at [facility] CARDIAC CATH LAB    CARDIAC PROCEDURE N/A 5/22/2024    talmaura / Left heart cath / coronary angiography / op scmh performed by Antonio Love MD at [facility] CARDIAC CATH LAB    CARDIAC PROCEDURE N/A 5/22/2024    Percutaneous coronary intervention performed by Antonio Love MD at [facility] CARDIAC CATH LAB    CARDIAC PROCEDURE N/A 6/2/2025    ali / Left heart cath / coronary angiography / rm 2005 performed by Mj Faith MD at [facility] CARDIAC CATH LAB    CORONARY ANGIOPLASTY WITH STENT PLACEMENT      8 stents prior to CABG per patient, most recent 12/2023    CORONARY ARTERY BYPASS GRAFT      2014    DIAGNOSTIC CARDIAC CATH LAB PROCEDURE  04/11/2024       PAST MEDICAL HISTORY       Past Medical History:   Diagnosis Date    A-fib (formerly [facility])     PREET (acute kidney injury)     Anemia     CAD (coronary artery disease)     Esophageal stricture     Hyperlipidemia     Hypokalemia     Hypomagnesemia     NSTEMI (non-ST elevated myocardial infarction) (formerly [facility])     Orthostatic hypotension     Parkinsons (formerly [facility])     Presence of Watchman left atrial appendage closure device     Prostate CA (formerly [facility])        Labs:  Labs Reviewed   CBC WITH AUTO DIFFERENTIAL - Abnormal; Notable for the following components:       Result Value    RBC 3.53 (*)     Hemoglobin 11.2 (*)     Hematocrit 36.2 (*)     RDW 14.5 (*)     Neutrophils % 69 (*)     Lymphocytes % 14 (*)

## 2025-07-12 NOTE — PROCEDURES
PROCEDURE NOTE  Date: 7/11/2025   Name: Rowdy Marie  YOB: 1950    Procedures        Ultrasound-Guided Peripheral IV:    PROCEDURE PERFORMED:  Ultrasound guided peripheral IV insertion  PERFORMING PHYSICIAN: Yang Bridges DO  ESTIMATED BLOOD LOSS:  Less than 5 ml  COMPLICATIONS:  None immediately appreciated.     DISCUSSION:    Rowdy Marie is a 75 y.o.-year-old male who requires peripheral IV access vascular access. The history and physical examination were reviewed and confirmed.      CONSENT: The patient provided verbal consent for this procedure.     PROCEDURE:  A timeout was initiated by the bedside nurse and was confirmed by those present. The skin overlying the left forearm was prepped with chlorhexadine. The vessel and surrounding anatomy was visualized using ultrasound. An 20g IV was inserted into the vein under ultrasound guidance. Line was attached to a J-loop and flushed easily. A temporary sterile dressing was applied.  No immediate complication was evident.     Yang Bridges DO       Ultrasound-Guided Peripheral IV:    PROCEDURE PERFORMED:  Ultrasound guided peripheral IV insertion  PERFORMING PHYSICIAN: Yang Bridges DO  ESTIMATED BLOOD LOSS:  Less than 5 ml  COMPLICATIONS:  None immediately appreciated.     DISCUSSION:    Rowdy Marie is a 75 y.o.-year-old male who requires peripheral IV access vascular access. The history and physical examination were reviewed and confirmed.      CONSENT: The patient provided verbal consent for this procedure.     PROCEDURE:  A timeout was initiated by the bedside nurse and was confirmed by those present. The skin overlying the right forearm was prepped with chlorhexadine. The vessel and surrounding anatomy was visualized using ultrasound. An 18g IV was inserted into the vein under ultrasound guidance. Line was attached to a J-loop and flushed easily. A temporary sterile dressing was applied.  No immediate complication was  evident.     Yang Bridges, DO

## 2025-07-12 NOTE — PLAN OF CARE
[facility]  Office: [phone]  Dillon Fraire DO, Gil Diaz, DO, Rodney Mccarthy DO, Danny Rosa, DO, Elmo Whaley MD, Anum Salomon MD, Ivan Barclay MD, Kell Nix MD,  Erik Nevarez MD, Makenna Pabon MD, Edelmira Fernandez MD,  Rehana Pizano DO, Delisa Wu MD, Tyrone Wakefield MD, Theo Fraire DO, Venus Meza MD,  Tristen Stevens DO, Maria T Mccartney MD, Viry Hylton MD, Geno Estrada MD,  Basim Lester MD, Willi Echevarria MD, Ana Greco MD, Berny Christian MD, Varinder Avilez MD, Estuardo Álvarez MD, Patrick Engel, DO, Yandy Jones MD, Seth Zarate DO, Wellington Erazo MD, Rehana Almanza MD, Mohsin Reza, MD, Chaz Quinones MD, Shirley Waterhouse, CNP,  Zoya Drake, CNP, Patrick Angulo, CNP,  Brittany Slater, KOMAL, Rosalie King CNP, Michelle Rose, CNP, Latasha Diaz, CNP, Yun López, CNP, Lucero Guerrero, PA-C, Dahiana Meza, CNP, Rajni Sauer, CNP,  Ryanne Wolfe, CNP, Arabella Patel, CNP, Rd Ruby, PA-C, Mandi Ray, PA-C, Marleen Penny, CNP,        Maryjane Andrade, CNS, Kira Rowley, CNP, Margarita Infante, CNP         [facility]   IN-PATIENT SERVICE   [facility]    Second Visit Note  For more detailed information please refer to the progress note of the day      7/12/2025    8:34 AM    Name:   Rowdy Marie  MRN:     [id number]     Acct:      [id number]   Room:   39/39  IP Day:  1  Admit Date:  7/11/2025  8:09 PM    PCP:   Aracely Pierre MD  Code Status:  Full Code      Pt vitals were reviewed   New labs were reviewed   Patient was seen  Came by ambulance for acute onset left sided non exertional stabbing chest pain associated with dizziness, diaphoresis .   Patient lives with son and grand kids. Has known CAD with stents - previous one was 2 weeks ago. He had CABG X 4 in May 2014.  He had cardiac arrest at that time.   Other comorbidity - Parkinson , h/o prostate cancer, A-fib  s/p Watchman.  Labs show elevated

## 2025-07-13 LAB
ANTI-XA UNFRAC HEPARIN: 0.35 IU/L
ERYTHROCYTE [DISTWIDTH] IN BLOOD BY AUTOMATED COUNT: 14.2 % (ref 11.8–14.4)
HCT VFR BLD AUTO: 35 % (ref 40.7–50.3)
HGB BLD-MCNC: 10.9 G/DL (ref 13–17)
MCH RBC QN AUTO: 32.2 PG (ref 25.2–33.5)
MCHC RBC AUTO-ENTMCNC: 31.1 G/DL (ref 28.4–34.8)
MCV RBC AUTO: 103.2 FL (ref 82.6–102.9)
NRBC BLD-RTO: 0 PER 100 WBC
PLATELET # BLD AUTO: 270 K/UL (ref 138–453)
PMV BLD AUTO: 10.7 FL (ref 8.1–13.5)
RBC # BLD AUTO: 3.39 M/UL (ref 4.21–5.77)
WBC OTHER # BLD: 6 K/UL (ref 3.5–11.3)

## 2025-07-13 PROCEDURE — 6370000000 HC RX 637 (ALT 250 FOR IP): Performed by: NURSE PRACTITIONER

## 2025-07-13 PROCEDURE — 36415 COLL VENOUS BLD VENIPUNCTURE: CPT

## 2025-07-13 PROCEDURE — 2500000003 HC RX 250 WO HCPCS: Performed by: NURSE PRACTITIONER

## 2025-07-13 PROCEDURE — 85027 COMPLETE CBC AUTOMATED: CPT

## 2025-07-13 PROCEDURE — 99233 SBSQ HOSP IP/OBS HIGH 50: CPT | Performed by: NURSE PRACTITIONER

## 2025-07-13 PROCEDURE — 85520 HEPARIN ASSAY: CPT

## 2025-07-13 PROCEDURE — 6370000000 HC RX 637 (ALT 250 FOR IP): Performed by: INTERNAL MEDICINE

## 2025-07-13 PROCEDURE — 99232 SBSQ HOSP IP/OBS MODERATE 35: CPT | Performed by: STUDENT IN AN ORGANIZED HEALTH CARE EDUCATION/TRAINING PROGRAM

## 2025-07-13 PROCEDURE — 6360000002 HC RX W HCPCS

## 2025-07-13 PROCEDURE — 2060000000 HC ICU INTERMEDIATE R&B

## 2025-07-13 RX ORDER — AMLODIPINE BESYLATE 5 MG/1
2.5 TABLET ORAL DAILY
Status: DISCONTINUED | OUTPATIENT
Start: 2025-07-13 | End: 2025-07-19 | Stop reason: HOSPADM

## 2025-07-13 RX ORDER — SCOPOLAMINE 1 MG/3D
1 PATCH, EXTENDED RELEASE TRANSDERMAL
Status: DISCONTINUED | OUTPATIENT
Start: 2025-07-13 | End: 2025-07-19 | Stop reason: HOSPADM

## 2025-07-13 RX ORDER — PANTOPRAZOLE SODIUM 40 MG/1
40 TABLET, DELAYED RELEASE ORAL
Status: DISCONTINUED | OUTPATIENT
Start: 2025-07-13 | End: 2025-07-19 | Stop reason: HOSPADM

## 2025-07-13 RX ORDER — ISOSORBIDE MONONITRATE 30 MG/1
30 TABLET, EXTENDED RELEASE ORAL NIGHTLY
Status: DISCONTINUED | OUTPATIENT
Start: 2025-07-13 | End: 2025-07-19 | Stop reason: HOSPADM

## 2025-07-13 RX ADMIN — POTASSIUM CHLORIDE 10 MEQ: 750 CAPSULE, EXTENDED RELEASE ORAL at 08:11

## 2025-07-13 RX ADMIN — ISOSORBIDE MONONITRATE 30 MG: 30 TABLET, EXTENDED RELEASE ORAL at 20:59

## 2025-07-13 RX ADMIN — ATORVASTATIN CALCIUM 40 MG: 40 TABLET, FILM COATED ORAL at 08:11

## 2025-07-13 RX ADMIN — CARVEDILOL 3.12 MG: 3.12 TABLET, FILM COATED ORAL at 16:38

## 2025-07-13 RX ADMIN — SUCRALFATE 1 G: 1 TABLET ORAL at 08:10

## 2025-07-13 RX ADMIN — DROXIDOPA 200 MG: 200 CAPSULE ORAL at 13:19

## 2025-07-13 RX ADMIN — CLOPIDOGREL BISULFATE 75 MG: 75 TABLET, FILM COATED ORAL at 08:11

## 2025-07-13 RX ADMIN — AMLODIPINE BESYLATE 2.5 MG: 5 TABLET ORAL at 11:18

## 2025-07-13 RX ADMIN — ASPIRIN 81 MG: 81 TABLET, CHEWABLE ORAL at 08:11

## 2025-07-13 RX ADMIN — DROXIDOPA 200 MG: 200 CAPSULE ORAL at 20:59

## 2025-07-13 RX ADMIN — SUCRALFATE 1 G: 1 TABLET ORAL at 16:38

## 2025-07-13 RX ADMIN — AMIODARONE HYDROCHLORIDE 200 MG: 200 TABLET ORAL at 08:12

## 2025-07-13 RX ADMIN — SODIUM CHLORIDE, PRESERVATIVE FREE 10 ML: 5 INJECTION INTRAVENOUS at 08:12

## 2025-07-13 RX ADMIN — CARBIDOPA AND LEVODOPA 1 TABLET: 25; 100 TABLET ORAL at 16:40

## 2025-07-13 RX ADMIN — CARBIDOPA AND LEVODOPA 1 TABLET: 25; 100 TABLET ORAL at 20:59

## 2025-07-13 RX ADMIN — PANTOPRAZOLE SODIUM 40 MG: 40 TABLET, DELAYED RELEASE ORAL at 05:09

## 2025-07-13 RX ADMIN — CARVEDILOL 3.12 MG: 3.12 TABLET, FILM COATED ORAL at 08:11

## 2025-07-13 RX ADMIN — LEVOTHYROXINE SODIUM 75 MCG: 0.07 TABLET ORAL at 08:11

## 2025-07-13 RX ADMIN — SUCRALFATE 1 G: 1 TABLET ORAL at 21:00

## 2025-07-13 RX ADMIN — CARBIDOPA AND LEVODOPA 1 TABLET: 25; 100 TABLET ORAL at 13:18

## 2025-07-13 RX ADMIN — PANTOPRAZOLE SODIUM 40 MG: 40 TABLET, DELAYED RELEASE ORAL at 16:38

## 2025-07-13 RX ADMIN — ACETAMINOPHEN 650 MG: 325 TABLET ORAL at 00:07

## 2025-07-13 RX ADMIN — ACETAMINOPHEN 650 MG: 325 TABLET ORAL at 12:53

## 2025-07-13 RX ADMIN — SODIUM CHLORIDE, PRESERVATIVE FREE 10 ML: 5 INJECTION INTRAVENOUS at 21:02

## 2025-07-13 RX ADMIN — SUCRALFATE 1 G: 1 TABLET ORAL at 13:19

## 2025-07-13 RX ADMIN — CARBIDOPA AND LEVODOPA 1 TABLET: 25; 100 TABLET ORAL at 08:12

## 2025-07-13 RX ADMIN — HEPARIN SODIUM 12 UNITS/KG/HR: 10000 INJECTION, SOLUTION INTRAVENOUS at 05:09

## 2025-07-13 RX ADMIN — FLUDROCORTISONE ACETATE 0.05 MG: 0.1 TABLET ORAL at 08:12

## 2025-07-13 RX ADMIN — CYANOCOBALAMIN TAB 500 MCG 1000 MCG: 500 TAB at 08:11

## 2025-07-13 ASSESSMENT — PAIN SCALES - GENERAL
PAINLEVEL_OUTOF10: 2
PAINLEVEL_OUTOF10: 8
PAINLEVEL_OUTOF10: 8
PAINLEVEL_OUTOF10: 2
PAINLEVEL_OUTOF10: 3
PAINLEVEL_OUTOF10: 3
PAINLEVEL_OUTOF10: 6

## 2025-07-13 ASSESSMENT — PAIN DESCRIPTION - DESCRIPTORS: DESCRIPTORS: ACHING

## 2025-07-13 ASSESSMENT — PAIN DESCRIPTION - LOCATION: LOCATION: HEAD

## 2025-07-13 ASSESSMENT — PAIN - FUNCTIONAL ASSESSMENT: PAIN_FUNCTIONAL_ASSESSMENT: PREVENTS OR INTERFERES SOME ACTIVE ACTIVITIES AND ADLS

## 2025-07-13 NOTE — PLAN OF CARE
Problem: Discharge Planning  Goal: Discharge to home or other facility with appropriate resources  Outcome: Progressing-Discharge planning is in progress at this time.  Flowsheets (Taken 7/13/2025 0800)  Discharge to home or other facility with appropriate resources: Identify barriers to discharge with patient and caregiver     Problem: Skin/Tissue Integrity  Goal: Skin integrity remains intact  Description: 1.  Monitor for areas of redness and/or skin breakdown  2.  Assess vascular access sites hourly  3.  Every 4-6 hours minimum:  Change oxygen saturation probe site  4.  Every 4-6 hours:  If on nasal continuous positive airway pressure, respiratory therapy assess nares and determine need for appliance change or resting period  Outcome: Progressing-No new skin abnormalities noted during this shift.   Flowsheets (Taken 7/13/2025 0800)  Skin Integrity Remains Intact: Monitor for areas of redness and/or skin breakdown     Problem: Safety - Adult  Goal: Free from fall injury  Outcome: Progressing-Patient free from falls during this shift.     Problem: ABCDS Injury Assessment  Goal: Absence of physical injury  Outcome: Progressing-no new injuries during this shift.

## 2025-07-13 NOTE — CARE COORDINATION
Case Management Assessment  Initial Evaluation    Date/Time of Evaluation: 7/13/2025 1:52 PM  Assessment Completed by: CHEIKH COOK    If patient is discharged prior to next notation, then this note serves as note for discharge by case management.    Patient Name: Rowdy Marie                   YOB: 1950  Diagnosis: NSTEMI (non-ST elevated myocardial infarction) (HCC) [I21.4]                   Date / Time: 7/11/2025  8:09 PM    Patient Admission Status: Inpatient   Readmission Risk (Low < 19, Mod (19-27), High > 27): Readmission Risk Score: 26.8    Current PCP: Aracely Pierre MD  PCP verified by CM? Yes    Chart Reviewed: Yes      History Provided by: Patient  Patient Orientation: Alert and Oriented    Patient Cognition: Alert    Hospitalization in the last 30 days (Readmission):  No    If yes, Readmission Assessment in CM Navigator will be completed.    Advance Directives:      Code Status: Full Code   Patient's Primary Decision Maker is: Named in Scanned ACP Document    Primary Decision Maker: Kye Marie - Daughter-in-Law - [phone]    Secondary Decision Maker: Elliott Marie - Child - [phone]    Supplemental (Other) Decision Maker: Patrick Marie \"Alek\" - Child - [phone]    Discharge Planning:    Patient lives with: Children, Family Members Type of Home: House  Primary Care Giver: Self  Patient Support Systems include: Children, Family Members   Current Financial resources: Medicare  Current community resources: None  Current services prior to admission: (P) Home Care, Durable Medical Equipment ([place])            Current DME: (P) [facility]            Type of Home Care services:  (P) Nursing Services    ADLS  Prior functional level: Assistance with the following:, Housework, Shopping, Mobility  Current functional level: Assistance with the following:, Bathing, Dressing, Toileting, Mobility    PT AM-PAC:   /24  OT AM-PAC:   /24    Family can provide assistance at DC: Yes  Would

## 2025-07-13 NOTE — PROGRESS NOTES
[facility]  Office: [phone]  Dillon Fraire DO, Gil Diaz DO, Rodney Mccarthy DO, Danny Rosa DO, Elmo Whaley MD, Anum Salomon MD, Ivan Barclay MD, Kell Nix MD,  Erik Nevarez MD, Makenna Pabon MD, Guido Pauilno DO, Edelmira Fernandez MD,  Rehana Pizano DO, Delisa Wu MD, Tyrone Wakefield MD, Theo Fraire DO, Venus Meza MD,  Tristen Stevens DO, Yarely Ambrocio MD, Maria T Mccartney MD, Viry Hylton MD, Geno Estrada MD,  Basim Lester MD, Willi Echevarria MD, Ana Greco MD, Arnold Giles DO, Matthieu Caballero MD,  Wellington Erazo MD, Shirley Waterhouse, CNP,  Zoya Drake, CNP, Marleen Penny, CNP, Patrick Angulo, CNP,  Brittany Slater, DNP, Rosalie King, CNP, Michelle Rose, CNP, Kira Rowley, CNP, Latasha Diaz, CNP, Yun López, CNP, Bertrand Atwood PA-C, Maryjane Andrade, CNS, Laya Ng, CNP, Margariat Infante, CNP         [facility]   IN-PATIENT SERVICE   [facility]    Progress Note    7/13/2025    5:57 PM    Name:   Rowdy Marie  MRN:     [id number]     Acct:      [id number]   [place]   [facility] [place]   Day:  2  Admit Date:  7/11/2025  8:09 PM    PCP:   Aracely Pierre MD  Code Status:  Full Code    Subjective:     Patient seen and examined this morning, resting comfortably, denied any chest pain, stated that he feels pressure specially when he is eating.  Discussed about his previous finding of EGD and GI recommendations.  Patient mentioned that he does take necessary precaution and follow the recommendations from GI by taking small bites and not talking while eating.  Will get speech on board.  Cardiology on board, s/p echo with EF 40-45%, normal wall motion.      Brief History:  Rowdy Marie is a 75 y.o.  male w/ MV CAD s/p CABG 2014, s/p stents 04/2024 w/ recent staged PCI 06/02/2025 (w/ plans for further stent ASAP, with future plans of revascularization of RCA stenosis at [facility]), prior TIA, chronic afib s/p

## 2025-07-13 NOTE — PROGRESS NOTES
[facility]  Progress Note                   Date:   7/13/2025  Patient name: Rowdy Marie  Date of admission:  7/11/2025  8:09 PM  MRN:   [id number]  YOB: 1950  PCP: Aracely Pierre MD    Reason for Admission: NSTEMI (non-ST elevated myocardial infarction) (HCC) [I21.4]    Subjective:       Clinical Changes /Abnormalities: seen & examined in room Denies any changes in his chest \"heaviness.\" States it is still there but not worse. Denies any SOB but states he jsut feels \"real run down today.\" Is only on clear liquid diet?? And feels \"so weak.\" Labs, vitals, & tele reviewed.     Review of Systems    Medications:   Scheduled Meds:   scopolamine  1 patch TransDERmal Q72H    amiodarone  200 mg Oral Daily    atorvastatin  40 mg Oral Daily    carbidopa-levodopa  1 tablet Oral 4x Daily    carvedilol  3.125 mg Oral BID WC    droxidopa  200 mg Oral TID    [Held by provider] ferrous sulfate  325 mg Oral TID WC    fludrocortisone  0.05 mg Oral Daily    levothyroxine  75 mcg Oral Daily    midodrine  10 mg Oral TID    nitroGLYCERIN  1 patch TransDERmal Daily    pantoprazole  40 mg Oral QAM AC    potassium chloride  10 mEq Oral QAM    sucralfate  1 g Oral 4x daily    vitamin B-12  1,000 mcg Oral Daily    sodium chloride flush  5-40 mL IntraVENous 2 times per day    aspirin  81 mg Oral Daily    clopidogrel  75 mg Oral Daily     Continuous Infusions:   nitroGLYCERIN Stopped (07/12/25 1617)    heparin (PORCINE) Infusion 12 Units/kg/hr (07/13/25 0509)    sodium chloride       CBC:   Recent Labs     07/11/25 2026 07/12/25  0304 07/13/25  0818   WBC 8.4 7.8 6.0   HGB 11.2* 10.0* 10.9*    276 270     BMP:    Recent Labs     07/11/25 2026 07/12/25  0304    137   K 4.0 3.9   CL 99 101   CO2 25 25   BUN 20 20   CREATININE 1.4* 1.2   GLUCOSE 103* 100*     Hepatic:  Recent Labs     07/11/25 2026   AST 28   ALT 20   BILITOT 0.3   ALKPHOS 85     Troponin:   Recent Labs     07/11/25 2026

## 2025-07-13 NOTE — PROGRESS NOTES
[facility] [place]  PROGRESS NOTE    Shift date: 7/13/2025  Shift day: Sunday   Shift # 2    Room # 0402/0402-01   Name: Rowdy Marie                Roman Catholic: Moravian   Place of [facility]aritan: unknown    Referral: Routine Visit    Admit Date & Time: 7/11/2025  8:09 PM    Assessment:  Rowdy Marie is a 75 y.o. male in the hospital because of NSTEMI (non-ST elevated myocardial infarction) (HCC). Upon entering the room writer observes pt is awake and receptive to visit.      Intervention:  Writer introduced self and title as     inquired how pt was feeling. Pt said he has been feeling a little better each day. He is hopeful he may be able to go home tomorrow.  asked about support system. Pt shared about his family with whom he lives. He feels very fortunate to gave a good family. Pt is still adjusting to the loss of his wife of 53 years and the loss of his close friend recently.  inquired about tamiko background. Pt says he is Moravian and attends [employer] when he is able to. Pt says tamiko plays a role in his life, especially his outlook on his health situation.  offered a Bible, which pt accepted.  offered prayer w/ pt.      Outcome:  Pt expressed gratitude for visit and prayer.     Plan:  Chaplains will remain available to offer spiritual and emotional support as needed.      Electronically signed by Ant Aguayo, Intern, on 7/13/2025 at 5:28 PM.  [facility]  [phone]

## 2025-07-13 NOTE — PLAN OF CARE
Problem: Discharge Planning  Goal: Discharge to home or other facility with appropriate resources  Outcome: Progressing  Flowsheets (Taken 7/12/2025 2131)  Discharge to home or other facility with appropriate resources: Identify barriers to discharge with patient and caregiver     Problem: Skin/Tissue Integrity  Goal: Skin integrity remains intact  Description: 1.  Monitor for areas of redness and/or skin breakdown  2.  Assess vascular access sites hourly  3.  Every 4-6 hours minimum:  Change oxygen saturation probe site  4.  Every 4-6 hours:  If on nasal continuous positive airway pressure, respiratory therapy assess nares and determine need for appliance change or resting period  Outcome: Progressing  Flowsheets (Taken 7/12/2025 2131)  Skin Integrity Remains Intact: Monitor for areas of redness and/or skin breakdown     Problem: Safety - Adult  Goal: Free from fall injury  Outcome: Progressing  Flowsheets (Taken 7/12/2025 2131)  Free From Fall Injury:   Instruct family/caregiver on patient safety   Based on caregiver fall risk screen, instruct family/caregiver to ask for assistance with transferring infant if caregiver noted to have fall risk factors     Problem: ABCDS Injury Assessment  Goal: Absence of physical injury  Outcome: Progressing  Flowsheets (Taken 7/12/2025 2131)  Absence of Physical Injury: Implement safety measures based on patient assessment     Problem: Pain  Goal: Verbalizes/displays adequate comfort level or baseline comfort level  Outcome: Progressing  Flowsheets (Taken 7/12/2025 2131)  Verbalizes/displays adequate comfort level or baseline comfort level:   Encourage patient to monitor pain and request assistance   Assess pain using appropriate pain scale

## 2025-07-14 ENCOUNTER — TELEPHONE (OUTPATIENT)
Dept: INTERNAL MEDICINE CLINIC | Age: 75
End: 2025-07-14

## 2025-07-14 PROBLEM — I21.4 NSTEMI (NON-ST ELEVATED MYOCARDIAL INFARCTION) (HCC): Status: RESOLVED | Noted: 2024-04-10 | Resolved: 2025-07-14

## 2025-07-14 LAB
ANION GAP SERPL CALCULATED.3IONS-SCNC: 12 MMOL/L (ref 9–16)
ANTI-XA UNFRAC HEPARIN: 0.2 IU/L
ANTI-XA UNFRAC HEPARIN: 0.31 IU/L
BUN SERPL-MCNC: 18 MG/DL (ref 8–23)
CALCIUM SERPL-MCNC: 8.6 MG/DL (ref 8.6–10.4)
CHLORIDE SERPL-SCNC: 104 MMOL/L (ref 98–107)
CO2 SERPL-SCNC: 24 MMOL/L (ref 20–31)
CREAT SERPL-MCNC: 1.3 MG/DL (ref 0.7–1.2)
ERYTHROCYTE [DISTWIDTH] IN BLOOD BY AUTOMATED COUNT: 14.1 % (ref 11.8–14.4)
ERYTHROCYTE [DISTWIDTH] IN BLOOD BY AUTOMATED COUNT: 14.2 % (ref 11.8–14.4)
GFR, ESTIMATED: 57 ML/MIN/1.73M2
GLUCOSE SERPL-MCNC: 93 MG/DL (ref 74–99)
HCT VFR BLD AUTO: 31.9 % (ref 40.7–50.3)
HCT VFR BLD AUTO: 39.7 % (ref 40.7–50.3)
HGB BLD-MCNC: 10 G/DL (ref 13–17)
HGB BLD-MCNC: 12 G/DL (ref 13–17)
INR PPP: 1.6
MCH RBC QN AUTO: 31.9 PG (ref 25.2–33.5)
MCH RBC QN AUTO: 32.1 PG (ref 25.2–33.5)
MCHC RBC AUTO-ENTMCNC: 30.2 G/DL (ref 28.4–34.8)
MCHC RBC AUTO-ENTMCNC: 31.3 G/DL (ref 28.4–34.8)
MCV RBC AUTO: 102.2 FL (ref 82.6–102.9)
MCV RBC AUTO: 105.6 FL (ref 82.6–102.9)
NRBC BLD-RTO: 0 PER 100 WBC
NRBC BLD-RTO: 0 PER 100 WBC
PARTIAL THROMBOPLASTIN TIME: >180 SEC (ref 23–36.5)
PLATELET # BLD AUTO: 223 K/UL (ref 138–453)
PLATELET # BLD AUTO: 269 K/UL (ref 138–453)
PMV BLD AUTO: 10.8 FL (ref 8.1–13.5)
PMV BLD AUTO: 10.9 FL (ref 8.1–13.5)
POTASSIUM SERPL-SCNC: 3.6 MMOL/L (ref 3.7–5.3)
PROTHROMBIN TIME: 19.3 SEC (ref 11.7–14.9)
RBC # BLD AUTO: 3.12 M/UL (ref 4.21–5.77)
RBC # BLD AUTO: 3.76 M/UL (ref 4.21–5.77)
SODIUM SERPL-SCNC: 140 MMOL/L (ref 136–145)
WBC OTHER # BLD: 6.3 K/UL (ref 3.5–11.3)
WBC OTHER # BLD: 6.6 K/UL (ref 3.5–11.3)

## 2025-07-14 PROCEDURE — 6370000000 HC RX 637 (ALT 250 FOR IP): Performed by: NURSE PRACTITIONER

## 2025-07-14 PROCEDURE — 97535 SELF CARE MNGMENT TRAINING: CPT

## 2025-07-14 PROCEDURE — 2060000000 HC ICU INTERMEDIATE R&B

## 2025-07-14 PROCEDURE — 6360000002 HC RX W HCPCS: Performed by: NURSE PRACTITIONER

## 2025-07-14 PROCEDURE — 2500000003 HC RX 250 WO HCPCS: Performed by: NURSE PRACTITIONER

## 2025-07-14 PROCEDURE — 99232 SBSQ HOSP IP/OBS MODERATE 35: CPT | Performed by: STUDENT IN AN ORGANIZED HEALTH CARE EDUCATION/TRAINING PROGRAM

## 2025-07-14 PROCEDURE — 6370000000 HC RX 637 (ALT 250 FOR IP): Performed by: INTERNAL MEDICINE

## 2025-07-14 PROCEDURE — 80048 BASIC METABOLIC PNL TOTAL CA: CPT

## 2025-07-14 PROCEDURE — 85027 COMPLETE CBC AUTOMATED: CPT

## 2025-07-14 PROCEDURE — 97530 THERAPEUTIC ACTIVITIES: CPT

## 2025-07-14 PROCEDURE — 85610 PROTHROMBIN TIME: CPT

## 2025-07-14 PROCEDURE — 6370000000 HC RX 637 (ALT 250 FOR IP): Performed by: STUDENT IN AN ORGANIZED HEALTH CARE EDUCATION/TRAINING PROGRAM

## 2025-07-14 PROCEDURE — 85730 THROMBOPLASTIN TIME PARTIAL: CPT

## 2025-07-14 PROCEDURE — 85520 HEPARIN ASSAY: CPT

## 2025-07-14 PROCEDURE — 97166 OT EVAL MOD COMPLEX 45 MIN: CPT

## 2025-07-14 PROCEDURE — 97162 PT EVAL MOD COMPLEX 30 MIN: CPT

## 2025-07-14 PROCEDURE — 99233 SBSQ HOSP IP/OBS HIGH 50: CPT | Performed by: NURSE PRACTITIONER

## 2025-07-14 PROCEDURE — 36415 COLL VENOUS BLD VENIPUNCTURE: CPT

## 2025-07-14 RX ORDER — ISOSORBIDE MONONITRATE 30 MG/1
30 TABLET, EXTENDED RELEASE ORAL NIGHTLY
Qty: 30 TABLET | Refills: 3 | Status: SHIPPED | OUTPATIENT
Start: 2025-07-14

## 2025-07-14 RX ORDER — NITROGLYCERIN 0.4 MG/1
0.4 TABLET SUBLINGUAL EVERY 5 MIN PRN
Qty: 25 TABLET | Refills: 3 | Status: SHIPPED | OUTPATIENT
Start: 2025-07-14

## 2025-07-14 RX ORDER — HEPARIN SODIUM 10000 [USP'U]/100ML
5-30 INJECTION, SOLUTION INTRAVENOUS CONTINUOUS
Status: DISCONTINUED | OUTPATIENT
Start: 2025-07-14 | End: 2025-07-16

## 2025-07-14 RX ORDER — HEPARIN SODIUM 1000 [USP'U]/ML
2000 INJECTION, SOLUTION INTRAVENOUS; SUBCUTANEOUS PRN
Status: DISCONTINUED | OUTPATIENT
Start: 2025-07-14 | End: 2025-07-16

## 2025-07-14 RX ORDER — AMLODIPINE BESYLATE 2.5 MG/1
2.5 TABLET ORAL DAILY
Qty: 30 TABLET | Refills: 3 | Status: SHIPPED | OUTPATIENT
Start: 2025-07-15

## 2025-07-14 RX ORDER — HEPARIN SODIUM 1000 [USP'U]/ML
4000 INJECTION, SOLUTION INTRAVENOUS; SUBCUTANEOUS PRN
Status: DISCONTINUED | OUTPATIENT
Start: 2025-07-14 | End: 2025-07-16

## 2025-07-14 RX ORDER — MIDODRINE HYDROCHLORIDE 5 MG/1
5 TABLET ORAL ONCE
Status: COMPLETED | OUTPATIENT
Start: 2025-07-14 | End: 2025-07-14

## 2025-07-14 RX ORDER — HYDROCODONE BITARTRATE AND ACETAMINOPHEN 5; 325 MG/1; MG/1
1 TABLET ORAL EVERY 6 HOURS PRN
Status: DISCONTINUED | OUTPATIENT
Start: 2025-07-14 | End: 2025-07-19 | Stop reason: HOSPADM

## 2025-07-14 RX ORDER — SCOPOLAMINE 1 MG/3D
1 PATCH, EXTENDED RELEASE TRANSDERMAL
Qty: 5 PATCH | Refills: 0 | Status: SHIPPED | OUTPATIENT
Start: 2025-07-16 | End: 2025-07-31

## 2025-07-14 RX ORDER — HEPARIN SODIUM 1000 [USP'U]/ML
4000 INJECTION, SOLUTION INTRAVENOUS; SUBCUTANEOUS ONCE
Status: DISCONTINUED | OUTPATIENT
Start: 2025-07-14 | End: 2025-07-14 | Stop reason: SDUPTHER

## 2025-07-14 RX ORDER — HYDROCODONE BITARTRATE AND ACETAMINOPHEN 5; 325 MG/1; MG/1
1 TABLET ORAL EVERY 6 HOURS PRN
Qty: 10 TABLET | Refills: 0 | Status: SHIPPED | OUTPATIENT
Start: 2025-07-14 | End: 2025-07-17

## 2025-07-14 RX ORDER — MORPHINE SULFATE 2 MG/ML
1 INJECTION, SOLUTION INTRAMUSCULAR; INTRAVENOUS EVERY 4 HOURS PRN
Status: DISCONTINUED | OUTPATIENT
Start: 2025-07-14 | End: 2025-07-19 | Stop reason: HOSPADM

## 2025-07-14 RX ADMIN — LEVOTHYROXINE SODIUM 75 MCG: 0.07 TABLET ORAL at 07:50

## 2025-07-14 RX ADMIN — CARBIDOPA AND LEVODOPA 1 TABLET: 25; 100 TABLET ORAL at 07:49

## 2025-07-14 RX ADMIN — DROXIDOPA 200 MG: 200 CAPSULE ORAL at 13:28

## 2025-07-14 RX ADMIN — SODIUM CHLORIDE, PRESERVATIVE FREE 10 ML: 5 INJECTION INTRAVENOUS at 19:52

## 2025-07-14 RX ADMIN — ISOSORBIDE MONONITRATE 30 MG: 30 TABLET, EXTENDED RELEASE ORAL at 19:49

## 2025-07-14 RX ADMIN — CARBIDOPA AND LEVODOPA 1 TABLET: 25; 100 TABLET ORAL at 13:28

## 2025-07-14 RX ADMIN — CARBIDOPA AND LEVODOPA 1 TABLET: 25; 100 TABLET ORAL at 16:16

## 2025-07-14 RX ADMIN — HEPARIN SODIUM 12 UNITS/KG/HR: 10000 INJECTION, SOLUTION INTRAVENOUS at 13:26

## 2025-07-14 RX ADMIN — MIDODRINE HYDROCHLORIDE 10 MG: 10 TABLET ORAL at 19:50

## 2025-07-14 RX ADMIN — CARBIDOPA AND LEVODOPA 1 TABLET: 25; 100 TABLET ORAL at 21:36

## 2025-07-14 RX ADMIN — FLUDROCORTISONE ACETATE 0.05 MG: 0.1 TABLET ORAL at 07:48

## 2025-07-14 RX ADMIN — CARVEDILOL 3.12 MG: 3.12 TABLET, FILM COATED ORAL at 16:16

## 2025-07-14 RX ADMIN — MIDODRINE HYDROCHLORIDE 5 MG: 5 TABLET ORAL at 01:05

## 2025-07-14 RX ADMIN — HYDROCODONE BITARTRATE AND ACETAMINOPHEN 1 TABLET: 5; 325 TABLET ORAL at 16:15

## 2025-07-14 RX ADMIN — ATORVASTATIN CALCIUM 40 MG: 40 TABLET, FILM COATED ORAL at 07:48

## 2025-07-14 RX ADMIN — MORPHINE SULFATE 1 MG: 2 INJECTION, SOLUTION INTRAMUSCULAR; INTRAVENOUS at 19:50

## 2025-07-14 RX ADMIN — MIDODRINE HYDROCHLORIDE 10 MG: 10 TABLET ORAL at 13:28

## 2025-07-14 RX ADMIN — AMLODIPINE BESYLATE 2.5 MG: 5 TABLET ORAL at 07:49

## 2025-07-14 RX ADMIN — CYANOCOBALAMIN TAB 500 MCG 1000 MCG: 500 TAB at 07:50

## 2025-07-14 RX ADMIN — PANTOPRAZOLE SODIUM 40 MG: 40 TABLET, DELAYED RELEASE ORAL at 16:15

## 2025-07-14 RX ADMIN — CARVEDILOL 3.12 MG: 3.12 TABLET, FILM COATED ORAL at 07:48

## 2025-07-14 RX ADMIN — SUCRALFATE 1 G: 1 TABLET ORAL at 13:28

## 2025-07-14 RX ADMIN — ACETAMINOPHEN 650 MG: 325 TABLET ORAL at 07:49

## 2025-07-14 RX ADMIN — SUCRALFATE 1 G: 1 TABLET ORAL at 19:50

## 2025-07-14 RX ADMIN — POTASSIUM CHLORIDE 10 MEQ: 750 CAPSULE, EXTENDED RELEASE ORAL at 07:48

## 2025-07-14 RX ADMIN — SUCRALFATE 1 G: 1 TABLET ORAL at 07:48

## 2025-07-14 RX ADMIN — SUCRALFATE 1 G: 1 TABLET ORAL at 16:15

## 2025-07-14 RX ADMIN — DROXIDOPA 200 MG: 200 CAPSULE ORAL at 07:47

## 2025-07-14 RX ADMIN — MIDODRINE HYDROCHLORIDE 10 MG: 10 TABLET ORAL at 07:50

## 2025-07-14 RX ADMIN — AMIODARONE HYDROCHLORIDE 200 MG: 200 TABLET ORAL at 07:48

## 2025-07-14 RX ADMIN — DROXIDOPA 200 MG: 200 CAPSULE ORAL at 21:36

## 2025-07-14 RX ADMIN — ACETAMINOPHEN 650 MG: 325 TABLET ORAL at 00:57

## 2025-07-14 RX ADMIN — ASPIRIN 81 MG: 81 TABLET, CHEWABLE ORAL at 07:48

## 2025-07-14 RX ADMIN — HEPARIN SODIUM 12 UNITS/KG/HR: 10000 INJECTION, SOLUTION INTRAVENOUS at 19:16

## 2025-07-14 RX ADMIN — PANTOPRAZOLE SODIUM 40 MG: 40 TABLET, DELAYED RELEASE ORAL at 06:13

## 2025-07-14 RX ADMIN — SODIUM CHLORIDE, PRESERVATIVE FREE 10 ML: 5 INJECTION INTRAVENOUS at 07:50

## 2025-07-14 RX ADMIN — CLOPIDOGREL BISULFATE 75 MG: 75 TABLET, FILM COATED ORAL at 07:48

## 2025-07-14 RX ADMIN — HYDROCODONE BITARTRATE AND ACETAMINOPHEN 1 TABLET: 5; 325 TABLET ORAL at 09:53

## 2025-07-14 ASSESSMENT — PAIN SCALES - GENERAL
PAINLEVEL_OUTOF10: 2
PAINLEVEL_OUTOF10: 9
PAINLEVEL_OUTOF10: 8
PAINLEVEL_OUTOF10: 5
PAINLEVEL_OUTOF10: 3
PAINLEVEL_OUTOF10: 8
PAINLEVEL_OUTOF10: 9
PAINLEVEL_OUTOF10: 9
PAINLEVEL_OUTOF10: 8

## 2025-07-14 ASSESSMENT — PAIN DESCRIPTION - DESCRIPTORS: DESCRIPTORS: ACHING;CRAMPING;DISCOMFORT

## 2025-07-14 ASSESSMENT — PAIN DESCRIPTION - LOCATION
LOCATION: CHEST
LOCATION: HEAD
LOCATION: HEAD
LOCATION: CHEST
LOCATION: BACK;CHEST

## 2025-07-14 ASSESSMENT — PAIN - FUNCTIONAL ASSESSMENT: PAIN_FUNCTIONAL_ASSESSMENT: ACTIVITIES ARE NOT PREVENTED

## 2025-07-14 ASSESSMENT — PAIN SCALES - WONG BAKER: WONGBAKER_NUMERICALRESPONSE: NO HURT

## 2025-07-14 ASSESSMENT — PAIN DESCRIPTION - ORIENTATION: ORIENTATION: LEFT;LOWER

## 2025-07-14 NOTE — PROGRESS NOTES
Physical Therapy  Facility/Department: [address] STEPDOWN   Physical Therapy Initial Evaluation    Patient Name: Rowdy Marie        MRN: [id number]    : 1950    Date of Service: 2025    Chief Complaint   Patient presents with    Chest Pain     Past Medical History:  has a past medical history of A-fib (HCC), PREET (acute kidney injury), Anemia, CAD (coronary artery disease), Esophageal stricture, Hyperlipidemia, Hypokalemia, Hypomagnesemia, NSTEMI (non-ST elevated myocardial infarction) (HCC), Orthostatic hypotension, Parkinsons (HCC), Presence of Watchman left atrial appendage closure device, and Prostate CA ([facility]).  Past Surgical History:  has a past surgical history that includes Coronary artery bypass graft; Cardiac catheterization; Coronary angioplasty with stent; Diagnostic Cardiac Cath Lab Procedure (2024); Cardiac procedure (N/A, 2024); Cardiac procedure (N/A, 2024); Cardiac procedure (N/A, 2024); Cardiac procedure (N/A, 2024); and Cardiac procedure (N/A, 2025).    Discharge Recommendations  Discharge Recommendations: Patient would benefit from continued therapy after discharge  PT Equipment Recommendations  Equipment Needed: No (Pt reports owning a RW)    Assessment  Body Structures, Functions, Activity Limitations Requiring Skilled Therapeutic Intervention: Decreased functional mobility , Decreased strength, Increased pain, Decreased posture, Decreased ROM, Decreased endurance, Decreased balance  Assessment: Pt ambulated 60ft w/ RW CGA, ambulation distance limited due to reported chest pain and fatigue. Pt is expected to safely return home with family support. Recommending continued skilled physical therapy to address these deficits to return pt to prior level of independence.  Therapy Prognosis: Good  Decision Making: Medium Complexity  Requires PT Follow-Up: Yes  Activity Tolerance  Activity Tolerance: Patient limited by fatigue, Patient limited by endurance  Safety

## 2025-07-14 NOTE — DISCHARGE INSTRUCTIONS
Follow-up outpatient with PCP and cardiology.  Continue medications as prescribed.  Dietary instructions as per recommendations, patient advised to take small bites and chew them well.  Recommended eating an upright position.  Avoid talking while eating.

## 2025-07-14 NOTE — PROGRESS NOTES
[facility]  Progress Note                   Date:   7/14/2025  Patient name: Rowdy Marie  Date of admission:  7/11/2025  8:09 PM  MRN:   [id number]  YOB: 1950  PCP: Aracely Pierre MD    Reason for Admission: NSTEMI (non-ST elevated myocardial infarction) ([facility]) [I21.4]    Subjective:       Clinical Changes /Abnormalities: seen & examined in room Denies any changes in his chest \"heaviness.\" States it is still there but not worse. States overall he does feel better today. Denies any SOB. No acute CV issues/concerns overnight. Labs,vitals, & tele reviewed. Remains SR on tele. Working with OT at present    Review of Systems    Medications:   Scheduled Meds:   scopolamine  1 patch TransDERmal Q72H    isosorbide mononitrate  30 mg Oral Nightly    amLODIPine  2.5 mg Oral Daily    pantoprazole  40 mg Oral BID AC    amiodarone  200 mg Oral Daily    atorvastatin  40 mg Oral Daily    carbidopa-levodopa  1 tablet Oral 4x Daily    carvedilol  3.125 mg Oral BID WC    droxidopa  200 mg Oral TID    [Held by provider] ferrous sulfate  325 mg Oral TID WC    fludrocortisone  0.05 mg Oral Daily    levothyroxine  75 mcg Oral Daily    midodrine  10 mg Oral TID    nitroGLYCERIN  1 patch TransDERmal Daily    potassium chloride  10 mEq Oral QAM    sucralfate  1 g Oral 4x daily    vitamin B-12  1,000 mcg Oral Daily    sodium chloride flush  5-40 mL IntraVENous 2 times per day    aspirin  81 mg Oral Daily    clopidogrel  75 mg Oral Daily     Continuous Infusions:   nitroGLYCERIN Stopped (07/12/25 1402)    heparin (PORCINE) Infusion 12 Units/kg/hr (07/14/25 0700)    sodium chloride       CBC:   Recent Labs     07/12/25 0304 07/13/25  0818 07/14/25  0622   WBC 7.8 6.0 6.6   HGB 10.0* 10.9* 10.0*    270 269     BMP:    Recent Labs     07/11/25 2026 07/12/25  0304 07/14/25  0622    137 140   K 4.0 3.9 3.6*   CL 99 101 104   CO2 25 25 24   BUN 20 20 18   CREATININE 1.4* 1.2 1.3*   GLUCOSE 103*

## 2025-07-14 NOTE — PROGRESS NOTES
[facility]  Office: [phone]  Dillon Fraire DO, Gil Diaz DO, Rodney Mccarthy DO, Danny Rosa DO, Elmo Whaley MD, Anum Salomon MD, Ivan Barclay MD, Kell Nix MD,  Erik Nevarez MD, Makenna Pabon MD, Guido Paulino DO, Edelmira Fernandez MD,  Rehana Pizano DO, Delisa Wu MD, Tyrone Wakefield MD, Theo Fraire DO, Venus Meza MD,  Tristen Stevens DO, Yarely Ambrocio MD, Maria T Mccartney MD, Viry Hylton MD, Geno Estrada MD,  Basim Lester MD, Willi Echevarria MD, Ana Greco MD, Arnold Giles DO, Matthieu Caballero MD,  Wellington Erazo MD, Shirley Waterhouse, CNP,  Zoya Drake, CNP, Marleen Penny, CNP, Patrick Angulo, CNP,  Brittany Slater, DNP, Rosalie King, CNP, Michelle Rose, CNP, Kira Rowley, CNP, Latasha Diaz, CNP, Yun López, CNP, Bertrand Atwood PA-C, Maryjane Andrade, CNS, Laya Ng, CNP, Margarita Infante, CNP         [facility]   IN-PATIENT SERVICE   [facility]    Progress Note    7/14/2025    1:06 PM    Name:   Rowdy Marie  MRN:     [id number]     Acct:      [id number]   Room:   0402/0402-01   Day:  3  Admit Date:  7/11/2025  8:09 PM    PCP:   Aracely Pierre MD  Code Status:  Full Code    Subjective:     Patient seen and examined this morning, he denied any chest pain or heaviness.  Tolerating p.o. diet.  Vitals and labs reviewed.  Cardiology on board, s/p echo with EF 40-45%, normal wall motion.  Initially plan was to discharge the patient as he clearly verbalized during my evaluation that he does not want any cardiac intervention.  Discharge order was placed.  Later during the day cardiology notified that patient has been scheduled for cath on Wednesday and they will be restarting heparin.    Brief History:  Rowdy Marie is a 75 y.o.  male w/ MV CAD s/p CABG 2014, s/p stents 04/2024 w/ recent staged PCI 06/02/2025 (w/ plans for further stent ASAP, with future plans of revascularization of RCA stenosis at  A-fib status post prior watchman with history of GI bleed.  Continue rate control per home regimen.    Prolonged QT.  Mag 1.9, potassium 3.9.  TSH 6.155 over T4 within normal range 1.4.  Follow-up on EKG in the a.m.  Patient lives with son, plan to go back home once okay with cardiology.      Medical Decision Making: Gertrude Avilez MD  7/14/2025  1:06 PM

## 2025-07-14 NOTE — DISCHARGE INSTR - COC
Continuity of Care Form    Patient Name: Rowdy Marie   :  1950  MRN:  [id number]    Admit date:  2025  Discharge date:      Code Status Order: Full Code   Advance Directives:     Admitting Physician:  No admitting provider for patient encounter.  PCP: Aracely Pierre MD    Discharging Nurse: Julio Silverman  Discharging Hospital Unit/Room#: 0402/0402-01  Discharging Unit Phone Number: [phone]    Emergency Contact:   Extended Emergency Contact Information  Primary Emergency Contact: Kye Marie  Address: [address] [place] [address] [place] [address]  Home Phone: [phone]  Work Phone: [phone]  Mobile Phone: [phone]  Relation: Daughter-in-Law   needed? No  Secondary Emergency Contact: Elliott Marie  Address: [address] [place] [address] [place] [address]  Home Phone: [phone]  Mobile Phone: [phone]  Relation: Child   needed? No    Past Surgical History:  Past Surgical History:   Procedure Laterality Date    CARDIAC CATHETERIZATION      CARDIAC PROCEDURE N/A 2024    sunny / Left heart cath / coronary angiography / op sc[facility] performed by Andree Paulino MD at [facility] CARDIAC CATH LAB    CARDIAC PROCEDURE N/A 2024    Percutaneous coronary intervention performed by Andree Paulino MD at [facility] CARDIAC CATH LAB    CARDIAC PROCEDURE N/A 2024    taleb / Left heart cath / coronary angiography / op scmh performed by Antonio Loev MD at [facility] CARDIAC CATH LAB    CARDIAC PROCEDURE N/A 2024    Percutaneous coronary intervention performed by Antonio Love MD at [facility] CARDIAC CATH LAB    CARDIAC PROCEDURE N/A 2025    ali / Left heart cath / coronary angiography / rm 2005 performed by Mj Faith MD at [facility] CARDIAC CATH LAB    CORONARY ANGIOPLASTY WITH STENT PLACEMENT      8 stents prior to CABG per patient, most recent 2023    CORONARY ARTERY BYPASS GRAFT          DIAGNOSTIC CARDIAC CATH LAB PROCEDURE  2024       Immunization

## 2025-07-14 NOTE — PROGRESS NOTES
[facility]  Speech Language Pathology    Date: 7/14/2025  Patient Name: Rowdy Marie  YOB: 1950   AGE: 75 y.o.  MRN: [id number]        Patient Not Available for Speech Therapy     Due to:  [] Testing  [] Hemodialysis  [] Cancelled by RN  [] Surgery   [] Intubation/Sedation/Pain Medication  [] Medical instability  [x] Other: Pt. Currently on regular diet.  Spoke with RN, no need for ST bedside swallow study at this time.     Next scheduled treatment: please re-consult if neessary  Completed by: TARA TRIPP, SLP, M.A. CCC-SLP

## 2025-07-14 NOTE — PROGRESS NOTES
Dr. Avilez notified per policy via [employer] of critical lab called in by Hematology PTT >180. States ok.

## 2025-07-14 NOTE — CARE COORDINATION
Met with the patient to discuss the transition plan. Meds have been delivered. Denies need for DME. States his son is on his way to pick him up. IMM explained and copy provided to the patient. Okay with discharging with less than 4 hours notice.    Discharge Report    [facility]  Clinical Case Management Department  Written by: MICHELLE DAVIS    Patient Name: Rowdy Marie  Attending Provider: Varinder Avilez MD  Admit Date: 2025  8:09 PM  MRN: [id number]  Account: [id number]                     : 1950  Discharge Date: 2025      Disposition: home    MICHELLE DAVIS

## 2025-07-14 NOTE — TELEPHONE ENCOUNTER
[facility] home health care calling to verify if Dr Pierre will be the following physician for home care orders, confirmed.

## 2025-07-14 NOTE — PLAN OF CARE
Problem: Discharge Planning  Goal: Discharge to home or other facility with appropriate resources  7/14/2025 1012 by Julio Silverman RN  Outcome: Progressing  7/13/2025 2018 by Bindu Encinas RN  Outcome: Progressing     Problem: Skin/Tissue Integrity  Goal: Skin integrity remains intact  Description: 1.  Monitor for areas of redness and/or skin breakdown  2.  Assess vascular access sites hourly  3.  Every 4-6 hours minimum:  Change oxygen saturation probe site  4.  Every 4-6 hours:  If on nasal continuous positive airway pressure, respiratory therapy assess nares and determine need for appliance change or resting period  7/14/2025 1012 by Julio Silverman RN  Outcome: Progressing  7/13/2025 2018 by Bindu Encinas RN  Outcome: Progressing     Problem: Safety - Adult  Goal: Free from fall injury  7/14/2025 1012 by Julio Silverman RN  Outcome: Progressing  7/13/2025 2018 by Bindu Encinas RN  Outcome: Progressing     Problem: ABCDS Injury Assessment  Goal: Absence of physical injury  7/14/2025 1012 by Julio Silverman RN  Outcome: Progressing  7/13/2025 2018 by Bindu Encinas RN  Outcome: Progressing     Problem: Pain  Goal: Verbalizes/displays adequate comfort level or baseline comfort level  7/14/2025 1012 by Julio Silverman RN  Outcome: Progressing  7/13/2025 2018 by Bindu Encinas RN  Outcome: Progressing

## 2025-07-14 NOTE — PROGRESS NOTES
Occupational Therapy  Occupational Therapy Initial Evaluation  Facility/Department: [facility] 4A STEPDOWN   Patient Name: Rowdy Marie        MRN: [id number]    : 1950    Date of Service: 2025    Chief Complaint   Patient presents with    Chest Pain   Copied from Internal Medicine: Rowdy Marie is a 75 y.o.  male w/ MV CAD s/p CABG , s/p stents 2024 w/ recent staged PCI 2025 (w/ plans for further stent ASAP, with future plans of revascularization of RCA stenosis at [facility]), prior TIA, chronic afib s/p watchman w/ prior GIB, prior prostate ca s/p brachytherapy 2022, chronic GERD, Parkinson's w/ history of pharyngoesophageal dysphagia (s/p EGD/colon 2023) who presents with Chest Pain   and is admitted to the hospital for the management of NSTEMI (non-ST elevated myocardial infarction) ([facility]).  Past Medical History:  has a past medical history of A-fib ([facility]), PREET (acute kidney injury), Anemia, CAD (coronary artery disease), Esophageal stricture, Hyperlipidemia, Hypokalemia, Hypomagnesemia, NSTEMI (non-ST elevated myocardial infarction) ([facility]), Orthostatic hypotension, Parkinsons ([facility]), Presence of Watchman left atrial appendage closure device, and Prostate CA ([facility]).  Past Surgical History:  has a past surgical history that includes Coronary artery bypass graft; Cardiac catheterization; Coronary angioplasty with stent; Diagnostic Cardiac Cath Lab Procedure (2024); Cardiac procedure (N/A, 2024); Cardiac procedure (N/A, 2024); Cardiac procedure (N/A, 2024); Cardiac procedure (N/A, 2024); and Cardiac procedure (N/A, 2025).    Discharge Recommendations   No therapy recommended at discharge.          Assessment  Pt lying supine in bed upon entrance to room. Pt completed bed mobility with min assistance. Pt sat at eob 15 minutes with good unsupported sitting balance. Sit to stand with contact guard assistance. Pt completed dynamic mob in room with contact guard

## 2025-07-14 NOTE — PLAN OF CARE
Problem: Discharge Planning  Goal: Discharge to home or other facility with appropriate resources  7/13/2025 2018 by Bindu Encinas RN  Outcome: Progressing  7/13/2025 1719 by Anna Ng RN  Outcome: Progressing  Flowsheets (Taken 7/13/2025 0800)  Discharge to home or other facility with appropriate resources: Identify barriers to discharge with patient and caregiver     Problem: Skin/Tissue Integrity  Goal: Skin integrity remains intact  Description: 1.  Monitor for areas of redness and/or skin breakdown  2.  Assess vascular access sites hourly  3.  Every 4-6 hours minimum:  Change oxygen saturation probe site  4.  Every 4-6 hours:  If on nasal continuous positive airway pressure, respiratory therapy assess nares and determine need for appliance change or resting period  7/13/2025 2018 by Bindu Encinas RN  Outcome: Progressing  7/13/2025 1719 by Anna Ng RN  Outcome: Progressing  Flowsheets (Taken 7/13/2025 0800)  Skin Integrity Remains Intact: Monitor for areas of redness and/or skin breakdown     Problem: Safety - Adult  Goal: Free from fall injury  7/13/2025 2018 by Bindu Encinas RN  Outcome: Progressing  7/13/2025 1719 by Anna Ng RN  Outcome: Progressing     Problem: ABCDS Injury Assessment  Goal: Absence of physical injury  7/13/2025 2018 by Bindu Encinas RN  Outcome: Progressing  7/13/2025 1719 by Anna Ng RN  Outcome: Progressing     Problem: Pain  Goal: Verbalizes/displays adequate comfort level or baseline comfort level  7/13/2025 2018 by Bindu Encinas RN  Outcome: Progressing  7/13/2025 1719 by Anna Ng RN  Outcome: Progressing

## 2025-07-15 PROBLEM — I25.10 CAD IN NATIVE ARTERY: Status: ACTIVE | Noted: 2025-06-16

## 2025-07-15 LAB
ANION GAP SERPL CALCULATED.3IONS-SCNC: 16 MMOL/L (ref 9–16)
ANTI-XA UNFRAC HEPARIN: 0.47 IU/L
ANTI-XA UNFRAC HEPARIN: 0.49 IU/L
ANTI-XA UNFRAC HEPARIN: 0.58 IU/L
BUN SERPL-MCNC: 16 MG/DL (ref 8–23)
CALCIUM SERPL-MCNC: 8.4 MG/DL (ref 8.6–10.4)
CHLORIDE SERPL-SCNC: 103 MMOL/L (ref 98–107)
CO2 SERPL-SCNC: 22 MMOL/L (ref 20–31)
CREAT SERPL-MCNC: 1.2 MG/DL (ref 0.7–1.2)
EKG ATRIAL RATE: 53 BPM
EKG P AXIS: 81 DEGREES
EKG P-R INTERVAL: 170 MS
EKG Q-T INTERVAL: 484 MS
EKG QRS DURATION: 94 MS
EKG QTC CALCULATION (BAZETT): 454 MS
EKG R AXIS: 6 DEGREES
EKG T AXIS: 62 DEGREES
EKG VENTRICULAR RATE: 53 BPM
GFR, ESTIMATED: 63 ML/MIN/1.73M2
GLUCOSE SERPL-MCNC: 120 MG/DL (ref 74–99)
MAGNESIUM SERPL-MCNC: 1.9 MG/DL (ref 1.6–2.4)
POTASSIUM SERPL-SCNC: 3.1 MMOL/L (ref 3.7–5.3)
SODIUM SERPL-SCNC: 141 MMOL/L (ref 136–145)

## 2025-07-15 PROCEDURE — 85520 HEPARIN ASSAY: CPT

## 2025-07-15 PROCEDURE — 6360000002 HC RX W HCPCS: Performed by: NURSE PRACTITIONER

## 2025-07-15 PROCEDURE — 6370000000 HC RX 637 (ALT 250 FOR IP): Performed by: STUDENT IN AN ORGANIZED HEALTH CARE EDUCATION/TRAINING PROGRAM

## 2025-07-15 PROCEDURE — 99233 SBSQ HOSP IP/OBS HIGH 50: CPT | Performed by: NURSE PRACTITIONER

## 2025-07-15 PROCEDURE — 2500000003 HC RX 250 WO HCPCS: Performed by: NURSE PRACTITIONER

## 2025-07-15 PROCEDURE — 6370000000 HC RX 637 (ALT 250 FOR IP): Performed by: NURSE PRACTITIONER

## 2025-07-15 PROCEDURE — 99232 SBSQ HOSP IP/OBS MODERATE 35: CPT | Performed by: STUDENT IN AN ORGANIZED HEALTH CARE EDUCATION/TRAINING PROGRAM

## 2025-07-15 PROCEDURE — 83735 ASSAY OF MAGNESIUM: CPT

## 2025-07-15 PROCEDURE — 36415 COLL VENOUS BLD VENIPUNCTURE: CPT

## 2025-07-15 PROCEDURE — 93005 ELECTROCARDIOGRAM TRACING: CPT | Performed by: STUDENT IN AN ORGANIZED HEALTH CARE EDUCATION/TRAINING PROGRAM

## 2025-07-15 PROCEDURE — 80048 BASIC METABOLIC PNL TOTAL CA: CPT

## 2025-07-15 PROCEDURE — 6370000000 HC RX 637 (ALT 250 FOR IP): Performed by: INTERNAL MEDICINE

## 2025-07-15 PROCEDURE — 2060000000 HC ICU INTERMEDIATE R&B

## 2025-07-15 RX ORDER — POTASSIUM CHLORIDE 1500 MG/1
40 TABLET, EXTENDED RELEASE ORAL ONCE
Status: COMPLETED | OUTPATIENT
Start: 2025-07-15 | End: 2025-07-15

## 2025-07-15 RX ADMIN — AMIODARONE HYDROCHLORIDE 200 MG: 200 TABLET ORAL at 08:03

## 2025-07-15 RX ADMIN — MORPHINE SULFATE 1 MG: 2 INJECTION, SOLUTION INTRAMUSCULAR; INTRAVENOUS at 16:33

## 2025-07-15 RX ADMIN — HYDROCODONE BITARTRATE AND ACETAMINOPHEN 1 TABLET: 5; 325 TABLET ORAL at 13:47

## 2025-07-15 RX ADMIN — CARBIDOPA AND LEVODOPA 1 TABLET: 25; 100 TABLET ORAL at 08:02

## 2025-07-15 RX ADMIN — SODIUM CHLORIDE, PRESERVATIVE FREE 10 ML: 5 INJECTION INTRAVENOUS at 08:05

## 2025-07-15 RX ADMIN — ATORVASTATIN CALCIUM 40 MG: 40 TABLET, FILM COATED ORAL at 08:02

## 2025-07-15 RX ADMIN — SUCRALFATE 1 G: 1 TABLET ORAL at 16:34

## 2025-07-15 RX ADMIN — HYDROCODONE BITARTRATE AND ACETAMINOPHEN 1 TABLET: 5; 325 TABLET ORAL at 20:20

## 2025-07-15 RX ADMIN — CARBIDOPA AND LEVODOPA 1 TABLET: 25; 100 TABLET ORAL at 20:21

## 2025-07-15 RX ADMIN — HEPARIN SODIUM 2000 UNITS: 1000 INJECTION INTRAVENOUS; SUBCUTANEOUS at 00:23

## 2025-07-15 RX ADMIN — SUCRALFATE 1 G: 1 TABLET ORAL at 08:04

## 2025-07-15 RX ADMIN — CARBIDOPA AND LEVODOPA 1 TABLET: 25; 100 TABLET ORAL at 16:33

## 2025-07-15 RX ADMIN — POTASSIUM CHLORIDE 10 MEQ: 750 CAPSULE, EXTENDED RELEASE ORAL at 08:03

## 2025-07-15 RX ADMIN — FLUDROCORTISONE ACETATE 0.05 MG: 0.1 TABLET ORAL at 08:02

## 2025-07-15 RX ADMIN — SUCRALFATE 1 G: 1 TABLET ORAL at 13:48

## 2025-07-15 RX ADMIN — DROXIDOPA 200 MG: 200 CAPSULE ORAL at 13:48

## 2025-07-15 RX ADMIN — SODIUM CHLORIDE, PRESERVATIVE FREE 10 ML: 5 INJECTION INTRAVENOUS at 20:22

## 2025-07-15 RX ADMIN — MORPHINE SULFATE 1 MG: 2 INJECTION, SOLUTION INTRAMUSCULAR; INTRAVENOUS at 10:03

## 2025-07-15 RX ADMIN — ASPIRIN 81 MG: 81 TABLET, CHEWABLE ORAL at 08:04

## 2025-07-15 RX ADMIN — AMLODIPINE BESYLATE 2.5 MG: 5 TABLET ORAL at 08:05

## 2025-07-15 RX ADMIN — MIDODRINE HYDROCHLORIDE 10 MG: 10 TABLET ORAL at 13:48

## 2025-07-15 RX ADMIN — CARBIDOPA AND LEVODOPA 1 TABLET: 25; 100 TABLET ORAL at 13:48

## 2025-07-15 RX ADMIN — SUCRALFATE 1 G: 1 TABLET ORAL at 20:22

## 2025-07-15 RX ADMIN — HYDROCODONE BITARTRATE AND ACETAMINOPHEN 1 TABLET: 5; 325 TABLET ORAL at 08:01

## 2025-07-15 RX ADMIN — LEVOTHYROXINE SODIUM 75 MCG: 0.07 TABLET ORAL at 08:04

## 2025-07-15 RX ADMIN — POTASSIUM CHLORIDE 40 MEQ: 1500 TABLET, EXTENDED RELEASE ORAL at 13:48

## 2025-07-15 RX ADMIN — CARVEDILOL 3.12 MG: 3.12 TABLET, FILM COATED ORAL at 08:03

## 2025-07-15 RX ADMIN — MORPHINE SULFATE 1 MG: 2 INJECTION, SOLUTION INTRAMUSCULAR; INTRAVENOUS at 21:29

## 2025-07-15 RX ADMIN — CLOPIDOGREL BISULFATE 75 MG: 75 TABLET, FILM COATED ORAL at 08:04

## 2025-07-15 RX ADMIN — HYDROCODONE BITARTRATE AND ACETAMINOPHEN 1 TABLET: 5; 325 TABLET ORAL at 00:05

## 2025-07-15 RX ADMIN — DROXIDOPA 200 MG: 200 CAPSULE ORAL at 20:22

## 2025-07-15 RX ADMIN — HEPARIN SODIUM 14 UNITS/KG/HR: 10000 INJECTION, SOLUTION INTRAVENOUS at 00:20

## 2025-07-15 RX ADMIN — CARVEDILOL 3.12 MG: 3.12 TABLET, FILM COATED ORAL at 16:33

## 2025-07-15 RX ADMIN — ISOSORBIDE MONONITRATE 30 MG: 30 TABLET, EXTENDED RELEASE ORAL at 20:20

## 2025-07-15 RX ADMIN — PANTOPRAZOLE SODIUM 40 MG: 40 TABLET, DELAYED RELEASE ORAL at 06:03

## 2025-07-15 RX ADMIN — DROXIDOPA 200 MG: 200 CAPSULE ORAL at 08:05

## 2025-07-15 RX ADMIN — PANTOPRAZOLE SODIUM 40 MG: 40 TABLET, DELAYED RELEASE ORAL at 16:35

## 2025-07-15 RX ADMIN — CYANOCOBALAMIN TAB 500 MCG 1000 MCG: 500 TAB at 08:04

## 2025-07-15 RX ADMIN — MORPHINE SULFATE 1 MG: 2 INJECTION, SOLUTION INTRAMUSCULAR; INTRAVENOUS at 02:45

## 2025-07-15 ASSESSMENT — PAIN SCALES - GENERAL
PAINLEVEL_OUTOF10: 8
PAINLEVEL_OUTOF10: 7
PAINLEVEL_OUTOF10: 8
PAINLEVEL_OUTOF10: 8
PAINLEVEL_OUTOF10: 7
PAINLEVEL_OUTOF10: 9
PAINLEVEL_OUTOF10: 8
PAINLEVEL_OUTOF10: 5

## 2025-07-15 ASSESSMENT — PAIN DESCRIPTION - ORIENTATION
ORIENTATION: RIGHT;LEFT
ORIENTATION: MID;LEFT
ORIENTATION: MID
ORIENTATION: LEFT;RIGHT
ORIENTATION: MID

## 2025-07-15 ASSESSMENT — PAIN SCALES - WONG BAKER
WONGBAKER_NUMERICALRESPONSE: NO HURT
WONGBAKER_NUMERICALRESPONSE: NO HURT

## 2025-07-15 ASSESSMENT — PAIN DESCRIPTION - LOCATION
LOCATION: CHEST
LOCATION: CHEST
LOCATION: HEAD
LOCATION: BACK;HEAD
LOCATION: CHEST
LOCATION: HEAD
LOCATION: BACK;HEAD

## 2025-07-15 ASSESSMENT — PAIN DESCRIPTION - ONSET: ONSET: ON-GOING

## 2025-07-15 ASSESSMENT — PAIN DESCRIPTION - DESCRIPTORS
DESCRIPTORS: STABBING;SHARP;SQUEEZING
DESCRIPTORS: ACHING
DESCRIPTORS: ACHING;CRAMPING;DISCOMFORT
DESCRIPTORS: HEAVINESS
DESCRIPTORS: SHARP;HEAVINESS
DESCRIPTORS: ACHING

## 2025-07-15 ASSESSMENT — PAIN DESCRIPTION - FREQUENCY: FREQUENCY: INTERMITTENT

## 2025-07-15 NOTE — PLAN OF CARE
Problem: Discharge Planning  Goal: Discharge to home or other facility with appropriate resources  7/15/2025 1118 by Julio Silverman RN  Outcome: Progressing  7/15/2025 0517 by Minerva Brasher RN  Outcome: Progressing  Flowsheets (Taken 7/14/2025 2000)  Discharge to home or other facility with appropriate resources: Identify barriers to discharge with patient and caregiver     Problem: Skin/Tissue Integrity  Goal: Skin integrity remains intact  Description: 1.  Monitor for areas of redness and/or skin breakdown  2.  Assess vascular access sites hourly  3.  Every 4-6 hours minimum:  Change oxygen saturation probe site  4.  Every 4-6 hours:  If on nasal continuous positive airway pressure, respiratory therapy assess nares and determine need for appliance change or resting period  7/15/2025 1118 by Julio Silverman RN  Outcome: Progressing  7/15/2025 0517 by Minerva Brasher RN  Outcome: Progressing  Flowsheets (Taken 7/14/2025 2000)  Skin Integrity Remains Intact: Monitor for areas of redness and/or skin breakdown     Problem: Safety - Adult  Goal: Free from fall injury  7/15/2025 1118 by Julio Silverman RN  Outcome: Progressing  7/15/2025 0517 by Minerva Brasher RN  Outcome: Progressing     Problem: ABCDS Injury Assessment  Goal: Absence of physical injury  7/15/2025 1118 by Julio Silverman RN  Outcome: Progressing  7/15/2025 0517 by Minerva Brasher RN  Outcome: Progressing     Problem: Pain  Goal: Verbalizes/displays adequate comfort level or baseline comfort level  7/15/2025 1118 by Julio Silverman RN  Outcome: Progressing  7/15/2025 0517 by Minerva Brasher RN  Outcome: Progressing

## 2025-07-15 NOTE — PROGRESS NOTES
[facility]  Office: [phone]  Dillon Fraire DO, Gil Diaz DO, Rodney Mccarthy DO, Danny Rosa DO, Elmo Whaley MD, Anum Salomon MD, Ivan Barclay MD, Kell Nix MD,  Erik Nevarez MD, Makenna Pabon MD, Guido Paulino DO, Edelmira Fernandez MD,  Rehana Pizano DO, Delisa Wu MD, Tyrone Wakefield MD, Theo Fraire DO, Venus Meza MD,  Tristen Stevens DO, Yarely Ambrocio MD, Maria T Mccartney MD, Viry Hylton MD, Geno Estrada MD,  Basim Lester MD, Willi Echevarria MD, Ana Greco MD, Arnold Giles DO, Matthieu Caballero MD,  Wellington Erazo MD, Shirley Waterhouse, CNP,  Zoya Drake, CNP, Marleen Penny, CNP, Patrick Angulo, CNP,  Brittany Slater, DNP, Rosalie King, CNP, Michelle Rose, CNP, Kira Rowley, CNP, Latasha Diaz, CNP, Yun López, CNP, Bertrand Atwood PA-C, Maryjane Andrade, CNS, Laya Ng, CNP, Margarita Infante, CNP         [facility]   IN-PATIENT SERVICE   [facility]    Progress Note    7/15/2025    12:34 PM    Name:   Rowdy Marie  MRN:     [id number]     Acct:      [id number]   Room:   [facility]/0402-01   Day:  4  Admit Date:  7/11/2025  8:09 PM    PCP:   Aracely Pierre MD  Code Status:  Full Code    Subjective:     Patient seen and examined, complaining of chest pressure/heaviness however stated that it is better today, stated that Norco is helping him.  Remained afebrile, vitals within normal range.  Labs reviewed, potassium 3.1, replaced.  Creatinine 1.2.  Sugar 120.  He was started back on heparin GTT by cardiology yesterday as patient is agreeable for high risk PCI on 7/16/25 under anesthesia.    Brief History:  Rowdy Marei is a 75 y.o.  male w/ MV CAD s/p CABG 2014, s/p stents 04/2024 w/ recent staged PCI 06/02/2025 (w/ plans for further stent ASAP, with future plans of revascularization of RCA stenosis at [facility]), prior TIA, chronic afib s/p watchman w/ prior GIB, prior prostate ca s/p brachytherapy 06/2022,

## 2025-07-15 NOTE — PLAN OF CARE
Problem: Discharge Planning  Goal: Discharge to home or other facility with appropriate resources  Outcome: Progressing  Flowsheets (Taken 7/14/2025 2000)  Discharge to home or other facility with appropriate resources: Identify barriers to discharge with patient and caregiver     Problem: Skin/Tissue Integrity  Goal: Skin integrity remains intact  Description: 1.  Monitor for areas of redness and/or skin breakdown  2.  Assess vascular access sites hourly  3.  Every 4-6 hours minimum:  Change oxygen saturation probe site  4.  Every 4-6 hours:  If on nasal continuous positive airway pressure, respiratory therapy assess nares and determine need for appliance change or resting period  Outcome: Progressing  Flowsheets (Taken 7/14/2025 2000)  Skin Integrity Remains Intact: Monitor for areas of redness and/or skin breakdown     Problem: Safety - Adult  Goal: Free from fall injury  Outcome: Progressing     Problem: ABCDS Injury Assessment  Goal: Absence of physical injury  Outcome: Progressing     Problem: Pain  Goal: Verbalizes/displays adequate comfort level or baseline comfort level  Outcome: Progressing

## 2025-07-15 NOTE — PROGRESS NOTES
[facility]  Progress Note                   Date:   7/15/2025  Patient name: Rowdy Marie  Date of admission:  7/11/2025  8:09 PM  MRN:   [id number]  YOB: 1950  PCP: Aracely Pierre MD    Reason for Admission: NSTEMI (non-ST elevated myocardial infarction) (HCC) [I21.4]    Subjective:       Clinical Changes /Abnormalities: seen & examined in room Denies any changes in his chest \"heaviness.\" States it is still there but not worse. States overall he does feel better today. Denies any SOB. No acute CV issues/concerns overnight. Labs,vitals, & tele reviewed. Remains SR on tele.     Medications:   Scheduled Meds:   scopolamine  1 patch TransDERmal Q72H    isosorbide mononitrate  30 mg Oral Nightly    amLODIPine  2.5 mg Oral Daily    pantoprazole  40 mg Oral BID AC    amiodarone  200 mg Oral Daily    atorvastatin  40 mg Oral Daily    carbidopa-levodopa  1 tablet Oral 4x Daily    carvedilol  3.125 mg Oral BID WC    droxidopa  200 mg Oral TID    [Held by provider] ferrous sulfate  325 mg Oral TID WC    fludrocortisone  0.05 mg Oral Daily    levothyroxine  75 mcg Oral Daily    midodrine  10 mg Oral TID    nitroGLYCERIN  1 patch TransDERmal Daily    potassium chloride  10 mEq Oral QAM    sucralfate  1 g Oral 4x daily    vitamin B-12  1,000 mcg Oral Daily    sodium chloride flush  5-40 mL IntraVENous 2 times per day    aspirin  81 mg Oral Daily    clopidogrel  75 mg Oral Daily     Continuous Infusions:   heparin (PORCINE) Infusion 14 Units/kg/hr (07/15/25 1005)    sodium chloride       CBC:   Recent Labs     07/13/25  0818 07/14/25  0622 07/14/25  1352   WBC 6.0 6.6 6.3   HGB 10.9* 10.0* 12.0*    269 223     BMP:    Recent Labs     07/14/25  0622 07/15/25  0732    141   K 3.6* 3.1*    103   CO2 24 22   BUN 18 16   CREATININE 1.3* 1.2   GLUCOSE 93 120*     Hepatic:  No results for input(s): \"AST\", \"ALT\", \"BILITOT\", \"ALKPHOS\" in the last 72 hours.    Invalid input(s):  angioplasty with stent     Coronary artery disease involving native coronary artery of native heart with refractory angina pectoris     CAP (community acquired pneumonia)     Angina pectoris     Hyperlipidemia     Atrial fibrillation with rapid ventricular response (HCC)     Rhinovirus infection     History of prostate cancer     CKD (chronic kidney disease)     Mitral regurgitation     Facial droop     Orthostatic hypotension     Convulsive syncope     Prostate cancer (HCC)     Presence of Watchman left atrial appendage closure device     Hypothyroid     Parkinson's disease (HCC)     Parkinson's disease without dyskinesia, with fluctuations (G20.A2)     Prolonged QT interval      Plan of Treatment:   Stable and present. No acute ECG changes. Echo with preserved LVEF and no sign WMA  EXTENSIVE known CAD with multiple complications post recent PCI at [facility]. He has had f/u with Mal Mccormick to discuss further high risk PCI to vein graft and had appt OP with Dr. Angel as well.   Cont med management at this time. Will optimize as able. Feeling better today. Continue Imdur @ HS and low dose Norvasc.  D/C Heparin gtt  Will plan continued med management at this time and OP f/u with Mal Saenz as scheduled to reassess plans for PCI  Keep K >4 and Mg >2. Continue Midodrine for BP support along with low dose Florinef   Spoke with Dr. Angel. Will proceed with high risk PCI as inpatient on Wednesday at 2pm with anesthesia. Keep NPO after midnight    Electronically signed by ALMAS Hackett NP on 7/15/2025 at 11:37 AM  [facility] [employer]  [phone]

## 2025-07-16 ENCOUNTER — ANESTHESIA (OUTPATIENT)
Age: 75
End: 2025-07-16
Payer: MEDICARE

## 2025-07-16 ENCOUNTER — ANESTHESIA EVENT (OUTPATIENT)
Age: 75
End: 2025-07-16
Payer: MEDICARE

## 2025-07-16 LAB
ACT BLD: 280 SEC (ref 79–149)
ANION GAP SERPL CALCULATED.3IONS-SCNC: 11 MMOL/L (ref 9–16)
ANTI-XA UNFRAC HEPARIN: 0.5 IU/L
BUN SERPL-MCNC: 15 MG/DL (ref 8–23)
CALCIUM SERPL-MCNC: 8.4 MG/DL (ref 8.6–10.4)
CHLORIDE SERPL-SCNC: 103 MMOL/L (ref 98–107)
CO2 SERPL-SCNC: 25 MMOL/L (ref 20–31)
CREAT SERPL-MCNC: 1.2 MG/DL (ref 0.7–1.2)
ECHO BSA: 1.85 M2
ERYTHROCYTE [DISTWIDTH] IN BLOOD BY AUTOMATED COUNT: 13.9 % (ref 11.8–14.4)
GFR, ESTIMATED: 63 ML/MIN/1.73M2
GLUCOSE SERPL-MCNC: 90 MG/DL (ref 74–99)
HCT VFR BLD AUTO: 31 % (ref 40.7–50.3)
HGB BLD-MCNC: 9.5 G/DL (ref 13–17)
MCH RBC QN AUTO: 31.1 PG (ref 25.2–33.5)
MCHC RBC AUTO-ENTMCNC: 30.6 G/DL (ref 28.4–34.8)
MCV RBC AUTO: 101.6 FL (ref 82.6–102.9)
NRBC BLD-RTO: 0 PER 100 WBC
PLATELET # BLD AUTO: 243 K/UL (ref 138–453)
PMV BLD AUTO: 10.9 FL (ref 8.1–13.5)
POTASSIUM SERPL-SCNC: 4 MMOL/L (ref 3.7–5.3)
RBC # BLD AUTO: 3.05 M/UL (ref 4.21–5.77)
SODIUM SERPL-SCNC: 139 MMOL/L (ref 136–145)
WBC OTHER # BLD: 7.1 K/UL (ref 3.5–11.3)

## 2025-07-16 PROCEDURE — C1760 CLOSURE DEV, VASC: HCPCS | Performed by: STUDENT IN AN ORGANIZED HEALTH CARE EDUCATION/TRAINING PROGRAM

## 2025-07-16 PROCEDURE — 6360000004 HC RX CONTRAST MEDICATION: Performed by: STUDENT IN AN ORGANIZED HEALTH CARE EDUCATION/TRAINING PROGRAM

## 2025-07-16 PROCEDURE — 6370000000 HC RX 637 (ALT 250 FOR IP): Performed by: NURSE PRACTITIONER

## 2025-07-16 PROCEDURE — 2500000003 HC RX 250 WO HCPCS: Performed by: NURSE PRACTITIONER

## 2025-07-16 PROCEDURE — 99232 SBSQ HOSP IP/OBS MODERATE 35: CPT | Performed by: STUDENT IN AN ORGANIZED HEALTH CARE EDUCATION/TRAINING PROGRAM

## 2025-07-16 PROCEDURE — 2500000003 HC RX 250 WO HCPCS: Performed by: ANESTHESIOLOGY

## 2025-07-16 PROCEDURE — 76937 US GUIDE VASCULAR ACCESS: CPT | Performed by: STUDENT IN AN ORGANIZED HEALTH CARE EDUCATION/TRAINING PROGRAM

## 2025-07-16 PROCEDURE — 6370000000 HC RX 637 (ALT 250 FOR IP): Performed by: STUDENT IN AN ORGANIZED HEALTH CARE EDUCATION/TRAINING PROGRAM

## 2025-07-16 PROCEDURE — 2580000003 HC RX 258: Performed by: STUDENT IN AN ORGANIZED HEALTH CARE EDUCATION/TRAINING PROGRAM

## 2025-07-16 PROCEDURE — 3700000000 HC ANESTHESIA ATTENDED CARE: Performed by: STUDENT IN AN ORGANIZED HEALTH CARE EDUCATION/TRAINING PROGRAM

## 2025-07-16 PROCEDURE — 7100000011 HC PHASE II RECOVERY - ADDTL 15 MIN: Performed by: STUDENT IN AN ORGANIZED HEALTH CARE EDUCATION/TRAINING PROGRAM

## 2025-07-16 PROCEDURE — 2500000003 HC RX 250 WO HCPCS

## 2025-07-16 PROCEDURE — 36415 COLL VENOUS BLD VENIPUNCTURE: CPT

## 2025-07-16 PROCEDURE — C1769 GUIDE WIRE: HCPCS | Performed by: STUDENT IN AN ORGANIZED HEALTH CARE EDUCATION/TRAINING PROGRAM

## 2025-07-16 PROCEDURE — 3700000001 HC ADD 15 MINUTES (ANESTHESIA): Performed by: STUDENT IN AN ORGANIZED HEALTH CARE EDUCATION/TRAINING PROGRAM

## 2025-07-16 PROCEDURE — 92928 PRQ TCAT PLMT NTRAC ST 1 LES: CPT | Performed by: STUDENT IN AN ORGANIZED HEALTH CARE EDUCATION/TRAINING PROGRAM

## 2025-07-16 PROCEDURE — 6360000002 HC RX W HCPCS: Performed by: NURSE PRACTITIONER

## 2025-07-16 PROCEDURE — 2709999900 HC NON-CHARGEABLE SUPPLY: Performed by: STUDENT IN AN ORGANIZED HEALTH CARE EDUCATION/TRAINING PROGRAM

## 2025-07-16 PROCEDURE — 027034Z DILATION OF CORONARY ARTERY, ONE ARTERY WITH DRUG-ELUTING INTRALUMINAL DEVICE, PERCUTANEOUS APPROACH: ICD-10-PCS | Performed by: STUDENT IN AN ORGANIZED HEALTH CARE EDUCATION/TRAINING PROGRAM

## 2025-07-16 PROCEDURE — 85347 COAGULATION TIME ACTIVATED: CPT

## 2025-07-16 PROCEDURE — 80048 BASIC METABOLIC PNL TOTAL CA: CPT

## 2025-07-16 PROCEDURE — 6370000000 HC RX 637 (ALT 250 FOR IP): Performed by: INTERNAL MEDICINE

## 2025-07-16 PROCEDURE — 7100000010 HC PHASE II RECOVERY - FIRST 15 MIN: Performed by: STUDENT IN AN ORGANIZED HEALTH CARE EDUCATION/TRAINING PROGRAM

## 2025-07-16 PROCEDURE — C9604 PERC D-E COR REVASC T CABG S: HCPCS | Performed by: STUDENT IN AN ORGANIZED HEALTH CARE EDUCATION/TRAINING PROGRAM

## 2025-07-16 PROCEDURE — 85027 COMPLETE CBC AUTOMATED: CPT

## 2025-07-16 PROCEDURE — 2580000003 HC RX 258

## 2025-07-16 PROCEDURE — C1887 CATHETER, GUIDING: HCPCS | Performed by: STUDENT IN AN ORGANIZED HEALTH CARE EDUCATION/TRAINING PROGRAM

## 2025-07-16 PROCEDURE — 2060000000 HC ICU INTERMEDIATE R&B

## 2025-07-16 PROCEDURE — 85520 HEPARIN ASSAY: CPT

## 2025-07-16 PROCEDURE — 0913T PRQ TCAT THER RX NTRAC BALO1: CPT | Performed by: STUDENT IN AN ORGANIZED HEALTH CARE EDUCATION/TRAINING PROGRAM

## 2025-07-16 PROCEDURE — C1874 STENT, COATED/COV W/DEL SYS: HCPCS | Performed by: STUDENT IN AN ORGANIZED HEALTH CARE EDUCATION/TRAINING PROGRAM

## 2025-07-16 PROCEDURE — 6360000002 HC RX W HCPCS: Performed by: STUDENT IN AN ORGANIZED HEALTH CARE EDUCATION/TRAINING PROGRAM

## 2025-07-16 PROCEDURE — C1892 INTRO/SHEATH,FIXED,PEEL-AWAY: HCPCS | Performed by: STUDENT IN AN ORGANIZED HEALTH CARE EDUCATION/TRAINING PROGRAM

## 2025-07-16 PROCEDURE — C1894 INTRO/SHEATH, NON-LASER: HCPCS | Performed by: STUDENT IN AN ORGANIZED HEALTH CARE EDUCATION/TRAINING PROGRAM

## 2025-07-16 PROCEDURE — 6360000002 HC RX W HCPCS: Performed by: ANESTHESIOLOGY

## 2025-07-16 PROCEDURE — 92921 PR PRQ TRLUML CORONARY ANGIOPLASTY ADDL BRANCH: CPT | Performed by: STUDENT IN AN ORGANIZED HEALTH CARE EDUCATION/TRAINING PROGRAM

## 2025-07-16 PROCEDURE — C9610 HC CATH CORONARY DRUG-DELIVERY: HCPCS | Performed by: STUDENT IN AN ORGANIZED HEALTH CARE EDUCATION/TRAINING PROGRAM

## 2025-07-16 PROCEDURE — 6360000002 HC RX W HCPCS

## 2025-07-16 PROCEDURE — C1725 CATH, TRANSLUMIN NON-LASER: HCPCS | Performed by: STUDENT IN AN ORGANIZED HEALTH CARE EDUCATION/TRAINING PROGRAM

## 2025-07-16 DEVICE — ANGIO-SEAL VIP VASCULAR CLOSURE DEVICE
Type: IMPLANTABLE DEVICE | Status: FUNCTIONAL
Brand: ANGIO-SEAL

## 2025-07-16 DEVICE — STENT ONYXNG25022UX ONYX 2.50X22RX
Type: IMPLANTABLE DEVICE | Status: FUNCTIONAL
Brand: ONYX FRONTIER™

## 2025-07-16 RX ORDER — DEXMEDETOMIDINE HYDROCHLORIDE 100 UG/ML
INJECTION, SOLUTION INTRAVENOUS
Status: DISCONTINUED | OUTPATIENT
Start: 2025-07-16 | End: 2025-07-16 | Stop reason: SDUPTHER

## 2025-07-16 RX ORDER — HYDRALAZINE HYDROCHLORIDE 20 MG/ML
10 INJECTION INTRAMUSCULAR; INTRAVENOUS
Status: DISCONTINUED | OUTPATIENT
Start: 2025-07-16 | End: 2025-07-19 | Stop reason: HOSPADM

## 2025-07-16 RX ORDER — SODIUM CHLORIDE 9 MG/ML
INJECTION, SOLUTION INTRAVENOUS CONTINUOUS
Status: DISCONTINUED | OUTPATIENT
Start: 2025-07-16 | End: 2025-07-17

## 2025-07-16 RX ORDER — FENTANYL CITRATE 50 UG/ML
INJECTION, SOLUTION INTRAMUSCULAR; INTRAVENOUS
Status: DISCONTINUED | OUTPATIENT
Start: 2025-07-16 | End: 2025-07-16 | Stop reason: SDUPTHER

## 2025-07-16 RX ORDER — PROCHLORPERAZINE EDISYLATE 5 MG/ML
5 INJECTION INTRAMUSCULAR; INTRAVENOUS
Status: COMPLETED | OUTPATIENT
Start: 2025-07-16 | End: 2025-07-17

## 2025-07-16 RX ORDER — HEPARIN SODIUM 1000 [USP'U]/ML
INJECTION, SOLUTION INTRAVENOUS; SUBCUTANEOUS PRN
Status: DISCONTINUED | OUTPATIENT
Start: 2025-07-16 | End: 2025-07-16 | Stop reason: HOSPADM

## 2025-07-16 RX ORDER — IOPAMIDOL 755 MG/ML
INJECTION, SOLUTION INTRAVASCULAR PRN
Status: DISCONTINUED | OUTPATIENT
Start: 2025-07-16 | End: 2025-07-16 | Stop reason: HOSPADM

## 2025-07-16 RX ORDER — SODIUM CHLORIDE 0.9 % (FLUSH) 0.9 %
5-40 SYRINGE (ML) INJECTION PRN
Status: DISCONTINUED | OUTPATIENT
Start: 2025-07-16 | End: 2025-07-19 | Stop reason: HOSPADM

## 2025-07-16 RX ORDER — SODIUM CHLORIDE 0.9 % (FLUSH) 0.9 %
5-40 SYRINGE (ML) INJECTION EVERY 12 HOURS SCHEDULED
Status: DISCONTINUED | OUTPATIENT
Start: 2025-07-16 | End: 2025-07-19 | Stop reason: HOSPADM

## 2025-07-16 RX ORDER — ALBUMIN HUMAN 50 G/1000ML
SOLUTION INTRAVENOUS
Status: DISCONTINUED
Start: 2025-07-16 | End: 2025-07-16 | Stop reason: WASHOUT

## 2025-07-16 RX ORDER — SODIUM CHLORIDE 9 MG/ML
INJECTION, SOLUTION INTRAVENOUS PRN
Status: DISCONTINUED | OUTPATIENT
Start: 2025-07-16 | End: 2025-07-19 | Stop reason: HOSPADM

## 2025-07-16 RX ORDER — DROPERIDOL 2.5 MG/ML
0.62 INJECTION, SOLUTION INTRAMUSCULAR; INTRAVENOUS
Status: DISCONTINUED | OUTPATIENT
Start: 2025-07-16 | End: 2025-07-19 | Stop reason: HOSPADM

## 2025-07-16 RX ORDER — TRAMADOL HYDROCHLORIDE 50 MG/1
50 TABLET ORAL
Status: COMPLETED | OUTPATIENT
Start: 2025-07-16 | End: 2025-07-18

## 2025-07-16 RX ORDER — LABETALOL HYDROCHLORIDE 5 MG/ML
10 INJECTION, SOLUTION INTRAVENOUS
Status: DISCONTINUED | OUTPATIENT
Start: 2025-07-16 | End: 2025-07-19 | Stop reason: HOSPADM

## 2025-07-16 RX ORDER — LORAZEPAM 2 MG/ML
0.5 INJECTION INTRAMUSCULAR
Status: DISCONTINUED | OUTPATIENT
Start: 2025-07-16 | End: 2025-07-19 | Stop reason: HOSPADM

## 2025-07-16 RX ORDER — SODIUM CHLORIDE, SODIUM LACTATE, POTASSIUM CHLORIDE, CALCIUM CHLORIDE 600; 310; 30; 20 MG/100ML; MG/100ML; MG/100ML; MG/100ML
INJECTION, SOLUTION INTRAVENOUS
Status: DISCONTINUED | OUTPATIENT
Start: 2025-07-16 | End: 2025-07-16 | Stop reason: SDUPTHER

## 2025-07-16 RX ORDER — PROPOFOL 10 MG/ML
INJECTION, EMULSION INTRAVENOUS
Status: DISPENSED
Start: 2025-07-16 | End: 2025-07-17

## 2025-07-16 RX ORDER — DIPHENHYDRAMINE HYDROCHLORIDE 50 MG/ML
12.5 INJECTION, SOLUTION INTRAMUSCULAR; INTRAVENOUS
Status: DISCONTINUED | OUTPATIENT
Start: 2025-07-16 | End: 2025-07-19 | Stop reason: HOSPADM

## 2025-07-16 RX ORDER — DEXMEDETOMIDINE HYDROCHLORIDE 4 UG/ML
INJECTION, SOLUTION INTRAVENOUS
Status: DISPENSED
Start: 2025-07-16 | End: 2025-07-17

## 2025-07-16 RX ORDER — FENTANYL CITRATE 50 UG/ML
25 INJECTION, SOLUTION INTRAMUSCULAR; INTRAVENOUS EVERY 5 MIN PRN
Status: DISCONTINUED | OUTPATIENT
Start: 2025-07-16 | End: 2025-07-19 | Stop reason: HOSPADM

## 2025-07-16 RX ORDER — NITROGLYCERIN 20 MG/100ML
INJECTION INTRAVENOUS PRN
Status: DISCONTINUED | OUTPATIENT
Start: 2025-07-16 | End: 2025-07-16 | Stop reason: HOSPADM

## 2025-07-16 RX ADMIN — AMIODARONE HYDROCHLORIDE 200 MG: 200 TABLET ORAL at 09:31

## 2025-07-16 RX ADMIN — FENTANYL CITRATE 12.5 MCG: 50 INJECTION, SOLUTION INTRAMUSCULAR; INTRAVENOUS at 16:49

## 2025-07-16 RX ADMIN — DEXMEDETOMIDINE HCL 4 MCG: 100 INJECTION INTRAVENOUS at 16:07

## 2025-07-16 RX ADMIN — HEPARIN SODIUM 14 UNITS/KG/HR: 10000 INJECTION, SOLUTION INTRAVENOUS at 01:10

## 2025-07-16 RX ADMIN — CARBIDOPA AND LEVODOPA 1 TABLET: 25; 100 TABLET ORAL at 21:55

## 2025-07-16 RX ADMIN — SODIUM CHLORIDE, PRESERVATIVE FREE 10 ML: 5 INJECTION INTRAVENOUS at 21:55

## 2025-07-16 RX ADMIN — PANTOPRAZOLE SODIUM 40 MG: 40 TABLET, DELAYED RELEASE ORAL at 06:09

## 2025-07-16 RX ADMIN — ASPIRIN 81 MG: 81 TABLET, CHEWABLE ORAL at 09:30

## 2025-07-16 RX ADMIN — CYANOCOBALAMIN TAB 500 MCG 1000 MCG: 500 TAB at 09:30

## 2025-07-16 RX ADMIN — DEXMEDETOMIDINE HCL 4 MCG: 100 INJECTION INTRAVENOUS at 16:17

## 2025-07-16 RX ADMIN — AMLODIPINE BESYLATE 2.5 MG: 5 TABLET ORAL at 09:30

## 2025-07-16 RX ADMIN — SODIUM CHLORIDE, PRESERVATIVE FREE 10 ML: 5 INJECTION INTRAVENOUS at 09:32

## 2025-07-16 RX ADMIN — SUCRALFATE 1 G: 1 TABLET ORAL at 09:30

## 2025-07-16 RX ADMIN — MORPHINE SULFATE 1 MG: 2 INJECTION, SOLUTION INTRAMUSCULAR; INTRAVENOUS at 01:38

## 2025-07-16 RX ADMIN — FENTANYL CITRATE 12.5 MCG: 50 INJECTION, SOLUTION INTRAMUSCULAR; INTRAVENOUS at 17:03

## 2025-07-16 RX ADMIN — MORPHINE SULFATE 1 MG: 2 INJECTION, SOLUTION INTRAMUSCULAR; INTRAVENOUS at 18:09

## 2025-07-16 RX ADMIN — ATORVASTATIN CALCIUM 40 MG: 40 TABLET, FILM COATED ORAL at 09:30

## 2025-07-16 RX ADMIN — SODIUM CHLORIDE, PRESERVATIVE FREE 10 ML: 5 INJECTION INTRAVENOUS at 21:57

## 2025-07-16 RX ADMIN — DROXIDOPA 200 MG: 200 CAPSULE ORAL at 09:31

## 2025-07-16 RX ADMIN — DROXIDOPA 200 MG: 200 CAPSULE ORAL at 21:57

## 2025-07-16 RX ADMIN — CLOPIDOGREL BISULFATE 75 MG: 75 TABLET, FILM COATED ORAL at 09:30

## 2025-07-16 RX ADMIN — DEXMEDETOMIDINE HCL 4 MCG: 100 INJECTION INTRAVENOUS at 16:39

## 2025-07-16 RX ADMIN — HYDROCODONE BITARTRATE AND ACETAMINOPHEN 1 TABLET: 5; 325 TABLET ORAL at 11:27

## 2025-07-16 RX ADMIN — HYDRALAZINE HYDROCHLORIDE 10 MG: 20 INJECTION, SOLUTION INTRAMUSCULAR; INTRAVENOUS at 17:52

## 2025-07-16 RX ADMIN — LEVOTHYROXINE SODIUM 75 MCG: 0.07 TABLET ORAL at 09:30

## 2025-07-16 RX ADMIN — FLUDROCORTISONE ACETATE 0.05 MG: 0.1 TABLET ORAL at 09:30

## 2025-07-16 RX ADMIN — POTASSIUM CHLORIDE 10 MEQ: 750 CAPSULE, EXTENDED RELEASE ORAL at 09:30

## 2025-07-16 RX ADMIN — SODIUM CHLORIDE, POTASSIUM CHLORIDE, SODIUM LACTATE AND CALCIUM CHLORIDE: 600; 310; 30; 20 INJECTION, SOLUTION INTRAVENOUS at 16:01

## 2025-07-16 RX ADMIN — DEXMEDETOMIDINE HCL 8 MCG: 100 INJECTION INTRAVENOUS at 16:02

## 2025-07-16 RX ADMIN — DEXMEDETOMIDINE HCL 4 MCG: 100 INJECTION INTRAVENOUS at 16:53

## 2025-07-16 RX ADMIN — HYDROCODONE BITARTRATE AND ACETAMINOPHEN 1 TABLET: 5; 325 TABLET ORAL at 22:00

## 2025-07-16 RX ADMIN — SUCRALFATE 1 G: 1 TABLET ORAL at 21:55

## 2025-07-16 RX ADMIN — CARVEDILOL 3.12 MG: 3.12 TABLET, FILM COATED ORAL at 09:31

## 2025-07-16 RX ADMIN — CARBIDOPA AND LEVODOPA 1 TABLET: 25; 100 TABLET ORAL at 09:30

## 2025-07-16 RX ADMIN — DEXMEDETOMIDINE HCL 4 MCG: 100 INJECTION INTRAVENOUS at 16:48

## 2025-07-16 RX ADMIN — DEXMEDETOMIDINE HCL 4 MCG: 100 INJECTION INTRAVENOUS at 17:08

## 2025-07-16 RX ADMIN — SODIUM CHLORIDE: 9 INJECTION, SOLUTION INTRAVENOUS at 13:57

## 2025-07-16 RX ADMIN — ISOSORBIDE MONONITRATE 30 MG: 30 TABLET, EXTENDED RELEASE ORAL at 21:55

## 2025-07-16 ASSESSMENT — PAIN SCALES - GENERAL
PAINLEVEL_OUTOF10: 8
PAINLEVEL_OUTOF10: 9
PAINLEVEL_OUTOF10: 10
PAINLEVEL_OUTOF10: 7
PAINLEVEL_OUTOF10: 4
PAINLEVEL_OUTOF10: 7

## 2025-07-16 ASSESSMENT — PAIN DESCRIPTION - DESCRIPTORS
DESCRIPTORS: ACHING
DESCRIPTORS: PRESSURE

## 2025-07-16 ASSESSMENT — PAIN DESCRIPTION - LOCATION
LOCATION: HEAD
LOCATION: CHEST

## 2025-07-16 ASSESSMENT — PAIN DESCRIPTION - ORIENTATION: ORIENTATION: MID

## 2025-07-16 NOTE — PROGRESS NOTES
Occupational Therapy    [facility]  Occupational Therapy Not Seen Note    DATE: 2025    NAME: Rowdy Marie  MRN: [id number]   : 1950      Patient not seen this date for Occupational Therapy due to:    Surgery/Procedure: pt off floor for cardiac cath    Will continue to follow as able.    Electronically signed by MELCHOR Nicole on 2025 at 2:44 PM

## 2025-07-16 NOTE — PROGRESS NOTES
1857: writer secure messaged cardio fellow about patients heparin gtt orders. Patient heparin gtt was not running when patient returned to floor. Asking for clarification on whether to restart it or not. Awaiting response.

## 2025-07-16 NOTE — PROGRESS NOTES
0920: writer secure messaged cardiology NP with patients morning meds such as aspirin, plavix and heparin gtt with patient going for heart cath at 2pm. Per NP, okay to give all.    1332: patient off unit for heart cath procedure.

## 2025-07-16 NOTE — PROGRESS NOTES
Returned to [facility] room 15 from cath lab.  Monitor and b/p cuff applied.  Right groin soft and dry without hematoma; bandaid intact. Right D.P and P.T. Pulses palpable.

## 2025-07-16 NOTE — PLAN OF CARE
Problem: Cardiovascular - Adult  Goal: Maintains optimal cardiac output and hemodynamic stability  Outcome: Progressing  Flowsheets (Taken 7/15/2025 2015)  Maintains optimal cardiac output and hemodynamic stability:   Monitor blood pressure and heart rate   Monitor urine output and notify Licensed Independent Practitioner for values outside of normal range   Assess for signs of decreased cardiac output  Goal: Absence of cardiac dysrhythmias or at baseline  Outcome: Progressing  Flowsheets (Taken 7/15/2025 2015)  Absence of cardiac dysrhythmias or at baseline:   Monitor cardiac rate and rhythm   Administer antiarrhythmia medication and electrolyte replacement as ordered   Assess for signs of decreased cardiac output     Problem: Skin/Tissue Integrity - Adult  Goal: Skin integrity remains intact  Description: 1.  Monitor for areas of redness and/or skin breakdown  2.  Assess vascular access sites hourly  3.  Every 4-6 hours minimum:  Change oxygen saturation probe site  4.  Every 4-6 hours:  If on nasal continuous positive airway pressure, respiratory therapy assess nares and determine need for appliance change or resting period  Outcome: Progressing  Flowsheets (Taken 7/15/2025 2015)  Skin Integrity Remains Intact: Monitor for areas of redness and/or skin breakdown     Problem: Pain  Goal: Verbalizes/displays adequate comfort level or baseline comfort level  Outcome: Progressing     Problem: Safety - Adult  Goal: Free from fall injury  Outcome: Progressing  Flowsheets (Taken 7/15/2025 2015)  Free From Fall Injury: Instruct family/caregiver on patient safety

## 2025-07-16 NOTE — PROGRESS NOTES
Patient admitted, awaiting Dr. Cruz and Anesthesia to consent pt. Call light to reach with side rails up 2 of 2. Rt. Groin clipped with writer and Karey MCCLELLAND present.  No family at bedside with patient.  History and physical needs completed.

## 2025-07-16 NOTE — PROGRESS NOTES
Physical Therapy        Physical Therapy Cancel Note      DATE: 2025    NAME: Rowdy Marie  MRN: [id number]   : 1950      Patient not seen this date for Physical Therapy due to:    Patient Declined: Pt declining d/t not feeling well, weakness reported from being NPO, heart cath today, will check back tomorrow 25.       Electronically signed by Tj Antonio PTA on 2025 at 8:34 AM

## 2025-07-16 NOTE — PROGRESS NOTES
Administered 1 mg morphine to pt and wasted 1 mg with Karey Lu RN in [facility].

## 2025-07-16 NOTE — PROGRESS NOTES
[facility]  Office: [phone]  Dillon Fraire DO, Gil Diaz DO, Rodney Mccarthy DO, Danny Rosa DO, Elmo Whaley MD, Anum Salomon MD, Ivan Barclay MD, Kell Nix MD,  Erik Nevarez MD, Makenna Pabon MD, Guido Paulino DO, Edelmira Fernandez MD,  Rehana Pizano DO, Delisa Wu MD, Tyrone Wakefield MD, Theo Fraire DO, Venus Meza MD,  Tristen Stevens DO, Yarely Ambrocio MD, Maria T Mccartney MD, Viry Hylton MD, Geno Estrada MD,  Basim Lester MD, Willi Echevarria MD, Ana Greco MD, Arnold Giles DO, Matthieu Caballero MD,  Wellington Erazo MD, Shirley Waterhouse, CNP,  Zoya Drake, CNP, Marleen Penny, CNP, Patrick Angulo, CNP,  Brittany Slater, DNP, Rosalie King, CNP, Michelle Rsoe, CNP, Kira Rowley, CNP, Latasha Diaz, CNP, Yun López, CNP, Bertrand Atwood PA-C, Maryjane Andrade, CNS, Laya Ng, CNP, Margarita Infante, CNP         [facility]   IN-PATIENT SERVICE   [facility]    Progress Note    7/16/2025    10:47 AM    Name:   Rowdy Marie  MRN:     [id number]     Acct:      [id number]   Room:   [facility]2/0402-01   Day:  5  Admit Date:  7/11/2025  8:09 PM    PCP:   Aracely Pierre MD  Code Status:  Full Code    Subjective:     Patient seen and examined this morning.  Resting in his bed.  N.p.o. this morning, heparin as per cardiology.  Remained afebrile, blood pressure in normal range, labs reviewed, hemoglobin 11.5.  Otherwise unremarkable.  Cardiology planning for cath at 2 PM today.    Brief History:  Rowdy Marie is a 75 y.o.  male w/ MV CAD s/p CABG 2014, s/p stents 04/2024 w/ recent staged PCI 06/02/2025 (w/ plans for further stent ASAP, with future plans of revascularization of RCA stenosis at [facility]), prior TIA, chronic afib s/p watchman w/ prior GIB, prior prostate ca s/p brachytherapy 06/2022, chronic GERD, Parkinson's w/ history of pharyngoesophageal dysphagia (s/p EGD/colon 08/2023) who presents with Chest Pain   and

## 2025-07-16 NOTE — PLAN OF CARE
Problem: Discharge Planning  Goal: Discharge to home or other facility with appropriate resources  7/16/2025 1154 by Lurdes Alex RN  Outcome: Progressing  7/16/2025 0202 by Karey Damian RN  Outcome: Progressing  Flowsheets (Taken 7/15/2025 2015)  Discharge to home or other facility with appropriate resources:   Identify barriers to discharge with patient and caregiver   Arrange for needed discharge resources and transportation as appropriate   Identify discharge learning needs (meds, wound care, etc)   Refer to discharge planning if patient needs post-hospital services based on physician order or complex needs related to functional status, cognitive ability or social support system     Problem: Skin/Tissue Integrity  Goal: Skin integrity remains intact  Description: 1.  Monitor for areas of redness and/or skin breakdown  2.  Assess vascular access sites hourly  3.  Every 4-6 hours minimum:  Change oxygen saturation probe site  4.  Every 4-6 hours:  If on nasal continuous positive airway pressure, respiratory therapy assess nares and determine need for appliance change or resting period  7/16/2025 1154 by Lurdes Alex RN  Outcome: Progressing  7/16/2025 0202 by Karey Damian RN  Outcome: Progressing  Flowsheets (Taken 7/15/2025 2015)  Skin Integrity Remains Intact: Monitor for areas of redness and/or skin breakdown     Problem: Safety - Adult  Goal: Free from fall injury  7/16/2025 1154 by Lurdes Alex RN  Outcome: Progressing  7/16/2025 0202 by Karey Damian RN  Outcome: Progressing  Flowsheets (Taken 7/15/2025 2015)  Free From Fall Injury: Instruct family/caregiver on patient safety     Problem: ABCDS Injury Assessment  Goal: Absence of physical injury  7/16/2025 1154 by Lurdes Alex RN  Outcome: Progressing  7/16/2025 0202 by Karey Damian RN  Outcome: Progressing  Flowsheets (Taken 7/15/2025 2015)  Absence of Physical Injury: Implement safety measures based on

## 2025-07-16 NOTE — ANESTHESIA PRE PROCEDURE
Rectal Q6H PRN Marleen Penny APRN - NP       • magnesium hydroxide (MILK OF MAGNESIA) 400 MG/5ML suspension 30 mL  30 mL Oral Daily PRN Marleen Penny APRN - NP       • nitroGLYCERIN (NITROSTAT) SL tablet 0.4 mg  0.4 mg SubLINGual Q5 Min PRN Marleen Penny APRN - NP       • aspirin chewable tablet 81 mg  81 mg Oral Daily Marleen Penny APRN - NP   81 mg at 07/16/25 0930   • clopidogrel (PLAVIX) tablet 75 mg  75 mg Oral Daily Marleen Penny APRN - NP   75 mg at 07/16/25 0930       Allergies:    Allergies   Allergen Reactions   • Latex    • Atorvastatin    • Fluvastatin    • Imdur [Isosorbide Nitrate]    • Isosorbide Headaches     Patient denies having any allergy to this medication   • Methadone    • Nalbuphine    • Oxycodone    • Penicillins    • Rosuvastatin    • Statins    • Zetia [Ezetimibe]        Problem List:    Patient Active Problem List   Diagnosis Code   • Hypotension I95.9   • Mild anemia D64.9   • Chronic atrial fibrillation (HCC) I48.20   • Coronary artery disease involving coronary bypass graft of native heart without angina pectoris I25.810   • S/P CABG (coronary artery bypass graft) Z95.1   • GI bleed K92.2   • Severe malnutrition E43   • Pharyngoesophageal dysphagia R13.14   • Parkinson's disease without dyskinesia or fluctuating manifestations (HCC) G20.A1   • Acute intractable headache R51.9   • H/O neck surgery Z98.890   • Chest pain R07.9   • S/P angioplasty with stent Z95.820   • Coronary artery disease involving native coronary artery of native heart with refractory angina pectoris I25.112   • CAP (community acquired pneumonia) J18.9   • Angina pectoris I20.9   • Hyperlipidemia E78.5   • Atrial fibrillation with rapid ventricular response (HCC) I48.91   • Rhinovirus infection B34.8   • History of prostate cancer Z85.46   • CKD (chronic kidney disease) N18.9   • Mitral regurgitation I34.0   • Facial droop R29.810   • Orthostatic hypotension I95.1   • Convulsive syncope R55   • Prostate cancer (HCC)

## 2025-07-17 DIAGNOSIS — G20.A1 PARKINSON'S DISEASE WITHOUT DYSKINESIA OR FLUCTUATING MANIFESTATIONS (HCC): ICD-10-CM

## 2025-07-17 LAB
ACT BLD: 589 SEC (ref 79–149)
ANION GAP SERPL CALCULATED.3IONS-SCNC: 15 MMOL/L (ref 9–16)
ANTI-XA UNFRAC HEPARIN: <0.1 IU/L
BUN SERPL-MCNC: 14 MG/DL (ref 8–23)
CALCIUM SERPL-MCNC: 9.1 MG/DL (ref 8.6–10.4)
CHLORIDE SERPL-SCNC: 100 MMOL/L (ref 98–107)
CO2 SERPL-SCNC: 22 MMOL/L (ref 20–31)
CREAT SERPL-MCNC: 1.3 MG/DL (ref 0.7–1.2)
GFR, ESTIMATED: 57 ML/MIN/1.73M2
GLUCOSE SERPL-MCNC: 98 MG/DL (ref 74–99)
POTASSIUM SERPL-SCNC: 3.7 MMOL/L (ref 3.7–5.3)
SODIUM SERPL-SCNC: 137 MMOL/L (ref 136–145)
TROPONIN I SERPL HS-MCNC: 1164 NG/L (ref 0–22)
TROPONIN I SERPL HS-MCNC: 1204 NG/L (ref 0–22)

## 2025-07-17 PROCEDURE — 6370000000 HC RX 637 (ALT 250 FOR IP): Performed by: NURSE PRACTITIONER

## 2025-07-17 PROCEDURE — 6370000000 HC RX 637 (ALT 250 FOR IP): Performed by: INTERNAL MEDICINE

## 2025-07-17 PROCEDURE — 6360000002 HC RX W HCPCS: Performed by: ANESTHESIOLOGY

## 2025-07-17 PROCEDURE — 2500000003 HC RX 250 WO HCPCS: Performed by: NURSE PRACTITIONER

## 2025-07-17 PROCEDURE — 6370000000 HC RX 637 (ALT 250 FOR IP): Performed by: STUDENT IN AN ORGANIZED HEALTH CARE EDUCATION/TRAINING PROGRAM

## 2025-07-17 PROCEDURE — 85520 HEPARIN ASSAY: CPT

## 2025-07-17 PROCEDURE — 99233 SBSQ HOSP IP/OBS HIGH 50: CPT | Performed by: NURSE PRACTITIONER

## 2025-07-17 PROCEDURE — 36415 COLL VENOUS BLD VENIPUNCTURE: CPT

## 2025-07-17 PROCEDURE — 80048 BASIC METABOLIC PNL TOTAL CA: CPT

## 2025-07-17 PROCEDURE — 99232 SBSQ HOSP IP/OBS MODERATE 35: CPT | Performed by: STUDENT IN AN ORGANIZED HEALTH CARE EDUCATION/TRAINING PROGRAM

## 2025-07-17 PROCEDURE — 2500000003 HC RX 250 WO HCPCS: Performed by: ANESTHESIOLOGY

## 2025-07-17 PROCEDURE — 6360000002 HC RX W HCPCS: Performed by: STUDENT IN AN ORGANIZED HEALTH CARE EDUCATION/TRAINING PROGRAM

## 2025-07-17 PROCEDURE — 2060000000 HC ICU INTERMEDIATE R&B

## 2025-07-17 PROCEDURE — 84484 ASSAY OF TROPONIN QUANT: CPT

## 2025-07-17 PROCEDURE — 6360000002 HC RX W HCPCS: Performed by: NURSE PRACTITIONER

## 2025-07-17 PROCEDURE — 93005 ELECTROCARDIOGRAM TRACING: CPT | Performed by: NURSE PRACTITIONER

## 2025-07-17 RX ORDER — DIPHENHYDRAMINE HYDROCHLORIDE 50 MG/ML
10 INJECTION, SOLUTION INTRAMUSCULAR; INTRAVENOUS ONCE
Status: COMPLETED | OUTPATIENT
Start: 2025-07-17 | End: 2025-07-17

## 2025-07-17 RX ORDER — CARBIDOPA AND LEVODOPA 25; 100 MG/1; MG/1
1 TABLET ORAL 4 TIMES DAILY
Qty: 120 TABLET | Refills: 2 | Status: SHIPPED | OUTPATIENT
Start: 2025-07-17 | End: 2025-07-17 | Stop reason: HOSPADM

## 2025-07-17 RX ORDER — RANOLAZINE 500 MG/1
1000 TABLET, EXTENDED RELEASE ORAL 2 TIMES DAILY
Status: DISCONTINUED | OUTPATIENT
Start: 2025-07-17 | End: 2025-07-19 | Stop reason: HOSPADM

## 2025-07-17 RX ORDER — KETOROLAC TROMETHAMINE 15 MG/ML
15 INJECTION, SOLUTION INTRAMUSCULAR; INTRAVENOUS ONCE
Status: COMPLETED | OUTPATIENT
Start: 2025-07-17 | End: 2025-07-17

## 2025-07-17 RX ORDER — NITROGLYCERIN 20 MG/100ML
5-200 INJECTION INTRAVENOUS CONTINUOUS
Status: DISCONTINUED | OUTPATIENT
Start: 2025-07-17 | End: 2025-07-19 | Stop reason: HOSPADM

## 2025-07-17 RX ORDER — FENTANYL CITRATE 50 UG/ML
25 INJECTION, SOLUTION INTRAMUSCULAR; INTRAVENOUS ONCE
Status: COMPLETED | OUTPATIENT
Start: 2025-07-17 | End: 2025-07-17

## 2025-07-17 RX ORDER — PROCHLORPERAZINE EDISYLATE 5 MG/ML
5 INJECTION INTRAMUSCULAR; INTRAVENOUS ONCE
Status: COMPLETED | OUTPATIENT
Start: 2025-07-17 | End: 2025-07-17

## 2025-07-17 RX ADMIN — FLUDROCORTISONE ACETATE 0.05 MG: 0.1 TABLET ORAL at 07:34

## 2025-07-17 RX ADMIN — CARBIDOPA AND LEVODOPA 1 TABLET: 25; 100 TABLET ORAL at 17:03

## 2025-07-17 RX ADMIN — PROCHLORPERAZINE EDISYLATE 5 MG: 5 INJECTION INTRAMUSCULAR; INTRAVENOUS at 00:55

## 2025-07-17 RX ADMIN — PANTOPRAZOLE SODIUM 40 MG: 40 TABLET, DELAYED RELEASE ORAL at 07:24

## 2025-07-17 RX ADMIN — KETOROLAC TROMETHAMINE 15 MG: 15 INJECTION, SOLUTION INTRAMUSCULAR; INTRAVENOUS at 10:10

## 2025-07-17 RX ADMIN — RANOLAZINE 1000 MG: 500 TABLET, FILM COATED, EXTENDED RELEASE ORAL at 21:54

## 2025-07-17 RX ADMIN — AMIODARONE HYDROCHLORIDE 200 MG: 200 TABLET ORAL at 07:24

## 2025-07-17 RX ADMIN — NITROGLYCERIN 5 MCG/MIN: 20 INJECTION INTRAVENOUS at 11:41

## 2025-07-17 RX ADMIN — CARVEDILOL 3.12 MG: 3.12 TABLET, FILM COATED ORAL at 15:52

## 2025-07-17 RX ADMIN — DIPHENHYDRAMINE HYDROCHLORIDE 10 MG: 50 INJECTION INTRAMUSCULAR; INTRAVENOUS at 10:10

## 2025-07-17 RX ADMIN — CARBIDOPA AND LEVODOPA 1 TABLET: 25; 100 TABLET ORAL at 08:03

## 2025-07-17 RX ADMIN — CLOPIDOGREL BISULFATE 75 MG: 75 TABLET, FILM COATED ORAL at 07:24

## 2025-07-17 RX ADMIN — SUCRALFATE 1 G: 1 TABLET ORAL at 13:39

## 2025-07-17 RX ADMIN — SUCRALFATE 1 G: 1 TABLET ORAL at 07:24

## 2025-07-17 RX ADMIN — ATORVASTATIN CALCIUM 40 MG: 40 TABLET, FILM COATED ORAL at 07:24

## 2025-07-17 RX ADMIN — ISOSORBIDE MONONITRATE 30 MG: 30 TABLET, EXTENDED RELEASE ORAL at 21:53

## 2025-07-17 RX ADMIN — MIDODRINE HYDROCHLORIDE 10 MG: 10 TABLET ORAL at 21:53

## 2025-07-17 RX ADMIN — MIDODRINE HYDROCHLORIDE 10 MG: 10 TABLET ORAL at 11:39

## 2025-07-17 RX ADMIN — DROXIDOPA 200 MG: 200 CAPSULE ORAL at 07:34

## 2025-07-17 RX ADMIN — RANOLAZINE 1000 MG: 500 TABLET, FILM COATED, EXTENDED RELEASE ORAL at 11:19

## 2025-07-17 RX ADMIN — HYDROCODONE BITARTRATE AND ACETAMINOPHEN 1 TABLET: 5; 325 TABLET ORAL at 15:55

## 2025-07-17 RX ADMIN — POTASSIUM CHLORIDE 10 MEQ: 750 CAPSULE, EXTENDED RELEASE ORAL at 07:24

## 2025-07-17 RX ADMIN — PANTOPRAZOLE SODIUM 40 MG: 40 TABLET, DELAYED RELEASE ORAL at 15:52

## 2025-07-17 RX ADMIN — AMLODIPINE BESYLATE 2.5 MG: 5 TABLET ORAL at 09:16

## 2025-07-17 RX ADMIN — CARVEDILOL 3.12 MG: 3.12 TABLET, FILM COATED ORAL at 07:24

## 2025-07-17 RX ADMIN — HYDROCODONE BITARTRATE AND ACETAMINOPHEN 1 TABLET: 5; 325 TABLET ORAL at 07:23

## 2025-07-17 RX ADMIN — DROXIDOPA 200 MG: 200 CAPSULE ORAL at 21:55

## 2025-07-17 RX ADMIN — DROXIDOPA 200 MG: 200 CAPSULE ORAL at 13:39

## 2025-07-17 RX ADMIN — LEVOTHYROXINE SODIUM 75 MCG: 0.07 TABLET ORAL at 07:24

## 2025-07-17 RX ADMIN — SUCRALFATE 1 G: 1 TABLET ORAL at 21:54

## 2025-07-17 RX ADMIN — CARBIDOPA AND LEVODOPA 1 TABLET: 25; 100 TABLET ORAL at 21:53

## 2025-07-17 RX ADMIN — SUCRALFATE 1 G: 1 TABLET ORAL at 15:52

## 2025-07-17 RX ADMIN — ASPIRIN 81 MG: 81 TABLET, CHEWABLE ORAL at 07:34

## 2025-07-17 RX ADMIN — PROCHLORPERAZINE EDISYLATE 5 MG: 5 INJECTION INTRAMUSCULAR; INTRAVENOUS at 10:10

## 2025-07-17 RX ADMIN — SODIUM CHLORIDE, PRESERVATIVE FREE 10 ML: 5 INJECTION INTRAVENOUS at 21:54

## 2025-07-17 RX ADMIN — FENTANYL CITRATE 25 MCG: 50 INJECTION INTRAMUSCULAR; INTRAVENOUS at 11:09

## 2025-07-17 RX ADMIN — CYANOCOBALAMIN TAB 500 MCG 1000 MCG: 500 TAB at 07:24

## 2025-07-17 RX ADMIN — SODIUM CHLORIDE, PRESERVATIVE FREE 10 ML: 5 INJECTION INTRAVENOUS at 21:55

## 2025-07-17 RX ADMIN — CARBIDOPA AND LEVODOPA 1 TABLET: 25; 100 TABLET ORAL at 13:39

## 2025-07-17 ASSESSMENT — PAIN DESCRIPTION - LOCATION
LOCATION: CHEST
LOCATION: HEAD;CHEST

## 2025-07-17 ASSESSMENT — PAIN SCALES - GENERAL
PAINLEVEL_OUTOF10: 8
PAINLEVEL_OUTOF10: 7
PAINLEVEL_OUTOF10: 8
PAINLEVEL_OUTOF10: 10
PAINLEVEL_OUTOF10: 6
PAINLEVEL_OUTOF10: 9
PAINLEVEL_OUTOF10: 9

## 2025-07-17 NOTE — PROGRESS NOTES
[facility]  Progress Note                   Date:   7/17/2025  Patient name: Rowdy Marie  Date of admission:  7/11/2025  8:09 PM  MRN:   [id number]  YOB: 1950  PCP: Aracely Pierre MD    Reason for Admission: NSTEMI (non-ST elevated myocardial infarction) (HCC) [I21.4]    Subjective:       Clinical Changes /Abnormalities: Pt seen and examined in the room.  Patient resting in bed with RN at bedside. Pt reporting left sided chest pains. Reports a severe HA this morning. He denies any sob.  Labs, vitals and tele reviewed- SR 73  S/P PCI  Will obtain EKG    Medications:   Scheduled Meds:   sodium chloride flush  5-40 mL IntraVENous 2 times per day    scopolamine  1 patch TransDERmal Q72H    isosorbide mononitrate  30 mg Oral Nightly    amLODIPine  2.5 mg Oral Daily    pantoprazole  40 mg Oral BID AC    amiodarone  200 mg Oral Daily    atorvastatin  40 mg Oral Daily    carbidopa-levodopa  1 tablet Oral 4x Daily    carvedilol  3.125 mg Oral BID WC    droxidopa  200 mg Oral TID    [Held by provider] ferrous sulfate  325 mg Oral TID WC    fludrocortisone  0.05 mg Oral Daily    levothyroxine  75 mcg Oral Daily    midodrine  10 mg Oral TID    nitroGLYCERIN  1 patch TransDERmal Daily    potassium chloride  10 mEq Oral QAM    sucralfate  1 g Oral 4x daily    vitamin B-12  1,000 mcg Oral Daily    sodium chloride flush  5-40 mL IntraVENous 2 times per day    aspirin  81 mg Oral Daily    clopidogrel  75 mg Oral Daily     Continuous Infusions:   sodium chloride      sodium chloride       CBC:   Recent Labs     07/14/25  1352 07/16/25  0420   WBC 6.3 7.1   HGB 12.0* 9.5*    243     BMP:    Recent Labs     07/15/25  0732 07/16/25  0420    139   K 3.1* 4.0    103   CO2 22 25   BUN 16 15   CREATININE 1.2 1.2   GLUCOSE 120* 90     Hepatic:  No results for input(s): \"AST\", \"ALT\", \"BILITOT\", \"ALKPHOS\" in the last 72 hours.    Invalid input(s): \"ALB\"    Troponin:   No results for  fibrillation (HCC)     Coronary artery disease involving coronary bypass graft of native heart without angina pectoris     S/P CABG (coronary artery bypass graft)     GI bleed     Severe malnutrition     Pharyngoesophageal dysphagia     Parkinson's disease without dyskinesia or fluctuating manifestations (HCC)     Acute intractable headache     H/O neck surgery     Chest pain     NSTEMI (non-ST elevated myocardial infarction) (HCC)     S/P angioplasty with stent     Coronary artery disease involving native coronary artery of native heart with refractory angina pectoris     CAP (community acquired pneumonia)     Angina pectoris     Hyperlipidemia     Atrial fibrillation with rapid ventricular response (HCC)     Rhinovirus infection     History of prostate cancer     CKD (chronic kidney disease)     Mitral regurgitation     Facial droop     Orthostatic hypotension     Convulsive syncope     Prostate cancer (HCC)     Presence of Watchman left atrial appendage closure device     Hypothyroid     Parkinson's disease (HCC)     Parkinson's disease without dyskinesia, with fluctuations (G20.A2)     Prolonged QT interval      Plan of Treatment:   ECHO with preserved LVEF and no sign WMA  EXTENSIVE known CAD with multiple complications post recent PCI at [facility].    Discussed in detail with patient post cath POC including but not limited to medications, diet, exercise, right femoral artery site care, and follow-up. Questions and concerns addressed.   Keep K >4 and Mg >2   Vitals stable.   CAD- continue Norvasc, ASA, statin, BB, Imdur and Plavix. Patient with Chest pains this morning. EKG ordered and Ranexa restarted. Give Norvasc now.  Will start Nitro drip and give a one time dose of fentanyl. Assessed with attending- if no improvement- will need to go back to cath lab  Patient refusing nitro patch- reporting that it only makes him have a HA. Will D/C order.   Continue to optimize medications as tolerated.   Continue midodrine

## 2025-07-17 NOTE — PROGRESS NOTES
[facility]  Office: [phone]  Dillon Fraire DO, Gil Diaz DO, Rodney Mccarthy DO, Danny Rosa, DO, Elmo Whaley MD, Anum Salomon MD, Ivan Barclay MD, Kell Nix MD,  Erik Nevarez MD, Makenna Pabon MD, Guido Paulino DO, Edelmira Fernandez MD,  Rehana Pizano DO, Delisa Wu MD, Tyrone Wakefield MD, Theo Fraire DO, Venus Meza MD,  Tristen Stevens DO, Yarely Ambrocio MD, Maria T Mccartney MD, Viry Hylton MD, Geno Estrada MD,  Basim Lester MD, Willi Echevarria MD, Ana Greco MD, Arnold Giles DO, Matthieu Caballero MD,  Wellington Erazo MD, Shirley Waterhouse, CNP,  Zoya Drake, CNP, Marleen Penny, CNP, Patrick Angulo, CNP,  Brittany Slater, DNP, Rosalie King, CNP, Michelle Rose, CNP, Kira Rowley, CNP, Latasha Diaz, CNP, Yun López, CNP, Bertrand Atwood PA-C, Maryjane Andrade, CNS, Laya Ng, CNP, Margarita Infante, CNP         [facility]   IN-PATIENT SERVICE   [facility]    Progress Note    7/17/2025    12:53 PM    Name:   Rowdy Marie  MRN:     [id number]     Acct:      [id number]   Room:   0402/0402-01   Day:  6  Admit Date:  7/11/2025  8:09 PM    PCP:   Aracely Pierre MD  Code Status:  Full Code    Subjective:     Pt seen and examined this morning, underwent high risk PCI 7/16/25. C/o headache , stated that he does have hx of migraines.  Will give him cocktail.    Discussed with RN and CM, plan was to dc him home if his symptoms resolves and OK with Cardiology.    Later during the day RN paged that pt continues to have CP, cardiology was notified, and recommended keeping him overnight. Might need to go back to cath lab if chest pain persists.     Brief History:  Rowdy Marie is a 75 y.o.  male w/ MV CAD s/p CABG 2014, s/p stents 04/2024 w/ recent staged PCI 06/02/2025 (w/ plans for further stent ASAP, with future plans of revascularization of RCA stenosis at [facility]), prior TIA, chronic afib s/p watchman w/ prior

## 2025-07-17 NOTE — PROGRESS NOTES
Message sent to Dr. Avilez regarding BP meds ordered. Patient has both 10mg midodrine and 2.5mg Norvasc due at 0900. Patient refusing nitro patch as he currently has a headache and patch will only increase headache. Per Dr. Avilez, hold meds, ask cardio about nitro and ask if okay for d/c.

## 2025-07-17 NOTE — PROGRESS NOTES
Kira Rowley NP at bedside. Patient complaining of 10/10 chest pain. Refusing nitro patch. EKG complete. Nitro drip, fentanyl, stat troponin ordered. Nitro drip started with minimal relief from chest pain. Dr. Avilez updated on plan of care. Discharge order discontinued. Troponin critical noted. Dr. Avilez and Kira NP notified.

## 2025-07-17 NOTE — PLAN OF CARE
Trops noted. Continue nitro drip and will assess pain. Discussed with Dr. Mane.    Electronically signed by ALMAS Saldivar - CNP on 7/17/2025 at 3:31 PM  [facility]  [phone]

## 2025-07-17 NOTE — CARE COORDINATION
Discharge Report    [facility]  Clinical Case Management Department  Written by: Ashlyn Quiles RN    Patient Name: Rowdy Marie  Attending Provider: Varinder Avilez MD  Admit Date: 2025  8:09 PM  MRN: [id number]  Account: [id number]                     : 1950  Discharge Date: 25      Disposition: home    Plan is return home with [facility] (accepted). Patient's son to transport home. Patient agreeable to plan. Message in careport to [facility] to notify of plans for discharge home today.     Ashlyn Quiles RN

## 2025-07-17 NOTE — PLAN OF CARE
Problem: Discharge Planning  Goal: Discharge to home or other facility with appropriate resources  Outcome: Progressing  Flowsheets (Taken 7/16/2025 1930)  Discharge to home or other facility with appropriate resources:   Identify barriers to discharge with patient and caregiver   Arrange for needed discharge resources and transportation as appropriate   Identify discharge learning needs (meds, wound care, etc)   Refer to discharge planning if patient needs post-hospital services based on physician order or complex needs related to functional status, cognitive ability or social support system     Problem: Skin/Tissue Integrity  Goal: Skin integrity remains intact  Description: 1.  Monitor for areas of redness and/or skin breakdown  2.  Assess vascular access sites hourly  3.  Every 4-6 hours minimum:  Change oxygen saturation probe site  4.  Every 4-6 hours:  If on nasal continuous positive airway pressure, respiratory therapy assess nares and determine need for appliance change or resting period  Outcome: Progressing  Flowsheets (Taken 7/16/2025 1930)  Skin Integrity Remains Intact: Monitor for areas of redness and/or skin breakdown     Problem: Safety - Adult  Goal: Free from fall injury  Outcome: Progressing  Flowsheets (Taken 7/16/2025 1930)  Free From Fall Injury: Instruct family/caregiver on patient safety     Problem: ABCDS Injury Assessment  Goal: Absence of physical injury  Outcome: Progressing  Flowsheets (Taken 7/16/2025 1930)  Absence of Physical Injury: Implement safety measures based on patient assessment     Problem: Pain  Goal: Verbalizes/displays adequate comfort level or baseline comfort level  Outcome: Progressing  Flowsheets (Taken 7/16/2025 2000)  Verbalizes/displays adequate comfort level or baseline comfort level:   Encourage patient to monitor pain and request assistance   Assess pain using appropriate pain scale   Administer analgesics based on type and severity of pain and evaluate

## 2025-07-17 NOTE — ANESTHESIA POSTPROCEDURE EVALUATION
Department of Anesthesiology  Postprocedure Note    Patient: Rowdy Marie  MRN: [id number]  YOB: 1950  Date of evaluation: 7/17/2025    Procedure Summary       Date: 07/16/25 Room / Location: [facility] CATH LAB  1 / [facility] CARDIAC CATH LAB    Anesthesia Start: 1601 Anesthesia Stop: 1722    Procedures:       al jessica / Percutaneous coronary intervention w/anes / rm [id number]      Ultrasound guided vascular access Diagnosis:       Coronary artery disease with angina pectoris, unspecified vessel or lesion type, unspecified whether native or transplanted heart      (Coronary artery disease with angina pectoris, unspecified vessel or lesion type, unspecified whether native or transplanted heart [I25.119])    Providers: Mj Faith MD Responsible Provider: Eliezer Lyman MD    Anesthesia Type: MAC, TIVA ASA Status: 4            Anesthesia Type: No value filed.    Quinten Phase I: Quinten Score: 10    Quinten Phase II: Quinten Score: 10    Anesthesia Post Evaluation    Patient location during evaluation: PACU  Patient participation: complete - patient participated  Level of consciousness: awake and alert  Airway patency: patent  Nausea & Vomiting: no nausea and no vomiting  Cardiovascular status: blood pressure returned to baseline  Respiratory status: acceptable  Hydration status: euvolemic  Pain management: adequate    There were no known notable events for this encounter.

## 2025-07-17 NOTE — PROGRESS NOTES
Cardiology NP updated no relief from CP with nitro at 70mcg/min. Per NP, titrate down on nitro as it is not providing relief.

## 2025-07-17 NOTE — PLAN OF CARE
Problem: Discharge Planning  Goal: Discharge to home or other facility with appropriate resources  7/17/2025 0925 by Vanessa Olson RN  Outcome: Progressing  7/17/2025 0558 by Anneliese Rao RN  Outcome: Progressing  Flowsheets (Taken 7/16/2025 1930)  Discharge to home or other facility with appropriate resources:   Identify barriers to discharge with patient and caregiver   Arrange for needed discharge resources and transportation as appropriate   Identify discharge learning needs (meds, wound care, etc)   Refer to discharge planning if patient needs post-hospital services based on physician order or complex needs related to functional status, cognitive ability or social support system     Problem: Skin/Tissue Integrity  Goal: Skin integrity remains intact  Description: 1.  Monitor for areas of redness and/or skin breakdown  2.  Assess vascular access sites hourly  3.  Every 4-6 hours minimum:  Change oxygen saturation probe site  4.  Every 4-6 hours:  If on nasal continuous positive airway pressure, respiratory therapy assess nares and determine need for appliance change or resting period  7/17/2025 0925 by Vanessa Olosn RN  Outcome: Progressing  7/17/2025 0558 by Anneliese Rao RN  Outcome: Progressing  Flowsheets (Taken 7/16/2025 1930)  Skin Integrity Remains Intact: Monitor for areas of redness and/or skin breakdown     Problem: Safety - Adult  Goal: Free from fall injury  7/17/2025 0925 by Vanessa Olson RN  Outcome: Progressing  7/17/2025 0558 by Anneliese Rao RN  Outcome: Progressing  Flowsheets (Taken 7/16/2025 1930)  Free From Fall Injury: Instruct family/caregiver on patient safety     Problem: ABCDS Injury Assessment  Goal: Absence of physical injury  7/17/2025 0925 by Vanessa Olson RN  Outcome: Progressing  7/17/2025 0558 by Anneliese Rao RN  Outcome: Progressing  Flowsheets (Taken 7/16/2025 1930)  Absence of Physical Injury: Implement safety measures based on patient assessment     Problem:

## 2025-07-18 ENCOUNTER — APPOINTMENT (OUTPATIENT)
Dept: CT IMAGING | Age: 75
End: 2025-07-18
Payer: MEDICARE

## 2025-07-18 LAB
ANION GAP SERPL CALCULATED.3IONS-SCNC: 16 MMOL/L (ref 9–16)
ANTI-XA UNFRAC HEPARIN: <0.1 IU/L
BUN SERPL-MCNC: 22 MG/DL (ref 8–23)
CALCIUM SERPL-MCNC: 8.6 MG/DL (ref 8.6–10.4)
CHLORIDE SERPL-SCNC: 101 MMOL/L (ref 98–107)
CO2 SERPL-SCNC: 19 MMOL/L (ref 20–31)
CREAT SERPL-MCNC: 1.4 MG/DL (ref 0.7–1.2)
EKG ATRIAL RATE: 78 BPM
EKG P AXIS: 51 DEGREES
EKG P-R INTERVAL: 154 MS
EKG Q-T INTERVAL: 428 MS
EKG QRS DURATION: 94 MS
EKG QTC CALCULATION (BAZETT): 487 MS
EKG R AXIS: 24 DEGREES
EKG T AXIS: 130 DEGREES
EKG VENTRICULAR RATE: 78 BPM
GFR, ESTIMATED: 52 ML/MIN/1.73M2
GLUCOSE BLD-MCNC: 94 MG/DL (ref 75–110)
GLUCOSE SERPL-MCNC: 100 MG/DL (ref 74–99)
POTASSIUM SERPL-SCNC: 4.2 MMOL/L (ref 3.7–5.3)
SODIUM SERPL-SCNC: 136 MMOL/L (ref 136–145)
TROPONIN I SERPL HS-MCNC: 1493 NG/L (ref 0–22)

## 2025-07-18 PROCEDURE — 6370000000 HC RX 637 (ALT 250 FOR IP): Performed by: ANESTHESIOLOGY

## 2025-07-18 PROCEDURE — 6360000002 HC RX W HCPCS: Performed by: NURSE PRACTITIONER

## 2025-07-18 PROCEDURE — 6370000000 HC RX 637 (ALT 250 FOR IP): Performed by: NURSE PRACTITIONER

## 2025-07-18 PROCEDURE — 6370000000 HC RX 637 (ALT 250 FOR IP): Performed by: STUDENT IN AN ORGANIZED HEALTH CARE EDUCATION/TRAINING PROGRAM

## 2025-07-18 PROCEDURE — 99239 HOSP IP/OBS DSCHRG MGMT >30: CPT | Performed by: STUDENT IN AN ORGANIZED HEALTH CARE EDUCATION/TRAINING PROGRAM

## 2025-07-18 PROCEDURE — 36415 COLL VENOUS BLD VENIPUNCTURE: CPT

## 2025-07-18 PROCEDURE — 70450 CT HEAD/BRAIN W/O DYE: CPT

## 2025-07-18 PROCEDURE — 99285 EMERGENCY DEPT VISIT HI MDM: CPT

## 2025-07-18 PROCEDURE — 2580000003 HC RX 258: Performed by: STUDENT IN AN ORGANIZED HEALTH CARE EDUCATION/TRAINING PROGRAM

## 2025-07-18 PROCEDURE — 93005 ELECTROCARDIOGRAM TRACING: CPT

## 2025-07-18 PROCEDURE — 84484 ASSAY OF TROPONIN QUANT: CPT

## 2025-07-18 PROCEDURE — 82947 ASSAY GLUCOSE BLOOD QUANT: CPT

## 2025-07-18 PROCEDURE — 2060000000 HC ICU INTERMEDIATE R&B

## 2025-07-18 PROCEDURE — 99233 SBSQ HOSP IP/OBS HIGH 50: CPT | Performed by: NURSE PRACTITIONER

## 2025-07-18 PROCEDURE — 2500000003 HC RX 250 WO HCPCS: Performed by: NURSE PRACTITIONER

## 2025-07-18 PROCEDURE — 6360000004 HC RX CONTRAST MEDICATION: Performed by: HOSPITALIST

## 2025-07-18 PROCEDURE — 70498 CT ANGIOGRAPHY NECK: CPT

## 2025-07-18 PROCEDURE — 85520 HEPARIN ASSAY: CPT

## 2025-07-18 PROCEDURE — 80048 BASIC METABOLIC PNL TOTAL CA: CPT

## 2025-07-18 PROCEDURE — 6370000000 HC RX 637 (ALT 250 FOR IP): Performed by: INTERNAL MEDICINE

## 2025-07-18 PROCEDURE — 2500000003 HC RX 250 WO HCPCS: Performed by: ANESTHESIOLOGY

## 2025-07-18 RX ORDER — SODIUM CHLORIDE, SODIUM LACTATE, POTASSIUM CHLORIDE, AND CALCIUM CHLORIDE .6; .31; .03; .02 G/100ML; G/100ML; G/100ML; G/100ML
250 INJECTION, SOLUTION INTRAVENOUS ONCE
Status: COMPLETED | OUTPATIENT
Start: 2025-07-18 | End: 2025-07-18

## 2025-07-18 RX ORDER — CARBIDOPA AND LEVODOPA 25; 100 MG/1; MG/1
1 TABLET ORAL 4 TIMES DAILY
Qty: 90 TABLET | Refills: 3 | Status: SHIPPED | OUTPATIENT
Start: 2025-07-18

## 2025-07-18 RX ORDER — IOPAMIDOL 755 MG/ML
75 INJECTION, SOLUTION INTRAVASCULAR
Status: COMPLETED | OUTPATIENT
Start: 2025-07-18 | End: 2025-07-18

## 2025-07-18 RX ORDER — DROXIDOPA 200 MG/1
200 CAPSULE ORAL 3 TIMES DAILY
Qty: 90 CAPSULE | Refills: 3 | Status: SHIPPED | OUTPATIENT
Start: 2025-07-18

## 2025-07-18 RX ORDER — CARVEDILOL 3.12 MG/1
3.12 TABLET ORAL 2 TIMES DAILY
Qty: 60 TABLET | Refills: 3 | Status: SHIPPED | OUTPATIENT
Start: 2025-07-18 | End: 2025-07-19

## 2025-07-18 RX ADMIN — DROXIDOPA 200 MG: 200 CAPSULE ORAL at 13:41

## 2025-07-18 RX ADMIN — MIDODRINE HYDROCHLORIDE 10 MG: 10 TABLET ORAL at 08:51

## 2025-07-18 RX ADMIN — CARBIDOPA AND LEVODOPA 1 TABLET: 25; 100 TABLET ORAL at 16:55

## 2025-07-18 RX ADMIN — DROXIDOPA 200 MG: 200 CAPSULE ORAL at 10:18

## 2025-07-18 RX ADMIN — RANOLAZINE 1000 MG: 500 TABLET, FILM COATED, EXTENDED RELEASE ORAL at 21:44

## 2025-07-18 RX ADMIN — SUCRALFATE 1 G: 1 TABLET ORAL at 21:44

## 2025-07-18 RX ADMIN — CARBIDOPA AND LEVODOPA 1 TABLET: 25; 100 TABLET ORAL at 10:18

## 2025-07-18 RX ADMIN — CARVEDILOL 3.12 MG: 3.12 TABLET, FILM COATED ORAL at 16:55

## 2025-07-18 RX ADMIN — CARBIDOPA AND LEVODOPA 1 TABLET: 25; 100 TABLET ORAL at 13:41

## 2025-07-18 RX ADMIN — AMIODARONE HYDROCHLORIDE 200 MG: 200 TABLET ORAL at 10:17

## 2025-07-18 RX ADMIN — CLOPIDOGREL BISULFATE 75 MG: 75 TABLET, FILM COATED ORAL at 10:17

## 2025-07-18 RX ADMIN — CYANOCOBALAMIN TAB 500 MCG 1000 MCG: 500 TAB at 10:17

## 2025-07-18 RX ADMIN — MORPHINE SULFATE 1 MG: 2 INJECTION, SOLUTION INTRAMUSCULAR; INTRAVENOUS at 04:58

## 2025-07-18 RX ADMIN — PANTOPRAZOLE SODIUM 40 MG: 40 TABLET, DELAYED RELEASE ORAL at 16:55

## 2025-07-18 RX ADMIN — SUCRALFATE 1 G: 1 TABLET ORAL at 13:41

## 2025-07-18 RX ADMIN — HYDROCODONE BITARTRATE AND ACETAMINOPHEN 1 TABLET: 5; 325 TABLET ORAL at 16:55

## 2025-07-18 RX ADMIN — CARVEDILOL 3.12 MG: 3.12 TABLET, FILM COATED ORAL at 10:21

## 2025-07-18 RX ADMIN — SUCRALFATE 1 G: 1 TABLET ORAL at 16:55

## 2025-07-18 RX ADMIN — AMLODIPINE BESYLATE 2.5 MG: 5 TABLET ORAL at 10:17

## 2025-07-18 RX ADMIN — DROXIDOPA 200 MG: 200 CAPSULE ORAL at 21:44

## 2025-07-18 RX ADMIN — SUCRALFATE 1 G: 1 TABLET ORAL at 10:17

## 2025-07-18 RX ADMIN — CARBIDOPA AND LEVODOPA 1 TABLET: 25; 100 TABLET ORAL at 21:44

## 2025-07-18 RX ADMIN — RANOLAZINE 1000 MG: 500 TABLET, FILM COATED, EXTENDED RELEASE ORAL at 10:17

## 2025-07-18 RX ADMIN — SODIUM CHLORIDE, PRESERVATIVE FREE 10 ML: 5 INJECTION INTRAVENOUS at 21:45

## 2025-07-18 RX ADMIN — ASPIRIN 81 MG: 81 TABLET, CHEWABLE ORAL at 10:18

## 2025-07-18 RX ADMIN — IOPAMIDOL 75 ML: 755 INJECTION, SOLUTION INTRAVENOUS at 19:15

## 2025-07-18 RX ADMIN — POTASSIUM CHLORIDE 10 MEQ: 750 CAPSULE, EXTENDED RELEASE ORAL at 10:17

## 2025-07-18 RX ADMIN — TRAMADOL HYDROCHLORIDE 50 MG: 50 TABLET ORAL at 00:12

## 2025-07-18 RX ADMIN — HYDROCODONE BITARTRATE AND ACETAMINOPHEN 1 TABLET: 5; 325 TABLET ORAL at 00:12

## 2025-07-18 RX ADMIN — LEVOTHYROXINE SODIUM 75 MCG: 0.07 TABLET ORAL at 10:17

## 2025-07-18 RX ADMIN — SODIUM CHLORIDE, SODIUM LACTATE, POTASSIUM CHLORIDE, AND CALCIUM CHLORIDE 250 ML: .6; .31; .03; .02 INJECTION, SOLUTION INTRAVENOUS at 16:52

## 2025-07-18 RX ADMIN — ATORVASTATIN CALCIUM 40 MG: 40 TABLET, FILM COATED ORAL at 10:17

## 2025-07-18 RX ADMIN — ISOSORBIDE MONONITRATE 30 MG: 30 TABLET, EXTENDED RELEASE ORAL at 21:44

## 2025-07-18 RX ADMIN — MIDODRINE HYDROCHLORIDE 10 MG: 10 TABLET ORAL at 13:41

## 2025-07-18 RX ADMIN — FLUDROCORTISONE ACETATE 0.05 MG: 0.1 TABLET ORAL at 10:21

## 2025-07-18 ASSESSMENT — PAIN SCALES - GENERAL
PAINLEVEL_OUTOF10: 8
PAINLEVEL_OUTOF10: 5
PAINLEVEL_OUTOF10: 7
PAINLEVEL_OUTOF10: 8
PAINLEVEL_OUTOF10: 9
PAINLEVEL_OUTOF10: 6
PAINLEVEL_OUTOF10: 3
PAINLEVEL_OUTOF10: 8
PAINLEVEL_OUTOF10: 8

## 2025-07-18 NOTE — CONSULTS
Stroke Neurology Consult Note  Stroke Alert @ 6:48 pm   Arrival at bedside @ 6:50 pm    Reason for Consult:  Inpatient Stroke alert  Requesting Physician:  ED team   Endovascular Neurosurgeon: Guido Whalen MD  Stroke Team: Guido Whalen MD  History Obtained From:  patient, family member - Son and daughter   Chief Complaint:  Acute neurological deficits   Allergies:  Latex, Atorvastatin, Fluvastatin, Imdur [isosorbide nitrate], Isosorbide, Methadone, Nalbuphine, Oxycodone, Penicillins, Rosuvastatin, Statins, and Zetia [ezetimibe]    History of Presenting Illness     Rowdy Marie is a 75 y.o. right handed male with a history of CAD S/p CABG 2014. S/p cardiac stenting 2024 with recent PCI 06/2025, prior TIA, chronic Afib s/p watchman with a hx prior GIB, prior prostate ca s/p brachytherapy 2022, chronic GERD, parkinson's disease who was admitted to the hospital with chest pain and was veing manaaged for NSTEMI.     S/p PCI 2 days ago     The patient was planned for discharge today.  When he attempted to get out of his bed and walk on his own the patient felt dizzy/weak and had to sit down again.  The patient denied any headaches, chest pain, loss of consciousness, nausea, vomiting, urinary/stool incontinence or convulsions.     Upon examination the patient was alert and oriented x 3.  He was following commands.  Neurological examination was significant for bilateral lower extremities stiffness/rigidity and intention tremor. stated that he has chronic left eye impaired vision.  NIH at time of examination was 1 (chronic left eye visual impairment), corrected NIH is 0.  CT head and CTA were unremarkable.         LKW: 4 pm  NIHSS: 1 (chronic left eye visual impairment), corrected NIH is 0.  SBP: 126  Glucose: Within normal  CT head without contrast: Unremarkable  TNK: Not a candidate  Endovascular: Not applicable    Review of Systems   Neurological:  Positive for dizziness and tremors. Negative for seizures, syncope,  tablet Take 1 tablet by mouth every morning 6/12/25  Yes Aracely Pierre MD   mirtazapine (REMERON) 7.5 MG tablet Take 1 tablet by mouth nightly 6/12/25  Yes Aracely Pierre MD   levothyroxine (SYNTHROID) 75 MCG tablet Take 1 tablet by mouth daily 6/12/25  Yes Aracely Pierre MD   ferrous sulfate (FEROSUL) 325 (65 Fe) MG tablet Take 1 tablet by mouth 3 times daily (with meals) 6/12/25  Yes Aracely Pierre MD   fludrocortisone (FLORINEF) 0.1 MG tablet Take 0.5 tablets by mouth daily 6/3/25 11/30/25 Yes Chaz Quinones MD   esomeprazole Magnesium (NEXIUM) 20 MG PACK Take 1 packet by mouth daily   Yes Bailey Umanzor MD   atorvastatin (LIPITOR) 40 MG tablet Take 1 tablet by mouth daily   Yes Bailey Umanzor MD   clopidogrel (PLAVIX) 75 MG tablet Take 1 tablet by mouth daily   Yes Bailey Umanzor MD   midodrine (PROAMATINE) 5 MG tablet Take 2 tablets by mouth 3 times daily 2/18/25  Yes Aracely Pierre MD   meclizine (ANTIVERT) 12.5 MG tablet Take 1 tablet by mouth 3 times daily as needed for Dizziness 12/17/24  Yes Yandy Jones MD   nystatin (MYCOSTATIN) 290623 UNIT/GM powder Apply 3 times daily. 8/9/24  Yes Yandy Jones MD     Current Facility-Administered Medications: ranolazine (RANEXA) extended release tablet 1,000 mg, 1,000 mg, Oral, BID  nitroGLYCERIN 200 mcg/mL in dextrose 5%, 5-200 mcg/min, IntraVENous, Continuous  naloxone 0.4 mg in 10 mL sodium chloride syringe, , IntraVENous, PRN  sodium chloride flush 0.9 % injection 5-40 mL, 5-40 mL, IntraVENous, 2 times per day  sodium chloride flush 0.9 % injection 5-40 mL, 5-40 mL, IntraVENous, PRN  0.9 % sodium chloride infusion, , IntraVENous, PRN  fentaNYL (SUBLIMAZE) injection 25 mcg, 25 mcg, IntraVENous, Q5 Min PRN  HYDROmorphone (DILAUDID) injection 0.5 mg, 0.5 mg, IntraVENous, Q5 Min PRN  LORazepam (ATIVAN) injection 0.5 mg, 0.5 mg, IntraVENous, Once PRN  diphenhydrAMINE (BENADRYL) injection 12.5 mg, 12.5 mg, IntraVENous, Once

## 2025-07-18 NOTE — PLAN OF CARE
Verbalizes/displays adequate comfort level or baseline comfort level  7/18/2025 1557 by Vanessa Olson RN  Outcome: Completed  7/18/2025 0437 by Anneliese Rao RN  Outcome: Progressing  Flowsheets (Taken 7/17/2025 1954)  Verbalizes/displays adequate comfort level or baseline comfort level:   Encourage patient to monitor pain and request assistance   Assess pain using appropriate pain scale   Administer analgesics based on type and severity of pain and evaluate response   Implement non-pharmacological measures as appropriate and evaluate response   Notify Licensed Independent Practitioner if interventions unsuccessful or patient reports new pain     Problem: Cardiovascular - Adult  Goal: Maintains optimal cardiac output and hemodynamic stability  7/18/2025 1557 by Vanessa Olson RN  Outcome: Completed  7/18/2025 0437 by Anneliese Rao RN  Outcome: Progressing  Flowsheets (Taken 7/17/2025 1930)  Maintains optimal cardiac output and hemodynamic stability:   Monitor blood pressure and heart rate   Monitor urine output and notify Licensed Independent Practitioner for values outside of normal range   Assess for signs of decreased cardiac output  Goal: Absence of cardiac dysrhythmias or at baseline  7/18/2025 1557 by Vanessa Olson RN  Outcome: Completed  7/18/2025 0437 by Anneliese Rao RN  Outcome: Progressing  Flowsheets (Taken 7/17/2025 1930)  Absence of cardiac dysrhythmias or at baseline:   Monitor cardiac rate and rhythm   Administer antiarrhythmia medication and electrolyte replacement as ordered   Assess for signs of decreased cardiac output     Problem: Skin/Tissue Integrity - Adult  Goal: Skin integrity remains intact  Description: 1.  Monitor for areas of redness and/or skin breakdown  2.  Assess vascular access sites hourly  3.  Every 4-6 hours minimum:  Change oxygen saturation probe site  4.  Every 4-6 hours:  If on nasal continuous positive airway pressure, respiratory therapy assess nares and determine  making related to treatment and care:   Facilitate patient and family articulation of goals for care   Help patient and family identify pros/cons of alternative solutions   Provide information as requested by patient/family

## 2025-07-18 NOTE — DISCHARGE SUMMARY
[facility]  Office: [phone]  Dillon Fraire DO, Gil Diaz DO, Rodney Mccarthy DO, Danny Rosa DO, Elmo Whaley MD, Anum Salomon MD, Ivan Barclay MD, Kell Nix MD,  Erik Nevarez MD, Makenna Pabon MD, Edelmira Fernandez MD,  Rehana Pizano DO, Delisa Wu MD, Tyrone Wakefield MD, Theo Fraire DO, Venus Meza MD,  Tristen Stevens DO, Maria T Mccartney MD, Viry Hylton MD, Geno Estrada MD,  Basim Lester MD, Willi Echevarria MD, Ana Greco MD, Berny Christian MD, Varinder Avilez MD, Estuardo Álvarez MD, Patrick Engel DO, Yandy Jones MD, Seth Zarate DO, Wellington Erazo MD, Rehana Almanza MD, Mohsin Reza, MD, Chaz Quinones MD, Shirley Waterhouse, CNP,  Zoya Drake CNP, Patrick Angulo, CNP,  Brittany Slater, KOMAL, Rosalie King CNP, Michelle Rose CNP, Latasha Diaz, CNP, Ynu López, CNP, Lucero Guerrero, PA-C, Dahiana Meza, CNP, Rajni Sauer, CNP,  Ryanne Wolfe, CNP, Arabella Patel, CNP, Rd Ruby, PA-C, Mandi Ray, PA-C, Marleen Penny, CNP,        Maryjane Andrade, MICHAELA, Kira Rowley, CNP, Margarita Infante, CNP         [facility]   IN-PATIENT SERVICE   [facility]    Discharge Summary     Patient ID: Rowdy Marie  :  1950   MRN: [id number]     ACCOUNT:  [id number]   Patient's PCP: Aracely Pierre MD  Admit Date: 2025   Discharge Date: 2025     Length of Stay: 7  Code Status:  Full Code  Admitting Physician: No admitting provider for patient encounter.  Discharge Physician: Patrick Engel DO     Active Discharge Diagnoses:     Hospital Problem Lists:  Principal Problem (Resolved):    NSTEMI (non-ST elevated myocardial infarction) (HCC)  Active Problems:    Coronary artery disease involving native coronary artery of native heart with refractory angina pectoris    Prolonged QT interval      Admission Condition:  fair     Discharged Condition: good    Hospital Stay:     Hospital Course:      75-year-old male

## 2025-07-18 NOTE — CARE COORDINATION
Discharge Report    [facility]  Clinical Case Management Department  Written by: Ashlyn Quiles RN    Patient Name: Rowdy Dalys  Attending Provider: Patrick Engel DO  Admit Date: 2025  8:09 PM  MRN: [id number]  Account: [id number]                     : 1950  Discharge Date: 25      Disposition: home    Plan is home with [facility]. Patient's son to transport home. Updated [facility] that patient is discharging home today    Ashlyn Quiles RN

## 2025-07-18 NOTE — PROGRESS NOTES
[facility]  Office: [phone]  Dillon Fraire DO, Gil Diaz, DO, Rodney Mccarthy DO, Danny Rosa, DO, Elmo Whaley MD, Anum Salomon MD, Ivan Barclay MD, Kell Nix MD,  Erik Nevarez MD, Makenna Pabon MD, Edelmira Fernandez MD,  Rehana Pizano DO, Delisa Wu MD, Tyrone Wakefield MD, Theo Fraire DO, Venus Meza MD,  Tristen Stevens DO, Maria T Mccartney MD, Viry Hylton MD, Geno Estrada MD,  Basim Lester MD, Willi Echevarria MD, Ana Greco MD, Berny Christian MD, Varinder Avilez MD, Estuardo Álvarez MD, Patrick Engel, DO, Yandy Jones MD, Seth Zarate DO, Wellington Erazo MD, Rehana Almanza MD, Mohsin Reza, MD, Chaz Quinones MD, Shirley Waterhouse, CNP,  Zoya Drake, CNP, Patrick Angulo, CNP,  Brittany Slater, DNP, Rosalie King CNP, Michelle Rose, CNP, Latasha Diaz, CNP, Yun López, CNP, Lucero Guerrero, PA-C, Dahiana Meza, CNP, Rajni Sauer, CNP,  Ryanne Wolfe, CNP, Arabella Patel, CNP, Rd Ruby, PA-C, Mandi Ray, PA-C, Marleen Penny, CNP,        Maryjane Andrade, CNS, Kira Rowley, CNP, Margarita Infante, CNP         [facility]   IN-PATIENT SERVICE   [facility]    Second Visit Note  For more detailed information please refer to the progress note of the day      7/18/2025    6:59 PM    Name:   Rowdy Marie  MRN:     [id number]     Acct:      [id number]   Room:   0402/0402-01   Day:  7  Admit Date:  7/11/2025  8:09 PM    PCP:   Aracely Pierre MD  Code Status:  Full Code      Pt vitals were reviewed   New labs were reviewed   Patient was seen    Updated plan :     Patient with acute left lower extremity weakness, bilateral lower extremity stiffness and worsening tremor more than his baseline Parkinson's tremor.  Patient had heart cath 2 days ago.  Stroke alert called patient was assessed by stroke team and will be going down for CT head and CTA of the head and neck.      Chaz Quinones MD  7/18/2025  6:59 PM

## 2025-07-18 NOTE — PROGRESS NOTES
Comprehensive Nutrition Assessment    Type and Reason for Visit:  Initial, LOS    Nutrition Recommendations/Plan:   Continue current diet as tolerated  Consider bowel regimen 2/2 no BM documented x 7 days  Will monitor GI status, PO intake, wt, and POC     Malnutrition Assessment:  Malnutrition Status:  No malnutrition (07/18/25 1049)      Nutrition Assessment:    75 y,o.M s/p PCI. Pt assessed for LOS. Pt NPO this AM for possible cardiac cath, now on Reg diet. Pt reports good appetite and intake, no concerns w/ intake or wt loss PTA. Pt reports eating % of meals x adm. Wt stable x 7+ mo. Of note, no BM documented x 7 days--consider bowel regimen. No acute nutrition concerns or interventions at this time. RD will continue to monitor per protocol.    Nutrition Related Findings:    Meds/Labs reviewed. Wound Type: None       Current Nutrition Intake & Therapies:    Average Meal Intake: %  Average Supplements Intake: None Ordered  ADULT DIET; Regular    Anthropometric Measures:  Height: 180.3 cm (5' 10.98\")  Ideal Body Weight (IBW): 172 lbs (78 kg)    Current Body Weight: 68.1 kg (150 lb 2.1 oz), 87.3 % IBW.    Current BMI (kg/m2): 20.9    Estimated Daily Nutrient Needs:  Energy Requirements Based On: Kcal/kg  Weight Used for Energy Requirements: Current  Energy (kcal/day): 4179-0016 kcals/day  Weight Used for Protein Requirements: Current  Protein (g/day): 68-78 g/day  Method Used for Fluid Requirements: 1 ml/kcal  Fluid (ml/day): 4315-2651 ml/day    Nutrition Diagnosis:   No nutrition diagnosis at this time     Nutrition Interventions:   Food and/or Nutrient Delivery: Continue Current Diet  Nutrition Education/Counseling: Survival skills/brief education completed  Coordination of Nutrition Care: Continue to monitor while inpatient  Plan of Care discussed with: Pt    Nutrition Monitoring and Evaluation:   Behavioral-Environmental Outcomes: None Identified  Food/Nutrient Intake Outcomes: Food and Nutrient

## 2025-07-18 NOTE — PROGRESS NOTES
[facility]     Emergency/Trauma Note    PATIENT NAME: Rowdy Marie    Shift date: 7/18/2025  Shift day: Friday   Shift # 2    Room # 0402/0402-01   Name: Rowdy Marie            Age: 75 y.o.  Gender: male          Hindu: Protestant   Place of Muslim: Unknown    Trauma/Incident type: Stroke Alert  Admit Date & Time: 7/11/2025  8:09 PM  TRAUMA NAME: N/A    ADVANCE DIRECTIVES IN CHART?  Yes    NAME OF DECISION MAKER: Kye Marie [phone]    RELATIONSHIP OF DECISION MAKER TO PATIENT: Daughter in-law      SPIRITUAL ASSESSMENT-INTERVENTION-OUTCOME:  Pt calm and coping at time of code stroke. Pt followed commands. Son and daughter in-law present in room. Both calm and inviting to writers presence. Pt went to ct scan. Writer was ministry of presence who offered active listening for support.      PATIENT BELONGINGS:  Writer did not handle belongings    ANY BELONGINGS OF SIGNIFICANT VALUE NOTED:  Unknown      WHAT IS YOUR SPIRITUAL CARE PLAN FOR THIS PATIENT?:   Spiritual care available 24/7 vi perfect serve     Electronically signed by Gold Feliz,Chaplain Resident, on 7/18/2025 at 7:46 PM.  [facility]  [phone]

## 2025-07-18 NOTE — PLAN OF CARE
needed  Goal: Return ADL status to a safe level of function  Outcome: Progressing  Flowsheets (Taken 7/17/2025 1930)  Return ADL Status to a Safe Level of Function:   Administer medication as ordered   Assess activities of daily living deficits and provide assistive devices as needed     Problem: Infection - Adult  Goal: Absence of infection at discharge  Outcome: Progressing  Flowsheets (Taken 7/17/2025 1930)  Absence of infection at discharge:   Assess and monitor for signs and symptoms of infection   Monitor lab/diagnostic results   Monitor all insertion sites i.e., indwelling lines, tubes and drains   Administer medications as ordered  Goal: Absence of infection during hospitalization  Outcome: Progressing  Flowsheets (Taken 7/17/2025 1930)  Absence of infection during hospitalization:   Assess and monitor for signs and symptoms of infection   Monitor lab/diagnostic results   Monitor all insertion sites i.e., indwelling lines, tubes and drains   Administer medications as ordered     Problem: Metabolic/Fluid and Electrolytes - Adult  Goal: Electrolytes maintained within normal limits  Outcome: Progressing  Flowsheets (Taken 7/17/2025 1930)  Electrolytes maintained within normal limits:   Monitor labs and assess patient for signs and symptoms of electrolyte imbalances   Administer electrolyte replacement as ordered   Monitor response to electrolyte replacements, including repeat lab results as appropriate  Goal: Hemodynamic stability and optimal renal function maintained  Outcome: Progressing  Flowsheets (Taken 7/17/2025 1930)  Hemodynamic stability and optimal renal function maintained:   Monitor labs and assess for signs and symptoms of volume excess or deficit   Monitor intake, output and patient weight     Problem: Hematologic - Adult  Goal: Maintains hematologic stability  Outcome: Progressing  Flowsheets (Taken 7/17/2025 1930)  Maintains hematologic stability:   Assess for signs and symptoms of bleeding or  family and health care team  7. Initiate Consults from Ethics, Palliative Care or initiate Family Care Conference as is appropriate  Outcome: Progressing  Flowsheets (Taken 7/17/2025 1930)  Patient/family able to effectively weigh alternatives and participate in decision making related to treatment and care:   Facilitate patient and family articulation of goals for care   Help patient and family identify pros/cons of alternative solutions   Provide information as requested by patient/family

## 2025-07-18 NOTE — PROGRESS NOTES
[facility]  Progress Note                   Date:   7/18/2025  Patient name: Rowdy Marie  Date of admission:  7/11/2025  8:09 PM  MRN:   [id number]  YOB: 1950  PCP: Aracely Pierre MD    Reason for Admission: NSTEMI (non-ST elevated myocardial infarction) ([facility]) [I21.4]    Subjective:       Clinical Changes /Abnormalities: Pt seen and examined in the room.  Patient resting in bed Pt remains on Nitro gtt.   Reports a severe HA this morning. He denies any sob. He states that his CP is greatly improved.    Labs, vitals and tele reviewed- SR 73      Medications:   Scheduled Meds:   ranolazine  1,000 mg Oral BID    sodium chloride flush  5-40 mL IntraVENous 2 times per day    scopolamine  1 patch TransDERmal Q72H    isosorbide mononitrate  30 mg Oral Nightly    amLODIPine  2.5 mg Oral Daily    pantoprazole  40 mg Oral BID AC    amiodarone  200 mg Oral Daily    atorvastatin  40 mg Oral Daily    carbidopa-levodopa  1 tablet Oral 4x Daily    carvedilol  3.125 mg Oral BID WC    droxidopa  200 mg Oral TID    [Held by provider] ferrous sulfate  325 mg Oral TID WC    fludrocortisone  0.05 mg Oral Daily    levothyroxine  75 mcg Oral Daily    midodrine  10 mg Oral TID    potassium chloride  10 mEq Oral QAM    sucralfate  1 g Oral 4x daily    vitamin B-12  1,000 mcg Oral Daily    sodium chloride flush  5-40 mL IntraVENous 2 times per day    aspirin  81 mg Oral Daily    clopidogrel  75 mg Oral Daily     Continuous Infusions:   nitroGLYCERIN 35 mcg/min (07/18/25 0459)    sodium chloride      sodium chloride       CBC:   Recent Labs     07/16/25  0420   WBC 7.1   HGB 9.5*        BMP:    Recent Labs     07/16/25  0420 07/17/25  1127    137   K 4.0 3.7    100   CO2 25 22   BUN 15 14   CREATININE 1.2 1.3*   GLUCOSE 90 98     Hepatic:  No results for input(s): \"AST\", \"ALT\", \"BILITOT\", \"ALKPHOS\" in the last 72 hours.    Invalid input(s): \"ALB\"    Troponin:   Recent Labs

## 2025-07-18 NOTE — PROGRESS NOTES
[facility]  Office: [phone]  Dillon Fraire DO, Gil Diaz, DO, Rodney Mccarthy DO, Danny Rosa DO, Elmo Whaley MD, Anum Salomon MD, Ivan Barclay MD, Kell Nix MD,  Erik Nevarez MD, Makenna Pabon MD, Edelmira Fernandez MD,  Rehana Pizano DO, Delisa Wu MD, Tyrone Wakefield MD, Theo Fraire DO, Vensu Meza MD,  Tristen Stevens DO, Maria T Mccartney MD, Viry Hylton MD, Geno Estrada MD,  Basim Lester MD, Willi Echevarria MD, Ana Greco MD, Berny Christian MD, Varinder Avilez MD, Esutardo Álvarez MD, Patrick Engel DO, Yandy Jones MD, Seth Zarate DO, Wellington Erzao MD, Rehana Almanza MD, Mohsin Reza, MD, Chaz Quinones MD, Shirley Waterhouse, CNP,  Zoya Drake, CNP, Patrick Angulo, CNP,  Brittany Slater, KOMAL, Rosalie King, CNP, Michelle Rose, CNP, Latasha Diaz, CNP, Yun López, CNP, Lucero Guerrero, PA-C, Dahiana Meza, CNP, Rajni Sauer, CNP,  Ryanne Wolfe, CNP, Arabella Patel, CNP, Rd Ruby, PA-C, Mandi Ray, PA-C, Marleen Penny, CNP,        Maryjane Andrade, CNS, Kira Rowley, CNP, Margarita Infante, CNP         [facility]   IN-PATIENT SERVICE   [facility]    Progress Note    7/18/2025    7:48 AM    Name:   Rowdy Marie  MRN:     [id number]     Acct:      [id number]   Room:   [facility] Day:  7  Admit Date:  7/11/2025  8:09 PM    PCP:   Aracely Pierre MD  Code Status:  Full Code    Subjective:     C/C:   Chief Complaint   Patient presents with    Chest Pain       Patient seen this morning.  He states he feels well and denies any chest pain, shortness of breath, palpitations.  He notes the chest pain that he had yesterday has resolved.    Brief History:     75-year-old male with past medical history of CAD status post CABG 2014, PCI 2024 and 6/2025, A-fib status post watchman, prostate cancer status post brachytherapy, Parkinson disease presented to the emergency department endorsing chest pain.  He was  admitted for the management of NSTEMI.  He was started on a heparin drip and nitro drip.  Cardiology was consulted, echo was ordered that showed EF 40-45%.    Medications:     Allergies:    Allergies   Allergen Reactions    Latex     Atorvastatin     Fluvastatin     Imdur [Isosorbide Nitrate]     Isosorbide Headaches     Patient denies having any allergy to this medication    Methadone     Nalbuphine     Oxycodone     Penicillins     Rosuvastatin     Statins     Zetia [Ezetimibe]        Current Meds:   Scheduled Meds:    ranolazine  1,000 mg Oral BID    sodium chloride flush  5-40 mL IntraVENous 2 times per day    scopolamine  1 patch TransDERmal Q72H    isosorbide mononitrate  30 mg Oral Nightly    amLODIPine  2.5 mg Oral Daily    pantoprazole  40 mg Oral BID AC    amiodarone  200 mg Oral Daily    atorvastatin  40 mg Oral Daily    carbidopa-levodopa  1 tablet Oral 4x Daily    carvedilol  3.125 mg Oral BID WC    droxidopa  200 mg Oral TID    [Held by provider] ferrous sulfate  325 mg Oral TID WC    fludrocortisone  0.05 mg Oral Daily    levothyroxine  75 mcg Oral Daily    midodrine  10 mg Oral TID    potassium chloride  10 mEq Oral QAM    sucralfate  1 g Oral 4x daily    vitamin B-12  1,000 mcg Oral Daily    sodium chloride flush  5-40 mL IntraVENous 2 times per day    aspirin  81 mg Oral Daily    clopidogrel  75 mg Oral Daily     Continuous Infusions:    nitroGLYCERIN 35 mcg/min (07/18/25 0459)    sodium chloride      sodium chloride       PRN Meds: naloxone 0.4 mg in 10 mL sodium chloride syringe, sodium chloride flush, sodium chloride, fentanNYL, HYDROmorphone, LORazepam, diphenhydrAMINE, droPERidol, labetalol **OR** hydrALAZINE, HYDROcodone 5 mg - acetaminophen, morphine, meclizine, sodium chloride flush, sodium chloride, potassium chloride **OR** potassium alternative oral replacement **OR** potassium chloride, magnesium sulfate, [Held by provider] ondansetron **OR** [Held by provider] ondansetron,

## 2025-07-18 NOTE — FLOWSHEET NOTE
07/18/25 1615   Vital Signs   Orthostatic B/P and Pulse? Yes   Blood Pressure Lying 119/42   Pulse Lying 66 PER MINUTE   Blood Pressure Sitting 134/69   Pulse Sitting 69 PER MINUTE   Blood Pressure Standing 129/59   Pulse Standing 75 PER MINUTE     Patient reporting dizziness with activity. Dr. Engel notified.

## 2025-07-18 NOTE — PROGRESS NOTES
Patient reports legs feeling like lead and unable to pick them. Dr. Engel notified. D/C order pulled and patient to work with PT tomorrow. Writer requesting for Dr. Engel to discontinue D/C order.   Family called out at 1810 as patient was shaking in bed different from his parkinson's shakes. Vitals obtained and documented. Blood sugar 94. Dr. Engel notified. Family reports he has had a similar event prior and was diagnosed with TIA.

## 2025-07-18 NOTE — PROGRESS NOTES
CLINICAL PHARMACY NOTE: MEDS TO BEDS    Total # of Prescriptions Filled: 1   The following medications were delivered to the patient:  Carvedilol 3.125mg    Additional Documentation: delivered to patient in room 402 7/18 at 4:28pm. No co-pay.

## 2025-07-18 NOTE — PROGRESS NOTES
1845: Dr. Quinones at bedside to call stroke alert. Neuro to bedside. Patient transported to and from CT scan. Neuro at bedside updating family.

## 2025-07-19 VITALS
OXYGEN SATURATION: 100 % | RESPIRATION RATE: 17 BRPM | HEART RATE: 61 BPM | DIASTOLIC BLOOD PRESSURE: 64 MMHG | HEIGHT: 71 IN | TEMPERATURE: 97.9 F | BODY MASS INDEX: 20.99 KG/M2 | WEIGHT: 149.91 LBS | SYSTOLIC BLOOD PRESSURE: 106 MMHG

## 2025-07-19 LAB
BASOPHILS # BLD: 0.09 K/UL (ref 0–0.2)
BASOPHILS NFR BLD: 1 % (ref 0–2)
EKG ATRIAL RATE: 63 BPM
EKG P AXIS: 70 DEGREES
EKG P-R INTERVAL: 178 MS
EKG Q-T INTERVAL: 478 MS
EKG QRS DURATION: 100 MS
EKG QTC CALCULATION (BAZETT): 489 MS
EKG R AXIS: 41 DEGREES
EKG T AXIS: 136 DEGREES
EKG VENTRICULAR RATE: 63 BPM
EOSINOPHIL # BLD: 0.5 K/UL (ref 0–0.44)
EOSINOPHILS RELATIVE PERCENT: 8 % (ref 1–4)
ERYTHROCYTE [DISTWIDTH] IN BLOOD BY AUTOMATED COUNT: 13.3 % (ref 11.8–14.4)
HCT VFR BLD AUTO: 33.7 % (ref 40.7–50.3)
HGB BLD-MCNC: 10.4 G/DL (ref 13–17)
IMM GRANULOCYTES # BLD AUTO: 0.03 K/UL (ref 0–0.3)
IMM GRANULOCYTES NFR BLD: 1 %
LYMPHOCYTES NFR BLD: 1.24 K/UL (ref 1.1–3.7)
LYMPHOCYTES RELATIVE PERCENT: 20 % (ref 24–43)
MCH RBC QN AUTO: 31.4 PG (ref 25.2–33.5)
MCHC RBC AUTO-ENTMCNC: 30.9 G/DL (ref 28.4–34.8)
MCV RBC AUTO: 101.8 FL (ref 82.6–102.9)
MONOCYTES NFR BLD: 0.8 K/UL (ref 0.1–1.2)
MONOCYTES NFR BLD: 13 % (ref 3–12)
NEUTROPHILS NFR BLD: 57 % (ref 36–65)
NEUTS SEG NFR BLD: 3.67 K/UL (ref 1.5–8.1)
NRBC BLD-RTO: 0 PER 100 WBC
PLATELET # BLD AUTO: 205 K/UL (ref 138–453)
PMV BLD AUTO: 11.4 FL (ref 8.1–13.5)
RBC # BLD AUTO: 3.31 M/UL (ref 4.21–5.77)
TROPONIN I SERPL HS-MCNC: 1560 NG/L (ref 0–22)
TROPONIN I SERPL HS-MCNC: 1606 NG/L (ref 0–22)
WBC OTHER # BLD: 6.3 K/UL (ref 3.5–11.3)

## 2025-07-19 PROCEDURE — 36415 COLL VENOUS BLD VENIPUNCTURE: CPT

## 2025-07-19 PROCEDURE — 99239 HOSP IP/OBS DSCHRG MGMT >30: CPT | Performed by: STUDENT IN AN ORGANIZED HEALTH CARE EDUCATION/TRAINING PROGRAM

## 2025-07-19 PROCEDURE — 6370000000 HC RX 637 (ALT 250 FOR IP): Performed by: NURSE PRACTITIONER

## 2025-07-19 PROCEDURE — 6370000000 HC RX 637 (ALT 250 FOR IP): Performed by: STUDENT IN AN ORGANIZED HEALTH CARE EDUCATION/TRAINING PROGRAM

## 2025-07-19 PROCEDURE — 85025 COMPLETE CBC W/AUTO DIFF WBC: CPT

## 2025-07-19 PROCEDURE — 2500000003 HC RX 250 WO HCPCS: Performed by: ANESTHESIOLOGY

## 2025-07-19 PROCEDURE — 84484 ASSAY OF TROPONIN QUANT: CPT

## 2025-07-19 PROCEDURE — 94761 N-INVAS EAR/PLS OXIMETRY MLT: CPT

## 2025-07-19 PROCEDURE — 97110 THERAPEUTIC EXERCISES: CPT

## 2025-07-19 PROCEDURE — 2500000003 HC RX 250 WO HCPCS: Performed by: NURSE PRACTITIONER

## 2025-07-19 PROCEDURE — 6370000000 HC RX 637 (ALT 250 FOR IP): Performed by: INTERNAL MEDICINE

## 2025-07-19 PROCEDURE — 99233 SBSQ HOSP IP/OBS HIGH 50: CPT | Performed by: NURSE PRACTITIONER

## 2025-07-19 PROCEDURE — 6360000002 HC RX W HCPCS: Performed by: NURSE PRACTITIONER

## 2025-07-19 PROCEDURE — 97116 GAIT TRAINING THERAPY: CPT

## 2025-07-19 PROCEDURE — 97530 THERAPEUTIC ACTIVITIES: CPT

## 2025-07-19 RX ORDER — FERROUS SULFATE 325(65) MG
1 TABLET ORAL
Qty: 180 TABLET | Refills: 1 | Status: SHIPPED | OUTPATIENT
Start: 2025-07-19

## 2025-07-19 RX ORDER — MIDODRINE HYDROCHLORIDE 5 MG/1
10 TABLET ORAL 3 TIMES DAILY
Qty: 60 TABLET | Refills: 3 | Status: SHIPPED | OUTPATIENT
Start: 2025-07-19

## 2025-07-19 RX ORDER — CARVEDILOL 3.12 MG/1
3.12 TABLET ORAL 2 TIMES DAILY
Qty: 60 TABLET | Refills: 4 | Status: SHIPPED | OUTPATIENT
Start: 2025-07-19

## 2025-07-19 RX ADMIN — DROXIDOPA 200 MG: 200 CAPSULE ORAL at 13:58

## 2025-07-19 RX ADMIN — SODIUM CHLORIDE, PRESERVATIVE FREE 10 ML: 5 INJECTION INTRAVENOUS at 08:02

## 2025-07-19 RX ADMIN — LEVOTHYROXINE SODIUM 75 MCG: 0.07 TABLET ORAL at 08:02

## 2025-07-19 RX ADMIN — MORPHINE SULFATE 1 MG: 2 INJECTION, SOLUTION INTRAMUSCULAR; INTRAVENOUS at 10:38

## 2025-07-19 RX ADMIN — CLOPIDOGREL BISULFATE 75 MG: 75 TABLET, FILM COATED ORAL at 08:01

## 2025-07-19 RX ADMIN — CARVEDILOL 3.12 MG: 3.12 TABLET, FILM COATED ORAL at 07:59

## 2025-07-19 RX ADMIN — ASPIRIN 81 MG: 81 TABLET, CHEWABLE ORAL at 08:00

## 2025-07-19 RX ADMIN — HYDROCODONE BITARTRATE AND ACETAMINOPHEN 1 TABLET: 5; 325 TABLET ORAL at 08:07

## 2025-07-19 RX ADMIN — DROXIDOPA 200 MG: 200 CAPSULE ORAL at 08:02

## 2025-07-19 RX ADMIN — FLUDROCORTISONE ACETATE 0.05 MG: 0.1 TABLET ORAL at 08:08

## 2025-07-19 RX ADMIN — CYANOCOBALAMIN TAB 500 MCG 1000 MCG: 500 TAB at 07:59

## 2025-07-19 RX ADMIN — SUCRALFATE 1 G: 1 TABLET ORAL at 08:01

## 2025-07-19 RX ADMIN — RANOLAZINE 1000 MG: 500 TABLET, FILM COATED, EXTENDED RELEASE ORAL at 08:01

## 2025-07-19 RX ADMIN — MIDODRINE HYDROCHLORIDE 10 MG: 10 TABLET ORAL at 13:58

## 2025-07-19 RX ADMIN — PANTOPRAZOLE SODIUM 40 MG: 40 TABLET, DELAYED RELEASE ORAL at 08:01

## 2025-07-19 RX ADMIN — AMIODARONE HYDROCHLORIDE 200 MG: 200 TABLET ORAL at 08:00

## 2025-07-19 RX ADMIN — SUCRALFATE 1 G: 1 TABLET ORAL at 13:58

## 2025-07-19 RX ADMIN — POTASSIUM CHLORIDE 10 MEQ: 750 CAPSULE, EXTENDED RELEASE ORAL at 08:01

## 2025-07-19 RX ADMIN — MIDODRINE HYDROCHLORIDE 10 MG: 10 TABLET ORAL at 08:00

## 2025-07-19 RX ADMIN — CARBIDOPA AND LEVODOPA 1 TABLET: 25; 100 TABLET ORAL at 13:58

## 2025-07-19 RX ADMIN — ATORVASTATIN CALCIUM 40 MG: 40 TABLET, FILM COATED ORAL at 08:00

## 2025-07-19 RX ADMIN — AMLODIPINE BESYLATE 2.5 MG: 5 TABLET ORAL at 08:00

## 2025-07-19 RX ADMIN — CARBIDOPA AND LEVODOPA 1 TABLET: 25; 100 TABLET ORAL at 08:07

## 2025-07-19 ASSESSMENT — PAIN SCALES - GENERAL
PAINLEVEL_OUTOF10: 8
PAINLEVEL_OUTOF10: 5
PAINLEVEL_OUTOF10: 7

## 2025-07-19 ASSESSMENT — PAIN DESCRIPTION - LOCATION
LOCATION: BACK
LOCATION: BACK

## 2025-07-19 NOTE — PROGRESS NOTES
[facility]  Progress Note                   Date:   7/19/2025  Patient name: Rowdy Marie  Date of admission:  7/11/2025  8:09 PM  MRN:   [id number]  YOB: 1950  PCP: Aracely Pierre MD    Reason for Admission: NSTEMI (non-ST elevated myocardial infarction) (HCC) [I21.4]    Subjective:       Clinical Changes /Abnormalities: Pt seen and examined in the room.  Patient resting in bed quietly.  CP is much better.  Off nitro gttt.   Labs, vitals and tele reviewed- SR 73      Medications:   Scheduled Meds:   ranolazine  1,000 mg Oral BID    sodium chloride flush  5-40 mL IntraVENous 2 times per day    scopolamine  1 patch TransDERmal Q72H    isosorbide mononitrate  30 mg Oral Nightly    amLODIPine  2.5 mg Oral Daily    pantoprazole  40 mg Oral BID AC    amiodarone  200 mg Oral Daily    atorvastatin  40 mg Oral Daily    carbidopa-levodopa  1 tablet Oral 4x Daily    carvedilol  3.125 mg Oral BID WC    droxidopa  200 mg Oral TID    [Held by provider] ferrous sulfate  325 mg Oral TID WC    fludrocortisone  0.05 mg Oral Daily    levothyroxine  75 mcg Oral Daily    midodrine  10 mg Oral TID    potassium chloride  10 mEq Oral QAM    sucralfate  1 g Oral 4x daily    vitamin B-12  1,000 mcg Oral Daily    sodium chloride flush  5-40 mL IntraVENous 2 times per day    aspirin  81 mg Oral Daily    clopidogrel  75 mg Oral Daily     Continuous Infusions:   nitroGLYCERIN Stopped (07/18/25 1045)    sodium chloride      sodium chloride       CBC:   No results for input(s): \"WBC\", \"HGB\", \"PLT\" in the last 72 hours.    BMP:    Recent Labs     07/17/25  1127 07/18/25 1929    136   K 3.7 4.2    101   CO2 22 19*   BUN 14 22   CREATININE 1.3* 1.4*   GLUCOSE 98 100*     Hepatic:  No results for input(s): \"AST\", \"ALT\", \"BILITOT\", \"ALKPHOS\" in the last 72 hours.    Invalid input(s): \"ALB\"    Troponin:   Recent Labs     07/17/25  1348 07/18/25  1929 07/19/25  0822   TROPHS 1,204* 1,493* 1,560*

## 2025-07-19 NOTE — PROGRESS NOTES
Radiology called from [phone] number and wanted to speak with physician on a critical result . Writer messaged on call NP to inform of the information. Call back at [phone], ask for dr Haroldo Ye.

## 2025-07-19 NOTE — PLAN OF CARE
Problem: Discharge Planning  Goal: Discharge to home or other facility with appropriate resources  Recent Flowsheet Documentation  Taken 7/18/2025 2024 by Anneliese Rao RN  Discharge to home or other facility with appropriate resources:   Identify barriers to discharge with patient and caregiver   Arrange for needed discharge resources and transportation as appropriate   Identify discharge learning needs (meds, wound care, etc)   Refer to discharge planning if patient needs post-hospital services based on physician order or complex needs related to functional status, cognitive ability or social support system  7/18/2025 1557 by Vanessa Olson RN  Outcome: Completed     Problem: Skin/Tissue Integrity  Goal: Skin integrity remains intact  Description: 1.  Monitor for areas of redness and/or skin breakdown  2.  Assess vascular access sites hourly  3.  Every 4-6 hours minimum:  Change oxygen saturation probe site  4.  Every 4-6 hours:  If on nasal continuous positive airway pressure, respiratory therapy assess nares and determine need for appliance change or resting period  Recent Flowsheet Documentation  Taken 7/18/2025 2024 by Anneliese Rao RN  Skin Integrity Remains Intact: Monitor for areas of redness and/or skin breakdown  7/18/2025 1557 by Vanessa Olson RN  Outcome: Completed     Problem: Safety - Adult  Goal: Free from fall injury  Recent Flowsheet Documentation  Taken 7/18/2025 2024 by Anneliese Rao RN  Free From Fall Injury: Instruct family/caregiver on patient safety  7/18/2025 1557 by Vanessa Olson RN  Outcome: Completed     Problem: ABCDS Injury Assessment  Goal: Absence of physical injury  Recent Flowsheet Documentation  Taken 7/18/2025 2024 by Anneliese Rao RN  Absence of Physical Injury: Implement safety measures based on patient assessment  7/18/2025 1557 by Vanessa Olson RN  Outcome: Completed     Problem: Pain  Goal: Verbalizes/displays adequate comfort level or baseline comfort level  Recent  using appropriate pain scale   Administer analgesics based on type and severity of pain and evaluate response   Implement non-pharmacological measures as appropriate and evaluate response   Notify Licensed Independent Practitioner if interventions unsuccessful or patient reports new pain  7/18/2025 1557 by Vanessa Olson RN  Outcome: Completed     Problem: Cardiovascular - Adult  Goal: Maintains optimal cardiac output and hemodynamic stability  Recent Flowsheet Documentation  Taken 7/18/2025 2024 by Anneliese Rao RN  Maintains optimal cardiac output and hemodynamic stability:   Monitor blood pressure and heart rate   Monitor urine output and notify Licensed Independent Practitioner for values outside of normal range   Assess for signs of decreased cardiac output  7/18/2025 1557 by Vanessa Olson RN  Outcome: Completed  Goal: Absence of cardiac dysrhythmias or at baseline  Recent Flowsheet Documentation  Taken 7/18/2025 2024 by Anneliese Rao RN  Absence of cardiac dysrhythmias or at baseline:   Monitor cardiac rate and rhythm   Administer antiarrhythmia medication and electrolyte replacement as ordered   Assess for signs of decreased cardiac output  7/18/2025 1557 by Vanessa Olson RN  Outcome: Completed     Problem: Skin/Tissue Integrity - Adult  Goal: Skin integrity remains intact  Description: 1.  Monitor for areas of redness and/or skin breakdown  2.  Assess vascular access sites hourly  3.  Every 4-6 hours minimum:  Change oxygen saturation probe site  4.  Every 4-6 hours:  If on nasal continuous positive airway pressure, respiratory therapy assess nares and determine need for appliance change or resting period  Recent Flowsheet Documentation  Taken 7/18/2025 2024 by Anneliese Rao RN  Skin Integrity Remains Intact: Monitor for areas of redness and/or skin breakdown  7/18/2025 1557 by Vanessa Olson RN  Outcome: Completed     Problem: Musculoskeletal - Adult  Goal: Return mobility to safest level of

## 2025-07-19 NOTE — PLAN OF CARE
Problem: Discharge Planning  Goal: Discharge to home or other facility with appropriate resources  7/18/2025 2032 by Anneliese Rao RN  Outcome: Progressing  7/18/2025 2031 by Anneliese Rao RN  Outcome: Progressing  7/18/2025 2028 by Anneliese Rao RN  Reactivated  Flowsheets (Taken 7/18/2025 2024)  Discharge to home or other facility with appropriate resources:   Identify barriers to discharge with patient and caregiver   Arrange for needed discharge resources and transportation as appropriate   Identify discharge learning needs (meds, wound care, etc)   Refer to discharge planning if patient needs post-hospital services based on physician order or complex needs related to functional status, cognitive ability or social support system  7/18/2025 1557 by Vanessa Olson RN  Outcome: Completed     Problem: Skin/Tissue Integrity  Goal: Skin integrity remains intact  Description: 1.  Monitor for areas of redness and/or skin breakdown  2.  Assess vascular access sites hourly  3.  Every 4-6 hours minimum:  Change oxygen saturation probe site  4.  Every 4-6 hours:  If on nasal continuous positive airway pressure, respiratory therapy assess nares and determine need for appliance change or resting period  7/18/2025 2032 by Anneliese Rao RN  Outcome: Progressing  7/18/2025 2031 by Anneliese Rao RN  Outcome: Progressing  7/18/2025 2028 by Anneliese Rao RN  Reactivated  Flowsheets (Taken 7/18/2025 2024)  Skin Integrity Remains Intact: Monitor for areas of redness and/or skin breakdown  7/18/2025 1557 by Vanessa Olson RN  Outcome: Completed     Problem: Safety - Adult  Goal: Free from fall injury  7/18/2025 2032 by Anneliese Rao RN  Outcome: Progressing  7/18/2025 2031 by Anneliese Rao RN  Outcome: Progressing  7/18/2025 2028 by Anneliese Rao RN  Reactivated  Flowsheets (Taken 7/18/2025 2024)  Free From Fall Injury: Instruct family/caregiver on patient safety  7/18/2025 1557 by Vanessa Olson RN  Outcome:

## 2025-07-19 NOTE — CARE COORDINATION
Case Management   Daily Progress Note       Patient Name: Rowdy Marie                   YOB: 1950  Diagnosis: NSTEMI (non-ST elevated myocardial infarction) (HCC) [I21.4]                       GMLOS: 2 days  Length of Stay: 8  days    Anticipated Discharge Date: Two or more days before discharge    Readmission Risk (Low < 19, Mod (19-27), High > 27): Readmission Risk Score: 24.8        Current Transitional Plan    [] Home Independently    [] Home with HC    [] Skilled Nursing Facility    [] Acute Rehabilitation    [] Long Term Acute Care (LTAC)    [] Other:     Plan for the Stay (Medical Management) :          Workflow Continuation (Additional Notes) :  Patient did not leave yesterday d/t CT finding.  Awaiting plan.    Home with Falguni Cooley RN  July 19, 2025

## 2025-07-19 NOTE — PROGRESS NOTES
[facility]  Office: [phone]  Dillon Fraire DO, Gil Diaz, DO, Rodney Mccarthy DO, Danny Rosa DO, Elmo Whaley MD, Anum Salomon MD, Ivan Barclay MD, Kell Nix MD,  Erik Nevarez MD, Makenna Pabon MD, Edelmira Fernandez MD,  Rehana Pizano DO, Delisa Wu MD, Tyrone Wakefield MD, Theo Fraire DO, Venus Meza MD,  Tristen Stevens DO, Maria T Mccartney MD, Viry Hylton MD, Geno Estrada MD,  Basim Lester MD, Willi Echevarria MD, Ana Greco MD, Berny Christian MD, Varinder Avilez MD, Estuardo Álvarez MD, Patrick Engel, DO, Yandy Jones MD, Seth Zarate DO, Wellington Erazo MD, Rehana Almanza MD, Mohsin Reza, MD, Chaz Quinones MD, Shirley Waterhouse, CNP,  Zoya Drake, CNP, Patrick Angulo, CNP,  Brittany Slater, KOMAL, Rosalie King, CNP, Michelle Rose, CNP, Latasha Diaz, CNP, Yun López, CNP, Lucero Guerrero, PA-C, Dahiana Meza, CNP, Rajni Sauer, CNP,  Ryanne Wolfe, CNP, Arabella Patel, CNP, Rd Ruby, PA-C, Mandi Ray, PA-C, Marleen Penny, CNP,        Maryjane Andrade, CNS, Kira Rowley, CNP, Margarita Infante, CNP         [facility]   IN-PATIENT SERVICE   [facility]    Progress Note    7/19/2025    7:32 AM    Name:   Rowdy Marie  MRN:     [id number]     Acct:      [id number]   Room:   [facility] Day:  8  Admit Date:  7/11/2025  8:09 PM    PCP:   Aracely Pierre MD  Code Status:  Full Code    Subjective:     C/C:   Chief Complaint   Patient presents with    Chest Pain       Stroke alert was called yesterday evening due to increased tremors and left lower extremity weakness.  CT head showed no acute findings.  CTA head and neck showed stenosis at the right posterior cerebral artery P1/P2 junction.  Stroke team evaluated the patient and felt the presentation was related to his Parkinson's disease.  This morning he states he feels generally fatigued, however his leg weakness seems improved compared to yesterday.  He denies

## 2025-07-19 NOTE — PLAN OF CARE
Problem: Discharge Planning  Goal: Discharge to home or other facility with appropriate resources  7/19/2025 0921 by Julio Silverman RN  Outcome: Progressing  7/18/2025 2032 by Anneliese Rao RN  Outcome: Progressing  7/18/2025 2031 by Anneliese Rao RN  Outcome: Progressing  7/18/2025 2028 by Anneliese Rao RN  Reactivated  Flowsheets (Taken 7/18/2025 2024)  Discharge to home or other facility with appropriate resources:   Identify barriers to discharge with patient and caregiver   Arrange for needed discharge resources and transportation as appropriate   Identify discharge learning needs (meds, wound care, etc)   Refer to discharge planning if patient needs post-hospital services based on physician order or complex needs related to functional status, cognitive ability or social support system     Problem: Skin/Tissue Integrity  Goal: Skin integrity remains intact  Description: 1.  Monitor for areas of redness and/or skin breakdown  2.  Assess vascular access sites hourly  3.  Every 4-6 hours minimum:  Change oxygen saturation probe site  4.  Every 4-6 hours:  If on nasal continuous positive airway pressure, respiratory therapy assess nares and determine need for appliance change or resting period  7/19/2025 0921 by Julio Silverman RN  Outcome: Progressing  7/18/2025 2032 by Anneliese Rao RN  Outcome: Progressing  7/18/2025 2031 by Anneliese Rao RN  Outcome: Progressing  7/18/2025 2028 by Anneliese Rao RN  Reactivated  Flowsheets (Taken 7/18/2025 2024)  Skin Integrity Remains Intact: Monitor for areas of redness and/or skin breakdown     Problem: Safety - Adult  Goal: Free from fall injury  7/19/2025 0921 by Julio Silverman RN  Outcome: Progressing  7/18/2025 2032 by Anneliese Rao RN  Outcome: Progressing  7/18/2025 2031 by Anneliese Rao RN  Outcome: Progressing  7/18/2025 2028 by Anneliese Rao RN  Reactivated  Flowsheets (Taken 7/18/2025 2024)  Free From Fall Injury: Instruct family/caregiver on  patient safety     Problem: ABCDS Injury Assessment  Goal: Absence of physical injury  7/19/2025 0921 by Julio Silverman RN  Outcome: Progressing  7/18/2025 2032 by Anneliese Rao RN  Outcome: Progressing  7/18/2025 2031 by Anneliese Rao RN  Outcome: Progressing  7/18/2025 2028 by Anneliese Rao RN  Reactivated  Flowsheets (Taken 7/18/2025 2024)  Absence of Physical Injury: Implement safety measures based on patient assessment     Problem: Pain  Goal: Verbalizes/displays adequate comfort level or baseline comfort level  7/19/2025 0921 by Julio Silverman RN  Outcome: Progressing  7/18/2025 2032 by Anneliese Rao RN  Outcome: Progressing  7/18/2025 2031 by Anneliese Rao RN  Outcome: Progressing  7/18/2025 2028 by Anneliese Rao RN  Reactivated  Flowsheets (Taken 7/18/2025 1930)  Verbalizes/displays adequate comfort level or baseline comfort level:   Encourage patient to monitor pain and request assistance   Assess pain using appropriate pain scale   Administer analgesics based on type and severity of pain and evaluate response   Implement non-pharmacological measures as appropriate and evaluate response   Notify Licensed Independent Practitioner if interventions unsuccessful or patient reports new pain     Problem: Cardiovascular - Adult  Goal: Maintains optimal cardiac output and hemodynamic stability  7/19/2025 0921 by Julio Silverman RN  Outcome: Progressing  7/18/2025 2032 by Anneliese Rao RN  Outcome: Progressing  7/18/2025 2031 by Anneliese Rao RN  Outcome: Progressing  7/18/2025 2028 by Anneliese Rao RN  Reactivated  Flowsheets (Taken 7/18/2025 2024)  Maintains optimal cardiac output and hemodynamic stability:   Monitor blood pressure and heart rate   Monitor urine output and notify Licensed Independent Practitioner for values outside of normal range   Assess for signs of decreased cardiac output  Goal: Absence of cardiac dysrhythmias or at baseline  7/19/2025 0921 by Julio Silverman RN  Outcome:

## 2025-07-19 NOTE — PROGRESS NOTES
Physical Therapy  Facility/Department: [address] STEPDOWN   Physical Therapy Daily Treatment Note    Patient Name: Rowdy Marie        MRN: [id number]    : 1950    Date of Service: 2025    Chief Complaint   Patient presents with    Chest Pain     Past Medical History:  has a past medical history of A-fib (HCC), PREET (acute kidney injury), Anemia, CAD (coronary artery disease), Esophageal stricture, Hyperlipidemia, Hypokalemia, Hypomagnesemia, NSTEMI (non-ST elevated myocardial infarction) ([facility]), Orthostatic hypotension, Parkinsons ([facility]), Presence of Watchman left atrial appendage closure device, and Prostate CA ([facility]).  Past Surgical History:  has a past surgical history that includes Coronary artery bypass graft; Cardiac catheterization; Coronary angioplasty with stent; Diagnostic Cardiac Cath Lab Procedure (2024); Cardiac procedure (N/A, 2024); Cardiac procedure (N/A, 2024); Cardiac procedure (N/A, 2024); Cardiac procedure (N/A, 2024); Cardiac procedure (N/A, 2025); Cardiac catheterization (2025); Cardiac procedure (N/A, 2025); and invasive vascular (N/A, 2025).    Discharge Recommendations  Discharge Recommendations: Patient would benefit from continued therapy after discharge  PT Equipment Recommendations  Equipment Needed: No  Other: pt owns RW    Assessment  Body Structures, Functions, Activity Limitations Requiring Skilled Therapeutic Intervention: Decreased functional mobility ;Decreased strength;Increased pain;Decreased posture;Decreased ROM;Decreased endurance;Decreased balance  Assessment: Pt completed bed mobility and functional transfers with SBA, ambulated 75ft w/ RW mostly CGA(Abdirizak to navigate RW while turning), ambulation distance limited due to reported fatigeu and mild SOB. Pt is expected to safely return home with family support. Recommending continued skilled physical therapy to address these deficits to return pt to prior level of

## 2025-07-19 NOTE — PLAN OF CARE
Problem: Discharge Planning  Goal: Discharge to home or other facility with appropriate resources  Recent Flowsheet Documentation  Taken 7/18/2025 2024 by Anneliese Rao RN  Discharge to home or other facility with appropriate resources:   Identify barriers to discharge with patient and caregiver   Arrange for needed discharge resources and transportation as appropriate   Identify discharge learning needs (meds, wound care, etc)   Refer to discharge planning if patient needs post-hospital services based on physician order or complex needs related to functional status, cognitive ability or social support system  7/18/2025 1557 by Vanessa Olson RN  Outcome: Completed     Problem: Skin/Tissue Integrity  Goal: Skin integrity remains intact  Description: 1.  Monitor for areas of redness and/or skin breakdown  2.  Assess vascular access sites hourly  3.  Every 4-6 hours minimum:  Change oxygen saturation probe site  4.  Every 4-6 hours:  If on nasal continuous positive airway pressure, respiratory therapy assess nares and determine need for appliance change or resting period  Recent Flowsheet Documentation  Taken 7/18/2025 2024 by Anneliese Rao RN  Skin Integrity Remains Intact: Monitor for areas of redness and/or skin breakdown  7/18/2025 1557 by Vanessa Olson RN  Outcome: Completed     Problem: Safety - Adult  Goal: Free from fall injury  Recent Flowsheet Documentation  Taken 7/18/2025 2024 by Anneliese Rao RN  Free From Fall Injury: Instruct family/caregiver on patient safety  7/18/2025 1557 by Vanessa Olson RN  Outcome: Completed     Problem: ABCDS Injury Assessment  Goal: Absence of physical injury  Recent Flowsheet Documentation  Taken 7/18/2025 2024 by Anneliese Rao RN  Absence of Physical Injury: Implement safety measures based on patient assessment  7/18/2025 1557 by Vanessa Olson RN  Outcome: Completed     Problem: Pain  Goal: Verbalizes/displays adequate comfort level or baseline comfort level  Recent

## 2025-07-20 NOTE — PROGRESS NOTES
CLINICAL PHARMACY NOTE: MEDS TO BEDS    Total # of Prescriptions Filled: 2   The following medications were delivered to the patient:  Midodrine 5mg tabs  Ferosul 325mg tabs    Additional Documentation:  Delivered to pt + 1 in room  402 on 07/19/25 at 2:54 PM. Copay was $2.36 paid with [employer]

## 2025-07-21 ENCOUNTER — CARE COORDINATION (OUTPATIENT)
Dept: CARE COORDINATION | Age: 75
End: 2025-07-21

## 2025-07-21 NOTE — CARE COORDINATION
Care Transitions Note    Initial Call - Call within 2 business days of discharge: Yes    Attempted to reach patient for transitions of care follow up. Unable to reach patient.    Outreach Attempts:   HIPAA compliant voicemail left for patient.     Patient: Rowdy Marie    Patient : 1950   MRN: [id number]    Reason for Admission: NSTEMI (non-ST elevated myocardial infarction) (HCC) ...  Discharge Date: 25  RURS: Readmission Risk Score: 27.5    Last Discharge Facility       Date Complaint Diagnosis Description Type Department Provider    25 Chest Pain NSTEMI (non-ST elevated myocardial infarction) (HCC) ... ED to Hosp-Admission (Discharged) (ADMITTED) [facility] Patrick Engel DO; Jemal Oliver..            Was this an external facility discharge? No    Follow Up Appointment:   Patient does not have a follow up appointment scheduled at time of call.    Future Appointments         Provider Specialty Dept Phone    2025 1:15 PM Mj Faith MD Cardiology [phone]    2025 9:00 AM [facility] [place] [facility] LPN Internal Medicine [phone]    9/15/2025 8:15 AM Aracely Pierre MD Internal Medicine [phone]    12/15/2025 10:00 AM Mj Faith MD Cardiology [phone]            Plan for follow-up on next business day.  2nd attempt 24 hr HFU call     Amalia Mcneil RN

## 2025-07-22 ENCOUNTER — CARE COORDINATION (OUTPATIENT)
Dept: CARE COORDINATION | Age: 75
End: 2025-07-22

## 2025-07-22 NOTE — CARE COORDINATION
Care Transitions Note    Initial Call - Call within 2 business days of discharge: Yes    Attempted to reach patient for transitions of care follow up. Unable to reach patient.    Outreach Attempts:   Multiple attempts to contact patient at phone numbers on file.   Unable to leave message.     Patient: Rowdy Marie    Patient : 1950   MRN: [id number]    Reason for Admission: STEMI (non-ST elevated myocardial infarction) (HCC) .  Discharge Date: 25  RURS: Readmission Risk Score: 27.5    Last Discharge Facility       Date Complaint Diagnosis Description Type Department Provider    25 Chest Pain NSTEMI (non-ST elevated myocardial infarction) (HCC) ... ED to Hosp-Admission (Discharged) (ADMITTED) [facility] Patrick Engel DO; Jemal Oliver..            Was this an external facility discharge? No    Follow Up Appointment:   Patient has hospital follow up appointment scheduled within 7 days of discharge.    Future Appointments         Provider Specialty Dept Phone    2025 9:00 AM SCHEDULE, [facility] LPN Internal Medicine [phone]    9/15/2025 8:15 AM Aracely Pierre MD Internal Medicine [phone]    12/15/2025 10:00 AM Mj Faith MD Cardiology [phone]            No further follow-up call indicated     Amalia Mcneil RN

## 2025-07-22 NOTE — PROGRESS NOTES
Physician Progress Note      PATIENT:               RADHA IVEY  CSN #:                  [id number]  :                       1950  ADMIT DATE:       2025 8:09 PM  DISCH DATE:        2025 3:19 PM  RESPONDING  PROVIDER #:        Patrick Engel DO          QUERY TEXT:    Please clarify in documentation the relationship, if any, between  NSTEMI and in stent stenosis. Are the conditions:    The clinical indicators include:   H/P IM: \"75 y.o.  male w/ MV CAD s/p CABG , s/p stents 2024 w/   recent staged PCI 2025 (w/ plans for further stent ASAP, with future   plans of revascularization of RCA stenosis at [facility]),who presents with   Chest Pain and is admitted to the hospital for the management of NSTEMI.\"   CARDIOLOGY PN: \"Successful balloon angioplasty of severe in-stent   restenosis for the vein graft to first diagonal using serial balloons 1.5,   2.0, 2.5 and 3.0 mm eventually we placed a 3.0 X30 agent drug-coated balloon   balloon with excellent angiographic results then we performed PCI of the   distal vein graft to the native diagonal using a single 2.5X 22 mm   drug-eluting stent.\"     cath  Successful balloon angioplasty of severe in-stent restenosis for the vein   graft to first diagonal using serial balloons 1.5, 2.0, 2.5 and 3.0 mm   eventually we placed a 3.0 X30 agent drug-coated balloon balloon with   excellent angiographic results then  we performed PCI of the distal vein graft to the native diagonal using a   single 2.5X 22 mm drug-eluting stent.  First Diagonal Branch: 1st Diag lesion, 90% stenosed. Lesion is the culprit   lesion.  Intervention:  1st Diag lesion: Stent: A single stent was placed. Drug-eluting   stent was successfully placed  There is a 0% residual stenosis post intervention. Mid Graft lesion (Graft To   1st Diag): Angioplasty was performed prior to stent deployment.    heparin drip, nitro gtt, Norvasc, ASA, statin, BB, Imdur and Plavix

## 2025-07-25 ENCOUNTER — OFFICE VISIT (OUTPATIENT)
Dept: INTERNAL MEDICINE CLINIC | Age: 75
End: 2025-07-25
Payer: MEDICARE

## 2025-07-25 VITALS
HEART RATE: 51 BPM | WEIGHT: 150.8 LBS | TEMPERATURE: 97.8 F | SYSTOLIC BLOOD PRESSURE: 116 MMHG | HEIGHT: 70 IN | RESPIRATION RATE: 18 BRPM | DIASTOLIC BLOOD PRESSURE: 66 MMHG | BODY MASS INDEX: 21.59 KG/M2

## 2025-07-25 DIAGNOSIS — G43.019 INTRACTABLE MIGRAINE WITHOUT AURA AND WITHOUT STATUS MIGRAINOSUS: ICD-10-CM

## 2025-07-25 DIAGNOSIS — Z00.00 INITIAL MEDICARE ANNUAL WELLNESS VISIT: Primary | ICD-10-CM

## 2025-07-25 PROCEDURE — 1159F MED LIST DOCD IN RCRD: CPT | Performed by: STUDENT IN AN ORGANIZED HEALTH CARE EDUCATION/TRAINING PROGRAM

## 2025-07-25 PROCEDURE — 1123F ACP DISCUSS/DSCN MKR DOCD: CPT | Performed by: STUDENT IN AN ORGANIZED HEALTH CARE EDUCATION/TRAINING PROGRAM

## 2025-07-25 PROCEDURE — G0438 PPPS, INITIAL VISIT: HCPCS | Performed by: STUDENT IN AN ORGANIZED HEALTH CARE EDUCATION/TRAINING PROGRAM

## 2025-07-25 RX ORDER — TRAMADOL HYDROCHLORIDE 50 MG/1
50 TABLET ORAL EVERY 6 HOURS PRN
COMMUNITY

## 2025-07-25 RX ORDER — SCOPOLAMINE 1 MG/3D
1 PATCH, EXTENDED RELEASE TRANSDERMAL
Qty: 5 PATCH | Refills: 0 | OUTPATIENT
Start: 2025-07-25 | End: 2025-08-09

## 2025-07-25 RX ORDER — HYDROCODONE BITARTRATE AND ACETAMINOPHEN 5; 325 MG/1; MG/1
1 TABLET ORAL EVERY 6 HOURS PRN
Qty: 10 TABLET | Refills: 0 | OUTPATIENT
Start: 2025-07-25 | End: 2025-07-28

## 2025-07-25 ASSESSMENT — PATIENT HEALTH QUESTIONNAIRE - PHQ9
2. FEELING DOWN, DEPRESSED OR HOPELESS: NOT AT ALL
SUM OF ALL RESPONSES TO PHQ QUESTIONS 1-9: 0
1. LITTLE INTEREST OR PLEASURE IN DOING THINGS: NOT AT ALL
SUM OF ALL RESPONSES TO PHQ QUESTIONS 1-9: 0

## 2025-07-25 NOTE — TELEPHONE ENCOUNTER
Rowdy Marie is calling to request a refill on the following medication(s):    Medication Request:  Requested Prescriptions     Pending Prescriptions Disp Refills    scopolamine (TRANSDERM-SCOP) transdermal patch 5 patch 0     Sig: Place 1 patch onto the skin every 72 hours for 15 days    HYDROcodone-acetaminophen (NORCO) 5-325 MG per tablet 10 tablet 0     Sig: Take 1 tablet by mouth every 6 hours as needed for Pain for up to 3 days. Max Daily Amount: 4 tablets       Last Visit Date (If Applicable):  6/12/2025    Next Visit Date:    7/25/2025        Last refilled 7/14/2025  RX PENDING

## 2025-07-25 NOTE — PROGRESS NOTES
Medicare Annual Wellness Visit    Rowdy Marie is here for Medicare AWV, Immunizations (Due for pneumococcal, covid, shingles and RSV vaccines), and Health Maintenance (Due for hep c screen and PSA screening)    Assessment & Plan        No follow-ups on file.     Subjective   Medicare Annual Wellness Visit    Patient's complete Health Risk Assessment and screening values have been reviewed and are found in Flowsheets. The following problems were reviewed today and where indicated follow up appointments were made and/or referrals ordered.    Positive Risk Factor Screenings with Interventions:    Fall Risk:  Do you feel unsteady or are you worried about falling? : (!) yes  2 or more falls in past year?: (!) yes  Fall with injury in past year?: no  Interventions:    Patient comments: states d/t parkinsons, uses cane and walker  Reviewed medications, home hazards, visual acuity, and co-morbidities that can increase risk for falls    Cognitive:   Clock Drawing Test (CDT): (!) Abnormal  Words recalled: 3 Words Recalled  Total Score: 3  Total Score Interpretation: Normal Mini-Cog    Interventions:  Patient comments: started clock, stated I don't know and didn't complete  See AVS for additional education material        Controlled Medication Review:    Today's Pain Level: No data recorded   Opioid Risk: (Low risk score <55) Opioid risk score: 11    Patient is low risk for opioid use disorder or overdose.    Last PDMP Yair as Reviewed:  Review User Review Instant Review Result   ROZ YVETTE 11/5/2024  1:54 PM     Reviewed PDMP [1]       Opioid Risk: (Low risk score <= 55)  Opioid risk score: 11    Any opioid medication usage will be addressed by their licensed provider at a future visit.        Self-assessment of health:  In general, how would you say your health is?: (!) Poor    Interventions:  Patient comments: due to parkinsons  Patient declines any further evaluation or treatment     Inactivity:  On average, how

## 2025-08-02 DIAGNOSIS — R63.4 UNEXPLAINED WEIGHT LOSS: ICD-10-CM

## 2025-08-03 ENCOUNTER — PATIENT MESSAGE (OUTPATIENT)
Dept: INTERNAL MEDICINE CLINIC | Age: 75
End: 2025-08-03

## 2025-08-04 RX ORDER — MIRTAZAPINE 7.5 MG/1
7.5 TABLET, FILM COATED ORAL NIGHTLY
Qty: 30 TABLET | Refills: 5 | Status: SHIPPED | OUTPATIENT
Start: 2025-08-04

## 2025-08-04 RX ORDER — SCOPOLAMINE 1 MG/3D
1 PATCH, EXTENDED RELEASE TRANSDERMAL
Qty: 10 PATCH | Refills: 0 | Status: SHIPPED | OUTPATIENT
Start: 2025-08-04 | End: 2025-09-03

## 2025-08-09 ENCOUNTER — PATIENT MESSAGE (OUTPATIENT)
Dept: INTERNAL MEDICINE CLINIC | Age: 75
End: 2025-08-09

## 2025-08-18 RX ORDER — AMLODIPINE BESYLATE 2.5 MG/1
2.5 TABLET ORAL DAILY
Qty: 90 TABLET | Refills: 2 | Status: SHIPPED | OUTPATIENT
Start: 2025-08-18

## 2025-08-18 RX ORDER — CARVEDILOL 3.12 MG/1
3.12 TABLET ORAL 2 TIMES DAILY
Qty: 180 TABLET | Refills: 2 | Status: SHIPPED | OUTPATIENT
Start: 2025-08-18

## 2025-08-18 RX ORDER — ISOSORBIDE MONONITRATE 30 MG/1
30 TABLET, EXTENDED RELEASE ORAL DAILY
Qty: 90 TABLET | Refills: 3 | Status: SHIPPED | OUTPATIENT
Start: 2025-08-18

## (undated) DEVICE — GUIDEWIRE VASC L180CM DIA0.035IN L7CM DIA3MM J TIP PTFE S

## (undated) DEVICE — TRAY SURG CUST CRD CATH TOLEDO

## (undated) DEVICE — CATHETER ANGIO 6FR L100CM DIA0.056IN FR4 CRV VASC ACCS EXPO

## (undated) DEVICE — CATHETER ETER GUID 6FR 100CM 070IN JUDKINS R 4 VASC COR W O

## (undated) DEVICE — ANGIOGRAPHIC CATHETER: Brand: EXPO™

## (undated) DEVICE — INTRODUCER SHTH 6FR L11CM 0.038IN STD SIDEPRT EXTN 3 W

## (undated) DEVICE — GUIDEWIRE 35/260/FC/PTFE/3J: Brand: GUIDEWIRE

## (undated) DEVICE — Device

## (undated) DEVICE — KIT MICRO INTRO 4FR STIFF 40CM NIGHTENALL TUNGSTEN 7SMT

## (undated) DEVICE — Device: Brand: EAGLE EYE PLATINUM RX DIGITAL IVUS CATHETER

## (undated) DEVICE — DEVICE VASC CLSR MYNX CONTROL 6FR 7FR 10ML LOK SYR W BLLN CATHETER INTEGR (ORDER MUTLIPLES OF 10 EACH)

## (undated) DEVICE — Device: Brand: MINAMO

## (undated) DEVICE — CATHETER GUID 6FR L150CM RAP EXCHG L25CM TIP 5.1FR PUSHROD

## (undated) DEVICE — SURGICAL PROCEDURE TRAY CRD CATH SVMMC

## (undated) DEVICE — ANGIO-SEAL VIP VASCULAR CLOSURE DEVICE: Brand: ANGIO-SEAL

## (undated) DEVICE — INTRODUCER SHTH 0.018 IN 4 FRX40 CM KT SFT TIP NIT VSI 7266V

## (undated) DEVICE — CATH BLLN ANGIO 2.50X15MM SC EUPHORA RX

## (undated) DEVICE — CATH BLLN ANGIO 2X15MM NC EUPHORIA RX

## (undated) DEVICE — GUIDEWIRE WITH ICE™ HYDROPHILIC COATING: Brand: LUGE™

## (undated) DEVICE — CATHETER ANGIO IM 0.056 INX6 FRX100 CM EXPO

## (undated) DEVICE — CATHETER GUID 6FR L100CM GRN PTFE JR4 TRUELUMEN W/ 2 SIDE H

## (undated) DEVICE — PACLITAXEL-COATED BALLOON CATHETER: Brand: AGENT™

## (undated) DEVICE — CATHETER ANGIO AMPLATZ LT 1 6 FRX100 CM INFIN

## (undated) DEVICE — PINNACLE INTRODUCER SHEATH: Brand: PINNACLE